# Patient Record
Sex: FEMALE | Race: WHITE | NOT HISPANIC OR LATINO | Employment: OTHER | URBAN - METROPOLITAN AREA
[De-identification: names, ages, dates, MRNs, and addresses within clinical notes are randomized per-mention and may not be internally consistent; named-entity substitution may affect disease eponyms.]

---

## 2017-01-11 ENCOUNTER — TRANSCRIBE ORDERS (OUTPATIENT)
Dept: ADMINISTRATIVE | Facility: HOSPITAL | Age: 77
End: 2017-01-11

## 2017-01-11 DIAGNOSIS — Z12.31 VISIT FOR SCREENING MAMMOGRAM: Primary | ICD-10-CM

## 2017-02-14 ENCOUNTER — HOSPITAL ENCOUNTER (OUTPATIENT)
Dept: MAMMOGRAPHY | Facility: HOSPITAL | Age: 77
Discharge: HOME | End: 2017-02-14
Attending: INTERNAL MEDICINE | Admitting: INTERNAL MEDICINE

## 2017-02-14 DIAGNOSIS — Z12.31 VISIT FOR SCREENING MAMMOGRAM: ICD-10-CM

## 2017-02-14 PROCEDURE — G0202 SCR MAMMO BI INCL CAD: HCPCS

## 2017-02-14 PROCEDURE — G0202 SCR MAMMO BI INCL CAD: HCPCS | Performed by: RADIOLOGY

## 2017-02-14 PROCEDURE — 77063 BREAST TOMOSYNTHESIS BI: CPT

## 2017-02-14 PROCEDURE — 77063 BREAST TOMOSYNTHESIS BI: CPT | Performed by: RADIOLOGY

## 2019-12-10 ENCOUNTER — TRANSCRIBE ORDERS (OUTPATIENT)
Dept: ADMINISTRATIVE | Facility: HOSPITAL | Age: 79
End: 2019-12-10

## 2019-12-10 DIAGNOSIS — R06.09 OTHER FORMS OF DYSPNEA: Primary | ICD-10-CM

## 2020-01-08 ENCOUNTER — HOSPITAL ENCOUNTER (OUTPATIENT)
Dept: CARDIOLOGY | Facility: HOSPITAL | Age: 80
Discharge: HOME | End: 2020-01-08

## 2020-01-08 VITALS — HEIGHT: 66 IN | BODY MASS INDEX: 25.55 KG/M2 | WEIGHT: 159 LBS

## 2020-01-08 DIAGNOSIS — R06.09 OTHER FORMS OF DYSPNEA: ICD-10-CM

## 2020-01-08 LAB
BH CV STRESS BP STAGE 1: NORMAL
BH CV STRESS BP STAGE 3: NORMAL
BH CV STRESS COMMENTS STAGE 1: NORMAL
BH CV STRESS DOSE REGADENOSON STAGE 1: 0.4
BH CV STRESS DURATION MIN STAGE 1: 1
BH CV STRESS DURATION MIN STAGE 2: 1
BH CV STRESS DURATION MIN STAGE 3: 1
BH CV STRESS DURATION MIN STAGE 4: 1
BH CV STRESS DURATION SEC STAGE 1: 10
BH CV STRESS DURATION SEC STAGE 2: 0
BH CV STRESS HR STAGE 1: 95
BH CV STRESS HR STAGE 2: 95
BH CV STRESS HR STAGE 3: 95
BH CV STRESS HR STAGE 4: 93
BH CV STRESS PROTOCOL 1: NORMAL
BH CV STRESS RECOVERY BP: NORMAL MMHG
BH CV STRESS RECOVERY HR: 96 BPM
BH CV STRESS STAGE 1: 1
BH CV STRESS STAGE 2: 2
BH CV STRESS STAGE 3: 3
BH CV STRESS STAGE 4: 4
LV EF NUC BP: 68 %
MAXIMAL PREDICTED HEART RATE: 140 BPM
PERCENT MAX PREDICTED HR: 70.71 %
STRESS BASELINE BP: NORMAL MMHG
STRESS BASELINE HR: 71 BPM
STRESS PERCENT HR: 83 %
STRESS POST PEAK BP: NORMAL MMHG
STRESS POST PEAK HR: 99 BPM
STRESS TARGET HR: 119 BPM

## 2020-01-08 PROCEDURE — 78452 HT MUSCLE IMAGE SPECT MULT: CPT

## 2020-01-08 PROCEDURE — 25010000002 REGADENOSON 0.4 MG/5ML SOLUTION: Performed by: INTERNAL MEDICINE

## 2020-01-08 PROCEDURE — A9500 TC99M SESTAMIBI: HCPCS | Performed by: INTERNAL MEDICINE

## 2020-01-08 PROCEDURE — 0 TECHNETIUM SESTAMIBI: Performed by: INTERNAL MEDICINE

## 2020-01-08 PROCEDURE — 93017 CV STRESS TEST TRACING ONLY: CPT

## 2020-01-08 RX ORDER — RAMIPRIL 1.25 MG/1
1.25 CAPSULE ORAL DAILY
COMMUNITY
Start: 2019-12-11 | End: 2021-08-02 | Stop reason: HOSPADM

## 2020-01-08 RX ORDER — SERTRALINE HYDROCHLORIDE 100 MG/1
150 TABLET, FILM COATED ORAL DAILY
COMMUNITY
Start: 2019-11-13

## 2020-01-08 RX ORDER — ALPRAZOLAM 0.5 MG/1
0.5 TABLET ORAL NIGHTLY PRN
Status: ON HOLD | COMMUNITY
Start: 2019-11-22 | End: 2021-08-26 | Stop reason: SDUPTHER

## 2020-01-08 RX ORDER — AMLODIPINE BESYLATE 5 MG/1
5 TABLET ORAL DAILY
COMMUNITY
Start: 2019-11-13 | End: 2021-05-10 | Stop reason: ALTCHOICE

## 2020-01-08 RX ORDER — HYDROCHLOROTHIAZIDE 25 MG/1
25 TABLET ORAL DAILY
COMMUNITY
Start: 2019-10-15 | End: 2021-08-02 | Stop reason: HOSPADM

## 2020-01-08 RX ORDER — CAFFEINE CITRATE 20 MG/ML
60 SOLUTION INTRAVENOUS ONCE
Status: COMPLETED | OUTPATIENT
Start: 2020-01-08 | End: 2020-01-08

## 2020-01-08 RX ORDER — SIMVASTATIN 20 MG
TABLET ORAL
COMMUNITY
Start: 2019-11-04 | End: 2021-05-10

## 2020-01-08 RX ORDER — TIOTROPIUM BROMIDE AND OLODATEROL 3.124; 2.736 UG/1; UG/1
2 SPRAY, METERED RESPIRATORY (INHALATION) DAILY PRN
Status: ON HOLD | COMMUNITY
Start: 2019-10-09 | End: 2021-10-04

## 2020-01-08 RX ORDER — HYDROCODONE BITARTRATE AND ACETAMINOPHEN 7.5; 325 MG/1; MG/1
1 TABLET ORAL EVERY 6 HOURS PRN
Status: ON HOLD | COMMUNITY
End: 2022-02-12 | Stop reason: SDUPTHER

## 2020-01-08 RX ADMIN — REGADENOSON 0.4 MG: 0.08 INJECTION, SOLUTION INTRAVENOUS at 12:01

## 2020-01-08 RX ADMIN — CAFFEINE CITRATE 60 MG: 20 INJECTION, SOLUTION INTRAVENOUS at 12:11

## 2020-01-08 RX ADMIN — TECHNETIUM TC 99M SESTAMIBI 1 DOSE: 1 INJECTION INTRAVENOUS at 12:00

## 2020-01-08 RX ADMIN — TECHNETIUM TC 99M SESTAMIBI 1 DOSE: 1 INJECTION INTRAVENOUS at 10:15

## 2021-03-22 ENCOUNTER — TRANSCRIBE ORDERS (OUTPATIENT)
Dept: ADMINISTRATIVE | Facility: HOSPITAL | Age: 81
End: 2021-03-22

## 2021-03-22 DIAGNOSIS — I48.91 ATRIAL FIBRILLATION, UNSPECIFIED TYPE (HCC): Primary | ICD-10-CM

## 2021-03-22 DIAGNOSIS — I48.0 PAROXYSMAL ATRIAL FIBRILLATION (HCC): ICD-10-CM

## 2021-04-23 ENCOUNTER — HOSPITAL ENCOUNTER (OUTPATIENT)
Dept: CARDIOLOGY | Facility: HOSPITAL | Age: 81
Discharge: HOME | End: 2021-04-23
Admitting: INTERNAL MEDICINE

## 2021-04-23 VITALS
HEIGHT: 66 IN | BODY MASS INDEX: 25.55 KG/M2 | WEIGHT: 159 LBS | DIASTOLIC BLOOD PRESSURE: 93 MMHG | SYSTOLIC BLOOD PRESSURE: 166 MMHG

## 2021-04-23 DIAGNOSIS — I48.0 PAROXYSMAL ATRIAL FIBRILLATION (HCC): ICD-10-CM

## 2021-04-23 LAB
BH CV ECHO MEAS - AO MAX PG (FULL): 11.9 MMHG
BH CV ECHO MEAS - AO MAX PG: 17.3 MMHG
BH CV ECHO MEAS - AO MEAN PG (FULL): 7.2 MMHG
BH CV ECHO MEAS - AO MEAN PG: 10.2 MMHG
BH CV ECHO MEAS - AO ROOT AREA (BSA CORRECTED): 1.4
BH CV ECHO MEAS - AO ROOT AREA: 5 CM^2
BH CV ECHO MEAS - AO ROOT DIAM: 2.5 CM
BH CV ECHO MEAS - AO V2 MAX: 207.7 CM/SEC
BH CV ECHO MEAS - AO V2 MEAN: 151.8 CM/SEC
BH CV ECHO MEAS - AO V2 VTI: 52.9 CM
BH CV ECHO MEAS - AVA(I,A): 1.5 CM^2
BH CV ECHO MEAS - AVA(I,D): 1.5 CM^2
BH CV ECHO MEAS - AVA(V,A): 1.6 CM^2
BH CV ECHO MEAS - AVA(V,D): 1.6 CM^2
BH CV ECHO MEAS - BSA(HAYCOCK): 1.8 M^2
BH CV ECHO MEAS - BSA: 1.8 M^2
BH CV ECHO MEAS - BZI_BMI: 26.5 KILOGRAMS/M^2
BH CV ECHO MEAS - BZI_METRIC_HEIGHT: 165.1 CM
BH CV ECHO MEAS - BZI_METRIC_WEIGHT: 72.1 KG
BH CV ECHO MEAS - EDV(CUBED): 81.9 ML
BH CV ECHO MEAS - EDV(MOD-SP2): 122 ML
BH CV ECHO MEAS - EDV(MOD-SP4): 137 ML
BH CV ECHO MEAS - EDV(TEICH): 85 ML
BH CV ECHO MEAS - EF(CUBED): 73.4 %
BH CV ECHO MEAS - EF(MOD-BP): 68 %
BH CV ECHO MEAS - EF(MOD-SP2): 72.1 %
BH CV ECHO MEAS - EF(MOD-SP4): 62.8 %
BH CV ECHO MEAS - EF(TEICH): 65.5 %
BH CV ECHO MEAS - ESV(CUBED): 21.8 ML
BH CV ECHO MEAS - ESV(MOD-SP2): 34 ML
BH CV ECHO MEAS - ESV(MOD-SP4): 51 ML
BH CV ECHO MEAS - ESV(TEICH): 29.3 ML
BH CV ECHO MEAS - FS: 35.7 %
BH CV ECHO MEAS - IVS/LVPW: 0.91
BH CV ECHO MEAS - IVSD: 0.93 CM
BH CV ECHO MEAS - LA DIMENSION: 4.8 CM
BH CV ECHO MEAS - LA/AO: 1.9
BH CV ECHO MEAS - LAD MAJOR: 5.3 CM
BH CV ECHO MEAS - LAT PEAK E' VEL: 8.7 CM/SEC
BH CV ECHO MEAS - LATERAL E/E' RATIO: 19.4
BH CV ECHO MEAS - LV DIASTOLIC VOL/BSA (35-75): 76.3 ML/M^2
BH CV ECHO MEAS - LV MASS(C)D: 140.7 GRAMS
BH CV ECHO MEAS - LV MASS(C)DI: 78.4 GRAMS/M^2
BH CV ECHO MEAS - LV MAX PG: 5.3 MMHG
BH CV ECHO MEAS - LV MEAN PG: 3 MMHG
BH CV ECHO MEAS - LV SYSTOLIC VOL/BSA (12-30): 28.4 ML/M^2
BH CV ECHO MEAS - LV V1 MAX: 115.4 CM/SEC
BH CV ECHO MEAS - LV V1 MEAN: 80.3 CM/SEC
BH CV ECHO MEAS - LV V1 VTI: 28.6 CM
BH CV ECHO MEAS - LVIDD: 4.3 CM
BH CV ECHO MEAS - LVIDS: 2.8 CM
BH CV ECHO MEAS - LVLD AP2: 7.8 CM
BH CV ECHO MEAS - LVLD AP4: 8.2 CM
BH CV ECHO MEAS - LVLS AP2: 6.2 CM
BH CV ECHO MEAS - LVLS AP4: 6.6 CM
BH CV ECHO MEAS - LVOT AREA (M): 2.8 CM^2
BH CV ECHO MEAS - LVOT AREA: 2.8 CM^2
BH CV ECHO MEAS - LVOT DIAM: 1.9 CM
BH CV ECHO MEAS - LVPWD: 1 CM
BH CV ECHO MEAS - MED PEAK E' VEL: 6.6 CM/SEC
BH CV ECHO MEAS - MEDIAL E/E' RATIO: 25.4
BH CV ECHO MEAS - MR MAX PG: 152 MMHG
BH CV ECHO MEAS - MR MAX VEL: 615.3 CM/SEC
BH CV ECHO MEAS - MR MEAN PG: 94.9 MMHG
BH CV ECHO MEAS - MR MEAN VEL: 454 CM/SEC
BH CV ECHO MEAS - MR VTI: 195 CM
BH CV ECHO MEAS - MV A MAX VEL: 118.5 CM/SEC
BH CV ECHO MEAS - MV DEC TIME: 0.37 SEC
BH CV ECHO MEAS - MV E MAX VEL: 170.9 CM/SEC
BH CV ECHO MEAS - MV E/A: 1.4
BH CV ECHO MEAS - MV MAX PG: 22 MMHG
BH CV ECHO MEAS - MV MEAN PG: 6.9 MMHG
BH CV ECHO MEAS - MV V2 MAX: 234.6 CM/SEC
BH CV ECHO MEAS - MV V2 MEAN: 114.6 CM/SEC
BH CV ECHO MEAS - MV V2 VTI: 65.8 CM
BH CV ECHO MEAS - MVA(VTI): 1.2 CM^2
BH CV ECHO MEAS - PA ACC SLOPE: 458.5 CM/SEC^2
BH CV ECHO MEAS - PA ACC TIME: 0.17 SEC
BH CV ECHO MEAS - PA MAX PG: 4.6 MMHG
BH CV ECHO MEAS - PA PR(ACCEL): 2.9 MMHG
BH CV ECHO MEAS - PA V2 MAX: 106.9 CM/SEC
BH CV ECHO MEAS - PI END-D VEL: 50.4 CM/SEC
BH CV ECHO MEAS - RAP SYSTOLE: 3 MMHG
BH CV ECHO MEAS - RVSP: 62 MMHG
BH CV ECHO MEAS - SI(AO): 147.4 ML/M^2
BH CV ECHO MEAS - SI(CUBED): 33.5 ML/M^2
BH CV ECHO MEAS - SI(LVOT): 44.6 ML/M^2
BH CV ECHO MEAS - SI(MOD-SP2): 49 ML/M^2
BH CV ECHO MEAS - SI(MOD-SP4): 47.9 ML/M^2
BH CV ECHO MEAS - SI(TEICH): 31 ML/M^2
BH CV ECHO MEAS - SV(AO): 264.4 ML
BH CV ECHO MEAS - SV(CUBED): 60.1 ML
BH CV ECHO MEAS - SV(LVOT): 80 ML
BH CV ECHO MEAS - SV(MOD-SP2): 88 ML
BH CV ECHO MEAS - SV(MOD-SP4): 86 ML
BH CV ECHO MEAS - SV(TEICH): 55.7 ML
BH CV ECHO MEAS - TAPSE (>1.6): 2.7 CM
BH CV ECHO MEAS - TR MAX PG: 59 MMHG
BH CV ECHO MEAS - TR MAX VEL: 354.6 CM/SEC
BH CV ECHO MEASUREMENTS AVERAGE E/E' RATIO: 22.34
BH CV XLRA - RV BASE: 4.1 CM
BH CV XLRA - RV LENGTH: 5.7 CM
BH CV XLRA - RV MID: 2.5 CM
BH CV XLRA - TDI S': 13.5 CM/SEC
LEFT ATRIUM VOLUME INDEX: 42.4 ML/M^2
LEFT ATRIUM VOLUME: 76 ML
MAXIMAL PREDICTED HEART RATE: 139 BPM
STRESS TARGET HR: 118 BPM

## 2021-04-23 PROCEDURE — 93306 TTE W/DOPPLER COMPLETE: CPT

## 2021-04-23 PROCEDURE — 93306 TTE W/DOPPLER COMPLETE: CPT | Performed by: INTERNAL MEDICINE

## 2021-05-05 ENCOUNTER — HOSPITAL ENCOUNTER (OUTPATIENT)
Dept: GENERAL RADIOLOGY | Facility: HOSPITAL | Age: 81
Discharge: HOME | End: 2021-05-05
Admitting: INTERNAL MEDICINE

## 2021-05-05 ENCOUNTER — TRANSCRIBE ORDERS (OUTPATIENT)
Dept: ADMINISTRATIVE | Facility: HOSPITAL | Age: 81
End: 2021-05-05

## 2021-05-05 DIAGNOSIS — I48.91 ATRIAL FIBRILLATION, UNSPECIFIED TYPE (HCC): Primary | ICD-10-CM

## 2021-05-05 PROCEDURE — 71046 X-RAY EXAM CHEST 2 VIEWS: CPT

## 2021-05-10 ENCOUNTER — OFFICE VISIT (OUTPATIENT)
Dept: CARDIOLOGY | Facility: CLINIC | Age: 81
End: 2021-05-10

## 2021-05-10 VITALS
DIASTOLIC BLOOD PRESSURE: 60 MMHG | SYSTOLIC BLOOD PRESSURE: 110 MMHG | BODY MASS INDEX: 22.98 KG/M2 | HEIGHT: 66 IN | WEIGHT: 143 LBS | HEART RATE: 119 BPM

## 2021-05-10 DIAGNOSIS — I48.91 ATRIAL FIBRILLATION WITH RVR (HCC): Primary | ICD-10-CM

## 2021-05-10 DIAGNOSIS — J43.9 PULMONARY EMPHYSEMA, UNSPECIFIED EMPHYSEMA TYPE (HCC): ICD-10-CM

## 2021-05-10 DIAGNOSIS — I10 ESSENTIAL HYPERTENSION: ICD-10-CM

## 2021-05-10 DIAGNOSIS — I48.0 PAROXYSMAL ATRIAL FIBRILLATION (HCC): ICD-10-CM

## 2021-05-10 PROCEDURE — 99204 OFFICE O/P NEW MOD 45 MIN: CPT | Performed by: INTERNAL MEDICINE

## 2021-05-10 PROCEDURE — 93000 ELECTROCARDIOGRAM COMPLETE: CPT | Performed by: INTERNAL MEDICINE

## 2021-05-10 RX ORDER — MULTIPLE VITAMINS W/ MINERALS TAB 9MG-400MCG
1 TAB ORAL DAILY
COMMUNITY

## 2021-05-10 RX ORDER — ERGOCALCIFEROL (VITAMIN D2) 50 MCG
1 CAPSULE ORAL DAILY
COMMUNITY

## 2021-05-10 RX ORDER — DILTIAZEM HYDROCHLORIDE 180 MG/1
1 CAPSULE, EXTENDED RELEASE ORAL DAILY
COMMUNITY
End: 2021-08-02 | Stop reason: HOSPADM

## 2021-05-10 RX ORDER — MULTIVIT WITH MINERALS/LUTEIN
1000 TABLET ORAL DAILY
Status: ON HOLD | COMMUNITY
End: 2021-08-23

## 2021-05-10 RX ORDER — CHLORAL HYDRATE 500 MG
1000 CAPSULE ORAL
COMMUNITY

## 2021-05-10 NOTE — PROGRESS NOTES
Subjective:     Encounter Date:05/10/2021    Primary Care Physician: Santiago Chaudhry MD      Patient ID: Yin Law is a 80 y.o. female.    Chief Complaint:Atrial Fibrillation    PROBLEM LIST:  1. Atrial fibrillation  a. Diagnosed 3/2021  b. CHADSVASC- 4  c. 4/23/2021 EF 66 to 70%.  Severe mitral calcification.  Moderate mitral regurgitation with multiple jets.  Moderate mitral valve stenosis.  RVSP greater than 55 mmHg.  Severe pulmonary hypertension.  Moderate calcification of aortic valve mean gradient 10 mmHg.  2. Hypertension  3. Dyslipidemia  a. 1/8/2020 MPS EF is 68%.  Normal perfusion, no ischemia.  4. Anxiety/depression  5. Asthma  6. COPD  7. Tobacco abuse, ongoing  8. Rheumatoid arthritis  9. Osteoporosis  10. Surgeries:  a. Right colectomy  b. Hysterectomy Total  c. Cholecystectomy  d. Appendectomy  e. Bilateral cataract surgery  f. Left carpal tunnel surgery  g. Tonsillectomy      No Known Allergies      Current Outpatient Medications:   •  ALPRAZolam (XANAX) 0.5 MG tablet, Take 0.5 mg by mouth At Night As Needed., Disp: , Rfl:   •  apixaban (ELIQUIS) 5 MG tablet tablet, Take 5 mg by mouth 2 (Two) Times a Day., Disp: , Rfl:   •  ascorbic acid (VITAMIN C) 1000 MG tablet, Take 1,000 mg by mouth Daily., Disp: , Rfl:   •  Calcium Carbonate (CALCIUM 600 PO), Take 1 tablet by mouth Daily., Disp: , Rfl:   •  dilTIAZem (TIAZAC) 180 MG 24 hr capsule, Take 1 capsule by mouth Daily., Disp: , Rfl:   •  hydroCHLOROthiazide (HYDRODIURIL) 25 MG tablet, Take 25 mg by mouth Daily., Disp: , Rfl:   •  HYDROcodone-acetaminophen (NORCO) 7.5-325 MG per tablet, Take 1 tablet by mouth Daily As Needed., Disp: , Rfl:   •  multivitamin with minerals (CENTRUM SILVER ADULT 50+ PO), Take 1 tablet by mouth Daily., Disp: , Rfl:   •  Omega-3 Fatty Acids (fish oil) 1000 MG capsule capsule, Take 1,000 mg by mouth Daily With Breakfast., Disp: , Rfl:   •  ramipril (ALTACE) 1.25 MG capsule, Take 1.25 mg by mouth Daily., Disp: ,  Rfl:   •  sertraline (ZOLOFT) 100 MG tablet, Take  by mouth Daily. 1.5 tab daily, Disp: , Rfl:   •  STIOLTO RESPIMAT 2.5-2.5 MCG/ACT aerosol solution inhaler, Inhale 2 puffs Daily As Needed., Disp: , Rfl:   •  Vitamin D, Ergocalciferol, 50 MCG (2000 UT) capsule, Take 1 tablet by mouth Daily., Disp: , Rfl:   •  VITAMIN E PO, Take 1 tablet by mouth Daily., Disp: , Rfl:         History of Present Illness    Patient is an 80-year-old  female who is being seen today for further evaluation of atrial fibrillation with rapid ventricular response.  She has no previous history of this.  She notes that she was going for a routine follow-up and was noting a nervous sensation in her chest.  She notes that her vitals were checked with pulse oximetry and her heart rate was noted to be elevated.  She was then given an EKG which showed atrial fibrillation with rapid ventricular response.  She was started on medical therapy for this.  Since this time she has noted significant increase of shortness of breath.  She has been maintained on anticoagulation.  Denies any chest pain, pressure, tightness.  Denies any syncope, near syncope.  Has had some edema primarily in her left lower extremity.    The following portions of the patient's history were reviewed and updated as appropriate: allergies, current medications, past family history, past medical history, past social history, past surgical history and problem list.    Family History   Problem Relation Age of Onset   • Alzheimer's disease Mother    • Parkinsonism Brother        Social History     Tobacco Use   • Smoking status: Current Every Day Smoker     Packs/day: 0.25     Types: Cigarettes   • Smokeless tobacco: Never Used   • Tobacco comment: 1-2 cigs occas   Vaping Use   • Vaping Use: Never used   Substance Use Topics   • Alcohol use: Never   • Drug use: Never         Review of Systems   Constitutional: Positive for malaise/fatigue. Negative for fever.   HENT: Positive  "for hearing loss and tinnitus. Negative for nosebleeds.    Eyes: Positive for blurred vision. Negative for redness and visual disturbance.   Cardiovascular: Positive for leg swelling, orthopnea and palpitations. Negative for paroxysmal nocturnal dyspnea.   Respiratory: Positive for shortness of breath. Negative for cough, snoring, sputum production and wheezing.    Endocrine: Positive for heat intolerance.   Hematologic/Lymphatic: Negative for bleeding problem.   Skin: Negative for flushing, itching and rash.   Musculoskeletal: Positive for arthritis, muscle weakness and myalgias. Negative for falls, joint pain and muscle cramps.   Gastrointestinal: Negative for abdominal pain, diarrhea, heartburn, nausea and vomiting.   Genitourinary: Positive for urgency. Negative for hematuria.   Neurological: Positive for excessive daytime sleepiness and dizziness. Negative for headaches, tremors and weakness.   Psychiatric/Behavioral: Negative for substance abuse. The patient is nervous/anxious.           Objective:   /60 (BP Location: Right arm, Patient Position: Sitting)   Pulse 119   Ht 167.6 cm (66\")   Wt 64.9 kg (143 lb)   BMI 23.08 kg/m²         Vitals reviewed.   Constitutional:       Appearance: Healthy appearance. Well-developed and not in distress.   Eyes:      Conjunctiva/sclera: Conjunctivae normal.      Pupils: Pupils are equal, round, and reactive to light.   HENT:      Head: Normocephalic and atraumatic.    Mouth/Throat:      Pharynx: Oropharynx is clear.   Neck:      Thyroid: Thyroid normal. No thyromegaly.      Vascular: Normal carotid pulses. No carotid bruit or JVD. JVD normal.      Lymphadenopathy: No cervical adenopathy.   Pulmonary:      Effort: No respiratory distress.      Breath sounds: Examination of the right-middle field reveals rhonchi. Examination of the right-lower field reveals rhonchi. Examination of the left-lower field reveals rhonchi. No wheezing. Rhonchi present. No rales.   Chest: "      Chest wall: Not tender to palpatation.   Cardiovascular:      Normal rate. Regular rhythm.      No gallop.   Pulses:     Carotid: 2+ bilaterally.     Dorsalis pedis: 2+ bilaterally.     Posterior tibial: 2+ bilaterally.  Abdominal:      General: There is no distension or abdominal bruit.      Palpations: There is no abdominal mass.      Tenderness: There is no abdominal tenderness. There is no rebound.   Musculoskeletal:         General: No tenderness or deformity.      Extremities: No clubbing present.Skin:     General: Skin is warm and dry. There is no cyanosis.      Findings: No rash.   Neurological:      Mental Status: Alert, oriented to person, place, and time and oriented to person, place and time.           ECG 12 Lead    Date/Time: 5/10/2021 9:34 AM  Performed by: He Montoya MD  Authorized by: He Montoya MD   Comparison: compared with previous ECG from 11/17/2014  Comparison to previous ECG: Sinus rhythm has been replaced with atrial fibrillation with rapid ventricular response.  Rhythm: atrial fibrillation  Other findings: non-specific ST-T wave changes    Clinical impression: abnormal EKG          Labs 11/2020 LDL 57        Assessment:   Assessment/Plan      Diagnoses and all orders for this visit:    1. Atrial fibrillation with RVR (CMS/HCC) (Primary)  -     ECG 12 Lead    2. Pulmonary emphysema, unspecified emphysema type (CMS/HCC)    3. Essential hypertension      1.  Atrial fibrillation with RVR.  Symptomatic, new functional class III dyspnea  2.  History of COPD with pulmonary scarring.  Remote tobacco use.  Abnormal chest x-ray but not hypoxic.  3.  Hypertension, well controlled today on diltiazem.    Recommendations:  1.  Discussed option with the patient his daughter.  Given her symptomatic status, would recommend she undergo cardioversion within the next 1 to 2 weeks.  2.  Continue current anticoagulation therapy.  3.  Withhold antiarrhythmic at this time, given this is her first  event, and reluctant to take medications.  4.  If symptoms do not resolve post cardioversion, will consider ischemic evaluation plus minus repeat pulmonary evaluation.  5.  Further recommendations after the cardioversion       Roslyn SERRATO scribed portions of this dictation for Dr. He Montoya.     I have seen and examined the patient, I have reviewed the note, discussed the case with the advance practice clinician, made necessary changes and I agree with the final note.    He Montoya MD  05/10/21  10:16 EDT        Dictated utilizing Dragon dictation

## 2021-05-17 ENCOUNTER — PREP FOR SURGERY (OUTPATIENT)
Dept: OTHER | Facility: HOSPITAL | Age: 81
End: 2021-05-17

## 2021-05-17 DIAGNOSIS — I48.19 ATRIAL FIBRILLATION, PERSISTENT (HCC): Primary | ICD-10-CM

## 2021-05-17 RX ORDER — ONDANSETRON 2 MG/ML
4 INJECTION INTRAMUSCULAR; INTRAVENOUS EVERY 6 HOURS PRN
Status: CANCELLED | OUTPATIENT
Start: 2021-05-17

## 2021-05-17 RX ORDER — SODIUM CHLORIDE 0.9 % (FLUSH) 0.9 %
3 SYRINGE (ML) INJECTION EVERY 12 HOURS SCHEDULED
Status: CANCELLED | OUTPATIENT
Start: 2021-05-17

## 2021-05-17 RX ORDER — SODIUM CHLORIDE 0.9 % (FLUSH) 0.9 %
1-10 SYRINGE (ML) INJECTION AS NEEDED
Status: CANCELLED | OUTPATIENT
Start: 2021-05-17

## 2021-05-21 ENCOUNTER — APPOINTMENT (OUTPATIENT)
Dept: CARDIOLOGY | Facility: HOSPITAL | Age: 81
End: 2021-05-21

## 2021-05-21 ENCOUNTER — HOSPITAL ENCOUNTER (OUTPATIENT)
Dept: CARDIOLOGY | Facility: HOSPITAL | Age: 81
Discharge: HOME | End: 2021-05-21
Attending: INTERNAL MEDICINE | Admitting: INTERNAL MEDICINE

## 2021-05-21 VITALS
SYSTOLIC BLOOD PRESSURE: 101 MMHG | WEIGHT: 143.3 LBS | BODY MASS INDEX: 23.03 KG/M2 | HEART RATE: 98 BPM | RESPIRATION RATE: 16 BRPM | HEIGHT: 66 IN | DIASTOLIC BLOOD PRESSURE: 60 MMHG | TEMPERATURE: 97.4 F | OXYGEN SATURATION: 100 %

## 2021-05-21 DIAGNOSIS — I48.91 ATRIAL FIBRILLATION, UNSPECIFIED TYPE (HCC): ICD-10-CM

## 2021-05-21 DIAGNOSIS — I48.19 ATRIAL FIBRILLATION, PERSISTENT (HCC): ICD-10-CM

## 2021-05-21 LAB
ALBUMIN SERPL-MCNC: 3.9 G/DL (ref 3.5–5.2)
ALBUMIN/GLOB SERPL: 1.6 G/DL
ALP SERPL-CCNC: 102 U/L (ref 39–117)
ALT SERPL W P-5'-P-CCNC: 14 U/L (ref 1–33)
ANION GAP SERPL CALCULATED.3IONS-SCNC: 9 MMOL/L (ref 5–15)
AST SERPL-CCNC: 26 U/L (ref 1–32)
BILIRUB SERPL-MCNC: 0.6 MG/DL (ref 0–1.2)
BUN SERPL-MCNC: 11 MG/DL (ref 8–23)
BUN/CREAT SERPL: 18 (ref 7–25)
CALCIUM SPEC-SCNC: 9.2 MG/DL (ref 8.6–10.5)
CHLORIDE SERPL-SCNC: 99 MMOL/L (ref 98–107)
CHOLEST SERPL-MCNC: 138 MG/DL (ref 0–200)
CO2 SERPL-SCNC: 27 MMOL/L (ref 22–29)
CREAT SERPL-MCNC: 0.61 MG/DL (ref 0.57–1)
DEPRECATED RDW RBC AUTO: 42.4 FL (ref 37–54)
ERYTHROCYTE [DISTWIDTH] IN BLOOD BY AUTOMATED COUNT: 12.6 % (ref 12.3–15.4)
GFR SERPL CREATININE-BSD FRML MDRD: 94 ML/MIN/1.73
GLOBULIN UR ELPH-MCNC: 2.4 GM/DL
GLUCOSE SERPL-MCNC: 98 MG/DL (ref 65–99)
HBA1C MFR BLD: 4.6 % (ref 4.8–5.6)
HCT VFR BLD AUTO: 31.8 % (ref 34–46.6)
HDLC SERPL-MCNC: 56 MG/DL (ref 40–60)
HGB BLD-MCNC: 9.7 G/DL (ref 12–15.9)
LDLC SERPL CALC-MCNC: 70 MG/DL (ref 0–100)
LDLC/HDLC SERPL: 1.25 {RATIO}
MAXIMAL PREDICTED HEART RATE: 139 BPM
MCH RBC QN AUTO: 28 PG (ref 26.6–33)
MCHC RBC AUTO-ENTMCNC: 30.5 G/DL (ref 31.5–35.7)
MCV RBC AUTO: 91.6 FL (ref 79–97)
PLATELET # BLD AUTO: 233 10*3/MM3 (ref 140–450)
PMV BLD AUTO: 10.6 FL (ref 6–12)
POTASSIUM SERPL-SCNC: 4 MMOL/L (ref 3.5–5.2)
PROT SERPL-MCNC: 6.3 G/DL (ref 6–8.5)
RBC # BLD AUTO: 3.47 10*6/MM3 (ref 3.77–5.28)
SODIUM SERPL-SCNC: 135 MMOL/L (ref 136–145)
STRESS TARGET HR: 118 BPM
TRIGL SERPL-MCNC: 59 MG/DL (ref 0–150)
VLDLC SERPL-MCNC: 12 MG/DL (ref 5–40)
WBC # BLD AUTO: 4.83 10*3/MM3 (ref 3.4–10.8)

## 2021-05-21 PROCEDURE — 80053 COMPREHEN METABOLIC PANEL: CPT | Performed by: NURSE PRACTITIONER

## 2021-05-21 PROCEDURE — 92960 CARDIOVERSION ELECTRIC EXT: CPT | Performed by: INTERNAL MEDICINE

## 2021-05-21 PROCEDURE — 82947 ASSAY GLUCOSE BLOOD QUANT: CPT

## 2021-05-21 PROCEDURE — 83036 HEMOGLOBIN GLYCOSYLATED A1C: CPT | Performed by: NURSE PRACTITIONER

## 2021-05-21 PROCEDURE — 85014 HEMATOCRIT: CPT

## 2021-05-21 PROCEDURE — 80061 LIPID PANEL: CPT | Performed by: NURSE PRACTITIONER

## 2021-05-21 PROCEDURE — 82565 ASSAY OF CREATININE: CPT

## 2021-05-21 PROCEDURE — 84295 ASSAY OF SERUM SODIUM: CPT

## 2021-05-21 PROCEDURE — 92960 CARDIOVERSION ELECTRIC EXT: CPT

## 2021-05-21 PROCEDURE — 85027 COMPLETE CBC AUTOMATED: CPT | Performed by: NURSE PRACTITIONER

## 2021-05-21 PROCEDURE — 84132 ASSAY OF SERUM POTASSIUM: CPT

## 2021-05-21 PROCEDURE — 25010000002 MIDAZOLAM PER 1 MG: Performed by: INTERNAL MEDICINE

## 2021-05-21 PROCEDURE — 82330 ASSAY OF CALCIUM: CPT

## 2021-05-21 PROCEDURE — 36415 COLL VENOUS BLD VENIPUNCTURE: CPT

## 2021-05-21 PROCEDURE — 82803 BLOOD GASES ANY COMBINATION: CPT

## 2021-05-21 RX ORDER — MIDAZOLAM HYDROCHLORIDE 1 MG/ML
INJECTION INTRAMUSCULAR; INTRAVENOUS
Status: DISCONTINUED
Start: 2021-05-21 | End: 2021-05-21 | Stop reason: HOSPADM

## 2021-05-21 RX ORDER — MIDAZOLAM HYDROCHLORIDE 1 MG/ML
INJECTION INTRAMUSCULAR; INTRAVENOUS
Status: COMPLETED | OUTPATIENT
Start: 2021-05-21 | End: 2021-05-21

## 2021-05-21 RX ORDER — AMIODARONE HYDROCHLORIDE 200 MG/1
200 TABLET ORAL 2 TIMES DAILY
Qty: 60 TABLET | Refills: 5 | Status: SHIPPED | OUTPATIENT
Start: 2021-05-21 | End: 2021-08-02 | Stop reason: HOSPADM

## 2021-05-21 RX ORDER — SODIUM CHLORIDE 0.9 % (FLUSH) 0.9 %
1-10 SYRINGE (ML) INJECTION AS NEEDED
Status: DISCONTINUED | OUTPATIENT
Start: 2021-05-21 | End: 2021-05-21 | Stop reason: HOSPADM

## 2021-05-21 RX ORDER — ONDANSETRON 2 MG/ML
4 INJECTION INTRAMUSCULAR; INTRAVENOUS EVERY 6 HOURS PRN
Status: DISCONTINUED | OUTPATIENT
Start: 2021-05-21 | End: 2021-05-21 | Stop reason: HOSPADM

## 2021-05-21 RX ORDER — SODIUM CHLORIDE 0.9 % (FLUSH) 0.9 %
3 SYRINGE (ML) INJECTION EVERY 12 HOURS SCHEDULED
Status: DISCONTINUED | OUTPATIENT
Start: 2021-05-21 | End: 2021-05-21 | Stop reason: HOSPADM

## 2021-05-21 RX ADMIN — MIDAZOLAM 2 MG: 1 INJECTION INTRAMUSCULAR; INTRAVENOUS at 13:04

## 2021-05-21 RX ADMIN — METHOHEXITAL SODIUM 20 MG: 500 INJECTION, POWDER, LYOPHILIZED, FOR SOLUTION INTRAMUSCULAR; INTRAVENOUS; RECTAL at 13:04

## 2021-05-25 LAB
BASE EXCESS BLDA CALC-SCNC: 6 MMOL/L (ref -5–5)
CA-I BLDA-SCNC: 1.25 MMOL/L (ref 1.2–1.32)
CO2 BLDA-SCNC: 31 MMOL/L (ref 24–29)
CREAT BLDA-MCNC: 0.6 MG/DL (ref 0.6–1.3)
GLUCOSE BLDC GLUCOMTR-MCNC: 97 MG/DL (ref 70–130)
HCO3 BLDA-SCNC: 30 MMOL/L (ref 22–26)
HCT VFR BLDA CALC: 32 % (ref 38–51)
HGB BLDA-MCNC: 10.9 G/DL (ref 12–17)
PCO2 BLDA: 44.1 MM HG (ref 35–45)
PH BLDA: 7.44 PH UNITS (ref 7.35–7.6)
PO2 BLDA: 26 MMHG (ref 80–105)
POTASSIUM BLDA-SCNC: 3.8 MMOL/L (ref 3.5–4.9)
SAO2 % BLDA: 49 % (ref 95–98)
SODIUM BLD-SCNC: 136 MMOL/L (ref 138–146)

## 2021-06-17 ENCOUNTER — PREP FOR SURGERY (OUTPATIENT)
Dept: OTHER | Facility: HOSPITAL | Age: 81
End: 2021-06-17

## 2021-06-17 DIAGNOSIS — I48.19 ATRIAL FIBRILLATION, PERSISTENT (HCC): Primary | ICD-10-CM

## 2021-06-17 RX ORDER — SODIUM CHLORIDE 0.9 % (FLUSH) 0.9 %
3 SYRINGE (ML) INJECTION EVERY 12 HOURS SCHEDULED
Status: CANCELLED | OUTPATIENT
Start: 2021-06-17

## 2021-06-17 RX ORDER — ONDANSETRON 2 MG/ML
4 INJECTION INTRAMUSCULAR; INTRAVENOUS EVERY 6 HOURS PRN
Status: CANCELLED | OUTPATIENT
Start: 2021-06-17

## 2021-06-17 RX ORDER — SODIUM CHLORIDE 0.9 % (FLUSH) 0.9 %
1-10 SYRINGE (ML) INJECTION AS NEEDED
Status: CANCELLED | OUTPATIENT
Start: 2021-06-17

## 2021-06-18 ENCOUNTER — HOSPITAL ENCOUNTER (OUTPATIENT)
Dept: CARDIOLOGY | Facility: HOSPITAL | Age: 81
Discharge: HOME | End: 2021-06-18
Attending: INTERNAL MEDICINE | Admitting: INTERNAL MEDICINE

## 2021-06-18 VITALS
SYSTOLIC BLOOD PRESSURE: 168 MMHG | TEMPERATURE: 97.8 F | BODY MASS INDEX: 23.13 KG/M2 | DIASTOLIC BLOOD PRESSURE: 62 MMHG | HEIGHT: 66 IN | HEART RATE: 64 BPM | OXYGEN SATURATION: 98 %

## 2021-06-18 DIAGNOSIS — I48.91 ATRIAL FIBRILLATION, UNSPECIFIED TYPE (HCC): ICD-10-CM

## 2021-06-18 PROCEDURE — 93010 ELECTROCARDIOGRAM REPORT: CPT | Performed by: INTERNAL MEDICINE

## 2021-06-18 PROCEDURE — 93005 ELECTROCARDIOGRAM TRACING: CPT

## 2021-06-18 NOTE — H&P
[facility] Consult/H&P Note      Referring Provider: He Montoya MD  Primary Provider:  Santiago Chaudhry MD  Reason for Consultation: Afib    Problem list:    1. Atrial fibrillation  a. Diagnosed 3/2021  b. CHADSVASC- 4, on Eliquis  c. 4/23/2021 EF 66 to 70%.  Severe mitral calcification.  Moderate mitral regurgitation with multiple jets.  Moderate mitral valve stenosis.  RVSP greater than 55 mmHg.  Severe pulmonary hypertension.  Moderate calcification of aortic valve mean gradient 10 mmHg.  d. 5/21/21, s/p ECV but only maintained NSR for 10-15 seconds. Subsequently started on amiodarone.   e. Returned on 6/18/21 for repeat ECV.   2. Hypertension  3. Dyslipidemia  a. 1/8/2020 MPS EF is 68%.  Normal perfusion, no ischemia.  4. Anxiety/depression  5. Asthma  6. COPD  7. Tobacco abuse, ongoing  8. Rheumatoid arthritis  9. Osteoporosis  10. Surgeries:  a. Right colectomy  b. Hysterectomy Total  c. Cholecystectomy  d. Appendectomy  e. Bilateral cataract surgery  f. Left carpal tunnel surgery  g. Tonsillectomy        HISTORY OF PRESENT ILLNESS:  Yin Law is a 80 y.o. female with the above noted pmhx who presents today for ECV. Patient with known history of atrial fibrillation. She was diagnosed in March 2021 and seen by Dr. Montoya in office on 5/10/21 at which time she was set up for ECV. She underwent ECV on 5/21/21 but only maintained NSR for about 10-15 seconds. She was then started on amiodarone and now returns for repeat ECV in NSR.       REVIEW OF SYSTEMS  Pertinent positives and negatives are listed in the HPI.      SOCIAL HISTORY:   reports that she has been smoking cigarettes. She has been smoking about 0.25 packs per day. She has never used smokeless tobacco. She reports that she does not drink alcohol and does not use drugs.     FAMILY HISTORY:  family history includes Alzheimer's disease in her mother; Parkinsonism in her brother.     CURRENT MEDICATIONS:    Prior to Admission  medications    Medication Sig Start Date End Date Taking? Authorizing Provider   ALPRAZolam (XANAX) 0.5 MG tablet Take 0.5 mg by mouth At Night As Needed. 11/22/19   Laurie Oropeza MD   amiodarone (PACERONE) 200 MG tablet Take 1 tablet by mouth 2 (Two) Times a Day. 5/21/21   He Montoya MD   apixaban (ELIQUIS) 5 MG tablet tablet Take 5 mg by mouth 2 (Two) Times a Day.    Laurie Oropeza MD   ascorbic acid (VITAMIN C) 1000 MG tablet Take 1,000 mg by mouth Daily.    Laurie Oropeza MD   Calcium Carbonate (CALCIUM 600 PO) Take 1 tablet by mouth Daily.    Laurie Oropeza MD   dilTIAZem (TIAZAC) 180 MG 24 hr capsule Take 1 capsule by mouth Daily.    Laurie Oropeza MD   hydroCHLOROthiazide (HYDRODIURIL) 25 MG tablet Take 25 mg by mouth Daily. 10/15/19   Laurie Oropeza MD   HYDROcodone-acetaminophen (NORCO) 7.5-325 MG per tablet Take 1 tablet by mouth Every 6 (Six) Hours As Needed.    Laurie Oropeza MD   multivitamin with minerals (CENTRUM SILVER ADULT 50+ PO) Take 1 tablet by mouth Daily.    Laurie Oropeza MD   Omega-3 Fatty Acids (fish oil) 1000 MG capsule capsule Take 1,000 mg by mouth Daily With Breakfast.    Laurie Oropeza MD   ramipril (ALTACE) 1.25 MG capsule Take 1.25 mg by mouth Daily. 12/11/19   Laurie Oropeza MD   sertraline (ZOLOFT) 100 MG tablet Take 150 mg by mouth Daily. 1.5 tab daily 11/13/19   Laurie Oropeza MD   STIOLTO RESPIMAT 2.5-2.5 MCG/ACT aerosol solution inhaler Inhale 2 puffs Daily As Needed. 10/9/19   Laurie Oropeza MD   Vitamin D, Ergocalciferol, 50 MCG (2000 UT) capsule Take 1 tablet by mouth Daily.    Laurie Oropeza MD   VITAMIN E PO Take 1 tablet by mouth Daily.    Laurie Oropeza MD      Allergies:  Patient has no known allergies.    Objective     Vital Sign Min/Max for last 24 hours  No data recorded   No data recorded   No data recorded   No data recorded   No data recorded   No data recorded    No data recorded     PHYSICAL EXAM:  GENERAL: This is a well-developed, well-nourished, female who is in no acute distress.   SKIN: Pink and warm without rash or abnormality noted.   HEENT: Head is normocephalic and atraumatic. Pupils are equal and reactive to light bilaterally. Mucous membranes are pink and moist.   NECK: Supple without lymphadenopathy or thyromegaly. There is no jugular venous distention at 30°.  LUNGS: Clear to auscultation bilaterally without wheezing, rhonchi, or rales noted.   CARDIOVASCULAR: The heart has a regular rate with a normal S1 and S2. There is no murmur, gallop, rub, or click appreciated. The PMI is nondisplaced. Carotid upstrokes are 2+ and symmetrical without bruits.   ABDOMEN: Soft and nondistended with positive bowel sounds x4. The patient denies tenderness of palpitation.   MUSCULOSKELETAL: There are no obvious bony abnormalities. Normal range of tenderness to palpation.   NEUROLOGICAL: Nonfocal. Alert and oriented x3.   PERIPHERAL VASCULAR: Femoral pulses are 2+ and symmetrical without bruits. Posterior tibial and dorsalis pedis pulses are 2+ and symmetrical. There is no peripheral edema.   PSYCH: Normal mood and affect.      EKG:  NSR  Tele: NSR    LABS:      Lab Results   Component Value Date    WBC 4.83 05/21/2021    HGB 10.9 (L) 05/21/2021    HCT 32 (L) 05/21/2021    MCV 91.6 05/21/2021     05/21/2021     Lab Results   Component Value Date    GLUCOSE 98 05/21/2021    BUN 11 05/21/2021    CREATININE 0.60 05/21/2021    EGFRIFNONA 94 05/21/2021    BCR 18.0 05/21/2021    K 4.0 05/21/2021    CO2 27.0 05/21/2021    CALCIUM 9.2 05/21/2021    ALBUMIN 3.90 05/21/2021    AST 26 05/21/2021    ALT 14 05/21/2021     No results found for: CKTOTAL, CKMB, CKMBINDEX, TROPONINI, TROPONINT  No results found for: INR, PROTIME  Lab Results   Component Value Date    CHOL 138 05/21/2021    TRIG 59 05/21/2021    HDL 56 05/21/2021    LDL 70 05/21/2021        Results Review: I reviewed the  patient's new clinical results.      ASSESSMENT:    1.  Persistent atrial fibrillation  a. CHADSVASC = 4, Eliquis  b. On amiodarone   c. Symptomatic with SOA.   Patient presents today for ECV but is in NSR.     PLAN:    1. Cancel ECV.   2. D/C home.   3. Continue amiodarone and Eliquis for Afib.   4. Follow up with Dr. Montoya in 6 weeks.         Electronically signed by AMA Sanchez, 06/18/21, 10:23 AM EDT.

## 2021-06-23 ENCOUNTER — TELEPHONE (OUTPATIENT)
Dept: CARDIOLOGY | Facility: CLINIC | Age: 81
End: 2021-06-23

## 2021-06-23 NOTE — TELEPHONE ENCOUNTER
Left message for Medina, who left  requesting to talk to TM about patient, advised in [facility], please call back and leave detailed message so my let Dr. Montoya know.

## 2021-06-25 LAB
QT INTERVAL: 474 MS
QTC INTERVAL: 485 MS

## 2021-06-28 ENCOUNTER — TELEPHONE (OUTPATIENT)
Dept: CARDIOLOGY | Facility: CLINIC | Age: 81
End: 2021-06-28

## 2021-06-28 NOTE — TELEPHONE ENCOUNTER
Spoke with Medina, advised Dr. Montoya has reviewed records/labs, agrees with plan to hold Eliquis until post GI work up.

## 2021-07-19 ENCOUNTER — APPOINTMENT (OUTPATIENT)
Dept: GENERAL RADIOLOGY | Facility: HOSPITAL | Age: 81
End: 2021-07-19

## 2021-07-19 ENCOUNTER — APPOINTMENT (OUTPATIENT)
Dept: CT IMAGING | Facility: HOSPITAL | Age: 81
End: 2021-07-19

## 2021-07-19 ENCOUNTER — HOSPITAL ENCOUNTER (INPATIENT)
Facility: HOSPITAL | Age: 81
LOS: 14 days | Discharge: HOME HEALTH | End: 2021-08-02
Attending: EMERGENCY MEDICINE | Admitting: INTERNAL MEDICINE

## 2021-07-19 DIAGNOSIS — I50.9 ACUTE ON CHRONIC CONGESTIVE HEART FAILURE, UNSPECIFIED HEART FAILURE TYPE (HCC): ICD-10-CM

## 2021-07-19 DIAGNOSIS — J18.9 MULTIFOCAL PNEUMONIA: Primary | ICD-10-CM

## 2021-07-19 DIAGNOSIS — J44.9 CHRONIC OBSTRUCTIVE PULMONARY DISEASE, UNSPECIFIED COPD TYPE (HCC): ICD-10-CM

## 2021-07-19 DIAGNOSIS — Z74.09 IMPAIRED FUNCTIONAL MOBILITY, BALANCE, GAIT, AND ENDURANCE: ICD-10-CM

## 2021-07-19 DIAGNOSIS — R09.02 HYPOXIA: ICD-10-CM

## 2021-07-19 DIAGNOSIS — E87.6 HYPOKALEMIA: ICD-10-CM

## 2021-07-19 PROBLEM — I48.91 ATRIAL FIBRILLATION: Status: ACTIVE | Noted: 2021-07-19

## 2021-07-19 LAB
ALBUMIN SERPL-MCNC: 3.8 G/DL (ref 3.5–5.2)
ALBUMIN/GLOB SERPL: 1.2 G/DL
ALP SERPL-CCNC: 173 U/L (ref 39–117)
ALT SERPL W P-5'-P-CCNC: 41 U/L (ref 1–33)
ANION GAP SERPL CALCULATED.3IONS-SCNC: 14 MMOL/L (ref 5–15)
AST SERPL-CCNC: 55 U/L (ref 1–32)
B PARAPERT DNA SPEC QL NAA+PROBE: NOT DETECTED
B PERT DNA SPEC QL NAA+PROBE: NOT DETECTED
BASOPHILS # BLD AUTO: 0.03 10*3/MM3 (ref 0–0.2)
BASOPHILS NFR BLD AUTO: 0.2 % (ref 0–1.5)
BILIRUB SERPL-MCNC: 0.5 MG/DL (ref 0–1.2)
BUN SERPL-MCNC: 14 MG/DL (ref 8–23)
BUN/CREAT SERPL: 16.7 (ref 7–25)
C PNEUM DNA NPH QL NAA+NON-PROBE: NOT DETECTED
CALCIUM SPEC-SCNC: 9.3 MG/DL (ref 8.6–10.5)
CHLORIDE SERPL-SCNC: 92 MMOL/L (ref 98–107)
CO2 SERPL-SCNC: 24 MMOL/L (ref 22–29)
CREAT SERPL-MCNC: 0.84 MG/DL (ref 0.57–1)
D-LACTATE SERPL-SCNC: 2.5 MMOL/L (ref 0.5–2)
D-LACTATE SERPL-SCNC: 3 MMOL/L (ref 0.5–2)
DEPRECATED RDW RBC AUTO: 42.5 FL (ref 37–54)
EOSINOPHIL # BLD AUTO: 0.03 10*3/MM3 (ref 0–0.4)
EOSINOPHIL NFR BLD AUTO: 0.2 % (ref 0.3–6.2)
ERYTHROCYTE [DISTWIDTH] IN BLOOD BY AUTOMATED COUNT: 14.4 % (ref 12.3–15.4)
FLUAV SUBTYP SPEC NAA+PROBE: NOT DETECTED
FLUBV RNA ISLT QL NAA+PROBE: NOT DETECTED
GFR SERPL CREATININE-BSD FRML MDRD: 65 ML/MIN/1.73
GLOBULIN UR ELPH-MCNC: 3.3 GM/DL
GLUCOSE SERPL-MCNC: 154 MG/DL (ref 65–99)
HADV DNA SPEC NAA+PROBE: NOT DETECTED
HCOV 229E RNA SPEC QL NAA+PROBE: NOT DETECTED
HCOV HKU1 RNA SPEC QL NAA+PROBE: NOT DETECTED
HCOV NL63 RNA SPEC QL NAA+PROBE: NOT DETECTED
HCOV OC43 RNA SPEC QL NAA+PROBE: NOT DETECTED
HCT VFR BLD AUTO: 32.1 % (ref 34–46.6)
HGB BLD-MCNC: 9.5 G/DL (ref 12–15.9)
HMPV RNA NPH QL NAA+NON-PROBE: NOT DETECTED
HOLD SPECIMEN: NORMAL
HPIV1 RNA SPEC QL NAA+PROBE: NOT DETECTED
HPIV2 RNA SPEC QL NAA+PROBE: NOT DETECTED
HPIV3 RNA NPH QL NAA+PROBE: NOT DETECTED
HPIV4 P GENE NPH QL NAA+PROBE: NOT DETECTED
IMM GRANULOCYTES # BLD AUTO: 0.13 10*3/MM3 (ref 0–0.05)
IMM GRANULOCYTES NFR BLD AUTO: 0.8 % (ref 0–0.5)
LYMPHOCYTES # BLD AUTO: 0.5 10*3/MM3 (ref 0.7–3.1)
LYMPHOCYTES NFR BLD AUTO: 3.2 % (ref 19.6–45.3)
M PNEUMO IGG SER IA-ACNC: NOT DETECTED
MAGNESIUM SERPL-MCNC: 2.2 MG/DL (ref 1.6–2.4)
MCH RBC QN AUTO: 23.8 PG (ref 26.6–33)
MCHC RBC AUTO-ENTMCNC: 29.6 G/DL (ref 31.5–35.7)
MCV RBC AUTO: 80.5 FL (ref 79–97)
MONOCYTES # BLD AUTO: 1.2 10*3/MM3 (ref 0.1–0.9)
MONOCYTES NFR BLD AUTO: 7.6 % (ref 5–12)
NEUTROPHILS NFR BLD AUTO: 13.86 10*3/MM3 (ref 1.7–7)
NEUTROPHILS NFR BLD AUTO: 88 % (ref 42.7–76)
NRBC BLD AUTO-RTO: 0 /100 WBC (ref 0–0.2)
NT-PROBNP SERPL-MCNC: 3674 PG/ML (ref 0–1800)
PLATELET # BLD AUTO: 288 10*3/MM3 (ref 140–450)
PMV BLD AUTO: 10.4 FL (ref 6–12)
POTASSIUM SERPL-SCNC: 3.3 MMOL/L (ref 3.5–5.2)
PROCALCITONIN SERPL-MCNC: 0.15 NG/ML (ref 0–0.25)
PROT SERPL-MCNC: 7.1 G/DL (ref 6–8.5)
RBC # BLD AUTO: 3.99 10*6/MM3 (ref 3.77–5.28)
RHINOVIRUS RNA SPEC NAA+PROBE: NOT DETECTED
RSV RNA NPH QL NAA+NON-PROBE: NOT DETECTED
SARS-COV-2 RNA NPH QL NAA+NON-PROBE: NOT DETECTED
SODIUM SERPL-SCNC: 130 MMOL/L (ref 136–145)
TROPONIN T SERPL-MCNC: 0.01 NG/ML (ref 0–0.03)
WBC # BLD AUTO: 15.75 10*3/MM3 (ref 3.4–10.8)
WHOLE BLOOD HOLD SPECIMEN: NORMAL

## 2021-07-19 PROCEDURE — 99285 EMERGENCY DEPT VISIT HI MDM: CPT

## 2021-07-19 PROCEDURE — 71275 CT ANGIOGRAPHY CHEST: CPT

## 2021-07-19 PROCEDURE — 25010000002 FUROSEMIDE PER 20 MG: Performed by: INTERNAL MEDICINE

## 2021-07-19 PROCEDURE — 87040 BLOOD CULTURE FOR BACTERIA: CPT | Performed by: EMERGENCY MEDICINE

## 2021-07-19 PROCEDURE — 85025 COMPLETE CBC W/AUTO DIFF WBC: CPT | Performed by: EMERGENCY MEDICINE

## 2021-07-19 PROCEDURE — 0202U NFCT DS 22 TRGT SARS-COV-2: CPT | Performed by: EMERGENCY MEDICINE

## 2021-07-19 PROCEDURE — 83880 ASSAY OF NATRIURETIC PEPTIDE: CPT | Performed by: EMERGENCY MEDICINE

## 2021-07-19 PROCEDURE — 80053 COMPREHEN METABOLIC PANEL: CPT | Performed by: EMERGENCY MEDICINE

## 2021-07-19 PROCEDURE — 93005 ELECTROCARDIOGRAM TRACING: CPT

## 2021-07-19 PROCEDURE — 83735 ASSAY OF MAGNESIUM: CPT | Performed by: PHYSICIAN ASSISTANT

## 2021-07-19 PROCEDURE — 83605 ASSAY OF LACTIC ACID: CPT | Performed by: EMERGENCY MEDICINE

## 2021-07-19 PROCEDURE — 84145 PROCALCITONIN (PCT): CPT | Performed by: INTERNAL MEDICINE

## 2021-07-19 PROCEDURE — 71045 X-RAY EXAM CHEST 1 VIEW: CPT

## 2021-07-19 PROCEDURE — 84484 ASSAY OF TROPONIN QUANT: CPT | Performed by: EMERGENCY MEDICINE

## 2021-07-19 PROCEDURE — 99223 1ST HOSP IP/OBS HIGH 75: CPT | Performed by: INTERNAL MEDICINE

## 2021-07-19 PROCEDURE — 93005 ELECTROCARDIOGRAM TRACING: CPT | Performed by: EMERGENCY MEDICINE

## 2021-07-19 PROCEDURE — 25010000003 CEFTRIAXONE PER 250 MG: Performed by: EMERGENCY MEDICINE

## 2021-07-19 PROCEDURE — 0 IOPAMIDOL PER 1 ML: Performed by: EMERGENCY MEDICINE

## 2021-07-19 RX ORDER — POTASSIUM CHLORIDE 1.5 G/1.77G
40 POWDER, FOR SOLUTION ORAL AS NEEDED
Status: DISCONTINUED | OUTPATIENT
Start: 2021-07-19 | End: 2021-07-20 | Stop reason: SDUPTHER

## 2021-07-19 RX ORDER — ASCORBIC ACID 500 MG
1000 TABLET ORAL DAILY
Status: DISCONTINUED | OUTPATIENT
Start: 2021-07-20 | End: 2021-08-02 | Stop reason: HOSPADM

## 2021-07-19 RX ORDER — SODIUM CHLORIDE 0.9 % (FLUSH) 0.9 %
10 SYRINGE (ML) INJECTION AS NEEDED
Status: DISCONTINUED | OUTPATIENT
Start: 2021-07-19 | End: 2021-07-20 | Stop reason: SDUPTHER

## 2021-07-19 RX ORDER — CEFTRIAXONE SODIUM 2 G/50ML
2 INJECTION, SOLUTION INTRAVENOUS ONCE
Status: COMPLETED | OUTPATIENT
Start: 2021-07-19 | End: 2021-07-19

## 2021-07-19 RX ORDER — SODIUM CHLORIDE 0.9 % (FLUSH) 0.9 %
10 SYRINGE (ML) INJECTION EVERY 12 HOURS SCHEDULED
Status: DISCONTINUED | OUTPATIENT
Start: 2021-07-19 | End: 2021-07-24

## 2021-07-19 RX ORDER — ACETAMINOPHEN 160 MG/5ML
650 SOLUTION ORAL EVERY 4 HOURS PRN
Status: DISCONTINUED | OUTPATIENT
Start: 2021-07-19 | End: 2021-08-02 | Stop reason: HOSPADM

## 2021-07-19 RX ORDER — POTASSIUM CHLORIDE 750 MG/1
40 CAPSULE, EXTENDED RELEASE ORAL AS NEEDED
Status: DISCONTINUED | OUTPATIENT
Start: 2021-07-19 | End: 2021-07-20 | Stop reason: SDUPTHER

## 2021-07-19 RX ORDER — HYDROXYZINE HYDROCHLORIDE 25 MG/1
25 TABLET, FILM COATED ORAL 3 TIMES DAILY PRN
Status: DISCONTINUED | OUTPATIENT
Start: 2021-07-19 | End: 2021-08-02 | Stop reason: HOSPADM

## 2021-07-19 RX ORDER — FUROSEMIDE 10 MG/ML
20 INJECTION INTRAMUSCULAR; INTRAVENOUS ONCE
Status: COMPLETED | OUTPATIENT
Start: 2021-07-19 | End: 2021-07-19

## 2021-07-19 RX ORDER — SODIUM CHLORIDE 0.9 % (FLUSH) 0.9 %
10 SYRINGE (ML) INJECTION AS NEEDED
Status: DISCONTINUED | OUTPATIENT
Start: 2021-07-19 | End: 2021-07-29

## 2021-07-19 RX ORDER — BACITRACIN, NEOMYCIN, POLYMYXIN B 400; 3.5; 5 [USP'U]/G; MG/G; [USP'U]/G
OINTMENT TOPICAL EVERY 8 HOURS SCHEDULED
Status: DISCONTINUED | OUTPATIENT
Start: 2021-07-19 | End: 2021-08-02 | Stop reason: HOSPADM

## 2021-07-19 RX ORDER — POTASSIUM CHLORIDE 7.45 MG/ML
10 INJECTION INTRAVENOUS
Status: DISCONTINUED | OUTPATIENT
Start: 2021-07-19 | End: 2021-07-20 | Stop reason: SDUPTHER

## 2021-07-19 RX ORDER — ACETAMINOPHEN 325 MG/1
650 TABLET ORAL EVERY 4 HOURS PRN
Status: DISCONTINUED | OUTPATIENT
Start: 2021-07-19 | End: 2021-08-02 | Stop reason: HOSPADM

## 2021-07-19 RX ORDER — RAMIPRIL 1.25 MG/1
1.25 CAPSULE ORAL DAILY
Status: DISCONTINUED | OUTPATIENT
Start: 2021-07-20 | End: 2021-07-20

## 2021-07-19 RX ORDER — MULTIPLE VITAMINS W/ MINERALS TAB 9MG-400MCG
1 TAB ORAL DAILY
Status: DISCONTINUED | OUTPATIENT
Start: 2021-07-20 | End: 2021-08-02 | Stop reason: HOSPADM

## 2021-07-19 RX ORDER — HYDROCHLOROTHIAZIDE 25 MG/1
25 TABLET ORAL DAILY
Status: DISCONTINUED | OUTPATIENT
Start: 2021-07-20 | End: 2021-07-20

## 2021-07-19 RX ORDER — IPRATROPIUM BROMIDE AND ALBUTEROL SULFATE 2.5; .5 MG/3ML; MG/3ML
3 SOLUTION RESPIRATORY (INHALATION) EVERY 4 HOURS PRN
Status: DISCONTINUED | OUTPATIENT
Start: 2021-07-19 | End: 2021-07-20

## 2021-07-19 RX ORDER — AMIODARONE HYDROCHLORIDE 200 MG/1
200 TABLET ORAL 2 TIMES DAILY
Status: DISCONTINUED | OUTPATIENT
Start: 2021-07-19 | End: 2021-07-20

## 2021-07-19 RX ORDER — DILTIAZEM HYDROCHLORIDE 180 MG/1
180 CAPSULE, COATED, EXTENDED RELEASE ORAL
Status: DISCONTINUED | OUTPATIENT
Start: 2021-07-20 | End: 2021-07-21

## 2021-07-19 RX ORDER — ACETAMINOPHEN 650 MG/1
650 SUPPOSITORY RECTAL EVERY 4 HOURS PRN
Status: DISCONTINUED | OUTPATIENT
Start: 2021-07-19 | End: 2021-08-02 | Stop reason: HOSPADM

## 2021-07-19 RX ORDER — CHOLECALCIFEROL (VITAMIN D3) 125 MCG
5 CAPSULE ORAL NIGHTLY PRN
Status: DISCONTINUED | OUTPATIENT
Start: 2021-07-19 | End: 2021-07-21

## 2021-07-19 RX ORDER — CEFTRIAXONE SODIUM 1 G/50ML
1 INJECTION, SOLUTION INTRAVENOUS EVERY 24 HOURS
Status: COMPLETED | OUTPATIENT
Start: 2021-07-20 | End: 2021-07-28

## 2021-07-19 RX ADMIN — IOPAMIDOL 65 ML: 755 INJECTION, SOLUTION INTRAVENOUS at 19:25

## 2021-07-19 RX ADMIN — Medication 5 MG: at 23:10

## 2021-07-19 RX ADMIN — BACITRACIN ZINC NEOMYCIN SULFATE POLYMYXIN B SULFATE: 400; 3.5; 5 OINTMENT TOPICAL at 23:20

## 2021-07-19 RX ADMIN — CEFTRIAXONE SODIUM 2 G: 2 INJECTION, SOLUTION INTRAVENOUS at 18:25

## 2021-07-19 RX ADMIN — HYDROXYZINE HYDROCHLORIDE 25 MG: 25 TABLET, FILM COATED ORAL at 23:20

## 2021-07-19 RX ADMIN — FUROSEMIDE 20 MG: 10 INJECTION, SOLUTION INTRAVENOUS at 23:09

## 2021-07-19 RX ADMIN — ACETAMINOPHEN 650 MG: 325 TABLET ORAL at 23:09

## 2021-07-19 RX ADMIN — DOXYCYCLINE 100 MG: 100 INJECTION, POWDER, LYOPHILIZED, FOR SOLUTION INTRAVENOUS at 17:38

## 2021-07-19 RX ADMIN — AMIODARONE HYDROCHLORIDE 200 MG: 200 TABLET ORAL at 23:09

## 2021-07-19 RX ADMIN — APIXABAN 5 MG: 5 TABLET, FILM COATED ORAL at 23:09

## 2021-07-20 ENCOUNTER — APPOINTMENT (OUTPATIENT)
Dept: CARDIOLOGY | Facility: HOSPITAL | Age: 81
End: 2021-07-20

## 2021-07-20 ENCOUNTER — APPOINTMENT (OUTPATIENT)
Dept: GENERAL RADIOLOGY | Facility: HOSPITAL | Age: 81
End: 2021-07-20

## 2021-07-20 PROBLEM — J96.01 ACUTE RESPIRATORY FAILURE WITH HYPOXIA (HCC): Status: ACTIVE | Noted: 2021-07-20

## 2021-07-20 LAB
ANION GAP SERPL CALCULATED.3IONS-SCNC: 16 MMOL/L (ref 5–15)
ARTERIAL PATENCY WRIST A: ABNORMAL
ATMOSPHERIC PRESS: ABNORMAL MM[HG]
BASE EXCESS BLDA CALC-SCNC: 1.7 MMOL/L (ref 0–2)
BDY SITE: ABNORMAL
BH CV ECHO MEAS - AO MAX PG (FULL): 21.9 MMHG
BH CV ECHO MEAS - AO MAX PG: 32.9 MMHG
BH CV ECHO MEAS - AO MEAN PG (FULL): 11.4 MMHG
BH CV ECHO MEAS - AO MEAN PG: 17.2 MMHG
BH CV ECHO MEAS - AO ROOT AREA (BSA CORRECTED): 1.5
BH CV ECHO MEAS - AO ROOT AREA: 5.6 CM^2
BH CV ECHO MEAS - AO ROOT DIAM: 2.7 CM
BH CV ECHO MEAS - AO V2 MAX: 286.7 CM/SEC
BH CV ECHO MEAS - AO V2 MEAN: 189.3 CM/SEC
BH CV ECHO MEAS - AO V2 VTI: 60 CM
BH CV ECHO MEAS - ASC AORTA: 2.8 CM
BH CV ECHO MEAS - AVA(I,A): 1.4 CM^2
BH CV ECHO MEAS - AVA(I,D): 1.4 CM^2
BH CV ECHO MEAS - AVA(V,A): 1.5 CM^2
BH CV ECHO MEAS - AVA(V,D): 1.5 CM^2
BH CV ECHO MEAS - BSA(HAYCOCK): 1.8 M^2
BH CV ECHO MEAS - BSA: 1.8 M^2
BH CV ECHO MEAS - BZI_BMI: 24 KILOGRAMS/M^2
BH CV ECHO MEAS - BZI_METRIC_HEIGHT: 167.6 CM
BH CV ECHO MEAS - BZI_METRIC_WEIGHT: 67.6 KG
BH CV ECHO MEAS - EDV(CUBED): 65.5 ML
BH CV ECHO MEAS - EDV(MOD-SP2): 136 ML
BH CV ECHO MEAS - EDV(MOD-SP4): 113 ML
BH CV ECHO MEAS - EDV(TEICH): 71.3 ML
BH CV ECHO MEAS - EF(CUBED): 72.3 %
BH CV ECHO MEAS - EF(MOD-BP): 65 %
BH CV ECHO MEAS - EF(MOD-SP2): 66.9 %
BH CV ECHO MEAS - EF(MOD-SP4): 64.6 %
BH CV ECHO MEAS - EF(TEICH): 64.6 %
BH CV ECHO MEAS - ESV(CUBED): 18.1 ML
BH CV ECHO MEAS - ESV(MOD-SP2): 45 ML
BH CV ECHO MEAS - ESV(MOD-SP4): 40 ML
BH CV ECHO MEAS - ESV(TEICH): 25.2 ML
BH CV ECHO MEAS - FS: 34.8 %
BH CV ECHO MEAS - IVS/LVPW: 1.1
BH CV ECHO MEAS - IVSD: 0.97 CM
BH CV ECHO MEAS - LA DIMENSION: 4.4 CM
BH CV ECHO MEAS - LA/AO: 1.6
BH CV ECHO MEAS - LAD MAJOR: 5.6 CM
BH CV ECHO MEAS - LV DIASTOLIC VOL/BSA (35-75): 64 ML/M^2
BH CV ECHO MEAS - LV IVRT: 0.09 SEC
BH CV ECHO MEAS - LV MASS(C)D: 113.9 GRAMS
BH CV ECHO MEAS - LV MASS(C)DI: 64.6 GRAMS/M^2
BH CV ECHO MEAS - LV MAX PG: 11 MMHG
BH CV ECHO MEAS - LV MEAN PG: 5.7 MMHG
BH CV ECHO MEAS - LV SYSTOLIC VOL/BSA (12-30): 22.7 ML/M^2
BH CV ECHO MEAS - LV V1 MAX: 165.6 CM/SEC
BH CV ECHO MEAS - LV V1 MEAN: 109.7 CM/SEC
BH CV ECHO MEAS - LV V1 VTI: 33.2 CM
BH CV ECHO MEAS - LVIDD: 4 CM
BH CV ECHO MEAS - LVIDS: 2.6 CM
BH CV ECHO MEAS - LVLD AP2: 8.1 CM
BH CV ECHO MEAS - LVLD AP4: 7.8 CM
BH CV ECHO MEAS - LVLS AP2: 7 CM
BH CV ECHO MEAS - LVLS AP4: 6.3 CM
BH CV ECHO MEAS - LVOT AREA (M): 2.5 CM^2
BH CV ECHO MEAS - LVOT AREA: 2.6 CM^2
BH CV ECHO MEAS - LVOT DIAM: 1.8 CM
BH CV ECHO MEAS - LVPWD: 0.87 CM
BH CV ECHO MEAS - MR MAX PG: 121 MMHG
BH CV ECHO MEAS - MR MAX VEL: 545.8 CM/SEC
BH CV ECHO MEAS - MR MEAN PG: 74.4 MMHG
BH CV ECHO MEAS - MR MEAN VEL: 379.4 CM/SEC
BH CV ECHO MEAS - MR VTI: 114.5 CM
BH CV ECHO MEAS - MV A MAX VEL: 157.3 CM/SEC
BH CV ECHO MEAS - MV DEC SLOPE: 340.3 CM/SEC^2
BH CV ECHO MEAS - MV DEC TIME: 0.44 SEC
BH CV ECHO MEAS - MV E MAX VEL: 132.7 CM/SEC
BH CV ECHO MEAS - MV E/A: 0.84
BH CV ECHO MEAS - MV MAX PG: 12.7 MMHG
BH CV ECHO MEAS - MV MEAN PG: 6.3 MMHG
BH CV ECHO MEAS - MV P1/2T MAX VEL: 171.6 CM/SEC
BH CV ECHO MEAS - MV P1/2T: 147.7 MSEC
BH CV ECHO MEAS - MV V2 MAX: 178.4 CM/SEC
BH CV ECHO MEAS - MV V2 MEAN: 117.9 CM/SEC
BH CV ECHO MEAS - MV V2 VTI: 74.7 CM
BH CV ECHO MEAS - MVA P1/2T LCG: 1.3 CM^2
BH CV ECHO MEAS - MVA(P1/2T): 1.5 CM^2
BH CV ECHO MEAS - MVA(VTI): 1.2 CM^2
BH CV ECHO MEAS - PA ACC SLOPE: 1197 CM/SEC^2
BH CV ECHO MEAS - PA ACC TIME: 0.08 SEC
BH CV ECHO MEAS - PA PR(ACCEL): 41 MMHG
BH CV ECHO MEAS - PI END-D VEL: 122.2 CM/SEC
BH CV ECHO MEAS - RAP SYSTOLE: 3 MMHG
BH CV ECHO MEAS - RVSP: 37 MMHG
BH CV ECHO MEAS - SI(AO): 190.7 ML/M^2
BH CV ECHO MEAS - SI(CUBED): 26.8 ML/M^2
BH CV ECHO MEAS - SI(LVOT): 49.2 ML/M^2
BH CV ECHO MEAS - SI(MOD-SP2): 51.6 ML/M^2
BH CV ECHO MEAS - SI(MOD-SP4): 41.4 ML/M^2
BH CV ECHO MEAS - SI(TEICH): 26.1 ML/M^2
BH CV ECHO MEAS - SV(AO): 336.5 ML
BH CV ECHO MEAS - SV(CUBED): 47.4 ML
BH CV ECHO MEAS - SV(LVOT): 86.8 ML
BH CV ECHO MEAS - SV(MOD-SP2): 91 ML
BH CV ECHO MEAS - SV(MOD-SP4): 73 ML
BH CV ECHO MEAS - SV(TEICH): 46 ML
BH CV ECHO MEAS - TAPSE (>1.6): 2.6 CM
BH CV ECHO MEAS - TR MAX PG: 34 MMHG
BH CV ECHO MEAS - TR MAX VEL: 289.4 CM/SEC
BH CV XLRA - RV BASE: 4.9 CM
BH CV XLRA - RV LENGTH: 6.8 CM
BH CV XLRA - RV MID: 3.7 CM
BODY TEMPERATURE: 37 C
BUN SERPL-MCNC: 19 MG/DL (ref 8–23)
BUN/CREAT SERPL: 18.8 (ref 7–25)
C3 SERPL-MCNC: 109 MG/DL (ref 82–167)
C4 SERPL-MCNC: 19 MG/DL (ref 14–44)
CALCIUM SPEC-SCNC: 8.7 MG/DL (ref 8.6–10.5)
CHLORIDE SERPL-SCNC: 91 MMOL/L (ref 98–107)
CO2 BLDA-SCNC: 27.6 MMOL/L (ref 22–33)
CO2 SERPL-SCNC: 22 MMOL/L (ref 22–29)
COHGB MFR BLD: 1.5 % (ref 0–2)
CREAT SERPL-MCNC: 1.01 MG/DL (ref 0.57–1)
CRP SERPL-MCNC: 15.3 MG/DL (ref 0–0.5)
DEPRECATED RDW RBC AUTO: 41.6 FL (ref 37–54)
EPAP: 0
ERYTHROCYTE [DISTWIDTH] IN BLOOD BY AUTOMATED COUNT: 14.6 % (ref 12.3–15.4)
ERYTHROCYTE [SEDIMENTATION RATE] IN BLOOD: 34 MM/HR (ref 0–30)
GFR SERPL CREATININE-BSD FRML MDRD: 53 ML/MIN/1.73
GLUCOSE BLDC GLUCOMTR-MCNC: 139 MG/DL (ref 70–130)
GLUCOSE BLDC GLUCOMTR-MCNC: 167 MG/DL (ref 70–130)
GLUCOSE SERPL-MCNC: 146 MG/DL (ref 65–99)
HBA1C MFR BLD: 4.9 % (ref 4.8–5.6)
HCO3 BLDA-SCNC: 26.3 MMOL/L (ref 20–26)
HCT VFR BLD AUTO: 30.1 % (ref 34–46.6)
HCT VFR BLD CALC: 31.5 %
HGB BLD-MCNC: 9.2 G/DL (ref 12–15.9)
HGB BLDA-MCNC: 10.3 G/DL (ref 14–18)
INHALED O2 CONCENTRATION: 100 %
IPAP: 0
L PNEUMO1 AG UR QL IA: NEGATIVE
LEFT ATRIUM VOLUME INDEX: 41.9 ML/M^2
LEFT ATRIUM VOLUME: 74 ML
LV EF 2D ECHO EST: 65 %
MAGNESIUM SERPL-MCNC: 1.9 MG/DL (ref 1.6–2.4)
MAXIMAL PREDICTED HEART RATE: 139 BPM
MCH RBC QN AUTO: 24 PG (ref 26.6–33)
MCHC RBC AUTO-ENTMCNC: 30.6 G/DL (ref 31.5–35.7)
MCV RBC AUTO: 78.6 FL (ref 79–97)
METHGB BLD QL: 0 % (ref 0–1.5)
MODALITY: ABNORMAL
NOTE: ABNORMAL
OXYHGB MFR BLDV: 95.2 % (ref 94–99)
PAW @ PEAK INSP FLOW SETTING VENT: 0 CMH2O
PCO2 BLDA: 40.6 MM HG (ref 35–45)
PCO2 TEMP ADJ BLD: 40.6 MM HG (ref 35–45)
PH BLDA: 7.42 PH UNITS (ref 7.35–7.45)
PH, TEMP CORRECTED: 7.42 PH UNITS
PHOSPHATE SERPL-MCNC: 4.2 MG/DL (ref 2.5–4.5)
PLATELET # BLD AUTO: 276 10*3/MM3 (ref 140–450)
PMV BLD AUTO: 11.3 FL (ref 6–12)
PO2 BLDA: 86.8 MM HG (ref 83–108)
PO2 TEMP ADJ BLD: 86.8 MM HG (ref 83–108)
POTASSIUM SERPL-SCNC: 3.7 MMOL/L (ref 3.5–5.2)
POTASSIUM SERPL-SCNC: 3.7 MMOL/L (ref 3.5–5.2)
RBC # BLD AUTO: 3.83 10*6/MM3 (ref 3.77–5.28)
S PNEUM AG SPEC QL LA: NEGATIVE
SODIUM SERPL-SCNC: 129 MMOL/L (ref 136–145)
STRESS TARGET HR: 118 BPM
TOTAL RATE: 0 BREATHS/MINUTE
TROPONIN T SERPL-MCNC: 0.03 NG/ML (ref 0–0.03)
WBC # BLD AUTO: 12.45 10*3/MM3 (ref 3.4–10.8)

## 2021-07-20 PROCEDURE — 84484 ASSAY OF TROPONIN QUANT: CPT | Performed by: INTERNAL MEDICINE

## 2021-07-20 PROCEDURE — 84100 ASSAY OF PHOSPHORUS: CPT | Performed by: INTERNAL MEDICINE

## 2021-07-20 PROCEDURE — 25010000002 MORPHINE PER 10 MG: Performed by: INTERNAL MEDICINE

## 2021-07-20 PROCEDURE — 99291 CRITICAL CARE FIRST HOUR: CPT | Performed by: INTERNAL MEDICINE

## 2021-07-20 PROCEDURE — 86256 FLUORESCENT ANTIBODY TITER: CPT | Performed by: INTERNAL MEDICINE

## 2021-07-20 PROCEDURE — 94799 UNLISTED PULMONARY SVC/PX: CPT

## 2021-07-20 PROCEDURE — 25010000003 MAGNESIUM SULFATE 4 GM/100ML SOLUTION: Performed by: INTERNAL MEDICINE

## 2021-07-20 PROCEDURE — 86235 NUCLEAR ANTIGEN ANTIBODY: CPT | Performed by: INTERNAL MEDICINE

## 2021-07-20 PROCEDURE — 84132 ASSAY OF SERUM POTASSIUM: CPT | Performed by: INTERNAL MEDICINE

## 2021-07-20 PROCEDURE — 86140 C-REACTIVE PROTEIN: CPT | Performed by: INTERNAL MEDICINE

## 2021-07-20 PROCEDURE — 85652 RBC SED RATE AUTOMATED: CPT | Performed by: INTERNAL MEDICINE

## 2021-07-20 PROCEDURE — 86160 COMPLEMENT ANTIGEN: CPT | Performed by: INTERNAL MEDICINE

## 2021-07-20 PROCEDURE — 86431 RHEUMATOID FACTOR QUANT: CPT | Performed by: INTERNAL MEDICINE

## 2021-07-20 PROCEDURE — 86225 DNA ANTIBODY NATIVE: CPT | Performed by: INTERNAL MEDICINE

## 2021-07-20 PROCEDURE — 83520 IMMUNOASSAY QUANT NOS NONAB: CPT | Performed by: INTERNAL MEDICINE

## 2021-07-20 PROCEDURE — 80048 BASIC METABOLIC PNL TOTAL CA: CPT | Performed by: INTERNAL MEDICINE

## 2021-07-20 PROCEDURE — 83735 ASSAY OF MAGNESIUM: CPT | Performed by: INTERNAL MEDICINE

## 2021-07-20 PROCEDURE — 93306 TTE W/DOPPLER COMPLETE: CPT | Performed by: INTERNAL MEDICINE

## 2021-07-20 PROCEDURE — 93306 TTE W/DOPPLER COMPLETE: CPT

## 2021-07-20 PROCEDURE — 25010000002 METHYLPREDNISOLONE PER 40 MG: Performed by: INTERNAL MEDICINE

## 2021-07-20 PROCEDURE — 25010000002 CEFTRIAXONE PER 250 MG: Performed by: PHYSICIAN ASSISTANT

## 2021-07-20 PROCEDURE — 82805 BLOOD GASES W/O2 SATURATION: CPT

## 2021-07-20 PROCEDURE — 87899 AGENT NOS ASSAY W/OPTIC: CPT | Performed by: PHYSICIAN ASSISTANT

## 2021-07-20 PROCEDURE — 82962 GLUCOSE BLOOD TEST: CPT

## 2021-07-20 PROCEDURE — 83050 HGB METHEMOGLOBIN QUAN: CPT

## 2021-07-20 PROCEDURE — 94640 AIRWAY INHALATION TREATMENT: CPT

## 2021-07-20 PROCEDURE — 25010000002 FUROSEMIDE PER 20 MG: Performed by: INTERNAL MEDICINE

## 2021-07-20 PROCEDURE — 93010 ELECTROCARDIOGRAM REPORT: CPT | Performed by: INTERNAL MEDICINE

## 2021-07-20 PROCEDURE — 99223 1ST HOSP IP/OBS HIGH 75: CPT | Performed by: INTERNAL MEDICINE

## 2021-07-20 PROCEDURE — 85027 COMPLETE CBC AUTOMATED: CPT | Performed by: PHYSICIAN ASSISTANT

## 2021-07-20 PROCEDURE — 36600 WITHDRAWAL OF ARTERIAL BLOOD: CPT

## 2021-07-20 PROCEDURE — 82375 ASSAY CARBOXYHB QUANT: CPT

## 2021-07-20 PROCEDURE — 86200 CCP ANTIBODY: CPT | Performed by: INTERNAL MEDICINE

## 2021-07-20 PROCEDURE — 86162 COMPLEMENT TOTAL (CH50): CPT | Performed by: INTERNAL MEDICINE

## 2021-07-20 PROCEDURE — 83036 HEMOGLOBIN GLYCOSYLATED A1C: CPT | Performed by: INTERNAL MEDICINE

## 2021-07-20 PROCEDURE — 93005 ELECTROCARDIOGRAM TRACING: CPT | Performed by: NURSE PRACTITIONER

## 2021-07-20 PROCEDURE — 94660 CPAP INITIATION&MGMT: CPT

## 2021-07-20 PROCEDURE — 71045 X-RAY EXAM CHEST 1 VIEW: CPT

## 2021-07-20 RX ORDER — IPRATROPIUM BROMIDE AND ALBUTEROL SULFATE 2.5; .5 MG/3ML; MG/3ML
3 SOLUTION RESPIRATORY (INHALATION)
Status: DISCONTINUED | OUTPATIENT
Start: 2021-07-20 | End: 2021-07-23

## 2021-07-20 RX ORDER — MAGNESIUM SULFATE HEPTAHYDRATE 40 MG/ML
2 INJECTION, SOLUTION INTRAVENOUS AS NEEDED
Status: DISCONTINUED | OUTPATIENT
Start: 2021-07-20 | End: 2021-08-02 | Stop reason: HOSPADM

## 2021-07-20 RX ORDER — POTASSIUM CHLORIDE 750 MG/1
40 CAPSULE, EXTENDED RELEASE ORAL AS NEEDED
Status: DISCONTINUED | OUTPATIENT
Start: 2021-07-20 | End: 2021-08-02 | Stop reason: HOSPADM

## 2021-07-20 RX ORDER — NITROGLYCERIN 20 MG/100ML
5-200 INJECTION INTRAVENOUS
Status: DISCONTINUED | OUTPATIENT
Start: 2021-07-20 | End: 2021-07-20

## 2021-07-20 RX ORDER — MAGNESIUM SULFATE HEPTAHYDRATE 40 MG/ML
4 INJECTION, SOLUTION INTRAVENOUS AS NEEDED
Status: DISCONTINUED | OUTPATIENT
Start: 2021-07-20 | End: 2021-08-02 | Stop reason: HOSPADM

## 2021-07-20 RX ORDER — FUROSEMIDE 10 MG/ML
40 INJECTION INTRAMUSCULAR; INTRAVENOUS DAILY
Status: DISCONTINUED | OUTPATIENT
Start: 2021-07-21 | End: 2021-07-21

## 2021-07-20 RX ORDER — MORPHINE SULFATE 2 MG/ML
2 INJECTION, SOLUTION INTRAMUSCULAR; INTRAVENOUS ONCE
Status: COMPLETED | OUTPATIENT
Start: 2021-07-20 | End: 2021-07-20

## 2021-07-20 RX ORDER — AMLODIPINE BESYLATE 10 MG/1
10 TABLET ORAL
Status: DISCONTINUED | OUTPATIENT
Start: 2021-07-20 | End: 2021-07-20

## 2021-07-20 RX ORDER — POTASSIUM CHLORIDE 7.45 MG/ML
10 INJECTION INTRAVENOUS
Status: DISCONTINUED | OUTPATIENT
Start: 2021-07-20 | End: 2021-08-02 | Stop reason: HOSPADM

## 2021-07-20 RX ORDER — METHYLPREDNISOLONE SODIUM SUCCINATE 40 MG/ML
40 INJECTION, POWDER, LYOPHILIZED, FOR SOLUTION INTRAMUSCULAR; INTRAVENOUS EVERY 8 HOURS
Status: DISCONTINUED | OUTPATIENT
Start: 2021-07-20 | End: 2021-07-23

## 2021-07-20 RX ORDER — METHYLPREDNISOLONE SODIUM SUCCINATE 40 MG/ML
40 INJECTION, POWDER, LYOPHILIZED, FOR SOLUTION INTRAMUSCULAR; INTRAVENOUS ONCE
Status: COMPLETED | OUTPATIENT
Start: 2021-07-20 | End: 2021-07-20

## 2021-07-20 RX ORDER — POTASSIUM CHLORIDE 750 MG/1
40 CAPSULE, EXTENDED RELEASE ORAL ONCE
Status: COMPLETED | OUTPATIENT
Start: 2021-07-20 | End: 2021-07-20

## 2021-07-20 RX ORDER — FUROSEMIDE 10 MG/ML
40 INJECTION INTRAMUSCULAR; INTRAVENOUS ONCE
Status: COMPLETED | OUTPATIENT
Start: 2021-07-20 | End: 2021-07-20

## 2021-07-20 RX ORDER — DEXTROSE MONOHYDRATE 25 G/50ML
25 INJECTION, SOLUTION INTRAVENOUS
Status: DISCONTINUED | OUTPATIENT
Start: 2021-07-20 | End: 2021-08-02 | Stop reason: HOSPADM

## 2021-07-20 RX ORDER — NITROGLYCERIN 20 MG/100ML
5-200 INJECTION INTRAVENOUS
Status: DISCONTINUED | OUTPATIENT
Start: 2021-07-20 | End: 2021-07-25

## 2021-07-20 RX ORDER — POTASSIUM CHLORIDE 1.5 G/1.77G
40 POWDER, FOR SOLUTION ORAL AS NEEDED
Status: DISCONTINUED | OUTPATIENT
Start: 2021-07-20 | End: 2021-08-02 | Stop reason: HOSPADM

## 2021-07-20 RX ORDER — RAMIPRIL 2.5 MG/1
5 CAPSULE ORAL DAILY
Status: DISCONTINUED | OUTPATIENT
Start: 2021-07-21 | End: 2021-07-20

## 2021-07-20 RX ORDER — FUROSEMIDE 10 MG/ML
40 INJECTION INTRAMUSCULAR; INTRAVENOUS
Status: DISCONTINUED | OUTPATIENT
Start: 2021-07-20 | End: 2021-07-20

## 2021-07-20 RX ORDER — NICOTINE POLACRILEX 4 MG
15 LOZENGE BUCCAL
Status: DISCONTINUED | OUTPATIENT
Start: 2021-07-20 | End: 2021-07-20 | Stop reason: SDUPTHER

## 2021-07-20 RX ADMIN — METHYLPREDNISOLONE SODIUM SUCCINATE 40 MG: 40 INJECTION, POWDER, LYOPHILIZED, FOR SOLUTION INTRAMUSCULAR; INTRAVENOUS at 16:26

## 2021-07-20 RX ADMIN — HYDROXYZINE HYDROCHLORIDE 25 MG: 25 TABLET, FILM COATED ORAL at 20:20

## 2021-07-20 RX ADMIN — RAMIPRIL 1.25 MG: 1.25 CAPSULE ORAL at 07:20

## 2021-07-20 RX ADMIN — IPRATROPIUM BROMIDE AND ALBUTEROL SULFATE 3 ML: 2.5; .5 SOLUTION RESPIRATORY (INHALATION) at 05:39

## 2021-07-20 RX ADMIN — HYDROXYZINE HYDROCHLORIDE 25 MG: 25 TABLET, FILM COATED ORAL at 08:57

## 2021-07-20 RX ADMIN — SERTRALINE HYDROCHLORIDE 150 MG: 50 TABLET ORAL at 07:19

## 2021-07-20 RX ADMIN — IPRATROPIUM BROMIDE AND ALBUTEROL SULFATE 3 ML: 2.5; .5 SOLUTION RESPIRATORY (INHALATION) at 08:42

## 2021-07-20 RX ADMIN — POTASSIUM CHLORIDE 40 MEQ: 750 CAPSULE, EXTENDED RELEASE ORAL at 07:19

## 2021-07-20 RX ADMIN — BACITRACIN ZINC NEOMYCIN SULFATE POLYMYXIN B SULFATE: 400; 3.5; 5 OINTMENT TOPICAL at 20:15

## 2021-07-20 RX ADMIN — POTASSIUM CHLORIDE 40 MEQ: 750 CAPSULE, EXTENDED RELEASE ORAL at 10:58

## 2021-07-20 RX ADMIN — MAGNESIUM SULFATE HEPTAHYDRATE 4 G: 40 INJECTION, SOLUTION INTRAVENOUS at 20:15

## 2021-07-20 RX ADMIN — MULTIPLE VITAMINS W/ MINERALS TAB 1 TABLET: TAB at 07:19

## 2021-07-20 RX ADMIN — HYDROCHLOROTHIAZIDE 25 MG: 25 TABLET ORAL at 07:19

## 2021-07-20 RX ADMIN — MORPHINE SULFATE 2 MG: 2 INJECTION, SOLUTION INTRAMUSCULAR; INTRAVENOUS at 09:21

## 2021-07-20 RX ADMIN — DILTIAZEM HYDROCHLORIDE 180 MG: 180 CAPSULE, COATED, EXTENDED RELEASE ORAL at 07:19

## 2021-07-20 RX ADMIN — APIXABAN 5 MG: 5 TABLET, FILM COATED ORAL at 07:20

## 2021-07-20 RX ADMIN — BACITRACIN ZINC NEOMYCIN SULFATE POLYMYXIN B SULFATE 1 APPLICATION: 400; 3.5; 5 OINTMENT TOPICAL at 05:12

## 2021-07-20 RX ADMIN — DOXYCYCLINE 100 MG: 100 INJECTION, POWDER, LYOPHILIZED, FOR SOLUTION INTRAVENOUS at 17:44

## 2021-07-20 RX ADMIN — Medication 5 MG: at 22:05

## 2021-07-20 RX ADMIN — FUROSEMIDE 40 MG: 10 INJECTION, SOLUTION INTRAVENOUS at 10:58

## 2021-07-20 RX ADMIN — METHYLPREDNISOLONE SODIUM SUCCINATE 40 MG: 40 INJECTION, POWDER, FOR SOLUTION INTRAMUSCULAR; INTRAVENOUS at 09:25

## 2021-07-20 RX ADMIN — FUROSEMIDE 40 MG: 10 INJECTION, SOLUTION INTRAVENOUS at 08:57

## 2021-07-20 RX ADMIN — NITROGLYCERIN 5 MCG/MIN: 20 INJECTION INTRAVENOUS at 10:12

## 2021-07-20 RX ADMIN — IPRATROPIUM BROMIDE AND ALBUTEROL SULFATE 3 ML: 2.5; .5 SOLUTION RESPIRATORY (INHALATION) at 23:58

## 2021-07-20 RX ADMIN — SODIUM CHLORIDE, PRESERVATIVE FREE 10 ML: 5 INJECTION INTRAVENOUS at 20:16

## 2021-07-20 RX ADMIN — IPRATROPIUM BROMIDE AND ALBUTEROL SULFATE 3 ML: 2.5; .5 SOLUTION RESPIRATORY (INHALATION) at 16:31

## 2021-07-20 RX ADMIN — AMIODARONE HYDROCHLORIDE 200 MG: 200 TABLET ORAL at 07:20

## 2021-07-20 RX ADMIN — APIXABAN 5 MG: 5 TABLET, FILM COATED ORAL at 20:16

## 2021-07-20 RX ADMIN — OXYCODONE HYDROCHLORIDE AND ACETAMINOPHEN 1000 MG: 500 TABLET ORAL at 07:19

## 2021-07-20 RX ADMIN — CEFTRIAXONE SODIUM 1 G: 1 INJECTION, SOLUTION INTRAVENOUS at 18:47

## 2021-07-20 RX ADMIN — IPRATROPIUM BROMIDE AND ALBUTEROL SULFATE 3 ML: 2.5; .5 SOLUTION RESPIRATORY (INHALATION) at 19:10

## 2021-07-20 RX ADMIN — DOXYCYCLINE 100 MG: 100 INJECTION, POWDER, LYOPHILIZED, FOR SOLUTION INTRAVENOUS at 05:12

## 2021-07-21 ENCOUNTER — APPOINTMENT (OUTPATIENT)
Dept: GENERAL RADIOLOGY | Facility: HOSPITAL | Age: 81
End: 2021-07-21

## 2021-07-21 LAB
ALBUMIN SERPL-MCNC: 3.1 G/DL (ref 3.5–5.2)
ALBUMIN/GLOB SERPL: 1 G/DL
ALP SERPL-CCNC: 125 U/L (ref 39–117)
ALT SERPL W P-5'-P-CCNC: 49 U/L (ref 1–33)
ANION GAP SERPL CALCULATED.3IONS-SCNC: 14 MMOL/L (ref 5–15)
ANION GAP SERPL CALCULATED.3IONS-SCNC: 15 MMOL/L (ref 5–15)
ANION GAP SERPL CALCULATED.3IONS-SCNC: 15 MMOL/L (ref 5–15)
ANISOCYTOSIS BLD QL: NORMAL
ARTERIAL PATENCY WRIST A: ABNORMAL
AST SERPL-CCNC: 62 U/L (ref 1–32)
ATMOSPHERIC PRESS: ABNORMAL MM[HG]
BASE EXCESS BLDA CALC-SCNC: 2 MMOL/L (ref 0–2)
BASOPHILS # BLD AUTO: 0.03 10*3/MM3 (ref 0–0.2)
BASOPHILS NFR BLD AUTO: 0.1 % (ref 0–1.5)
BDY SITE: ABNORMAL
BILIRUB SERPL-MCNC: 0.5 MG/DL (ref 0–1.2)
BODY TEMPERATURE: 37 C
BUN SERPL-MCNC: 22 MG/DL (ref 8–23)
BUN SERPL-MCNC: 26 MG/DL (ref 8–23)
BUN SERPL-MCNC: 27 MG/DL (ref 8–23)
BUN/CREAT SERPL: 22.7 (ref 7–25)
BUN/CREAT SERPL: 28.1 (ref 7–25)
BUN/CREAT SERPL: 29.5 (ref 7–25)
BURR CELLS BLD QL SMEAR: NORMAL
CALCIUM SPEC-SCNC: 8.3 MG/DL (ref 8.6–10.5)
CALCIUM SPEC-SCNC: 8.5 MG/DL (ref 8.6–10.5)
CALCIUM SPEC-SCNC: 8.6 MG/DL (ref 8.6–10.5)
CHLORIDE SERPL-SCNC: 90 MMOL/L (ref 98–107)
CHLORIDE SERPL-SCNC: 92 MMOL/L (ref 98–107)
CHLORIDE SERPL-SCNC: 92 MMOL/L (ref 98–107)
CHROMATIN AB SERPL-ACNC: 22.8 IU/ML (ref 0–14)
CO2 BLDA-SCNC: 26.4 MMOL/L (ref 22–33)
CO2 SERPL-SCNC: 20 MMOL/L (ref 22–29)
CO2 SERPL-SCNC: 21 MMOL/L (ref 22–29)
CO2 SERPL-SCNC: 23 MMOL/L (ref 22–29)
COHGB MFR BLD: 1.2 % (ref 0–2)
CREAT SERPL-MCNC: 0.88 MG/DL (ref 0.57–1)
CREAT SERPL-MCNC: 0.96 MG/DL (ref 0.57–1)
CREAT SERPL-MCNC: 0.97 MG/DL (ref 0.57–1)
CRP SERPL-MCNC: 19.06 MG/DL (ref 0–0.5)
D-LACTATE SERPL-SCNC: 2.7 MMOL/L (ref 0.5–2)
DEPRECATED RDW RBC AUTO: 40.3 FL (ref 37–54)
EOSINOPHIL # BLD AUTO: 0.06 10*3/MM3 (ref 0–0.4)
EOSINOPHIL NFR BLD AUTO: 0.2 % (ref 0.3–6.2)
EPAP: 0
ERYTHROCYTE [DISTWIDTH] IN BLOOD BY AUTOMATED COUNT: 14.5 % (ref 12.3–15.4)
ERYTHROCYTE [SEDIMENTATION RATE] IN BLOOD: 43 MM/HR (ref 0–30)
GFR SERPL CREATININE-BSD FRML MDRD: 55 ML/MIN/1.73
GFR SERPL CREATININE-BSD FRML MDRD: 56 ML/MIN/1.73
GFR SERPL CREATININE-BSD FRML MDRD: 62 ML/MIN/1.73
GLOBULIN UR ELPH-MCNC: 3 GM/DL
GLUCOSE BLDC GLUCOMTR-MCNC: 127 MG/DL (ref 70–130)
GLUCOSE BLDC GLUCOMTR-MCNC: 141 MG/DL (ref 70–130)
GLUCOSE BLDC GLUCOMTR-MCNC: 150 MG/DL (ref 70–130)
GLUCOSE BLDC GLUCOMTR-MCNC: 179 MG/DL (ref 70–130)
GLUCOSE SERPL-MCNC: 127 MG/DL (ref 65–99)
GLUCOSE SERPL-MCNC: 130 MG/DL (ref 65–99)
GLUCOSE SERPL-MCNC: 137 MG/DL (ref 65–99)
HCO3 BLDA-SCNC: 25.4 MMOL/L (ref 20–26)
HCT VFR BLD AUTO: 27.6 % (ref 34–46.6)
HCT VFR BLD CALC: 25.9 %
HGB BLD-MCNC: 8.6 G/DL (ref 12–15.9)
HGB BLDA-MCNC: 8.4 G/DL (ref 14–18)
IMM GRANULOCYTES # BLD AUTO: 0.21 10*3/MM3 (ref 0–0.05)
IMM GRANULOCYTES NFR BLD AUTO: 0.8 % (ref 0–0.5)
INHALED O2 CONCENTRATION: 100 %
IPAP: 0
LYMPHOCYTES # BLD AUTO: 0.78 10*3/MM3 (ref 0.7–3.1)
LYMPHOCYTES NFR BLD AUTO: 2.9 % (ref 19.6–45.3)
MAGNESIUM SERPL-MCNC: 2.7 MG/DL (ref 1.6–2.4)
MCH RBC QN AUTO: 24.1 PG (ref 26.6–33)
MCHC RBC AUTO-ENTMCNC: 31.2 G/DL (ref 31.5–35.7)
MCV RBC AUTO: 77.3 FL (ref 79–97)
METHGB BLD QL: 0 % (ref 0–1.5)
MODALITY: ABNORMAL
MONOCYTES # BLD AUTO: 2.14 10*3/MM3 (ref 0.1–0.9)
MONOCYTES NFR BLD AUTO: 8.1 % (ref 5–12)
NEUTROPHILS NFR BLD AUTO: 23.31 10*3/MM3 (ref 1.7–7)
NEUTROPHILS NFR BLD AUTO: 87.9 % (ref 42.7–76)
NOTE: ABNORMAL
NRBC BLD AUTO-RTO: 0 /100 WBC (ref 0–0.2)
NT-PROBNP SERPL-MCNC: 3281 PG/ML (ref 0–1800)
OXYHGB MFR BLDV: 92.6 % (ref 94–99)
PAW @ PEAK INSP FLOW SETTING VENT: 0 CMH2O
PCO2 BLDA: 33.6 MM HG (ref 35–45)
PCO2 TEMP ADJ BLD: 33.6 MM HG (ref 35–45)
PH BLDA: 7.49 PH UNITS (ref 7.35–7.45)
PH, TEMP CORRECTED: 7.49 PH UNITS
PHOSPHATE SERPL-MCNC: 4.2 MG/DL (ref 2.5–4.5)
PLAT MORPH BLD: NORMAL
PLATELET # BLD AUTO: 367 10*3/MM3 (ref 140–450)
PMV BLD AUTO: 10.6 FL (ref 6–12)
PO2 BLDA: 66.1 MM HG (ref 83–108)
PO2 TEMP ADJ BLD: 66.1 MM HG (ref 83–108)
POLYCHROMASIA BLD QL SMEAR: NORMAL
POTASSIUM SERPL-SCNC: 3.7 MMOL/L (ref 3.5–5.2)
POTASSIUM SERPL-SCNC: 3.8 MMOL/L (ref 3.5–5.2)
POTASSIUM SERPL-SCNC: 3.8 MMOL/L (ref 3.5–5.2)
PROCALCITONIN SERPL-MCNC: 1.52 NG/ML (ref 0–0.25)
PROT SERPL-MCNC: 6.1 G/DL (ref 6–8.5)
QT INTERVAL: 408 MS
QTC INTERVAL: 523 MS
RBC # BLD AUTO: 3.57 10*6/MM3 (ref 3.77–5.28)
SODIUM SERPL-SCNC: 126 MMOL/L (ref 136–145)
SODIUM SERPL-SCNC: 126 MMOL/L (ref 136–145)
SODIUM SERPL-SCNC: 130 MMOL/L (ref 136–145)
TOTAL RATE: 0 BREATHS/MINUTE
TROPONIN T SERPL-MCNC: 0.01 NG/ML (ref 0–0.03)
WBC # BLD AUTO: 26.53 10*3/MM3 (ref 3.4–10.8)
WBC MORPH BLD: NORMAL

## 2021-07-21 PROCEDURE — 83735 ASSAY OF MAGNESIUM: CPT | Performed by: INTERNAL MEDICINE

## 2021-07-21 PROCEDURE — 25010000002 METHYLPREDNISOLONE PER 125 MG: Performed by: INTERNAL MEDICINE

## 2021-07-21 PROCEDURE — 71045 X-RAY EXAM CHEST 1 VIEW: CPT

## 2021-07-21 PROCEDURE — C1894 INTRO/SHEATH, NON-LASER: HCPCS

## 2021-07-21 PROCEDURE — 85007 BL SMEAR W/DIFF WBC COUNT: CPT | Performed by: INTERNAL MEDICINE

## 2021-07-21 PROCEDURE — 25010000002 FUROSEMIDE PER 20 MG: Performed by: INTERNAL MEDICINE

## 2021-07-21 PROCEDURE — 83050 HGB METHEMOGLOBIN QUAN: CPT

## 2021-07-21 PROCEDURE — 84484 ASSAY OF TROPONIN QUANT: CPT | Performed by: INTERNAL MEDICINE

## 2021-07-21 PROCEDURE — C1751 CATH, INF, PER/CENT/MIDLINE: HCPCS

## 2021-07-21 PROCEDURE — 05HY33Z INSERTION OF INFUSION DEVICE INTO UPPER VEIN, PERCUTANEOUS APPROACH: ICD-10-PCS | Performed by: INTERNAL MEDICINE

## 2021-07-21 PROCEDURE — 99232 SBSQ HOSP IP/OBS MODERATE 35: CPT | Performed by: INTERNAL MEDICINE

## 2021-07-21 PROCEDURE — 82805 BLOOD GASES W/O2 SATURATION: CPT

## 2021-07-21 PROCEDURE — 80053 COMPREHEN METABOLIC PANEL: CPT | Performed by: INTERNAL MEDICINE

## 2021-07-21 PROCEDURE — 83880 ASSAY OF NATRIURETIC PEPTIDE: CPT | Performed by: INTERNAL MEDICINE

## 2021-07-21 PROCEDURE — 25010000002 PHENYLEPHRINE 10 MG/ML SOLUTION: Performed by: INTERNAL MEDICINE

## 2021-07-21 PROCEDURE — 93010 ELECTROCARDIOGRAM REPORT: CPT | Performed by: INTERNAL MEDICINE

## 2021-07-21 PROCEDURE — 36600 WITHDRAWAL OF ARTERIAL BLOOD: CPT

## 2021-07-21 PROCEDURE — 85652 RBC SED RATE AUTOMATED: CPT | Performed by: INTERNAL MEDICINE

## 2021-07-21 PROCEDURE — 82962 GLUCOSE BLOOD TEST: CPT

## 2021-07-21 PROCEDURE — 94799 UNLISTED PULMONARY SVC/PX: CPT

## 2021-07-21 PROCEDURE — 93005 ELECTROCARDIOGRAM TRACING: CPT | Performed by: INTERNAL MEDICINE

## 2021-07-21 PROCEDURE — 25010000003 POTASSIUM CHLORIDE PER 2 MEQ: Performed by: INTERNAL MEDICINE

## 2021-07-21 PROCEDURE — 84145 PROCALCITONIN (PCT): CPT | Performed by: INTERNAL MEDICINE

## 2021-07-21 PROCEDURE — 25010000002 METHYLPREDNISOLONE PER 40 MG: Performed by: INTERNAL MEDICINE

## 2021-07-21 PROCEDURE — 86140 C-REACTIVE PROTEIN: CPT | Performed by: INTERNAL MEDICINE

## 2021-07-21 PROCEDURE — 82375 ASSAY CARBOXYHB QUANT: CPT

## 2021-07-21 PROCEDURE — 84100 ASSAY OF PHOSPHORUS: CPT | Performed by: INTERNAL MEDICINE

## 2021-07-21 PROCEDURE — 83605 ASSAY OF LACTIC ACID: CPT | Performed by: INTERNAL MEDICINE

## 2021-07-21 PROCEDURE — 99291 CRITICAL CARE FIRST HOUR: CPT | Performed by: INTERNAL MEDICINE

## 2021-07-21 PROCEDURE — 25010000002 CEFTRIAXONE PER 250 MG: Performed by: PHYSICIAN ASSISTANT

## 2021-07-21 PROCEDURE — 85025 COMPLETE CBC W/AUTO DIFF WBC: CPT | Performed by: INTERNAL MEDICINE

## 2021-07-21 RX ORDER — DEXMEDETOMIDINE HYDROCHLORIDE 4 UG/ML
.2-1.5 INJECTION, SOLUTION INTRAVENOUS
Status: DISCONTINUED | OUTPATIENT
Start: 2021-07-21 | End: 2021-07-28

## 2021-07-21 RX ORDER — METOPROLOL TARTRATE 5 MG/5ML
5 INJECTION INTRAVENOUS
Status: DISCONTINUED | OUTPATIENT
Start: 2021-07-21 | End: 2021-08-02 | Stop reason: HOSPADM

## 2021-07-21 RX ORDER — CHOLECALCIFEROL (VITAMIN D3) 125 MCG
5 CAPSULE ORAL NIGHTLY
Status: DISCONTINUED | OUTPATIENT
Start: 2021-07-21 | End: 2021-08-02 | Stop reason: HOSPADM

## 2021-07-21 RX ORDER — TOLVAPTAN 15 MG/1
15 TABLET ORAL ONCE
Status: COMPLETED | OUTPATIENT
Start: 2021-07-21 | End: 2021-07-21

## 2021-07-21 RX ORDER — FUROSEMIDE 10 MG/ML
80 INJECTION INTRAMUSCULAR; INTRAVENOUS ONCE
Status: COMPLETED | OUTPATIENT
Start: 2021-07-21 | End: 2021-07-21

## 2021-07-21 RX ORDER — SODIUM CHLORIDE 0.9 % (FLUSH) 0.9 %
10 SYRINGE (ML) INJECTION AS NEEDED
Status: DISCONTINUED | OUTPATIENT
Start: 2021-07-21 | End: 2021-08-02 | Stop reason: HOSPADM

## 2021-07-21 RX ORDER — QUETIAPINE FUMARATE 25 MG/1
50 TABLET, FILM COATED ORAL NIGHTLY
Status: DISCONTINUED | OUTPATIENT
Start: 2021-07-21 | End: 2021-08-02 | Stop reason: HOSPADM

## 2021-07-21 RX ORDER — SODIUM CHLORIDE 0.9 % (FLUSH) 0.9 %
10 SYRINGE (ML) INJECTION EVERY 12 HOURS SCHEDULED
Status: DISCONTINUED | OUTPATIENT
Start: 2021-07-21 | End: 2021-08-02 | Stop reason: HOSPADM

## 2021-07-21 RX ORDER — METHYLPREDNISOLONE SODIUM SUCCINATE 125 MG/2ML
125 INJECTION, POWDER, LYOPHILIZED, FOR SOLUTION INTRAMUSCULAR; INTRAVENOUS ONCE
Status: COMPLETED | OUTPATIENT
Start: 2021-07-21 | End: 2021-07-21

## 2021-07-21 RX ORDER — POTASSIUM CHLORIDE 29.8 MG/ML
20 INJECTION INTRAVENOUS ONCE
Status: COMPLETED | OUTPATIENT
Start: 2021-07-21 | End: 2021-07-21

## 2021-07-21 RX ORDER — POTASSIUM CHLORIDE 750 MG/1
40 CAPSULE, EXTENDED RELEASE ORAL ONCE
Status: DISCONTINUED | OUTPATIENT
Start: 2021-07-21 | End: 2021-07-21

## 2021-07-21 RX ORDER — PHENYLEPHRINE HCL IN 0.9% NACL 0.5 MG/5ML
.5-3 SYRINGE (ML) INTRAVENOUS
Status: DISCONTINUED | OUTPATIENT
Start: 2021-07-21 | End: 2021-07-24

## 2021-07-21 RX ADMIN — FUROSEMIDE 40 MG: 10 INJECTION, SOLUTION INTRAVENOUS at 08:08

## 2021-07-21 RX ADMIN — SODIUM CHLORIDE, PRESERVATIVE FREE 10 ML: 5 INJECTION INTRAVENOUS at 21:24

## 2021-07-21 RX ADMIN — SERTRALINE HYDROCHLORIDE 150 MG: 50 TABLET ORAL at 08:07

## 2021-07-21 RX ADMIN — DILTIAZEM HYDROCHLORIDE 180 MG: 180 CAPSULE, COATED, EXTENDED RELEASE ORAL at 08:07

## 2021-07-21 RX ADMIN — QUETIAPINE FUMARATE 50 MG: 25 TABLET ORAL at 19:44

## 2021-07-21 RX ADMIN — BACITRACIN ZINC NEOMYCIN SULFATE POLYMYXIN B SULFATE: 400; 3.5; 5 OINTMENT TOPICAL at 21:24

## 2021-07-21 RX ADMIN — DOXYCYCLINE 100 MG: 100 INJECTION, POWDER, LYOPHILIZED, FOR SOLUTION INTRAVENOUS at 18:52

## 2021-07-21 RX ADMIN — IPRATROPIUM BROMIDE AND ALBUTEROL SULFATE 3 ML: 2.5; .5 SOLUTION RESPIRATORY (INHALATION) at 07:01

## 2021-07-21 RX ADMIN — IPRATROPIUM BROMIDE AND ALBUTEROL SULFATE 3 ML: 2.5; .5 SOLUTION RESPIRATORY (INHALATION) at 23:45

## 2021-07-21 RX ADMIN — METHYLPREDNISOLONE SODIUM SUCCINATE 125 MG: 125 INJECTION, POWDER, FOR SOLUTION INTRAMUSCULAR; INTRAVENOUS at 09:52

## 2021-07-21 RX ADMIN — OXYCODONE HYDROCHLORIDE AND ACETAMINOPHEN 1000 MG: 500 TABLET ORAL at 08:07

## 2021-07-21 RX ADMIN — NITROGLYCERIN 135 MCG/MIN: 20 INJECTION INTRAVENOUS at 04:13

## 2021-07-21 RX ADMIN — PHENOL 1 SPRAY: 1.5 LIQUID ORAL at 01:17

## 2021-07-21 RX ADMIN — IPRATROPIUM BROMIDE AND ALBUTEROL SULFATE 3 ML: 2.5; .5 SOLUTION RESPIRATORY (INHALATION) at 16:06

## 2021-07-21 RX ADMIN — NITROGLYCERIN 170 MCG/MIN: 20 INJECTION INTRAVENOUS at 10:35

## 2021-07-21 RX ADMIN — IPRATROPIUM BROMIDE AND ALBUTEROL SULFATE 3 ML: 2.5; .5 SOLUTION RESPIRATORY (INHALATION) at 20:04

## 2021-07-21 RX ADMIN — FUROSEMIDE 80 MG: 10 INJECTION, SOLUTION INTRAVENOUS at 16:29

## 2021-07-21 RX ADMIN — CEFTRIAXONE SODIUM 1 G: 1 INJECTION, SOLUTION INTRAVENOUS at 18:16

## 2021-07-21 RX ADMIN — SODIUM CHLORIDE, PRESERVATIVE FREE 10 ML: 5 INJECTION INTRAVENOUS at 21:23

## 2021-07-21 RX ADMIN — Medication 5 MG: at 19:45

## 2021-07-21 RX ADMIN — FUROSEMIDE 80 MG: 10 INJECTION, SOLUTION INTRAVENOUS at 09:50

## 2021-07-21 RX ADMIN — BACITRACIN ZINC NEOMYCIN SULFATE POLYMYXIN B SULFATE: 400; 3.5; 5 OINTMENT TOPICAL at 16:38

## 2021-07-21 RX ADMIN — ACETAMINOPHEN 650 MG: 325 TABLET ORAL at 13:15

## 2021-07-21 RX ADMIN — MULTIPLE VITAMINS W/ MINERALS TAB 1 TABLET: TAB at 08:22

## 2021-07-21 RX ADMIN — PHENYLEPHRINE HYDROCHLORIDE 0.5 MCG/KG/MIN: 10 INJECTION, SOLUTION INTRAVENOUS at 14:59

## 2021-07-21 RX ADMIN — IPRATROPIUM BROMIDE AND ALBUTEROL SULFATE 3 ML: 2.5; .5 SOLUTION RESPIRATORY (INHALATION) at 10:41

## 2021-07-21 RX ADMIN — APIXABAN 5 MG: 5 TABLET, FILM COATED ORAL at 08:07

## 2021-07-21 RX ADMIN — METHYLPREDNISOLONE SODIUM SUCCINATE 40 MG: 40 INJECTION, POWDER, LYOPHILIZED, FOR SOLUTION INTRAMUSCULAR; INTRAVENOUS at 16:29

## 2021-07-21 RX ADMIN — DEXMEDETOMIDINE HYDROCHLORIDE 0.2 MCG/KG/HR: 4 INJECTION, SOLUTION INTRAVENOUS at 09:47

## 2021-07-21 RX ADMIN — SODIUM CHLORIDE, PRESERVATIVE FREE 10 ML: 5 INJECTION INTRAVENOUS at 08:08

## 2021-07-21 RX ADMIN — DOXYCYCLINE 100 MG: 100 INJECTION, POWDER, LYOPHILIZED, FOR SOLUTION INTRAVENOUS at 05:17

## 2021-07-21 RX ADMIN — BACITRACIN ZINC NEOMYCIN SULFATE POLYMYXIN B SULFATE 1 APPLICATION: 400; 3.5; 5 OINTMENT TOPICAL at 05:17

## 2021-07-21 RX ADMIN — IPRATROPIUM BROMIDE AND ALBUTEROL SULFATE 3 ML: 2.5; .5 SOLUTION RESPIRATORY (INHALATION) at 03:43

## 2021-07-21 RX ADMIN — POTASSIUM CHLORIDE 20 MEQ: 400 INJECTION, SOLUTION INTRAVENOUS at 16:37

## 2021-07-21 RX ADMIN — APIXABAN 5 MG: 5 TABLET, FILM COATED ORAL at 19:45

## 2021-07-21 RX ADMIN — TOLVAPTAN 15 MG: 15 TABLET ORAL at 13:16

## 2021-07-22 ENCOUNTER — APPOINTMENT (OUTPATIENT)
Dept: GENERAL RADIOLOGY | Facility: HOSPITAL | Age: 81
End: 2021-07-22

## 2021-07-22 LAB
ALBUMIN SERPL-MCNC: 2.9 G/DL (ref 3.5–5.2)
ALBUMIN/GLOB SERPL: 0.9 G/DL
ALP SERPL-CCNC: 111 U/L (ref 39–117)
ALT SERPL W P-5'-P-CCNC: 40 U/L (ref 1–33)
ANION GAP SERPL CALCULATED.3IONS-SCNC: 14 MMOL/L (ref 5–15)
ANION GAP SERPL CALCULATED.3IONS-SCNC: 17 MMOL/L (ref 5–15)
AST SERPL-CCNC: 40 U/L (ref 1–32)
BASOPHILS # BLD AUTO: 0.02 10*3/MM3 (ref 0–0.2)
BASOPHILS NFR BLD AUTO: 0.1 % (ref 0–1.5)
BILIRUB SERPL-MCNC: 0.4 MG/DL (ref 0–1.2)
BUN SERPL-MCNC: 29 MG/DL (ref 8–23)
BUN SERPL-MCNC: 31 MG/DL (ref 8–23)
BUN/CREAT SERPL: 31.2 (ref 7–25)
BUN/CREAT SERPL: 32.3 (ref 7–25)
CALCIUM SPEC-SCNC: 8.4 MG/DL (ref 8.6–10.5)
CALCIUM SPEC-SCNC: 8.6 MG/DL (ref 8.6–10.5)
CENTROMERE B AB SER-ACNC: >8 AI (ref 0–0.9)
CH50 SERPL-ACNC: 59 U/ML
CHLORIDE SERPL-SCNC: 91 MMOL/L (ref 98–107)
CHLORIDE SERPL-SCNC: 94 MMOL/L (ref 98–107)
CHROMATIN AB SERPL-ACNC: 0.2 AI (ref 0–0.9)
CO2 SERPL-SCNC: 23 MMOL/L (ref 22–29)
CO2 SERPL-SCNC: 23 MMOL/L (ref 22–29)
CREAT SERPL-MCNC: 0.93 MG/DL (ref 0.57–1)
CREAT SERPL-MCNC: 0.96 MG/DL (ref 0.57–1)
CRP SERPL-MCNC: 23.96 MG/DL (ref 0–0.5)
D-LACTATE SERPL-SCNC: 1.2 MMOL/L (ref 0.5–2)
DEPRECATED RDW RBC AUTO: 41.9 FL (ref 37–54)
DSDNA AB SER-ACNC: <1 IU/ML (ref 0–9)
ENA JO1 AB SER-ACNC: <0.2 AI (ref 0–0.9)
ENA RNP AB SER-ACNC: <0.2 AI (ref 0–0.9)
ENA SCL70 AB SER-ACNC: <0.2 AI (ref 0–0.9)
ENA SM AB SER-ACNC: <0.2 AI (ref 0–0.9)
ENA SS-A AB SER-ACNC: <0.2 AI (ref 0–0.9)
ENA SS-B AB SER-ACNC: <0.2 AI (ref 0–0.9)
EOSINOPHIL # BLD AUTO: 0.06 10*3/MM3 (ref 0–0.4)
EOSINOPHIL NFR BLD AUTO: 0.3 % (ref 0.3–6.2)
ERYTHROCYTE [DISTWIDTH] IN BLOOD BY AUTOMATED COUNT: 14.6 % (ref 12.3–15.4)
ERYTHROCYTE [SEDIMENTATION RATE] IN BLOOD: 54 MM/HR (ref 0–30)
GFR SERPL CREATININE-BSD FRML MDRD: 56 ML/MIN/1.73
GFR SERPL CREATININE-BSD FRML MDRD: 58 ML/MIN/1.73
GLOBULIN UR ELPH-MCNC: 3.2 GM/DL
GLUCOSE BLDC GLUCOMTR-MCNC: 129 MG/DL (ref 70–130)
GLUCOSE BLDC GLUCOMTR-MCNC: 148 MG/DL (ref 70–130)
GLUCOSE BLDC GLUCOMTR-MCNC: 150 MG/DL (ref 70–130)
GLUCOSE BLDC GLUCOMTR-MCNC: 183 MG/DL (ref 70–130)
GLUCOSE SERPL-MCNC: 129 MG/DL (ref 65–99)
GLUCOSE SERPL-MCNC: 155 MG/DL (ref 65–99)
HCT VFR BLD AUTO: 30.7 % (ref 34–46.6)
HGB BLD-MCNC: 9.3 G/DL (ref 12–15.9)
IMM GRANULOCYTES # BLD AUTO: 0.24 10*3/MM3 (ref 0–0.05)
IMM GRANULOCYTES NFR BLD AUTO: 1 % (ref 0–0.5)
LYMPHOCYTES # BLD AUTO: 0.41 10*3/MM3 (ref 0.7–3.1)
LYMPHOCYTES NFR BLD AUTO: 1.7 % (ref 19.6–45.3)
Lab: ABNORMAL
MAGNESIUM SERPL-MCNC: 2.3 MG/DL (ref 1.6–2.4)
MCH RBC QN AUTO: 24 PG (ref 26.6–33)
MCHC RBC AUTO-ENTMCNC: 30.3 G/DL (ref 31.5–35.7)
MCV RBC AUTO: 79.3 FL (ref 79–97)
MONOCYTES # BLD AUTO: 1.35 10*3/MM3 (ref 0.1–0.9)
MONOCYTES NFR BLD AUTO: 5.6 % (ref 5–12)
NEUTROPHILS NFR BLD AUTO: 21.83 10*3/MM3 (ref 1.7–7)
NEUTROPHILS NFR BLD AUTO: 91.3 % (ref 42.7–76)
NRBC BLD AUTO-RTO: 0 /100 WBC (ref 0–0.2)
PHOSPHATE SERPL-MCNC: 5.4 MG/DL (ref 2.5–4.5)
PLATELET # BLD AUTO: 328 10*3/MM3 (ref 140–450)
PMV BLD AUTO: 10.5 FL (ref 6–12)
POTASSIUM SERPL-SCNC: 3.4 MMOL/L (ref 3.5–5.2)
POTASSIUM SERPL-SCNC: 3.9 MMOL/L (ref 3.5–5.2)
PROCALCITONIN SERPL-MCNC: 1.42 NG/ML (ref 0–0.25)
PROT SERPL-MCNC: 6.1 G/DL (ref 6–8.5)
RBC # BLD AUTO: 3.87 10*6/MM3 (ref 3.77–5.28)
SODIUM SERPL-SCNC: 131 MMOL/L (ref 136–145)
SODIUM SERPL-SCNC: 131 MMOL/L (ref 136–145)
T4 FREE SERPL-MCNC: 0.95 NG/DL (ref 0.93–1.7)
TSH SERPL DL<=0.05 MIU/L-ACNC: 6.52 UIU/ML (ref 0.27–4.2)
WBC # BLD AUTO: 23.91 10*3/MM3 (ref 3.4–10.8)

## 2021-07-22 PROCEDURE — 94799 UNLISTED PULMONARY SVC/PX: CPT

## 2021-07-22 PROCEDURE — 97530 THERAPEUTIC ACTIVITIES: CPT

## 2021-07-22 PROCEDURE — 71045 X-RAY EXAM CHEST 1 VIEW: CPT

## 2021-07-22 PROCEDURE — 84439 ASSAY OF FREE THYROXINE: CPT | Performed by: INTERNAL MEDICINE

## 2021-07-22 PROCEDURE — 97162 PT EVAL MOD COMPLEX 30 MIN: CPT

## 2021-07-22 PROCEDURE — 25010000002 CEFTRIAXONE PER 250 MG: Performed by: PHYSICIAN ASSISTANT

## 2021-07-22 PROCEDURE — 85025 COMPLETE CBC W/AUTO DIFF WBC: CPT | Performed by: INTERNAL MEDICINE

## 2021-07-22 PROCEDURE — 80053 COMPREHEN METABOLIC PANEL: CPT | Performed by: INTERNAL MEDICINE

## 2021-07-22 PROCEDURE — 82962 GLUCOSE BLOOD TEST: CPT

## 2021-07-22 PROCEDURE — 83735 ASSAY OF MAGNESIUM: CPT | Performed by: INTERNAL MEDICINE

## 2021-07-22 PROCEDURE — 87449 NOS EACH ORGANISM AG IA: CPT | Performed by: INTERNAL MEDICINE

## 2021-07-22 PROCEDURE — 25010000002 METHYLPREDNISOLONE PER 40 MG: Performed by: INTERNAL MEDICINE

## 2021-07-22 PROCEDURE — 84443 ASSAY THYROID STIM HORMONE: CPT | Performed by: INTERNAL MEDICINE

## 2021-07-22 PROCEDURE — 84145 PROCALCITONIN (PCT): CPT | Performed by: INTERNAL MEDICINE

## 2021-07-22 PROCEDURE — 99291 CRITICAL CARE FIRST HOUR: CPT | Performed by: INTERNAL MEDICINE

## 2021-07-22 PROCEDURE — 86140 C-REACTIVE PROTEIN: CPT | Performed by: INTERNAL MEDICINE

## 2021-07-22 PROCEDURE — 99232 SBSQ HOSP IP/OBS MODERATE 35: CPT | Performed by: INTERNAL MEDICINE

## 2021-07-22 PROCEDURE — 83605 ASSAY OF LACTIC ACID: CPT | Performed by: INTERNAL MEDICINE

## 2021-07-22 PROCEDURE — 94660 CPAP INITIATION&MGMT: CPT

## 2021-07-22 PROCEDURE — 85652 RBC SED RATE AUTOMATED: CPT | Performed by: INTERNAL MEDICINE

## 2021-07-22 PROCEDURE — 25010000003 POTASSIUM CHLORIDE 10 MEQ/100ML SOLUTION: Performed by: INTERNAL MEDICINE

## 2021-07-22 PROCEDURE — 84100 ASSAY OF PHOSPHORUS: CPT | Performed by: INTERNAL MEDICINE

## 2021-07-22 PROCEDURE — 25010000002 FUROSEMIDE PER 20 MG: Performed by: INTERNAL MEDICINE

## 2021-07-22 PROCEDURE — 63710000001 INSULIN LISPRO (HUMAN) PER 5 UNITS: Performed by: INTERNAL MEDICINE

## 2021-07-22 PROCEDURE — 87385 HISTOPLASMA CAPSUL AG IA: CPT | Performed by: INTERNAL MEDICINE

## 2021-07-22 RX ORDER — FUROSEMIDE 10 MG/ML
40 INJECTION INTRAMUSCULAR; INTRAVENOUS ONCE
Status: COMPLETED | OUTPATIENT
Start: 2021-07-22 | End: 2021-07-22

## 2021-07-22 RX ORDER — POTASSIUM CHLORIDE 750 MG/1
20 CAPSULE, EXTENDED RELEASE ORAL ONCE
Status: COMPLETED | OUTPATIENT
Start: 2021-07-22 | End: 2021-07-22

## 2021-07-22 RX ADMIN — SODIUM CHLORIDE, PRESERVATIVE FREE 10 ML: 5 INJECTION INTRAVENOUS at 21:16

## 2021-07-22 RX ADMIN — DOXYCYCLINE 100 MG: 100 INJECTION, POWDER, LYOPHILIZED, FOR SOLUTION INTRAVENOUS at 05:11

## 2021-07-22 RX ADMIN — METHYLPREDNISOLONE SODIUM SUCCINATE 40 MG: 40 INJECTION, POWDER, LYOPHILIZED, FOR SOLUTION INTRAMUSCULAR; INTRAVENOUS at 09:11

## 2021-07-22 RX ADMIN — APIXABAN 5 MG: 5 TABLET, FILM COATED ORAL at 09:11

## 2021-07-22 RX ADMIN — MULTIPLE VITAMINS W/ MINERALS TAB 1 TABLET: TAB at 09:10

## 2021-07-22 RX ADMIN — DOXYCYCLINE 100 MG: 100 INJECTION, POWDER, LYOPHILIZED, FOR SOLUTION INTRAVENOUS at 17:32

## 2021-07-22 RX ADMIN — Medication 5 MG: at 21:14

## 2021-07-22 RX ADMIN — CEFTRIAXONE SODIUM 1 G: 1 INJECTION, SOLUTION INTRAVENOUS at 17:17

## 2021-07-22 RX ADMIN — APIXABAN 5 MG: 5 TABLET, FILM COATED ORAL at 21:14

## 2021-07-22 RX ADMIN — BACITRACIN ZINC NEOMYCIN SULFATE POLYMYXIN B SULFATE: 400; 3.5; 5 OINTMENT TOPICAL at 17:18

## 2021-07-22 RX ADMIN — OXYCODONE HYDROCHLORIDE AND ACETAMINOPHEN 1000 MG: 500 TABLET ORAL at 09:10

## 2021-07-22 RX ADMIN — METHYLPREDNISOLONE SODIUM SUCCINATE 40 MG: 40 INJECTION, POWDER, LYOPHILIZED, FOR SOLUTION INTRAMUSCULAR; INTRAVENOUS at 00:43

## 2021-07-22 RX ADMIN — SERTRALINE HYDROCHLORIDE 150 MG: 50 TABLET ORAL at 09:10

## 2021-07-22 RX ADMIN — IPRATROPIUM BROMIDE AND ALBUTEROL SULFATE 3 ML: 2.5; .5 SOLUTION RESPIRATORY (INHALATION) at 14:56

## 2021-07-22 RX ADMIN — SODIUM CHLORIDE, PRESERVATIVE FREE 10 ML: 5 INJECTION INTRAVENOUS at 09:12

## 2021-07-22 RX ADMIN — IPRATROPIUM BROMIDE AND ALBUTEROL SULFATE 3 ML: 2.5; .5 SOLUTION RESPIRATORY (INHALATION) at 02:48

## 2021-07-22 RX ADMIN — INSULIN LISPRO 2 UNITS: 100 INJECTION, SOLUTION INTRAVENOUS; SUBCUTANEOUS at 17:17

## 2021-07-22 RX ADMIN — FUROSEMIDE 40 MG: 10 INJECTION, SOLUTION INTRAVENOUS at 11:45

## 2021-07-22 RX ADMIN — POTASSIUM CHLORIDE 10 MEQ: 7.46 INJECTION, SOLUTION INTRAVENOUS at 06:12

## 2021-07-22 RX ADMIN — IPRATROPIUM BROMIDE AND ALBUTEROL SULFATE 3 ML: 2.5; .5 SOLUTION RESPIRATORY (INHALATION) at 19:50

## 2021-07-22 RX ADMIN — BACITRACIN ZINC NEOMYCIN SULFATE POLYMYXIN B SULFATE 1 APPLICATION: 400; 3.5; 5 OINTMENT TOPICAL at 21:17

## 2021-07-22 RX ADMIN — POTASSIUM CHLORIDE 10 MEQ: 7.46 INJECTION, SOLUTION INTRAVENOUS at 04:00

## 2021-07-22 RX ADMIN — POTASSIUM CHLORIDE 10 MEQ: 7.46 INJECTION, SOLUTION INTRAVENOUS at 05:11

## 2021-07-22 RX ADMIN — IPRATROPIUM BROMIDE AND ALBUTEROL SULFATE 3 ML: 2.5; .5 SOLUTION RESPIRATORY (INHALATION) at 06:49

## 2021-07-22 RX ADMIN — IPRATROPIUM BROMIDE AND ALBUTEROL SULFATE 3 ML: 2.5; .5 SOLUTION RESPIRATORY (INHALATION) at 10:39

## 2021-07-22 RX ADMIN — METHYLPREDNISOLONE SODIUM SUCCINATE 40 MG: 40 INJECTION, POWDER, LYOPHILIZED, FOR SOLUTION INTRAMUSCULAR; INTRAVENOUS at 17:16

## 2021-07-22 RX ADMIN — BACITRACIN ZINC NEOMYCIN SULFATE POLYMYXIN B SULFATE: 400; 3.5; 5 OINTMENT TOPICAL at 05:11

## 2021-07-22 RX ADMIN — FUROSEMIDE 40 MG: 10 INJECTION, SOLUTION INTRAVENOUS at 17:16

## 2021-07-22 RX ADMIN — QUETIAPINE FUMARATE 50 MG: 25 TABLET ORAL at 21:13

## 2021-07-22 RX ADMIN — POTASSIUM CHLORIDE 20 MEQ: 750 CAPSULE, EXTENDED RELEASE ORAL at 17:17

## 2021-07-22 RX ADMIN — POTASSIUM CHLORIDE 10 MEQ: 7.46 INJECTION, SOLUTION INTRAVENOUS at 09:11

## 2021-07-23 ENCOUNTER — APPOINTMENT (OUTPATIENT)
Dept: GENERAL RADIOLOGY | Facility: HOSPITAL | Age: 81
End: 2021-07-23

## 2021-07-23 LAB
ALBUMIN SERPL-MCNC: 3.1 G/DL (ref 3.5–5.2)
ALBUMIN/GLOB SERPL: 1.1 G/DL
ALP SERPL-CCNC: 106 U/L (ref 39–117)
ALT SERPL W P-5'-P-CCNC: 37 U/L (ref 1–33)
ANION GAP SERPL CALCULATED.3IONS-SCNC: 14 MMOL/L (ref 5–15)
AST SERPL-CCNC: 33 U/L (ref 1–32)
BASOPHILS # BLD MANUAL: 0 10*3/MM3 (ref 0–0.2)
BASOPHILS NFR BLD AUTO: 0 % (ref 0–1.5)
BILIRUB SERPL-MCNC: 0.4 MG/DL (ref 0–1.2)
BUN SERPL-MCNC: 44 MG/DL (ref 8–23)
BUN/CREAT SERPL: 49.4 (ref 7–25)
CALCIUM SPEC-SCNC: 8.7 MG/DL (ref 8.6–10.5)
CCP IGA+IGG SERPL IA-ACNC: 5 UNITS (ref 0–19)
CHLORIDE SERPL-SCNC: 94 MMOL/L (ref 98–107)
CO2 SERPL-SCNC: 24 MMOL/L (ref 22–29)
CREAT SERPL-MCNC: 0.89 MG/DL (ref 0.57–1)
CRP SERPL-MCNC: 14.48 MG/DL (ref 0–0.5)
D-LACTATE SERPL-SCNC: 1.4 MMOL/L (ref 0.5–2)
DEPRECATED RDW RBC AUTO: 40 FL (ref 37–54)
EOSINOPHIL # BLD MANUAL: 0 10*3/MM3 (ref 0–0.4)
EOSINOPHIL NFR BLD MANUAL: 0 % (ref 0.3–6.2)
ERYTHROCYTE [DISTWIDTH] IN BLOOD BY AUTOMATED COUNT: 14.6 % (ref 12.3–15.4)
ERYTHROCYTE [SEDIMENTATION RATE] IN BLOOD: 51 MM/HR (ref 0–30)
GFR SERPL CREATININE-BSD FRML MDRD: 61 ML/MIN/1.73
GLOBULIN UR ELPH-MCNC: 2.9 GM/DL
GLUCOSE BLDC GLUCOMTR-MCNC: 109 MG/DL (ref 70–130)
GLUCOSE BLDC GLUCOMTR-MCNC: 120 MG/DL (ref 70–130)
GLUCOSE BLDC GLUCOMTR-MCNC: 120 MG/DL (ref 70–130)
GLUCOSE BLDC GLUCOMTR-MCNC: 139 MG/DL (ref 70–130)
GLUCOSE SERPL-MCNC: 127 MG/DL (ref 65–99)
HCT VFR BLD AUTO: 26 % (ref 34–46.6)
HGB BLD-MCNC: 8.2 G/DL (ref 12–15.9)
LYMPHOCYTES # BLD MANUAL: 0.27 10*3/MM3 (ref 0.7–3.1)
LYMPHOCYTES NFR BLD MANUAL: 1 % (ref 5–12)
LYMPHOCYTES NFR BLD MANUAL: 2 % (ref 19.6–45.3)
MAGNESIUM SERPL-MCNC: 2.2 MG/DL (ref 1.6–2.4)
MCH RBC QN AUTO: 24.2 PG (ref 26.6–33)
MCHC RBC AUTO-ENTMCNC: 31.5 G/DL (ref 31.5–35.7)
MCV RBC AUTO: 76.7 FL (ref 79–97)
MONOCYTES # BLD AUTO: 0.13 10*3/MM3 (ref 0.1–0.9)
NEUTROPHILS # BLD AUTO: 13.01 10*3/MM3 (ref 1.7–7)
NEUTROPHILS NFR BLD MANUAL: 97 % (ref 42.7–76)
NT-PROBNP SERPL-MCNC: 3280 PG/ML (ref 0–1800)
PHOSPHATE SERPL-MCNC: 3.7 MG/DL (ref 2.5–4.5)
PLAT MORPH BLD: NORMAL
PLATELET # BLD AUTO: 290 10*3/MM3 (ref 140–450)
PMV BLD AUTO: 11.3 FL (ref 6–12)
POTASSIUM SERPL-SCNC: 3.5 MMOL/L (ref 3.5–5.2)
POTASSIUM SERPL-SCNC: 4.2 MMOL/L (ref 3.5–5.2)
PROCALCITONIN SERPL-MCNC: 0.9 NG/ML (ref 0–0.25)
PROT SERPL-MCNC: 6 G/DL (ref 6–8.5)
RBC # BLD AUTO: 3.39 10*6/MM3 (ref 3.77–5.28)
RBC MORPH BLD: NORMAL
SCAN SLIDE: NORMAL
SODIUM SERPL-SCNC: 132 MMOL/L (ref 136–145)
WBC # BLD AUTO: 13.41 10*3/MM3 (ref 3.4–10.8)
WBC MORPH BLD: NORMAL

## 2021-07-23 PROCEDURE — 87305 ASPERGILLUS AG IA: CPT | Performed by: INTERNAL MEDICINE

## 2021-07-23 PROCEDURE — 94799 UNLISTED PULMONARY SVC/PX: CPT

## 2021-07-23 PROCEDURE — 87205 SMEAR GRAM STAIN: CPT | Performed by: PHYSICIAN ASSISTANT

## 2021-07-23 PROCEDURE — 94669 MECHANICAL CHEST WALL OSCILL: CPT

## 2021-07-23 PROCEDURE — 97110 THERAPEUTIC EXERCISES: CPT

## 2021-07-23 PROCEDURE — 86757 RICKETTSIA ANTIBODY: CPT | Performed by: INTERNAL MEDICINE

## 2021-07-23 PROCEDURE — 83880 ASSAY OF NATRIURETIC PEPTIDE: CPT | Performed by: INTERNAL MEDICINE

## 2021-07-23 PROCEDURE — 82962 GLUCOSE BLOOD TEST: CPT

## 2021-07-23 PROCEDURE — 71045 X-RAY EXAM CHEST 1 VIEW: CPT

## 2021-07-23 PROCEDURE — 87449 NOS EACH ORGANISM AG IA: CPT | Performed by: INTERNAL MEDICINE

## 2021-07-23 PROCEDURE — 85652 RBC SED RATE AUTOMATED: CPT | Performed by: INTERNAL MEDICINE

## 2021-07-23 PROCEDURE — 84100 ASSAY OF PHOSPHORUS: CPT | Performed by: INTERNAL MEDICINE

## 2021-07-23 PROCEDURE — 84132 ASSAY OF SERUM POTASSIUM: CPT | Performed by: INTERNAL MEDICINE

## 2021-07-23 PROCEDURE — 85025 COMPLETE CBC W/AUTO DIFF WBC: CPT | Performed by: INTERNAL MEDICINE

## 2021-07-23 PROCEDURE — 83735 ASSAY OF MAGNESIUM: CPT | Performed by: INTERNAL MEDICINE

## 2021-07-23 PROCEDURE — 84145 PROCALCITONIN (PCT): CPT | Performed by: INTERNAL MEDICINE

## 2021-07-23 PROCEDURE — 86618 LYME DISEASE ANTIBODY: CPT | Performed by: INTERNAL MEDICINE

## 2021-07-23 PROCEDURE — 99291 CRITICAL CARE FIRST HOUR: CPT | Performed by: INTERNAL MEDICINE

## 2021-07-23 PROCEDURE — 83605 ASSAY OF LACTIC ACID: CPT | Performed by: INTERNAL MEDICINE

## 2021-07-23 PROCEDURE — 86666 EHRLICHIA ANTIBODY: CPT | Performed by: INTERNAL MEDICINE

## 2021-07-23 PROCEDURE — 25010000002 METHYLPREDNISOLONE PER 40 MG: Performed by: INTERNAL MEDICINE

## 2021-07-23 PROCEDURE — 87385 HISTOPLASMA CAPSUL AG IA: CPT | Performed by: INTERNAL MEDICINE

## 2021-07-23 PROCEDURE — 25010000002 HYDRALAZINE PER 20 MG: Performed by: NURSE PRACTITIONER

## 2021-07-23 PROCEDURE — 99232 SBSQ HOSP IP/OBS MODERATE 35: CPT | Performed by: INTERNAL MEDICINE

## 2021-07-23 PROCEDURE — 94660 CPAP INITIATION&MGMT: CPT

## 2021-07-23 PROCEDURE — 86753 PROTOZOA ANTIBODY NOS: CPT | Performed by: INTERNAL MEDICINE

## 2021-07-23 PROCEDURE — 85007 BL SMEAR W/DIFF WBC COUNT: CPT | Performed by: INTERNAL MEDICINE

## 2021-07-23 PROCEDURE — 25010000002 CEFTRIAXONE PER 250 MG: Performed by: INTERNAL MEDICINE

## 2021-07-23 PROCEDURE — 86140 C-REACTIVE PROTEIN: CPT | Performed by: INTERNAL MEDICINE

## 2021-07-23 PROCEDURE — 80053 COMPREHEN METABOLIC PANEL: CPT | Performed by: INTERNAL MEDICINE

## 2021-07-23 PROCEDURE — 25010000002 FUROSEMIDE PER 20 MG: Performed by: INTERNAL MEDICINE

## 2021-07-23 RX ORDER — POTASSIUM CHLORIDE 750 MG/1
40 CAPSULE, EXTENDED RELEASE ORAL 2 TIMES DAILY WITH MEALS
Status: ACTIVE | OUTPATIENT
Start: 2021-07-23 | End: 2021-07-24

## 2021-07-23 RX ORDER — IPRATROPIUM BROMIDE AND ALBUTEROL SULFATE 2.5; .5 MG/3ML; MG/3ML
3 SOLUTION RESPIRATORY (INHALATION)
Status: DISCONTINUED | OUTPATIENT
Start: 2021-07-23 | End: 2021-08-02 | Stop reason: HOSPADM

## 2021-07-23 RX ORDER — DEXTROMETHORPHAN POLISTIREX 30 MG/5ML
60 SUSPENSION ORAL EVERY 12 HOURS SCHEDULED
Status: DISCONTINUED | OUTPATIENT
Start: 2021-07-23 | End: 2021-08-02 | Stop reason: HOSPADM

## 2021-07-23 RX ORDER — HYDRALAZINE HYDROCHLORIDE 20 MG/ML
10 INJECTION INTRAMUSCULAR; INTRAVENOUS EVERY 6 HOURS PRN
Status: DISCONTINUED | OUTPATIENT
Start: 2021-07-23 | End: 2021-08-02 | Stop reason: HOSPADM

## 2021-07-23 RX ORDER — FUROSEMIDE 10 MG/ML
40 INJECTION INTRAMUSCULAR; INTRAVENOUS 2 TIMES DAILY
Status: COMPLETED | OUTPATIENT
Start: 2021-07-23 | End: 2021-07-23

## 2021-07-23 RX ORDER — METHYLPREDNISOLONE SODIUM SUCCINATE 40 MG/ML
20 INJECTION, POWDER, LYOPHILIZED, FOR SOLUTION INTRAMUSCULAR; INTRAVENOUS EVERY 8 HOURS
Status: DISCONTINUED | OUTPATIENT
Start: 2021-07-23 | End: 2021-07-28

## 2021-07-23 RX ORDER — GUAIFENESIN 600 MG/1
1200 TABLET, EXTENDED RELEASE ORAL EVERY 12 HOURS SCHEDULED
Status: DISCONTINUED | OUTPATIENT
Start: 2021-07-23 | End: 2021-07-28

## 2021-07-23 RX ORDER — BUDESONIDE 0.5 MG/2ML
0.5 INHALANT ORAL
Status: DISCONTINUED | OUTPATIENT
Start: 2021-07-23 | End: 2021-08-02 | Stop reason: HOSPADM

## 2021-07-23 RX ORDER — DOXYCYCLINE 100 MG/1
100 CAPSULE ORAL EVERY 12 HOURS SCHEDULED
Status: COMPLETED | OUTPATIENT
Start: 2021-07-23 | End: 2021-07-28

## 2021-07-23 RX ADMIN — CEFTRIAXONE SODIUM 1 G: 1 INJECTION, SOLUTION INTRAVENOUS at 17:30

## 2021-07-23 RX ADMIN — HYDRALAZINE HYDROCHLORIDE 10 MG: 20 INJECTION INTRAMUSCULAR; INTRAVENOUS at 15:46

## 2021-07-23 RX ADMIN — MULTIPLE VITAMINS W/ MINERALS TAB 1 TABLET: TAB at 08:37

## 2021-07-23 RX ADMIN — DEXMEDETOMIDINE HYDROCHLORIDE 0.2 MCG/KG/HR: 4 INJECTION, SOLUTION INTRAVENOUS at 16:36

## 2021-07-23 RX ADMIN — METOPROLOL TARTRATE 5 MG: 1 INJECTION, SOLUTION INTRAVENOUS at 08:02

## 2021-07-23 RX ADMIN — HYDROXYZINE HYDROCHLORIDE 25 MG: 25 TABLET, FILM COATED ORAL at 13:47

## 2021-07-23 RX ADMIN — METHYLPREDNISOLONE SODIUM SUCCINATE 20 MG: 40 INJECTION, POWDER, LYOPHILIZED, FOR SOLUTION INTRAMUSCULAR; INTRAVENOUS at 16:40

## 2021-07-23 RX ADMIN — QUETIAPINE FUMARATE 50 MG: 25 TABLET ORAL at 20:03

## 2021-07-23 RX ADMIN — OXYCODONE HYDROCHLORIDE AND ACETAMINOPHEN 1000 MG: 500 TABLET ORAL at 08:37

## 2021-07-23 RX ADMIN — METOPROLOL TARTRATE 5 MG: 1 INJECTION, SOLUTION INTRAVENOUS at 17:47

## 2021-07-23 RX ADMIN — METHYLPREDNISOLONE SODIUM SUCCINATE 40 MG: 40 INJECTION, POWDER, LYOPHILIZED, FOR SOLUTION INTRAMUSCULAR; INTRAVENOUS at 00:52

## 2021-07-23 RX ADMIN — IPRATROPIUM BROMIDE AND ALBUTEROL SULFATE 3 ML: 2.5; .5 SOLUTION RESPIRATORY (INHALATION) at 19:13

## 2021-07-23 RX ADMIN — Medication 5 MG: at 20:03

## 2021-07-23 RX ADMIN — BACITRACIN ZINC NEOMYCIN SULFATE POLYMYXIN B SULFATE: 400; 3.5; 5 OINTMENT TOPICAL at 05:32

## 2021-07-23 RX ADMIN — DOXYCYCLINE 100 MG: 100 INJECTION, POWDER, LYOPHILIZED, FOR SOLUTION INTRAVENOUS at 05:32

## 2021-07-23 RX ADMIN — APIXABAN 5 MG: 5 TABLET, FILM COATED ORAL at 08:37

## 2021-07-23 RX ADMIN — POTASSIUM CHLORIDE 40 MEQ: 1.5 POWDER, FOR SOLUTION ORAL at 12:13

## 2021-07-23 RX ADMIN — FUROSEMIDE 40 MG: 10 INJECTION, SOLUTION INTRAVENOUS at 09:20

## 2021-07-23 RX ADMIN — FUROSEMIDE 40 MG: 10 INJECTION, SOLUTION INTRAVENOUS at 17:22

## 2021-07-23 RX ADMIN — IPRATROPIUM BROMIDE AND ALBUTEROL SULFATE 3 ML: 2.5; .5 SOLUTION RESPIRATORY (INHALATION) at 07:20

## 2021-07-23 RX ADMIN — DEXTROMETHORPHAN 60 MG: 30 SUSPENSION, EXTENDED RELEASE ORAL at 20:03

## 2021-07-23 RX ADMIN — BACITRACIN ZINC NEOMYCIN SULFATE POLYMYXIN B SULFATE: 400; 3.5; 5 OINTMENT TOPICAL at 21:06

## 2021-07-23 RX ADMIN — SODIUM CHLORIDE, PRESERVATIVE FREE 10 ML: 5 INJECTION INTRAVENOUS at 20:03

## 2021-07-23 RX ADMIN — SERTRALINE HYDROCHLORIDE 150 MG: 50 TABLET ORAL at 08:36

## 2021-07-23 RX ADMIN — BUDESONIDE 0.5 MG: 0.5 INHALANT RESPIRATORY (INHALATION) at 19:13

## 2021-07-23 RX ADMIN — NITROGLYCERIN 5 MCG/MIN: 20 INJECTION INTRAVENOUS at 17:18

## 2021-07-23 RX ADMIN — DOXYCYCLINE 100 MG: 100 CAPSULE ORAL at 20:04

## 2021-07-23 RX ADMIN — SODIUM CHLORIDE, PRESERVATIVE FREE 10 ML: 5 INJECTION INTRAVENOUS at 20:04

## 2021-07-23 RX ADMIN — IPRATROPIUM BROMIDE AND ALBUTEROL SULFATE 3 ML: 2.5; .5 SOLUTION RESPIRATORY (INHALATION) at 03:35

## 2021-07-23 RX ADMIN — IPRATROPIUM BROMIDE AND ALBUTEROL SULFATE 3 ML: 2.5; .5 SOLUTION RESPIRATORY (INHALATION) at 00:07

## 2021-07-23 RX ADMIN — GUAIFENESIN 1200 MG: 600 TABLET, EXTENDED RELEASE ORAL at 20:03

## 2021-07-23 RX ADMIN — SODIUM CHLORIDE, PRESERVATIVE FREE 10 ML: 5 INJECTION INTRAVENOUS at 08:38

## 2021-07-23 RX ADMIN — BACITRACIN ZINC NEOMYCIN SULFATE POLYMYXIN B SULFATE: 400; 3.5; 5 OINTMENT TOPICAL at 16:40

## 2021-07-23 RX ADMIN — SODIUM CHLORIDE, PRESERVATIVE FREE 10 ML: 5 INJECTION INTRAVENOUS at 08:37

## 2021-07-23 RX ADMIN — APIXABAN 5 MG: 5 TABLET, FILM COATED ORAL at 20:03

## 2021-07-23 RX ADMIN — POTASSIUM CHLORIDE 40 MEQ: 1.5 POWDER, FOR SOLUTION ORAL at 08:36

## 2021-07-23 RX ADMIN — METHYLPREDNISOLONE SODIUM SUCCINATE 40 MG: 40 INJECTION, POWDER, LYOPHILIZED, FOR SOLUTION INTRAMUSCULAR; INTRAVENOUS at 08:03

## 2021-07-23 RX ADMIN — IPRATROPIUM BROMIDE AND ALBUTEROL SULFATE 3 ML: 2.5; .5 SOLUTION RESPIRATORY (INHALATION) at 15:40

## 2021-07-24 ENCOUNTER — APPOINTMENT (OUTPATIENT)
Dept: GENERAL RADIOLOGY | Facility: HOSPITAL | Age: 81
End: 2021-07-24

## 2021-07-24 LAB
ALBUMIN SERPL-MCNC: 2.9 G/DL (ref 3.5–5.2)
ALBUMIN/GLOB SERPL: 1 G/DL
ALP SERPL-CCNC: 102 U/L (ref 39–117)
ALT SERPL W P-5'-P-CCNC: 48 U/L (ref 1–33)
ANION GAP SERPL CALCULATED.3IONS-SCNC: 11 MMOL/L (ref 5–15)
AST SERPL-CCNC: 42 U/L (ref 1–32)
B BURGDOR IGG+IGM SER-ACNC: <0.91 ISR (ref 0–0.9)
B BURGDOR IGM SER IA-ACNC: <0.8 INDEX (ref 0–0.79)
BACTERIA SPEC AEROBE CULT: NORMAL
BACTERIA SPEC AEROBE CULT: NORMAL
BACTERIA SPEC RESP CULT: NORMAL
BASOPHILS # BLD AUTO: 0.01 10*3/MM3 (ref 0–0.2)
BASOPHILS NFR BLD AUTO: 0.1 % (ref 0–1.5)
BILIRUB SERPL-MCNC: 0.5 MG/DL (ref 0–1.2)
BUN SERPL-MCNC: 50 MG/DL (ref 8–23)
BUN/CREAT SERPL: 56.8 (ref 7–25)
C-ANCA TITR SER IF: ABNORMAL TITER
CALCIUM SPEC-SCNC: 9 MG/DL (ref 8.6–10.5)
CHLORIDE SERPL-SCNC: 98 MMOL/L (ref 98–107)
CO2 SERPL-SCNC: 25 MMOL/L (ref 22–29)
CREAT SERPL-MCNC: 0.88 MG/DL (ref 0.57–1)
CRP SERPL-MCNC: 7.26 MG/DL (ref 0–0.5)
D-LACTATE SERPL-SCNC: 1.5 MMOL/L (ref 0.5–2)
DEPRECATED RDW RBC AUTO: 41.5 FL (ref 37–54)
EOSINOPHIL # BLD AUTO: 0 10*3/MM3 (ref 0–0.4)
EOSINOPHIL NFR BLD AUTO: 0 % (ref 0.3–6.2)
ERYTHROCYTE [DISTWIDTH] IN BLOOD BY AUTOMATED COUNT: 14.5 % (ref 12.3–15.4)
ERYTHROCYTE [SEDIMENTATION RATE] IN BLOOD: 27 MM/HR (ref 0–30)
GFR SERPL CREATININE-BSD FRML MDRD: 62 ML/MIN/1.73
GLOBULIN UR ELPH-MCNC: 2.8 GM/DL
GLUCOSE BLDC GLUCOMTR-MCNC: 106 MG/DL (ref 70–130)
GLUCOSE BLDC GLUCOMTR-MCNC: 117 MG/DL (ref 70–130)
GLUCOSE BLDC GLUCOMTR-MCNC: 125 MG/DL (ref 70–130)
GLUCOSE BLDC GLUCOMTR-MCNC: 142 MG/DL (ref 70–130)
GLUCOSE BLDC GLUCOMTR-MCNC: 66 MG/DL (ref 70–130)
GLUCOSE SERPL-MCNC: 113 MG/DL (ref 65–99)
GRAM STN SPEC: NORMAL
HCT VFR BLD AUTO: 26.5 % (ref 34–46.6)
HGB BLD-MCNC: 7.9 G/DL (ref 12–15.9)
HYPOCHROMIA BLD QL: NORMAL
IMM GRANULOCYTES # BLD AUTO: 0.09 10*3/MM3 (ref 0–0.05)
IMM GRANULOCYTES NFR BLD AUTO: 0.6 % (ref 0–0.5)
LYMPHOCYTES # BLD AUTO: 0.54 10*3/MM3 (ref 0.7–3.1)
LYMPHOCYTES NFR BLD AUTO: 3.8 % (ref 19.6–45.3)
MAGNESIUM SERPL-MCNC: 2.2 MG/DL (ref 1.6–2.4)
MCH RBC QN AUTO: 23.7 PG (ref 26.6–33)
MCHC RBC AUTO-ENTMCNC: 29.8 G/DL (ref 31.5–35.7)
MCV RBC AUTO: 79.3 FL (ref 79–97)
MICROCYTES BLD QL: NORMAL
MONOCYTES # BLD AUTO: 0.88 10*3/MM3 (ref 0.1–0.9)
MONOCYTES NFR BLD AUTO: 6.2 % (ref 5–12)
MYELOPEROXIDASE AB SER IA-ACNC: <9 U/ML (ref 0–9)
NEUTROPHILS NFR BLD AUTO: 12.65 10*3/MM3 (ref 1.7–7)
NEUTROPHILS NFR BLD AUTO: 89.3 % (ref 42.7–76)
NRBC BLD AUTO-RTO: 0.2 /100 WBC (ref 0–0.2)
P-ANCA ATYPICAL TITR SER IF: ABNORMAL TITER
P-ANCA TITR SER IF: ABNORMAL TITER
PHOSPHATE SERPL-MCNC: 3.9 MG/DL (ref 2.5–4.5)
PLAT MORPH BLD: NORMAL
PLATELET # BLD AUTO: 276 10*3/MM3 (ref 140–450)
PMV BLD AUTO: 10.6 FL (ref 6–12)
POTASSIUM SERPL-SCNC: 4.6 MMOL/L (ref 3.5–5.2)
PROCALCITONIN SERPL-MCNC: 0.51 NG/ML (ref 0–0.25)
PROT SERPL-MCNC: 5.7 G/DL (ref 6–8.5)
PROTEINASE3 AB SER IA-ACNC: <3.5 U/ML (ref 0–3.5)
QT INTERVAL: 350 MS
QTC INTERVAL: 343 MS
RBC # BLD AUTO: 3.34 10*6/MM3 (ref 3.77–5.28)
SODIUM SERPL-SCNC: 134 MMOL/L (ref 136–145)
WBC # BLD AUTO: 14.17 10*3/MM3 (ref 3.4–10.8)
WBC MORPH BLD: NORMAL

## 2021-07-24 PROCEDURE — 83735 ASSAY OF MAGNESIUM: CPT | Performed by: INTERNAL MEDICINE

## 2021-07-24 PROCEDURE — 80053 COMPREHEN METABOLIC PANEL: CPT | Performed by: INTERNAL MEDICINE

## 2021-07-24 PROCEDURE — 71045 X-RAY EXAM CHEST 1 VIEW: CPT

## 2021-07-24 PROCEDURE — 94799 UNLISTED PULMONARY SVC/PX: CPT

## 2021-07-24 PROCEDURE — 25010000002 HYDRALAZINE PER 20 MG: Performed by: NURSE PRACTITIONER

## 2021-07-24 PROCEDURE — 86140 C-REACTIVE PROTEIN: CPT | Performed by: INTERNAL MEDICINE

## 2021-07-24 PROCEDURE — 99232 SBSQ HOSP IP/OBS MODERATE 35: CPT | Performed by: INTERNAL MEDICINE

## 2021-07-24 PROCEDURE — 85007 BL SMEAR W/DIFF WBC COUNT: CPT | Performed by: INTERNAL MEDICINE

## 2021-07-24 PROCEDURE — 85025 COMPLETE CBC W/AUTO DIFF WBC: CPT | Performed by: INTERNAL MEDICINE

## 2021-07-24 PROCEDURE — 85652 RBC SED RATE AUTOMATED: CPT | Performed by: INTERNAL MEDICINE

## 2021-07-24 PROCEDURE — 82962 GLUCOSE BLOOD TEST: CPT

## 2021-07-24 PROCEDURE — 99291 CRITICAL CARE FIRST HOUR: CPT | Performed by: INTERNAL MEDICINE

## 2021-07-24 PROCEDURE — 25010000002 CEFTRIAXONE PER 250 MG: Performed by: INTERNAL MEDICINE

## 2021-07-24 PROCEDURE — 25010000002 LORAZEPAM PER 2 MG: Performed by: NURSE PRACTITIONER

## 2021-07-24 PROCEDURE — 25010000002 METHYLPREDNISOLONE PER 40 MG: Performed by: INTERNAL MEDICINE

## 2021-07-24 PROCEDURE — 87070 CULTURE OTHR SPECIMN AEROBIC: CPT | Performed by: INTERNAL MEDICINE

## 2021-07-24 PROCEDURE — 84145 PROCALCITONIN (PCT): CPT | Performed by: INTERNAL MEDICINE

## 2021-07-24 PROCEDURE — 25010000002 FUROSEMIDE PER 20 MG: Performed by: INTERNAL MEDICINE

## 2021-07-24 PROCEDURE — 87205 SMEAR GRAM STAIN: CPT | Performed by: INTERNAL MEDICINE

## 2021-07-24 PROCEDURE — 94669 MECHANICAL CHEST WALL OSCILL: CPT

## 2021-07-24 PROCEDURE — 83605 ASSAY OF LACTIC ACID: CPT | Performed by: INTERNAL MEDICINE

## 2021-07-24 PROCEDURE — 84100 ASSAY OF PHOSPHORUS: CPT | Performed by: INTERNAL MEDICINE

## 2021-07-24 RX ORDER — LORAZEPAM 2 MG/ML
0.5 INJECTION INTRAMUSCULAR ONCE
Status: COMPLETED | OUTPATIENT
Start: 2021-07-24 | End: 2021-07-24

## 2021-07-24 RX ORDER — FAMOTIDINE 20 MG/1
20 TABLET, FILM COATED ORAL
Status: DISCONTINUED | OUTPATIENT
Start: 2021-07-25 | End: 2021-08-02 | Stop reason: HOSPADM

## 2021-07-24 RX ORDER — FUROSEMIDE 10 MG/ML
40 INJECTION INTRAMUSCULAR; INTRAVENOUS ONCE
Status: COMPLETED | OUTPATIENT
Start: 2021-07-24 | End: 2021-07-24

## 2021-07-24 RX ORDER — LORAZEPAM 2 MG/ML
0.5 INJECTION INTRAMUSCULAR ONCE
Status: DISCONTINUED | OUTPATIENT
Start: 2021-07-24 | End: 2021-07-24

## 2021-07-24 RX ADMIN — GUAIFENESIN 1200 MG: 600 TABLET, EXTENDED RELEASE ORAL at 08:04

## 2021-07-24 RX ADMIN — CEFTRIAXONE SODIUM 1 G: 1 INJECTION, SOLUTION INTRAVENOUS at 18:02

## 2021-07-24 RX ADMIN — FUROSEMIDE 40 MG: 10 INJECTION, SOLUTION INTRAVENOUS at 10:27

## 2021-07-24 RX ADMIN — BACITRACIN ZINC NEOMYCIN SULFATE POLYMYXIN B SULFATE: 400; 3.5; 5 OINTMENT TOPICAL at 13:55

## 2021-07-24 RX ADMIN — QUETIAPINE FUMARATE 50 MG: 25 TABLET ORAL at 22:20

## 2021-07-24 RX ADMIN — GUAIFENESIN 1200 MG: 600 TABLET, EXTENDED RELEASE ORAL at 22:20

## 2021-07-24 RX ADMIN — IPRATROPIUM BROMIDE AND ALBUTEROL SULFATE 3 ML: 2.5; .5 SOLUTION RESPIRATORY (INHALATION) at 16:21

## 2021-07-24 RX ADMIN — MULTIPLE VITAMINS W/ MINERALS TAB 1 TABLET: TAB at 08:04

## 2021-07-24 RX ADMIN — DILTIAZEM HYDROCHLORIDE 30 MG: 30 TABLET, FILM COATED ORAL at 10:33

## 2021-07-24 RX ADMIN — METHYLPREDNISOLONE SODIUM SUCCINATE 20 MG: 40 INJECTION, POWDER, LYOPHILIZED, FOR SOLUTION INTRAMUSCULAR; INTRAVENOUS at 17:39

## 2021-07-24 RX ADMIN — APIXABAN 5 MG: 5 TABLET, FILM COATED ORAL at 08:04

## 2021-07-24 RX ADMIN — DEXTROMETHORPHAN 60 MG: 30 SUSPENSION, EXTENDED RELEASE ORAL at 22:20

## 2021-07-24 RX ADMIN — DEXTROMETHORPHAN 60 MG: 30 SUSPENSION, EXTENDED RELEASE ORAL at 08:04

## 2021-07-24 RX ADMIN — DEXMEDETOMIDINE HYDROCHLORIDE 0.2 MCG/KG/HR: 4 INJECTION, SOLUTION INTRAVENOUS at 23:42

## 2021-07-24 RX ADMIN — Medication 5 MG: at 22:20

## 2021-07-24 RX ADMIN — METHYLPREDNISOLONE SODIUM SUCCINATE 20 MG: 40 INJECTION, POWDER, LYOPHILIZED, FOR SOLUTION INTRAMUSCULAR; INTRAVENOUS at 08:04

## 2021-07-24 RX ADMIN — METHYLPREDNISOLONE SODIUM SUCCINATE 20 MG: 40 INJECTION, POWDER, LYOPHILIZED, FOR SOLUTION INTRAMUSCULAR; INTRAVENOUS at 00:25

## 2021-07-24 RX ADMIN — BUDESONIDE 0.5 MG: 0.5 INHALANT RESPIRATORY (INHALATION) at 18:42

## 2021-07-24 RX ADMIN — BUDESONIDE 0.5 MG: 0.5 INHALANT RESPIRATORY (INHALATION) at 06:47

## 2021-07-24 RX ADMIN — DOXYCYCLINE 100 MG: 100 CAPSULE ORAL at 22:20

## 2021-07-24 RX ADMIN — BACITRACIN ZINC NEOMYCIN SULFATE POLYMYXIN B SULFATE: 400; 3.5; 5 OINTMENT TOPICAL at 06:21

## 2021-07-24 RX ADMIN — IPRATROPIUM BROMIDE AND ALBUTEROL SULFATE 3 ML: 2.5; .5 SOLUTION RESPIRATORY (INHALATION) at 23:22

## 2021-07-24 RX ADMIN — IPRATROPIUM BROMIDE AND ALBUTEROL SULFATE 3 ML: 2.5; .5 SOLUTION RESPIRATORY (INHALATION) at 00:29

## 2021-07-24 RX ADMIN — DEXMEDETOMIDINE HYDROCHLORIDE 0.2 MCG/KG/HR: 4 INJECTION, SOLUTION INTRAVENOUS at 02:10

## 2021-07-24 RX ADMIN — APIXABAN 5 MG: 5 TABLET, FILM COATED ORAL at 22:20

## 2021-07-24 RX ADMIN — DILTIAZEM HYDROCHLORIDE 30 MG: 30 TABLET, FILM COATED ORAL at 17:39

## 2021-07-24 RX ADMIN — SODIUM CHLORIDE, PRESERVATIVE FREE 10 ML: 5 INJECTION INTRAVENOUS at 08:04

## 2021-07-24 RX ADMIN — BACITRACIN ZINC NEOMYCIN SULFATE POLYMYXIN B SULFATE: 400; 3.5; 5 OINTMENT TOPICAL at 22:21

## 2021-07-24 RX ADMIN — IPRATROPIUM BROMIDE AND ALBUTEROL SULFATE 3 ML: 2.5; .5 SOLUTION RESPIRATORY (INHALATION) at 10:34

## 2021-07-24 RX ADMIN — IPRATROPIUM BROMIDE AND ALBUTEROL SULFATE 3 ML: 2.5; .5 SOLUTION RESPIRATORY (INHALATION) at 18:42

## 2021-07-24 RX ADMIN — LORAZEPAM 0.5 MG: 2 INJECTION INTRAMUSCULAR; INTRAVENOUS at 21:08

## 2021-07-24 RX ADMIN — HYDROXYZINE HYDROCHLORIDE 25 MG: 25 TABLET, FILM COATED ORAL at 10:28

## 2021-07-24 RX ADMIN — OXYCODONE HYDROCHLORIDE AND ACETAMINOPHEN 1000 MG: 500 TABLET ORAL at 08:04

## 2021-07-24 RX ADMIN — SODIUM CHLORIDE, PRESERVATIVE FREE 10 ML: 5 INJECTION INTRAVENOUS at 08:05

## 2021-07-24 RX ADMIN — DILTIAZEM HYDROCHLORIDE 30 MG: 30 TABLET, FILM COATED ORAL at 23:42

## 2021-07-24 RX ADMIN — HYDRALAZINE HYDROCHLORIDE 10 MG: 20 INJECTION INTRAMUSCULAR; INTRAVENOUS at 13:55

## 2021-07-24 RX ADMIN — DOXYCYCLINE 100 MG: 100 CAPSULE ORAL at 08:04

## 2021-07-24 RX ADMIN — SERTRALINE HYDROCHLORIDE 150 MG: 50 TABLET ORAL at 08:04

## 2021-07-24 RX ADMIN — IPRATROPIUM BROMIDE AND ALBUTEROL SULFATE 3 ML: 2.5; .5 SOLUTION RESPIRATORY (INHALATION) at 06:47

## 2021-07-25 ENCOUNTER — APPOINTMENT (OUTPATIENT)
Dept: GENERAL RADIOLOGY | Facility: HOSPITAL | Age: 81
End: 2021-07-25

## 2021-07-25 LAB
ANION GAP SERPL CALCULATED.3IONS-SCNC: 12 MMOL/L (ref 5–15)
BASOPHILS # BLD AUTO: 0.01 10*3/MM3 (ref 0–0.2)
BASOPHILS NFR BLD AUTO: 0.1 % (ref 0–1.5)
BUN SERPL-MCNC: 46 MG/DL (ref 8–23)
BUN/CREAT SERPL: 58.2 (ref 7–25)
CALCIUM SPEC-SCNC: 8.9 MG/DL (ref 8.6–10.5)
CHLORIDE SERPL-SCNC: 95 MMOL/L (ref 98–107)
CO2 SERPL-SCNC: 25 MMOL/L (ref 22–29)
CREAT SERPL-MCNC: 0.79 MG/DL (ref 0.57–1)
DEPRECATED RDW RBC AUTO: 43.8 FL (ref 37–54)
EOSINOPHIL # BLD AUTO: 0 10*3/MM3 (ref 0–0.4)
EOSINOPHIL NFR BLD AUTO: 0 % (ref 0.3–6.2)
ERYTHROCYTE [DISTWIDTH] IN BLOOD BY AUTOMATED COUNT: 14.8 % (ref 12.3–15.4)
GFR SERPL CREATININE-BSD FRML MDRD: 70 ML/MIN/1.73
GLUCOSE BLDC GLUCOMTR-MCNC: 111 MG/DL (ref 70–130)
GLUCOSE BLDC GLUCOMTR-MCNC: 115 MG/DL (ref 70–130)
GLUCOSE BLDC GLUCOMTR-MCNC: 119 MG/DL (ref 70–130)
GLUCOSE BLDC GLUCOMTR-MCNC: 33 MG/DL (ref 70–130)
GLUCOSE BLDC GLUCOMTR-MCNC: 83 MG/DL (ref 70–130)
GLUCOSE SERPL-MCNC: 108 MG/DL (ref 65–99)
HCT VFR BLD AUTO: 28.1 % (ref 34–46.6)
HGB BLD-MCNC: 8.1 G/DL (ref 12–15.9)
HYPOCHROMIA BLD QL: NORMAL
IMM GRANULOCYTES # BLD AUTO: 0.11 10*3/MM3 (ref 0–0.05)
IMM GRANULOCYTES NFR BLD AUTO: 0.9 % (ref 0–0.5)
LYMPHOCYTES # BLD AUTO: 0.44 10*3/MM3 (ref 0.7–3.1)
LYMPHOCYTES NFR BLD AUTO: 3.7 % (ref 19.6–45.3)
MAGNESIUM SERPL-MCNC: 2 MG/DL (ref 1.6–2.4)
MCH RBC QN AUTO: 23.5 PG (ref 26.6–33)
MCHC RBC AUTO-ENTMCNC: 28.8 G/DL (ref 31.5–35.7)
MCV RBC AUTO: 81.4 FL (ref 79–97)
MONOCYTES # BLD AUTO: 0.58 10*3/MM3 (ref 0.1–0.9)
MONOCYTES NFR BLD AUTO: 4.9 % (ref 5–12)
NEUTROPHILS NFR BLD AUTO: 10.81 10*3/MM3 (ref 1.7–7)
NEUTROPHILS NFR BLD AUTO: 90.4 % (ref 42.7–76)
NRBC BLD AUTO-RTO: 0 /100 WBC (ref 0–0.2)
OVALOCYTES BLD QL SMEAR: NORMAL
PHOSPHATE SERPL-MCNC: 3.9 MG/DL (ref 2.5–4.5)
PLAT MORPH BLD: NORMAL
PLATELET # BLD AUTO: 243 10*3/MM3 (ref 140–450)
PMV BLD AUTO: 10.5 FL (ref 6–12)
POTASSIUM SERPL-SCNC: 4.1 MMOL/L (ref 3.5–5.2)
RBC # BLD AUTO: 3.45 10*6/MM3 (ref 3.77–5.28)
SODIUM SERPL-SCNC: 132 MMOL/L (ref 136–145)
WBC # BLD AUTO: 11.95 10*3/MM3 (ref 3.4–10.8)
WBC MORPH BLD: NORMAL

## 2021-07-25 PROCEDURE — 25010000002 HYDRALAZINE PER 20 MG: Performed by: NURSE PRACTITIONER

## 2021-07-25 PROCEDURE — 80048 BASIC METABOLIC PNL TOTAL CA: CPT | Performed by: INTERNAL MEDICINE

## 2021-07-25 PROCEDURE — 82962 GLUCOSE BLOOD TEST: CPT

## 2021-07-25 PROCEDURE — 94799 UNLISTED PULMONARY SVC/PX: CPT

## 2021-07-25 PROCEDURE — 84100 ASSAY OF PHOSPHORUS: CPT | Performed by: INTERNAL MEDICINE

## 2021-07-25 PROCEDURE — 85025 COMPLETE CBC W/AUTO DIFF WBC: CPT | Performed by: INTERNAL MEDICINE

## 2021-07-25 PROCEDURE — 99232 SBSQ HOSP IP/OBS MODERATE 35: CPT | Performed by: INTERNAL MEDICINE

## 2021-07-25 PROCEDURE — 25010000002 METHYLPREDNISOLONE PER 40 MG: Performed by: INTERNAL MEDICINE

## 2021-07-25 PROCEDURE — 94669 MECHANICAL CHEST WALL OSCILL: CPT

## 2021-07-25 PROCEDURE — 25010000002 CEFTRIAXONE PER 250 MG: Performed by: INTERNAL MEDICINE

## 2021-07-25 PROCEDURE — 85007 BL SMEAR W/DIFF WBC COUNT: CPT | Performed by: INTERNAL MEDICINE

## 2021-07-25 PROCEDURE — 71045 X-RAY EXAM CHEST 1 VIEW: CPT

## 2021-07-25 PROCEDURE — 25010000002 FUROSEMIDE PER 20 MG: Performed by: INTERNAL MEDICINE

## 2021-07-25 PROCEDURE — 83735 ASSAY OF MAGNESIUM: CPT | Performed by: INTERNAL MEDICINE

## 2021-07-25 PROCEDURE — 99291 CRITICAL CARE FIRST HOUR: CPT | Performed by: INTERNAL MEDICINE

## 2021-07-25 RX ORDER — FUROSEMIDE 10 MG/ML
40 INJECTION INTRAMUSCULAR; INTRAVENOUS ONCE
Status: COMPLETED | OUTPATIENT
Start: 2021-07-25 | End: 2021-07-25

## 2021-07-25 RX ORDER — ALPRAZOLAM 0.5 MG/1
0.5 TABLET ORAL NIGHTLY PRN
Status: DISCONTINUED | OUTPATIENT
Start: 2021-07-25 | End: 2021-08-02 | Stop reason: HOSPADM

## 2021-07-25 RX ADMIN — DOXYCYCLINE 100 MG: 100 CAPSULE ORAL at 20:34

## 2021-07-25 RX ADMIN — BUDESONIDE 0.5 MG: 0.5 INHALANT RESPIRATORY (INHALATION) at 19:36

## 2021-07-25 RX ADMIN — FAMOTIDINE 20 MG: 20 TABLET ORAL at 18:29

## 2021-07-25 RX ADMIN — Medication 5 MG: at 20:34

## 2021-07-25 RX ADMIN — APIXABAN 5 MG: 5 TABLET, FILM COATED ORAL at 12:00

## 2021-07-25 RX ADMIN — FAMOTIDINE 20 MG: 20 TABLET ORAL at 12:00

## 2021-07-25 RX ADMIN — DILTIAZEM HYDROCHLORIDE 30 MG: 30 TABLET, FILM COATED ORAL at 11:59

## 2021-07-25 RX ADMIN — FUROSEMIDE 40 MG: 10 INJECTION, SOLUTION INTRAVENOUS at 12:11

## 2021-07-25 RX ADMIN — SODIUM CHLORIDE, PRESERVATIVE FREE 10 ML: 5 INJECTION INTRAVENOUS at 20:34

## 2021-07-25 RX ADMIN — METHYLPREDNISOLONE SODIUM SUCCINATE 20 MG: 40 INJECTION, POWDER, LYOPHILIZED, FOR SOLUTION INTRAMUSCULAR; INTRAVENOUS at 11:58

## 2021-07-25 RX ADMIN — BACITRACIN ZINC NEOMYCIN SULFATE POLYMYXIN B SULFATE: 400; 3.5; 5 OINTMENT TOPICAL at 05:40

## 2021-07-25 RX ADMIN — SODIUM CHLORIDE, PRESERVATIVE FREE 10 ML: 5 INJECTION INTRAVENOUS at 12:07

## 2021-07-25 RX ADMIN — GUAIFENESIN 1200 MG: 600 TABLET, EXTENDED RELEASE ORAL at 11:59

## 2021-07-25 RX ADMIN — APIXABAN 5 MG: 5 TABLET, FILM COATED ORAL at 20:34

## 2021-07-25 RX ADMIN — METHYLPREDNISOLONE SODIUM SUCCINATE 20 MG: 40 INJECTION, POWDER, LYOPHILIZED, FOR SOLUTION INTRAMUSCULAR; INTRAVENOUS at 00:26

## 2021-07-25 RX ADMIN — QUETIAPINE FUMARATE 50 MG: 25 TABLET ORAL at 20:34

## 2021-07-25 RX ADMIN — HYDRALAZINE HYDROCHLORIDE 10 MG: 20 INJECTION INTRAMUSCULAR; INTRAVENOUS at 05:40

## 2021-07-25 RX ADMIN — BACITRACIN ZINC NEOMYCIN SULFATE POLYMYXIN B SULFATE: 400; 3.5; 5 OINTMENT TOPICAL at 23:13

## 2021-07-25 RX ADMIN — DEXTROMETHORPHAN 60 MG: 30 SUSPENSION, EXTENDED RELEASE ORAL at 20:34

## 2021-07-25 RX ADMIN — DOXYCYCLINE 100 MG: 100 CAPSULE ORAL at 12:00

## 2021-07-25 RX ADMIN — DEXTROMETHORPHAN 60 MG: 30 SUSPENSION, EXTENDED RELEASE ORAL at 11:58

## 2021-07-25 RX ADMIN — IPRATROPIUM BROMIDE AND ALBUTEROL SULFATE 3 ML: 2.5; .5 SOLUTION RESPIRATORY (INHALATION) at 16:17

## 2021-07-25 RX ADMIN — IPRATROPIUM BROMIDE AND ALBUTEROL SULFATE 3 ML: 2.5; .5 SOLUTION RESPIRATORY (INHALATION) at 19:36

## 2021-07-25 RX ADMIN — METHYLPREDNISOLONE SODIUM SUCCINATE 20 MG: 40 INJECTION, POWDER, LYOPHILIZED, FOR SOLUTION INTRAMUSCULAR; INTRAVENOUS at 16:27

## 2021-07-25 RX ADMIN — DILTIAZEM HYDROCHLORIDE 30 MG: 30 TABLET, FILM COATED ORAL at 18:29

## 2021-07-25 RX ADMIN — BUDESONIDE 0.5 MG: 0.5 INHALANT RESPIRATORY (INHALATION) at 07:27

## 2021-07-25 RX ADMIN — MULTIPLE VITAMINS W/ MINERALS TAB 1 TABLET: TAB at 12:01

## 2021-07-25 RX ADMIN — GUAIFENESIN 1200 MG: 600 TABLET, EXTENDED RELEASE ORAL at 20:34

## 2021-07-25 RX ADMIN — ALPRAZOLAM 0.5 MG: 0.5 TABLET ORAL at 20:00

## 2021-07-25 RX ADMIN — CEFTRIAXONE SODIUM 1 G: 1 INJECTION, SOLUTION INTRAVENOUS at 18:38

## 2021-07-25 RX ADMIN — IPRATROPIUM BROMIDE AND ALBUTEROL SULFATE 3 ML: 2.5; .5 SOLUTION RESPIRATORY (INHALATION) at 07:27

## 2021-07-25 RX ADMIN — OXYCODONE HYDROCHLORIDE AND ACETAMINOPHEN 1000 MG: 500 TABLET ORAL at 11:59

## 2021-07-25 RX ADMIN — DILTIAZEM HYDROCHLORIDE 30 MG: 30 TABLET, FILM COATED ORAL at 05:40

## 2021-07-25 RX ADMIN — BACITRACIN ZINC NEOMYCIN SULFATE POLYMYXIN B SULFATE: 400; 3.5; 5 OINTMENT TOPICAL at 16:27

## 2021-07-25 RX ADMIN — DILTIAZEM HYDROCHLORIDE 30 MG: 30 TABLET, FILM COATED ORAL at 23:11

## 2021-07-25 RX ADMIN — METHYLPREDNISOLONE SODIUM SUCCINATE 20 MG: 40 INJECTION, POWDER, LYOPHILIZED, FOR SOLUTION INTRAMUSCULAR; INTRAVENOUS at 23:11

## 2021-07-25 RX ADMIN — SERTRALINE HYDROCHLORIDE 150 MG: 50 TABLET ORAL at 12:00

## 2021-07-26 ENCOUNTER — APPOINTMENT (OUTPATIENT)
Dept: GENERAL RADIOLOGY | Facility: HOSPITAL | Age: 81
End: 2021-07-26

## 2021-07-26 LAB
ANION GAP SERPL CALCULATED.3IONS-SCNC: 12 MMOL/L (ref 5–15)
BASOPHILS # BLD AUTO: 0.01 10*3/MM3 (ref 0–0.2)
BASOPHILS NFR BLD AUTO: 0.1 % (ref 0–1.5)
BUN SERPL-MCNC: 37 MG/DL (ref 8–23)
BUN/CREAT SERPL: 48.7 (ref 7–25)
BURR CELLS BLD QL SMEAR: NORMAL
CALCIUM SPEC-SCNC: 8.9 MG/DL (ref 8.6–10.5)
CHLORIDE SERPL-SCNC: 92 MMOL/L (ref 98–107)
CO2 SERPL-SCNC: 25 MMOL/L (ref 22–29)
CREAT SERPL-MCNC: 0.76 MG/DL (ref 0.57–1)
DEPRECATED RDW RBC AUTO: 40.4 FL (ref 37–54)
EOSINOPHIL # BLD AUTO: 0 10*3/MM3 (ref 0–0.4)
EOSINOPHIL NFR BLD AUTO: 0 % (ref 0.3–6.2)
ERYTHROCYTE [DISTWIDTH] IN BLOOD BY AUTOMATED COUNT: 14.5 % (ref 12.3–15.4)
GFR SERPL CREATININE-BSD FRML MDRD: 73 ML/MIN/1.73
GLUCOSE BLDC GLUCOMTR-MCNC: 113 MG/DL (ref 70–130)
GLUCOSE BLDC GLUCOMTR-MCNC: 117 MG/DL (ref 70–130)
GLUCOSE BLDC GLUCOMTR-MCNC: 52 MG/DL (ref 70–130)
GLUCOSE BLDC GLUCOMTR-MCNC: 80 MG/DL (ref 70–130)
GLUCOSE SERPL-MCNC: 120 MG/DL (ref 65–99)
H CAPSUL AG SER QL IA: <0.5
H CAPSUL AG UR QL IA: <0.5
HCT VFR BLD AUTO: 28 % (ref 34–46.6)
HGB BLD-MCNC: 8.4 G/DL (ref 12–15.9)
HYPOCHROMIA BLD QL: NORMAL
IMM GRANULOCYTES # BLD AUTO: 0.12 10*3/MM3 (ref 0–0.05)
IMM GRANULOCYTES NFR BLD AUTO: 0.9 % (ref 0–0.5)
LYMPHOCYTES # BLD AUTO: 0.65 10*3/MM3 (ref 0.7–3.1)
LYMPHOCYTES NFR BLD AUTO: 4.9 % (ref 19.6–45.3)
Lab: NORMAL
Lab: NORMAL
MCH RBC QN AUTO: 23.3 PG (ref 26.6–33)
MCHC RBC AUTO-ENTMCNC: 30 G/DL (ref 31.5–35.7)
MCV RBC AUTO: 77.8 FL (ref 79–97)
MICROCYTES BLD QL: NORMAL
MONOCYTES # BLD AUTO: 0.7 10*3/MM3 (ref 0.1–0.9)
MONOCYTES NFR BLD AUTO: 5.3 % (ref 5–12)
NEUTROPHILS NFR BLD AUTO: 11.82 10*3/MM3 (ref 1.7–7)
NEUTROPHILS NFR BLD AUTO: 88.8 % (ref 42.7–76)
NRBC BLD AUTO-RTO: 0 /100 WBC (ref 0–0.2)
OVALOCYTES BLD QL SMEAR: NORMAL
PLAT MORPH BLD: NORMAL
PLATELET # BLD AUTO: 310 10*3/MM3 (ref 140–450)
PMV BLD AUTO: 10.7 FL (ref 6–12)
POTASSIUM SERPL-SCNC: 3.9 MMOL/L (ref 3.5–5.2)
RBC # BLD AUTO: 3.6 10*6/MM3 (ref 3.77–5.28)
SODIUM SERPL-SCNC: 129 MMOL/L (ref 136–145)
TARGETS BLD QL SMEAR: NORMAL
WBC # BLD AUTO: 13.3 10*3/MM3 (ref 3.4–10.8)
WBC MORPH BLD: NORMAL

## 2021-07-26 PROCEDURE — 82962 GLUCOSE BLOOD TEST: CPT

## 2021-07-26 PROCEDURE — 80048 BASIC METABOLIC PNL TOTAL CA: CPT | Performed by: INTERNAL MEDICINE

## 2021-07-26 PROCEDURE — 94799 UNLISTED PULMONARY SVC/PX: CPT

## 2021-07-26 PROCEDURE — 97110 THERAPEUTIC EXERCISES: CPT

## 2021-07-26 PROCEDURE — 97116 GAIT TRAINING THERAPY: CPT

## 2021-07-26 PROCEDURE — 99233 SBSQ HOSP IP/OBS HIGH 50: CPT | Performed by: INTERNAL MEDICINE

## 2021-07-26 PROCEDURE — 25010000002 LORAZEPAM PER 2 MG: Performed by: NURSE PRACTITIONER

## 2021-07-26 PROCEDURE — 94669 MECHANICAL CHEST WALL OSCILL: CPT

## 2021-07-26 PROCEDURE — 25010000002 CEFTRIAXONE PER 250 MG: Performed by: INTERNAL MEDICINE

## 2021-07-26 PROCEDURE — 85025 COMPLETE CBC W/AUTO DIFF WBC: CPT | Performed by: INTERNAL MEDICINE

## 2021-07-26 PROCEDURE — 71045 X-RAY EXAM CHEST 1 VIEW: CPT

## 2021-07-26 PROCEDURE — 25010000002 METHYLPREDNISOLONE PER 40 MG: Performed by: INTERNAL MEDICINE

## 2021-07-26 PROCEDURE — 85007 BL SMEAR W/DIFF WBC COUNT: CPT | Performed by: INTERNAL MEDICINE

## 2021-07-26 PROCEDURE — 99232 SBSQ HOSP IP/OBS MODERATE 35: CPT | Performed by: INTERNAL MEDICINE

## 2021-07-26 PROCEDURE — 25010000002 FUROSEMIDE PER 20 MG: Performed by: INTERNAL MEDICINE

## 2021-07-26 RX ORDER — BISOPROLOL FUMARATE 5 MG/1
5 TABLET, FILM COATED ORAL
Status: DISCONTINUED | OUTPATIENT
Start: 2021-07-26 | End: 2021-08-02 | Stop reason: HOSPADM

## 2021-07-26 RX ORDER — FUROSEMIDE 10 MG/ML
40 INJECTION INTRAMUSCULAR; INTRAVENOUS ONCE
Status: COMPLETED | OUTPATIENT
Start: 2021-07-26 | End: 2021-07-26

## 2021-07-26 RX ORDER — LORAZEPAM 2 MG/ML
0.5 INJECTION INTRAMUSCULAR ONCE
Status: COMPLETED | OUTPATIENT
Start: 2021-07-26 | End: 2021-07-26

## 2021-07-26 RX ADMIN — IPRATROPIUM BROMIDE AND ALBUTEROL SULFATE 3 ML: 2.5; .5 SOLUTION RESPIRATORY (INHALATION) at 08:52

## 2021-07-26 RX ADMIN — METOPROLOL TARTRATE 5 MG: 1 INJECTION, SOLUTION INTRAVENOUS at 02:26

## 2021-07-26 RX ADMIN — OXYCODONE HYDROCHLORIDE AND ACETAMINOPHEN 1000 MG: 500 TABLET ORAL at 08:16

## 2021-07-26 RX ADMIN — BISOPROLOL FUMARATE 5 MG: 5 TABLET, FILM COATED ORAL at 10:09

## 2021-07-26 RX ADMIN — BACITRACIN ZINC NEOMYCIN SULFATE POLYMYXIN B SULFATE: 400; 3.5; 5 OINTMENT TOPICAL at 05:13

## 2021-07-26 RX ADMIN — IPRATROPIUM BROMIDE AND ALBUTEROL SULFATE 3 ML: 2.5; .5 SOLUTION RESPIRATORY (INHALATION) at 15:45

## 2021-07-26 RX ADMIN — APIXABAN 5 MG: 5 TABLET, FILM COATED ORAL at 08:18

## 2021-07-26 RX ADMIN — BUDESONIDE 0.5 MG: 0.5 INHALANT RESPIRATORY (INHALATION) at 19:31

## 2021-07-26 RX ADMIN — SERTRALINE HYDROCHLORIDE 150 MG: 50 TABLET ORAL at 08:17

## 2021-07-26 RX ADMIN — DILTIAZEM HYDROCHLORIDE 30 MG: 30 TABLET, FILM COATED ORAL at 05:11

## 2021-07-26 RX ADMIN — SODIUM CHLORIDE, PRESERVATIVE FREE 10 ML: 5 INJECTION INTRAVENOUS at 08:18

## 2021-07-26 RX ADMIN — LORAZEPAM 0.5 MG: 2 INJECTION INTRAMUSCULAR; INTRAVENOUS at 06:14

## 2021-07-26 RX ADMIN — METHYLPREDNISOLONE SODIUM SUCCINATE 20 MG: 40 INJECTION, POWDER, LYOPHILIZED, FOR SOLUTION INTRAMUSCULAR; INTRAVENOUS at 17:17

## 2021-07-26 RX ADMIN — FUROSEMIDE 40 MG: 10 INJECTION, SOLUTION INTRAVENOUS at 10:09

## 2021-07-26 RX ADMIN — GUAIFENESIN 1200 MG: 600 TABLET, EXTENDED RELEASE ORAL at 21:12

## 2021-07-26 RX ADMIN — QUETIAPINE FUMARATE 50 MG: 25 TABLET ORAL at 21:12

## 2021-07-26 RX ADMIN — FAMOTIDINE 20 MG: 20 TABLET ORAL at 17:17

## 2021-07-26 RX ADMIN — MULTIPLE VITAMINS W/ MINERALS TAB 1 TABLET: TAB at 08:17

## 2021-07-26 RX ADMIN — Medication 5 MG: at 21:12

## 2021-07-26 RX ADMIN — DOXYCYCLINE 100 MG: 100 CAPSULE ORAL at 08:17

## 2021-07-26 RX ADMIN — APIXABAN 5 MG: 5 TABLET, FILM COATED ORAL at 21:12

## 2021-07-26 RX ADMIN — BUDESONIDE 0.5 MG: 0.5 INHALANT RESPIRATORY (INHALATION) at 08:54

## 2021-07-26 RX ADMIN — BACITRACIN ZINC NEOMYCIN SULFATE POLYMYXIN B SULFATE: 400; 3.5; 5 OINTMENT TOPICAL at 21:12

## 2021-07-26 RX ADMIN — CEFTRIAXONE SODIUM 1 G: 1 INJECTION, SOLUTION INTRAVENOUS at 17:17

## 2021-07-26 RX ADMIN — DOXYCYCLINE 100 MG: 100 CAPSULE ORAL at 21:12

## 2021-07-26 RX ADMIN — IPRATROPIUM BROMIDE AND ALBUTEROL SULFATE 3 ML: 2.5; .5 SOLUTION RESPIRATORY (INHALATION) at 19:31

## 2021-07-26 RX ADMIN — DEXTROMETHORPHAN 60 MG: 30 SUSPENSION, EXTENDED RELEASE ORAL at 08:17

## 2021-07-26 RX ADMIN — FAMOTIDINE 20 MG: 20 TABLET ORAL at 08:17

## 2021-07-26 RX ADMIN — GUAIFENESIN 1200 MG: 600 TABLET, EXTENDED RELEASE ORAL at 08:17

## 2021-07-26 RX ADMIN — METHYLPREDNISOLONE SODIUM SUCCINATE 20 MG: 40 INJECTION, POWDER, LYOPHILIZED, FOR SOLUTION INTRAMUSCULAR; INTRAVENOUS at 08:17

## 2021-07-26 RX ADMIN — BACITRACIN ZINC NEOMYCIN SULFATE POLYMYXIN B SULFATE: 400; 3.5; 5 OINTMENT TOPICAL at 14:07

## 2021-07-27 LAB
1,3 BETA GLUCAN SER-MCNC: <31 PG/ML
ANION GAP SERPL CALCULATED.3IONS-SCNC: 13 MMOL/L (ref 5–15)
BACTERIA SPEC RESP CULT: ABNORMAL
BACTERIA SPEC RESP CULT: ABNORMAL
BASOPHILS # BLD AUTO: 0.01 10*3/MM3 (ref 0–0.2)
BASOPHILS NFR BLD AUTO: 0.1 % (ref 0–1.5)
BUN SERPL-MCNC: 31 MG/DL (ref 8–23)
BUN/CREAT SERPL: 43.1 (ref 7–25)
CALCIUM SPEC-SCNC: 8.6 MG/DL (ref 8.6–10.5)
CHLORIDE SERPL-SCNC: 93 MMOL/L (ref 98–107)
CO2 SERPL-SCNC: 22 MMOL/L (ref 22–29)
CREAT SERPL-MCNC: 0.72 MG/DL (ref 0.57–1)
DEPRECATED RDW RBC AUTO: 43.2 FL (ref 37–54)
EOSINOPHIL # BLD AUTO: 0 10*3/MM3 (ref 0–0.4)
EOSINOPHIL NFR BLD AUTO: 0 % (ref 0.3–6.2)
ERYTHROCYTE [DISTWIDTH] IN BLOOD BY AUTOMATED COUNT: 14.8 % (ref 12.3–15.4)
GFR SERPL CREATININE-BSD FRML MDRD: 78 ML/MIN/1.73
GLUCOSE BLDC GLUCOMTR-MCNC: 117 MG/DL (ref 70–130)
GLUCOSE BLDC GLUCOMTR-MCNC: 124 MG/DL (ref 70–130)
GLUCOSE BLDC GLUCOMTR-MCNC: 129 MG/DL (ref 70–130)
GLUCOSE BLDC GLUCOMTR-MCNC: 172 MG/DL (ref 70–130)
GLUCOSE BLDC GLUCOMTR-MCNC: 94 MG/DL (ref 70–130)
GLUCOSE SERPL-MCNC: 148 MG/DL (ref 65–99)
GRAM STN SPEC: ABNORMAL
HCT VFR BLD AUTO: 29.4 % (ref 34–46.6)
HGB BLD-MCNC: 8.6 G/DL (ref 12–15.9)
IMM GRANULOCYTES # BLD AUTO: 0.09 10*3/MM3 (ref 0–0.05)
IMM GRANULOCYTES NFR BLD AUTO: 0.8 % (ref 0–0.5)
LYMPHOCYTES # BLD AUTO: 0.64 10*3/MM3 (ref 0.7–3.1)
LYMPHOCYTES NFR BLD AUTO: 5.4 % (ref 19.6–45.3)
MCH RBC QN AUTO: 23.5 PG (ref 26.6–33)
MCHC RBC AUTO-ENTMCNC: 29.3 G/DL (ref 31.5–35.7)
MCV RBC AUTO: 80.3 FL (ref 79–97)
MONOCYTES # BLD AUTO: 0.57 10*3/MM3 (ref 0.1–0.9)
MONOCYTES NFR BLD AUTO: 4.8 % (ref 5–12)
NEUTROPHILS NFR BLD AUTO: 10.51 10*3/MM3 (ref 1.7–7)
NEUTROPHILS NFR BLD AUTO: 88.9 % (ref 42.7–76)
NRBC BLD AUTO-RTO: 0 /100 WBC (ref 0–0.2)
PLATELET # BLD AUTO: 278 10*3/MM3 (ref 140–450)
PMV BLD AUTO: 10.6 FL (ref 6–12)
POTASSIUM SERPL-SCNC: 3.2 MMOL/L (ref 3.5–5.2)
POTASSIUM SERPL-SCNC: 4.9 MMOL/L (ref 3.5–5.2)
QT INTERVAL: 420 MS
QTC INTERVAL: 475 MS
R RICKETTSI IGM SER-ACNC: 0.19 INDEX (ref 0–0.89)
RBC # BLD AUTO: 3.66 10*6/MM3 (ref 3.77–5.28)
SODIUM SERPL-SCNC: 128 MMOL/L (ref 136–145)
WBC # BLD AUTO: 11.82 10*3/MM3 (ref 3.4–10.8)

## 2021-07-27 PROCEDURE — 25010000002 FUROSEMIDE PER 20 MG: Performed by: INTERNAL MEDICINE

## 2021-07-27 PROCEDURE — 85025 COMPLETE CBC W/AUTO DIFF WBC: CPT | Performed by: INTERNAL MEDICINE

## 2021-07-27 PROCEDURE — 99232 SBSQ HOSP IP/OBS MODERATE 35: CPT | Performed by: NURSE PRACTITIONER

## 2021-07-27 PROCEDURE — 97110 THERAPEUTIC EXERCISES: CPT

## 2021-07-27 PROCEDURE — 63710000001 INSULIN LISPRO (HUMAN) PER 5 UNITS: Performed by: INTERNAL MEDICINE

## 2021-07-27 PROCEDURE — 97116 GAIT TRAINING THERAPY: CPT

## 2021-07-27 PROCEDURE — 82962 GLUCOSE BLOOD TEST: CPT

## 2021-07-27 PROCEDURE — 94799 UNLISTED PULMONARY SVC/PX: CPT

## 2021-07-27 PROCEDURE — 99232 SBSQ HOSP IP/OBS MODERATE 35: CPT | Performed by: INTERNAL MEDICINE

## 2021-07-27 PROCEDURE — 25010000002 METHYLPREDNISOLONE PER 40 MG: Performed by: INTERNAL MEDICINE

## 2021-07-27 PROCEDURE — 84132 ASSAY OF SERUM POTASSIUM: CPT | Performed by: INTERNAL MEDICINE

## 2021-07-27 PROCEDURE — 80048 BASIC METABOLIC PNL TOTAL CA: CPT | Performed by: INTERNAL MEDICINE

## 2021-07-27 PROCEDURE — 25010000002 CEFTRIAXONE PER 250 MG: Performed by: INTERNAL MEDICINE

## 2021-07-27 RX ORDER — FUROSEMIDE 10 MG/ML
40 INJECTION INTRAMUSCULAR; INTRAVENOUS ONCE
Status: COMPLETED | OUTPATIENT
Start: 2021-07-27 | End: 2021-07-27

## 2021-07-27 RX ORDER — POTASSIUM CHLORIDE 750 MG/1
40 CAPSULE, EXTENDED RELEASE ORAL ONCE
Status: COMPLETED | OUTPATIENT
Start: 2021-07-27 | End: 2021-07-27

## 2021-07-27 RX ADMIN — METHYLPREDNISOLONE SODIUM SUCCINATE 20 MG: 40 INJECTION, POWDER, LYOPHILIZED, FOR SOLUTION INTRAMUSCULAR; INTRAVENOUS at 23:36

## 2021-07-27 RX ADMIN — DOXYCYCLINE 100 MG: 100 CAPSULE ORAL at 08:23

## 2021-07-27 RX ADMIN — METHYLPREDNISOLONE SODIUM SUCCINATE 20 MG: 40 INJECTION, POWDER, LYOPHILIZED, FOR SOLUTION INTRAMUSCULAR; INTRAVENOUS at 08:24

## 2021-07-27 RX ADMIN — ACETAMINOPHEN 650 MG: 325 TABLET ORAL at 04:30

## 2021-07-27 RX ADMIN — IPRATROPIUM BROMIDE AND ALBUTEROL SULFATE 3 ML: 2.5; .5 SOLUTION RESPIRATORY (INHALATION) at 07:01

## 2021-07-27 RX ADMIN — SERTRALINE HYDROCHLORIDE 150 MG: 50 TABLET ORAL at 08:23

## 2021-07-27 RX ADMIN — MULTIPLE VITAMINS W/ MINERALS TAB 1 TABLET: TAB at 08:24

## 2021-07-27 RX ADMIN — CEFTRIAXONE SODIUM 1 G: 1 INJECTION, SOLUTION INTRAVENOUS at 17:04

## 2021-07-27 RX ADMIN — FAMOTIDINE 20 MG: 20 TABLET ORAL at 06:32

## 2021-07-27 RX ADMIN — POTASSIUM CHLORIDE 40 MEQ: 10 CAPSULE, COATED, EXTENDED RELEASE ORAL at 12:38

## 2021-07-27 RX ADMIN — DEXTROMETHORPHAN 60 MG: 30 SUSPENSION, EXTENDED RELEASE ORAL at 08:24

## 2021-07-27 RX ADMIN — ALPRAZOLAM 0.5 MG: 0.5 TABLET ORAL at 21:01

## 2021-07-27 RX ADMIN — INSULIN LISPRO 2 UNITS: 100 INJECTION, SOLUTION INTRAVENOUS; SUBCUTANEOUS at 12:00

## 2021-07-27 RX ADMIN — APIXABAN 5 MG: 5 TABLET, FILM COATED ORAL at 08:23

## 2021-07-27 RX ADMIN — GUAIFENESIN 1200 MG: 600 TABLET, EXTENDED RELEASE ORAL at 20:14

## 2021-07-27 RX ADMIN — BUDESONIDE 0.5 MG: 0.5 INHALANT RESPIRATORY (INHALATION) at 07:01

## 2021-07-27 RX ADMIN — HYDROXYZINE HYDROCHLORIDE 25 MG: 25 TABLET, FILM COATED ORAL at 16:55

## 2021-07-27 RX ADMIN — BISOPROLOL FUMARATE 5 MG: 5 TABLET, FILM COATED ORAL at 08:45

## 2021-07-27 RX ADMIN — SODIUM CHLORIDE, PRESERVATIVE FREE 10 ML: 5 INJECTION INTRAVENOUS at 08:26

## 2021-07-27 RX ADMIN — SODIUM CHLORIDE, PRESERVATIVE FREE 10 ML: 5 INJECTION INTRAVENOUS at 20:15

## 2021-07-27 RX ADMIN — FAMOTIDINE 20 MG: 20 TABLET ORAL at 16:46

## 2021-07-27 RX ADMIN — IPRATROPIUM BROMIDE AND ALBUTEROL SULFATE 3 ML: 2.5; .5 SOLUTION RESPIRATORY (INHALATION) at 12:19

## 2021-07-27 RX ADMIN — POTASSIUM CHLORIDE 40 MEQ: 1.5 POWDER, FOR SOLUTION ORAL at 16:52

## 2021-07-27 RX ADMIN — QUETIAPINE FUMARATE 50 MG: 25 TABLET ORAL at 20:14

## 2021-07-27 RX ADMIN — IPRATROPIUM BROMIDE AND ALBUTEROL SULFATE 3 ML: 2.5; .5 SOLUTION RESPIRATORY (INHALATION) at 20:00

## 2021-07-27 RX ADMIN — BACITRACIN ZINC NEOMYCIN SULFATE POLYMYXIN B SULFATE: 400; 3.5; 5 OINTMENT TOPICAL at 06:32

## 2021-07-27 RX ADMIN — DOXYCYCLINE 100 MG: 100 CAPSULE ORAL at 20:14

## 2021-07-27 RX ADMIN — IPRATROPIUM BROMIDE AND ALBUTEROL SULFATE 3 ML: 2.5; .5 SOLUTION RESPIRATORY (INHALATION) at 15:52

## 2021-07-27 RX ADMIN — BUDESONIDE 0.5 MG: 0.5 INHALANT RESPIRATORY (INHALATION) at 20:00

## 2021-07-27 RX ADMIN — GUAIFENESIN 1200 MG: 600 TABLET, EXTENDED RELEASE ORAL at 08:24

## 2021-07-27 RX ADMIN — BACITRACIN ZINC NEOMYCIN SULFATE POLYMYXIN B SULFATE: 400; 3.5; 5 OINTMENT TOPICAL at 15:07

## 2021-07-27 RX ADMIN — BACITRACIN ZINC NEOMYCIN SULFATE POLYMYXIN B SULFATE: 400; 3.5; 5 OINTMENT TOPICAL at 21:01

## 2021-07-27 RX ADMIN — FUROSEMIDE 40 MG: 10 INJECTION, SOLUTION INTRAVENOUS at 14:10

## 2021-07-27 RX ADMIN — METHYLPREDNISOLONE SODIUM SUCCINATE 20 MG: 40 INJECTION, POWDER, LYOPHILIZED, FOR SOLUTION INTRAMUSCULAR; INTRAVENOUS at 16:46

## 2021-07-27 RX ADMIN — POTASSIUM CHLORIDE 40 MEQ: 10 CAPSULE, COATED, EXTENDED RELEASE ORAL at 14:13

## 2021-07-27 RX ADMIN — METHYLPREDNISOLONE SODIUM SUCCINATE 20 MG: 40 INJECTION, POWDER, LYOPHILIZED, FOR SOLUTION INTRAMUSCULAR; INTRAVENOUS at 01:00

## 2021-07-27 RX ADMIN — APIXABAN 5 MG: 5 TABLET, FILM COATED ORAL at 20:14

## 2021-07-27 RX ADMIN — OXYCODONE HYDROCHLORIDE AND ACETAMINOPHEN 1000 MG: 500 TABLET ORAL at 08:23

## 2021-07-27 RX ADMIN — Medication 5 MG: at 20:14

## 2021-07-28 ENCOUNTER — APPOINTMENT (OUTPATIENT)
Dept: GENERAL RADIOLOGY | Facility: HOSPITAL | Age: 81
End: 2021-07-28

## 2021-07-28 LAB
ANION GAP SERPL CALCULATED.3IONS-SCNC: 8 MMOL/L (ref 5–15)
BASOPHILS # BLD AUTO: 0.01 10*3/MM3 (ref 0–0.2)
BASOPHILS NFR BLD AUTO: 0 % (ref 0–1.5)
BUN SERPL-MCNC: 28 MG/DL (ref 8–23)
BUN/CREAT SERPL: 37.3 (ref 7–25)
CALCIUM SPEC-SCNC: 8.6 MG/DL (ref 8.6–10.5)
CHLORIDE SERPL-SCNC: 97 MMOL/L (ref 98–107)
CO2 SERPL-SCNC: 25 MMOL/L (ref 22–29)
CREAT SERPL-MCNC: 0.75 MG/DL (ref 0.57–1)
DEPRECATED RDW RBC AUTO: 43.4 FL (ref 37–54)
EOSINOPHIL # BLD AUTO: 0.03 10*3/MM3 (ref 0–0.4)
EOSINOPHIL NFR BLD AUTO: 0.1 % (ref 0.3–6.2)
ERYTHROCYTE [DISTWIDTH] IN BLOOD BY AUTOMATED COUNT: 14.9 % (ref 12.3–15.4)
GALACTOMANNAN AG SPEC IA-ACNC: 0.06 INDEX (ref 0–0.49)
GFR SERPL CREATININE-BSD FRML MDRD: 74 ML/MIN/1.73
GLUCOSE BLDC GLUCOMTR-MCNC: 109 MG/DL (ref 70–130)
GLUCOSE BLDC GLUCOMTR-MCNC: 116 MG/DL (ref 70–130)
GLUCOSE BLDC GLUCOMTR-MCNC: 119 MG/DL (ref 70–130)
GLUCOSE BLDC GLUCOMTR-MCNC: 98 MG/DL (ref 70–130)
GLUCOSE SERPL-MCNC: 105 MG/DL (ref 65–99)
HCT VFR BLD AUTO: 28.7 % (ref 34–46.6)
HGB BLD-MCNC: 8.4 G/DL (ref 12–15.9)
IMM GRANULOCYTES # BLD AUTO: 0.22 10*3/MM3 (ref 0–0.05)
IMM GRANULOCYTES NFR BLD AUTO: 1.1 % (ref 0–0.5)
LYMPHOCYTES # BLD AUTO: 1.22 10*3/MM3 (ref 0.7–3.1)
LYMPHOCYTES NFR BLD AUTO: 5.9 % (ref 19.6–45.3)
MAGNESIUM SERPL-MCNC: 1.9 MG/DL (ref 1.6–2.4)
MCH RBC QN AUTO: 23.3 PG (ref 26.6–33)
MCHC RBC AUTO-ENTMCNC: 29.3 G/DL (ref 31.5–35.7)
MCV RBC AUTO: 79.7 FL (ref 79–97)
MONOCYTES # BLD AUTO: 1.29 10*3/MM3 (ref 0.1–0.9)
MONOCYTES NFR BLD AUTO: 6.2 % (ref 5–12)
MVISTA(R) BLASTOMYCES AG: NORMAL NG/ML
NEUTROPHILS NFR BLD AUTO: 18.07 10*3/MM3 (ref 1.7–7)
NEUTROPHILS NFR BLD AUTO: 86.7 % (ref 42.7–76)
NRBC BLD AUTO-RTO: 0 /100 WBC (ref 0–0.2)
PLATELET # BLD AUTO: 282 10*3/MM3 (ref 140–450)
PMV BLD AUTO: 10.6 FL (ref 6–12)
POTASSIUM SERPL-SCNC: 4.9 MMOL/L (ref 3.5–5.2)
R RICKETTSI IGG SER QL IA: NEGATIVE
RBC # BLD AUTO: 3.6 10*6/MM3 (ref 3.77–5.28)
SODIUM SERPL-SCNC: 130 MMOL/L (ref 136–145)
SPECIMEN SOURCE: NORMAL
WBC # BLD AUTO: 20.84 10*3/MM3 (ref 3.4–10.8)

## 2021-07-28 PROCEDURE — 97110 THERAPEUTIC EXERCISES: CPT

## 2021-07-28 PROCEDURE — 80048 BASIC METABOLIC PNL TOTAL CA: CPT | Performed by: INTERNAL MEDICINE

## 2021-07-28 PROCEDURE — 94669 MECHANICAL CHEST WALL OSCILL: CPT

## 2021-07-28 PROCEDURE — 97116 GAIT TRAINING THERAPY: CPT

## 2021-07-28 PROCEDURE — 71045 X-RAY EXAM CHEST 1 VIEW: CPT

## 2021-07-28 PROCEDURE — 25010000003 MAGNESIUM SULFATE 4 GM/100ML SOLUTION: Performed by: INTERNAL MEDICINE

## 2021-07-28 PROCEDURE — 25010000002 CEFTRIAXONE PER 250 MG: Performed by: INTERNAL MEDICINE

## 2021-07-28 PROCEDURE — 85025 COMPLETE CBC W/AUTO DIFF WBC: CPT | Performed by: INTERNAL MEDICINE

## 2021-07-28 PROCEDURE — 82962 GLUCOSE BLOOD TEST: CPT

## 2021-07-28 PROCEDURE — 99232 SBSQ HOSP IP/OBS MODERATE 35: CPT | Performed by: INTERNAL MEDICINE

## 2021-07-28 PROCEDURE — 94799 UNLISTED PULMONARY SVC/PX: CPT

## 2021-07-28 PROCEDURE — 25010000002 METHYLPREDNISOLONE PER 40 MG: Performed by: INTERNAL MEDICINE

## 2021-07-28 PROCEDURE — 83735 ASSAY OF MAGNESIUM: CPT | Performed by: INTERNAL MEDICINE

## 2021-07-28 RX ORDER — METHYLPREDNISOLONE SODIUM SUCCINATE 40 MG/ML
20 INJECTION, POWDER, LYOPHILIZED, FOR SOLUTION INTRAMUSCULAR; INTRAVENOUS EVERY 24 HOURS
Status: DISCONTINUED | OUTPATIENT
Start: 2021-07-29 | End: 2021-07-30

## 2021-07-28 RX ADMIN — DEXTROMETHORPHAN 60 MG: 30 SUSPENSION, EXTENDED RELEASE ORAL at 20:32

## 2021-07-28 RX ADMIN — APIXABAN 5 MG: 5 TABLET, FILM COATED ORAL at 20:32

## 2021-07-28 RX ADMIN — Medication 5 MG: at 20:32

## 2021-07-28 RX ADMIN — IPRATROPIUM BROMIDE AND ALBUTEROL SULFATE 3 ML: 2.5; .5 SOLUTION RESPIRATORY (INHALATION) at 12:14

## 2021-07-28 RX ADMIN — SERTRALINE HYDROCHLORIDE 150 MG: 50 TABLET ORAL at 08:39

## 2021-07-28 RX ADMIN — BUDESONIDE 0.5 MG: 0.5 INHALANT RESPIRATORY (INHALATION) at 19:51

## 2021-07-28 RX ADMIN — IPRATROPIUM BROMIDE AND ALBUTEROL SULFATE 3 ML: 2.5; .5 SOLUTION RESPIRATORY (INHALATION) at 06:53

## 2021-07-28 RX ADMIN — MAGNESIUM SULFATE HEPTAHYDRATE 4 G: 40 INJECTION, SOLUTION INTRAVENOUS at 09:13

## 2021-07-28 RX ADMIN — CEFTRIAXONE SODIUM 1 G: 1 INJECTION, SOLUTION INTRAVENOUS at 22:08

## 2021-07-28 RX ADMIN — OXYCODONE HYDROCHLORIDE AND ACETAMINOPHEN 1000 MG: 500 TABLET ORAL at 08:39

## 2021-07-28 RX ADMIN — FAMOTIDINE 20 MG: 20 TABLET ORAL at 18:28

## 2021-07-28 RX ADMIN — QUETIAPINE FUMARATE 50 MG: 25 TABLET ORAL at 20:32

## 2021-07-28 RX ADMIN — SODIUM CHLORIDE, PRESERVATIVE FREE 10 ML: 5 INJECTION INTRAVENOUS at 20:32

## 2021-07-28 RX ADMIN — IPRATROPIUM BROMIDE AND ALBUTEROL SULFATE 3 ML: 2.5; .5 SOLUTION RESPIRATORY (INHALATION) at 19:51

## 2021-07-28 RX ADMIN — ALPRAZOLAM 0.5 MG: 0.5 TABLET ORAL at 20:37

## 2021-07-28 RX ADMIN — SODIUM CHLORIDE, PRESERVATIVE FREE 10 ML: 5 INJECTION INTRAVENOUS at 08:39

## 2021-07-28 RX ADMIN — IPRATROPIUM BROMIDE AND ALBUTEROL SULFATE 3 ML: 2.5; .5 SOLUTION RESPIRATORY (INHALATION) at 15:31

## 2021-07-28 RX ADMIN — BUDESONIDE 0.5 MG: 0.5 INHALANT RESPIRATORY (INHALATION) at 06:55

## 2021-07-28 RX ADMIN — METHYLPREDNISOLONE SODIUM SUCCINATE 20 MG: 40 INJECTION, POWDER, LYOPHILIZED, FOR SOLUTION INTRAMUSCULAR; INTRAVENOUS at 08:38

## 2021-07-28 RX ADMIN — DOXYCYCLINE 100 MG: 100 CAPSULE ORAL at 20:32

## 2021-07-28 RX ADMIN — MULTIPLE VITAMINS W/ MINERALS TAB 1 TABLET: TAB at 08:40

## 2021-07-28 RX ADMIN — DOXYCYCLINE 100 MG: 100 CAPSULE ORAL at 08:38

## 2021-07-28 RX ADMIN — APIXABAN 5 MG: 5 TABLET, FILM COATED ORAL at 08:38

## 2021-07-28 RX ADMIN — BISOPROLOL FUMARATE 5 MG: 5 TABLET, FILM COATED ORAL at 08:38

## 2021-07-28 RX ADMIN — BACITRACIN ZINC NEOMYCIN SULFATE POLYMYXIN B SULFATE: 400; 3.5; 5 OINTMENT TOPICAL at 06:31

## 2021-07-28 RX ADMIN — GUAIFENESIN 1200 MG: 600 TABLET, EXTENDED RELEASE ORAL at 08:38

## 2021-07-28 RX ADMIN — BACITRACIN ZINC NEOMYCIN SULFATE POLYMYXIN B SULFATE: 400; 3.5; 5 OINTMENT TOPICAL at 22:08

## 2021-07-29 LAB
A PHAGOCYTOPH IGG TITR SER IF: NEGATIVE {TITER}
A PHAGOCYTOPH IGM TITR SER IF: NEGATIVE {TITER}
ANION GAP SERPL CALCULATED.3IONS-SCNC: 8 MMOL/L (ref 5–15)
BASOPHILS # BLD AUTO: 0.02 10*3/MM3 (ref 0–0.2)
BASOPHILS NFR BLD AUTO: 0.1 % (ref 0–1.5)
BUN SERPL-MCNC: 30 MG/DL (ref 8–23)
BUN/CREAT SERPL: 42.9 (ref 7–25)
CALCIUM SPEC-SCNC: 8.3 MG/DL (ref 8.6–10.5)
CHLORIDE SERPL-SCNC: 100 MMOL/L (ref 98–107)
CO2 SERPL-SCNC: 26 MMOL/L (ref 22–29)
CREAT SERPL-MCNC: 0.7 MG/DL (ref 0.57–1)
DEPRECATED RDW RBC AUTO: 41.3 FL (ref 37–54)
E CHAFFEENSIS IGG TITR SER IF: NEGATIVE {TITER}
E CHAFFEENSIS IGM TITR SER IF: NEGATIVE {TITER}
EOSINOPHIL # BLD AUTO: 0.19 10*3/MM3 (ref 0–0.4)
EOSINOPHIL NFR BLD AUTO: 0.9 % (ref 0.3–6.2)
ERYTHROCYTE [DISTWIDTH] IN BLOOD BY AUTOMATED COUNT: 15.2 % (ref 12.3–15.4)
GFR SERPL CREATININE-BSD FRML MDRD: 80 ML/MIN/1.73
GLUCOSE BLDC GLUCOMTR-MCNC: 111 MG/DL (ref 70–130)
GLUCOSE BLDC GLUCOMTR-MCNC: 71 MG/DL (ref 70–130)
GLUCOSE SERPL-MCNC: 107 MG/DL (ref 65–99)
HCT VFR BLD AUTO: 29.7 % (ref 34–46.6)
HGB BLD-MCNC: 9 G/DL (ref 12–15.9)
IMM GRANULOCYTES # BLD AUTO: 0.24 10*3/MM3 (ref 0–0.05)
IMM GRANULOCYTES NFR BLD AUTO: 1.2 % (ref 0–0.5)
LYMPHOCYTES # BLD AUTO: 1.33 10*3/MM3 (ref 0.7–3.1)
LYMPHOCYTES NFR BLD AUTO: 6.6 % (ref 19.6–45.3)
MCH RBC QN AUTO: 23.4 PG (ref 26.6–33)
MCHC RBC AUTO-ENTMCNC: 30.3 G/DL (ref 31.5–35.7)
MCV RBC AUTO: 77.3 FL (ref 79–97)
MONOCYTES # BLD AUTO: 1.17 10*3/MM3 (ref 0.1–0.9)
MONOCYTES NFR BLD AUTO: 5.8 % (ref 5–12)
NEUTROPHILS NFR BLD AUTO: 17.34 10*3/MM3 (ref 1.7–7)
NEUTROPHILS NFR BLD AUTO: 85.4 % (ref 42.7–76)
NRBC BLD AUTO-RTO: 0 /100 WBC (ref 0–0.2)
PLATELET # BLD AUTO: 321 10*3/MM3 (ref 140–450)
PMV BLD AUTO: 10.7 FL (ref 6–12)
POTASSIUM SERPL-SCNC: 4.7 MMOL/L (ref 3.5–5.2)
RBC # BLD AUTO: 3.84 10*6/MM3 (ref 3.77–5.28)
SODIUM SERPL-SCNC: 134 MMOL/L (ref 136–145)
WBC # BLD AUTO: 20.29 10*3/MM3 (ref 3.4–10.8)

## 2021-07-29 PROCEDURE — 85025 COMPLETE CBC W/AUTO DIFF WBC: CPT | Performed by: INTERNAL MEDICINE

## 2021-07-29 PROCEDURE — 82962 GLUCOSE BLOOD TEST: CPT

## 2021-07-29 PROCEDURE — 94799 UNLISTED PULMONARY SVC/PX: CPT

## 2021-07-29 PROCEDURE — 25010000002 METHYLPREDNISOLONE PER 40 MG: Performed by: INTERNAL MEDICINE

## 2021-07-29 PROCEDURE — 99232 SBSQ HOSP IP/OBS MODERATE 35: CPT | Performed by: INTERNAL MEDICINE

## 2021-07-29 PROCEDURE — 80048 BASIC METABOLIC PNL TOTAL CA: CPT | Performed by: INTERNAL MEDICINE

## 2021-07-29 PROCEDURE — 99232 SBSQ HOSP IP/OBS MODERATE 35: CPT | Performed by: NURSE PRACTITIONER

## 2021-07-29 RX ADMIN — METHYLPREDNISOLONE SODIUM SUCCINATE 20 MG: 40 INJECTION, POWDER, FOR SOLUTION INTRAMUSCULAR; INTRAVENOUS at 09:07

## 2021-07-29 RX ADMIN — SERTRALINE HYDROCHLORIDE 150 MG: 50 TABLET ORAL at 09:07

## 2021-07-29 RX ADMIN — APIXABAN 5 MG: 5 TABLET, FILM COATED ORAL at 09:07

## 2021-07-29 RX ADMIN — OXYCODONE HYDROCHLORIDE AND ACETAMINOPHEN 1000 MG: 500 TABLET ORAL at 09:07

## 2021-07-29 RX ADMIN — FAMOTIDINE 20 MG: 20 TABLET ORAL at 17:49

## 2021-07-29 RX ADMIN — Medication 5 MG: at 21:11

## 2021-07-29 RX ADMIN — SODIUM CHLORIDE, PRESERVATIVE FREE 10 ML: 5 INJECTION INTRAVENOUS at 21:11

## 2021-07-29 RX ADMIN — BUDESONIDE 0.5 MG: 0.5 INHALANT RESPIRATORY (INHALATION) at 19:40

## 2021-07-29 RX ADMIN — FAMOTIDINE 20 MG: 20 TABLET ORAL at 09:07

## 2021-07-29 RX ADMIN — SODIUM CHLORIDE, PRESERVATIVE FREE 10 ML: 5 INJECTION INTRAVENOUS at 13:27

## 2021-07-29 RX ADMIN — IPRATROPIUM BROMIDE AND ALBUTEROL SULFATE 3 ML: 2.5; .5 SOLUTION RESPIRATORY (INHALATION) at 15:39

## 2021-07-29 RX ADMIN — IPRATROPIUM BROMIDE AND ALBUTEROL SULFATE 3 ML: 2.5; .5 SOLUTION RESPIRATORY (INHALATION) at 11:47

## 2021-07-29 RX ADMIN — APIXABAN 5 MG: 5 TABLET, FILM COATED ORAL at 21:11

## 2021-07-29 RX ADMIN — BISOPROLOL FUMARATE 5 MG: 5 TABLET, FILM COATED ORAL at 09:08

## 2021-07-29 RX ADMIN — BACITRACIN ZINC NEOMYCIN SULFATE POLYMYXIN B SULFATE: 400; 3.5; 5 OINTMENT TOPICAL at 09:20

## 2021-07-29 RX ADMIN — BUDESONIDE 0.5 MG: 0.5 INHALANT RESPIRATORY (INHALATION) at 06:33

## 2021-07-29 RX ADMIN — MULTIPLE VITAMINS W/ MINERALS TAB 1 TABLET: TAB at 09:06

## 2021-07-29 RX ADMIN — BACITRACIN ZINC NEOMYCIN SULFATE POLYMYXIN B SULFATE: 400; 3.5; 5 OINTMENT TOPICAL at 13:27

## 2021-07-29 RX ADMIN — IPRATROPIUM BROMIDE AND ALBUTEROL SULFATE 3 ML: 2.5; .5 SOLUTION RESPIRATORY (INHALATION) at 06:33

## 2021-07-29 RX ADMIN — QUETIAPINE FUMARATE 50 MG: 25 TABLET ORAL at 21:11

## 2021-07-29 RX ADMIN — IPRATROPIUM BROMIDE AND ALBUTEROL SULFATE 3 ML: 2.5; .5 SOLUTION RESPIRATORY (INHALATION) at 19:39

## 2021-07-29 RX ADMIN — ALPRAZOLAM 0.5 MG: 0.5 TABLET ORAL at 21:17

## 2021-07-30 LAB
ANION GAP SERPL CALCULATED.3IONS-SCNC: 9 MMOL/L (ref 5–15)
BASOPHILS # BLD AUTO: 0.03 10*3/MM3 (ref 0–0.2)
BASOPHILS NFR BLD AUTO: 0.1 % (ref 0–1.5)
BUN SERPL-MCNC: 27 MG/DL (ref 8–23)
BUN/CREAT SERPL: 36 (ref 7–25)
CALCIUM SPEC-SCNC: 8.4 MG/DL (ref 8.6–10.5)
CHLORIDE SERPL-SCNC: 105 MMOL/L (ref 98–107)
CO2 SERPL-SCNC: 24 MMOL/L (ref 22–29)
CREAT SERPL-MCNC: 0.75 MG/DL (ref 0.57–1)
DEPRECATED RDW RBC AUTO: 44.3 FL (ref 37–54)
EOSINOPHIL # BLD AUTO: 0.17 10*3/MM3 (ref 0–0.4)
EOSINOPHIL NFR BLD AUTO: 0.8 % (ref 0.3–6.2)
ERYTHROCYTE [DISTWIDTH] IN BLOOD BY AUTOMATED COUNT: 15.7 % (ref 12.3–15.4)
GFR SERPL CREATININE-BSD FRML MDRD: 74 ML/MIN/1.73
GLUCOSE SERPL-MCNC: 104 MG/DL (ref 65–99)
HCT VFR BLD AUTO: 31.1 % (ref 34–46.6)
HGB BLD-MCNC: 9.1 G/DL (ref 12–15.9)
IMM GRANULOCYTES # BLD AUTO: 0.48 10*3/MM3 (ref 0–0.05)
IMM GRANULOCYTES NFR BLD AUTO: 2.2 % (ref 0–0.5)
LYMPHOCYTES # BLD AUTO: 2.55 10*3/MM3 (ref 0.7–3.1)
LYMPHOCYTES NFR BLD AUTO: 11.8 % (ref 19.6–45.3)
MCH RBC QN AUTO: 23.8 PG (ref 26.6–33)
MCHC RBC AUTO-ENTMCNC: 29.3 G/DL (ref 31.5–35.7)
MCV RBC AUTO: 81.2 FL (ref 79–97)
MONOCYTES # BLD AUTO: 1.59 10*3/MM3 (ref 0.1–0.9)
MONOCYTES NFR BLD AUTO: 7.3 % (ref 5–12)
NEUTROPHILS NFR BLD AUTO: 16.85 10*3/MM3 (ref 1.7–7)
NEUTROPHILS NFR BLD AUTO: 77.8 % (ref 42.7–76)
NRBC BLD AUTO-RTO: 0 /100 WBC (ref 0–0.2)
PLATELET # BLD AUTO: 348 10*3/MM3 (ref 140–450)
PMV BLD AUTO: 10.7 FL (ref 6–12)
POTASSIUM SERPL-SCNC: 4.7 MMOL/L (ref 3.5–5.2)
RBC # BLD AUTO: 3.83 10*6/MM3 (ref 3.77–5.28)
SODIUM SERPL-SCNC: 138 MMOL/L (ref 136–145)
WBC # BLD AUTO: 21.67 10*3/MM3 (ref 3.4–10.8)

## 2021-07-30 PROCEDURE — 25010000002 METHYLPREDNISOLONE PER 40 MG: Performed by: INTERNAL MEDICINE

## 2021-07-30 PROCEDURE — 94799 UNLISTED PULMONARY SVC/PX: CPT

## 2021-07-30 PROCEDURE — 85025 COMPLETE CBC W/AUTO DIFF WBC: CPT | Performed by: INTERNAL MEDICINE

## 2021-07-30 PROCEDURE — 80048 BASIC METABOLIC PNL TOTAL CA: CPT | Performed by: INTERNAL MEDICINE

## 2021-07-30 PROCEDURE — 99232 SBSQ HOSP IP/OBS MODERATE 35: CPT | Performed by: INTERNAL MEDICINE

## 2021-07-30 RX ORDER — PREDNISONE 20 MG/1
20 TABLET ORAL
Status: DISCONTINUED | OUTPATIENT
Start: 2021-07-31 | End: 2021-08-02 | Stop reason: HOSPADM

## 2021-07-30 RX ADMIN — APIXABAN 5 MG: 5 TABLET, FILM COATED ORAL at 08:19

## 2021-07-30 RX ADMIN — BUDESONIDE 0.5 MG: 0.5 INHALANT RESPIRATORY (INHALATION) at 07:55

## 2021-07-30 RX ADMIN — SODIUM CHLORIDE, PRESERVATIVE FREE 10 ML: 5 INJECTION INTRAVENOUS at 08:21

## 2021-07-30 RX ADMIN — IPRATROPIUM BROMIDE AND ALBUTEROL SULFATE 3 ML: 2.5; .5 SOLUTION RESPIRATORY (INHALATION) at 19:12

## 2021-07-30 RX ADMIN — Medication 5 MG: at 20:22

## 2021-07-30 RX ADMIN — MULTIPLE VITAMINS W/ MINERALS TAB 1 TABLET: TAB at 08:19

## 2021-07-30 RX ADMIN — ALPRAZOLAM 0.5 MG: 0.5 TABLET ORAL at 20:22

## 2021-07-30 RX ADMIN — FAMOTIDINE 20 MG: 20 TABLET ORAL at 08:20

## 2021-07-30 RX ADMIN — METHYLPREDNISOLONE SODIUM SUCCINATE 20 MG: 40 INJECTION, POWDER, FOR SOLUTION INTRAMUSCULAR; INTRAVENOUS at 08:19

## 2021-07-30 RX ADMIN — DEXTROMETHORPHAN 60 MG: 30 SUSPENSION, EXTENDED RELEASE ORAL at 08:20

## 2021-07-30 RX ADMIN — QUETIAPINE FUMARATE 50 MG: 25 TABLET ORAL at 20:22

## 2021-07-30 RX ADMIN — BUDESONIDE 0.5 MG: 0.5 INHALANT RESPIRATORY (INHALATION) at 19:12

## 2021-07-30 RX ADMIN — FAMOTIDINE 20 MG: 20 TABLET ORAL at 17:04

## 2021-07-30 RX ADMIN — SERTRALINE HYDROCHLORIDE 150 MG: 50 TABLET ORAL at 08:19

## 2021-07-30 RX ADMIN — BISOPROLOL FUMARATE 5 MG: 5 TABLET, FILM COATED ORAL at 08:19

## 2021-07-30 RX ADMIN — IPRATROPIUM BROMIDE AND ALBUTEROL SULFATE 3 ML: 2.5; .5 SOLUTION RESPIRATORY (INHALATION) at 15:45

## 2021-07-30 RX ADMIN — IPRATROPIUM BROMIDE AND ALBUTEROL SULFATE 3 ML: 2.5; .5 SOLUTION RESPIRATORY (INHALATION) at 07:55

## 2021-07-30 RX ADMIN — OXYCODONE HYDROCHLORIDE AND ACETAMINOPHEN 1000 MG: 500 TABLET ORAL at 08:20

## 2021-07-30 RX ADMIN — IPRATROPIUM BROMIDE AND ALBUTEROL SULFATE 3 ML: 2.5; .5 SOLUTION RESPIRATORY (INHALATION) at 12:26

## 2021-07-30 RX ADMIN — APIXABAN 5 MG: 5 TABLET, FILM COATED ORAL at 20:22

## 2021-07-30 RX ADMIN — SODIUM CHLORIDE, PRESERVATIVE FREE 10 ML: 5 INJECTION INTRAVENOUS at 20:25

## 2021-07-30 RX ADMIN — BACITRACIN ZINC NEOMYCIN SULFATE POLYMYXIN B SULFATE 1 APPLICATION: 400; 3.5; 5 OINTMENT TOPICAL at 06:30

## 2021-07-31 ENCOUNTER — APPOINTMENT (OUTPATIENT)
Dept: GENERAL RADIOLOGY | Facility: HOSPITAL | Age: 81
End: 2021-07-31

## 2021-07-31 LAB
ANION GAP SERPL CALCULATED.3IONS-SCNC: 8 MMOL/L (ref 5–15)
BASOPHILS # BLD AUTO: 0.02 10*3/MM3 (ref 0–0.2)
BASOPHILS NFR BLD AUTO: 0.1 % (ref 0–1.5)
BUN SERPL-MCNC: 25 MG/DL (ref 8–23)
BUN/CREAT SERPL: 32.9 (ref 7–25)
CALCIUM SPEC-SCNC: 8.4 MG/DL (ref 8.6–10.5)
CHLORIDE SERPL-SCNC: 100 MMOL/L (ref 98–107)
CO2 SERPL-SCNC: 26 MMOL/L (ref 22–29)
CREAT SERPL-MCNC: 0.76 MG/DL (ref 0.57–1)
DEPRECATED RDW RBC AUTO: 43 FL (ref 37–54)
EOSINOPHIL # BLD AUTO: 0.04 10*3/MM3 (ref 0–0.4)
EOSINOPHIL NFR BLD AUTO: 0.3 % (ref 0.3–6.2)
ERYTHROCYTE [DISTWIDTH] IN BLOOD BY AUTOMATED COUNT: 15.9 % (ref 12.3–15.4)
GFR SERPL CREATININE-BSD FRML MDRD: 73 ML/MIN/1.73
GLUCOSE SERPL-MCNC: 105 MG/DL (ref 65–99)
HCT VFR BLD AUTO: 28.3 % (ref 34–46.6)
HGB BLD-MCNC: 8.5 G/DL (ref 12–15.9)
IMM GRANULOCYTES # BLD AUTO: 0.16 10*3/MM3 (ref 0–0.05)
IMM GRANULOCYTES NFR BLD AUTO: 1.1 % (ref 0–0.5)
LYMPHOCYTES # BLD AUTO: 1.21 10*3/MM3 (ref 0.7–3.1)
LYMPHOCYTES NFR BLD AUTO: 8.5 % (ref 19.6–45.3)
MCH RBC QN AUTO: 23.4 PG (ref 26.6–33)
MCHC RBC AUTO-ENTMCNC: 30 G/DL (ref 31.5–35.7)
MCV RBC AUTO: 77.7 FL (ref 79–97)
MONOCYTES # BLD AUTO: 1.06 10*3/MM3 (ref 0.1–0.9)
MONOCYTES NFR BLD AUTO: 7.4 % (ref 5–12)
NEUTROPHILS NFR BLD AUTO: 11.8 10*3/MM3 (ref 1.7–7)
NEUTROPHILS NFR BLD AUTO: 82.6 % (ref 42.7–76)
NRBC BLD AUTO-RTO: 0 /100 WBC (ref 0–0.2)
PLATELET # BLD AUTO: 287 10*3/MM3 (ref 140–450)
PMV BLD AUTO: 10.5 FL (ref 6–12)
POTASSIUM SERPL-SCNC: 4.1 MMOL/L (ref 3.5–5.2)
RBC # BLD AUTO: 3.64 10*6/MM3 (ref 3.77–5.28)
SODIUM SERPL-SCNC: 134 MMOL/L (ref 136–145)
WBC # BLD AUTO: 14.29 10*3/MM3 (ref 3.4–10.8)

## 2021-07-31 PROCEDURE — 85025 COMPLETE CBC W/AUTO DIFF WBC: CPT | Performed by: INTERNAL MEDICINE

## 2021-07-31 PROCEDURE — 80048 BASIC METABOLIC PNL TOTAL CA: CPT | Performed by: INTERNAL MEDICINE

## 2021-07-31 PROCEDURE — 71045 X-RAY EXAM CHEST 1 VIEW: CPT

## 2021-07-31 PROCEDURE — 63710000001 PREDNISONE PER 1 MG: Performed by: INTERNAL MEDICINE

## 2021-07-31 PROCEDURE — 99232 SBSQ HOSP IP/OBS MODERATE 35: CPT | Performed by: INTERNAL MEDICINE

## 2021-07-31 PROCEDURE — 94799 UNLISTED PULMONARY SVC/PX: CPT

## 2021-07-31 RX ADMIN — PREDNISONE 20 MG: 20 TABLET ORAL at 10:05

## 2021-07-31 RX ADMIN — BACITRACIN ZINC NEOMYCIN SULFATE POLYMYXIN B SULFATE 1 APPLICATION: 400; 3.5; 5 OINTMENT TOPICAL at 14:35

## 2021-07-31 RX ADMIN — BISOPROLOL FUMARATE 5 MG: 5 TABLET, FILM COATED ORAL at 10:01

## 2021-07-31 RX ADMIN — APIXABAN 5 MG: 5 TABLET, FILM COATED ORAL at 10:05

## 2021-07-31 RX ADMIN — FAMOTIDINE 20 MG: 20 TABLET ORAL at 16:27

## 2021-07-31 RX ADMIN — IPRATROPIUM BROMIDE AND ALBUTEROL SULFATE 3 ML: 2.5; .5 SOLUTION RESPIRATORY (INHALATION) at 07:16

## 2021-07-31 RX ADMIN — BUDESONIDE 0.5 MG: 0.5 INHALANT RESPIRATORY (INHALATION) at 07:16

## 2021-07-31 RX ADMIN — SERTRALINE HYDROCHLORIDE 150 MG: 50 TABLET ORAL at 10:00

## 2021-07-31 RX ADMIN — FAMOTIDINE 20 MG: 20 TABLET ORAL at 10:06

## 2021-07-31 RX ADMIN — Medication 5 MG: at 20:19

## 2021-07-31 RX ADMIN — MULTIPLE VITAMINS W/ MINERALS TAB 1 TABLET: TAB at 10:01

## 2021-07-31 RX ADMIN — QUETIAPINE FUMARATE 50 MG: 25 TABLET ORAL at 20:19

## 2021-07-31 RX ADMIN — IPRATROPIUM BROMIDE AND ALBUTEROL SULFATE 3 ML: 2.5; .5 SOLUTION RESPIRATORY (INHALATION) at 16:11

## 2021-07-31 RX ADMIN — BACITRACIN ZINC NEOMYCIN SULFATE POLYMYXIN B SULFATE 1 APPLICATION: 400; 3.5; 5 OINTMENT TOPICAL at 06:40

## 2021-07-31 RX ADMIN — IPRATROPIUM BROMIDE AND ALBUTEROL SULFATE 3 ML: 2.5; .5 SOLUTION RESPIRATORY (INHALATION) at 11:52

## 2021-07-31 RX ADMIN — BACITRACIN ZINC NEOMYCIN SULFATE POLYMYXIN B SULFATE: 400; 3.5; 5 OINTMENT TOPICAL at 20:20

## 2021-07-31 RX ADMIN — SODIUM CHLORIDE, PRESERVATIVE FREE 10 ML: 5 INJECTION INTRAVENOUS at 20:19

## 2021-07-31 RX ADMIN — APIXABAN 5 MG: 5 TABLET, FILM COATED ORAL at 20:19

## 2021-07-31 RX ADMIN — BUDESONIDE 0.5 MG: 0.5 INHALANT RESPIRATORY (INHALATION) at 19:54

## 2021-07-31 RX ADMIN — ALPRAZOLAM 0.5 MG: 0.5 TABLET ORAL at 20:19

## 2021-07-31 RX ADMIN — OXYCODONE HYDROCHLORIDE AND ACETAMINOPHEN 1000 MG: 500 TABLET ORAL at 10:06

## 2021-07-31 RX ADMIN — SODIUM CHLORIDE, PRESERVATIVE FREE 10 ML: 5 INJECTION INTRAVENOUS at 10:06

## 2021-07-31 RX ADMIN — IPRATROPIUM BROMIDE AND ALBUTEROL SULFATE 3 ML: 2.5; .5 SOLUTION RESPIRATORY (INHALATION) at 19:54

## 2021-08-01 ENCOUNTER — APPOINTMENT (OUTPATIENT)
Dept: GENERAL RADIOLOGY | Facility: HOSPITAL | Age: 81
End: 2021-08-01

## 2021-08-01 LAB
FERRITIN SERPL-MCNC: 213.8 NG/ML (ref 13–150)
FOLATE SERPL-MCNC: >20 NG/ML (ref 4.78–24.2)
HCT VFR BLD AUTO: 28.5 % (ref 34–46.6)
HGB BLD-MCNC: 8.6 G/DL (ref 12–15.9)
IRON 24H UR-MRATE: 16 MCG/DL (ref 37–145)
IRON SATN MFR SERPL: 4 % (ref 20–50)
TIBC SERPL-MCNC: 364 MCG/DL (ref 298–536)
TRANSFERRIN SERPL-MCNC: 244 MG/DL (ref 200–360)
VIT B12 BLD-MCNC: 1792 PG/ML (ref 211–946)

## 2021-08-01 PROCEDURE — 25010000002 NA FERRIC GLUC CPLX PER 12.5 MG: Performed by: INTERNAL MEDICINE

## 2021-08-01 PROCEDURE — 83540 ASSAY OF IRON: CPT | Performed by: INTERNAL MEDICINE

## 2021-08-01 PROCEDURE — 94799 UNLISTED PULMONARY SVC/PX: CPT

## 2021-08-01 PROCEDURE — 82746 ASSAY OF FOLIC ACID SERUM: CPT | Performed by: INTERNAL MEDICINE

## 2021-08-01 PROCEDURE — 85018 HEMOGLOBIN: CPT | Performed by: INTERNAL MEDICINE

## 2021-08-01 PROCEDURE — 85014 HEMATOCRIT: CPT | Performed by: INTERNAL MEDICINE

## 2021-08-01 PROCEDURE — 82607 VITAMIN B-12: CPT | Performed by: INTERNAL MEDICINE

## 2021-08-01 PROCEDURE — 82728 ASSAY OF FERRITIN: CPT | Performed by: INTERNAL MEDICINE

## 2021-08-01 PROCEDURE — 97116 GAIT TRAINING THERAPY: CPT

## 2021-08-01 PROCEDURE — 97110 THERAPEUTIC EXERCISES: CPT

## 2021-08-01 PROCEDURE — 99232 SBSQ HOSP IP/OBS MODERATE 35: CPT | Performed by: INTERNAL MEDICINE

## 2021-08-01 PROCEDURE — 63710000001 PREDNISONE PER 1 MG: Performed by: INTERNAL MEDICINE

## 2021-08-01 PROCEDURE — 84466 ASSAY OF TRANSFERRIN: CPT | Performed by: INTERNAL MEDICINE

## 2021-08-01 PROCEDURE — 71045 X-RAY EXAM CHEST 1 VIEW: CPT

## 2021-08-01 RX ADMIN — BACITRACIN ZINC NEOMYCIN SULFATE POLYMYXIN B SULFATE: 400; 3.5; 5 OINTMENT TOPICAL at 21:38

## 2021-08-01 RX ADMIN — FAMOTIDINE 20 MG: 20 TABLET ORAL at 17:25

## 2021-08-01 RX ADMIN — MULTIPLE VITAMINS W/ MINERALS TAB 1 TABLET: TAB at 09:05

## 2021-08-01 RX ADMIN — BACITRACIN ZINC NEOMYCIN SULFATE POLYMYXIN B SULFATE: 400; 3.5; 5 OINTMENT TOPICAL at 15:35

## 2021-08-01 RX ADMIN — SERTRALINE HYDROCHLORIDE 150 MG: 50 TABLET ORAL at 09:04

## 2021-08-01 RX ADMIN — APIXABAN 5 MG: 5 TABLET, FILM COATED ORAL at 21:39

## 2021-08-01 RX ADMIN — IPRATROPIUM BROMIDE AND ALBUTEROL SULFATE 3 ML: 2.5; .5 SOLUTION RESPIRATORY (INHALATION) at 11:39

## 2021-08-01 RX ADMIN — BUDESONIDE 0.5 MG: 0.5 INHALANT RESPIRATORY (INHALATION) at 19:17

## 2021-08-01 RX ADMIN — IPRATROPIUM BROMIDE AND ALBUTEROL SULFATE 3 ML: 2.5; .5 SOLUTION RESPIRATORY (INHALATION) at 15:19

## 2021-08-01 RX ADMIN — PREDNISONE 20 MG: 20 TABLET ORAL at 09:05

## 2021-08-01 RX ADMIN — FAMOTIDINE 20 MG: 20 TABLET ORAL at 09:05

## 2021-08-01 RX ADMIN — SODIUM CHLORIDE, PRESERVATIVE FREE 10 ML: 5 INJECTION INTRAVENOUS at 15:37

## 2021-08-01 RX ADMIN — QUETIAPINE FUMARATE 50 MG: 25 TABLET ORAL at 21:39

## 2021-08-01 RX ADMIN — IPRATROPIUM BROMIDE AND ALBUTEROL SULFATE 3 ML: 2.5; .5 SOLUTION RESPIRATORY (INHALATION) at 19:17

## 2021-08-01 RX ADMIN — BUDESONIDE 0.5 MG: 0.5 INHALANT RESPIRATORY (INHALATION) at 07:42

## 2021-08-01 RX ADMIN — BISOPROLOL FUMARATE 5 MG: 5 TABLET, FILM COATED ORAL at 09:05

## 2021-08-01 RX ADMIN — ALPRAZOLAM 0.5 MG: 0.5 TABLET ORAL at 22:16

## 2021-08-01 RX ADMIN — APIXABAN 5 MG: 5 TABLET, FILM COATED ORAL at 09:04

## 2021-08-01 RX ADMIN — BACITRACIN ZINC NEOMYCIN SULFATE POLYMYXIN B SULFATE: 400; 3.5; 5 OINTMENT TOPICAL at 06:00

## 2021-08-01 RX ADMIN — IPRATROPIUM BROMIDE AND ALBUTEROL SULFATE 3 ML: 2.5; .5 SOLUTION RESPIRATORY (INHALATION) at 07:42

## 2021-08-01 RX ADMIN — OXYCODONE HYDROCHLORIDE AND ACETAMINOPHEN 1000 MG: 500 TABLET ORAL at 09:05

## 2021-08-01 RX ADMIN — Medication 5 MG: at 21:39

## 2021-08-01 NOTE — THERAPY TREATMENT NOTE
Patient Name: Yin Law  : 1940    MRN: [id number]                              Today's Date: 2021       Admit Date: 2021    Visit Dx:     ICD-10-CM ICD-9-CM   1. Multifocal pneumonia  J18.9 486   2. Hypoxia  R09.02 799.02   3. Acute on chronic congestive heart failure, unspecified heart failure type (CMS/HCC)  I50.9 428.0   4. Hypokalemia  E87.6 276.8   5. Chronic obstructive pulmonary disease, unspecified COPD type (CMS/HCC)  J44.9 496   6. Impaired functional mobility, balance, gait, and endurance  Z74.09 V49.89     Patient Active Problem List   Diagnosis   • Multifocal pneumonia   • Atrial fibrillation (CMS/HCC)   • COPD (chronic obstructive pulmonary disease) (CMS/HCC)   • Hypertension   • Rheumatoid arthritis (CMS/HCC)   • Anxiety and depression   • Lactic acidosis   • Wound of right leg   • Anemia   • Hypokalemia   • Acute respiratory failure with hypoxia (CMS/HCC)     Past Medical History:   Diagnosis Date   • Anxiety and depression    • Arrhythmia     A-FIB   • Asthma    • Chicken pox    • COPD (chronic obstructive pulmonary disease) (CMS/HCC)    • Hyperlipidemia    • Hypertension    • Measles    • Menopause    • Osteoporosis    • Rheumatoid arthritis (CMS/HCC)    • Rheumatoid arthritis (CMS/HCC)    • Tobacco abuse      Past Surgical History:   Procedure Laterality Date   • APPENDECTOMY     • CARPAL TUNNEL RELEASE Left    • CATARACT EXTRACTION, BILATERAL     • COLON SURGERY     • COLONOSCOPY     • GALLBLADDER SURGERY     • HYSTERECTOMY     • TONSILLECTOMY       General Information     Row Name 21 1454          Physical Therapy Time and Intention    Document Type  therapy note (daily note);progress note/recertification  -     Mode of Treatment  physical therapy  -     Row Name 21 1454          General Information    Patient Profile Reviewed  yes plan is to return home tomorrow [facility]  -     Existing Precautions/Restrictions   fall;oxygen therapy device and L/min  -KM     Row Name 08/01/21 1454          Cognition    Orientation Status (Cognition)  oriented x 4  -KM     Row Name 08/01/21 1454          Safety Issues, Functional Mobility    Impairments Affecting Function (Mobility)  balance;endurance/activity tolerance;pain;shortness of breath;strength  -KM       User Key  (r) = Recorded By, (t) = Taken By, (c) = Cosigned By    Initials Name Provider Type    Anny Mclaughlin, PT Physical Therapist        Mobility     Row Name 08/01/21 1455          Bed Mobility    Comment (Bed Mobility)  UIC  -KM     Row Name 08/01/21 1455          Sit-Stand Transfer    Sit-Stand [place] (Transfers)  contact guard  -KM     Assistive Device (Sit-Stand Transfers)  walker, front-wheeled  -KM     Row Name 08/01/21 1455          Gait/Stairs (Locomotion)    [place] Level (Gait)  contact guard;1 person to manage equipment  -KM     Assistive Device (Gait)  walker, front-wheeled  -KM     Distance in Feet (Gait)  55  -KM     Deviations/Abnormal Patterns (Gait)  base of support, narrow;cindi decreased;stride length decreased  -KM     Bilateral Gait Deviations  forward flexed posture;heel strike decreased  -KM     Comment (Gait/Stairs)  pt demonstrates fatigue and dyspnea with gait, pt requires 3 standing rest breaks, assist managing walker with turns, limited by decreased endurance  -KM       User Key  (r) = Recorded By, (t) = Taken By, (c) = Cosigned By    Initials Name Provider Type    Anny Mclaughlin PT Physical Therapist        Obj/Interventions     Row Name 08/01/21 1457          Hip (Therapeutic Exercise)    Hip AROM (Therapeutic Exercise)  bilateral;flexion;aBduction;aDduction;10 repetitions  -KM     Row Name 08/01/21 1457          Knee (Therapeutic Exercise)    Knee (Therapeutic Exercise)  AROM (active range of motion)  -KM     Knee AROM (Therapeutic Exercise)  bilateral;LAQ (long arc quad);10 repetitions  -KM     Row Name  08/01/21 1457          Balance    Dynamic Standing Balance  mild impairment;supported  -KM     Balance Interventions  sit to stand;weight shifting activity;occupation based/functional task  -KM       User Key  (r) = Recorded By, (t) = Taken By, (c) = Cosigned By    Initials Name Provider Type    Anny Mclaughlin, PT Physical Therapist        Goals/Plan     Row Name 08/01/21 1501          Bed Mobility Goal 1 (PT)    Activity/Assistive Device (Bed Mobility Goal 1, PT)  supine to sit  -KM     [place] Level/Cues Needed (Bed Mobility Goal 1, PT)  independent  -KM     Time Frame (Bed Mobility Goal 1, PT)  long term goal (LTG);2 weeks  -KM     Progress/Outcomes (Bed Mobility Goal 1, PT)  good progress toward goal  -KM     Row Name 08/01/21 1501          Transfer Goal 1 (PT)    Activity/Assistive Device (Transfer Goal 1, PT)  sit-to-stand/stand-to-sit  -KM     [place] Level/Cues Needed (Transfer Goal 1, PT)  independent  -KM     Time Frame (Transfer Goal 1, PT)  long term goal (LTG);2 weeks  -KM     Progress/Outcome (Transfer Goal 1, PT)  continuing progress toward goal;goal partially met  -KM     Row Name 08/01/21 1501          Gait Training Goal 1 (PT)    Activity/Assistive Device (Gait Training Goal 1, PT)  gait (walking locomotion);assistive device use  -KM     [place] Level (Gait Training Goal 1, PT)  modified independence  -KM     Distance (Gait Training Goal 1, PT)  150  -KM     Time Frame (Gait Training Goal 1, PT)  long term goal (LTG);2 weeks  -KM     Progress/Outcome (Gait Training Goal 1, PT)  goal ongoing;progress slower than expected  -KM     Row Name 08/01/21 1501          Stairs Goal 1 (PT)    Activity/Assistive Device (Stairs Goal 1, PT)  ascending stairs;descending stairs  -KM     [place] Level/Cues Needed (Stairs Goal 1, PT)  contact guard assist  -KM     Number of Stairs (Stairs Goal 1, PT)  2  -KM     Time Frame (Stairs Goal 1, PT)  long term goal (LTG);2 weeks  -KM      Progress/Outcome (Stairs Goal 1, PT)  goal ongoing  -KM       User Key  (r) = Recorded By, (t) = Taken By, (c) = Cosigned By    Initials Name Provider Type    Anny Mclaughlin, PT Physical Therapist        Clinical Impression     Row Name 08/01/21 1458          Pain    Additional Documentation  Pain Scale: Numbers Pre/Post-Treatment (Group)  -KM     Row Name 08/01/21 1458          Pain Scale: Numbers Pre/Post-Treatment    Pretreatment Pain Rating  0/10 - no pain  -KM     Posttreatment Pain Rating  0/10 - no pain  -KM     Row Name 08/01/21 1458          Plan of Care Review    Plan of Care Reviewed With  patient  -KM     Progress  improving  -KM     Outcome Summary  Patient transfers with c.g.assist and ambul 55 ft with r wx, dyspnea and fatigue with activity. Pt on 2l/miin O2, O2 sats 92-97%. Pt progressing slowly toward goals, anticipate continued progress with HHPT  -KM     Row Name 08/01/21 1458          Vital Signs    Pre Systolic BP Rehab  158  -KM     Pre Treatment Diastolic BP  51  -KM     Pre SpO2 (%)  97  -KM     O2 Delivery Pre Treatment  supplemental O2  -KM     Intra SpO2 (%)  92  -KM     O2 Delivery Intra Treatment  supplemental O2  -KM     Post SpO2 (%)  99  -KM     O2 Delivery Post Treatment  supplemental O2  -KM     Row Name 08/01/21 1458          Positioning and Restraints    Pre-Treatment Position  sitting in chair/recliner  -KM     Post Treatment Position  chair  -KM     In Chair  reclined;call light within reach;encouraged to call for assist  -KM       User Key  (r) = Recorded By, (t) = Taken By, (c) = Cosigned By    Initials Name Provider Type    Anny Mclaughlin, PT Physical Therapist        Outcome Measures     Row Name 08/01/21 1504 08/01/21 0800       How much help from another person do you currently need...    Turning from your back to your side while in flat bed without using bedrails?  4  -KM  4  -MH    Moving from lying on back to sitting on the side of a flat bed  without bedrails?  4  -KM  4  -MH    Moving to and from a bed to a chair (including a wheelchair)?  3  -KM  3  -MH    Standing up from a chair using your arms (e.g., wheelchair, bedside chair)?  3  -KM  3  -MH    Climbing 3-5 steps with a railing?  2  -KM  2  -MH    To walk in hospital room?  3  -KM  3  -MH    AM-PAC 6 Clicks Score (PT)  19  -KM  19  -MH    Row Name 08/01/21 1504          Functional Assessment    Outcome Measure Options  AM-PAC 6 Clicks Basic Mobility (PT)  -KM       User Key  (r) = Recorded By, (t) = Taken By, (c) = Cosigned By    Initials Name Provider Type    Anny Mclaughlin, PT Physical Therapist     Andreia Fabian, RN Registered Nurse                       Physical Therapy Education                 Title: PT OT SLP Therapies (Done)     Topic: Physical Therapy (Done)     Point: Mobility training (Done)     Learning Progress Summary           Patient Eager, E, VU by KM at 8/1/2021 1504    Acceptance, E, VU,NR by DO at 7/28/2021 1037    Acceptance, E, VU,NR by DO at 7/27/2021 1416    Acceptance, E, VU,NR by DO at 7/26/2021 1445    Acceptance, E, VU,NR by DO at 7/23/2021 1406    Acceptance, E, VU,NR by DO at 7/22/2021 0938   Family Acceptance, E, VU,NR by DO at 7/27/2021 1416    Acceptance, E, VU,NR by DO at 7/26/2021 1445    Acceptance, E, VU,NR by DO at 7/23/2021 1406                   Point: Home exercise program (Done)     Learning Progress Summary           Patient Eager, E, VU by KM at 8/1/2021 1504    Acceptance, E, VU,NR by DO at 7/28/2021 1037    Acceptance, E, VU,NR by DO at 7/27/2021 1416    Acceptance, E, VU,NR by DO at 7/26/2021 1445    Acceptance, E, VU,NR by DO at 7/23/2021 1406    Acceptance, E, VU,NR by DO at 7/22/2021 0938   Family Acceptance, E, VU,NR by DO at 7/27/2021 1416    Acceptance, E, VU,NR by DO at 7/26/2021 1445    Acceptance, E, VU,NR by DO at 7/23/2021 1406                   Point: Body mechanics (Done)     Learning Progress Summary           Patient  Eager, E, VU by KM at 8/1/2021 1504    Acceptance, E, VU,NR by DO at 7/28/2021 1037    Acceptance, E, VU,NR by DO at 7/27/2021 1416    Acceptance, E, VU,NR by DO at 7/26/2021 1445    Acceptance, E, VU,NR by DO at 7/23/2021 1406    Acceptance, E, VU,NR by DO at 7/22/2021 0938   Family Acceptance, E, VU,NR by DO at 7/27/2021 1416    Acceptance, E, VU,NR by DO at 7/26/2021 1445    Acceptance, E, VU,NR by DO at 7/23/2021 1406                   Point: Precautions (Done)     Learning Progress Summary           Patient Eager, E, VU by KM at 8/1/2021 1504    Acceptance, E, VU,NR by DO at 7/28/2021 1037    Acceptance, E, VU,NR by DO at 7/27/2021 1416    Acceptance, E, VU,NR by DO at 7/26/2021 1445    Acceptance, E, VU,NR by DO at 7/23/2021 1406    Acceptance, E, VU,NR by DO at 7/22/2021 0938   Family Acceptance, E, VU,NR by DO at 7/27/2021 1416    Acceptance, E, VU,NR by DO at 7/26/2021 1445    Acceptance, E, VU,NR by DO at 7/23/2021 1406                               User Key     Initials Effective Dates Name Provider Type Discipline    KM 06/16/21 -  Anny Bone, PT Physical Therapist PT    DO 07/09/20 -  Mona Conner, PT Student PT Student PT              PT Recommendation and Plan     Plan of Care Reviewed With: patient  Progress: improving  Outcome Summary: Patient transfers with c.g.assist and ambul 55 ft with r wx, dyspnea and fatigue with activity. Pt on 2l/miin O2, O2 sats 92-97%. Pt progressing slowly toward goals, anticipate continued progress with HHPT     Time Calculation:   PT Charges     Row Name 08/01/21 1505             Time Calculation    Start Time  1422  -KM      PT Received On  08/01/21  -KM      PT Goal Re-Cert Due Date  08/11/21  -KM         Time Calculation- PT    Total Timed Code Minutes- PT  23 minute(s)  -KM         Timed Charges    78993 - PT Therapeutic Exercise Minutes  8  -KM      47103 - Gait Training Minutes   15  -KM         Total Minutes    Timed Charges Total Minutes  23  -KM        Total Minutes  23  -KM        User Key  (r) = Recorded By, (t) = Taken By, (c) = Cosigned By    Initials Name Provider Type    Anny Mclaughlin, PT Physical Therapist        Therapy Charges for Today     Code Description Service Date Service Provider Modifiers Qty    [id number]  PT THER PROC EA 15 MIN 8/1/2021 Anny Bone, PT GP 1    [id number] HC GAIT TRAINING EA 15 MIN 8/1/2021 Anny Bone, PT GP 1          PT G-Codes  Outcome Measure Options: AM-PAC 6 Clicks Basic Mobility (PT)  AM-PAC 6 Clicks Score (PT): 19    Anny Bone, PT  8/1/2021

## 2021-08-01 NOTE — PROGRESS NOTES
[facility] Medicine Services  PROGRESS NOTE    Patient Name: Yin Law  : 1940  MRN: [id number]    Date of Admission: 2021  Primary Care Physician: Santiago Chaudhry MD    Subjective   Subjective     CC:  hypoxia    HPI:  Denies cough or shortness of breath.    ROS:  Gen- No fevers, chills  CV- No chest pain, palpitations  Resp- No cough, dyspnea  GI- No N/V/D, abd pain    Objective   Objective     Vital Signs:   Temp:  [98.1 °F (36.7 °C)-98.8 °F (37.1 °C)] 98.3 °F (36.8 °C)  Heart Rate:  [54-72] 57  Resp:  [15-20] 15  BP: (151-164)/(58-65) 151/58  Flow (L/min):  [2] 2     Physical Exam:    Constitutional - no acute distress, frail, sitting up in the recliner  HEENT-NCAT, mucous membranes moist  CV-RRR, S1 S2 normal, no m/r/g  Resp-CTAB  Abd-soft, nontender, nondistended, normoactive bowel sounds  Ext-No lower extremity cyanosis, clubbing or edema bilaterally  Neuro-alert and oriented, speech clear, moves all extremities   Psych-normal affect   Skin- No rash on exposed UE or LE bilaterally      Results Reviewed:  LAB RESULTS:      Lab 21  0845 21  1012 21  0551 21  0743 21  0546 21  0610 21  0610   WBC  --  14.29* 21.67* 20.29* 20.84*  --  11.82*   HEMOGLOBIN 8.6* 8.5* 9.1* 9.0* 8.4*   < > 8.6*   HEMATOCRIT 28.5* 28.3* 31.1* 29.7* 28.7*   < > 29.4*   PLATELETS  --  287 348 321 282  --  278   NEUTROS ABS  --  11.80* 16.85* 17.34* 18.07*  --  10.51*   IMMATURE GRANS (ABS)  --  0.16* 0.48* 0.24* 0.22*  --  0.09*   LYMPHS ABS  --  1.21 2.55 1.33 1.22  --  0.64*   MONOS ABS  --  1.06* 1.59* 1.17* 1.29*  --  0.57   EOS ABS  --  0.04 0.17 0.19 0.03  --  0.00   MCV  --  77.7* 81.2 77.3* 79.7  --  80.3    < > = values in this interval not displayed.         Lab 21  1012 21  0551 21  0743 21  0546 21  2114 21  0903 21  0903   SODIUM 134* 138 134* 130*  --   --  128*   POTASSIUM 4.1 4.7 4.7 4.9  4.9   < > 3.2*   CHLORIDE 100 105 100 97*  --   --  93*   CO2 26.0 24.0 26.0 25.0  --   --  22.0   ANION GAP 8.0 9.0 8.0 8.0  --   --  13.0   BUN 25* 27* 30* 28*  --   --  31*   CREATININE 0.76 0.75 0.70 0.75  --   --  0.72   GLUCOSE 105* 104* 107* 105*  --   --  148*   CALCIUM 8.4* 8.4* 8.3* 8.6  --   --  8.6   MAGNESIUM  --   --   --  1.9  --   --   --     < > = values in this interval not displayed.                     Lab 08/01/21  0845   IRON 16*   IRON SATURATION 4*   TIBC 364   TRANSFERRIN 244   FERRITIN 213.80*         Brief Urine Lab Results     None          Microbiology Results Abnormal     Procedure Component Value - Date/Time    Blastomyces Antigen - Urine, Urine, Catheter [[id number]] Collected: 07/22/21 1734    Lab Status: Final result Specimen: Urine, Catheter Updated: 07/28/21 0817     MVista(R) Blastomyces Ag None Detected ng/mL      Comment: Results reported as ng/mL in 0.2 - 14.7 ng/mL range  Results above the limit of detection but below 0.2 ng/mL  are reported as 'Positive, Below the Limit of  Quantification'  Results above 14.7 ng/mL are reported as 'Positive,  Above the Limit of Quantification'        Specimen Type URINE    Narrative:      Performed at:  [address] [place] [address] [facility] [address] [place]  [id number]  : Husam White MD, Phone:  [phone]    Blood Culture - Blood, Arm, Left [[id number]] Collected: 07/19/21 1725    Lab Status: Final result Specimen: Blood from Arm, Left Updated: 07/24/21 1830     Blood Culture No growth at 5 days    Blood Culture - Blood, Arm, Right [[id number]] Collected: 07/19/21 1730    Lab Status: Final result Specimen: Blood from Arm, Right Updated: 07/24/21 1830     Blood Culture No growth at 5 days    Respiratory Culture - Sputum, Cough [[id number]] Collected: 07/23/21 0928    Lab Status: Final result Specimen: Sputum from Cough Updated: 07/24/21 0944     Respiratory Culture Rejected     Gram Stain Moderate (3+) Epithelial cells  per low power field      Rare (1+) WBCs per low power field      Rare (1+) Mixed bacterial morphotypes seen on Gram Stain    Narrative:      Specimen rejected due to oropharyngeal contamination.    S. Pneumo Ag Urine or CSF - Urine, Urine, Clean Catch [[id number]]  (Normal) Collected: 07/20/21 0623    Lab Status: Final result Specimen: Urine, Clean Catch Updated: 07/20/21 0936     Strep Pneumo Ag Negative    Legionella Antigen, Urine - Urine, Urine, Clean Catch [[id number]]  (Normal) Collected: 07/20/21 0623    Lab Status: Final result Specimen: Urine, Clean Catch Updated: 07/20/21 0936     LEGIONELLA ANTIGEN, URINE Negative    COVID PRE-OP / PRE-PROCEDURE SCREENING ORDER (NO ISOLATION) - Swab, Nasopharynx [[id number]]  (Normal) Collected: 07/19/21 1736    Lab Status: Final result Specimen: Swab from Nasopharynx Updated: 07/19/21 1848    Narrative:      The following orders were created for panel order COVID PRE-OP / PRE-PROCEDURE SCREENING ORDER (NO ISOLATION) - Swab, Nasopharynx.  Procedure                               Abnormality         Status                     ---------                               -----------         ------                     Respiratory Panel PCR w/...[[id number]]  Normal              Final result                 Please view results for these tests on the individual orders.    Respiratory Panel PCR w/COVID-19(SARS-CoV-2) CAS/SHALINI/KRISTY/PAD/COR/MAD/HAJA In-House, NP Swab in UTM/VTM, 3-4 HR TAT - Swab, Nasopharynx [[id number]]  (Normal) Collected: 07/19/21 1736    Lab Status: Final result Specimen: Swab from Nasopharynx Updated: 07/19/21 1848     ADENOVIRUS, PCR Not Detected     Coronavirus 229E Not Detected     Coronavirus HKU1 Not Detected     Coronavirus NL63 Not Detected     Coronavirus OC43 Not Detected     COVID19 Not Detected     Human Metapneumovirus Not Detected     Human Rhinovirus/Enterovirus Not Detected     Influenza A PCR Not Detected     Influenza B PCR Not Detected     Parainfluenza  Virus 1 Not Detected     Parainfluenza Virus 2 Not Detected     Parainfluenza Virus 3 Not Detected     Parainfluenza Virus 4 Not Detected     RSV, PCR Not Detected     Bordetella pertussis pcr Not Detected     Bordetella parapertussis PCR Not Detected     Chlamydophila pneumoniae PCR Not Detected     Mycoplasma pneumo by PCR Not Detected    Narrative:      In the setting of a positive respiratory panel with a viral infection PLUS a negative procalcitonin without other underlying concern for bacterial infection, consider observing off antibiotics or discontinuation of antibiotics and continue supportive care. If the respiratory panel is positive for atypical bacterial infection (Bordetella pertussis, Chlamydophila pneumoniae, or Mycoplasma pneumoniae), consider antibiotic de-escalation to target atypical bacterial infection.          XR Chest 1 View    Result Date: 8/1/2021   EXAMINATION: XR CHEST 1 VW - 07/31/2021  INDICATION: J18.9-Pneumonia, unspecified organism; R09.02-Hypoxemia; I50.9-Heart failure, unspecified; E87.6-Hypokalemia; J44.9-Chronic obstructive pulmonary disease, unspecified; Z74.09-Other reduced mobility. Bilateral infiltrates.  COMPARISON: 07/28/2021  FINDINGS: Portable chest reveals patchy airspace disease seen within the upper lung fields bilaterally which is stable and unchanged. No change seen in the right pleural effusion with improvement seen in the aeration of the left lung base in the interval. Degenerative changes seen within the spine with PICC line catheter on the right tip in the SVC.        Impression: Improvement seen in aeration the left lung base. The remainder the chest is stable.  DICTATED:   07/31/2021 EDITED/ls :   08/01/2021       XR Chest 1 View    Result Date: 8/1/2021   EXAMINATION: XR CHEST 1 VW-  INDICATION: bilateral infiltrates; J18.9-Pneumonia, unspecified organism; R09.02-Hypoxemia; I50.9-Heart failure, unspecified; E87.6-Hypokalemia; J44.9-Chronic obstructive  pulmonary disease, unspecified; Z74.09-Other reduced mobility  COMPARISON: 07/31/2021  FINDINGS: Portable chest reveals patchy airspace disease seen in the upper lung fields. Degenerative changes seen within the spine. Small bladder pleural effusions. Vascular calcification seen within the thoracic aorta. PICC line catheter the right tip in the SVC.         Impression: Stable chest as above.         Results for orders placed during the hospital encounter of 07/19/21    Adult Transthoracic Echo Complete W/ Cont if Necessary Per Protocol    Interpretation Summary  · Estimated left ventricular EF = 65%  · Mild aortic valve stenosis is present.  · Aortic valve maximum pressure gradient is 32.9 mmHg. Aortic valve mean pressure gradient is 17.2 mmHg.  · Mild mitral valve regurgitation is present.  · Mild tricuspid valve regurgitation is present.  · Estimated right ventricular systolic pressure from tricuspid regurgitation is 37.0 mmHg.  · There is a large left pleural effusion. There is a moderate sized right pleural effusion.      I have reviewed the medications:  Scheduled Meds:apixaban, 5 mg, Oral, Q12H  ascorbic acid, 1,000 mg, Oral, Daily  bisoprolol, 5 mg, Oral, Q24H  budesonide, 0.5 mg, Nebulization, BID - RT  dextromethorphan polistirex ER, 60 mg, Oral, Q12H  famotidine, 20 mg, Oral, BID AC  ferric gluconate, 125 mg, Intravenous, Once  ipratropium-albuterol, 3 mL, Nebulization, Q4H While Awake - RT  melatonin, 5 mg, Oral, Nightly  multivitamin with minerals, 1 tablet, Oral, Daily  neomycin-bacitracin-polymyxin, , Topical, Q8H  predniSONE, 20 mg, Oral, Daily With Breakfast  QUEtiapine, 50 mg, Oral, Nightly  sertraline, 150 mg, Oral, Daily  sodium chloride, 10 mL, Intravenous, Q12H      Continuous Infusions:   PRN Meds:.•  acetaminophen **OR** acetaminophen **OR** acetaminophen  •  ALPRAZolam  •  dextrose  •  hydrALAZINE  •  hydrOXYzine  •  magnesium sulfate **OR** magnesium sulfate **OR** magnesium sulfate  •   metoprolol tartrate  •  phenol  •  potassium chloride **OR** potassium chloride **OR** potassium chloride  •  potassium phosphate infusion greater than 15 mMoles **OR** potassium phosphate infusion greater than 15 mMoles **OR** potassium phosphate **OR** sodium phosphate IVPB **OR** sodium phosphate IVPB **OR** sodium phosphate IVPB  •  sodium chloride    Assessment/Plan   Assessment & Plan     Active Hospital Problems    Diagnosis  POA   • **Acute respiratory failure with hypoxia (CMS/[facility]) [J96.01]  Yes   • Multifocal pneumonia [J18.9]  Yes   • Atrial fibrillation (CMS/[facility]) [I48.91]  Yes   • COPD (chronic obstructive pulmonary disease) (CMS/[facility]) [J44.9]  Yes   • Hypertension [I10]  Yes   • Rheumatoid arthritis (CMS/[facility]) [M06.9]  Yes   • Anxiety and depression [F41.9, F32.9]  Yes   • Lactic acidosis [E87.2]  Yes   • Wound of right leg [S81.801A]  Yes   • Anemia [D64.9]  Yes   • Hypokalemia [E87.6]  Yes      Resolved Hospital Problems   No resolved problems to display.        Brief Hospital Course to date:  80 y.o. female with history of atrial fibrillation on Eliquis and amiodarone, hypertension, hyperlipidemia, COPD, rheumatoid arthritis, depression/anxiety, who was admitted on 7/19/2021 with pneumonia and CHF exacerbation.  She decompensated in the medical unit and was transferred to the intensive care unit on 7/20/2021 with a systolic blood pressure of 220 and worsening hypoxemia.  CT angiogram of the chest on 7/19/2021 showed significant bilateral infiltrates, interstitial in appearance but not necessarily volume related.    Hypoxia with pulmonary infiltrates  - unclear if lung findings secondary to infection, volume or inflammation secondary to inflammation (ie, amiodarone toxicity)  - patient remains on 2 liters supplemental oxygen  - continue steroids for several more days, Pulmonary does not recommend an extended taper  - CXR today stable with diffuse interstitial infiltrates, however some clearing at bases  similar to yesterday.   - Ms Law will need to be followed by CXR by PCP to ensure resolution, may benefit from referral to Pulmonary clinic as well.    Pneumonia  - s/p antibiotics  - leukocytosis at this juncture likely secondary to steroids, patient remains afebrile.    PAF  - amiodarone discontinued due to concern for possible element of lung toxicity  - adjust rate control medications as needed, currently on bisoprolol 5 mg daily  - eliquis  - follow up with Dr Montoya in 4 weeks    CHF  - appears euvolemic    Hyponatremia  - s/p samsca  - stable at 134    HTN  - held HCTZ due to hyponatremia  - zebeta started, 5 mg daily -- consider titrating up or adding low dose ACEi    Anemia  - patient on eliquis but denies melena or blood in stool  - hemoglobin 8.6, MCV 77 -- low iron saturation (4%)  - will give IV iron x 1, start feosol with bifera at discharge  - patient has follow up later this month with GI Dr Tripp for upper and lower endoscopy.    DVT prophylaxis: eliquis  Medical DVT prophylaxis orders are present.     Disposition: I expect the patient to be discharged home in the morning, patient declines rehab. Ms Law says that her niece will be staying with her after discharge.  Discussed discharge plan with niece at bedside.    CODE STATUS:   Code Status and Medical Interventions:   Ordered at: 07/19/21 4430     Level Of Support Discussed With:    Patient     Code Status:    CPR     Medical Interventions (Level of Support Prior to Arrest):    Full       Chico Nicole MD  08/01/21

## 2021-08-01 NOTE — PLAN OF CARE
Goal Outcome Evaluation:  Plan of Care Reviewed With: patient        Progress: improving  Outcome Summary: Patient transfers with c.g.assist and ambul 55 ft with r wx, dyspnea and fatigue with activity. Pt on 2l/miin O2, O2 sats 92-97%. Pt progressing slowly toward goals, anticipate continued progress with HHPT

## 2021-08-01 NOTE — PLAN OF CARE
Goal Outcome Evaluation:  Plan of Care Reviewed With: patient        Progress: improving  Outcome Summary: no changes to report overnight; a/o x 4; VSS, 2L n.c; denies soa, no pain, pleasant; rested comfortably, will continue to monitor

## 2021-08-02 VITALS
HEIGHT: 66 IN | HEART RATE: 60 BPM | TEMPERATURE: 98.1 F | SYSTOLIC BLOOD PRESSURE: 135 MMHG | DIASTOLIC BLOOD PRESSURE: 44 MMHG | WEIGHT: 138.4 LBS | BODY MASS INDEX: 22.24 KG/M2 | OXYGEN SATURATION: 91 % | RESPIRATION RATE: 18 BRPM

## 2021-08-02 LAB
B MICROTI IGG TITR SER: NORMAL {TITER}
B MICROTI IGM TITR SER: NORMAL {TITER}

## 2021-08-02 PROCEDURE — 99239 HOSP IP/OBS DSCHRG MGMT >30: CPT | Performed by: NURSE PRACTITIONER

## 2021-08-02 PROCEDURE — 94799 UNLISTED PULMONARY SVC/PX: CPT

## 2021-08-02 PROCEDURE — 63710000001 PREDNISONE PER 1 MG: Performed by: INTERNAL MEDICINE

## 2021-08-02 RX ORDER — PREDNISONE 20 MG/1
20 TABLET ORAL
Qty: 2 TABLET | Refills: 0 | Status: SHIPPED | OUTPATIENT
Start: 2021-08-03 | End: 2021-08-12 | Stop reason: HOSPADM

## 2021-08-02 RX ORDER — BISOPROLOL FUMARATE 5 MG/1
5 TABLET, FILM COATED ORAL
Qty: 30 TABLET | Refills: 0 | Status: SHIPPED | OUTPATIENT
Start: 2021-08-03 | End: 2022-02-12 | Stop reason: HOSPADM

## 2021-08-02 RX ADMIN — MULTIPLE VITAMINS W/ MINERALS TAB 1 TABLET: TAB at 09:34

## 2021-08-02 RX ADMIN — OXYCODONE HYDROCHLORIDE AND ACETAMINOPHEN 1000 MG: 500 TABLET ORAL at 09:34

## 2021-08-02 RX ADMIN — PREDNISONE 20 MG: 20 TABLET ORAL at 09:34

## 2021-08-02 RX ADMIN — SERTRALINE HYDROCHLORIDE 150 MG: 50 TABLET ORAL at 09:34

## 2021-08-02 RX ADMIN — SODIUM CHLORIDE, PRESERVATIVE FREE 10 ML: 5 INJECTION INTRAVENOUS at 09:35

## 2021-08-02 RX ADMIN — BISOPROLOL FUMARATE 5 MG: 5 TABLET, FILM COATED ORAL at 09:34

## 2021-08-02 RX ADMIN — FAMOTIDINE 20 MG: 20 TABLET ORAL at 09:35

## 2021-08-02 RX ADMIN — BACITRACIN ZINC NEOMYCIN SULFATE POLYMYXIN B SULFATE: 400; 3.5; 5 OINTMENT TOPICAL at 09:35

## 2021-08-02 RX ADMIN — IPRATROPIUM BROMIDE AND ALBUTEROL SULFATE 3 ML: 2.5; .5 SOLUTION RESPIRATORY (INHALATION) at 11:38

## 2021-08-02 RX ADMIN — APIXABAN 5 MG: 5 TABLET, FILM COATED ORAL at 09:34

## 2021-08-02 RX ADMIN — BUDESONIDE 0.5 MG: 0.5 INHALANT RESPIRATORY (INHALATION) at 08:30

## 2021-08-02 RX ADMIN — IPRATROPIUM BROMIDE AND ALBUTEROL SULFATE 3 ML: 2.5; .5 SOLUTION RESPIRATORY (INHALATION) at 08:30

## 2021-08-02 NOTE — CASE MANAGEMENT/SOCIAL WORK
Case Management Discharge Note      Final Note: Met with pt and isaías at bedside to finalize DCP.  Plan remains to return home with isaías and [facility] services.  CM notified Sho at  of pt's DC today.  Confirmed RW and home O2 have been delivered to pt's room for DC today.  Isaías requested info for [employer].  Contact info has been listed in AVS.         Selected Continued Care - Admitted Since 7/19/2021     Destination    No services have been selected for the patient.              Durable Medical Equipment Coordination complete.    Service Provider Selected Services Address Phone Fax Patient Preferred    [facility] [place]  Durable Medical Equipment [address], [facility] [address] [phone] --          Dialysis/Infusion    No services have been selected for the patient.              Home Medical Care Coordination complete.    Service Provider Selected Services Address Phone Fax Patient Preferred    [facility]  Home Health Services [address], [facility] [address] [phone] --          Therapy    No services have been selected for the patient.              Community Resources    No services have been selected for the patient.              Community & DME    No services have been selected for the patient.                       Final Discharge Disposition Code: 06 - home with home health care

## 2021-08-02 NOTE — DISCHARGE SUMMARY
[facility] Medicine Services  DISCHARGE SUMMARY    Patient Name: Yin Law  : 1940  MRN: [id number]    Date of Admission: 2021  4:06 PM  Date of Discharge:  21  Primary Care Physician: Santiago Chaudhry MD    Consults     Date and Time Order Name Status Description    2021 12:35 AM Inpatient Cardiology Consult Completed     2021  9:48 PM Inpatient Cardiology Consult Completed           Hospital Course     Presenting Problem:   Multifocal pneumonia [J18.9]    Active Hospital Problems    Diagnosis  POA   • **Acute respiratory failure with hypoxia (CMS/HCC) [J96.01]  Yes   • Multifocal pneumonia [J18.9]  Yes   • Atrial fibrillation (CMS/HCC) [I48.91]  Yes   • COPD (chronic obstructive pulmonary disease) (CMS/HCC) [J44.9]  Yes   • Hypertension [I10]  Yes   • Rheumatoid arthritis (CMS/HCC) [M06.9]  Yes   • Anxiety and depression [F41.9, F32.9]  Yes   • Lactic acidosis [E87.2]  Yes   • Wound of right leg [S81.801A]  Yes   • Anemia [D64.9]  Yes   • Hypokalemia [E87.6]  Yes      Resolved Hospital Problems   No resolved problems to display.          Hospital Course:  Yin Law is a 80 y.o. female with history of atrial fibrillation on Eliquis and amiodarone, hypertension, hyperlipidemia, COPD, rheumatoid arthritis, depression/anxiety, who was admitted on 2021 with pneumonia, CHF exacerbation and right leg wound. Was then admitted to ICU for worsening respiratory failure and hypertensive urgency. CT angiogram of the chest on 2021 showed small pleural effusions, interstitial lung disease more prominent at the apices.  It was unclear if line findings are secondary to infection, volume or possibly inflammation secondary to amiodarone toxicity.  Patient will be tapered on steroids patient will need to follow-up with her PCP for chest x-ray to ensure resolution of her disease process and may benefit from referral to pulmonary clinic as well.  Patient  status post antibiotics.  Amiodarone was discontinued due to concern for possible lung toxicity, adjusted her medications for rate control of her PAF.  Will need to follow-up with Dr. Montoya in 4 weeks.  Patient will be discharged home as she is declined rehab at this time.    Discharge Follow Up Recommendations for outpatient labs/diagnostics:   PCP 1 week with CXR   Dr. Montoya in 4 weeks    Day of Discharge     HPI:   Breathing has improved, but not back to baseline yet. Remains on 2lnc, will require oxygen at home after discharge. No new concerns. Denies f/c, n/v/d, soa or cp.     Review of Systems  All other systems negative    Vital Signs:   Temp:  [96.8 °F (36 °C)-98.7 °F (37.1 °C)] 98.1 °F (36.7 °C)  Heart Rate:  [51-69] 56  Resp:  [15-18] 18  BP: (135-169)/(44-68) 135/44     Physical Exam:  Constitutional: No acute distress, awake, alert up in chair   HENT: NCAT, mucous membranes moist  Respiratory: Clear to auscultation bilaterally, respiratory effort normal on 2lnc   Cardiovascular: Bradycardic, no murmurs, rubs, or gallops  Gastrointestinal: Positive bowel sounds, soft, nontender, nondistended  Musculoskeletal: 1+ bilateral ankle edema  Psychiatric: Appropriate affect, cooperative  Neurologic: Oriented x 3, strength symmetric in all extremities, Cranial Nerves grossly intact to confrontation, speech clear  Skin: No rashes     Pertinent  and/or Most Recent Results     LAB RESULTS:      Lab 08/01/21  0845 07/31/21  1012 07/30/21  0551 07/29/21  0743 07/28/21  0546 07/27/21  0610 07/27/21  0610   WBC  --  14.29* 21.67* 20.29* 20.84*  --  11.82*   HEMOGLOBIN 8.6* 8.5* 9.1* 9.0* 8.4*   < > 8.6*   HEMATOCRIT 28.5* 28.3* 31.1* 29.7* 28.7*   < > 29.4*   PLATELETS  --  287 348 321 282  --  278   NEUTROS ABS  --  11.80* 16.85* 17.34* 18.07*  --  10.51*   IMMATURE GRANS (ABS)  --  0.16* 0.48* 0.24* 0.22*  --  0.09*   LYMPHS ABS  --  1.21 2.55 1.33 1.22  --  0.64*   MONOS ABS  --  1.06* 1.59* 1.17* 1.29*  --  0.57    EOS ABS  --  0.04 0.17 0.19 0.03  --  0.00   MCV  --  77.7* 81.2 77.3* 79.7  --  80.3    < > = values in this interval not displayed.         Lab 07/31/21  1012 07/30/21  0551 07/29/21  0743 07/28/21  0546 07/27/21  2114 07/27/21  0903 07/27/21  0903   SODIUM 134* 138 134* 130*  --   --  128*   POTASSIUM 4.1 4.7 4.7 4.9 4.9   < > 3.2*   CHLORIDE 100 105 100 97*  --   --  93*   CO2 26.0 24.0 26.0 25.0  --   --  22.0   ANION GAP 8.0 9.0 8.0 8.0  --   --  13.0   BUN 25* 27* 30* 28*  --   --  31*   CREATININE 0.76 0.75 0.70 0.75  --   --  0.72   GLUCOSE 105* 104* 107* 105*  --   --  148*   CALCIUM 8.4* 8.4* 8.3* 8.6  --   --  8.6   MAGNESIUM  --   --   --  1.9  --   --   --     < > = values in this interval not displayed.                     Lab 08/01/21  0845   IRON 16*   IRON SATURATION 4*   TIBC 364   TRANSFERRIN 244   FERRITIN 213.80*   FOLATE >20.00   VITAMIN B 12 1,792*         Brief Urine Lab Results     None        Microbiology Results (last 10 days)     Procedure Component Value - Date/Time    Respiratory Culture - Sputum, Cough [[id number]]  (Abnormal) Collected: 07/24/21 1040    Lab Status: Final result Specimen: Sputum from Cough Updated: 07/27/21 1247     Respiratory Culture Scant growth (1+) Candida albicans      No Normal Respiratory Emerita     Gram Stain Rare (1+) WBCs per low power field      Rare (1+) Epithelial cells per low power field      No organisms seen          XR Chest 1 View    Result Date: 8/2/2021   EXAMINATION: XR CHEST 1 VW - 07/31/2021  INDICATION: J18.9-Pneumonia, unspecified organism; R09.02-Hypoxemia; I50.9-Heart failure, unspecified; E87.6-Hypokalemia; J44.9-Chronic obstructive pulmonary disease, unspecified; Z74.09-Other reduced mobility. Bilateral infiltrates.  COMPARISON: 07/28/2021  FINDINGS: Portable chest reveals patchy airspace disease seen within the upper lung fields bilaterally which is stable and unchanged. No change seen in the right pleural effusion with improvement seen  in the aeration of the left lung base in the interval. Degenerative changes seen within the spine with PICC line catheter on the right tip in the SVC.        Improvement seen in aeration the left lung base. The remainder the chest is stable.  DICTATED:   07/31/2021 EDITED/ls :   08/01/2021  This report was finalized on 8/2/2021 8:29 AM by Dr. Nichole Greene MD.      XR Chest 1 View    Result Date: 8/2/2021  EXAMINATION: XR CHEST 1 VW-  INDICATION: Bilateral infiltrates; J18.9-Pneumonia, unspecified organism; R09.02-Hypoxemia; I50.9-Heart failure, unspecified; E87.6-Hypokalemia; J44.9-Chronic obstructive pulmonary disease, unspecified; Z74.09-Other reduced mobility.  COMPARISON: 07/31/2021.  FINDINGS: Portable chest reveals patchy airspace disease seen in the upper lung fields. Degenerative change is seen within the spine. Small bilateral pleural effusions. Vascular calcification is seen within the thoracic aorta. PICC line catheter on the right with tip in the SVC.         Stable chest as above.  D:  08/01/2021 E:  08/02/2021      XR Chest 1 View    Result Date: 7/30/2021  EXAMINATION: XR CHEST 1 VW-07/28/2021:  INDICATION: Respiratory failure; J18.9-Pneumonia, unspecified organism; R09.02-Hypoxemia; I50.9-Heart failure, unspecified; E87.6-Hypokalemia.  COMPARISON: Chest x-ray 07/26/2021.  FINDINGS: Right arm PICC remains in place. Bilateral pulmonary opacifications of an upper lung predominance, right greater than left, mildly decreased from prior with small-to-moderate bilateral pleural effusions.      Slight decrease in airspace disease in the upper lungs from prior.  D:  07/28/2021 E:  07/28/2021  This report was finalized on 7/30/2021 5:16 PM by Dr. Tony Eaton.      XR Chest 1 View    Result Date: 7/26/2021   EXAMINATION: XR CHEST 1 VW - 07/25/2021  INDICATION: J18.9-Pneumonia, unspecified organism; R09.02-Hypoxemia; I50.9-Heart failure, unspecified; E87.6-Hypokalemia. Follow-up respiratory illness.   COMPARISON: Chest x-ray 07/24/2021.  FINDINGS: Right PICC remains in place. Cardiac size enlarged. Bilateral P1 opacifications again noted with bilateral effusions stable to slightly decreased on the right with persistent small volume left effusion.      Bilateral pulmonary opacifications again noted with stable to slightly decreased right effusion.  DICTATED:   07/25/2021 EDITED/ls :   07/25/2021  This report was finalized on 7/26/2021 4:46 PM by Dr. Tony Eaton.      XR Chest 1 View    Result Date: 7/26/2021  EXAMINATION: XR CHEST 1 VW-  INDICATION: Followup respiratory illness.; J18.9-Pneumonia, unspecified organism; R09.02-Hypoxemia; I50.9-Heart failure, unspecified; E87.6-Hypokalemia.  COMPARISON: 07/25/2021.  FINDINGS: Portable chest reveals increased markings seen diffusely throughout the lung fields. Small bilateral pleural effusions with degenerative changes seen within the spine. PICC line catheter identified on the right with tip in the SVC with vascular calcification seen within the thoracic aorta.         Stable chest with patchy airspace disease seen diffusely throughout the lung fields. No change in the small bilateral pleural effusions.  D:  07/26/2021 E:  07/26/2021  This report was finalized on 7/26/2021 4:03 PM by Dr. Nichole Greene MD.      XR Chest 1 View    Result Date: 7/24/2021   EXAMINATION: XR CHEST 1 VW - 07/24/2021  INDICATION: J18.9-Pneumonia, unspecified organism; R09.02-Hypoxemia; I50.9-Heart failure, unspecified; E87.6-Hypokalemia. Follow-up exam.  COMPARISON: Chest x-ray 07/23/2021  FINDINGS: Right arm PICC remains in place. Cardiac size remains enlarged. Bilateral pulmonary opacifications in the bibasilar regions with small right and trace to small left pleural fusions with increase on the right from prior comparison along with diffuse bilateral pulmonary opacifications noted.      Bilateral P1 opacifications with potential increased in density from prior along with at least  small volume right pleural effusion increased from prior comparison along with trace to small persistent left effusion as well.  DICTATED:   07/24/2021 EDITED/ls :   07/24/2021  This report was finalized on 7/24/2021 2:28 PM by Dr. Tony Eaton.      XR Chest 1 View    Result Date: 7/23/2021  EXAMINATION: XR CHEST 1 VW-  INDICATION: Followup;  J18.9-Pneumonia, unspecified organism; R09.02-Hypoxemia; I50.9-Heart failure, unspecified; E87.6-Hypokalemia.  COMPARISON: 07/22/2021.  FINDINGS: Right upper extremity PICC line remains in the SVC. There is dense interstitial disease of the upper and mid lungs and milder basilar disease. There appears to be minimal bibasilar effusion. No pneumothorax is seen. Heart is enlarged. Vasculature is cephalized.      Extensive but stable pulmonary interstitial disease mostly in the upper lungs, resembling interstitial edema or ARDS. Very small pleural effusions and mild bibasilar atelectasis.   D:  07/23/2021 E:  07/23/2021  This report was finalized on 7/23/2021 9:34 PM by Dr. Augie Leiva MD.                Results for orders placed during the hospital encounter of 07/19/21    Adult Transthoracic Echo Complete W/ Cont if Necessary Per Protocol    Interpretation Summary  · Estimated left ventricular EF = 65%  · Mild aortic valve stenosis is present.  · Aortic valve maximum pressure gradient is 32.9 mmHg. Aortic valve mean pressure gradient is 17.2 mmHg.  · Mild mitral valve regurgitation is present.  · Mild tricuspid valve regurgitation is present.  · Estimated right ventricular systolic pressure from tricuspid regurgitation is 37.0 mmHg.  · There is a large left pleural effusion. There is a moderate sized right pleural effusion.      Plan for Follow-up of Pending Labs/Results: PCP  Pending Labs     Order Current Status    [facility] Microti Antibody Panel In process        Discharge Details        Discharge Medications      Stop These Medications    amiodarone 200 MG tablet  Commonly  known as: PACERONE        ASK your doctor about these medications      Instructions Start Date   ALPRAZolam 0.5 MG tablet  Commonly known as: XANAX   0.5 mg, Oral, Nightly PRN      apixaban 5 MG tablet tablet  Commonly known as: ELIQUIS   5 mg, Oral, 2 Times Daily      ascorbic acid 1000 MG tablet  Commonly known as: VITAMIN C   1,000 mg, Oral, Daily      CALCIUM 600 PO   1 tablet, Oral, Daily      dilTIAZem 180 MG 24 hr capsule  Commonly known as: TIAZAC   1 capsule, Oral, Daily      fish oil 1000 MG capsule capsule   1,000 mg, Oral, Daily With Breakfast      hydroCHLOROthiazide 25 MG tablet  Commonly known as: HYDRODIURIL   25 mg, Oral, Daily      HYDROcodone-acetaminophen 7.5-325 MG per tablet  Commonly known as: NORCO   1 tablet, Oral, Every 6 Hours PRN      multivitamin with minerals tablet tablet   1 tablet, Oral, Daily      ramipril 1.25 MG capsule  Commonly known as: ALTACE   1.25 mg, Oral, Daily      sertraline 100 MG tablet  Commonly known as: ZOLOFT   150 mg, Oral, Daily, 1.5 tab daily      Stiolto Respimat 2.5-2.5 MCG/ACT aerosol solution inhaler  Generic drug: tiotropium bromide-olodaterol   2 puffs, Inhalation, Daily PRN      Vitamin D (Ergocalciferol) 50 MCG (2000 UT) capsule   1 tablet, Oral, Daily      VITAMIN E PO   1 tablet, Oral, Daily             No Known Allergies      Discharge Disposition:  Home    Diet:  Hospital:  Diet Order   Procedures   • Diet Soft Texture; Whole Foods; Cardiac            CODE STATUS:    Code Status and Medical Interventions:   Ordered at: 07/19/21 4041     Level Of Support Discussed With:    Patient     Code Status:    CPR     Medical Interventions (Level of Support Prior to Arrest):    Full       Future Appointments   Date Time Provider Department Center   8/30/2021  2:30 PM Roslyn Melendez APRN [facility]       Additional Instructions for the Follow-ups that You Need to Schedule     Ambulatory Referral to Home Health   As directed      Face to Face Visit Date:  7/30/2021    Follow-up provider for Plan of Care?: I treated the patient in an acute care facility and will not continue treatment after discharge.    Follow-up provider: LARISSA ROSE [[id number]]    Reason/Clinical Findings: Respiratory failure;  PNA;  COPD    Describe mobility limitations that make leaving home difficult: Impaired functional mobility, gait, balance, endurance    Nursing/Therapeutic Services Requested: Skilled Nursing Physical Therapy Occupational Therapy    Skilled nursing orders: Medication education Cardiopulmonary assessments COPD management O2 instruction    PT orders: Therapeutic exercise Gait Training Strengthening    Weight Bearing Status: As Tolerated    Occupational orders: Activities of daily living Strengthening Home safety assessment    Frequency: 1 Week 1         Discharge Follow-up with Specialty: Jan/Al; 1 Month   As directed      Specialty: Jan/[place]    Follow Up: 1 Month                     AMA Randall  08/02/21      Time Spent on Discharge:  I spent  45 minutes on this discharge activity which included: face-to-face encounter with the patient, reviewing the data in the system, coordination of the care with the nursing staff as well as consultants, documentation, and entering orders.

## 2021-08-02 NOTE — PROGRESS NOTES
Clinical Nutrition       Patient Name: Yin Law  YOB: 1940  MRN: [id number]  Date of Encounter: 21 11:27 EDT  Admission date: 2021      Reason for Visit   Follow-up protocol       EMR  Reviewed        Reported/Observed/Food/Nutrition Related - Comments     ) Euvolemia noted. PO intake improved from previous follow up.     ) Plans to decrease steroids today. Patient reports she is eating okay and drinking ONS. She reports ability to reach food has been an obstacle. Spoke to her about ensuring to ask for assistance from staff if this occurs again.      Current Nutrition Prescription     Diet Soft Texture; Whole Foods; Cardiac    Dietary Nutrition Supplements Ensure HP BID      Average Intake from [place]% x 7 meals   ) 58% x 6 meals   ) 50% x 2 meals     Nutrition Diagnosis     Date:  Updated:,   Problem Inadequate Oral intake    Etiology Clinical condition; reps status    Signs/Symptoms PO intake    Status: improved    Actions     Follow treatment progress, Care plan reviewed     Will continue to send ONS as ordered       Monitor Per Protocol      Tamika Vogt RDN, LD, CNSC  Time Spent: 15min

## 2021-08-02 NOTE — PLAN OF CARE
Goal Outcome Evaluation:  Plan of Care Reviewed With: patient, family        Progress: improving  Outcome Summary: Pt stable VSS and been up in recliner no c/o of pain or discomfort at this time.  Pt stable for discharge at this time.  Will d/c on home oxygen and has portable tank in room and oxygen co number to have home o2 delivered there.  Family is providing transportation home in private vehicle. Patient had resting room O2 sat of 86%.

## 2021-08-03 ENCOUNTER — READMISSION MANAGEMENT (OUTPATIENT)
Dept: CALL CENTER | Facility: HOSPITAL | Age: 81
End: 2021-08-03

## 2021-08-03 NOTE — OUTREACH NOTE
Prep Survey      Responses   Nondenominational facility patient discharged from?  [place]   Is LACE score < 7 ?  No   Emergency Room discharge w/ pulse ox?  No   Eligibility  Readm Mgmt   Discharge diagnosis  Multifocal pneumonia   Does the patient have one of the following disease processes/diagnoses(primary or secondary)?  COPD/Pneumonia   Does the patient have Home health ordered?  Yes   What is the Home health agency?   [facility]   Is there a DME ordered?  No   Prep survey completed?  Yes          Lani Baum RN

## 2021-08-05 ENCOUNTER — APPOINTMENT (OUTPATIENT)
Dept: CT IMAGING | Facility: HOSPITAL | Age: 81
End: 2021-08-05

## 2021-08-05 ENCOUNTER — HOSPITAL ENCOUNTER (INPATIENT)
Facility: HOSPITAL | Age: 81
LOS: 7 days | Discharge: HOME HEALTH | End: 2021-08-12
Attending: EMERGENCY MEDICINE | Admitting: INTERNAL MEDICINE

## 2021-08-05 DIAGNOSIS — J18.9 MULTIFOCAL PNEUMONIA: ICD-10-CM

## 2021-08-05 DIAGNOSIS — J18.9 PNEUMONIA OF BOTH LOWER LOBES DUE TO INFECTIOUS ORGANISM: ICD-10-CM

## 2021-08-05 DIAGNOSIS — J96.01 ACUTE RESPIRATORY FAILURE WITH HYPOXIA (HCC): Primary | ICD-10-CM

## 2021-08-05 DIAGNOSIS — I50.9 ACUTE ON CHRONIC CONGESTIVE HEART FAILURE, UNSPECIFIED HEART FAILURE TYPE (HCC): ICD-10-CM

## 2021-08-05 DIAGNOSIS — J90 PLEURAL EFFUSION: ICD-10-CM

## 2021-08-05 PROBLEM — J96.21 ACUTE AND CHRONIC RESPIRATORY FAILURE WITH HYPOXIA (HCC): Status: ACTIVE | Noted: 2021-08-05

## 2021-08-05 PROBLEM — R77.8 ELEVATED TROPONIN: Status: ACTIVE | Noted: 2021-08-05

## 2021-08-05 PROBLEM — A41.9 SEPSIS (HCC): Status: ACTIVE | Noted: 2021-08-05

## 2021-08-05 LAB
ALBUMIN SERPL-MCNC: 3.2 G/DL (ref 3.5–5.2)
ALBUMIN/GLOB SERPL: 1.1 G/DL
ALP SERPL-CCNC: 95 U/L (ref 39–117)
ALT SERPL W P-5'-P-CCNC: 21 U/L (ref 1–33)
ANION GAP SERPL CALCULATED.3IONS-SCNC: 10 MMOL/L (ref 5–15)
AST SERPL-CCNC: 58 U/L (ref 1–32)
B PARAPERT DNA SPEC QL NAA+PROBE: NOT DETECTED
B PERT DNA SPEC QL NAA+PROBE: NOT DETECTED
BASOPHILS # BLD AUTO: 0.05 10*3/MM3 (ref 0–0.2)
BASOPHILS NFR BLD AUTO: 0.4 % (ref 0–1.5)
BILIRUB SERPL-MCNC: 0.7 MG/DL (ref 0–1.2)
BUN SERPL-MCNC: 20 MG/DL (ref 8–23)
BUN/CREAT SERPL: 29.4 (ref 7–25)
C PNEUM DNA NPH QL NAA+NON-PROBE: NOT DETECTED
CALCIUM SPEC-SCNC: 8.8 MG/DL (ref 8.6–10.5)
CHLORIDE SERPL-SCNC: 102 MMOL/L (ref 98–107)
CO2 SERPL-SCNC: 24 MMOL/L (ref 22–29)
CREAT SERPL-MCNC: 0.68 MG/DL (ref 0.57–1)
D-LACTATE SERPL-SCNC: 2.3 MMOL/L (ref 0.5–2)
DEPRECATED RDW RBC AUTO: 47.2 FL (ref 37–54)
EOSINOPHIL # BLD AUTO: 0.04 10*3/MM3 (ref 0–0.4)
EOSINOPHIL NFR BLD AUTO: 0.3 % (ref 0.3–6.2)
ERYTHROCYTE [DISTWIDTH] IN BLOOD BY AUTOMATED COUNT: 19.9 % (ref 12.3–15.4)
FLUAV SUBTYP SPEC NAA+PROBE: NOT DETECTED
FLUBV RNA ISLT QL NAA+PROBE: NOT DETECTED
GFR SERPL CREATININE-BSD FRML MDRD: 83 ML/MIN/1.73
GLOBULIN UR ELPH-MCNC: 2.9 GM/DL
GLUCOSE SERPL-MCNC: 104 MG/DL (ref 65–99)
HADV DNA SPEC NAA+PROBE: NOT DETECTED
HCOV 229E RNA SPEC QL NAA+PROBE: NOT DETECTED
HCOV HKU1 RNA SPEC QL NAA+PROBE: NOT DETECTED
HCOV NL63 RNA SPEC QL NAA+PROBE: NOT DETECTED
HCOV OC43 RNA SPEC QL NAA+PROBE: NOT DETECTED
HCT VFR BLD AUTO: 28.2 % (ref 34–46.6)
HGB BLD-MCNC: 8.5 G/DL (ref 12–15.9)
HMPV RNA NPH QL NAA+NON-PROBE: NOT DETECTED
HOLD SPECIMEN: NORMAL
HPIV1 RNA SPEC QL NAA+PROBE: NOT DETECTED
HPIV2 RNA SPEC QL NAA+PROBE: NOT DETECTED
HPIV3 RNA NPH QL NAA+PROBE: NOT DETECTED
HPIV4 P GENE NPH QL NAA+PROBE: NOT DETECTED
IMM GRANULOCYTES # BLD AUTO: 0.07 10*3/MM3 (ref 0–0.05)
IMM GRANULOCYTES NFR BLD AUTO: 0.5 % (ref 0–0.5)
LYMPHOCYTES # BLD AUTO: 1.62 10*3/MM3 (ref 0.7–3.1)
LYMPHOCYTES NFR BLD AUTO: 12.7 % (ref 19.6–45.3)
M PNEUMO IGG SER IA-ACNC: NOT DETECTED
MCH RBC QN AUTO: 24.3 PG (ref 26.6–33)
MCHC RBC AUTO-ENTMCNC: 30.1 G/DL (ref 31.5–35.7)
MCV RBC AUTO: 80.6 FL (ref 79–97)
MONOCYTES # BLD AUTO: 1.02 10*3/MM3 (ref 0.1–0.9)
MONOCYTES NFR BLD AUTO: 8 % (ref 5–12)
NEUTROPHILS NFR BLD AUTO: 78.1 % (ref 42.7–76)
NEUTROPHILS NFR BLD AUTO: 9.99 10*3/MM3 (ref 1.7–7)
NRBC BLD AUTO-RTO: 0 /100 WBC (ref 0–0.2)
NT-PROBNP SERPL-MCNC: ABNORMAL PG/ML (ref 0–1800)
PLATELET # BLD AUTO: 276 10*3/MM3 (ref 140–450)
PMV BLD AUTO: 10.4 FL (ref 6–12)
POTASSIUM SERPL-SCNC: 3.7 MMOL/L (ref 3.5–5.2)
PROCALCITONIN SERPL-MCNC: 0.17 NG/ML (ref 0–0.25)
PROT SERPL-MCNC: 6.1 G/DL (ref 6–8.5)
RBC # BLD AUTO: 3.5 10*6/MM3 (ref 3.77–5.28)
RHINOVIRUS RNA SPEC NAA+PROBE: NOT DETECTED
RSV RNA NPH QL NAA+NON-PROBE: NOT DETECTED
SARS-COV-2 RNA NPH QL NAA+NON-PROBE: NOT DETECTED
SODIUM SERPL-SCNC: 136 MMOL/L (ref 136–145)
TROPONIN T SERPL-MCNC: 0.38 NG/ML (ref 0–0.03)
WBC # BLD AUTO: 12.79 10*3/MM3 (ref 3.4–10.8)
WHOLE BLOOD HOLD SPECIMEN: NORMAL

## 2021-08-05 PROCEDURE — 83605 ASSAY OF LACTIC ACID: CPT | Performed by: INTERNAL MEDICINE

## 2021-08-05 PROCEDURE — 84145 PROCALCITONIN (PCT): CPT | Performed by: INTERNAL MEDICINE

## 2021-08-05 PROCEDURE — 87040 BLOOD CULTURE FOR BACTERIA: CPT | Performed by: INTERNAL MEDICINE

## 2021-08-05 PROCEDURE — 93005 ELECTROCARDIOGRAM TRACING: CPT | Performed by: EMERGENCY MEDICINE

## 2021-08-05 PROCEDURE — 94660 CPAP INITIATION&MGMT: CPT

## 2021-08-05 PROCEDURE — 25010000002 METHYLPREDNISOLONE PER 40 MG: Performed by: NURSE PRACTITIONER

## 2021-08-05 PROCEDURE — 80053 COMPREHEN METABOLIC PANEL: CPT | Performed by: EMERGENCY MEDICINE

## 2021-08-05 PROCEDURE — 25010000002 VANCOMYCIN 10 G RECONSTITUTED SOLUTION: Performed by: EMERGENCY MEDICINE

## 2021-08-05 PROCEDURE — 99284 EMERGENCY DEPT VISIT MOD MDM: CPT

## 2021-08-05 PROCEDURE — 94799 UNLISTED PULMONARY SVC/PX: CPT

## 2021-08-05 PROCEDURE — 99223 1ST HOSP IP/OBS HIGH 75: CPT | Performed by: INTERNAL MEDICINE

## 2021-08-05 PROCEDURE — 87899 AGENT NOS ASSAY W/OPTIC: CPT | Performed by: NURSE PRACTITIONER

## 2021-08-05 PROCEDURE — 84484 ASSAY OF TROPONIN QUANT: CPT | Performed by: EMERGENCY MEDICINE

## 2021-08-05 PROCEDURE — 0202U NFCT DS 22 TRGT SARS-COV-2: CPT | Performed by: INTERNAL MEDICINE

## 2021-08-05 PROCEDURE — 83880 ASSAY OF NATRIURETIC PEPTIDE: CPT | Performed by: EMERGENCY MEDICINE

## 2021-08-05 PROCEDURE — 0 IOPAMIDOL PER 1 ML: Performed by: EMERGENCY MEDICINE

## 2021-08-05 PROCEDURE — 81003 URINALYSIS AUTO W/O SCOPE: CPT | Performed by: NURSE PRACTITIONER

## 2021-08-05 PROCEDURE — 25010000002 PIPERACILLIN SOD-TAZOBACTAM PER 1 G: Performed by: EMERGENCY MEDICINE

## 2021-08-05 PROCEDURE — 25010000002 FUROSEMIDE PER 20 MG: Performed by: EMERGENCY MEDICINE

## 2021-08-05 PROCEDURE — 85025 COMPLETE CBC W/AUTO DIFF WBC: CPT | Performed by: EMERGENCY MEDICINE

## 2021-08-05 PROCEDURE — 71275 CT ANGIOGRAPHY CHEST: CPT

## 2021-08-05 RX ORDER — FUROSEMIDE 10 MG/ML
40 INJECTION INTRAMUSCULAR; INTRAVENOUS ONCE
Status: COMPLETED | OUTPATIENT
Start: 2021-08-05 | End: 2021-08-05

## 2021-08-05 RX ORDER — SODIUM CHLORIDE 0.9 % (FLUSH) 0.9 %
10 SYRINGE (ML) INJECTION EVERY 12 HOURS SCHEDULED
Status: DISCONTINUED | OUTPATIENT
Start: 2021-08-05 | End: 2021-08-12 | Stop reason: HOSPADM

## 2021-08-05 RX ORDER — ALPRAZOLAM 0.5 MG/1
0.5 TABLET ORAL NIGHTLY PRN
Status: DISCONTINUED | OUTPATIENT
Start: 2021-08-05 | End: 2021-08-07

## 2021-08-05 RX ORDER — HYDROCODONE BITARTRATE AND ACETAMINOPHEN 7.5; 325 MG/1; MG/1
1 TABLET ORAL EVERY 6 HOURS PRN
Status: DISPENSED | OUTPATIENT
Start: 2021-08-05 | End: 2021-08-07

## 2021-08-05 RX ORDER — SODIUM CHLORIDE 0.9 % (FLUSH) 0.9 %
10 SYRINGE (ML) INJECTION AS NEEDED
Status: DISCONTINUED | OUTPATIENT
Start: 2021-08-05 | End: 2021-08-12 | Stop reason: HOSPADM

## 2021-08-05 RX ORDER — IPRATROPIUM BROMIDE AND ALBUTEROL SULFATE 2.5; .5 MG/3ML; MG/3ML
3 SOLUTION RESPIRATORY (INHALATION)
Status: DISCONTINUED | OUTPATIENT
Start: 2021-08-05 | End: 2021-08-12 | Stop reason: HOSPADM

## 2021-08-05 RX ORDER — METHYLPREDNISOLONE SODIUM SUCCINATE 40 MG/ML
40 INJECTION, POWDER, LYOPHILIZED, FOR SOLUTION INTRAMUSCULAR; INTRAVENOUS EVERY 8 HOURS
Status: DISCONTINUED | OUTPATIENT
Start: 2021-08-05 | End: 2021-08-08

## 2021-08-05 RX ORDER — IPRATROPIUM BROMIDE AND ALBUTEROL SULFATE 2.5; .5 MG/3ML; MG/3ML
3 SOLUTION RESPIRATORY (INHALATION) EVERY 4 HOURS PRN
Status: DISCONTINUED | OUTPATIENT
Start: 2021-08-05 | End: 2021-08-12 | Stop reason: HOSPADM

## 2021-08-05 RX ORDER — FUROSEMIDE 10 MG/ML
40 INJECTION INTRAMUSCULAR; INTRAVENOUS ONCE
Status: COMPLETED | OUTPATIENT
Start: 2021-08-06 | End: 2021-08-06

## 2021-08-05 RX ORDER — ASCORBIC ACID 500 MG
1000 TABLET ORAL DAILY
Status: DISCONTINUED | OUTPATIENT
Start: 2021-08-06 | End: 2021-08-12 | Stop reason: HOSPADM

## 2021-08-05 RX ORDER — BISOPROLOL FUMARATE 5 MG/1
5 TABLET, FILM COATED ORAL
Status: DISCONTINUED | OUTPATIENT
Start: 2021-08-06 | End: 2021-08-12 | Stop reason: HOSPADM

## 2021-08-05 RX ADMIN — FUROSEMIDE 40 MG: 10 INJECTION INTRAMUSCULAR; INTRAVENOUS at 20:59

## 2021-08-05 RX ADMIN — APIXABAN 5 MG: 5 TABLET, FILM COATED ORAL at 23:41

## 2021-08-05 RX ADMIN — IOPAMIDOL 70 ML: 755 INJECTION, SOLUTION INTRAVENOUS at 19:44

## 2021-08-05 RX ADMIN — VANCOMYCIN HYDROCHLORIDE 1250 MG: 100 INJECTION, POWDER, LYOPHILIZED, FOR SOLUTION INTRAVENOUS at 21:55

## 2021-08-05 RX ADMIN — TAZOBACTAM SODIUM AND PIPERACILLIN SODIUM 3.38 G: 375; 3 INJECTION, SOLUTION INTRAVENOUS at 21:06

## 2021-08-05 RX ADMIN — SODIUM CHLORIDE, PRESERVATIVE FREE 10 ML: 5 INJECTION INTRAVENOUS at 23:41

## 2021-08-05 RX ADMIN — METHYLPREDNISOLONE SODIUM SUCCINATE 40 MG: 40 INJECTION, POWDER, FOR SOLUTION INTRAMUSCULAR; INTRAVENOUS at 23:41

## 2021-08-05 NOTE — ED PROVIDER NOTES
[place]    EMERGENCY DEPARTMENT ENCOUNTER      Pt Name: Yin Law  MRN: [id number]  YOB: 1940  Date of evaluation: 8/5/2021  Provider: Clemente Hernández DO    CHIEF COMPLAINT       Chief Complaint   Patient presents with   • Shortness of Breath         HISTORY OF PRESENT ILLNESS  (Location/Symptom, Timing/Onset, Context/Setting, Quality, Duration, Modifying Factors, Severity.)   Yin Law is a 80 y.o. female who presents to the emergency department for evaluation of increasing shortness of breath, congestion, overall not feeling well.  She has a history of atrial fibrillation on Eliquis and amiodarone, hypertension, hyperlipidemia, COPD, rheumatoid arthritis, depression/anxiety, who was admitted on 7/19/2021 with pneumonia, CHF exacerbation and right leg wound. Was then admitted to ICU for worsening respiratory failure and hypertensive urgency. CT angiogram of the chest on 7/19/2021 showed small pleural effusions, interstitial lung disease more prominent at the apices.  Patient states she was treated for pneumonia she was sent back home and since she has been home she continued to worsen with increase shortness of breath, especially with any type of exertion, mild cough, she denies any fevers or chills, does have history of heart failure.  She denies any chest pain.  She been compliant with her medications at home.  She denies any lateral weakness, numbness or tingling, no falls injury or head trauma.  She denies any other acute systemic complaints at this time.  She does wear chronic nasal cannula oxygenation, but states she has increased her levels from 2 L to 4 L.      Nursing notes were reviewed.    REVIEW OF SYSTEMS    (2-9 systems for level 4, 10 or more for level 5)   ROS:  General:  No fevers, no chills, + generalized weakness  Cardiovascular:  No chest pain, no palpitations  Respiratory:  + shortness of breath, no cough, no wheezing  Gastrointestinal:  No pain, no nausea, no  vomiting, no diarrhea  Musculoskeletal:  No muscle pain, no joint pain  Skin:  No rash  Neurologic:  No speech problems, no headache, no extremity numbness, no extremity tingling, no extremity weakness  Psychiatric:  No anxiety  Genitourinary:  No dysuria, no hematuria    Except as noted above the remainder of the review of systems was reviewed and negative.       PAST MEDICAL HISTORY     Past Medical History:   Diagnosis Date   • Anxiety and depression    • Arrhythmia     A-FIB   • Asthma    • Chicken pox    • COPD (chronic obstructive pulmonary disease) (CMS/Formerly [facility])    • Hyperlipidemia    • Hypertension    • Measles    • Menopause    • Osteoporosis    • Rheumatoid arthritis (CMS/Formerly [facility])    • Rheumatoid arthritis (CMS/Formerly [facility])    • Tobacco abuse          SURGICAL HISTORY       Past Surgical History:   Procedure Laterality Date   • APPENDECTOMY     • CARPAL TUNNEL RELEASE Left    • CATARACT EXTRACTION, BILATERAL     • COLON SURGERY     • COLONOSCOPY     • GALLBLADDER SURGERY     • HYSTERECTOMY  1971   • TONSILLECTOMY           CURRENT MEDICATIONS       Current Facility-Administered Medications:   •  [START ON 8/7/2021] ! Vanc trough due 09:30 am 8/7/21 - hold 10 am vanc dose until trough reviewed by Pharmacist, , Does not apply, Once, Missy Lee DO  •  ALPRAZolam (XANAX) tablet 0.5 mg, 0.5 mg, Oral, Nightly PRN, Missy Lee DO  •  apixaban (ELIQUIS) tablet 5 mg, 5 mg, Oral, Q12H, Jacqueline Guzman APRN, 5 mg at 08/05/21 2341  •  ascorbic acid (VITAMIN C) tablet 1,000 mg, 1,000 mg, Oral, Daily, Jacqueline Guzman APRN  •  bisoprolol (ZEBeta) tablet 5 mg, 5 mg, Oral, Q24H, Jacqueline Guzman APRN  •  furosemide (LASIX) injection 40 mg, 40 mg, Intravenous, Once, Jacqueline Guzman APRN  •  HYDROcodone-acetaminophen (NORCO) 7.5-325 MG per tablet 1 tablet, 1 tablet, Oral, Q6H PRN, Missy Lee DO  •  ipratropium-albuterol (DUO-NEB) nebulizer solution 3 mL, 3 mL, Nebulization, Q4H PRN, Jacqueline Guzman APRN  •   ipratropium-albuterol (DUO-NEB) nebulizer solution 3 mL, 3 mL, Nebulization, 4x Daily - RT, Jacqueline Guzman APRN  •  methylPREDNISolone sodium succinate (SOLU-Medrol) injection 40 mg, 40 mg, Intravenous, Q8H, Jacqueline Guzman APRN, 40 mg at 08/05/21 2341  •  Pharmacy to dose vancomycin, , Does not apply, Continuous PRN, Jacqueline Gzuman APRN  •  piperacillin-tazobactam (ZOSYN) 3.375 g in iso-osmotic dextrose 50 ml (premix), 3.375 g, Intravenous, Q8H, Jacqueline Guzman APRN  •  sertraline (ZOLOFT) tablet 150 mg, 150 mg, Oral, Daily, Jacqueline Guzman APRN  •  sodium chloride 0.9 % flush 10 mL, 10 mL, Intravenous, PRN, Clemente Hernández DO  •  sodium chloride 0.9 % flush 10 mL, 10 mL, Intravenous, Q12H, Jacqueline Guzman APRN, 10 mL at 08/05/21 2341  •  sodium chloride 0.9 % flush 10 mL, 10 mL, Intravenous, PRN, Jacqueline Guzman APRN  •  vancomycin in dextrose 5% 150 mL (VANCOCIN) IVPB 750 mg, 750 mg, Intravenous, Q12H, Missy Lee DO    ALLERGIES     Patient has no known allergies.    FAMILY HISTORY       Family History   Problem Relation Age of Onset   • Alzheimer's disease Mother    • Parkinsonism Brother           SOCIAL HISTORY       Social History     Socioeconomic History   • Marital status:      Spouse name: Not on file   • Number of children: Not on file   • Years of education: Not on file   • Highest education level: Not on file   Tobacco Use   • Smoking status: Current Every Day Smoker     Packs/day: 0.25     Types: Cigarettes   • Smokeless tobacco: Never Used   • Tobacco comment: 1-2 cigs occas   Vaping Use   • Vaping Use: Never used   Substance and Sexual Activity   • Alcohol use: Never   • Drug use: Never   • Sexual activity: Defer         PHYSICAL EXAM    (up to 7 for level 4, 8 or more for level 5)     Vitals:    08/05/21 2120 08/05/21 2144 08/05/21 2337 08/05/21 2351   BP: 172/69 (!) 183/64 165/59    BP Location: Right arm Right arm Right arm    Patient Position: Lying Lying Lying    Pulse: 70  75 67 65   Resp:  (!) 29 28 28   Temp:  98.2 °F (36.8 °C) 97.7 °F (36.5 °C)    TempSrc:  Oral Oral    SpO2: 94%  95% 97%   Weight:       Height:           Physical Exam  General : Patient is awake, alert, oriented, in no acute distress, nontoxic appearing  HEENT: Pupils are equally round and reactive to light, EOMI, conjunctivae clear, sclerae white, there is no injection no icterus.  Oral mucosa is moist, no exudate. Uvula is midline  Neck: Neck is supple, full range of motion, trachea midline  Cardiac: Heart regular rate, rhythm, no murmurs, rubs, or gallops  Lungs: Lungs are clear to auscultation, there is no wheezing, rhonchi, or rales. There is no use of accessory muscles  Chest wall: There is no tenderness to palpation over the chest wall or over ribs  Abdomen: Abdomen is soft, nontender, nondistended. There are no firm or pulsatile masses, no rebound rigidity or guarding.   Musculoskeletal: 5 out of 5 strength in all 4 extremities.  No focal muscle deficits are appreciated  Neuro: Motor intact, sensory intact, level of consciousness is normal, cerebellar function is normal, reflexes are grossly normal. No evidence of incontinence or loss of bowel or bladder function, no saddle anesthesia noted   Dermatology: Skin is warm and dry  Psych: Mentation is grossly normal, cognition is grossly normal. Affect is appropriate.      DIAGNOSTIC RESULTS     EKG: All EKG's are interpreted by the Emergency Department Physician who either signs or Co-signs this chart in the absence of a cardiologist.    ECG 12 Lead         ECG 12 Lead    (Results Pending)       RADIOLOGY:   Non-plain film images such as CT, Ultrasound and MRI are read by the radiologist. Plain radiographic images are visualized and preliminarily interpreted by the emergency physician with the below findings:      [] Radiologist's Report Reviewed:  CT Chest Pulmonary Embolism   Final Result   1. Negative for pulmonary embolus.   2. There has been interval  increase in the patient's bibasilar pleural effusions from prior as well as patchy bilateral airspace disease that may represent progressive pneumonia/infection. There also may be some potential pulmonary edema.   3. There is cardiomegaly with coronary artery calcification.      Signer Name: Jacqueline Ramírez MD    Signed: 8/5/2021 7:56 PM    Workstation Name: [id number]     [facility]            ED BEDSIDE ULTRASOUND:   Performed by ED Physician - none    LABS:    I have reviewed and interpreted all of the currently available lab results from this visit (if applicable):  Results for orders placed or performed during the hospital encounter of 08/05/21   Respiratory Panel PCR w/COVID-19(SARS-CoV-2) CAS/SHALINI/KRISTY/PAD/COR/MAD/HAJA In-House, NP Swab in UTM/VTM, 3-4 HR TAT - Swab, Nasopharynx    Specimen: Nasopharynx; Swab   Result Value Ref Range    ADENOVIRUS, PCR Not Detected Not Detected    Coronavirus 229E Not Detected Not Detected    Coronavirus HKU1 Not Detected Not Detected    Coronavirus NL63 Not Detected Not Detected    Coronavirus OC43 Not Detected Not Detected    COVID19 Not Detected Not Detected - Ref. Range    Human Metapneumovirus Not Detected Not Detected    Human Rhinovirus/Enterovirus Not Detected Not Detected    Influenza A PCR Not Detected Not Detected    Influenza B PCR Not Detected Not Detected    Parainfluenza Virus 1 Not Detected Not Detected    Parainfluenza Virus 2 Not Detected Not Detected    Parainfluenza Virus 3 Not Detected Not Detected    Parainfluenza Virus 4 Not Detected Not Detected    RSV, PCR Not Detected Not Detected    Bordetella pertussis pcr Not Detected Not Detected    Bordetella parapertussis PCR Not Detected Not Detected    Chlamydophila pneumoniae PCR Not Detected Not Detected    Mycoplasma pneumo by PCR Not Detected Not Detected   Comprehensive Metabolic Panel    Specimen: Blood   Result Value Ref Range    Glucose 104 (H) 65 - 99 mg/dL    BUN 20 8 - 23 mg/dL     Creatinine 0.68 0.57 - 1.00 mg/dL    Sodium 136 136 - 145 mmol/L    Potassium 3.7 3.5 - 5.2 mmol/L    Chloride 102 98 - 107 mmol/L    CO2 24.0 22.0 - 29.0 mmol/L    Calcium 8.8 8.6 - 10.5 mg/dL    Total Protein 6.1 6.0 - 8.5 g/dL    Albumin 3.20 (L) 3.50 - 5.20 g/dL    ALT (SGPT) 21 1 - 33 U/L    AST (SGOT) 58 (H) 1 - 32 U/L    Alkaline Phosphatase 95 39 - 117 U/L    Total Bilirubin 0.7 0.0 - 1.2 mg/dL    eGFR Non African Amer 83 >60 mL/min/1.73    Globulin 2.9 gm/dL    A/G Ratio 1.1 g/dL    BUN/Creatinine Ratio 29.4 (H) 7.0 - 25.0    Anion Gap 10.0 5.0 - 15.0 mmol/L   BNP    Specimen: Blood   Result Value Ref Range    proBNP 10,115.0 (H) 0.0-1,800.0 pg/mL   Troponin    Specimen: Blood   Result Value Ref Range    Troponin T 0.380 (C) 0.000 - 0.030 ng/mL   CBC Auto Differential    Specimen: Blood   Result Value Ref Range    WBC 12.79 (H) 3.40 - 10.80 10*3/mm3    RBC 3.50 (L) 3.77 - 5.28 10*6/mm3    Hemoglobin 8.5 (L) 12.0 - 15.9 g/dL    Hematocrit 28.2 (L) 34.0 - 46.6 %    MCV 80.6 79.0 - 97.0 fL    MCH 24.3 (L) 26.6 - 33.0 pg    MCHC 30.1 (L) 31.5 - 35.7 g/dL    RDW 19.9 (H) 12.3 - 15.4 %    RDW-SD 47.2 37.0 - 54.0 fl    MPV 10.4 6.0 - 12.0 fL    Platelets 276 140 - 450 10*3/mm3    Neutrophil % 78.1 (H) 42.7 - 76.0 %    Lymphocyte % 12.7 (L) 19.6 - 45.3 %    Monocyte % 8.0 5.0 - 12.0 %    Eosinophil % 0.3 0.3 - 6.2 %    Basophil % 0.4 0.0 - 1.5 %    Immature Grans % 0.5 0.0 - 0.5 %    Neutrophils, Absolute 9.99 (H) 1.70 - 7.00 10*3/mm3    Lymphocytes, Absolute 1.62 0.70 - 3.10 10*3/mm3    Monocytes, Absolute 1.02 (H) 0.10 - 0.90 10*3/mm3    Eosinophils, Absolute 0.04 0.00 - 0.40 10*3/mm3    Basophils, Absolute 0.05 0.00 - 0.20 10*3/mm3    Immature Grans, Absolute 0.07 (H) 0.00 - 0.05 10*3/mm3    nRBC 0.0 0.0 - 0.2 /100 WBC   Lactic Acid, Plasma    Specimen: Blood   Result Value Ref Range    Lactate 2.3 (C) 0.5 - 2.0 mmol/L   Procalcitonin    Specimen: Blood   Result Value Ref Range    Procalcitonin 0.17 0.00 - 0.25  ng/mL   Green Top (Gel)   Result Value Ref Range    Extra Tube Hold for add-ons.    Lavender Top   Result Value Ref Range    Extra Tube hold for add-on    Gold Top - SST   Result Value Ref Range    Extra Tube Hold for add-ons.    Gray Top   Result Value Ref Range    Extra Tube Hold for add-ons.         All other labs were within normal range or not returned as of this dictation.      EMERGENCY DEPARTMENT COURSE and DIFFERENTIAL DIAGNOSIS/MDM:   Vitals:    Vitals:    08/05/21 2120 08/05/21 2144 08/05/21 2337 08/05/21 2351   BP: 172/69 (!) 183/64 165/59    BP Location: Right arm Right arm Right arm    Patient Position: Lying Lying Lying    Pulse: 70 75 67 65   Resp:  (!) 29 28 28   Temp:  98.2 °F (36.8 °C) 97.7 °F (36.5 °C)    TempSrc:  Oral Oral    SpO2: 94%  95% 97%   Weight:       Height:                Patient with increasing shortness of breath with a history of pulmonary disease, COPD was recently admitted to the hospital and treated for pneumonia was continuing to worsen her discharge a couple days ago.  She has had increased oxygen demand, has decreased breath sound bilaterally with scattered rhonchi throughout bilateral lung fields.  With her recent no pneumonia we did obtain IV, labs, venous imaging for further evaluation.  She appears to have continued bilateral effusions, bilateral lower lobe pneumonia, no pulmonary emboli.  She was started on broad-spectrum antibiotics, BNP over 10,000, underlying CHF present with her effusions, she was diuresed, we will plan for admission to the hospital for further work-up treatment and evaluation.  Case was discussed with hospitalist team for admission.      MEDICATIONS ADMINISTERED IN ED:      PROCEDURES:  Procedures    CRITICAL CARE TIME    Total Critical Care time was 30 minutes, excluding separately reportable procedures.  Acute respiratory failure hypoxia with acute congestive heart failure, pleural effusions and recurrent pneumonia requiring multiple  interventions, reevaluation's, discussions with consultants. There was a high probability of clinically significant/life threatening deterioration in the patient's condition which required my urgent intervention.      FINAL IMPRESSION      1. Acute respiratory failure with hypoxia (CMS/HCC)    2. Acute on chronic congestive heart failure, unspecified heart failure type (CMS/HCC)    3. Pleural effusion    4. Pneumonia of both lower lobes due to infectious organism          DISPOSITION/PLAN     ED Disposition     ED Disposition Condition Comment    Decision to Admit  Level of Care: Telemetry [5]   Diagnosis: Acute respiratory failure with hypoxia (CMS/HCC) [917541]   Admitting Physician: JOSHUA SINGH [[id number]]   Attending Physician: JOSHUA SINGH [[id number]]   Bed Request Comments: 6A, can go to tele with a neg covid                  Comment: Please note this report has been produced using speech recognition software.      Clemente Hernández DO  Attending Emergency Physician               Clemente Hernández DO  08/06/21 0048

## 2021-08-06 ENCOUNTER — READMISSION MANAGEMENT (OUTPATIENT)
Dept: CALL CENTER | Facility: HOSPITAL | Age: 81
End: 2021-08-06

## 2021-08-06 ENCOUNTER — APPOINTMENT (OUTPATIENT)
Dept: GENERAL RADIOLOGY | Facility: HOSPITAL | Age: 81
End: 2021-08-06

## 2021-08-06 PROBLEM — R91.8 BILATERAL PULMONARY INFILTRATES ON CXR: Status: ACTIVE | Noted: 2021-08-06

## 2021-08-06 PROBLEM — I50.31 DIASTOLIC CHF, ACUTE: Status: ACTIVE | Noted: 2021-08-06

## 2021-08-06 LAB
ANION GAP SERPL CALCULATED.3IONS-SCNC: 12 MMOL/L (ref 5–15)
ANISOCYTOSIS BLD QL: NORMAL
BASOPHILS # BLD AUTO: 0.01 10*3/MM3 (ref 0–0.2)
BASOPHILS NFR BLD AUTO: 0.1 % (ref 0–1.5)
BILIRUB UR QL STRIP: NEGATIVE
BUN SERPL-MCNC: 17 MG/DL (ref 8–23)
BUN/CREAT SERPL: 22.4 (ref 7–25)
CALCIUM SPEC-SCNC: 8.4 MG/DL (ref 8.6–10.5)
CHLORIDE SERPL-SCNC: 101 MMOL/L (ref 98–107)
CLARITY UR: CLEAR
CO2 SERPL-SCNC: 27 MMOL/L (ref 22–29)
COLOR UR: YELLOW
CREAT SERPL-MCNC: 0.76 MG/DL (ref 0.57–1)
CRP SERPL-MCNC: 6.98 MG/DL (ref 0–0.5)
D-LACTATE SERPL-SCNC: 1.5 MMOL/L (ref 0.5–2)
DEPRECATED RDW RBC AUTO: 49.3 FL (ref 37–54)
EOSINOPHIL # BLD AUTO: 0 10*3/MM3 (ref 0–0.4)
EOSINOPHIL NFR BLD AUTO: 0 % (ref 0.3–6.2)
ERYTHROCYTE [DISTWIDTH] IN BLOOD BY AUTOMATED COUNT: 20.2 % (ref 12.3–15.4)
GFR SERPL CREATININE-BSD FRML MDRD: 73 ML/MIN/1.73
GLUCOSE SERPL-MCNC: 138 MG/DL (ref 65–99)
GLUCOSE UR STRIP-MCNC: NEGATIVE MG/DL
HCT VFR BLD AUTO: 28.8 % (ref 34–46.6)
HGB BLD-MCNC: 8.6 G/DL (ref 12–15.9)
HGB UR QL STRIP.AUTO: NEGATIVE
HYPOCHROMIA BLD QL: NORMAL
IMM GRANULOCYTES # BLD AUTO: 0.1 10*3/MM3 (ref 0–0.05)
IMM GRANULOCYTES NFR BLD AUTO: 0.9 % (ref 0–0.5)
KETONES UR QL STRIP: NEGATIVE
L PNEUMO1 AG UR QL IA: NEGATIVE
LDH SERPL-CCNC: 361 U/L (ref 135–214)
LEUKOCYTE ESTERASE UR QL STRIP.AUTO: NEGATIVE
LYMPHOCYTES # BLD AUTO: 0.44 10*3/MM3 (ref 0.7–3.1)
LYMPHOCYTES NFR BLD AUTO: 3.8 % (ref 19.6–45.3)
MAGNESIUM SERPL-MCNC: 2.1 MG/DL (ref 1.6–2.4)
MCH RBC QN AUTO: 24.4 PG (ref 26.6–33)
MCHC RBC AUTO-ENTMCNC: 29.9 G/DL (ref 31.5–35.7)
MCV RBC AUTO: 81.8 FL (ref 79–97)
MONOCYTES # BLD AUTO: 0.14 10*3/MM3 (ref 0.1–0.9)
MONOCYTES NFR BLD AUTO: 1.2 % (ref 5–12)
MRSA DNA SPEC QL NAA+PROBE: NEGATIVE
NEUTROPHILS NFR BLD AUTO: 10.8 10*3/MM3 (ref 1.7–7)
NEUTROPHILS NFR BLD AUTO: 94 % (ref 42.7–76)
NITRITE UR QL STRIP: NEGATIVE
NRBC BLD AUTO-RTO: 0 /100 WBC (ref 0–0.2)
NT-PROBNP SERPL-MCNC: ABNORMAL PG/ML (ref 0–1800)
OVALOCYTES BLD QL SMEAR: NORMAL
PH UR STRIP.AUTO: 6 [PH] (ref 5–8)
PLAT MORPH BLD: NORMAL
PLATELET # BLD AUTO: 266 10*3/MM3 (ref 140–450)
PMV BLD AUTO: 10.8 FL (ref 6–12)
POTASSIUM SERPL-SCNC: 4.3 MMOL/L (ref 3.5–5.2)
PROT UR QL STRIP: NEGATIVE
QT INTERVAL: 398 MS
QT INTERVAL: 420 MS
QTC INTERVAL: 435 MS
QTC INTERVAL: 453 MS
RBC # BLD AUTO: 3.52 10*6/MM3 (ref 3.77–5.28)
S PNEUM AG SPEC QL LA: NEGATIVE
SODIUM SERPL-SCNC: 140 MMOL/L (ref 136–145)
SP GR UR STRIP: 1.01 (ref 1–1.03)
TARGETS BLD QL SMEAR: NORMAL
TROPONIN T SERPL-MCNC: 0.36 NG/ML (ref 0–0.03)
UROBILINOGEN UR QL STRIP: NORMAL
WBC # BLD AUTO: 11.49 10*3/MM3 (ref 3.4–10.8)
WBC MORPH BLD: NORMAL

## 2021-08-06 PROCEDURE — 94799 UNLISTED PULMONARY SVC/PX: CPT

## 2021-08-06 PROCEDURE — 83880 ASSAY OF NATRIURETIC PEPTIDE: CPT | Performed by: NURSE PRACTITIONER

## 2021-08-06 PROCEDURE — 84484 ASSAY OF TROPONIN QUANT: CPT | Performed by: NURSE PRACTITIONER

## 2021-08-06 PROCEDURE — 93005 ELECTROCARDIOGRAM TRACING: CPT | Performed by: NURSE PRACTITIONER

## 2021-08-06 PROCEDURE — 94640 AIRWAY INHALATION TREATMENT: CPT

## 2021-08-06 PROCEDURE — 83615 LACTATE (LD) (LDH) ENZYME: CPT | Performed by: INTERNAL MEDICINE

## 2021-08-06 PROCEDURE — 85007 BL SMEAR W/DIFF WBC COUNT: CPT | Performed by: NURSE PRACTITIONER

## 2021-08-06 PROCEDURE — 85025 COMPLETE CBC W/AUTO DIFF WBC: CPT | Performed by: NURSE PRACTITIONER

## 2021-08-06 PROCEDURE — 83735 ASSAY OF MAGNESIUM: CPT | Performed by: NURSE PRACTITIONER

## 2021-08-06 PROCEDURE — 25010000002 VANCOMYCIN PER 500 MG: Performed by: INTERNAL MEDICINE

## 2021-08-06 PROCEDURE — 25010000002 FUROSEMIDE PER 20 MG: Performed by: NURSE PRACTITIONER

## 2021-08-06 PROCEDURE — 25010000002 PIPERACILLIN SOD-TAZOBACTAM PER 1 G: Performed by: NURSE PRACTITIONER

## 2021-08-06 PROCEDURE — 97162 PT EVAL MOD COMPLEX 30 MIN: CPT

## 2021-08-06 PROCEDURE — 80048 BASIC METABOLIC PNL TOTAL CA: CPT | Performed by: NURSE PRACTITIONER

## 2021-08-06 PROCEDURE — 93010 ELECTROCARDIOGRAM REPORT: CPT | Performed by: INTERNAL MEDICINE

## 2021-08-06 PROCEDURE — 93005 ELECTROCARDIOGRAM TRACING: CPT | Performed by: INTERNAL MEDICINE

## 2021-08-06 PROCEDURE — 83605 ASSAY OF LACTIC ACID: CPT | Performed by: INTERNAL MEDICINE

## 2021-08-06 PROCEDURE — 86140 C-REACTIVE PROTEIN: CPT | Performed by: INTERNAL MEDICINE

## 2021-08-06 PROCEDURE — 25010000002 METHYLPREDNISOLONE PER 40 MG: Performed by: NURSE PRACTITIONER

## 2021-08-06 PROCEDURE — 97110 THERAPEUTIC EXERCISES: CPT

## 2021-08-06 PROCEDURE — 99233 SBSQ HOSP IP/OBS HIGH 50: CPT | Performed by: INTERNAL MEDICINE

## 2021-08-06 PROCEDURE — 87641 MR-STAPH DNA AMP PROBE: CPT

## 2021-08-06 PROCEDURE — 99291 CRITICAL CARE FIRST HOUR: CPT | Performed by: INTERNAL MEDICINE

## 2021-08-06 PROCEDURE — 71045 X-RAY EXAM CHEST 1 VIEW: CPT

## 2021-08-06 RX ORDER — DOXYCYCLINE 100 MG/1
100 CAPSULE ORAL EVERY 12 HOURS SCHEDULED
Status: DISCONTINUED | OUTPATIENT
Start: 2021-08-06 | End: 2021-08-06

## 2021-08-06 RX ORDER — DILTIAZEM HCL IN NACL,ISO-OSM 125 MG/125
5-15 PLASTIC BAG, INJECTION (ML) INTRAVENOUS
Status: DISCONTINUED | OUTPATIENT
Start: 2021-08-06 | End: 2021-08-08

## 2021-08-06 RX ORDER — DOXYCYCLINE 100 MG/1
100 CAPSULE ORAL 2 TIMES DAILY
Status: DISCONTINUED | OUTPATIENT
Start: 2021-08-06 | End: 2021-08-12 | Stop reason: HOSPADM

## 2021-08-06 RX ORDER — L.ACID,PARA/B.BIFIDUM/S.THERM 8B CELL
1 CAPSULE ORAL DAILY
Status: DISCONTINUED | OUTPATIENT
Start: 2021-08-06 | End: 2021-08-12 | Stop reason: HOSPADM

## 2021-08-06 RX ORDER — ALPRAZOLAM 0.5 MG/1
0.5 TABLET ORAL ONCE
Status: COMPLETED | OUTPATIENT
Start: 2021-08-06 | End: 2021-08-06

## 2021-08-06 RX ADMIN — SODIUM CHLORIDE, PRESERVATIVE FREE 10 ML: 5 INJECTION INTRAVENOUS at 09:32

## 2021-08-06 RX ADMIN — TAZOBACTAM SODIUM AND PIPERACILLIN SODIUM 3.38 G: 375; 3 INJECTION, SOLUTION INTRAVENOUS at 06:04

## 2021-08-06 RX ADMIN — ALPRAZOLAM 0.5 MG: 0.5 TABLET ORAL at 17:16

## 2021-08-06 RX ADMIN — Medication 1 CAPSULE: at 14:49

## 2021-08-06 RX ADMIN — VANCOMYCIN HYDROCHLORIDE 750 MG: 750 INJECTION, SOLUTION INTRAVENOUS at 10:56

## 2021-08-06 RX ADMIN — TAZOBACTAM SODIUM AND PIPERACILLIN SODIUM 3.38 G: 375; 3 INJECTION, SOLUTION INTRAVENOUS at 20:54

## 2021-08-06 RX ADMIN — Medication 5 MG/HR: at 17:41

## 2021-08-06 RX ADMIN — METHYLPREDNISOLONE SODIUM SUCCINATE 40 MG: 40 INJECTION, POWDER, FOR SOLUTION INTRAMUSCULAR; INTRAVENOUS at 23:19

## 2021-08-06 RX ADMIN — SODIUM CHLORIDE, PRESERVATIVE FREE 10 ML: 5 INJECTION INTRAVENOUS at 20:55

## 2021-08-06 RX ADMIN — IPRATROPIUM BROMIDE AND ALBUTEROL SULFATE 3 ML: 2.5; .5 SOLUTION RESPIRATORY (INHALATION) at 14:05

## 2021-08-06 RX ADMIN — OXYCODONE HYDROCHLORIDE AND ACETAMINOPHEN 1000 MG: 500 TABLET ORAL at 09:31

## 2021-08-06 RX ADMIN — DOXYCYCLINE 100 MG: 100 CAPSULE ORAL at 20:55

## 2021-08-06 RX ADMIN — TAZOBACTAM SODIUM AND PIPERACILLIN SODIUM 3.38 G: 375; 3 INJECTION, SOLUTION INTRAVENOUS at 13:58

## 2021-08-06 RX ADMIN — APIXABAN 5 MG: 5 TABLET, FILM COATED ORAL at 20:55

## 2021-08-06 RX ADMIN — IPRATROPIUM BROMIDE AND ALBUTEROL SULFATE 3 ML: 2.5; .5 SOLUTION RESPIRATORY (INHALATION) at 16:56

## 2021-08-06 RX ADMIN — IPRATROPIUM BROMIDE AND ALBUTEROL SULFATE 3 ML: 2.5; .5 SOLUTION RESPIRATORY (INHALATION) at 18:48

## 2021-08-06 RX ADMIN — METHYLPREDNISOLONE SODIUM SUCCINATE 40 MG: 40 INJECTION, POWDER, FOR SOLUTION INTRAMUSCULAR; INTRAVENOUS at 17:20

## 2021-08-06 RX ADMIN — METHYLPREDNISOLONE SODIUM SUCCINATE 40 MG: 40 INJECTION, POWDER, FOR SOLUTION INTRAMUSCULAR; INTRAVENOUS at 09:31

## 2021-08-06 RX ADMIN — FUROSEMIDE 40 MG: 10 INJECTION, SOLUTION INTRAVENOUS at 06:04

## 2021-08-06 RX ADMIN — BISOPROLOL FUMARATE 5 MG: 5 TABLET, FILM COATED ORAL at 09:31

## 2021-08-06 RX ADMIN — IPRATROPIUM BROMIDE AND ALBUTEROL SULFATE 3 ML: 2.5; .5 SOLUTION RESPIRATORY (INHALATION) at 09:12

## 2021-08-06 RX ADMIN — SERTRALINE HYDROCHLORIDE 150 MG: 100 TABLET ORAL at 09:31

## 2021-08-06 RX ADMIN — APIXABAN 5 MG: 5 TABLET, FILM COATED ORAL at 12:30

## 2021-08-06 NOTE — PLAN OF CARE
Goal Outcome Evaluation:  Plan of Care Reviewed With: patient, family        Progress: no change  Outcome Summary: PT evaluation complete this session. Pt presented with decreased activity tolerance, generalized weakness, and diminished functional mobility impacting her overall functional independence. Pt was able to come to sit on EOB with min A with increased reports of dizziness and SOA and O2 saturation dropping to 88% from 98%, requiring quick return to supine with Max A x1. O2 saturation returned to 96% within one minute with cues for PLB. Pt would benefit from IP PT at time time to address identified limitations and promote return to PLOF. Pt would benefit from IP OT evaluation at this time as well. Recommend SNF following discharge pending further progression.

## 2021-08-06 NOTE — CONSULTS
"  Referring Provider: MD Ruby  Reason for Consultation: AoCRF    Subjective .   Education:  NN spoke with pt and family at BS.  Pt alert and able to answer questions appropriately.  Pt O2 sat 93 % on 4 L currently, home O2 use 2-3 L.  Pt unable to report the ability to ambulate at baseline before experiencing SOB.  Pt is not currently prescribed a rescue medication. Patient is up to date on COVID and flu vaccines.  Per record, no PNA vaccine since 2015.  Encouraged to discuss with primary MD.  Former smoker, quit date June 2021.  Pack years unclear.  Pt reports no issues at this time with medications or transportation for appointments.  She is not interested at this time in seeing a pulmonologist.  Pt reports no previous hx of formal COPD teaching, no understanding of action plan, or WA.  Stop light report, NN contact information, instructions for accessing [facility] and list of educational videos given to pt.  \"A Patient's Guide to COPD\" booklet left at BS. 1800QUITNOW reference sheet discussed and given to patient at BS.  Benefits and various levels of pulmonary rehab explained.   COPD education completed in the form of explanation, handouts, and videos.  Albuterol and home O2 safety discussed  No new concerns or questions voiced at this time.  NN will continue to follow as needed.     Age: 80 y.o.  Sex: female  Smoker Status: former, pack years unclear  Pulmonologist: KASIA  FEV1 (PFT): NA  Home O2: 2-3L    Objective     SpO2 SpO2: 95 % (08/06/21 1059)  Device Device (Oxygen Therapy): humidified, nasal cannula (08/06/21 1059)  Flow Flow (L/min): 4 (08/06/21 1059)  Incentive Spirometer    IS Predicted Level (mL)     Number of Repetitions     Level Incentive Spirometer (mL)    Patient Tolerance     Inhaler Treatment Status    Treatment Route        Home Medications:  Medications Prior to Admission   Medication Sig Dispense Refill Last Dose   • ALPRAZolam (XANAX) 0.5 MG tablet Take 0.5 mg by mouth At Night As Needed.    "   • apixaban (ELIQUIS) 5 MG tablet tablet Take 5 mg by mouth 2 (Two) Times a Day.      • ascorbic acid (VITAMIN C) 1000 MG tablet Take 1,000 mg by mouth Daily.      • bisoprolol (ZEBeta) 5 MG tablet Take 1 tablet by mouth Daily. 30 tablet 0    • HYDROcodone-acetaminophen (NORCO) 7.5-325 MG per tablet Take 1 tablet by mouth Every 6 (Six) Hours As Needed.      • multivitamin with minerals (CENTRUM SILVER ADULT 50+ PO) Take 1 tablet by mouth Daily.      • Omega-3 Fatty Acids (fish oil) 1000 MG capsule capsule Take 1,000 mg by mouth Daily With Breakfast.      • predniSONE (DELTASONE) 20 MG tablet Take 1 tablet by mouth Daily With Breakfast. 2 tablet 0    • sertraline (ZOLOFT) 100 MG tablet Take 150 mg by mouth Daily. 1.5 tab daily      • STIOLTO RESPIMAT 2.5-2.5 MCG/ACT aerosol solution inhaler Inhale 2 puffs Daily As Needed.      • Vitamin D, Ergocalciferol, 50 MCG (2000 UT) capsule Take 1 tablet by mouth Daily.      • VITAMIN E PO Take 1 tablet by mouth Daily.          Discussion: Per current GOLD Standards, please consider: No rescue in place, No ICS in place, Outpatient PFT, Pulmonary rehab as appropriate          Elin Cline RN

## 2021-08-06 NOTE — PLAN OF CARE
Goal Outcome Evaluation:               Pt initially on 6L NC this am. Able to wean down to 3L NC throughout the day. No complaints of pain. IV abx given. Family at beside. This afternoon pt stated she was beginning to feel very anxious. HR up into the 150's. Pt stated she felt SOB, O2 increased to 5L NC. EKG showed afib with RVR. MD notified, cardizem gtt initiated. Currently running at 10 mL/hr. Purewick in place with adequate output. Will continue with plan of care.

## 2021-08-06 NOTE — PLAN OF CARE
Goal Outcome Evaluation:  Plan of Care Reviewed With: patient        Progress: no change  Patient vital signs stable and wearing a nonrebreather mask. Patient refuses bipap and Dr. Lee is aware. Patient denies pain and nausea. Will maintain fall and safety precautions.

## 2021-08-06 NOTE — CONSULTS
Intensivist Note     8/6/2021  Hospital Day: 1  * No surgery found *  ICU Stays Timeline            Hospital Admission: 08/05/21 1708 - Current  No ICU stays    No ICU stays to display             Ms. Yin Law, 80 y.o. female is followed for:    Bilateral upper lobe pulmonary infiltrates.  Components of CHF and possible inflammation/infection    A/C hypoxemic respiratory failure (CMS/HCC)    Acute diastolic CHF (CMS/HCC)    Pleural effusion, bilateral    Recurrent atrial fibrillation with RVR (CMS/HCC)    Hypertension and diastolic dysfunction    Elevated troponin    COPD on home oxygen (CMS/HCC)    Rheumatoid arthritis (CMS/HCC)    Anxiety and depression    Anemia       SUBJECTIVE     80-year-old white female recent smoker with PMH significant for atrial fibrillation on Eliquis and amiodarone, hypertension, hyperlipidemia, COPD, RA, as well as depression/anxiety.  Was admitted 7/19/2021 with presumed pneumonia and CHF exacerbation but decompensated on telemetry and was transferred to the ICU with a SBP of 220 and worsening hypoxemia.  CTA of chest 7/19/2021 revealed interstitial lung disease more prominent at the apices and small pleural effusions.  It was unclear whether this was drug-induced amiodarone lung versus autoimmune versus an infectious pneumonitis and it was felt that there was a component of acute on chronic diastolic CHF.  Amiodarone was held and she was given steroids as well as being placed on empiric antimicrobial therapy plus diuresis.  Gas exchange gradually improved with this therapy it was eventually weaned to 4 L of nasal O2.  She was profoundly weak, but was transitioned to telemetry on 7/29/2021.  It remained unclear how much of her problem was amiodarone toxicity, simple volume load overload, or pneumonia (no organisms cultured).  By 8/2/2021 she was felt to have received maximal hospital benefit and was discharged home.  At the time of discharge amiodarone was not continued nor her  steroids.  Medications at discharge included: Hydrochlorothiazide, diltiazem, RAMAPRIL, apixaban 5 mg twice daily, Stiolto Respimat, and sertraline.  Unfortunately she declined rehab and was discharged to home.      Shortly after discharge the patient began to note increasing dyspnea especially with exertion.  She has chronic lower extremity edema but states that is actually better than it has been in the past.  She denied fever, chills, or sweats but described a mild nonproductive cough.  She also denied chest pain on exertion or pleuritic.  Apparently the day of admission (8/5/2021) her niece was helping her to the bathroom when she became dizzy and near syncopal.  With help was placed back in her recliner.  I am told she was taken to Dr. Chaudhry office, a CXR was obtained which was abnormal, and she was sent to [facility] ER.  At admission a CTA was obtained which revealed no PE but revealed diffuse consolidative interstitial/alveolar disease in the upper lobe as well as moderate bilateral lower lobe effusions much worse than her previous CT of 7/19/2021. Patient was afebrile and labs revealed hematocrit 28.2, WBC 12.79, procalcitonin of 0.17, proBNP of 10,115, and troponin of 0.38.  Nasal swab for MRSA PCR was negative.  Chemistries were unremarkable.  PCR for COVID-19 was negative.  EKG in the ER revealed a normal sinus rhythm at a rate of 70 bpm.  Because of the fact that her CT scan on 8/5/2021 appeared much worse than the CT scan of 7/19/2021 it was felt that she had pneumonia and she was begun on vancomycin and Zosyn.  Note that strep and Legionella urinary antigens were negative.  Infectious disease has already seen the patient today and has discontinued her vancomycin and replaced it with doxycycline plus the Zosyn she was already on.  Additional therapies have included Lasix IV and Solu-Medrol 40 mg every 8 hours.  She has been continued on her home apixaban.  She does not have any history of exposure to  organic orienting any dust and no one else around her has been ill at home.  At admission the patient was in a sinus rhythm, but at approximately 5 PM today (8/6/2021) atrial fibrillation recurred with RVR ( to 130).  Blood pressure however was maintained.      ROS: Per subjective, all other systems reviewed and were negative.     Past Medical History:   Diagnosis Date   • Anxiety and depression    • Arrhythmia     A-FIB   • Asthma    • Chicken pox    • COPD (chronic obstructive pulmonary disease) (CMS/HCC)    • Hyperlipidemia    • Hypertension    • Measles    • Menopause    • Osteoporosis    • Rheumatoid arthritis (CMS/HCC)    • Rheumatoid arthritis (CMS/HCC)    • Tobacco abuse      Past Surgical History:   Procedure Laterality Date   • APPENDECTOMY     • CARPAL TUNNEL RELEASE Left    • CATARACT EXTRACTION, BILATERAL     • COLON SURGERY     • COLONOSCOPY     • GALLBLADDER SURGERY     • HYSTERECTOMY  1971   • TONSILLECTOMY         Current Outpatient Medications on File Prior to Encounter   Medication Sig Dispense Refill   • ALPRAZolam (XANAX) 0.5 MG tablet Take 0.5 mg by mouth At Night As Needed.     • apixaban (ELIQUIS) 5 MG tablet tablet Take 5 mg by mouth 2 (Two) Times a Day.     • ascorbic acid (VITAMIN C) 1000 MG tablet Take 1,000 mg by mouth Daily.     • bisoprolol (ZEBeta) 5 MG tablet Take 1 tablet by mouth Daily. 30 tablet 0   • HYDROcodone-acetaminophen (NORCO) 7.5-325 MG per tablet Take 1 tablet by mouth Every 6 (Six) Hours As Needed.     • multivitamin with minerals (CENTRUM SILVER ADULT 50+ PO) Take 1 tablet by mouth Daily.     • Omega-3 Fatty Acids (fish oil) 1000 MG capsule capsule Take 1,000 mg by mouth Daily With Breakfast.     • predniSONE (DELTASONE) 20 MG tablet Take 1 tablet by mouth Daily With Breakfast. 2 tablet 0   • sertraline (ZOLOFT) 100 MG tablet Take 150 mg by mouth Daily. 1.5 tab daily     • STIOLTO RESPIMAT 2.5-2.5 MCG/ACT aerosol solution inhaler Inhale 2 puffs Daily As Needed.    "  • Vitamin D, Ergocalciferol, 50 MCG (2000 UT) capsule Take 1 tablet by mouth Daily.     • VITAMIN E PO Take 1 tablet by mouth Daily.       No Known Allergies     Social History     Socioeconomic History   • Marital status:      Spouse name: Not on file   • Number of children: Not on file   • Years of education: Not on file   • Highest education level: Not on file   Tobacco Use   • Smoking status: Current Every Day Smoker     Packs/day: 0.25     Types: Cigarettes   • Smokeless tobacco: Never Used   • Tobacco comment: 1-2 cigs occas   Vaping Use   • Vaping Use: Never used   Substance and Sexual Activity   • Alcohol use: Never   • Drug use: Never   • Sexual activity: Defer       The patient's relevant PMH, PSH, FH, and SH were reviewed and updated in Epic as appropriate. Allergies and Medications reviewed.    OBJECTIVE     /80 (BP Location: Right arm, Patient Position: Lying)   Pulse (!) 123   Temp 98.1 °F (36.7 °C) (Oral)   Resp 22   Ht 167.6 cm (66\")   Wt 65.8 kg (145 lb)   SpO2 94%   BMI 23.40 kg/m²   Oxygen Concentration (%): 80 (2 flaps on)  Flow (L/min): 3    Flowsheet Rows      First Filed Value   Admission Height  167.6 cm (66\") Documented at 08/05/2021 1718   Admission Weight  62.1 kg (137 lb) Documented at 08/05/2021 1718        Intake & Output (last day)       08/05 0701 - 08/06 0700 08/06 0701 - 08/07 0700    P.O.  720    Total Intake(mL/kg)  720 (10.9)    Urine (mL/kg/hr) 1925 900 (1.2)    Stool  0    Total Output 1925 900    Net -1925 -180          Stool Unmeasured Occurrence  1 x          Exam:  General Exam:  Frail, thin, elderly white female propped up in bed in NAD  HEENT: Pupils equal and reactive. Nose and throat clear.  Neck:                          Supple, no JVD, thyromegaly, or adenopathy  Lungs: Bronchial breath sounds and some egophony in the upper lobes posteriorly.  Cardiovascular: Irregularly irregular rhythm with a variable S1 and normal S2.  No obvious murmurs or " gallops.  (Atrial fibrillation at a rate of 125 on monitor)  Abdomen: Soft nontender without organomegaly or masses.   and rectal: Deferred.  Extremities: Chronic lower extremity edema (2+) with thickened upper pigmented skin and obvious venous stasis changes  Neurologic:                 Symmetric strength but diffusely weak. No focal deficits.  Answers questions appropriately but poor historian and mildly confused    CXR 8/5/2021: Heart size normal.  Extensive bilateral upper lobe dense reticulonodular disease with very little change when compared to her discharge chest x-ray of 8/1/2021.  Bilateral pleural effusions are noted which are slightly larger than the previous film.    CTA chest 8/5/2021: No PE.  Sensitive bilateral dense airspace disease in the upper lobes.  Moderate pleural effusions in the bases.    INFUSIONS  dilTIAZem, 5-15 mg/hr, Last Rate: 10 mg/hr (08/06/21 1818)        Results from last 7 days   Lab Units 08/06/21  0612 08/05/21  1820 08/01/21  0845 07/31/21  1012 07/31/21  1012   WBC 10*3/mm3 11.49* 12.79*  --   --  14.29*   HEMOGLOBIN g/dL 8.6* 8.5* 8.6*   < > 8.5*   HEMATOCRIT % 28.8* 28.2* 28.5*   < > 28.3*   PLATELETS 10*3/mm3 266 276  --   --  287    < > = values in this interval not displayed.     Results from last 7 days   Lab Units 08/06/21  0612 08/05/21  1820   SODIUM mmol/L 140 136   POTASSIUM mmol/L 4.3 3.7   CHLORIDE mmol/L 101 102   CO2 mmol/L 27.0 24.0   BUN mg/dL 17 20   CREATININE mg/dL 0.76 0.68   GLUCOSE mg/dL 138* 104*   CALCIUM mg/dL 8.4* 8.8     Results from last 7 days   Lab Units 08/06/21  0612   MAGNESIUM mg/dL 2.1     Results from last 7 days   Lab Units 08/05/21  1820   ALK PHOS U/L 95   BILIRUBIN mg/dL 0.7   ALT (SGPT) U/L 21   AST (SGOT) U/L 58*       Lab Results   Component Value Date    SEDRATE 27 07/24/2021     No results found for: BNP  Lab Results   Component Value Date    TROPONINT 0.359 (C) 08/06/2021     Lab Results   Component Value Date    TSH 6.520 (H)  07/22/2021     Lab Results   Component Value Date    LACTATE 1.5 08/06/2021     No results found for: CORTISOL      I reviewed the patient's results, images and medication.    Assessment/Plan   ASSESSMENT        Bilateral upper lobe pulmonary infiltrates.  Components of CHF and possible inflammation/infection    A/C hypoxemic respiratory failure (CMS/HCC)    Acute diastolic CHF (CMS/HCC)    Pleural effusion, bilateral    Recurrent atrial fibrillation with RVR (CMS/HCC)    Hypertension and diastolic dysfunction    Elevated troponin    COPD on home oxygen (CMS/HCC)    Rheumatoid arthritis (CMS/HCC)    Anxiety and depression    Anemia      DISCUSSION: It appears there are 2 processes going on.  She does have CHF and since all her previous echocardiograms and stress test have revealed normal LV function I have to assume this is diastolic CHF.  The CXR picture however does not fit with that diagnosis and appears more inflammatory.  At present she has no evidence of an ongoing pneumonia (no fever, WBC, low procalcitonin and no sputum production).  Her chest x-ray had already worsened significantly during her previous hospital stay (just look at her admission CXR 7/19/2021 and her discharge CXR 8/1/2021 which already revealed extensive upper lobe infiltrates much worse than on admission.  There is only minimal difference between her discharge x-ray of 8/1/21 and the admission film 8/5/2021.  The only thing that was changed significantly at her discharge was the cessation of her steroids.  Although I do not think the CXR is classic for amiodarone lung, it is in the differential, and off steroids she may have deteriorated quickly.  This unusual upper lobe CXR appearance could also be seen with atypical infections such as TB or fungus but I would have expected more symptomatology consistent with an infection.  I normally would recommend bronchoscopy and biopsy but she is requiring significant FiO2's and would not tolerate it.   In addition at this point I would not recommend an aggressive approach such as a VAT biopsy in view of her age and debility.  For now we should treat what we can i.e. CHF, inflammation, and obtain appropriate serologic studies for atypical infections.  The latter has already been done with a respiratory panel PCR.  Will however check QuantiFERON gold as well as a histo and blasto urinary antigen although suspect extremely low yield.    PLAN     1.  QuantiFERON gold  2.  Histo and blasto urinary antigen  3.  Cryptococcal antigen  4.  Diurese until BUN begins to increase  5.  HFNC as needed (will not agree to wear BiPAP)  6.  Rate control for atrial fibrillation.  If does not respond may need cardiology help  7.  Unable to use amiodarone  8.  Diltiazem drip  9.  Continuing bisoprolol  10.  He remains in rapid A. fib or develops hypotension may have to cardiovert  11.  Continue steroids as you are doing    Prognosis guarded    Plan of care and goals reviewed with multidisciplinary team at daily rounds.    I discussed the patient's findings and my recommendations with patient and nursing staff    Patient is critically ill due to severe infiltrates of unknown etiology with hypoxemic respiratory failure and has a high risk of life-threatening decline in condition.  They require continuous monitoring and frequent reassessment of condition for adjustment of management in order to lessen risk.      Time spent Critical care 60 min (It does not include procedure time).    Electronically signed by Santiago Perez MD, 08/06/21, 6:46 PM EDT.   Pulmonary / Critical care medicine

## 2021-08-06 NOTE — CONSULTS
"INFECTIOUS DISEASE CONSULT/INITIAL HOSPITAL VISIT    Yin Law  1940  [id number]    Date of Consult: 8/6/2021    Admission Date: 8/5/2021      Requesting Provider: Missy Lee DO  Evaluating Physician: Clemente Barriga MD    Reason for Consultation: multifocal pneumonia    History of present illness:    Patient is a 80 y.o. female with h/o COPD/2L O2 at home, afib/Eliquis, HTN, and RA (no IS) who presented to [facility] ED on 8/5 with worsening shortness of breath.  The patient was recently hospitalized at [facility] from 7/19-8/2/21 for pneumonia, RLE wound, and CHF exacerbation.  A CT scan chest on 7/19 showed small pleural effusions and interstitial lung disease that was worse at apices.  It was unclear if this was infection or amiodarone toxicity.  Amiodarone was subsequently stopped by Cardiology.  She was treated with Rocephin, doxycycline, and steroids.  She was discharged home after refusing rehab on no steroids or antibiotics.  She followed up with her PCP on 8/5 with CXR showing worsening pneumonia.  She was sent to [facility] for further evaluation.     She has had worsening shortness of breath since discharge with a non-productive cough.  She had associated chills, weakness, and fatigue.  She continues to have swelling in her feet that has improved somewhat since discharge.  On arrival, she was afebrile and required 5L O2.  Her hypoxia worsened overnight and was on 15L O2, but now down to 4L.  Pertinent labs are WBC 12,800 with 78% neutrophils, PCT 0.17, proBNP 10,100, creatinine 0.68, and lactic acid 2.3.  Her UA was negative.  A respiratory panel PCR with COVID-19 was negative.  Strep pneumo and Legionella UAgs are negative.  Blood cultures are pending.  A Sputum culture is pending.  MRSA PCR negative.  A CTA of chest showed no PE, but did showed \" increase in bibasilar pleural effusions and patchy bilateral airspace disease that may represent progressive pneumonia/infection\".  She was started on Zosyn and " Vancomycin.  ID was asked to evaluate and manage her antibiotic therapy.    Past Medical History:   Diagnosis Date   • Anxiety and depression    • Arrhythmia     A-FIB   • Asthma    • Chicken pox    • COPD (chronic obstructive pulmonary disease) (CMS/[facility])    • Hyperlipidemia    • Hypertension    • Measles    • Menopause    • Osteoporosis    • Rheumatoid arthritis (CMS/HCC)    • Rheumatoid arthritis (CMS/HCC)    • Tobacco abuse        Past Surgical History:   Procedure Laterality Date   • APPENDECTOMY     • CARPAL TUNNEL RELEASE Left    • CATARACT EXTRACTION, BILATERAL     • COLON SURGERY     • COLONOSCOPY     • GALLBLADDER SURGERY     • HYSTERECTOMY  1971   • TONSILLECTOMY         Family History   Problem Relation Age of Onset   • Alzheimer's disease Mother    • Parkinsonism Brother        Social History     Socioeconomic History   • Marital status:      Spouse name: Not on file   • Number of children: Not on file   • Years of education: Not on file   • Highest education level: Not on file   Tobacco Use   • Smoking status: Current Every Day Smoker     Packs/day: 0.25     Types: Cigarettes   • Smokeless tobacco: Never Used   • Tobacco comment: 1-2 cigs occas   Vaping Use   • Vaping Use: Never used   Substance and Sexual Activity   • Alcohol use: Never   • Drug use: Never   • Sexual activity: Defer       No Known Allergies      Medication:    Current Facility-Administered Medications:   •  [START ON 8/7/2021] ! Vanc trough due 09:30 am 8/7/21 - hold 10 am vanc dose until trough reviewed by Pharmacist, , Does not apply, Once, Missy Lee DO  •  ALPRAZolam (XANAX) tablet 0.5 mg, 0.5 mg, Oral, Nightly PRN, Missy Lee DO  •  apixaban (ELIQUIS) tablet 5 mg, 5 mg, Oral, Q12H, Jacqueline Guzman APRN, 5 mg at 08/05/21 2341  •  ascorbic acid (VITAMIN C) tablet 1,000 mg, 1,000 mg, Oral, Daily, Jacqueline Guzman APRN  •  bisoprolol (ZEBeta) tablet 5 mg, 5 mg, Oral, Q24H, Jacqueline Guzman APRN  •   HYDROcodone-acetaminophen (NORCO) 7.5-325 MG per tablet 1 tablet, 1 tablet, Oral, Q6H PRN, Missy Lee DO  •  ipratropium-albuterol (DUO-NEB) nebulizer solution 3 mL, 3 mL, Nebulization, Q4H PRN, Jacqueline Guzman APRN  •  ipratropium-albuterol (DUO-NEB) nebulizer solution 3 mL, 3 mL, Nebulization, 4x Daily - RT, Jacqueline Guzman APRN  •  methylPREDNISolone sodium succinate (SOLU-Medrol) injection 40 mg, 40 mg, Intravenous, Q8H, Jacqueline Guzman APRN, 40 mg at 08/05/21 2341  •  Pharmacy Consult - [place], , Does not apply, Daily, Braxton Adan, [facility]  •  Pharmacy to dose vancomycin, , Does not apply, Continuous PRN, Jacqueline Guzman APRN  •  piperacillin-tazobactam (ZOSYN) 3.375 g in iso-osmotic dextrose 50 ml (premix), 3.375 g, Intravenous, Q8H, Jacqueline Guzman APRN, 3.375 g at 08/06/21 0604  •  sertraline (ZOLOFT) tablet 150 mg, 150 mg, Oral, Daily, Jacqueline Guzman APRN  •  sodium chloride 0.9 % flush 10 mL, 10 mL, Intravenous, PRN, Clemente Hernández DO  •  sodium chloride 0.9 % flush 10 mL, 10 mL, Intravenous, Q12H, Jacqueline Guzman APRN, 10 mL at 08/05/21 2341  •  sodium chloride 0.9 % flush 10 mL, 10 mL, Intravenous, PRN, Jacqueline Guzman APRN  •  vancomycin in dextrose 5% 150 mL (VANCOCIN) IVPB 750 mg, 750 mg, Intravenous, Q12H, iMssy Lee, DO    Antibiotics:  Anti-Infectives (From admission, onward)    Ordered     Dose/Rate Route Frequency Start Stop    08/05/21 2249  vancomycin in dextrose 5% 150 mL (VANCOCIN) IVPB 750 mg     Ordering Provider: Missy Lee DO    750 mg  over 60 Minutes Intravenous Every 12 Hours 08/06/21 1000 08/13/21 0959    08/05/21 2245  piperacillin-tazobactam (ZOSYN) 3.375 g in iso-osmotic dextrose 50 ml (premix)     Note to Pharmacy: First dose given in ED.   Ordering Provider: Jacqueline Guzman APRN    3.375 g  over 4 Hours Intravenous Every 8 Hours 08/06/21 0500 08/09/21 0459    08/05/21 2245  Pharmacy to dose vancomycin     Ordering Provider: Jacqueline Guzman APRN     Does  not apply Continuous PRN 21 2244 08/10/21 2243    21  vancomycin 1250 mg/250 mL 0.9% NS IVPB ([facility])     Ordering Provider: Clemente Hernández DO    20 mg/kg × 62.1 kg  166.7 mL/hr over 90 Minutes Intravenous Once 21 2325    21  piperacillin-tazobactam (ZOSYN) 3.375 g in iso-osmotic dextrose 50 ml (premix)     Ordering Provider: Clemente Hernández DO    3.375 g  over 30 Minutes Intravenous Once 21            Review of Systems:  Constitutional-- No Fever, chills or sweats.  Appetite decreased, and no malaise. No fatigue. + weakness  HEENT-- No new vision, hearing or throat complaints.  No epistaxis or oral sores.  Denies odynophagia or dysphagia. No headache, photophobia or neck stiffness.  CV-- No chest pain, palpitation or syncope  Resp-- + SOB/+ nonproductive cough/no Hemoptysis  GI- No nausea, vomiting, or diarrhea.  No hematochezia, melena, or hematemesis. Denies jaundice or chronic liver disease.  -- No dysuria, hematuria, or flank pain.  Denies hesitancy, urgency or burning with urination  Lymph- no swollen lymph nodes in neck/axilla or groin.   Heme- No active bruising or bleeding; no Hx of DVT or PE.  MS-- no swelling or pain in the bones or joints of arms/legs.  No new back pain.  Neuro-- No acute focal weakness or numbness in the arms or legs.  No seizures.  Skin--No rashes or lesions      Physical Exam:   Vital Signs  Temp (24hrs), Av.2 °F (36.8 °C), Min:97.7 °F (36.5 °C), Max:98.8 °F (37.1 °C)    Temp  Min: 97.7 °F (36.5 °C)  Max: 98.8 °F (37.1 °C)  BP  Min: 152/50  Max: 183/64  Pulse  Min: 57  Max: 76  Resp  Min: 18  Max: 29  SpO2  Min: 81 %  Max: 100 %    GENERAL: Awake and alert, in mild distress.   HEENT: Normocephalic, atraumatic.  PERRL. EOMI. No conjunctival injection. No icterus. Oropharynx clear without evidence of thrush or exudate. No evidence of peridontal disease.    NECK: Supple without nuchal rigidity.    LYMPH: No cervical, lymphadenopathy.  HEART: irregular rhythm; No murmur, rubs, gallops.   LUNGS: Diminished at lung bases bilaterally rales at right base. No wheezing,  Normal respiratory effort. Nonlabored.  ABDOMEN: Soft, nontender, nondistended. Positive bowel sounds. No rebound or guarding. EXT:  No cyanosis, clubbing. 1-2+ edema. No cord.  :  With external urinary catheter.  MSK:  FROM, no joint effusion  SKIN: Warm and dry without cutaneous eruptions on Inspection/palpation.  BLE with discoloration/venous stasis disease  NEURO: Oriented to PPT. Normal speech and cognition  PSYCHIATRIC: Normal insight and judgment. Cooperative with PE  PIV    Laboratory Data    Results from last 7 days   Lab Units 08/06/21  0612 08/05/21  1820 08/01/21  0845 07/31/21  1012 07/31/21  1012   WBC 10*3/mm3 11.49* 12.79*  --   --  14.29*   HEMOGLOBIN g/dL 8.6* 8.5* 8.6*   < > 8.5*   HEMATOCRIT % 28.8* 28.2* 28.5*   < > 28.3*   PLATELETS 10*3/mm3 266 276  --   --  287    < > = values in this interval not displayed.     Results from last 7 days   Lab Units 08/06/21  0612   SODIUM mmol/L 140   POTASSIUM mmol/L 4.3   CHLORIDE mmol/L 101   CO2 mmol/L 27.0   BUN mg/dL 17   CREATININE mg/dL 0.76   GLUCOSE mg/dL 138*   CALCIUM mg/dL 8.4*     Results from last 7 days   Lab Units 08/05/21  1820   ALK PHOS U/L 95   BILIRUBIN mg/dL 0.7   ALT (SGPT) U/L 21   AST (SGOT) U/L 58*         Results from last 7 days   Lab Units 08/06/21  0612   CRP mg/dL 6.98*     Results from last 7 days   Lab Units 08/06/21  0000   LACTATE mmol/L 1.5             Estimated Creatinine Clearance: 57.3 mL/min (by C-G formula based on SCr of 0.76 mg/dL).      Microbiology:  Microbiology Results (last 10 days)     Procedure Component Value - Date/Time    Respiratory Panel PCR w/COVID-19(SARS-CoV-2) CAS/SHALINI/KRISTY/PAD/COR/MAD/HAJA In-House, NP Swab in UTM/VTM, 3-4 HR TAT - Swab, Nasopharynx [[id number]]  (Normal) Collected: 08/05/21 2100    Lab Status: Final result  Specimen: Swab from Nasopharynx Updated: 08/05/21 2315     ADENOVIRUS, PCR Not Detected     Coronavirus 229E Not Detected     Coronavirus HKU1 Not Detected     Coronavirus NL63 Not Detected     Coronavirus OC43 Not Detected     COVID19 Not Detected     Human Metapneumovirus Not Detected     Human Rhinovirus/Enterovirus Not Detected     Influenza A PCR Not Detected     Influenza B PCR Not Detected     Parainfluenza Virus 1 Not Detected     Parainfluenza Virus 2 Not Detected     Parainfluenza Virus 3 Not Detected     Parainfluenza Virus 4 Not Detected     RSV, PCR Not Detected     Bordetella pertussis pcr Not Detected     Bordetella parapertussis PCR Not Detected     Chlamydophila pneumoniae PCR Not Detected     Mycoplasma pneumo by PCR Not Detected    Narrative:      In the setting of a positive respiratory panel with a viral infection PLUS a negative procalcitonin without other underlying concern for bacterial infection, consider observing off antibiotics or discontinuation of antibiotics and continue supportive care. If the respiratory panel is positive for atypical bacterial infection (Bordetella pertussis, Chlamydophila pneumoniae, or Mycoplasma pneumoniae), consider antibiotic de-escalation to target atypical bacterial infection.          Urine strep and legionella negative  MRSA nares screen negative    Radiology:  Imaging Results (Last 72 Hours)     Procedure Component Value Units Date/Time    CT Chest Pulmonary Embolism [[id number]] Collected: 08/05/21 1956     Updated: 08/05/21 1958    Narrative:      CT CHEST PULMONARY EMBOLISM W CONTRAST    INDICATION:   Shortness of air, history of pneumonia    TECHNIQUE:   CT angiogram of the chest with IV contrast. 3-D reconstructions were obtained and reviewed.   Radiation dose reduction techniques included automated exposure control or exposure modulation based on body size. Count of known CT and cardiac nuc med studies  performed in previous 12 months: 0.      COMPARISON:   CTA of the chest from 7/19/2021    FINDINGS:   There is no evidence of pulmonary embolism. The heart is again noted to be enlarged and there is coronary artery calcification. The patient's pleural effusions as well as bibasilar atelectasis has increased from prior. Again noted is patchy,  predominantly perihilar and upper lobe airspace disease that has progressed from prior. There may be some pulmonary vascular congestion and edema. Upper abdominal structures appear unremarkable. There are mild compression deformities of several  thoracolumbar vertebral bodies.      Impression:      1. Negative for pulmonary embolus.  2. There has been interval increase in the patient's bibasilar pleural effusions from prior as well as patchy bilateral airspace disease that may represent progressive pneumonia/infection. There also may be some potential pulmonary edema.  3. There is cardiomegaly with coronary artery calcification.    Signer Name: Jacqueline Ramírez MD   Signed: 8/5/2021 7:56 PM   Workstation Name: [id number]    [facility]        I personally reviewed the above extensive bilateral infiltrates      Impression:   - Bilateral pulmonary infiltrates, pneumonia vs edema or both: worse compared to last admission. Possible underlying amiodarone toxicity. Unclear if patient was discharged on steroids. Negative procal. Negative COVID, RVP, urine antigens and MRSA screening. Sputum culture pending if she can produce an adequate sample. No pathogens on screen from last admission. 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 On 2 L at baseline  - Acute on chronic CHF, likely diastolic  - COPD with acute exacerbation  - Chronic hypoxic respiratory failure on 2L O2 baseline at home  -  Leukocytosis/neutrophilia  - Anemia of chronic disease  - Essential hypertension  - Atrial fibrillation/Eliquis  - Rheumatoid arthritis: not on immunosuppression     PLAN/RECOMMENDATIONS:   - microbiology data reviewed  - CT scan images reviewed  - Follow blood and sputum cultures.   - Follow CBC, CMP    - stop vancomycin  - continue empiric zosyn pending further workup  - start PO doxycycline  - probiotic daily    - diuresis, nebulizers, steroids per primary team    Complex MDM. Numerous comorbidities predispose to poor outcomes. I will follow cultures peripherally over the weekend and re-evaluate in person on Monday. Call if any new concerns in the meantime.    Clemente Barriga MD saw and examined patient, verified hx and PE, read all radiographic studies, reviewed labs and micro data, and formulated dx, plan for treatment and all medical decision making.      PAVEL Del Toro-C for MD [employer] [facility]qu[facility] PA  8/6/2021  08:31 EDT    I have edited this note to reflect my history, exam, assessment and plan.  Clemente Barriga MD

## 2021-08-06 NOTE — H&P
[facility] Medicine Services  HISTORY AND PHYSICAL    Patient Name: Yin Law  : 1940  MRN: [id number]  Primary Care Physician: Santiago Chaudhry MD  Date of admission: 2021    Subjective   Subjective     Chief Complaint:  Shortness of breath     HPI:  Yin Law is a 80 y.o. female w/ a hx of chronic respiratory failure on 2 liters ATT, atrial fibrillation (Eliquis), HTN, HLD, COPD, rheumatoid arthritis, depression/anxiety who presented to the ED w/ c/o shortness of breath.   Pt admitted to [facility]  through 2021 for PNA, CHF exacerbation and a right leg wound. Pt initially admitted to the telemetry floor then moved to ICU on  for worsening respiratory failure and HTN urgency. Pt given IV Rocephin, Doxycycline and steroids for PNA treatment. CT angiogram of the chest on 2021 showed small pleural effusions, interstitial lung disease more prominent at the apices.  It was unclear if line findings are secondary to infection, volume or possibly inflammation secondary to amiodarone toxicity. Ultimately, Cardiology was consulted and amiodarone was d/c 2/2 concern for lung toxicity. Pt d/c home after she decline rehab. Pt as to f/u w/ PCP for repeat CXR and Dr. Montoya in 4 wks.   Pt seen by her PCP today for f/u and repeat CXR. Her PCP sent her to the ED for further evaluation 2/2 worsening CXR images; ongoing PNA.   Pt c/o progressively worsening shortness of breath since d/c w/ a non-productive cough, chills, fatigue and generalized weakness. Pt w/ ongoing pedal edema that she reports has slowly improved since d/c. '  Pt denies known fever, chest pain, N/V/D, abdominal pain, dysuria, syncope, confusion.   Pt evaluated in the ED. CTA Chest c/w increase in bibasilar pleural effusions and progressive PNA/infection. ProBNP 10,115.0, troponin 0.380. Lactic acid 2.3, WBC 12.79. Pt admitted to the hospital medicine service for further evaluation.     COVID  Details:    Symptoms:    [] NONE [] Fever [x]  Cough [x] Shortness of breath [] Change in taste/smell      The patient has a COVID HM Topic on their chart, and they are fully vaccinated.    Review of Systems   Constitutional: Positive for chills and fatigue. Negative for fever.   HENT: Negative.  Negative for congestion, postnasal drip, rhinorrhea, sinus pressure, sinus pain, sneezing, sore throat and trouble swallowing.    Eyes: Negative.  Negative for visual disturbance.   Respiratory: Positive for cough and shortness of breath. Negative for wheezing and stridor.    Cardiovascular: Positive for leg swelling. Negative for chest pain and palpitations.   Gastrointestinal: Negative.  Negative for abdominal distention, abdominal pain, blood in stool, constipation, diarrhea, nausea and vomiting.   Endocrine: Negative.    Genitourinary: Negative.  Negative for decreased urine volume, difficulty urinating, dysuria, flank pain, frequency, hematuria, pelvic pain and urgency.   Musculoskeletal: Negative.  Negative for arthralgias, myalgias, neck pain and neck stiffness.   Skin: Negative.  Negative for wound.   Neurological: Negative.  Negative for dizziness, tremors, seizures, syncope, facial asymmetry, speech difficulty, weakness, light-headedness, numbness and headaches.   Hematological: Negative.  Does not bruise/bleed easily.   Psychiatric/Behavioral: Negative.  Negative for confusion.   All other systems reviewed and are negative.     Personal History     Past Medical History:   Diagnosis Date   • Anxiety and depression    • Arrhythmia     A-FIB   • Asthma    • Chicken pox    • COPD (chronic obstructive pulmonary disease) (CMS/HCC)    • Hyperlipidemia    • Hypertension    • Measles    • Menopause    • Osteoporosis    • Rheumatoid arthritis (CMS/HCC)    • Rheumatoid arthritis (CMS/HCC)    • Tobacco abuse        Past Surgical History:   Procedure Laterality Date   • APPENDECTOMY     • CARPAL TUNNEL RELEASE Left    •  CATARACT EXTRACTION, BILATERAL     • COLON SURGERY     • COLONOSCOPY     • GALLBLADDER SURGERY     • HYSTERECTOMY  1971   • TONSILLECTOMY         Family History: family history includes Alzheimer's disease in her mother; Parkinsonism in her brother. Otherwise pertinent FHx was reviewed and unremarkable.     Social History:  reports that she has been smoking cigarettes. She has been smoking about 0.25 packs per day. She has never used smokeless tobacco. She reports that she does not drink alcohol and does not use drugs.  Social History     Social History Narrative   • Not on file       Medications:  ALPRAZolam, HYDROcodone-acetaminophen, Vitamin D (Ergocalciferol), Vitamin E, apixaban, ascorbic acid, bisoprolol, fish oil, multivitamin with minerals, predniSONE, sertraline, and tiotropium bromide-olodaterol    No Known Allergies    Objective   Objective     Vital Signs:   Temp:  [98.2 °F (36.8 °C)-98.8 °F (37.1 °C)] 98.2 °F (36.8 °C)  Heart Rate:  [67-76] 75  Resp:  [18-29] 29  BP: (152-183)/(50-71) 183/64  Flow (L/min):  [4-6] 6    Physical Exam     Constitutional: Awake, alert; ill appearing    Eyes: PERRLA, sclerae anicteric, no conjunctival injection  HENT: NCAT, mucous membranes moist  Neck: Supple, no thyromegaly, no lymphadenopathy, trachea midline  Respiratory: diminished throughout w/ faint rhonchi, no wheeze; increased respiratory effort at rest    Cardiovascular: RRR, no murmurs, rubs, or gallops, +2-3 pitting edema BLE   Gastrointestinal: Positive bowel sounds, soft, nontender, nondistended  Musculoskeletal: Normal ROM bilaterally   Psychiatric: Appropriate affect, cooperative  Neurologic: Oriented x 3, strength symmetric in all extremities, Cranial Nerves grossly intact to confrontation, speech clear  Skin: No rashes, lesions     Results Reviewed:  I have personally reviewed most recent indicated data and agree with findings including:  [x]  Laboratory  [x]  Radiology  [x]  EKG/Telemetry  []   Pathology  []  Cardiac/Vascular Studies  []  Old records  []  Other:  Most pertinent findings include:    LAB RESULTS:      Lab 08/05/21  2104 08/05/21  1820 08/01/21  0845 07/31/21  1012 07/30/21  0551   WBC  --  12.79*  --  14.29* 21.67*   HEMOGLOBIN  --  8.5* 8.6* 8.5* 9.1*   HEMATOCRIT  --  28.2* 28.5* 28.3* 31.1*   PLATELETS  --  276  --  287 348   NEUTROS ABS  --  9.99*  --  11.80* 16.85*   IMMATURE GRANS (ABS)  --  0.07*  --  0.16* 0.48*   LYMPHS ABS  --  1.62  --  1.21 2.55   MONOS ABS  --  1.02*  --  1.06* 1.59*   EOS ABS  --  0.04  --  0.04 0.17   MCV  --  80.6  --  77.7* 81.2   PROCALCITONIN  --  0.17  --   --   --    LACTATE 2.3*  --   --   --   --          Lab 08/05/21  1820 07/31/21  1012 07/30/21  0551   SODIUM 136 134* 138   POTASSIUM 3.7 4.1 4.7   CHLORIDE 102 100 105   CO2 24.0 26.0 24.0   ANION GAP 10.0 8.0 9.0   BUN 20 25* 27*   CREATININE 0.68 0.76 0.75   GLUCOSE 104* 105* 104*   CALCIUM 8.8 8.4* 8.4*         Lab 08/05/21  1820   TOTAL PROTEIN 6.1   ALBUMIN 3.20*   GLOBULIN 2.9   ALT (SGPT) 21   AST (SGOT) 58*   BILIRUBIN 0.7   ALK PHOS 95         Lab 08/05/21  1820   PROBNP 10,115.0*   TROPONIN T 0.380*             Lab 08/01/21  0845   IRON 16*   IRON SATURATION 4*   TIBC 364   TRANSFERRIN 244   FERRITIN 213.80*   FOLATE >20.00   VITAMIN B 12 1,792*         Brief Urine Lab Results     None        Microbiology Results (last 10 days)     ** No results found for the last 240 hours. **          CT Chest Pulmonary Embolism    Result Date: 8/5/2021  CT CHEST PULMONARY EMBOLISM W CONTRAST INDICATION: Shortness of air, history of pneumonia TECHNIQUE: CT angiogram of the chest with IV contrast. 3-D reconstructions were obtained and reviewed.   Radiation dose reduction techniques included automated exposure control or exposure modulation based on body size. Count of known CT and cardiac nuc med studies performed in previous 12 months: 0. COMPARISON: CTA of the chest from 7/19/2021 FINDINGS: There is no  evidence of pulmonary embolism. The heart is again noted to be enlarged and there is coronary artery calcification. The patient's pleural effusions as well as bibasilar atelectasis has increased from prior. Again noted is patchy, predominantly perihilar and upper lobe airspace disease that has progressed from prior. There may be some pulmonary vascular congestion and edema. Upper abdominal structures appear unremarkable. There are mild compression deformities of several thoracolumbar vertebral bodies.     Impression: 1. Negative for pulmonary embolus. 2. There has been interval increase in the patient's bibasilar pleural effusions from prior as well as patchy bilateral airspace disease that may represent progressive pneumonia/infection. There also may be some potential pulmonary edema. 3. There is cardiomegaly with coronary artery calcification. Signer Name: Jacqueline Ramírez MD  Signed: 8/5/2021 7:56 PM  Workstation Name: [id number]  [facility]      Results for orders placed during the hospital encounter of 07/19/21    Adult Transthoracic Echo Complete W/ Cont if Necessary Per Protocol    Interpretation Summary  · Estimated left ventricular EF = 65%  · Mild aortic valve stenosis is present.  · Aortic valve maximum pressure gradient is 32.9 mmHg. Aortic valve mean pressure gradient is 17.2 mmHg.  · Mild mitral valve regurgitation is present.  · Mild tricuspid valve regurgitation is present.  · Estimated right ventricular systolic pressure from tricuspid regurgitation is 37.0 mmHg.  · There is a large left pleural effusion. There is a moderate sized right pleural effusion.      Assessment/Plan   Assessment & Plan       Sepsis (CMS/HCC)    Multifocal pneumonia    Atrial fibrillation (CMS/HCC)    COPD (chronic obstructive pulmonary disease) (CMS/HCC)    Hypertension    Rheumatoid arthritis (CMS/HCC)    Anxiety and depression    Lactic acidosis    Anemia    Elevated troponin    Acute and chronic  respiratory failure with hypoxia (CMS/HCC)    Pleural effusion, bilateral    Yin CHARISSE Law is a 80 y.o. female w/ a hx of chronic respiratory failure on 2 liters ATT, atrial fibrillation (Eliquis), HTN, HLD, COPD, rheumatoid arthritis, depression/anxiety who presented to the ED w/ c/o shortness of breath.     **Acute/chronic, hypoxic respiratory failure   **Sepsis 2/2 multifocal Pneumonia   -recent admit for same and treated w/ Doxy, Rocephin and steroids (amiodarone d/c 2/2 concerns for lung toxicity)   -seen by PCP today for repeat/f-u CXR and sent to ED  -meets sepsis criteria based on WBC 12.79, lactic acid 2.3, RR 29, source   -pt on 2 liters AAT; currently requiring 6 liters- BiPAP ordered (pt currently refusing. noted to be 81% on 2 liters in ED)   -CTA Chest obtained in ED   -ABG refused by pt  -blood cx pending   -sputum cx pending   -baseline UA pending; legionella and strep. pneuo- pending   -lactic acid 2.3; reflex pending   -procal WNL   -s/p IV Vanc and Zosyn started in ED; continue  -Solumedrol 40mg TID   -prn and scheduled duo nebs   -IV diuresis   -Pulmonary consult in am  -ID consult in am   -case mgt consult in am   -COVID pending; if negative- consider pt,ot consult     **Bilateral pleural effusion   **Acute CHF   -per CTA Chest- interval increase in bibasilar pleural effusions   -ECHO 7/20/2021; EF 65% w/ large left effusion and moderate right effusion; RVSP 37mmHG  -proBNP 10,115.0  -s/p IV Lasix 40mg given in ED; repeat dose in am (@ 6am)   -BiPAP   -daily weights; strict I/Os  -consider thoracentesis if respiratory status park not improve     **Elevated troponin   -previously elevated @ 0.031 on 7/20; currently 0.380  -trend overnight   -EKG in am   -consider Cardiology pending trend     **PAF  -EKG c/w NSR   -continue routine Eliquis, Zebeta  -amiodarone recently d/c 2/2 concern for lung toxicity   -monitor   -has f/u appointment w/ Dr. Montoya in ~3-4 weeks     **Anemia   -previous H/H  8.6/28.5  -monitor     **Anxiety  **Depression   -continue routine Zoloft, PRN Xanax     DVT prophylaxis:  Eliquis     CODE STATUS:  DNR/DNI  Code Status and Medical Interventions:   Ordered at: 08/05/21 3143     Level Of Support Discussed With:    Patient     Code Status:    No CPR     Medical Interventions (Level of Support Prior to Arrest):    Full     Comments:    DNR/ DNI     This note has been completed as part of a split-shared workflow.     Signature: Electronically signed by AMA Rich, 08/05/21, 11:14 PM EDT.      Attending   Admission Attestation       I have seen and examined the patient, performing an independent face-to-face diagnostic evaluation with plan of care reviewed and developed with the advanced practice clinician (APC).      Brief Summary Statement:   Yin Law is a 80 y.o. female With a PMH significant for atrial fibrillation on Eliquis, HTN, HLD, COPD, RA, depression and anxiety, chronic hypoxic respiratory failure on 2 L home O2 who presents to the ED due to shortness of breath.  Patient was recently hospitalized and discharged on 8/2/2021 where she was treated for pneumonia/CHF exacerbation requiring a brief stay in the ICU.  Patient was treated with IV antibiotics (Rocephin/doxycycline) and steroids.  With imaging showing interstitial lung disease, amiodarone was discontinued due to concerns for amiodarone toxicity.  Patient was discharged home.  After discharge, her PCP ordered an outpatient x-ray today which was found to be abnormal.  Patient was encouraged to come to the ED.  She reports that her breathing has been stable since discharge, not currently worse.  She reports occasional cough, lower extremity edema and orthopnea.  She denies fever, chest pain.  Remainder of detailed HPI is as noted by APC and has been reviewed and/or edited by me for completeness.    Attending Physical Exam:  Constitutional: Awake, alert  Eyes: PERRLA, sclerae anicteric, no conjunctival  injection  HENT: NCAT, mucous membranes moist  Neck: Supple, no thyromegaly, no lymphadenopathy, trachea midline  Respiratory: Tachypneic, shallow breath sounds.  Cardiovascular: RRR, no murmurs, rubs, or gallops, palpable pedal pulses bilaterally  Gastrointestinal: Positive bowel sounds, soft, nontender, nondistended  Musculoskeletal: 2+ pitting bilateral ankle edema, no clubbing or cyanosis to extremities  Psychiatric: Appropriate affect, cooperative  Neurologic: Oriented x 3, strength symmetric in all extremities, Cranial Nerves grossly intact to confrontation, speech clear  Skin: No rashes      Brief Assessment/Plan :  See detailed assessment and plan developed with APC which I have reviewed and/or edited for completeness.        Admission Status: I believe that this patient meets INPATIENT status due to acute on chronic hypoxic respiratory failure.  I feel patient’s risk for adverse outcomes and need for care warrant INPATIENT evaluation and I predict the patient’s care encounter to likely last beyond 2 midnights.    Missy Lee,   08/06/21

## 2021-08-06 NOTE — THERAPY EVALUATION
Patient Name: Yin Law  : 1940    MRN: [id number]                              Today's Date: 2021       Admit Date: 2021    Visit Dx:     ICD-10-CM ICD-9-CM   1. Acute respiratory failure with hypoxia (CMS/HCC)  J96.01 518.81   2. Acute on chronic congestive heart failure, unspecified heart failure type (CMS/HCC)  I50.9 428.0   3. Pleural effusion  J90 511.9   4. Pneumonia of both lower lobes due to infectious organism  J18.9 486     Patient Active Problem List   Diagnosis   • Multifocal pneumonia   • Atrial fibrillation (CMS/HCC)   • COPD (chronic obstructive pulmonary disease) (CMS/HCC)   • Hypertension   • Rheumatoid arthritis (CMS/HCC)   • Anxiety and depression   • Lactic acidosis   • Wound of right leg   • Anemia   • Elevated troponin   • Sepsis (CMS/HCC)   • Acute and chronic respiratory failure with hypoxia (CMS/HCC)   • Pleural effusion, bilateral     Past Medical History:   Diagnosis Date   • Anxiety and depression    • Arrhythmia     A-FIB   • Asthma    • Chicken pox    • COPD (chronic obstructive pulmonary disease) (CMS/HCC)    • Hyperlipidemia    • Hypertension    • Measles    • Menopause    • Osteoporosis    • Rheumatoid arthritis (CMS/HCC)    • Rheumatoid arthritis (CMS/HCC)    • Tobacco abuse      Past Surgical History:   Procedure Laterality Date   • APPENDECTOMY     • CARPAL TUNNEL RELEASE Left    • CATARACT EXTRACTION, BILATERAL     • COLON SURGERY     • COLONOSCOPY     • GALLBLADDER SURGERY     • HYSTERECTOMY  1971   • TONSILLECTOMY       General Information     Row Name 21 1056          Physical Therapy Time and Intention    Document Type  evaluation  -HP     Mode of Treatment  physical therapy  -     Row Name 21 1055          General Information    Patient Profile Reviewed  yes  -HP     Prior Level of Function  mod assist:;all household mobility;transfer;bed mobility;cooking family memeber has been living with and providing assistance to pt since previous  d/c ~2 weeks ago; pt reported being Ind prior to last hospitalization  -     Existing Precautions/Restrictions  fall;oxygen therapy device and L/min  -     Barriers to Rehab  medically complex;previous functional deficit  -     Row Name 08/06/21 1050          Living Environment    Lives With  alone;other (see comments) niece has been staying with pt for last two weeks  -     Row Name 08/06/21 1050          Home Main Entrance    Number of Stairs, Main Entrance  two  -     Stair Railings, Main Entrance  railings safe and in good condition  -     Row Name 08/06/21 1050          Stairs Within Home, Primary    Number of Stairs, Within Home, Primary  none  -     Row Name 08/06/21 1050          Cognition    Orientation Status (Cognition)  oriented x 3  -     Row Name 08/06/21 1050          Safety Issues, Functional Mobility    Safety Issues Affecting Function (Mobility)  awareness of need for assistance;insight into deficits/self-awareness;sequencing abilities  -     Impairments Affecting Function (Mobility)  balance;coordination;endurance/activity tolerance;motor planning;shortness of breath;strength  -       User Key  (r) = Recorded By, (t) = Taken By, (c) = Cosigned By    Initials Name Provider Type     Acacia Taylor PT Physical Therapist        Mobility     Row Name 08/06/21 1054          Bed Mobility    Bed Mobility  supine-sit;sit-supine  -     Supine-Sit [place] (Bed Mobility)  minimum assist (75% patient effort);1 person assist  -     Sit-Supine [place] (Bed Mobility)  1 person assist;maximum assist (25% patient effort)  -     Assistive Device (Bed Mobility)  bed rails;draw sheet;head of bed elevated  -     Comment (Bed Mobility)  Pt was able to come to sit on EOB with min A for hip management with increased reports of dizziness and SOA and O2 saturation dropping to 88%, requiring quick return to supine with Max A x1. O2 saturation returned to 96% within one minute with  cues for PLB.  -[facility] Name 08/06/21 1054          Transfers    Comment (Transfers)  OOB activities not appropriate at this time.  -[facility] Name 08/06/21 1054          Bed-Chair Transfer    Bed-Chair [place] (Transfers)  not tested  -[facility] Name 08/06/21 1054          Sit-Stand Transfer    Sit-Stand [place] (Transfers)  not tested  -[facility] Name 08/06/21 1054          Gait/Stairs (Locomotion)    [place] Level (Gait)  not tested  -     Comment (Gait/Stairs)  OOB activities not appropriate at this time.  -       User Key  (r) = Recorded By, (t) = Taken By, (c) = Cosigned By    Initials Name Provider Type     Acacia Taylor PT Physical Therapist        Obj/Interventions     [facility] Name 08/06/21 1058          Range of Motion Comprehensive    General Range of Motion  no range of motion deficits identified  -[facility] Name 08/06/21 1058          Strength Comprehensive (MMT)    General Manual Muscle Testing (MMT) Assessment  lower extremity strength deficits identified  -     Comment, General Manual Muscle Testing (MMT) Assessment  BLE grossly 4-/5  -[facility] Name 08/06/21 1058          Motor Skills    Motor Skills  therapeutic exercise  -     Therapeutic Exercise  hip;knee;ankle  -[facility] Name 08/06/21 1058          Hip (Therapeutic Exercise)    Hip (Therapeutic Exercise)  strengthening exercise  -     Hip Strengthening (Therapeutic Exercise)  bilateral;heel slides;5 repetitions  -[facility] Name 08/06/21 1058          Knee (Therapeutic Exercise)    Knee (Therapeutic Exercise)  strengthening exercise  -     Knee Strengthening (Therapeutic Exercise)  bilateral;SLR (straight leg raise);5 repetitions;2 sets  -[facility] Name 08/06/21 1058          Ankle (Therapeutic Exercise)    Ankle (Therapeutic Exercise)  AROM (active range of motion)  -     Ankle AROM (Therapeutic Exercise)  bilateral;dorsiflexion;plantarflexion;10 repetitions  -[facility] Name 08/06/21 1058           Balance    Balance Assessment  sitting static balance;sitting dynamic balance  -HP     Static Sitting Balance  mild impairment;sitting, edge of bed  -HP     Dynamic Sitting Balance  moderate impairment;sitting, edge of bed  -HP     Row Name 08/06/21 1058          Sensory Assessment (Somatosensory)    Sensory Assessment (Somatosensory)  LE sensation intact  -HP       User Key  (r) = Recorded By, (t) = Taken By, (c) = Cosigned By    Initials Name Provider Type     Acacia Taylor PT Physical Therapist        Goals/Plan     Row Name 08/06/21 1108          Bed Mobility Goal 1 (PT)    Activity/Assistive Device (Bed Mobility Goal 1, PT)  sit to supine/supine to sit  -HP     [place] Level/Cues Needed (Bed Mobility Goal 1, PT)  modified independence  -HP     Time Frame (Bed Mobility Goal 1, PT)  long term goal (LTG);10 days  -HP     Progress/Outcomes (Bed Mobility Goal 1, PT)  goal ongoing  -     Row Name 08/06/21 1108          Transfer Goal 1 (PT)    Activity/Assistive Device (Transfer Goal 1, PT)  sit-to-stand/stand-to-sit  -HP     [place] Level/Cues Needed (Transfer Goal 1, PT)  minimum assist (75% or more patient effort);1 person assist  -HP     Time Frame (Transfer Goal 1, PT)  long term goal (LTG);10 days  -HP     Progress/Outcome (Transfer Goal 1, PT)  goal ongoing  -     Row Name 08/06/21 1108          Gait Training Goal 1 (PT)    Activity/Assistive Device (Gait Training Goal 1, PT)  gait (walking locomotion)  -HP     [place] Level (Gait Training Goal 1, PT)  minimum assist (75% or more patient effort);1 person assist  -HP     Distance (Gait Training Goal 1, PT)  25  -HP     Time Frame (Gait Training Goal 1, PT)  long term goal (LTG);10 days  -HP     Progress/Outcome (Gait Training Goal 1, PT)  goal ongoing  -       User Key  (r) = Recorded By, (t) = Taken By, (c) = Cosigned By    Initials Name Provider Type     Acacia Taylor PT Physical Therapist        Clinical Impression     Row Name  08/06/21 1100          Pain    Additional Documentation  Pain Scale: Numbers Pre/Post-Treatment (Group)  -HP     Row Name 08/06/21 1100          Pain Scale: Numbers Pre/Post-Treatment    Pretreatment Pain Rating  0/10 - no pain  -HP     Posttreatment Pain Rating  0/10 - no pain  -HP     Row Name 08/06/21 1100          Plan of Care Review    Plan of Care Reviewed With  patient;family  -     Progress  no change  -HP     Outcome Summary  PT evaluation complete this session. Pt presented with decreased activity tolerance, generalized weakness, and diminished functional mobility impacting her overall functional independence. Pt was able to come to sit on EOB with min A with increased reports of dizziness and SOA and O2 saturation dropping to 88% from 98%, requiring quick return to supine with Max A x1. O2 saturation returned to 96% within one minute with cues for PLB. Pt would benefit from IP PT at time time to address identified limitations and promote return to PLOF. Pt would benefit from IP OT evaluation at this time as well. Recommend SNF following discharge pending further progression.  -     Row Name 08/06/21 1100          Therapy Assessment/Plan (PT)    Patient/Family Therapy Goals Statement (PT)  to return to PLOF  -     Rehab Potential (PT)  good, to achieve stated therapy goals  -HP     Criteria for Skilled Interventions Met (PT)  yes;meets criteria;skilled treatment is necessary  -HP     Predicted Duration of Therapy Intervention (PT)  two weeks  -     Row Name 08/06/21 1100          Vital Signs    Pre Systolic BP Rehab  162  -HP     Pre Treatment Diastolic BP  65  -HP     Post Systolic BP Rehab  165  -HP     Post Treatment Diastolic BP  63  -HP     Pretreatment Heart Rate (beats/min)  74  -HP     Posttreatment Heart Rate (beats/min)  65  -HP     Pre SpO2 (%)  99  -HP     O2 Delivery Pre Treatment  nasal cannula  -HP     Intra SpO2 (%)  88  -HP     O2 Delivery Intra Treatment  nasal cannula  -HP      Post SpO2 (%)  99  -HP     O2 Delivery Post Treatment  nasal cannula  -HP     Pre Patient Position  Supine  -HP     Intra Patient Position  Sitting  -HP     Post Patient Position  Supine  -HP     Row Name 08/06/21 1100          Positioning and Restraints    Pre-Treatment Position  in bed  -HP     Post Treatment Position  bed  -HP     In Bed  supine;fowlers;call light within reach;encouraged to call for assist;exit alarm on;with family/caregiver;side rails up x2;legs elevated;notified nsg  -HP       User Key  (r) = Recorded By, (t) = Taken By, (c) = Cosigned By    Initials Name Provider Type     Acacia Taylor PT Physical Therapist        Outcome Measures     Row Name 08/06/21 1109          How much help from another person do you currently need...    Turning from your back to your side while in flat bed without using bedrails?  3  -HP     Moving from lying on back to sitting on the side of a flat bed without bedrails?  3  -HP     Moving to and from a bed to a chair (including a wheelchair)?  2  -HP     Standing up from a chair using your arms (e.g., wheelchair, bedside chair)?  2  -HP     Climbing 3-5 steps with a railing?  1  -HP     To walk in hospital room?  2  -HP     AM-PAC 6 Clicks Score (PT)  13  -HP     Row Name 08/06/21 1109          Functional Assessment    Outcome Measure Options  AM-PAC 6 Clicks Basic Mobility (PT)  -HP       User Key  (r) = Recorded By, (t) = Taken By, (c) = Cosigned By    Initials Name Provider Type    Acacia Jin PT Physical Therapist                       Physical Therapy Education                 Title: PT OT SLP Therapies (Done)     Topic: Physical Therapy (Done)     Point: Mobility training (Done)     Learning Progress Summary           Patient Acceptance, E,D, VU,NR by  at 8/6/2021 1110                   Point: Home exercise program (Done)     Learning Progress Summary           Patient Acceptance, E,D, VU,NR by  at 8/6/2021 1110                   Point: Body  mechanics (Done)     Learning Progress Summary           Patient Acceptance, E,D, VU,NR by  at 8/6/2021 1110                   Point: Precautions (Done)     Learning Progress Summary           Patient Acceptance, E,D, VU,NR by  at 8/6/2021 1110                               User Key     Initials Effective Dates Name Provider Type Discipline     06/01/21 -  Acacia Taylor, PT Physical Therapist PT              PT Recommendation and Plan  Planned Therapy Interventions (PT): balance training, bed mobility training, gait training, home exercise program, manual therapy techniques, patient/family education, postural re-education, strengthening, stretching, transfer training, motor coordination training  Plan of Care Reviewed With: patient, family  Progress: no change  Outcome Summary: PT evaluation complete this session. Pt presented with decreased activity tolerance, generalized weakness, and diminished functional mobility impacting her overall functional independence. Pt was able to come to sit on EOB with min A with increased reports of dizziness and SOA and O2 saturation dropping to 88% from 98%, requiring quick return to supine with Max A x1. O2 saturation returned to 96% within one minute with cues for PLB. Pt would benefit from IP PT at time time to address identified limitations and promote return to PLOF. Pt would benefit from IP OT evaluation at this time as well. Recommend SNF following discharge pending further progression.     Time Calculation:   PT Charges     Row Name 08/06/21 0100             Time Calculation    Start Time  1000  -HP      PT Received On  08/06/21  -      PT Goal Re-Cert Due Date  08/16/21  -         Time Calculation- PT    Total Timed Code Minutes- PT  8 minute(s)  -HP         Timed Charges    80348 - PT Therapeutic Exercise Minutes  5  -HP      09041 - PT Therapeutic Activity Minutes  3  -HP         Untimed Charges    PT Eval/Re-eval Minutes  47  -HP         Total Minutes     Timed Charges Total Minutes  8  -HP      Untimed Charges Total Minutes  47  -HP       Total Minutes  55  -HP        User Key  (r) = Recorded By, (t) = Taken By, (c) = Cosigned By    Initials Name Provider Type    HP Acacia Taylor, PT Physical Therapist        Therapy Charges for Today     Code Description Service Date Service Provider Modifiers Qty    92653519778 HC PT THER PROC EA 15 MIN 8/6/2021 Acacia Taylor, PT GP 1    77552387221 HC PT EVAL MOD COMPLEXITY 4 8/6/2021 Acacia Taylor, PT GP 1          PT G-Codes  Outcome Measure Options: AM-PAC 6 Clicks Basic Mobility (PT)  AM-PAC 6 Clicks Score (PT): 13    Acacia Taylor, PT  8/6/2021

## 2021-08-06 NOTE — OUTREACH NOTE
COPD/PN Week 1 Survey      Responses   [facility] patient discharged from?  [place]   Does the patient have one of the following disease processes/diagnoses(primary or secondary)?  COPD/Pneumonia   Week 1 attempt successful?  No   Revoke  Readmitted          Juliana Moreira RN

## 2021-08-06 NOTE — CASE MANAGEMENT/SOCIAL WORK
Discharge Planning Assessment  [facility]     Patient Name: Yin Law  MRN: [id number]  Today's Date: 8/6/2021    Admit Date: 8/5/2021    Discharge Needs Assessment     Row Name 08/06/21 1107       Living Environment    Lives With  other relative(s)    Name(s) of Who Lives With Patient  Esther metz    Current Living Arrangements  home/apartment/condo    Primary Care Provided by  other (see comments)    Provides Primary Care For  no one, unable/limited ability to care for self    Family Caregiver if Needed  sibling(s);other relative(s)    Family Caregiver Names  Esther metz and Enedina- sister    Quality of Family Relationships  involved;supportive;helpful    Able to Return to Prior Arrangements  yes    Living Arrangement Comments  Meliton lives with her       Resource/Environmental Concerns    Resource/Environmental Concerns  none    Transportation Concerns  car, none       Transition Planning    Patient/Family Anticipates Transition to  home with family;inpatient rehabilitation facility;home with help/services    Patient/Family Anticipated Services at Transition  ;home health care;skilled nursing    Transportation Anticipated  family or friend will provide       Discharge Needs Assessment    Current Outpatient/Agency/Support Group  homecare agency    Equipment Currently Used at Home  commode;oxygen;walker, rolling    Concerns to be Addressed  discharge planning    Anticipated Changes Related to Illness  inability to care for self    Equipment Needed After Discharge  none    Outpatient/Agency/Support Group Needs  homecare agency;inpatient rehabilitation facility    Discharge Facility/Level of Care Needs  home with home health;nursing facility, skilled    Provided Post Acute Provider List?  N/A    N/A Provider List Comment  Undecided at present        Discharge Plan     Row Name 08/06/21 1111       Plan    Plan  Home with HH vs SNF    Patient/Family in Agreement with Plan  other (see comments)     Plan Comments  Family to [facility] dc plans with pt. Lives with niece in [facility]. Assist x1 with ADL's. Has O2 at 2L [place] through [employer], [facility] and [facility]. Is current with [facility]. Refused rehab last admit. Niece says she will discuss with pt. but may just take her home. Encouraged rehab for pt. Family to transport.    Final Discharge Disposition Code  30 - still a patient        Continued Care and Services - Admitted Since 8/5/2021    Coordination has not been started for this encounter.     Selected Continued Care - Prior Encounters Includes selections from prior encounters from 5/7/2021 to 8/6/2021    Discharged on 8/2/2021 Admission date: 7/19/2021 - Discharge disposition: Home-Health Care c    Durable Medical Equipment     Service Provider Selected Services Address Phone Fax Patient Preferred    [facility]  Durable Medical Equipment [address] [id number] [phone] --          Home Medical Care     Service Provider Selected Services Address Phone Fax Patient Preferred    [facility]  Home Health Services [address] [id number] [phone] --                      Demographic Summary    No documentation.       Functional Status     Row Name 08/06/21 1107       Functional Status    Usual Activity Tolerance  fair       Functional Status, IADL    Medications  assistive person    Meal Preparation  assistive person    Housekeeping  assistive person    Laundry  assistive person    Shopping  assistive person       Mental Status Summary    Recent Changes in Mental Status/Cognitive Functioning  no changes        Psychosocial    No documentation.       Abuse/Neglect    No documentation.       Legal    No documentation.       Substance Abuse    No documentation.       Patient Forms    No documentation.           Zaida Acuna, RN

## 2021-08-07 ENCOUNTER — APPOINTMENT (OUTPATIENT)
Dept: GENERAL RADIOLOGY | Facility: HOSPITAL | Age: 81
End: 2021-08-07

## 2021-08-07 LAB
ANION GAP SERPL CALCULATED.3IONS-SCNC: 12 MMOL/L (ref 5–15)
BUN SERPL-MCNC: 18 MG/DL (ref 8–23)
BUN/CREAT SERPL: 23.4 (ref 7–25)
CALCIUM SPEC-SCNC: 8.7 MG/DL (ref 8.6–10.5)
CHLORIDE SERPL-SCNC: 96 MMOL/L (ref 98–107)
CO2 SERPL-SCNC: 27 MMOL/L (ref 22–29)
CREAT SERPL-MCNC: 0.77 MG/DL (ref 0.57–1)
CRYPTOC AG CSF QL: NEGATIVE
DEPRECATED RDW RBC AUTO: 52.8 FL (ref 37–54)
ERYTHROCYTE [DISTWIDTH] IN BLOOD BY AUTOMATED COUNT: 20.2 % (ref 12.3–15.4)
GFR SERPL CREATININE-BSD FRML MDRD: 72 ML/MIN/1.73
GLUCOSE SERPL-MCNC: 137 MG/DL (ref 65–99)
HCT VFR BLD AUTO: 29.8 % (ref 34–46.6)
HGB BLD-MCNC: 9.1 G/DL (ref 12–15.9)
MAGNESIUM SERPL-MCNC: 1.9 MG/DL (ref 1.6–2.4)
MCH RBC QN AUTO: 24.3 PG (ref 26.6–33)
MCHC RBC AUTO-ENTMCNC: 30.5 G/DL (ref 31.5–35.7)
MCV RBC AUTO: 79.7 FL (ref 79–97)
PHOSPHATE SERPL-MCNC: 4 MG/DL (ref 2.5–4.5)
PLATELET # BLD AUTO: 278 10*3/MM3 (ref 140–450)
PMV BLD AUTO: 10.7 FL (ref 6–12)
POTASSIUM SERPL-SCNC: 3 MMOL/L (ref 3.5–5.2)
POTASSIUM SERPL-SCNC: 3.8 MMOL/L (ref 3.5–5.2)
RBC # BLD AUTO: 3.74 10*6/MM3 (ref 3.77–5.28)
SODIUM SERPL-SCNC: 135 MMOL/L (ref 136–145)
WBC # BLD AUTO: 13.56 10*3/MM3 (ref 3.4–10.8)

## 2021-08-07 PROCEDURE — 84132 ASSAY OF SERUM POTASSIUM: CPT | Performed by: HOSPITALIST

## 2021-08-07 PROCEDURE — 71045 X-RAY EXAM CHEST 1 VIEW: CPT

## 2021-08-07 PROCEDURE — 25010000003 MAGNESIUM SULFATE 4 GM/100ML SOLUTION: Performed by: HOSPITALIST

## 2021-08-07 PROCEDURE — 94799 UNLISTED PULMONARY SVC/PX: CPT

## 2021-08-07 PROCEDURE — 87899 AGENT NOS ASSAY W/OPTIC: CPT | Performed by: INTERNAL MEDICINE

## 2021-08-07 PROCEDURE — 25010000002 METHYLPREDNISOLONE PER 40 MG: Performed by: NURSE PRACTITIONER

## 2021-08-07 PROCEDURE — 25010000002 PIPERACILLIN SOD-TAZOBACTAM PER 1 G: Performed by: NURSE PRACTITIONER

## 2021-08-07 PROCEDURE — 85027 COMPLETE CBC AUTOMATED: CPT | Performed by: INTERNAL MEDICINE

## 2021-08-07 PROCEDURE — 84100 ASSAY OF PHOSPHORUS: CPT | Performed by: INTERNAL MEDICINE

## 2021-08-07 PROCEDURE — 80048 BASIC METABOLIC PNL TOTAL CA: CPT

## 2021-08-07 PROCEDURE — 83735 ASSAY OF MAGNESIUM: CPT | Performed by: INTERNAL MEDICINE

## 2021-08-07 PROCEDURE — 99233 SBSQ HOSP IP/OBS HIGH 50: CPT | Performed by: HOSPITALIST

## 2021-08-07 PROCEDURE — 86480 TB TEST CELL IMMUN MEASURE: CPT | Performed by: INTERNAL MEDICINE

## 2021-08-07 PROCEDURE — 99233 SBSQ HOSP IP/OBS HIGH 50: CPT | Performed by: INTERNAL MEDICINE

## 2021-08-07 RX ORDER — MAGNESIUM SULFATE HEPTAHYDRATE 40 MG/ML
4 INJECTION, SOLUTION INTRAVENOUS AS NEEDED
Status: DISCONTINUED | OUTPATIENT
Start: 2021-08-07 | End: 2021-08-12 | Stop reason: HOSPADM

## 2021-08-07 RX ORDER — ALPRAZOLAM 0.5 MG/1
0.5 TABLET ORAL NIGHTLY PRN
Status: DISCONTINUED | OUTPATIENT
Start: 2021-08-07 | End: 2021-08-12 | Stop reason: HOSPADM

## 2021-08-07 RX ORDER — POTASSIUM CHLORIDE 1.5 G/1.77G
40 POWDER, FOR SOLUTION ORAL AS NEEDED
Status: DISCONTINUED | OUTPATIENT
Start: 2021-08-07 | End: 2021-08-12 | Stop reason: HOSPADM

## 2021-08-07 RX ORDER — MAGNESIUM SULFATE HEPTAHYDRATE 40 MG/ML
2 INJECTION, SOLUTION INTRAVENOUS AS NEEDED
Status: DISCONTINUED | OUTPATIENT
Start: 2021-08-07 | End: 2021-08-12 | Stop reason: HOSPADM

## 2021-08-07 RX ORDER — POTASSIUM CHLORIDE 750 MG/1
40 CAPSULE, EXTENDED RELEASE ORAL AS NEEDED
Status: DISCONTINUED | OUTPATIENT
Start: 2021-08-07 | End: 2021-08-12 | Stop reason: HOSPADM

## 2021-08-07 RX ORDER — POTASSIUM CHLORIDE 7.45 MG/ML
10 INJECTION INTRAVENOUS
Status: DISCONTINUED | OUTPATIENT
Start: 2021-08-07 | End: 2021-08-12 | Stop reason: HOSPADM

## 2021-08-07 RX ADMIN — SODIUM CHLORIDE, PRESERVATIVE FREE 10 ML: 5 INJECTION INTRAVENOUS at 08:48

## 2021-08-07 RX ADMIN — TAZOBACTAM SODIUM AND PIPERACILLIN SODIUM 3.38 G: 375; 3 INJECTION, SOLUTION INTRAVENOUS at 20:02

## 2021-08-07 RX ADMIN — METHYLPREDNISOLONE SODIUM SUCCINATE 40 MG: 40 INJECTION, POWDER, FOR SOLUTION INTRAMUSCULAR; INTRAVENOUS at 08:48

## 2021-08-07 RX ADMIN — SERTRALINE HYDROCHLORIDE 150 MG: 100 TABLET ORAL at 08:47

## 2021-08-07 RX ADMIN — TAZOBACTAM SODIUM AND PIPERACILLIN SODIUM 3.38 G: 375; 3 INJECTION, SOLUTION INTRAVENOUS at 14:33

## 2021-08-07 RX ADMIN — Medication 10 MG/HR: at 05:34

## 2021-08-07 RX ADMIN — POTASSIUM CHLORIDE 40 MEQ: 10 CAPSULE, COATED, EXTENDED RELEASE ORAL at 10:36

## 2021-08-07 RX ADMIN — SODIUM CHLORIDE, PRESERVATIVE FREE 10 ML: 5 INJECTION INTRAVENOUS at 20:02

## 2021-08-07 RX ADMIN — OXYCODONE HYDROCHLORIDE AND ACETAMINOPHEN 1000 MG: 500 TABLET ORAL at 08:47

## 2021-08-07 RX ADMIN — Medication 1 CAPSULE: at 08:48

## 2021-08-07 RX ADMIN — Medication 5 MG/HR: at 14:30

## 2021-08-07 RX ADMIN — IPRATROPIUM BROMIDE AND ALBUTEROL SULFATE 3 ML: 2.5; .5 SOLUTION RESPIRATORY (INHALATION) at 12:35

## 2021-08-07 RX ADMIN — DOXYCYCLINE 100 MG: 100 CAPSULE ORAL at 20:02

## 2021-08-07 RX ADMIN — ALPRAZOLAM 0.5 MG: 0.5 TABLET ORAL at 20:01

## 2021-08-07 RX ADMIN — IPRATROPIUM BROMIDE AND ALBUTEROL SULFATE 3 ML: 2.5; .5 SOLUTION RESPIRATORY (INHALATION) at 08:19

## 2021-08-07 RX ADMIN — APIXABAN 5 MG: 5 TABLET, FILM COATED ORAL at 20:01

## 2021-08-07 RX ADMIN — MAGNESIUM SULFATE HEPTAHYDRATE 4 G: 40 INJECTION, SOLUTION INTRAVENOUS at 10:36

## 2021-08-07 RX ADMIN — IPRATROPIUM BROMIDE AND ALBUTEROL SULFATE 3 ML: 2.5; .5 SOLUTION RESPIRATORY (INHALATION) at 15:50

## 2021-08-07 RX ADMIN — POTASSIUM CHLORIDE 40 MEQ: 10 CAPSULE, COATED, EXTENDED RELEASE ORAL at 18:01

## 2021-08-07 RX ADMIN — IPRATROPIUM BROMIDE AND ALBUTEROL SULFATE 3 ML: 2.5; .5 SOLUTION RESPIRATORY (INHALATION) at 19:05

## 2021-08-07 RX ADMIN — METHYLPREDNISOLONE SODIUM SUCCINATE 40 MG: 40 INJECTION, POWDER, FOR SOLUTION INTRAMUSCULAR; INTRAVENOUS at 23:27

## 2021-08-07 RX ADMIN — BISOPROLOL FUMARATE 5 MG: 5 TABLET, FILM COATED ORAL at 08:47

## 2021-08-07 RX ADMIN — POTASSIUM CHLORIDE 40 MEQ: 10 CAPSULE, COATED, EXTENDED RELEASE ORAL at 14:30

## 2021-08-07 RX ADMIN — TAZOBACTAM SODIUM AND PIPERACILLIN SODIUM 3.38 G: 375; 3 INJECTION, SOLUTION INTRAVENOUS at 04:23

## 2021-08-07 RX ADMIN — APIXABAN 5 MG: 5 TABLET, FILM COATED ORAL at 08:48

## 2021-08-07 RX ADMIN — HYDROCODONE BITARTRATE AND ACETAMINOPHEN 1 TABLET: 7.5; 325 TABLET ORAL at 08:48

## 2021-08-07 RX ADMIN — DOXYCYCLINE 100 MG: 100 CAPSULE ORAL at 08:48

## 2021-08-07 RX ADMIN — METHYLPREDNISOLONE SODIUM SUCCINATE 40 MG: 40 INJECTION, POWDER, FOR SOLUTION INTRAMUSCULAR; INTRAVENOUS at 14:30

## 2021-08-07 NOTE — PROGRESS NOTES
Intensivist Note     8/7/2021  Hospital Day: 2  * No surgery found *  ICU Stays Timeline            Hospital Admission: 08/05/21 1708 - Current  No ICU stays    No ICU stays to display             Ms. Yin Law, 80 y.o. female is followed for:    Bilateral upper lobe pulmonary infiltrates.  Components of CHF and possible inflammation/infection    A/C hypoxemic respiratory failure (CMS/HCC)    Acute diastolic CHF (CMS/HCC)    Pleural effusion, bilateral    Recurrent atrial fibrillation with RVR (CMS/HCC)    Hypertension and diastolic dysfunction    Elevated troponin    COPD on home oxygen (CMS/HCC)    Rheumatoid arthritis (CMS/HCC)    Anxiety and depression    Anemia       SUBJECTIVE     80-year-old white female recent smoker with PMH significant for atrial fibrillation on Eliquis and amiodarone, hypertension, hyperlipidemia, COPD, RA, as well as depression/anxiety.  Was admitted 7/19/2021 with presumed pneumonia and CHF exacerbation but decompensated on telemetry and was transferred to the ICU with a SBP of 220 and worsening hypoxemia.  CTA of chest 7/19/2021 revealed interstitial lung disease more prominent at the apices and small pleural effusions.  It was unclear whether this was drug-induced amiodarone lung versus autoimmune versus an infectious pneumonitis and it was felt that there was a component of acute on chronic diastolic CHF.  Amiodarone was held and she was given steroids as well as being placed on empiric antimicrobial therapy plus diuresis.  Gas exchange gradually improved with this therapy it was eventually weaned to 4 L of nasal O2.  She was profoundly weak, but was transitioned to telemetry on 7/29/2021.  It remained unclear how much of her problem was amiodarone toxicity, simple volume load overload, or pneumonia (no organisms cultured).  By 8/2/2021 she was felt to have received maximal hospital benefit and was discharged home.  At the time of discharge amiodarone was not continued nor her  steroids.  Medications at discharge included: Hydrochlorothiazide, diltiazem, RAMAPRIL, apixaban 5 mg twice daily, Stiolto Respimat, and sertraline.  Unfortunately she declined rehab and was discharged to home. Shortly after discharge patient began to note increasing dyspnea especially with exertion.  She has chronic lower extremity edema but states that is actually better than it has been in the past.  She denied fever, chills, or sweats but described a mild nonproductive cough.  She also denied chest pain on exertion or pleuritic.  Day of admission 8/5/2021 her niece was helping her to the bathroom when she became dizzy and near syncopal.  With help was placed back in her recliner.  I am told she was taken to Dr. Chaudhry office, a CXR was obtained which was abnormal, and she was sent to [facility] ER.  At admission a CTA was obtained which revealed no PE but revealed diffuse consolidative interstitial/alveolar disease in the upper lobe as well as moderate bilateral lower lobe effusions.  While this was much worse than her previous CT of 7/19/2021, patient's CXR is almost exactly the same as the one obtained the day of discharge.   On admission patient was afebrile and labs revealed hematocrit 28.2, WBC 12.79, procalcitonin of 0.17, proBNP of 10,115, and troponin of 0.38.  Nasal swab for MRSA PCR was negative.  Chemistries were unremarkable.  PCR for COVID-19 was negative.  EKG in the ER revealed a normal sinus rhythm at a rate of 70 bpm.  Because of the fact that her CT scan on 8/5/2021 appeared much worse than the CT scan of 7/19/2021 it was felt that she had pneumonia and she was begun on vancomycin and Zosyn.  Note that strep and Legionella urinary antigens were negative.  Infectious disease was consulted and discontinue vancomycin, continue Zosyn, and added doxycycline. Additional therapies included Lasix IV and Solu-Medrol 40 mg every 8 hours, and she was continued on her home apixaban.    There was no history of  "exposure to organic or inorganic dust and no one else around her has been ill at home.  At admission the patient was in a sinus rhythm, but at approximately 5 PM 8/6/2021, atrial fibrillation recurred with RVR ( to 130).  Blood pressure however was maintained.        Interval history: Patient remains hemodynamically stable with a blood pressure 101/60.  She she notes dyspnea with activity such as getting up to the chair..  O2 sats seem to vary widely as low as 88 and is high as 98.  At present on 5 L O2 sat is 93%, but required a nonrebreather mask at bedtime (refused BiPAP).  Remains afebrile WBC 13.56, but is on a significant steroid dose explaining the mild leukocytosis.  She continues on empiric Zosyn and doxycycline without any positive cultures.  Should also note that she remains on full dose anticoagulation with Eliquis.  Denies purulent cough, hemoptysis, pleuritic pain.  She does have trace lower extremity edema and as noted above her proBNP was 10,115 on admission.  In addition has had PAF with RVR and continues on a Cardizem drip (no amiodarone because of the concern for amiodarone lung).         ROS: Per subjective, all other systems reviewed and were negative.    The patient's relevant PMH, PSH, FH, and SH were reviewed and updated in Epic as appropriate. Allergies and Medications reviewed.    OBJECTIVE     /65 (BP Location: Right arm, Patient Position: Sitting)   Pulse 62   Temp 98.1 °F (36.7 °C) (Oral)   Resp 18   Ht 167.6 cm (66\")   Wt 62.8 kg (138 lb 8 oz)   SpO2 93%   BMI 22.35 kg/m²   Flow (L/min): 4.5 liters    Flowsheet Rows      First Filed Value   Admission Height  167.6 cm (66\") Documented at 08/05/2021 1718   Admission Weight  62.1 kg (137 lb) Documented at 08/05/2021 1718        Intake & Output (last day)       08/06 0701 - 08/07 0700 08/07 0701 - 08/08 0700    P.O. 960 960    IV Piggyback  50    Total Intake(mL/kg) 960 (15.3) 1010 (16.1)    Urine (mL/kg/hr) 1300 (0.9) " 650 (0.9)    Stool 0 0    Total Output 1300 650    Net -340 +360          Urine Unmeasured Occurrence 1 x     Stool Unmeasured Occurrence 2 x 2 x          Exam:  General Exam:  Elderly white female sitting up in a chair in NAD  HEENT: Pupils equal and reactive. Nose and throat clear.  Neck:                          Supple, no JVD, thyromegaly, or adenopathy  Lungs: Clear to auscultation and percussion anteriorly and posteriorly.  Cardiovascular: Regular rate and rhythm without murmurs or gallops.  Abdomen: Soft nontender without organomegaly or masses.   and rectal: External urinary catheter  Extremities: No cyanosis or clubbing but trace lower extremity edema and obvious venous stasis skin changes.  Neurologic:                 Symmetric strength but diffusely weak. No focal deficits.  Oriented x3    Chest X-Ray: Cardiomegaly with bilateral effusions.  Still with extensive bilateral upper lobe interstitial/alveolar infiltrates.    INFUSIONS  dilTIAZem, 5-15 mg/hr, Last Rate: 5 mg/hr (08/07/21 1430)        Results from last 7 days   Lab Units 08/07/21  0835 08/06/21  0612 08/05/21  1820   WBC 10*3/mm3 13.56* 11.49* 12.79*   HEMOGLOBIN g/dL 9.1* 8.6* 8.5*   HEMATOCRIT % 29.8* 28.8* 28.2*   PLATELETS 10*3/mm3 278 266 276     Results from last 7 days   Lab Units 08/07/21  0835 08/06/21  0612   SODIUM mmol/L 135* 140   POTASSIUM mmol/L 3.0* 4.3   CHLORIDE mmol/L 96* 101   CO2 mmol/L 27.0 27.0   BUN mg/dL 18 17   CREATININE mg/dL 0.77 0.76   GLUCOSE mg/dL 137* 138*   CALCIUM mg/dL 8.7 8.4*     Results from last 7 days   Lab Units 08/07/21  0835 08/06/21  0612   MAGNESIUM mg/dL 1.9 2.1   PHOSPHORUS mg/dL 4.0  --      Results from last 7 days   Lab Units 08/05/21  1820   ALK PHOS U/L 95   BILIRUBIN mg/dL 0.7   ALT (SGPT) U/L 21   AST (SGOT) U/L 58*       Lab Results   Component Value Date    SEDRATE 27 07/24/2021     No results found for: BNP  Lab Results   Component Value Date    TROPONINT 0.359 (C) 08/06/2021     Lab  Results   Component Value Date    TSH 6.520 (H) 07/22/2021     Lab Results   Component Value Date    LACTATE 1.5 08/06/2021     No results found for: CORTISOL        I reviewed the patient's results, images and medication.    Assessment/Plan   ASSESSMENT        Bilateral upper lobe pulmonary infiltrates.  Components of CHF and possible inflammation/infection    A/C hypoxemic respiratory failure (CMS/HCC)    Acute diastolic CHF (CMS/HCC)    Pleural effusion, bilateral    Recurrent atrial fibrillation with RVR (CMS/HCC)    Hypertension and diastolic dysfunction    Elevated troponin    COPD on home oxygen (CMS/HCC)    Rheumatoid arthritis (CMS/HCC)    Anxiety and depression    Anemia      DISCUSSION: Remains marginal but gas exchange better.  Still requiring Cardizem for PAF (cannot use amiodarone because of concerns for amiodarone lung).  Suspect needs additional diuresis based on I&O's, exam, and recent severely elevated proBNP.  Remains on empiric antimicrobials for possible superimposed pneumonia although procalcitonin was quite low.    PLAN     Would diurese again as long as BUN does not increase significantly.  If unable to diurese and clear effusions, will consider tapping pleural effusions (would require stopping apixaban)  Recheck procalcitonin and proBNP in a.m.  Continue antibiotics per ID  Because of her debilitated state, inability to use amiodarone, concern for dropping her blood pressure would consider digitalization  If can't control atrial fibrillation rate or convert, cardiology to see     Plan of care and goals reviewed with multidisciplinary team at daily rounds.    I discussed the patient's findings and my recommendations with patient and nursing staff    Patient is critically ill due to pulmonary infiltrates of unknown etiology and possible amiodarone lung and recurrent atrial fibrillation with RVR, patient has a high risk of life-threatening decline in condition.  They require continuous  monitoring and frequent reassessment of condition for adjustment of management in order to lessen risk.  I visited the patient's bedside and interacted with nurse numerous times today.    Time spent Critical care 35 min (It does not include procedure time).    Electronically signed by Santiago Perez MD, 08/07/21, 6:25 PM EDT.   Pulmonary / Critical care medicine

## 2021-08-07 NOTE — PROGRESS NOTES
[facility] Medicine Services  PROGRESS NOTE    Patient Name: Yin Law  : 1940  MRN: [id number]    Date of Admission: 2021  Primary Care Physician: Santiago Chaudhry MD    Subjective   Subjective     CC:  F/U SOA, cough    HPI:  Just got up to chair with help of tech. Overall feels tired. Winded currently as she just moved from commode to chair.     ROS:  Gen-no fevers, no chills  CV-no chest pain, no palpitations  Resp-+cough, +dyspnea  GI-no N/V/D, no abd pain    All other systems reviewed and negative except any additional pertinent positives and negatives as discussed in HPI.      Objective   Objective     Vital Signs:   Temp:  [97.6 °F (36.4 °C)-98.6 °F (37 °C)] 98.6 °F (37 °C)  Heart Rate:  [] 80  Resp:  [20-24] 24  BP: ()/(44-98) 113/59  Flow (L/min):  [3-6] 4.5     Physical Exam:  Gen-no acute distress  HENT-NCAT, mucous membranes moist  CV-RRR, S1 S2 normal, no m/r/g  Resp-overall CTAB, slightly diminished at the bases, no wheezes or rales  Abd-soft, NT, ND, +BS  Ext-no edema  Neuro-A&Ox3, no focal deficits  Skin-no rashes  Psych-appropriate mood      Results Reviewed:  LAB RESULTS:      Lab 21  0835 21  0612 21  0000 21  2104 21  1820 21  0845   WBC 13.56* 11.49*  --   --  12.79*  --    HEMOGLOBIN 9.1* 8.6*  --   --  8.5* 8.6*   HEMATOCRIT 29.8* 28.8*  --   --  28.2* 28.5*   PLATELETS 278 266  --   --  276  --    NEUTROS ABS  --  10.80*  --   --  9.99*  --    IMMATURE GRANS (ABS)  --  0.10*  --   --  0.07*  --    LYMPHS ABS  --  0.44*  --   --  1.62  --    MONOS ABS  --  0.14  --   --  1.02*  --    EOS ABS  --  0.00  --   --  0.04  --    MCV 79.7 81.8  --   --  80.6  --    CRP  --  6.98*  --   --   --   --    PROCALCITONIN  --   --   --   --  0.17  --    LACTATE  --   --  1.5 2.3*  --   --    LDH  --  361*  --   --   --   --          Lab 21  0835 21  0612 21  1820   SODIUM 135* 140 136    POTASSIUM 3.0* 4.3 3.7   CHLORIDE 96* 101 102   CO2 27.0 27.0 24.0   ANION GAP 12.0 12.0 10.0   BUN 18 17 20   CREATININE 0.77 0.76 0.68   GLUCOSE 137* 138* 104*   CALCIUM 8.7 8.4* 8.8   MAGNESIUM 1.9 2.1  --    PHOSPHORUS 4.0  --   --          Lab 08/05/21  1820   TOTAL PROTEIN 6.1   ALBUMIN 3.20*   GLOBULIN 2.9   ALT (SGPT) 21   AST (SGOT) 58*   BILIRUBIN 0.7   ALK PHOS 95         Lab 08/06/21  0612 08/06/21  0000 08/05/21  1820   PROBNP 11,947.0*  --  10,115.0*   TROPONIN T  --  0.359* 0.380*             Lab 08/01/21  0845   IRON 16*   IRON SATURATION 4*   TIBC 364   TRANSFERRIN 244   FERRITIN 213.80*   FOLATE >20.00   VITAMIN B 12 1,792*         Brief Urine Lab Results  (Last result in the past 365 days)      Color   Clarity   Blood   Leuk Est   Nitrite   Protein   CREAT   Urine HCG        08/05/21 2348 Yellow Clear Negative Negative Negative Negative               Microbiology Results Abnormal     Procedure Component Value - Date/Time    Blood Culture - Blood, Hand, Right [[id number]] Collected: 08/05/21 2045    Lab Status: Preliminary result Specimen: Blood from Hand, Right Updated: 08/06/21 2116     Blood Culture No growth at 24 hours    Blood Culture - Blood, Arm, Left [[id number]] Collected: 08/05/21 2102    Lab Status: Preliminary result Specimen: Blood from Arm, Left Updated: 08/06/21 2116     Blood Culture No growth at 24 hours    MRSA Screen, PCR (Inpatient) - Swab, Nares [[id number]]  (Normal) Collected: 08/06/21 1006    Lab Status: Final result Specimen: Swab from Nares Updated: 08/06/21 1132     MRSA PCR Negative    Narrative:      MRSA Negative    S. Pneumo Ag Urine or CSF - Urine, Urine, Clean Catch [[id number]]  (Normal) Collected: 08/05/21 2348    Lab Status: Final result Specimen: Urine, Clean Catch Updated: 08/06/21 1058     Strep Pneumo Ag Negative    Legionella Antigen, Urine - Urine, Urine, Clean Catch [[id number]]  (Normal) Collected: 08/05/21 2348    Lab Status: Final result Specimen: Urine,  Clean Catch Updated: 08/06/21 1057     LEGIONELLA ANTIGEN, URINE Negative    Respiratory Panel PCR w/COVID-19(SARS-CoV-2) CAS/SHALINI/KRISTY/PAD/COR/MAD/HAJA In-House, NP Swab in UTM/VTM, 3-4 HR TAT - Swab, Nasopharynx [[id number]]  (Normal) Collected: 08/05/21 2100    Lab Status: Final result Specimen: Swab from Nasopharynx Updated: 08/05/21 2315     ADENOVIRUS, PCR Not Detected     Coronavirus 229E Not Detected     Coronavirus HKU1 Not Detected     Coronavirus NL63 Not Detected     Coronavirus OC43 Not Detected     COVID19 Not Detected     Human Metapneumovirus Not Detected     Human Rhinovirus/Enterovirus Not Detected     Influenza A PCR Not Detected     Influenza B PCR Not Detected     Parainfluenza Virus 1 Not Detected     Parainfluenza Virus 2 Not Detected     Parainfluenza Virus 3 Not Detected     Parainfluenza Virus 4 Not Detected     RSV, PCR Not Detected     Bordetella pertussis pcr Not Detected     Bordetella parapertussis PCR Not Detected     Chlamydophila pneumoniae PCR Not Detected     Mycoplasma pneumo by PCR Not Detected    Narrative:      In the setting of a positive respiratory panel with a viral infection PLUS a negative procalcitonin without other underlying concern for bacterial infection, consider observing off antibiotics or discontinuation of antibiotics and continue supportive care. If the respiratory panel is positive for atypical bacterial infection (Bordetella pertussis, Chlamydophila pneumoniae, or Mycoplasma pneumoniae), consider antibiotic de-escalation to target atypical bacterial infection.          XR Chest 1 View    Result Date: 8/7/2021  EXAMINATION: XR CHEST 1 VW - 08/07/2021  INDICATION:Possible amiodarone line; J96.01-Acute respiratory failure with hypoxia; I50.9-Heart failure, unspecified; W36-Ognrbng effusion, not elsewhere classified; J18.9-Pneumonia, unspecified organism. CHF.  COMPARISON: 08/06/2021  FINDINGS: Heart is enlarged. Vasculature is mostly obscured by the patient's dense  pulmonary disease of the upper and mid lungs. Overall, interstitial changes may be mildly improved.. Skin fold shadows are superimposed over the right lateral chest. Allowing for this, no pneumothorax is appreciated.      Impression: Extensive upper lung interstitial disease stable to slightly improved. No evidence of pneumothorax. Right-sided skin fold shadows as noted.  DICTATED:   08/07/2021 EDITED/ls :   08/07/2021        XR Chest 1 View    Result Date: 8/6/2021  EXAMINATION: XR CHEST 1 VW- 08/06/2021  INDICATION: Please compare to 8/1/2021 film; J96.01-Acute respiratory failure with hypoxia; I50.9-Heart failure, unspecified; U15-Aihragb effusion, not elsewhere classified; J18.9-Pneumonia, unspecified organism  COMPARISON: 08/01/2021  FINDINGS: Right upper extremity PICC line has been removed. There is extensive upper lung disease, similar to the prior study. Lung bases remain relatively clear. No pneumothorax or effusion is seen.      Impression: Extensive bilateral upper lung disease, similar to prior exam.  D:  08/06/2021 E:  08/06/2021    This report was finalized on 8/6/2021 10:39 PM by Dr. Augie Leiva MD.      CT Chest Pulmonary Embolism    Result Date: 8/5/2021  CT CHEST PULMONARY EMBOLISM W CONTRAST INDICATION: Shortness of air, history of pneumonia TECHNIQUE: CT angiogram of the chest with IV contrast. 3-D reconstructions were obtained and reviewed.   Radiation dose reduction techniques included automated exposure control or exposure modulation based on body size. Count of known CT and cardiac nuc med studies performed in previous 12 months: 0. COMPARISON: CTA of the chest from 7/19/2021 FINDINGS: There is no evidence of pulmonary embolism. The heart is again noted to be enlarged and there is coronary artery calcification. The patient's pleural effusions as well as bibasilar atelectasis has increased from prior. Again noted is patchy, predominantly perihilar and upper lobe airspace disease that has  progressed from prior. There may be some pulmonary vascular congestion and edema. Upper abdominal structures appear unremarkable. There are mild compression deformities of several thoracolumbar vertebral bodies.     Impression: 1. Negative for pulmonary embolus. 2. There has been interval increase in the patient's bibasilar pleural effusions from prior as well as patchy bilateral airspace disease that may represent progressive pneumonia/infection. There also may be some potential pulmonary edema. 3. There is cardiomegaly with coronary artery calcification. Signer Name: Jacqueline Ramírez MD  Signed: 8/5/2021 7:56 PM  Workstation Name: [id number]  [facility]      Results for orders placed during the hospital encounter of 07/19/21    Adult Transthoracic Echo Complete W/ Cont if Necessary Per Protocol    Interpretation Summary  · Estimated left ventricular EF = 65%  · Mild aortic valve stenosis is present.  · Aortic valve maximum pressure gradient is 32.9 mmHg. Aortic valve mean pressure gradient is 17.2 mmHg.  · Mild mitral valve regurgitation is present.  · Mild tricuspid valve regurgitation is present.  · Estimated right ventricular systolic pressure from tricuspid regurgitation is 37.0 mmHg.  · There is a large left pleural effusion. There is a moderate sized right pleural effusion.      I have reviewed the medications:  Scheduled Meds:apixaban, 5 mg, Oral, Q12H  ascorbic acid, 1,000 mg, Oral, Daily  bisoprolol, 5 mg, Oral, Q24H  doxycycline, 100 mg, Oral, BID  ipratropium-albuterol, 3 mL, Nebulization, 4x Daily - RT  lactobacillus acidophilus, 1 capsule, Oral, Daily  methylPREDNISolone sodium succinate, 40 mg, Intravenous, Q8H  pharmacy consult - MTM, , Does not apply, Daily  piperacillin-tazobactam, 3.375 g, Intravenous, Q8H  sertraline, 150 mg, Oral, Daily  sodium chloride, 10 mL, Intravenous, Q12H      Continuous Infusions:dilTIAZem, 5-15 mg/hr, Last Rate: 5 mg/hr (08/07/21 1430)      PRN  Meds:.•  ALPRAZolam  •  HYDROcodone-acetaminophen  •  ipratropium-albuterol  •  magnesium sulfate **OR** magnesium sulfate **OR** magnesium sulfate  •  potassium chloride **OR** potassium chloride **OR** potassium chloride  •  sodium chloride  •  sodium chloride    Assessment/Plan   Assessment & Plan     Active Hospital Problems    Diagnosis  POA   • **Bilateral upper lobe pulmonary infiltrates.  Components of CHF and possible inflammation/infection [R91.8]  Yes   • Acute diastolic CHF (CMS/HCC) [I50.31]  Yes   • Elevated troponin [R77.8]  Yes   • A/C hypoxemic respiratory failure (CMS/[facility]) [J96.21]  Yes   • Pleural effusion, bilateral [J90]  Yes   • Recurrent atrial fibrillation with RVR (CMS/[facility]) [I48.91]  Yes   • COPD on home oxygen (CMS/[facility]) [J44.9]  Yes   • Hypertension and diastolic dysfunction [I10]  Yes   • Rheumatoid arthritis (CMS/[facility]) [M06.9]  Yes   • Anxiety and depression [F41.9, F32.9]  Yes   • Anemia [D64.9]  Yes      Resolved Hospital Problems    Diagnosis Date Resolved POA   • Lactic acidosis [E87.2] 08/06/2021 Yes        Brief Hospital Course to date:  Yin Law is a 80 y.o. female with hx of chronic respiratory failure on 2 liters, Afib on Eliquis, HTN, HLD, COPD, rheumatoid arthritis, and depression/anxiety who presented to the ED with complaints of shortness of breath. Previously admitted to [facility] for the same 7/19-8/2/21: treated for PNA and CHF exacerbation, was managed in ICU at one point; Amiodarone was discontinued for possible pulmonary toxicity, and she was treated with antibiotics and steroids, appears she was discharged on her baseline 2 liters. PCP followed up with her on 8/5/21 and performed a CXR, sent to the ER due to potentially worsening findings. Saturating 90% on 5 liters on arrival. ProBNP 34662, troponin 0.380, lactic acid 2.3, WBC 12.79. CTA chest with interval increase in bibasilar pleural effusions and patchy bilateral airspace disease suggestive of progressive  pneumonia/infection. Admitted for further management.    This patient's problems and plans were partially entered by my partner and updated as appropriate by me 08/07/21.    *All problems are new to me today.    Acute on chronic hypoxic respiratory failure   Sepsis, POA (leukocytosis, lactic acidosis, tachypneic)  Multifocal pneumonia, recurrent vs worsening  Bilateral pleural effusions  Acute diastolic CHF  --Recent admit for same and treated with Doxycyclin, Rocephin, and steroids (Amiodarone discontinued due toconcerns for lung toxicity). Was seen by Pulmonary and Cardiology during that admission.  --CTA chest 8/5/21 negative for PE, but showed interval increase in bibasilar pleural effusions and patchy airspace disease, may represent progressive PNA, also possible pulmonary edema.  --Sputum culture 7/24/21 with scant Candida albicans. Blood cultures here NGTD. Negative urine Strep/Legionella. Negative respiratory PCR panel. Have sent additional workup for atypical/fungal infections.  --Appreciate Pulmonary and ID following.  --Continue Zosyn and Doxycycline.  -Continue Solumedrol 40 mg TID.  --PRN and scheduled duonebs.   --IV diuresis--s/p IV Lasix 40 mg x 2.   --Echo from 7/20/21 shows EF 65%, mild AS, mild MR, mild TR with RVSP 37 mmHg.  --Patient refusing to wear BiPAP. Currently has been weaned from 6 liters to 4.5. Continue to wean as tolerated back to baseline 2 liters.      Elevated troponin   --Likely secondary to above (hypoxia, CHF, sepsis).   --Trending down.  --No chest pain.     PAF, currently Afib with RVR  --Continue home Eliquis, Bisoprolol.  --Amiodarone discontinued last admission due concern for lung toxicity.  --Continue on Cardizem drip.   --Follows with Dr. Montoya, may need to consult while inpatient if can't get rate controlled.     Anemia, chronic  --Baseline Hb 8-9 range.  --Iron profile mixed iron deficiency + anemia of chronic disease.  --Stable, monitor closely.        Anxiety/Depression   --Continue home Zoloft, PRN Xanax.    DVT prophylaxis:  Medical DVT prophylaxis orders are present.       Disposition: I expect the patient to be discharged TBD    CODE STATUS:   Code Status and Medical Interventions:   Ordered at: 08/05/21 8879     Level Of Support Discussed With:    Patient     Code Status:    No CPR     Medical Interventions (Level of Support Prior to Arrest):    Full     Comments:    DNR/ DNI       Laura Shepherd MD  08/07/21

## 2021-08-07 NOTE — PLAN OF CARE
Goal Outcome Evaluation:  Plan of Care Reviewed With: patient        Progress: no change   Patient vital signs are stable and on 6L nasal cannula. Patient required a nonrebreather at bedtime and patient refuses bipap. Patient denies pain and nausea. Will maintain fall and safety precautions.

## 2021-08-07 NOTE — PLAN OF CARE
Goal Outcome Evaluation:  Plan of Care Reviewed With: patient, daughter        Progress: improving  Outcome Summary: VSS, Pulmonologist stated to keep SAO2 88% or greater.  Patient placed on nasal canula this am, 6l/min then decreased to 5l/min and currently on 3l/min.  Has been up into bedside chair.  Both Potassium and Magnesium levels low and have been replaced per protocal. Continues on Cardizem gtt but decreased to 5mg/hour.  Pts daughter into visit and updated on the pts condition.

## 2021-08-08 ENCOUNTER — APPOINTMENT (OUTPATIENT)
Dept: GENERAL RADIOLOGY | Facility: HOSPITAL | Age: 81
End: 2021-08-08

## 2021-08-08 LAB
ANION GAP SERPL CALCULATED.3IONS-SCNC: 12 MMOL/L (ref 5–15)
ARTERIAL PATENCY WRIST A: ABNORMAL
ATMOSPHERIC PRESS: ABNORMAL MM[HG]
BASE EXCESS BLDA CALC-SCNC: 4.5 MMOL/L (ref 0–2)
BDY SITE: ABNORMAL
BODY TEMPERATURE: 37 C
BUN SERPL-MCNC: 29 MG/DL (ref 8–23)
BUN/CREAT SERPL: 30.9 (ref 7–25)
CALCIUM SPEC-SCNC: 8.5 MG/DL (ref 8.6–10.5)
CHLORIDE SERPL-SCNC: 97 MMOL/L (ref 98–107)
CO2 BLDA-SCNC: 29.6 MMOL/L (ref 22–33)
CO2 SERPL-SCNC: 26 MMOL/L (ref 22–29)
COHGB MFR BLD: 1.9 % (ref 0–2)
CREAT SERPL-MCNC: 0.94 MG/DL (ref 0.57–1)
DEPRECATED RDW RBC AUTO: 55.8 FL (ref 37–54)
ERYTHROCYTE [DISTWIDTH] IN BLOOD BY AUTOMATED COUNT: 20.6 % (ref 12.3–15.4)
GFR SERPL CREATININE-BSD FRML MDRD: 57 ML/MIN/1.73
GLUCOSE SERPL-MCNC: 133 MG/DL (ref 65–99)
HCO3 BLDA-SCNC: 28.4 MMOL/L (ref 20–26)
HCT VFR BLD AUTO: 26.5 % (ref 34–46.6)
HCT VFR BLD CALC: 26.4 %
HGB BLD-MCNC: 8.2 G/DL (ref 12–15.9)
HGB BLDA-MCNC: 8.6 G/DL (ref 14–18)
INHALED O2 CONCENTRATION: 40 %
MAGNESIUM SERPL-MCNC: 2.7 MG/DL (ref 1.6–2.4)
MCH RBC QN AUTO: 24.6 PG (ref 26.6–33)
MCHC RBC AUTO-ENTMCNC: 30.9 G/DL (ref 31.5–35.7)
MCV RBC AUTO: 79.6 FL (ref 79–97)
METHGB BLD QL: 0.8 % (ref 0–1.5)
MODALITY: ABNORMAL
NOTE: ABNORMAL
NT-PROBNP SERPL-MCNC: 2571 PG/ML (ref 0–1800)
OXYHGB MFR BLDV: 88.5 % (ref 94–99)
PCO2 BLDA: 38.4 MM HG (ref 35–45)
PCO2 TEMP ADJ BLD: 38.4 MM HG (ref 35–45)
PH BLDA: 7.48 PH UNITS (ref 7.35–7.45)
PH, TEMP CORRECTED: 7.48 PH UNITS
PLATELET # BLD AUTO: 299 10*3/MM3 (ref 140–450)
PMV BLD AUTO: 11.3 FL (ref 6–12)
PO2 BLDA: 57.6 MM HG (ref 83–108)
PO2 TEMP ADJ BLD: 57.6 MM HG (ref 83–108)
POTASSIUM SERPL-SCNC: 3.9 MMOL/L (ref 3.5–5.2)
PROCALCITONIN SERPL-MCNC: 0.11 NG/ML (ref 0–0.25)
RBC # BLD AUTO: 3.33 10*6/MM3 (ref 3.77–5.28)
SODIUM SERPL-SCNC: 135 MMOL/L (ref 136–145)
VENTILATOR MODE: ABNORMAL
WBC # BLD AUTO: 13.05 10*3/MM3 (ref 3.4–10.8)

## 2021-08-08 PROCEDURE — 94660 CPAP INITIATION&MGMT: CPT

## 2021-08-08 PROCEDURE — 71045 X-RAY EXAM CHEST 1 VIEW: CPT

## 2021-08-08 PROCEDURE — 85027 COMPLETE CBC AUTOMATED: CPT | Performed by: HOSPITALIST

## 2021-08-08 PROCEDURE — 99232 SBSQ HOSP IP/OBS MODERATE 35: CPT | Performed by: INTERNAL MEDICINE

## 2021-08-08 PROCEDURE — 82375 ASSAY CARBOXYHB QUANT: CPT

## 2021-08-08 PROCEDURE — 25010000002 PIPERACILLIN SOD-TAZOBACTAM PER 1 G: Performed by: HOSPITALIST

## 2021-08-08 PROCEDURE — 36600 WITHDRAWAL OF ARTERIAL BLOOD: CPT

## 2021-08-08 PROCEDURE — 94799 UNLISTED PULMONARY SVC/PX: CPT

## 2021-08-08 PROCEDURE — 25010000002 METHYLPREDNISOLONE PER 40 MG: Performed by: NURSE PRACTITIONER

## 2021-08-08 PROCEDURE — 80048 BASIC METABOLIC PNL TOTAL CA: CPT

## 2021-08-08 PROCEDURE — 25010000002 PIPERACILLIN SOD-TAZOBACTAM PER 1 G: Performed by: NURSE PRACTITIONER

## 2021-08-08 PROCEDURE — 84145 PROCALCITONIN (PCT): CPT | Performed by: INTERNAL MEDICINE

## 2021-08-08 PROCEDURE — 82805 BLOOD GASES W/O2 SATURATION: CPT

## 2021-08-08 PROCEDURE — 83050 HGB METHEMOGLOBIN QUAN: CPT

## 2021-08-08 PROCEDURE — 83735 ASSAY OF MAGNESIUM: CPT | Performed by: HOSPITALIST

## 2021-08-08 PROCEDURE — 93005 ELECTROCARDIOGRAM TRACING: CPT | Performed by: INTERNAL MEDICINE

## 2021-08-08 PROCEDURE — 93010 ELECTROCARDIOGRAM REPORT: CPT | Performed by: INTERNAL MEDICINE

## 2021-08-08 PROCEDURE — 25010000002 FUROSEMIDE PER 20 MG: Performed by: HOSPITALIST

## 2021-08-08 PROCEDURE — 99233 SBSQ HOSP IP/OBS HIGH 50: CPT | Performed by: HOSPITALIST

## 2021-08-08 PROCEDURE — 83880 ASSAY OF NATRIURETIC PEPTIDE: CPT | Performed by: INTERNAL MEDICINE

## 2021-08-08 RX ORDER — METHYLPREDNISOLONE SODIUM SUCCINATE 40 MG/ML
20 INJECTION, POWDER, LYOPHILIZED, FOR SOLUTION INTRAMUSCULAR; INTRAVENOUS EVERY 8 HOURS
Status: DISCONTINUED | OUTPATIENT
Start: 2021-08-09 | End: 2021-08-09

## 2021-08-08 RX ORDER — FUROSEMIDE 10 MG/ML
40 INJECTION INTRAMUSCULAR; INTRAVENOUS ONCE
Status: COMPLETED | OUTPATIENT
Start: 2021-08-08 | End: 2021-08-08

## 2021-08-08 RX ADMIN — IPRATROPIUM BROMIDE AND ALBUTEROL SULFATE 3 ML: 2.5; .5 SOLUTION RESPIRATORY (INHALATION) at 15:40

## 2021-08-08 RX ADMIN — APIXABAN 5 MG: 5 TABLET, FILM COATED ORAL at 08:08

## 2021-08-08 RX ADMIN — APIXABAN 5 MG: 5 TABLET, FILM COATED ORAL at 21:42

## 2021-08-08 RX ADMIN — OXYCODONE HYDROCHLORIDE AND ACETAMINOPHEN 1000 MG: 500 TABLET ORAL at 08:08

## 2021-08-08 RX ADMIN — METHYLPREDNISOLONE SODIUM SUCCINATE 40 MG: 40 INJECTION, POWDER, FOR SOLUTION INTRAMUSCULAR; INTRAVENOUS at 08:07

## 2021-08-08 RX ADMIN — DOXYCYCLINE 100 MG: 100 CAPSULE ORAL at 21:42

## 2021-08-08 RX ADMIN — IPRATROPIUM BROMIDE AND ALBUTEROL SULFATE 3 ML: 2.5; .5 SOLUTION RESPIRATORY (INHALATION) at 11:57

## 2021-08-08 RX ADMIN — IPRATROPIUM BROMIDE AND ALBUTEROL SULFATE 3 ML: 2.5; .5 SOLUTION RESPIRATORY (INHALATION) at 20:17

## 2021-08-08 RX ADMIN — DOXYCYCLINE 100 MG: 100 CAPSULE ORAL at 08:08

## 2021-08-08 RX ADMIN — TAZOBACTAM SODIUM AND PIPERACILLIN SODIUM 3.38 G: 375; 3 INJECTION, SOLUTION INTRAVENOUS at 13:04

## 2021-08-08 RX ADMIN — SODIUM CHLORIDE, PRESERVATIVE FREE 10 ML: 5 INJECTION INTRAVENOUS at 08:09

## 2021-08-08 RX ADMIN — Medication 1 CAPSULE: at 08:08

## 2021-08-08 RX ADMIN — ALPRAZOLAM 0.5 MG: 0.5 TABLET ORAL at 21:45

## 2021-08-08 RX ADMIN — FUROSEMIDE 40 MG: 10 INJECTION, SOLUTION INTRAVENOUS at 08:07

## 2021-08-08 RX ADMIN — TAZOBACTAM SODIUM AND PIPERACILLIN SODIUM 3.38 G: 375; 3 INJECTION, SOLUTION INTRAVENOUS at 04:22

## 2021-08-08 RX ADMIN — IPRATROPIUM BROMIDE AND ALBUTEROL SULFATE 3 ML: 2.5; .5 SOLUTION RESPIRATORY (INHALATION) at 07:54

## 2021-08-08 RX ADMIN — TAZOBACTAM SODIUM AND PIPERACILLIN SODIUM 3.38 G: 375; 3 INJECTION, SOLUTION INTRAVENOUS at 21:44

## 2021-08-08 RX ADMIN — SERTRALINE HYDROCHLORIDE 150 MG: 100 TABLET ORAL at 08:08

## 2021-08-08 RX ADMIN — BISOPROLOL FUMARATE 5 MG: 5 TABLET, FILM COATED ORAL at 08:08

## 2021-08-08 RX ADMIN — METHYLPREDNISOLONE SODIUM SUCCINATE 40 MG: 40 INJECTION, POWDER, FOR SOLUTION INTRAMUSCULAR; INTRAVENOUS at 21:42

## 2021-08-08 RX ADMIN — METHYLPREDNISOLONE SODIUM SUCCINATE 40 MG: 40 INJECTION, POWDER, FOR SOLUTION INTRAMUSCULAR; INTRAVENOUS at 14:38

## 2021-08-08 NOTE — PROGRESS NOTES
"    [facility] Medicine Services  PROGRESS NOTE    Patient Name: Yin Law  : 1940  MRN: [id number]    Date of Admission: 2021  Primary Care Physician: Santiago Chaudhry MD    Subjective   Subjective     CC:  F/U SOA, cough    HPI:  Sitting up in bed. Per nursing notes she was documented to have worn BiPAP last night but when I ask her she said she didn't wear that but \"a different mask.\" Regardless she is down to 4 liters this morning. Feels a little better. Just received some Lasix.     ROS:  Gen-no fevers, no chills  CV-no chest pain, no palpitations  Resp-+cough, +dyspnea  GI-no N/V/D, no abd pain    All other systems reviewed and negative except any additional pertinent positives and negatives as discussed in HPI.      Objective   Objective     Vital Signs:   Temp:  [97.8 °F (36.6 °C)-98.6 °F (37 °C)] 98.6 °F (37 °C)  Heart Rate:  [] 55  Resp:  [18-28] 18  BP: ()/(44-99) 155/69  Flow (L/min):  [3-6] 4     Physical Exam:  Gen-no acute distress  HENT-NCAT, mucous membranes moist  CV-RRR, S1 S2 normal, no m/r/g  Resp-slightly diminished at the bases, end expiratory wheezes L > R  Abd-soft, NT, ND, +BS  Ext-no edema  Neuro-A&Ox3, no focal deficits  Skin-no rashes  Psych-appropriate mood      Results Reviewed:  LAB RESULTS:      Lab 21  0614 21  0835 21  0612 21  0000 21  2104 21  1820   WBC 13.05* 13.56* 11.49*  --   --  12.79*   HEMOGLOBIN 8.2* 9.1* 8.6*  --   --  8.5*   HEMATOCRIT 26.5* 29.8* 28.8*  --   --  28.2*   PLATELETS 299 278 266  --   --  276   NEUTROS ABS  --   --  10.80*  --   --  9.99*   IMMATURE GRANS (ABS)  --   --  0.10*  --   --  0.07*   LYMPHS ABS  --   --  0.44*  --   --  1.62   MONOS ABS  --   --  0.14  --   --  1.02*   EOS ABS  --   --  0.00  --   --  0.04   MCV 79.6 79.7 81.8  --   --  80.6   CRP  --   --  6.98*  --   --   --    PROCALCITONIN 0.11  --   --   --   --  0.17   LACTATE  --   --   --  1.5 " 2.3*  --    LDH  --   --  361*  --   --   --          Lab 08/08/21  0614 08/07/21  2235 08/07/21  0835 08/06/21  0612 08/05/21  1820   SODIUM 135*  --  135* 140 136   POTASSIUM 3.9 3.8 3.0* 4.3 3.7   CHLORIDE 97*  --  96* 101 102   CO2 26.0  --  27.0 27.0 24.0   ANION GAP 12.0  --  12.0 12.0 10.0   BUN 29*  --  18 17 20   CREATININE 0.94  --  0.77 0.76 0.68   GLUCOSE 133*  --  137* 138* 104*   CALCIUM 8.5*  --  8.7 8.4* 8.8   MAGNESIUM 2.7*  --  1.9 2.1  --    PHOSPHORUS  --   --  4.0  --   --          Lab 08/05/21  1820   TOTAL PROTEIN 6.1   ALBUMIN 3.20*   GLOBULIN 2.9   ALT (SGPT) 21   AST (SGOT) 58*   BILIRUBIN 0.7   ALK PHOS 95         Lab 08/08/21  0614 08/06/21  0612 08/06/21  0000 08/05/21  1820   PROBNP 2,571.0* 11,947.0*  --  10,115.0*   TROPONIN T  --   --  0.359* 0.380*                 Lab 08/08/21  0707   PH, ARTERIAL 7.477*   PCO2, ARTERIAL 38.4   PO2 ART 57.6*   FIO2 40   HCO3 ART 28.4*   BASE EXCESS ART 4.5*   CARBOXYHEMOGLOBIN 1.9     Brief Urine Lab Results  (Last result in the past 365 days)      Color   Clarity   Blood   Leuk Est   Nitrite   Protein   CREAT   Urine HCG        08/05/21 2348 Yellow Clear Negative Negative Negative Negative               Microbiology Results Abnormal     Procedure Component Value - Date/Time    Blood Culture - Blood, Hand, Right [[id number]] Collected: 08/05/21 2045    Lab Status: Preliminary result Specimen: Blood from Hand, Right Updated: 08/07/21 2130     Blood Culture No growth at 2 days    Blood Culture - Blood, Arm, Left [[id number]] Collected: 08/05/21 2102    Lab Status: Preliminary result Specimen: Blood from Arm, Left Updated: 08/07/21 2130     Blood Culture No growth at 2 days    MRSA Screen, PCR (Inpatient) - Swab, Nares [[id number]]  (Normal) Collected: 08/06/21 1006    Lab Status: Final result Specimen: Swab from Nares Updated: 08/06/21 1132     MRSA PCR Negative    Narrative:      MRSA Negative    S. Pneumo Ag Urine or CSF - Urine, Urine, Clean Catch  [[id number]]  (Normal) Collected: 08/05/21 2348    Lab Status: Final result Specimen: Urine, Clean Catch Updated: 08/06/21 1058     Strep Pneumo Ag Negative    Legionella Antigen, Urine - Urine, Urine, Clean Catch [[id number]]  (Normal) Collected: 08/05/21 2348    Lab Status: Final result Specimen: Urine, Clean Catch Updated: 08/06/21 1057     LEGIONELLA ANTIGEN, URINE Negative    Respiratory Panel PCR w/COVID-19(SARS-CoV-2) CAS/SHALINI/KRISTY/PAD/COR/MAD/HAJA In-House, NP Swab in UTM/VTM, 3-4 HR TAT - Swab, Nasopharynx [[id number]]  (Normal) Collected: 08/05/21 2100    Lab Status: Final result Specimen: Swab from Nasopharynx Updated: 08/05/21 2315     ADENOVIRUS, PCR Not Detected     Coronavirus 229E Not Detected     Coronavirus HKU1 Not Detected     Coronavirus NL63 Not Detected     Coronavirus OC43 Not Detected     COVID19 Not Detected     Human Metapneumovirus Not Detected     Human Rhinovirus/Enterovirus Not Detected     Influenza A PCR Not Detected     Influenza B PCR Not Detected     Parainfluenza Virus 1 Not Detected     Parainfluenza Virus 2 Not Detected     Parainfluenza Virus 3 Not Detected     Parainfluenza Virus 4 Not Detected     RSV, PCR Not Detected     Bordetella pertussis pcr Not Detected     Bordetella parapertussis PCR Not Detected     Chlamydophila pneumoniae PCR Not Detected     Mycoplasma pneumo by PCR Not Detected    Narrative:      In the setting of a positive respiratory panel with a viral infection PLUS a negative procalcitonin without other underlying concern for bacterial infection, consider observing off antibiotics or discontinuation of antibiotics and continue supportive care. If the respiratory panel is positive for atypical bacterial infection (Bordetella pertussis, Chlamydophila pneumoniae, or Mycoplasma pneumoniae), consider antibiotic de-escalation to target atypical bacterial infection.          XR Chest 1 View    Result Date: 8/8/2021  EXAMINATION: XR CHEST 1 VW-  INDICATION: Hypoxemic  respiratory failure and diffuse upper lobe infiltrates; J96.01-Acute respiratory failure with hypoxia; I50.9-Heart failure, unspecified; K10-Rxtvwey effusion, not elsewhere classified; J18.9-Pneumonia, unspecified organism  COMPARISON: 8/7/2021  FINDINGS: Coarsened extensive biapical pulmonary interstitial disease is stable. Included lower lungs appear clear except for trace bibasilar effusion. No pneumothorax is seen.       Impression: Stable chest exam including extensive but stable upper lung interstitial disease.        XR Chest 1 View    Result Date: 8/7/2021  EXAMINATION: XR CHEST 1 VW - 08/07/2021  INDICATION:Possible amiodarone line; J96.01-Acute respiratory failure with hypoxia; I50.9-Heart failure, unspecified; G49-Svqivts effusion, not elsewhere classified; J18.9-Pneumonia, unspecified organism. CHF.  COMPARISON: 08/06/2021  FINDINGS: Heart is enlarged. Vasculature is mostly obscured by the patient's dense pulmonary disease of the upper and mid lungs. Overall, interstitial changes may be mildly improved. Skin fold shadows are superimposed over the right lateral chest. Allowing for this, no pneumothorax is appreciated.      Impression: Extensive upper lung interstitial disease stable to slightly improved. No evidence of pneumothorax. Right-sided skin fold shadows as noted.  DICTATED:   08/07/2021 EDITED/ls :   08/07/2021    This report was finalized on 8/7/2021 9:53 PM by Dr. Augie Leiva MD.        Results for orders placed during the hospital encounter of 07/19/21    Adult Transthoracic Echo Complete W/ Cont if Necessary Per Protocol    Interpretation Summary  · Estimated left ventricular EF = 65%  · Mild aortic valve stenosis is present.  · Aortic valve maximum pressure gradient is 32.9 mmHg. Aortic valve mean pressure gradient is 17.2 mmHg.  · Mild mitral valve regurgitation is present.  · Mild tricuspid valve regurgitation is present.  · Estimated right ventricular systolic pressure from tricuspid  regurgitation is 37.0 mmHg.  · There is a large left pleural effusion. There is a moderate sized right pleural effusion.      I have reviewed the medications:  Scheduled Meds:apixaban, 5 mg, Oral, Q12H  ascorbic acid, 1,000 mg, Oral, Daily  bisoprolol, 5 mg, Oral, Q24H  doxycycline, 100 mg, Oral, BID  ipratropium-albuterol, 3 mL, Nebulization, 4x Daily - RT  lactobacillus acidophilus, 1 capsule, Oral, Daily  methylPREDNISolone sodium succinate, 40 mg, Intravenous, Q8H  pharmacy consult - MTM, , Does not apply, Daily  piperacillin-tazobactam, 3.375 g, Intravenous, Q8H  sertraline, 150 mg, Oral, Daily  sodium chloride, 10 mL, Intravenous, Q12H      Continuous Infusions:dilTIAZem, 5-15 mg/hr, Last Rate: Stopped (08/08/21 0422)      PRN Meds:.•  ALPRAZolam  •  ipratropium-albuterol  •  magnesium sulfate **OR** magnesium sulfate **OR** magnesium sulfate  •  potassium chloride **OR** potassium chloride **OR** potassium chloride  •  sodium chloride  •  sodium chloride    Assessment/Plan   Assessment & Plan     Active Hospital Problems    Diagnosis  POA   • **Bilateral upper lobe pulmonary infiltrates.  Components of CHF and possible inflammation/infection [R91.8]  Yes   • Acute diastolic CHF (CMS/HCC) [I50.31]  Yes   • Elevated troponin [R77.8]  Yes   • A/C hypoxemic respiratory failure (CMS/HCC) [J96.21]  Yes   • Pleural effusion, bilateral [J90]  Yes   • Recurrent atrial fibrillation with RVR (CMS/HCC) [I48.91]  Yes   • COPD on home oxygen (CMS/HCC) [J44.9]  Yes   • Hypertension and diastolic dysfunction [I10]  Yes   • Rheumatoid arthritis (CMS/HCC) [M06.9]  Yes   • Anxiety and depression [F41.9, F32.9]  Yes   • Anemia [D64.9]  Yes      Resolved Hospital Problems    Diagnosis Date Resolved POA   • Lactic acidosis [E87.2] 08/06/2021 Yes        Brief Hospital Course to date:  Yin Law is a 80 y.o. female with hx of chronic respiratory failure on 2 liters, Afib on Eliquis, HTN, HLD, COPD, rheumatoid arthritis, and  depression/anxiety who presented to the ED with complaints of shortness of breath. Previously admitted to [facility] for the same 7/19-8/2/21: treated for PNA and CHF exacerbation, was managed in ICU at one point; Amiodarone was discontinued for possible pulmonary toxicity, and she was treated with antibiotics and steroids, appears she was discharged on her baseline 2 liters. PCP followed up with her on 8/5/21 and performed a CXR, sent to the ER due to potentially worsening findings. Saturating 90% on 5 liters on arrival. ProBNP 62531, troponin 0.380, lactic acid 2.3, WBC 12.79. CTA chest with interval increase in bibasilar pleural effusions and patchy bilateral airspace disease suggestive of progressive pneumonia/infection. Admitted for further management.    This patient's problems and plans were partially entered by my partner and updated as appropriate by me 08/08/21.    Acute on chronic hypoxic respiratory failure   Sepsis, POA (leukocytosis, lactic acidosis, tachypneic)  Multifocal pneumonia, recurrent vs worsening  Bilateral pleural effusions  Acute diastolic CHF  --Recent admit for same and treated with Doxycyclin, Rocephin, and steroids (Amiodarone discontinued due toconcerns for lung toxicity). Was seen by Pulmonary and Cardiology during that admission.  --CTA chest 8/5/21 negative for PE, but showed interval increase in bibasilar pleural effusions and patchy airspace disease, may represent progressive PNA, also possible pulmonary edema.  --Sputum culture 7/24/21 with scant Candida albicans. Blood cultures here NGTD. Negative urine Strep/Legionella. Negative respiratory PCR panel. Have sent additional workup for atypical/fungal infections.  --Appreciate Pulmonary and ID following.  --Continue Zosyn and Doxycycline.  --Continue Solumedrol 40 mg TID.  --PRN and scheduled duonebs.   --Continue IV diuresis as tolerated--will give another dose of IV Lasix today.  --Echo from 7/20/21 shows EF 65%, mild AS, mild MR,  mild TR with RVSP 37 mmHg.  --Patient has been refusing to wear BiPAP but appears she did wear it last night? Currently has been weaned from 6 liters to 4. Continue to wean as tolerated back to baseline 2 liters.      Elevated troponin   --Likely secondary to above (hypoxia, CHF, sepsis).   --Trending down.  --No chest pain.     PAF, currently Afib with RVR  --Continue home Eliquis, Bisoprolol.  --Amiodarone discontinued last admission due concern for lung toxicity.  --Converted to NSR 8/7/21 evening. Discontinue Cardizem drip today.  --Follows with Dr. Montoya, may need to consult while inpatient if continues to have issues.     Anemia, chronic  --Baseline Hb 8-9 range.  --Iron profile mixed iron deficiency + anemia of chronic disease.  --Stable, monitor closely.       Anxiety/Depression   --Continue home Zoloft, PRN Xanax.    DVT prophylaxis:  Medical DVT prophylaxis orders are present.       Disposition: I expect the patient to be discharged TBD    Complex patient.    CODE STATUS:   Code Status and Medical Interventions:   Ordered at: 08/05/21 8253     Level Of Support Discussed With:    Patient     Code Status:    No CPR     Medical Interventions (Level of Support Prior to Arrest):    Full     Comments:    DNR/ DNI       Laura Shepherd MD  08/08/21

## 2021-08-08 NOTE — PLAN OF CARE
Goal Outcome Evaluation:         No significant events overnight. VSS. Tolerating 4.5LNC and bipap at night. Will continue to monitor.

## 2021-08-08 NOTE — PROGRESS NOTES
Intensivist Note     8/8/2021  Hospital Day: 3  * No surgery found *  ICU Stays Timeline            Hospital Admission: 08/05/21 1708 - Current  No ICU stays    No ICU stays to display             Ms. Yin Law, 80 y.o. female is followed for:    Bilateral upper lobe pulmonary infiltrates.  Components of CHF and possible inflammation/infection    A/C hypoxemic respiratory failure (CMS/HCC)    Acute diastolic CHF (CMS/HCC)    Pleural effusion, bilateral    Recurrent atrial fibrillation with RVR (CMS/HCC)    Hypertension and diastolic dysfunction    Elevated troponin    COPD on home oxygen (CMS/HCC)    Rheumatoid arthritis (CMS/HCC)    Anxiety and depression    Anemia       SUBJECTIVE     80-year-old white female recent smoker with PMH significant for atrial fibrillation on Eliquis and amiodarone, hypertension, hyperlipidemia, COPD, RA, as well as depression/anxiety.  Was admitted 7/19/2021 with presumed pneumonia and CHF exacerbation but decompensated on telemetry and was transferred to the ICU with a SBP of 220 and worsening hypoxemia.  CTA of chest 7/19/2021 revealed interstitial lung disease more prominent at the apices and small pleural effusions.  It was unclear whether this was drug-induced amiodarone lung versus autoimmune versus an infectious pneumonitis and it was felt that there was a component of acute on chronic diastolic CHF.  Amiodarone was held and she was given steroids as well as being placed on empiric antimicrobial therapy plus diuresis.  Gas exchange gradually improved with this therapy it was eventually weaned to 4 L of nasal O2.  She was profoundly weak, but was transitioned to telemetry on 7/29/2021.  It remained unclear how much of her problem was amiodarone toxicity, simple volume load overload, or pneumonia (no organisms cultured).  By 8/2/2021 she was felt to have received maximal hospital benefit and was discharged home.  At the time of discharge amiodarone was not continued nor her  steroids.  Medications at discharge included: Hydrochlorothiazide, diltiazem, RAMAPRIL, apixaban 5 mg twice daily, Stiolto Respimat, and sertraline.  Unfortunately she declined rehab and was discharged to home. Shortly after discharge patient began to note increasing dyspnea especially with exertion.  She has chronic lower extremity edema but states that is actually better than it has been in the past.  She denied fever, chills, or sweats but described a mild nonproductive cough.  She also denied chest pain on exertion or pleuritic.  Day of admission 8/5/2021 her niece was helping her to the bathroom when she became dizzy and near syncopal.  With help was placed back in her recliner.  I am told she was taken to Dr. Chaudhry office, a CXR was obtained which was abnormal, and she was sent to [facility] ER.  At admission a CTA was obtained which revealed no PE but revealed diffuse consolidative interstitial/alveolar disease in the upper lobe as well as moderate bilateral lower lobe effusions.  While this was much worse than her previous CT of 7/19/2021, patient's CXR is almost exactly the same as the one obtained the day of discharge.   On admission patient was afebrile and labs revealed hematocrit 28.2, WBC 12.79, procalcitonin of 0.17, proBNP of 10,115, and troponin of 0.38.  Nasal swab for MRSA PCR was negative.  Chemistries were unremarkable.  PCR for COVID-19 was negative.  EKG in the ER revealed a normal sinus rhythm at a rate of 70 bpm.  Because of the fact that her CT scan on 8/5/2021 appeared much worse than the CT scan of 7/19/2021 it was felt that she had pneumonia and she was begun on vancomycin and Zosyn.  Note that strep and Legionella urinary antigens were negative.  Infectious disease was consulted and discontinue vancomycin, continue Zosyn, and added doxycycline. Additional therapies included Lasix IV and Solu-Medrol 40 mg every 8 hours, and she was continued on her home apixaban.    There was no history of  "exposure to organic or inorganic dust and no one else around her has been ill at home.  At admission the patient was in a sinus rhythm, but at approximately 5 PM 8/6/2021, atrial fibrillation recurred with RVR ( to 130).  Blood pressure however was maintained.    Interval history: Hemodynamics look good (and converted from atrial fibrillation to sinus  rhythm the evening of 8/7/2021) and Cardizem drip has now been discontinued.  Is just on bisoprolol 5 mg daily now  which was her home dose.  Patient afebrile WBC 13.05 on IV Solu-Medrol (40 mg every 8 hours). Appears comfortable without significant dyspnea at rest and O2 sats 91% on 5 L during the day. Goes on BiPAP at night. UOP adequate and BUN/creatinine remain normal although BUN has increased from 18, to 29 dose of Lasix today..         ROS: Per subjective, all other systems reviewed and were negative.    The patient's relevant PMH, PSH, FH, and SH were reviewed and updated in Epic as appropriate. Allergies and Medications reviewed.    OBJECTIVE     /67 (BP Location: Right arm, Patient Position: Sitting)   Pulse 77   Temp 98.3 °F (36.8 °C) (Oral)   Resp 18   Ht 167.6 cm (66\")   Wt 64 kg (141 lb 1.6 oz)   SpO2 98%   BMI 22.77 kg/m²   Flow (L/min): 4    Flowsheet Rows      First Filed Value   Admission Height  167.6 cm (66\") Documented at 08/05/2021 1718   Admission Weight  62.1 kg (137 lb) Documented at 08/05/2021 1718        Intake & Output (last day)       08/07 0701 - 08/08 0700 08/08 0701 - 08/09 0700    P.O. 960 360    IV Piggyback 50     Total Intake(mL/kg) 1010 (15.8) 360 (5.6)    Urine (mL/kg/hr) 1400 (0.9)     Stool 0     Total Output 1400     Net -390 +360          Stool Unmeasured Occurrence 3 x           Exam:  General Exam:  Elderly white female propped up in bed in NAD.  HEENT: Pupils equal and reactive. Nose and throat clear.  Neck:                          Supple, no JVD, thyromegaly, or adenopathy  Lungs: Bronchial breath " sounds and some egophony in the upper lobes posteriorly  Cardiovascular: Regular rate and rhythm without murmurs or gallops. HR 67 bpm and sinus on monitor  Abdomen: Soft nontender without organomegaly or masses.   and rectal: Deferred.  Extremities: No cyanosis or clubbing but chronic lower extremity edema with thickened hyperpigmented skin (venous stasis)  Neurologic:                 Symmetric strength but diffusely weak. No focal deficits.    Chest X-Ray: Extensive dense bilateral upper lobe interstitial disease but unchanged      Results from last 7 days   Lab Units 08/08/21  0614 08/07/21  0835 08/06/21  0612   WBC 10*3/mm3 13.05* 13.56* 11.49*   HEMOGLOBIN g/dL 8.2* 9.1* 8.6*   HEMATOCRIT % 26.5* 29.8* 28.8*   PLATELETS 10*3/mm3 299 278 266     Results from last 7 days   Lab Units 08/08/21  0614 08/07/21 2235 08/07/21  0835 08/07/21  0835   SODIUM mmol/L 135*  --   --  135*   POTASSIUM mmol/L 3.9 3.8   < > 3.0*   CHLORIDE mmol/L 97*  --   --  96*   CO2 mmol/L 26.0  --   --  27.0   BUN mg/dL 29*  --   --  18   CREATININE mg/dL 0.94  --   --  0.77   GLUCOSE mg/dL 133*  --   --  137*   CALCIUM mg/dL 8.5*  --   --  8.7    < > = values in this interval not displayed.     Results from last 7 days   Lab Units 08/08/21  0614 08/07/21  0835 08/06/21  0612   MAGNESIUM mg/dL 2.7* 1.9 2.1   PHOSPHORUS mg/dL  --  4.0  --      Results from last 7 days   Lab Units 08/05/21  1820   ALK PHOS U/L 95   BILIRUBIN mg/dL 0.7   ALT (SGPT) U/L 21   AST (SGOT) U/L 58*       Lab Results   Component Value Date    SEDRATE 27 07/24/2021     No results found for: BNP  Lab Results   Component Value Date    TROPONINT 0.359 (C) 08/06/2021     Lab Results   Component Value Date    TSH 6.520 (H) 07/22/2021     Lab Results   Component Value Date    LACTATE 1.5 08/06/2021     No results found for: CORTISOL    Results from last 7 days   Lab Units 08/08/21  0707   PH, ARTERIAL pH units 7.477*   PCO2, ARTERIAL mm Hg 38.4   PO2 ART mm Hg 57.6*    HCO3 ART mmol/L 28.4*   FIO2 % 40           I reviewed the patient's results, images and medication.    Assessment/Plan   ASSESSMENT        Bilateral upper lobe pulmonary infiltrates.  Components of CHF and possible inflammation/infection    A/C hypoxemic respiratory failure (CMS/HCC)    Acute diastolic CHF (CMS/HCC)    Pleural effusion, bilateral    Recurrent atrial fibrillation with RVR (CMS/HCC)    Hypertension and diastolic dysfunction    Elevated troponin    COPD on home oxygen (CMS/HCC)    Rheumatoid arthritis (CMS/HCC)    Anxiety and depression    Anemia      DISCUSSION: No dramatic change and still requiring 5 L nasal oxygen. I have observed her on BiPAP 12/5 in the evening and noted that on 40% her sats are 98%. Is back in a sinus rhythm but is not on anything to keep her on it as has to remain off amiodarone. I continue to be concerned about her chest x-ray appearance as it is definitely atypical for amiodarone lung. There are still some studies pending including QuantiFERON gold, cryptococcal antigen, as well as histo and blasto urinary antigens. I note that there is an atypical p-ANCA from July that was + at 1: 640 and will repeat. Tomorrow would ask cardiology if they would consider putting her on digoxin or another agent. With her underlying lung disease she is very likely to go back into atrial fibrillation    PLAN     1. Continue to wean FiO2 as low as possible whether on nasal O2 or BiPAP  2. Decrease Solu-Medrol to 20 mg every 8 hours  3. BUN increasing so would back off on further diuresis  4. Is presently only on a beta-blocker for her PAF  5. Would ask cardiology opinion regarding best option for keeping her out of atrial fibrillation  6. Follow-up results of above studies  7. Repeat ANCA panel      Plan of care and goals reviewed with multidisciplinary team at daily rounds.    I discussed the patient's findings and my recommendations with patient and nursing staff    Patient is critically ill  due to underlying COPD on home O2 with severe apical lung disease, and has a high risk of life-threatening decline in condition.  They require continuous monitoring and frequent reassessment of condition for adjustment of management in order to lessen risk.      Time spent Critical care 20 min (It does not include procedure time).    Electronically signed by Santiago Perez MD, 08/08/21, 9:58 AM EDT.   Pulmonary / Critical care medicine

## 2021-08-08 NOTE — PLAN OF CARE
Goal Outcome Evaluation:  Plan of Care Reviewed With: patient, daughter        Progress: improving  Outcome Summary: VSS, has denied both pain or SOA.  Currently on 3.5l/min of Oxygen.  Has been up into bedside chair most of the shift.  Patients daughter at the bedside and is aware of the pts condition.

## 2021-08-09 LAB
ALBUMIN SERPL-MCNC: 2.7 G/DL (ref 3.5–5.2)
ALBUMIN/GLOB SERPL: 1.1 G/DL
ALP SERPL-CCNC: 69 U/L (ref 39–117)
ALT SERPL W P-5'-P-CCNC: 23 U/L (ref 1–33)
ANION GAP SERPL CALCULATED.3IONS-SCNC: 10 MMOL/L (ref 5–15)
AST SERPL-CCNC: 22 U/L (ref 1–32)
BILIRUB SERPL-MCNC: 0.4 MG/DL (ref 0–1.2)
BUN SERPL-MCNC: 39 MG/DL (ref 8–23)
BUN/CREAT SERPL: 36.4 (ref 7–25)
CALCIUM SPEC-SCNC: 8.3 MG/DL (ref 8.6–10.5)
CHLORIDE SERPL-SCNC: 98 MMOL/L (ref 98–107)
CO2 SERPL-SCNC: 24 MMOL/L (ref 22–29)
CREAT SERPL-MCNC: 1.07 MG/DL (ref 0.57–1)
DEPRECATED RDW RBC AUTO: 53.1 FL (ref 37–54)
ERYTHROCYTE [DISTWIDTH] IN BLOOD BY AUTOMATED COUNT: 20 % (ref 12.3–15.4)
GFR SERPL CREATININE-BSD FRML MDRD: 49 ML/MIN/1.73
GLOBULIN UR ELPH-MCNC: 2.4 GM/DL
GLUCOSE SERPL-MCNC: 120 MG/DL (ref 65–99)
HCT VFR BLD AUTO: 25.2 % (ref 34–46.6)
HGB BLD-MCNC: 7.8 G/DL (ref 12–15.9)
MCH RBC QN AUTO: 24.3 PG (ref 26.6–33)
MCHC RBC AUTO-ENTMCNC: 31 G/DL (ref 31.5–35.7)
MCV RBC AUTO: 78.5 FL (ref 79–97)
PLATELET # BLD AUTO: 223 10*3/MM3 (ref 140–450)
PMV BLD AUTO: 10.9 FL (ref 6–12)
POTASSIUM SERPL-SCNC: 3.6 MMOL/L (ref 3.5–5.2)
PREALB SERPL-MCNC: 17.4 MG/DL (ref 20–40)
PROT SERPL-MCNC: 5.1 G/DL (ref 6–8.5)
QT INTERVAL: 350 MS
QT INTERVAL: 454 MS
QTC INTERVAL: 475 MS
QTC INTERVAL: 534 MS
RBC # BLD AUTO: 3.21 10*6/MM3 (ref 3.77–5.28)
SODIUM SERPL-SCNC: 132 MMOL/L (ref 136–145)
WBC # BLD AUTO: 11.76 10*3/MM3 (ref 3.4–10.8)

## 2021-08-09 PROCEDURE — 97530 THERAPEUTIC ACTIVITIES: CPT

## 2021-08-09 PROCEDURE — 83520 IMMUNOASSAY QUANT NOS NONAB: CPT | Performed by: INTERNAL MEDICINE

## 2021-08-09 PROCEDURE — 94799 UNLISTED PULMONARY SVC/PX: CPT

## 2021-08-09 PROCEDURE — 99233 SBSQ HOSP IP/OBS HIGH 50: CPT | Performed by: HOSPITALIST

## 2021-08-09 PROCEDURE — 25010000002 PIPERACILLIN SOD-TAZOBACTAM PER 1 G: Performed by: HOSPITALIST

## 2021-08-09 PROCEDURE — 84134 ASSAY OF PREALBUMIN: CPT | Performed by: INTERNAL MEDICINE

## 2021-08-09 PROCEDURE — 85027 COMPLETE CBC AUTOMATED: CPT | Performed by: HOSPITALIST

## 2021-08-09 PROCEDURE — 86256 FLUORESCENT ANTIBODY TITER: CPT | Performed by: INTERNAL MEDICINE

## 2021-08-09 PROCEDURE — 99232 SBSQ HOSP IP/OBS MODERATE 35: CPT | Performed by: INTERNAL MEDICINE

## 2021-08-09 PROCEDURE — 25010000002 METHYLPREDNISOLONE PER 40 MG: Performed by: INTERNAL MEDICINE

## 2021-08-09 PROCEDURE — 80053 COMPREHEN METABOLIC PANEL: CPT | Performed by: INTERNAL MEDICINE

## 2021-08-09 RX ORDER — PREDNISONE 20 MG/1
40 TABLET ORAL
Status: DISCONTINUED | OUTPATIENT
Start: 2021-08-10 | End: 2021-08-12 | Stop reason: HOSPADM

## 2021-08-09 RX ORDER — AMOXICILLIN AND CLAVULANATE POTASSIUM 500; 125 MG/1; MG/1
1 TABLET, FILM COATED ORAL 2 TIMES DAILY
Status: COMPLETED | OUTPATIENT
Start: 2021-08-09 | End: 2021-08-10

## 2021-08-09 RX ADMIN — APIXABAN 5 MG: 5 TABLET, FILM COATED ORAL at 09:37

## 2021-08-09 RX ADMIN — METHYLPREDNISOLONE SODIUM SUCCINATE 20 MG: 40 INJECTION, POWDER, FOR SOLUTION INTRAMUSCULAR; INTRAVENOUS at 05:51

## 2021-08-09 RX ADMIN — POTASSIUM CHLORIDE 40 MEQ: 10 CAPSULE, COATED, EXTENDED RELEASE ORAL at 12:58

## 2021-08-09 RX ADMIN — POTASSIUM CHLORIDE 40 MEQ: 10 CAPSULE, COATED, EXTENDED RELEASE ORAL at 20:14

## 2021-08-09 RX ADMIN — ALPRAZOLAM 0.5 MG: 0.5 TABLET ORAL at 22:15

## 2021-08-09 RX ADMIN — IPRATROPIUM BROMIDE AND ALBUTEROL SULFATE 3 ML: 2.5; .5 SOLUTION RESPIRATORY (INHALATION) at 07:55

## 2021-08-09 RX ADMIN — METHYLPREDNISOLONE SODIUM SUCCINATE 20 MG: 40 INJECTION, POWDER, FOR SOLUTION INTRAMUSCULAR; INTRAVENOUS at 12:58

## 2021-08-09 RX ADMIN — APIXABAN 5 MG: 5 TABLET, FILM COATED ORAL at 20:14

## 2021-08-09 RX ADMIN — IPRATROPIUM BROMIDE AND ALBUTEROL SULFATE 3 ML: 2.5; .5 SOLUTION RESPIRATORY (INHALATION) at 12:18

## 2021-08-09 RX ADMIN — DOXYCYCLINE 100 MG: 100 CAPSULE ORAL at 20:14

## 2021-08-09 RX ADMIN — SODIUM CHLORIDE, PRESERVATIVE FREE 10 ML: 5 INJECTION INTRAVENOUS at 09:37

## 2021-08-09 RX ADMIN — SODIUM CHLORIDE, PRESERVATIVE FREE 10 ML: 5 INJECTION INTRAVENOUS at 20:14

## 2021-08-09 RX ADMIN — AMOXICILLIN AND CLAVULANATE POTASSIUM 500 MG: 500; 125 TABLET, FILM COATED ORAL at 20:14

## 2021-08-09 RX ADMIN — TAZOBACTAM SODIUM AND PIPERACILLIN SODIUM 3.38 G: 375; 3 INJECTION, SOLUTION INTRAVENOUS at 05:51

## 2021-08-09 RX ADMIN — OXYCODONE HYDROCHLORIDE AND ACETAMINOPHEN 1000 MG: 500 TABLET ORAL at 09:37

## 2021-08-09 RX ADMIN — BISOPROLOL FUMARATE 5 MG: 5 TABLET, FILM COATED ORAL at 09:37

## 2021-08-09 RX ADMIN — TAZOBACTAM SODIUM AND PIPERACILLIN SODIUM 3.38 G: 375; 3 INJECTION, SOLUTION INTRAVENOUS at 12:58

## 2021-08-09 RX ADMIN — Medication 1 CAPSULE: at 09:37

## 2021-08-09 RX ADMIN — DOXYCYCLINE 100 MG: 100 CAPSULE ORAL at 09:37

## 2021-08-09 RX ADMIN — IPRATROPIUM BROMIDE AND ALBUTEROL SULFATE 3 ML: 2.5; .5 SOLUTION RESPIRATORY (INHALATION) at 19:52

## 2021-08-09 RX ADMIN — SERTRALINE HYDROCHLORIDE 150 MG: 100 TABLET ORAL at 09:37

## 2021-08-09 RX ADMIN — IPRATROPIUM BROMIDE AND ALBUTEROL SULFATE 3 ML: 2.5; .5 SOLUTION RESPIRATORY (INHALATION) at 15:50

## 2021-08-09 NOTE — PROGRESS NOTES
" PULMONARY NOTE     Hospital Day: 4    Ms. Yin Law, 80 y.o. female is followed for:   Acute hypoxemic respiratory failure and pulmonary infiltrates        SUBJECTIVE       Interval history:    Awake and alert.  In bedside chair.  On nasal cannula O2.  Family at bedside.  Fluid balance slightly negative.  Afebrile.    The patient's relevant past medical, surgical and social history were reviewed and updated in Epic as appropriate.        OBJECTIVE     Vital Sign Min/Max for last 24 hours  Temp  Min: 97.6 °F (36.4 °C)  Max: 98.4 °F (36.9 °C)   BP  Min: 151/67  Max: 169/79   Pulse  Min: 56  Max: 77   Resp  Min: 16  Max: 22   SpO2  Min: 85 %  Max: 100 %   No data recorded   Weight  Min: 64.9 kg (143 lb)  Max: 64.9 kg (143 lb)      Intake/Output Summary (Last 24 hours) at 8/9/2021 1719  Last data filed at 8/9/2021 0148  Gross per 24 hour   Intake 360 ml   Output 500 ml   Net -140 ml      Flowsheet Rows      First Filed Value   Admission Height  167.6 cm (66\") Documented at 08/05/2021 1718   Admission Weight  62.1 kg (137 lb) Documented at 08/05/2021 1718        Body mass index is 23.08 kg/m².     08/07/21  0400 08/08/21  0459 08/09/21  0338   Weight: 62.8 kg (138 lb 8 oz) 64 kg (141 lb 1.6 oz) 64.9 kg (143 lb)             Objective:  General Appearance:  In no acute distress.    Vital signs: (most recent): Blood pressure 167/76, pulse 72, temperature 98.4 °F (36.9 °C), temperature source Oral, resp. rate 18, height 167.6 cm (66\"), weight 64.9 kg (143 lb), SpO2 96 %.    HEENT: Normal HEENT exam.  (Nasal cannula O2)    Lungs:  There are rales and decreased breath sounds.  No wheezes or rhonchi.    Heart: Normal rate.  Regular rhythm.  S1 normal and S2 normal.  No murmur, gallop or friction rub.   Chest: Symmetric chest wall expansion.   Abdomen: Abdomen is soft and non-distended.  Bowel sounds are normal.   There is no abdominal tenderness.   There is no mass. There is no splenomegaly. There is no hepatomegaly. "   Extremities: There is no deformity or dependent edema.    Neurological: Patient is alert and oriented to person, place and time.    Pupils:  Pupils are equal, round, and reactive to light.    Skin:  Warm and dry.                      I reviewed the patient's results and images, including reviewing images and reports.    Medications reviewed.      Scheduled Meds:apixaban, 5 mg, Oral, Q12H  ascorbic acid, 1,000 mg, Oral, Daily  bisoprolol, 5 mg, Oral, Q24H  doxycycline, 100 mg, Oral, BID  ipratropium-albuterol, 3 mL, Nebulization, 4x Daily - RT  lactobacillus acidophilus, 1 capsule, Oral, Daily  pharmacy consult - MTM, , Does not apply, Daily  piperacillin-tazobactam, 3.375 g, Intravenous, Q8H  [START ON 8/10/2021] predniSONE, 40 mg, Oral, Daily With Breakfast  sertraline, 150 mg, Oral, Daily  sodium chloride, 10 mL, Intravenous, Q12H          Assessment/Plan   ASSESSMENT      Active Hospital Problems    Diagnosis    • **Bilateral upper lobe pulmonary infiltrates.  Components of CHF and possible inflammation/infection    • Acute diastolic CHF (CMS/HCC)    • Elevated troponin    • A/C hypoxemic respiratory failure (CMS/HCC)    • Pleural effusion, bilateral    • Recurrent atrial fibrillation with RVR (CMS/HCC)    • COPD on home oxygen (CMS/HCC)    • Hypertension and diastolic dysfunction    • Rheumatoid arthritis (CMS/HCC)    • Anxiety and depression    • Anemia          80-year-old female with a past medical history significant for atrial fibrillation on Eliquis and amiodarone, hypertension, dyslipidemia, COPD, rheumatoid arthritis, and depression/anxiety.  She was admitted on 7/19 with presumed pneumonia and CHF exacerbation.  There was some question of amiodarone lung toxicity versus an autoimmune process versus an infectious pneumonia.  Amiodarone was held and she was given steroids and empiric antibiotics.  On discharge amiodarone was discontinued and steroids were not prescribed.  She declined rehab.  She  returned about 3 days after admission with dizziness and near syncope.  Her chest x-ray was not much changed.  Patient was readmitted and placed on Zosyn and doxycycline as well as a steroid pulse.    Oxygen currently weaned to 3 L.  She has some subjective improvement.  Family is at the bedside.  She has had recurrent atrial fibrillation after admission.  She is currently back in sinus rhythm.    1. Bilateral pulmonary infiltrates: There is certainly an upper lobe predominance and while this is atypical for amiodarone toxicity, multiple patterns are possible including lobar infiltrates and pleural fluid.  It seems she deteriorated after removal from steroids so we might consider a more gradual taper on discharge this time around  2. Possible pneumonia: Discussed with Dr. Barriga and he is planning on an appropriate de-escalating regimen  3. Possible component of diastolic CHF: Additional diuretics as needed        RECOMMENDATIONS     1. Wean O2 as tolerated  2. Decrease steroids to prednisone 40 mg daily  3. Empiric antibiotics per Dr. Barriga.  Discussed with him  4. Continue to hold amiodarone  5. Eliquis as before  6. Bronchodilators  7. I talked to her about considering rehab this time around         I discussed the patient's findings and my recommendations with patient, family and consulting provider     High level of risk due to:  illness with threat to life or bodily function.    Linus Mosley MD  Pulmonary and Critical Care Medicine

## 2021-08-09 NOTE — PLAN OF CARE
Goal Outcome Evaluation:            Pt pleasant and cooperative, she rested off and on overnight. Pt had not voided, bladder scan showed 394mls, but pt had complaints of discomfort. I&O cath returned 500mls. IV antibiotics cont. VSS on 2.5L- pt did wear bipap for a short time, will cont to monitor.

## 2021-08-09 NOTE — PLAN OF CARE
Goal Outcome Evaluation:  Plan of Care Reviewed With: patient, daughter        Progress: improving  Outcome Summary: Pt showing improvement as she increased ambulation distance to 5ft + 10ft with RWx and CGA with O2 remaining >90% on 2L NC. Initial dizziness reported which resolved with prolonged sitting EOB. Mod A required to stand from low surface height. Pt limited by decreased functional endurance and generalized weakness. Continue to progress as able. d/c rec for IRF.

## 2021-08-09 NOTE — PROGRESS NOTES
"    [facility]  PROGRESS NOTE    Patient Name: Yin Law  : 1940  MRN: [id number]    Date of Admission: 2021  Primary Care Physician: Santiago Chaudhry MD    Subjective   Subjective     CC:  F/U SOA, cough    HPI:  Seen this morning. Family member at bedside. Patient has been weaned to 2.5 liters. Says she feels better but not quite sure if breathing is that much better. Did wear BiPAP overnight. Says the steroids are making her feel \"jittery.\"     ROS:  Gen-no fevers, no chills  CV-no chest pain, no palpitations  Resp-no cough, +dyspnea  GI-no N/V/D, no abd pain    All other systems reviewed and negative except any additional pertinent positives and negatives as discussed in HPI.      Objective   Objective     Vital Signs:   Temp:  [97.6 °F (36.4 °C)-98.6 °F (37 °C)] 98.4 °F (36.9 °C)  Heart Rate:  [55-77] 68  Resp:  [16-28] 18  BP: (151-169)/(63-79) 166/75  Flow (L/min):  [2.5-4] 2.5     Physical Exam:  Gen-no acute distress  HENT-NCAT, mucous membranes moist  CV-RRR, S1 S2 normal, no m/r/g  Resp-slightly diminished at the bases, no wheezes appreciated today, nonlabored on 2.5 liters  Abd-soft, NT, ND, +BS  Ext-no edema  Neuro-A&Ox3, no focal deficits  Skin-no rashes  Psych-appropriate mood      Results Reviewed:  LAB RESULTS:      Lab 21  0640 21  0614 21  0835 21  0612 21  0000 21  2104 21  1820   WBC 11.76* 13.05* 13.56* 11.49*  --   --  12.79*   HEMOGLOBIN 7.8* 8.2* 9.1* 8.6*  --   --  8.5*   HEMATOCRIT 25.2* 26.5* 29.8* 28.8*  --   --  28.2*   PLATELETS 223 299 278 266  --   --  276   NEUTROS ABS  --   --   --  10.80*  --   --  9.99*   IMMATURE GRANS (ABS)  --   --   --  0.10*  --   --  0.07*   LYMPHS ABS  --   --   --  0.44*  --   --  1.62   MONOS ABS  --   --   --  0.14  --   --  1.02*   EOS ABS  --   --   --  0.00  --   --  0.04   MCV 78.5* 79.6 79.7 81.8  --   --  80.6   CRP  --   --   --  6.98*  --   " --   --    PROCALCITONIN  --  0.11  --   --   --   --  0.17   LACTATE  --   --   --   --  1.5 2.3*  --    LDH  --   --   --  361*  --   --   --          Lab 08/09/21  0641 08/08/21  0614 08/07/21  2235 08/07/21  0835 08/06/21  0612 08/05/21  1820 08/05/21  1820   SODIUM 132* 135*  --  135* 140  --  136   POTASSIUM 3.6 3.9 3.8 3.0* 4.3   < > 3.7   CHLORIDE 98 97*  --  96* 101  --  102   CO2 24.0 26.0  --  27.0 27.0  --  24.0   ANION GAP 10.0 12.0  --  12.0 12.0  --  10.0   BUN 39* 29*  --  18 17  --  20   CREATININE 1.07* 0.94  --  0.77 0.76  --  0.68   GLUCOSE 120* 133*  --  137* 138*  --  104*   CALCIUM 8.3* 8.5*  --  8.7 8.4*  --  8.8   MAGNESIUM  --  2.7*  --  1.9 2.1  --   --    PHOSPHORUS  --   --   --  4.0  --   --   --     < > = values in this interval not displayed.         Lab 08/09/21  0641 08/05/21  1820   TOTAL PROTEIN 5.1* 6.1   ALBUMIN 2.70* 3.20*   GLOBULIN 2.4 2.9   ALT (SGPT) 23 21   AST (SGOT) 22 58*   BILIRUBIN 0.4 0.7   ALK PHOS 69 95         Lab 08/08/21  0614 08/06/21  0612 08/06/21  0000 08/05/21  1820   PROBNP 2,571.0* 11,947.0*  --  10,115.0*   TROPONIN T  --   --  0.359* 0.380*                 Lab 08/08/21  0707   PH, ARTERIAL 7.477*   PCO2, ARTERIAL 38.4   PO2 ART 57.6*   FIO2 40   HCO3 ART 28.4*   BASE EXCESS ART 4.5*   CARBOXYHEMOGLOBIN 1.9     Brief Urine Lab Results  (Last result in the past 365 days)      Color   Clarity   Blood   Leuk Est   Nitrite   Protein   CREAT   Urine HCG        08/05/21 2348 Yellow Clear Negative Negative Negative Negative               Microbiology Results Abnormal     Procedure Component Value - Date/Time    Blood Culture - Blood, Hand, Right [[id number]] Collected: 08/05/21 2045    Lab Status: Preliminary result Specimen: Blood from Hand, Right Updated: 08/08/21 2130     Blood Culture No growth at 3 days    Blood Culture - Blood, Arm, Left [[id number]] Collected: 08/05/21 2102    Lab Status: Preliminary result Specimen: Blood from Arm, Left Updated: 08/08/21  2130     Blood Culture No growth at 3 days    MRSA Screen, PCR (Inpatient) - Swab, Nares [[id number]]  (Normal) Collected: 08/06/21 1006    Lab Status: Final result Specimen: Swab from Nares Updated: 08/06/21 1132     MRSA PCR Negative    Narrative:      MRSA Negative    S. Pneumo Ag Urine or CSF - Urine, Urine, Clean Catch [[id number]]  (Normal) Collected: 08/05/21 2348    Lab Status: Final result Specimen: Urine, Clean Catch Updated: 08/06/21 1058     Strep Pneumo Ag Negative    Legionella Antigen, Urine - Urine, Urine, Clean Catch [[id number]]  (Normal) Collected: 08/05/21 2348    Lab Status: Final result Specimen: Urine, Clean Catch Updated: 08/06/21 1057     LEGIONELLA ANTIGEN, URINE Negative    Respiratory Panel PCR w/COVID-19(SARS-CoV-2) CAS/SHALINI/KRISTY/PAD/COR/MAD/HAJA In-House, NP Swab in UTM/VTM, 3-4 HR TAT - Swab, Nasopharynx [[id number]]  (Normal) Collected: 08/05/21 2100    Lab Status: Final result Specimen: Swab from Nasopharynx Updated: 08/05/21 2315     ADENOVIRUS, PCR Not Detected     Coronavirus 229E Not Detected     Coronavirus HKU1 Not Detected     Coronavirus NL63 Not Detected     Coronavirus OC43 Not Detected     COVID19 Not Detected     Human Metapneumovirus Not Detected     Human Rhinovirus/Enterovirus Not Detected     Influenza A PCR Not Detected     Influenza B PCR Not Detected     Parainfluenza Virus 1 Not Detected     Parainfluenza Virus 2 Not Detected     Parainfluenza Virus 3 Not Detected     Parainfluenza Virus 4 Not Detected     RSV, PCR Not Detected     Bordetella pertussis pcr Not Detected     Bordetella parapertussis PCR Not Detected     Chlamydophila pneumoniae PCR Not Detected     Mycoplasma pneumo by PCR Not Detected    Narrative:      In the setting of a positive respiratory panel with a viral infection PLUS a negative procalcitonin without other underlying concern for bacterial infection, consider observing off antibiotics or discontinuation of antibiotics and continue supportive care.  If the respiratory panel is positive for atypical bacterial infection (Bordetella pertussis, Chlamydophila pneumoniae, or Mycoplasma pneumoniae), consider antibiotic de-escalation to target atypical bacterial infection.          XR Chest 1 View    Result Date: 8/8/2021  EXAMINATION: XR CHEST 1 VW - 08/08/2021  INDICATION: J96.01-Acute respiratory failure with hypoxia; I50.9-Heart failure, unspecified; Z13-Oupedvx effusion, not elsewhere classified; J18.9-Pneumonia, unspecified organism.  COMPARISON: 08/07/2021  FINDINGS: Coarse and extensive biapical pulmonary interstitial disease is stable. Included lower lungs appear clear except for trace bibasilar effusion. No pneumothorax is seen.      Impression: Stable chest exam including extensive but stable upper lung interstitial disease.   DICTATED:   08/08/2021 EDITED/ls :   08/08/2021  This report was finalized on 8/8/2021 10:00 PM by Dr. Augie Leiva MD.        Results for orders placed during the hospital encounter of 07/19/21    Adult Transthoracic Echo Complete W/ Cont if Necessary Per Protocol    Interpretation Summary  · Estimated left ventricular EF = 65%  · Mild aortic valve stenosis is present.  · Aortic valve maximum pressure gradient is 32.9 mmHg. Aortic valve mean pressure gradient is 17.2 mmHg.  · Mild mitral valve regurgitation is present.  · Mild tricuspid valve regurgitation is present.  · Estimated right ventricular systolic pressure from tricuspid regurgitation is 37.0 mmHg.  · There is a large left pleural effusion. There is a moderate sized right pleural effusion.      I have reviewed the medications:  Scheduled Meds:apixaban, 5 mg, Oral, Q12H  ascorbic acid, 1,000 mg, Oral, Daily  bisoprolol, 5 mg, Oral, Q24H  doxycycline, 100 mg, Oral, BID  ipratropium-albuterol, 3 mL, Nebulization, 4x Daily - RT  lactobacillus acidophilus, 1 capsule, Oral, Daily  methylPREDNISolone sodium succinate, 20 mg, Intravenous, Q8H  pharmacy consult - MTM, , Does not apply,  Daily  piperacillin-tazobactam, 3.375 g, Intravenous, Q8H  sertraline, 150 mg, Oral, Daily  sodium chloride, 10 mL, Intravenous, Q12H      Continuous Infusions:   PRN Meds:.•  ALPRAZolam  •  ipratropium-albuterol  •  magnesium sulfate **OR** magnesium sulfate **OR** magnesium sulfate  •  potassium chloride **OR** potassium chloride **OR** potassium chloride  •  sodium chloride  •  sodium chloride    Assessment/Plan   Assessment & Plan     Active Hospital Problems    Diagnosis  POA   • **Bilateral upper lobe pulmonary infiltrates.  Components of CHF and possible inflammation/infection [R91.8]  Yes   • Acute diastolic CHF (CMS/HCC) [I50.31]  Yes   • Elevated troponin [R77.8]  Yes   • A/C hypoxemic respiratory failure (CMS/HCC) [J96.21]  Yes   • Pleural effusion, bilateral [J90]  Yes   • Recurrent atrial fibrillation with RVR (CMS/HCC) [I48.91]  Yes   • COPD on home oxygen (CMS/HCC) [J44.9]  Yes   • Hypertension and diastolic dysfunction [I10]  Yes   • Rheumatoid arthritis (CMS/HCC) [M06.9]  Yes   • Anxiety and depression [F41.9, F32.9]  Yes   • Anemia [D64.9]  Yes      Resolved Hospital Problems    Diagnosis Date Resolved POA   • Lactic acidosis [E87.2] 08/06/2021 Yes        Brief Hospital Course to date:  Yin Law is a 80 y.o. female with hx of chronic respiratory failure on 2 liters, Afib on Eliquis, HTN, HLD, COPD, rheumatoid arthritis, and depression/anxiety who presented to the ED with complaints of shortness of breath. Previously admitted to [facility] for the same 7/19-8/2/21: treated for PNA and CHF exacerbation, was managed in ICU at one point; Amiodarone was discontinued for possible pulmonary toxicity, and she was treated with antibiotics and steroids, appears she was discharged on her baseline 2 liters. PCP followed up with her on 8/5/21 and performed a CXR, sent to the ER due to potentially worsening findings. Saturating 90% on 5 liters on arrival. ProBNP 57270, troponin 0.380, lactic acid 2.3, WBC  12.79. CTA chest with interval increase in bibasilar pleural effusions and patchy bilateral airspace disease suggestive of progressive pneumonia/infection. Admitted for further management.    This patient's problems and plans were partially entered by my partner and updated as appropriate by me 08/09/21.    Acute on chronic hypoxic respiratory failure   Sepsis, POA (leukocytosis, lactic acidosis, tachypneic)  Multifocal pneumonia, recurrent vs worsening  Bilateral pleural effusions  Acute diastolic CHF  --Recent admit for same and treated with Doxycycline, Rocephin, and steroids (Amiodarone discontinued due to concerns for lung toxicity). Was seen by Pulmonary and Cardiology during that admission. Discharged on a few more days of prednisone.  --CTA chest 8/5/21 negative for PE, but showed interval increase in bibasilar pleural effusions and patchy airspace disease, may represent progressive PNA, also possible pulmonary edema.  --Sputum culture 7/24/21 with scant Candida albicans. Blood cultures here NGTD. Negative urine Strep/Legionella. Negative respiratory PCR panel. Negative Crypto Ag. Negative Fungitell, Histo, Blasto, Aspergillus during last admission.  --Appreciate Pulmonary and ID following.  --Continue Zosyn and Doxycycline.  --Decreased Solumedrol to 20 mg Q8H today--continue to wean per Pulmonary. She may benefit from longer steroid taper?  --Continue PRN and scheduled duonebs.   --Continue IV diuresis as tolerated, last dose of Lasix 8/8/21--BUN/Cr bumped today, hold further doses.   --Echo from 7/20/21 shows EF 65%, mild AS, mild MR, mild TR with RVSP 37 mmHg.  --Currently has been weaned from 6 liters to 2.5 liters. Was wearing 2 liters at baseline previously. Continue BiPAP at night as tolerated.     Elevated troponin   --Likely secondary to above (hypoxia, CHF, sepsis).   --Trending down.  --No chest pain.     PAF, currently Afib with RVR  --Continue home Eliquis, Bisoprolol.  --Amiodarone  discontinued last admission due concern for lung toxicity.  --Converted to NSR 8/7/21 evening. Discontinued Cardizem drip 8/8/21.  --Follows with Dr. Montoya, have not consulted yet as she has now converted to and remains in NSR.     Anemia, chronic  --Baseline Hb 8-9 range.  --Iron profile mixed iron deficiency + anemia of chronic disease.  --Slight decline today, monitor closely.       Anxiety/Depression   --Continue home Zoloft, PRN Xanax.    DVT prophylaxis:  Medical DVT prophylaxis orders are present.       Disposition: I expect the patient to be discharged home ~2-3 days if remains stable    Discussed with patient and family, all questions answered to the best of my ability.     CODE STATUS:   Code Status and Medical Interventions:   Ordered at: 08/05/21 9002     Level Of Support Discussed With:    Patient     Code Status:    No CPR     Medical Interventions (Level of Support Prior to Arrest):    Full     Comments:    DNR/ DNI       Laura Shepherd MD  08/09/21

## 2021-08-09 NOTE — PROGRESS NOTES
NN spoke with pt at BS.  Pt alert and able to answer questions appropriately. Pt O2 sat  95  % on  2 L currently, home O2 use 2 L. Deep breathing exercises encouraged. IS to BS.  Patient declines IPR and pulmonary clinic follow up.  No new concerns or questions voiced at this time.  NN will continue to follow as needed.       Per current GOLD Standards, please consider: No rescue in place, No ICS in place, Outpatient PFT, Pulmonary rehab as appropriate

## 2021-08-09 NOTE — PROGRESS NOTES
"INFECTIOUS DISEASE Progress Note    Yin Law  1940  [id number]    Date of Consult: 8/6/2021    Admission Date: 8/5/2021      Requesting Provider: Missy Lee DO  Evaluating Physician: Clemente Barriga MD    Reason for Consultation: multifocal pneumonia    History of present illness:    Patient is a 80 y.o. female with h/o COPD/2L O2 at home, afib/Eliquis, HTN, and RA (no IS) who presented to [facility] ED on 8/5 with worsening shortness of breath.  The patient was recently hospitalized at [facility] from 7/19-8/2/21 for pneumonia, RLE wound, and CHF exacerbation.  A CT scan chest on 7/19 showed small pleural effusions and interstitial lung disease that was worse at apices.  It was unclear if this was infection or amiodarone toxicity.  Amiodarone was subsequently stopped by Cardiology.  She was treated with Rocephin, doxycycline, and steroids.  She was discharged home after refusing rehab on no steroids or antibiotics.  She followed up with her PCP on 8/5 with CXR showing worsening pneumonia.  She was sent to [facility] for further evaluation.     She has had worsening shortness of breath since discharge with a non-productive cough.  She had associated chills, weakness, and fatigue.  She continues to have swelling in her feet that has improved somewhat since discharge.  On arrival, she was afebrile and required 5L O2.  Her hypoxia worsened overnight and was on 15L O2, but now down to 4L.  Pertinent labs are WBC 12,800 with 78% neutrophils, PCT 0.17, proBNP 10,100, creatinine 0.68, and lactic acid 2.3.  Her UA was negative.  A respiratory panel PCR with COVID-19 was negative.  Strep pneumo and Legionella UAgs are negative.  Blood cultures are pending.  A Sputum culture is pending.  MRSA PCR negative.  A CTA of chest showed no PE, but did showed \" increase in bibasilar pleural effusions and patchy bilateral airspace disease that may represent progressive pneumonia/infection\".  She was started on Zosyn and Vancomycin.  ID was " asked to evaluate and manage her antibiotic therapy.    8/9/21: Afebrile over the weekend.  Blood pressure stable.  O2 requirement overall stable, currently 3 L.  She complains of rare dry cough but no sputum production.  Infectious work-up negative to date.  No worsening chest pain.  No headache, neck pain, abdominal pain, diarrhea, rash.     ROS: See above.  Otherwise 12 point review of systems negative      No Known Allergies      Medication:    Current Facility-Administered Medications:   •  ALPRAZolam (XANAX) tablet 0.5 mg, 0.5 mg, Oral, Nightly PRN, Laura Shepherd MD, 0.5 mg at 08/08/21 2145  •  apixaban (ELIQUIS) tablet 5 mg, 5 mg, Oral, Q12H, Jacqueline Guzman APRN, 5 mg at 08/09/21 0937  •  ascorbic acid (VITAMIN C) tablet 1,000 mg, 1,000 mg, Oral, Daily, Jacqueline Guzman APRN, 1,000 mg at 08/09/21 0937  •  bisoprolol (ZEBeta) tablet 5 mg, 5 mg, Oral, Q24H, Jacqueline Guzman APRN, 5 mg at 08/09/21 0937  •  doxycycline (MONODOX) capsule 100 mg, 100 mg, Oral, BID, Clemente Barriga MD, 100 mg at 08/09/21 0937  •  ipratropium-albuterol (DUO-NEB) nebulizer solution 3 mL, 3 mL, Nebulization, Q4H PRN, Jacqueline Guzman APRN  •  ipratropium-albuterol (DUO-NEB) nebulizer solution 3 mL, 3 mL, Nebulization, 4x Daily - RT, Jacqueline Guzman APRN, 3 mL at 08/09/21 1550  •  lactobacillus acidophilus (RISAQUAD) capsule 1 capsule, 1 capsule, Oral, Daily, Clemente Barriga MD, 1 capsule at 08/09/21 0937  •  Magnesium Sulfate 2 gram Bolus, followed by 8 gram infusion (total Mg dose 10 grams)- Mg less than or equal to 1mg/dL, 2 g, Intravenous, PRN **OR** Magnesium Sulfate 2 gram / 50mL Infusion (GIVE X 3 BAGS TO EQUAL 6GM TOTAL DOSE) - Mg 1.1 - 1.5 mg/dl, 2 g, Intravenous, PRN **OR** Magnesium Sulfate 4 gram infusion- Mg 1.6-1.9 mg/dL, 4 g, Intravenous, PRN, Laura Shepherd MD, Last Rate: 25 mL/hr at 08/07/21 1036, 4 g at 08/07/21 1036  •  Pharmacy Consult - [place], , Does not apply, Daily, Braxton Adan, [facility], Given at  08/08/21 0809  •  piperacillin-tazobactam (ZOSYN) 3.375 g in iso-osmotic dextrose 50 ml (premix), 3.375 g, Intravenous, Q8H, Laura Shepherd MD, 3.375 g at 08/09/21 1258  •  potassium chloride (MICRO-K) CR capsule 40 mEq, 40 mEq, Oral, PRN, 40 mEq at 08/09/21 1258 **OR** potassium chloride (KLOR-CON) packet 40 mEq, 40 mEq, Oral, PRN **OR** potassium chloride 10 mEq in 100 mL IVPB, 10 mEq, Intravenous, Q1H PRN, Laura Shepherd MD  •  [START ON 8/10/2021] predniSONE (DELTASONE) tablet 40 mg, 40 mg, Oral, Daily With Breakfast, Linus Mosley MD  •  sertraline (ZOLOFT) tablet 150 mg, 150 mg, Oral, Daily, Jacqueline Guzman APRN, 150 mg at 08/09/21 0937  •  sodium chloride 0.9 % flush 10 mL, 10 mL, Intravenous, PRN, Clemente Hernández,   •  sodium chloride 0.9 % flush 10 mL, 10 mL, Intravenous, Q12H, Jacqueline Guzman APRN, 10 mL at 08/09/21 0937  •  sodium chloride 0.9 % flush 10 mL, 10 mL, Intravenous, PRN, Jacqueline Guzman APRN    Antibiotics:  Anti-Infectives (From admission, onward)    Ordered     Dose/Rate Route Frequency Start Stop    08/06/21 1314  doxycycline (MONODOX) capsule 100 mg     Braxton Adan [facility] reviewed the order on 08/09/21 0742.   Ordering Provider: Clemente Barriga MD    100 mg Oral 2 Times Daily 08/06/21 2100 08/13/21 2059 08/05/21 2245  piperacillin-tazobactam (ZOSYN) 3.375 g in iso-osmotic dextrose 50 ml (premix)     Note to Pharmacy: First dose given in ED.   Laura Shepherd MD reviewed the order on 08/08/21 0748.   Ordering Provider: Laura Shepherd MD    3.375 g  over 4 Hours Intravenous Every 8 Hours 08/06/21 0500 08/11/21 0747    08/05/21 2015  vancomycin 1250 mg/250 mL 0.9% NS IVPB ([facility])     Ordering Provider: Clemente Hernández DO    20 mg/kg × 62.1 kg  166.7 mL/hr over 90 Minutes Intravenous Once 08/05/21 2017 08/05/21 2325    08/05/21 2015  piperacillin-tazobactam (ZOSYN) 3.375 g in iso-osmotic dextrose 50 ml (premix)     Ordering Provider: Clemente Hernández  Layo, DO    3.375 g  over 30 Minutes Intravenous Once 21 9715           Physical Exam:   Vital Signs  Temp (24hrs), Av.1 °F (36.7 °C), Min:97.6 °F (36.4 °C), Max:98.4 °F (36.9 °C)    Temp  Min: 97.6 °F (36.4 °C)  Max: 98.4 °F (36.9 °C)  BP  Min: 151/67  Max: 169/79  Pulse  Min: 56  Max: 77  Resp  Min: 16  Max: 22  SpO2  Min: 85 %  Max: 100 %    GENERAL: Awake and alert, in mild distress.   HEENT: Normocephalic, atraumatic.   EOMI. No conjunctival injection. No icterus.     NECK: Supple without nuchal rigidity.    HEART: irregular rhythm; No murmur   LUNGS: Diminished at bases but clear bilaterally.  No cough appreciated  ABDOMEN: Soft, nontender, nondistended. Positive bowel sounds. No rebound or guarding.   : + external catheter  EXT:  No edema  MSK:  FROM, no joint effusion  SKIN: no rash noted  NEURO: Oriented to PPT. Normal speech and cognition  PSYCHIATRIC: Normal insight and judgment. Cooperative with PE  PIV    Laboratory Data    Results from last 7 days   Lab Units 21  0640 21  0614 21  0835   WBC 10*3/mm3 11.76* 13.05* 13.56*   HEMOGLOBIN g/dL 7.8* 8.2* 9.1*   HEMATOCRIT % 25.2* 26.5* 29.8*   PLATELETS 10*3/mm3 223 299 278     Results from last 7 days   Lab Units 21  0641   SODIUM mmol/L 132*   POTASSIUM mmol/L 3.6   CHLORIDE mmol/L 98   CO2 mmol/L 24.0   BUN mg/dL 39*   CREATININE mg/dL 1.07*   GLUCOSE mg/dL 120*   CALCIUM mg/dL 8.3*     Results from last 7 days   Lab Units 21  0641   ALK PHOS U/L 69   BILIRUBIN mg/dL 0.4   ALT (SGPT) U/L 23   AST (SGOT) U/L 22         Results from last 7 days   Lab Units 21  0612   CRP mg/dL 6.98*     Results from last 7 days   Lab Units 08/06/21  0000   LACTATE mmol/L 1.5             Estimated Creatinine Clearance: 42.2 mL/min (A) (by C-G formula based on SCr of 1.07 mg/dL (H)).      Microbiology:  Microbiology Results (last 10 days)     Procedure Component Value - Date/Time    MRSA Screen, PCR (Inpatient) -  Swab, Nares [[id number]]  (Normal) Collected: 08/06/21 1006    Lab Status: Final result Specimen: Swab from Nares Updated: 08/06/21 1132     MRSA PCR Negative    Narrative:      MRSA Negative    Legionella Antigen, Urine - Urine, Urine, Clean Catch [[id number]]  (Normal) Collected: 08/05/21 2348    Lab Status: Final result Specimen: Urine, Clean Catch Updated: 08/06/21 1057     LEGIONELLA ANTIGEN, URINE Negative    S. Pneumo Ag Urine or CSF - Urine, Urine, Clean Catch [[id number]]  (Normal) Collected: 08/05/21 2348    Lab Status: Final result Specimen: Urine, Clean Catch Updated: 08/06/21 1058     Strep Pneumo Ag Negative    Blood Culture - Blood, Arm, Left [[id number]] Collected: 08/05/21 2102    Lab Status: Preliminary result Specimen: Blood from Arm, Left Updated: 08/08/21 2130     Blood Culture No growth at 3 days    Respiratory Panel PCR w/COVID-19(SARS-CoV-2) CAS/SHALINI/KRISTY/PAD/COR/MAD/HAJA In-House, NP Swab in UTM/VTM, 3-4 HR TAT - Swab, Nasopharynx [[id number]]  (Normal) Collected: 08/05/21 2100    Lab Status: Final result Specimen: Swab from Nasopharynx Updated: 08/05/21 2315     ADENOVIRUS, PCR Not Detected     Coronavirus 229E Not Detected     Coronavirus HKU1 Not Detected     Coronavirus NL63 Not Detected     Coronavirus OC43 Not Detected     COVID19 Not Detected     Human Metapneumovirus Not Detected     Human Rhinovirus/Enterovirus Not Detected     Influenza A PCR Not Detected     Influenza B PCR Not Detected     Parainfluenza Virus 1 Not Detected     Parainfluenza Virus 2 Not Detected     Parainfluenza Virus 3 Not Detected     Parainfluenza Virus 4 Not Detected     RSV, PCR Not Detected     Bordetella pertussis pcr Not Detected     Bordetella parapertussis PCR Not Detected     Chlamydophila pneumoniae PCR Not Detected     Mycoplasma pneumo by PCR Not Detected    Narrative:      In the setting of a positive respiratory panel with a viral infection PLUS a negative procalcitonin without other underlying concern  for bacterial infection, consider observing off antibiotics or discontinuation of antibiotics and continue supportive care. If the respiratory panel is positive for atypical bacterial infection (Bordetella pertussis, Chlamydophila pneumoniae, or Mycoplasma pneumoniae), consider antibiotic de-escalation to target atypical bacterial infection.    Blood Culture - Blood, Hand, Right [[id number]] Collected: 08/05/21 2045    Lab Status: Preliminary result Specimen: Blood from Hand, Right Updated: 08/08/21 2130     Blood Culture No growth at 3 days          Urine strep and legionella negative  MRSA nares screen negative    Radiology:  Imaging Results (Last 72 Hours)     Procedure Component Value Units Date/Time    XR Chest 1 View [[id number]] Collected: 08/08/21 1024     Updated: 08/08/21 2203    Narrative:      EXAMINATION: XR CHEST 1 VW - 08/08/2021     INDICATION: J96.01-Acute respiratory failure with hypoxia; I50.9-Heart  failure, unspecified; D40-Swvfrou effusion, not elsewhere classified;  J18.9-Pneumonia, unspecified organism.     COMPARISON: 08/07/2021     FINDINGS: Coarse and extensive biapical pulmonary interstitial disease  is stable. Included lower lungs appear clear except for trace bibasilar  effusion. No pneumothorax is seen.       Impression:      Stable chest exam including extensive but stable upper lung  interstitial disease.      DICTATED:   08/08/2021  EDITED/ls :   08/08/2021     This report was finalized on 8/8/2021 10:00 PM by Dr. Augie Leiva MD.       XR Chest 1 View [[id number]] Collected: 08/07/21 0836     Updated: 08/07/21 2156    Narrative:      EXAMINATION: XR CHEST 1 VW - 08/07/2021     INDICATION:Possible amiodarone line; J96.01-Acute respiratory failure  with hypoxia; I50.9-Heart failure, unspecified; E93-Xyrquvn effusion,  not elsewhere classified; J18.9-Pneumonia, unspecified organism. CHF.     COMPARISON: 08/06/2021     FINDINGS: Heart is enlarged. Vasculature is mostly obscured by  the  patient's dense pulmonary disease of the upper and mid lungs. Overall,  interstitial changes may be mildly improved. Skin fold shadows are  superimposed over the right lateral chest. Allowing for this, no  pneumothorax is appreciated.       Impression:      Extensive upper lung interstitial disease stable to slightly  improved. No evidence of pneumothorax. Right-sided skin fold shadows as  noted.     DICTATED:   08/07/2021  EDITED/ls :   08/07/2021         This report was finalized on 8/7/2021 9:53 PM by Dr. Augie Leiva MD.       XR Chest 1 View [[id number]] Collected: 08/06/21 1008     Updated: 08/06/21 2242    Narrative:      EXAMINATION: XR CHEST 1 VW- 08/06/2021     INDICATION: Please compare to 8/1/2021 film; J96.01-Acute respiratory  failure with hypoxia; I50.9-Heart failure, unspecified; V49-Xybzdxs  effusion, not elsewhere classified; J18.9-Pneumonia, unspecified  organism     COMPARISON: 08/01/2021     FINDINGS: Right upper extremity PICC line has been removed. There is  extensive upper lung disease, similar to the prior study. Lung bases  remain relatively clear. No pneumothorax or effusion is seen.       Impression:      Extensive bilateral upper lung disease, similar to prior  exam.     D:  08/06/2021  E:  08/06/2021         This report was finalized on 8/6/2021 10:39 PM by Dr. Augie Leiva MD.           I personally reviewed the above extensive bilateral infiltrates      Impression:   - Bilateral pulmonary infiltrates: Discussed with pulmonology team.  Edema certainly contributing, especially with elevated BNP.  Also had effusions.  A large part of this could be amiodarone toxicity.  CRP improving.  Could have possible reinfection, although WBC improved compared to last admission and now overall stable.  Procalcitonin negative x2.  Negative respiratory viral panel, urine antigens and MRSA screening.  No pathogens on sputum culture from last admission and now cough is no longer productive.     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 Steroids contributing.   - TRACY: creat trending up over past few days  - Anemia of chronic disease  - Essential hypertension  - Atrial fibrillation/Eliquis  - Rheumatoid arthritis: not on immunosuppression   - Prior QTC prolongation: Contraindication to several antibiotics    PLAN/RECOMMENDATIONS:   - microbiology data reviewed  - CT scan images reviewed again  - I discussed her complex case in the room with Dr. Mosley  - Follow CBC, CMP  - check G6PD in case dapsone needed     Currently day 4 of antibiotics.  Patient approved.  We will begin de-escalating  - Stop Zosyn  - PO Augmentin, renally dosed for 1 more day  - Continue doxycycline for now  - probiotic daily  - diuresis, nebulizers, steroids per primary team, pulmonary    - Likely PCP prophylaxis with low-dose Bactrim vs dapsone after other antibiotics completed. Continue until on less than 15 mg of prednisone daily.    Complex MDM. Numerous comorbidities predispose to poor outcomes. Discussed with Dr Mosley and family in the room. I will follow       Clemente Barriga MD  8/9/2021  17:26 EDT

## 2021-08-09 NOTE — THERAPY TREATMENT NOTE
Patient Name: Yin Law  : 1940    MRN: [id number]                              Today's Date: 2021       Admit Date: 2021    Visit Dx:     ICD-10-CM ICD-9-CM   1. Acute respiratory failure with hypoxia (CMS/[facility])  J96.01 518.81   2. Acute on chronic congestive heart failure, unspecified heart failure type (CMS/[facility])  I50.9 428.0   3. Pleural effusion  J90 511.9   4. Pneumonia of both lower lobes due to infectious organism  J18.9 486     Patient Active Problem List   Diagnosis   • Multifocal pneumonia   • Recurrent atrial fibrillation with RVR (CMS/[facility])   • COPD on home oxygen (CMS/[facility])   • Hypertension and diastolic dysfunction   • Rheumatoid arthritis (CMS/[facility])   • Anxiety and depression   • Wound of right leg   • Anemia   • Elevated troponin   • Sepsis (CMS/[facility])   • A/C hypoxemic respiratory failure (CMS/[facility])   • Pleural effusion, bilateral   • Bilateral upper lobe pulmonary infiltrates.  Components of CHF and possible inflammation/infection   • Acute diastolic CHF (CMS/[facility])     Past Medical History:   Diagnosis Date   • Anxiety and depression    • Arrhythmia     A-FIB   • Asthma    • Chicken pox    • COPD (chronic obstructive pulmonary disease) (CMS/[facility])    • Hyperlipidemia    • Hypertension    • Measles    • Menopause    • Osteoporosis    • Rheumatoid arthritis (CMS/HCC)    • Rheumatoid arthritis (CMS/HCC)    • Tobacco abuse      Past Surgical History:   Procedure Laterality Date   • APPENDECTOMY     • CARPAL TUNNEL RELEASE Left    • CATARACT EXTRACTION, BILATERAL     • COLON SURGERY     • COLONOSCOPY     • GALLBLADDER SURGERY     • HYSTERECTOMY  1971   • TONSILLECTOMY       General Information     Row Name 21 1318          Physical Therapy Time and Intention    Document Type  therapy note (daily note)  -AY     Mode of Treatment  physical therapy  -AY     Row Name 21 5813          General Information    Patient Profile Reviewed  yes  -AY     Existing Precautions/Restrictions   fall;oxygen therapy device and L/min dizziness  -AY     Barriers to Rehab  medically complex;previous functional deficit  -AY     Row Name 08/09/21 1318          Cognition    Orientation Status (Cognition)  oriented x 3  -AY     Row Name 08/09/21 1318          Safety Issues, Functional Mobility    Safety Issues Affecting Function (Mobility)  awareness of need for assistance;insight into deficits/self-awareness  -AY     Impairments Affecting Function (Mobility)  balance;coordination;endurance/activity tolerance;motor planning;shortness of breath;strength  -AY       User Key  (r) = Recorded By, (t) = Taken By, (c) = Cosigned By    Initials Name Provider Type    AY Anne Ballard, PT Physical Therapist        Mobility     Row Name 08/09/21 1318          Bed Mobility    Bed Mobility  supine-sit  -AY     Supine-Sit Crosby (Bed Mobility)  contact guard;verbal cues  -AY     Comment (Bed Mobility)  VC for sequencing and hand placement. Pt initially reported dizziness. BP WNL and dizziness resolved after 2 minutes of sitting EOB  -AY     Row Name 08/09/21 1318          Transfers    Comment (Transfers)  STS performed x3. Mod A required to stand from toilet, CGA to stand from EOB.  -AY     Row Name 08/09/21 1318          Sit-Stand Transfer    Sit-Stand Crosby (Transfers)  1 person assist;moderate assist (50% patient effort);verbal cues  -AY     Assistive Device (Sit-Stand Transfers)  walker, front-wheeled  -AY     Row Name 08/09/21 1318          Gait/Stairs (Locomotion)    Crosby Level (Gait)  contact guard;verbal cues  -AY     Assistive Device (Gait)  walker, front-wheeled  -AY     Distance in Feet (Gait)  5 + 10  -AY     Deviations/Abnormal Patterns (Gait)  cindi decreased;festinating/shuffling;gait speed decreased;bilateral deviations;stride length decreased;base of support, narrow  -AY     Bilateral Gait Deviations  heel strike decreased;forward flexed posture  -AY     Comment (Gait/Stairs)  pt  demonstrated shuffling step through gait pattern with decreased cindi and very flexed posture. VC for posture, increased stride length, and PLB. O2 remained >90% on 2L NC throughout. Gait distance limited by fatigue.  -AY       User Key  (r) = Recorded By, (t) = Taken By, (c) = Cosigned By    Initials Name Provider Type    Anne Florence PT Physical Therapist        Obj/Interventions     Row Name 08/09/21 1320          Balance    Balance Assessment  sitting static balance;sitting dynamic balance;standing static balance;standing dynamic balance  -AY     Static Sitting Balance  WFL;sitting, edge of bed  -AY     Dynamic Sitting Balance  mild impairment;sitting, edge of bed  -AY     Static Standing Balance  mild impairment;standing;supported  -AY     Dynamic Standing Balance  mild impairment;supported;standing  -AY       User Key  (r) = Recorded By, (t) = Taken By, (c) = Cosigned By    Initials Name Provider Type    Anne Florence PT Physical Therapist        Goals/Plan    No documentation.       Clinical Impression     Row Name 08/09/21 1320          Pain    Additional Documentation  Pain Scale: Numbers Pre/Post-Treatment (Group)  -AY     Row Name 08/09/21 1320          Pain Scale: Numbers Pre/Post-Treatment    Pretreatment Pain Rating  0/10 - no pain  -AY     Posttreatment Pain Rating  0/10 - no pain  -AY     Pain Intervention(s)  Ambulation/increased activity;Repositioned  -AY     Row Name 08/09/21 1320          Plan of Care Review    Plan of Care Reviewed With  patient;daughter  -AY     Progress  improving  -AY     Outcome Summary  Pt showing improvement as she increased ambulation distance to 5ft + 10ft with RWx and CGA with O2 remaining >90% on 2L NC. Initial dizziness reported which resolved with prolonged sitting EOB. Mod A required to stand from low surface height. Pt limited by decreased functional endurance and generalized weakness. Continue to progress as able. d/c rec for IRF.  -AY     Row Name  08/09/21 1320          Vital Signs    Pre Systolic BP Rehab  -- VSS  -AY     Pre SpO2 (%)  94  -AY     O2 Delivery Pre Treatment  nasal cannula  -AY     Intra SpO2 (%)  90  -AY     O2 Delivery Intra Treatment  nasal cannula  -AY     Post SpO2 (%)  96  -AY     O2 Delivery Post Treatment  nasal cannula  -AY     Pre Patient Position  Supine  -AY     Intra Patient Position  Standing  -AY     Post Patient Position  Sitting  -AY     Row Name 08/09/21 1320          Positioning and Restraints    Pre-Treatment Position  in bed  -AY     Post Treatment Position  chair  -AY     In Chair  notified nsg;reclined;sitting;call light within reach;encouraged to call for assist;exit alarm on;with family/caregiver;RUE elevated;LUE elevated;waffle cushion;legs elevated  -AY       User Key  (r) = Recorded By, (t) = Taken By, (c) = Cosigned By    Initials Name Provider Type    Anne Florence PT Physical Therapist        Outcome Measures     Row Name 08/09/21 1323          How much help from another person do you currently need...    Turning from your back to your side while in flat bed without using bedrails?  3  -AY     Moving from lying on back to sitting on the side of a flat bed without bedrails?  3  -AY     Moving to and from a bed to a chair (including a wheelchair)?  3  -AY     Standing up from a chair using your arms (e.g., wheelchair, bedside chair)?  3  -AY     Climbing 3-5 steps with a railing?  2  -AY     To walk in hospital room?  3  -AY     AM-PAC 6 Clicks Score (PT)  17  -AY     Row Name 08/09/21 1323          Functional Assessment    Outcome Measure Options  AM-PAC 6 Clicks Basic Mobility (PT)  -AY       User Key  (r) = Recorded By, (t) = Taken By, (c) = Cosigned By    Initials Name Provider Type    Anne Florence PT Physical Therapist                       Physical Therapy Education                 Title: PT OT SLP Therapies (Done)     Topic: Physical Therapy (Done)     Point: Mobility training (Done)     Learning  Progress Summary           Patient Acceptance, E,TB, VU,NR by AY at 8/9/2021 1323    Acceptance, E,D, VU,NR by HP at 8/6/2021 1110                   Point: Home exercise program (Done)     Learning Progress Summary           Patient Acceptance, E,D, VU,NR by HP at 8/6/2021 1110                   Point: Body mechanics (Done)     Learning Progress Summary           Patient Acceptance, E,TB, VU,NR by AY at 8/9/2021 1323    Acceptance, E,D, VU,NR by HP at 8/6/2021 1110                   Point: Precautions (Done)     Learning Progress Summary           Patient Acceptance, E,TB, VU,NR by AY at 8/9/2021 1323    Acceptance, E,D, VU,NR by HP at 8/6/2021 1110                               User Key     Initials Effective Dates Name Provider Type Discipline     11/10/20 -  Anne Ballard, PT Physical Therapist PT     06/01/21 -  Acacia Taylor, DELIA Physical Therapist PT              PT Recommendation and Plan     Plan of Care Reviewed With: patient, daughter  Progress: improving  Outcome Summary: Pt showing improvement as she increased ambulation distance to 5ft + 10ft with RWx and CGA with O2 remaining >90% on 2L NC. Initial dizziness reported which resolved with prolonged sitting EOB. Mod A required to stand from low surface height. Pt limited by decreased functional endurance and generalized weakness. Continue to progress as able. d/c rec for IRF.     Time Calculation:   PT Charges     Row Name 08/09/21 1323             Time Calculation    Start Time  0955  -AY      PT Received On  08/09/21  -AY         Time Calculation- PT    Total Timed Code Minutes- PT  23 minute(s)  -AY         Timed Charges    58314 - PT Therapeutic Activity Minutes  23  -AY         Total Minutes    Timed Charges Total Minutes  23  -AY       Total Minutes  23  -AY        User Key  (r) = Recorded By, (t) = Taken By, (c) = Cosigned By    Initials Name Provider Type    AY Anne Ballard, PT Physical Therapist        Therapy Charges for Today     Code  Description Service Date Service Provider Modifiers Qty    71188587390 HC PT THERAPEUTIC ACT EA 15 MIN 8/9/2021 Anne Ballard, PT GP 2          PT G-Codes  Outcome Measure Options: AM-PAC 6 Clicks Basic Mobility (PT)  AM-PAC 6 Clicks Score (PT): 17    Anne Ballard PT  8/9/2021

## 2021-08-09 NOTE — CASE MANAGEMENT/SOCIAL WORK
Continued Stay Note  [facility]     Patient Name: Yin Law  MRN: [id number]  Today's Date: 8/9/2021    Admit Date: 8/5/2021    Discharge Plan     Row Name 08/09/21 1445       Plan    Plan  Home with family    Patient/Family in Agreement with Plan  yes    Plan Comments  Spoke with pt. and blanca at bedside. Pt. states her plan is to dc to home with HH and her family. States it's her decision to make. She does not want rehab.    Final Discharge Disposition Code  06 - home with home health care        Discharge Codes    No documentation.             Zaida Acuna RN

## 2021-08-10 ENCOUNTER — APPOINTMENT (OUTPATIENT)
Dept: GENERAL RADIOLOGY | Facility: HOSPITAL | Age: 81
End: 2021-08-10

## 2021-08-10 LAB
ANION GAP SERPL CALCULATED.3IONS-SCNC: 6 MMOL/L (ref 5–15)
BACTERIA SPEC AEROBE CULT: NORMAL
BACTERIA SPEC AEROBE CULT: NORMAL
BUN SERPL-MCNC: 40 MG/DL (ref 8–23)
BUN/CREAT SERPL: 42.1 (ref 7–25)
CALCIUM SPEC-SCNC: 8.4 MG/DL (ref 8.6–10.5)
CHLORIDE SERPL-SCNC: 103 MMOL/L (ref 98–107)
CO2 SERPL-SCNC: 27 MMOL/L (ref 22–29)
CREAT SERPL-MCNC: 0.95 MG/DL (ref 0.57–1)
DEPRECATED RDW RBC AUTO: 54.5 FL (ref 37–54)
ERYTHROCYTE [DISTWIDTH] IN BLOOD BY AUTOMATED COUNT: 19.8 % (ref 12.3–15.4)
GFR SERPL CREATININE-BSD FRML MDRD: 56 ML/MIN/1.73
GLUCOSE SERPL-MCNC: 82 MG/DL (ref 65–99)
HCT VFR BLD AUTO: 26.6 % (ref 34–46.6)
HGB BLD-MCNC: 7.9 G/DL (ref 12–15.9)
MCH RBC QN AUTO: 23.7 PG (ref 26.6–33)
MCHC RBC AUTO-ENTMCNC: 29.7 G/DL (ref 31.5–35.7)
MCV RBC AUTO: 79.9 FL (ref 79–97)
PLATELET # BLD AUTO: 207 10*3/MM3 (ref 140–450)
PMV BLD AUTO: 11 FL (ref 6–12)
POTASSIUM SERPL-SCNC: 4.4 MMOL/L (ref 3.5–5.2)
RBC # BLD AUTO: 3.33 10*6/MM3 (ref 3.77–5.28)
SODIUM SERPL-SCNC: 136 MMOL/L (ref 136–145)
WBC # BLD AUTO: 12.81 10*3/MM3 (ref 3.4–10.8)

## 2021-08-10 PROCEDURE — 85027 COMPLETE CBC AUTOMATED: CPT | Performed by: HOSPITALIST

## 2021-08-10 PROCEDURE — 71046 X-RAY EXAM CHEST 2 VIEWS: CPT

## 2021-08-10 PROCEDURE — 99233 SBSQ HOSP IP/OBS HIGH 50: CPT | Performed by: HOSPITALIST

## 2021-08-10 PROCEDURE — 82955 ASSAY OF G6PD ENZYME: CPT | Performed by: INTERNAL MEDICINE

## 2021-08-10 PROCEDURE — 80048 BASIC METABOLIC PNL TOTAL CA: CPT | Performed by: HOSPITALIST

## 2021-08-10 PROCEDURE — 63710000001 PREDNISONE PER 1 MG: Performed by: INTERNAL MEDICINE

## 2021-08-10 PROCEDURE — 94799 UNLISTED PULMONARY SVC/PX: CPT

## 2021-08-10 PROCEDURE — 97530 THERAPEUTIC ACTIVITIES: CPT

## 2021-08-10 PROCEDURE — 85041 AUTOMATED RBC COUNT: CPT | Performed by: INTERNAL MEDICINE

## 2021-08-10 PROCEDURE — 99232 SBSQ HOSP IP/OBS MODERATE 35: CPT | Performed by: INTERNAL MEDICINE

## 2021-08-10 PROCEDURE — 97110 THERAPEUTIC EXERCISES: CPT

## 2021-08-10 RX ADMIN — SODIUM CHLORIDE, PRESERVATIVE FREE 10 ML: 5 INJECTION INTRAVENOUS at 09:34

## 2021-08-10 RX ADMIN — DOXYCYCLINE 100 MG: 100 CAPSULE ORAL at 09:33

## 2021-08-10 RX ADMIN — DOXYCYCLINE 100 MG: 100 CAPSULE ORAL at 20:23

## 2021-08-10 RX ADMIN — PREDNISONE 40 MG: 20 TABLET ORAL at 09:33

## 2021-08-10 RX ADMIN — IPRATROPIUM BROMIDE AND ALBUTEROL SULFATE 3 ML: 2.5; .5 SOLUTION RESPIRATORY (INHALATION) at 16:01

## 2021-08-10 RX ADMIN — APIXABAN 5 MG: 5 TABLET, FILM COATED ORAL at 20:23

## 2021-08-10 RX ADMIN — IPRATROPIUM BROMIDE AND ALBUTEROL SULFATE 3 ML: 2.5; .5 SOLUTION RESPIRATORY (INHALATION) at 13:48

## 2021-08-10 RX ADMIN — AMOXICILLIN AND CLAVULANATE POTASSIUM 500 MG: 500; 125 TABLET, FILM COATED ORAL at 20:23

## 2021-08-10 RX ADMIN — SERTRALINE HYDROCHLORIDE 150 MG: 100 TABLET ORAL at 09:33

## 2021-08-10 RX ADMIN — SODIUM CHLORIDE, PRESERVATIVE FREE 10 ML: 5 INJECTION INTRAVENOUS at 20:24

## 2021-08-10 RX ADMIN — AMOXICILLIN AND CLAVULANATE POTASSIUM 500 MG: 500; 125 TABLET, FILM COATED ORAL at 09:33

## 2021-08-10 RX ADMIN — APIXABAN 5 MG: 5 TABLET, FILM COATED ORAL at 09:34

## 2021-08-10 RX ADMIN — IPRATROPIUM BROMIDE AND ALBUTEROL SULFATE 3 ML: 2.5; .5 SOLUTION RESPIRATORY (INHALATION) at 07:57

## 2021-08-10 RX ADMIN — ALPRAZOLAM 0.5 MG: 0.5 TABLET ORAL at 21:27

## 2021-08-10 RX ADMIN — OXYCODONE HYDROCHLORIDE AND ACETAMINOPHEN 1000 MG: 500 TABLET ORAL at 09:33

## 2021-08-10 RX ADMIN — Medication 1 CAPSULE: at 09:33

## 2021-08-10 RX ADMIN — BISOPROLOL FUMARATE 5 MG: 5 TABLET, FILM COATED ORAL at 09:33

## 2021-08-10 RX ADMIN — IPRATROPIUM BROMIDE AND ALBUTEROL SULFATE 3 ML: 2.5; .5 SOLUTION RESPIRATORY (INHALATION) at 19:44

## 2021-08-10 NOTE — PROGRESS NOTES
[facility]  PROGRESS NOTE    Patient Name: Yin Law  : 1940  MRN: [id number]    Date of Admission: 2021  Primary Care Physician: Santiago Chaudhry MD    Subjective   Subjective     CC:  F/U SOA, cough    HPI:  Seen this morning. No family present. Overall feels better. On 3 liters currently.     ROS:  Gen-no fevers, no chills  CV-no chest pain, no palpitations  Resp-no cough, improved dyspnea  GI-no N/V/D, no abd pain    All other systems reviewed and negative except any additional pertinent positives and negatives as discussed in HPI.      Objective   Objective     Vital Signs:   Temp:  [97.5 °F (36.4 °C)-98.8 °F (37.1 °C)] 97.5 °F (36.4 °C)  Heart Rate:  [63-85] 69  Resp:  [16-20] 16  BP: (155-173)/(56-77) 165/60  Flow (L/min):  [2.5-3] 3     Physical Exam:  Gen-no acute distress  HENT-NCAT, mucous membranes moist  CV-RRR, S1 S2 normal, no m/r/g  Resp-slightly diminished at the bases, nonlabored on 3 liters  Abd-soft, NT, ND, +BS  Ext-no edema  Neuro-A&Ox3, no focal deficits  Skin-no rashes  Psych-appropriate mood      Results Reviewed:  LAB RESULTS:      Lab 08/10/21  0646 21  0640 21  0614 21  0835 21  0612 21  0000 21  2104 21  1820 21  1820   WBC 12.81* 11.76* 13.05* 13.56* 11.49*  --   --    < > 12.79*   HEMOGLOBIN 7.9* 7.8* 8.2* 9.1* 8.6*  --   --    < > 8.5*   HEMATOCRIT 26.6* 25.2* 26.5* 29.8* 28.8*  --   --    < > 28.2*   PLATELETS 207 223 299 278 266  --   --    < > 276   NEUTROS ABS  --   --   --   --  10.80*  --   --   --  9.99*   IMMATURE GRANS (ABS)  --   --   --   --  0.10*  --   --   --  0.07*   LYMPHS ABS  --   --   --   --  0.44*  --   --   --  1.62   MONOS ABS  --   --   --   --  0.14  --   --   --  1.02*   EOS ABS  --   --   --   --  0.00  --   --   --  0.04   MCV 79.9 78.5* 79.6 79.7 81.8  --   --    < > 80.6   CRP  --   --   --   --  6.98*  --   --   --   --    PROCALCITONIN   --   --  0.11  --   --   --   --   --  0.17   LACTATE  --   --   --   --   --  1.5 2.3*  --   --    LDH  --   --   --   --  361*  --   --   --   --     < > = values in this interval not displayed.         Lab 08/10/21  0647 08/09/21  0641 08/08/21  0614 08/07/21  2235 08/07/21  0835 08/06/21  0612 08/06/21  0612   SODIUM 136 132* 135*  --  135*  --  140   POTASSIUM 4.4 3.6 3.9 3.8 3.0*   < > 4.3   CHLORIDE 103 98 97*  --  96*  --  101   CO2 27.0 24.0 26.0  --  27.0  --  27.0   ANION GAP 6.0 10.0 12.0  --  12.0  --  12.0   BUN 40* 39* 29*  --  18  --  17   CREATININE 0.95 1.07* 0.94  --  0.77  --  0.76   GLUCOSE 82 120* 133*  --  137*  --  138*   CALCIUM 8.4* 8.3* 8.5*  --  8.7  --  8.4*   MAGNESIUM  --   --  2.7*  --  1.9  --  2.1   PHOSPHORUS  --   --   --   --  4.0  --   --     < > = values in this interval not displayed.         Lab 08/09/21  0641 08/05/21  1820   TOTAL PROTEIN 5.1* 6.1   ALBUMIN 2.70* 3.20*   GLOBULIN 2.4 2.9   ALT (SGPT) 23 21   AST (SGOT) 22 58*   BILIRUBIN 0.4 0.7   ALK PHOS 69 95         Lab 08/08/21  0614 08/06/21  0612 08/06/21  0000 08/05/21  1820   PROBNP 2,571.0* 11,947.0*  --  10,115.0*   TROPONIN T  --   --  0.359* 0.380*                 Lab 08/08/21  0707   PH, ARTERIAL 7.477*   PCO2, ARTERIAL 38.4   PO2 ART 57.6*   FIO2 40   HCO3 ART 28.4*   BASE EXCESS ART 4.5*   CARBOXYHEMOGLOBIN 1.9     Brief Urine Lab Results  (Last result in the past 365 days)      Color   Clarity   Blood   Leuk Est   Nitrite   Protein   CREAT   Urine HCG        08/05/21 2348 Yellow Clear Negative Negative Negative Negative               Microbiology Results Abnormal     Procedure Component Value - Date/Time    Blood Culture - Blood, Hand, Right [[id number]] Collected: 08/05/21 2045    Lab Status: Preliminary result Specimen: Blood from Hand, Right Updated: 08/09/21 2116     Blood Culture No growth at 4 days    Blood Culture - Blood, Arm, Left [[id number]] Collected: 08/05/21 2102    Lab Status: Preliminary  result Specimen: Blood from Arm, Left Updated: 08/09/21 2116     Blood Culture No growth at 4 days    MRSA Screen, PCR (Inpatient) - Swab, Nares [[id number]]  (Normal) Collected: 08/06/21 1006    Lab Status: Final result Specimen: Swab from Nares Updated: 08/06/21 1132     MRSA PCR Negative    Narrative:      MRSA Negative    S. Pneumo Ag Urine or CSF - Urine, Urine, Clean Catch [[id number]]  (Normal) Collected: 08/05/21 2348    Lab Status: Final result Specimen: Urine, Clean Catch Updated: 08/06/21 1058     Strep Pneumo Ag Negative    Legionella Antigen, Urine - Urine, Urine, Clean Catch [[id number]]  (Normal) Collected: 08/05/21 2348    Lab Status: Final result Specimen: Urine, Clean Catch Updated: 08/06/21 1057     LEGIONELLA ANTIGEN, URINE Negative    Respiratory Panel PCR w/COVID-19(SARS-CoV-2) CAS/SHALINI/KRISTY/PAD/COR/MAD/HAJA In-House, NP Swab in UTM/VTM, 3-4 HR TAT - Swab, Nasopharynx [[id number]]  (Normal) Collected: 08/05/21 2100    Lab Status: Final result Specimen: Swab from Nasopharynx Updated: 08/05/21 2315     ADENOVIRUS, PCR Not Detected     Coronavirus 229E Not Detected     Coronavirus HKU1 Not Detected     Coronavirus NL63 Not Detected     Coronavirus OC43 Not Detected     COVID19 Not Detected     Human Metapneumovirus Not Detected     Human Rhinovirus/Enterovirus Not Detected     Influenza A PCR Not Detected     Influenza B PCR Not Detected     Parainfluenza Virus 1 Not Detected     Parainfluenza Virus 2 Not Detected     Parainfluenza Virus 3 Not Detected     Parainfluenza Virus 4 Not Detected     RSV, PCR Not Detected     Bordetella pertussis pcr Not Detected     Bordetella parapertussis PCR Not Detected     Chlamydophila pneumoniae PCR Not Detected     Mycoplasma pneumo by PCR Not Detected    Narrative:      In the setting of a positive respiratory panel with a viral infection PLUS a negative procalcitonin without other underlying concern for bacterial infection, consider observing off antibiotics or  discontinuation of antibiotics and continue supportive care. If the respiratory panel is positive for atypical bacterial infection (Bordetella pertussis, Chlamydophila pneumoniae, or Mycoplasma pneumoniae), consider antibiotic de-escalation to target atypical bacterial infection.          No radiology results from the last 24 hrs    Results for orders placed during the hospital encounter of 07/19/21    Adult Transthoracic Echo Complete W/ Cont if Necessary Per Protocol    Interpretation Summary  · Estimated left ventricular EF = 65%  · Mild aortic valve stenosis is present.  · Aortic valve maximum pressure gradient is 32.9 mmHg. Aortic valve mean pressure gradient is 17.2 mmHg.  · Mild mitral valve regurgitation is present.  · Mild tricuspid valve regurgitation is present.  · Estimated right ventricular systolic pressure from tricuspid regurgitation is 37.0 mmHg.  · There is a large left pleural effusion. There is a moderate sized right pleural effusion.      I have reviewed the medications:  Scheduled Meds:amoxicillin-clavulanate, 1 tablet, Oral, BID  apixaban, 5 mg, Oral, Q12H  ascorbic acid, 1,000 mg, Oral, Daily  bisoprolol, 5 mg, Oral, Q24H  doxycycline, 100 mg, Oral, BID  ipratropium-albuterol, 3 mL, Nebulization, 4x Daily - RT  lactobacillus acidophilus, 1 capsule, Oral, Daily  pharmacy consult - MTM, , Does not apply, Daily  predniSONE, 40 mg, Oral, Daily With Breakfast  sertraline, 150 mg, Oral, Daily  sodium chloride, 10 mL, Intravenous, Q12H      Continuous Infusions:   PRN Meds:.•  ALPRAZolam  •  ipratropium-albuterol  •  magnesium sulfate **OR** magnesium sulfate **OR** magnesium sulfate  •  potassium chloride **OR** potassium chloride **OR** potassium chloride  •  sodium chloride  •  sodium chloride    Assessment/Plan   Assessment & Plan     Active Hospital Problems    Diagnosis  POA   • **Bilateral upper lobe pulmonary infiltrates.  Components of CHF and possible inflammation/infection [R91.8]  Yes    • Acute diastolic CHF (CMS/[facility]) [I50.31]  Yes   • Elevated troponin [R77.8]  Yes   • A/C hypoxemic respiratory failure (CMS/[facility]) [J96.21]  Yes   • Pleural effusion, bilateral [J90]  Yes   • Recurrent atrial fibrillation with RVR (CMS/[facility]) [I48.91]  Yes   • COPD on home oxygen (CMS/[facility]) [J44.9]  Yes   • Hypertension and diastolic dysfunction [I10]  Yes   • Rheumatoid arthritis (CMS/[facility]) [M06.9]  Yes   • Anxiety and depression [F41.9, F32.9]  Yes   • Anemia [D64.9]  Yes      Resolved Hospital Problems    Diagnosis Date Resolved POA   • Lactic acidosis [E87.2] 08/06/2021 Yes        Brief Hospital Course to date:  Yin Law is a 80 y.o. female with hx of chronic respiratory failure on 2 liters, Afib on Eliquis, HTN, HLD, COPD, rheumatoid arthritis, and depression/anxiety who presented to the ED with complaints of shortness of breath. Previously admitted to [facility] for the same 7/19-8/2/21: treated for PNA and CHF exacerbation, was managed in ICU at one point; Amiodarone was discontinued for possible pulmonary toxicity, and she was treated with antibiotics and steroids, appears she was discharged on her baseline 2 liters. PCP followed up with her on 8/5/21 and performed a CXR, sent to the ER due to potentially worsening findings. Saturating 90% on 5 liters on arrival. ProBNP 92562, troponin 0.380, lactic acid 2.3, WBC 12.79. CTA chest with interval increase in bibasilar pleural effusions and patchy bilateral airspace disease suggestive of progressive pneumonia/infection. Admitted for further management.    This patient's problems and plans were partially entered by my partner and updated as appropriate by me 08/10/21.    Acute on chronic hypoxic respiratory failure   Sepsis, POA (leukocytosis, lactic acidosis, tachypneic)  Multifocal pneumonia, recurrent vs worsening  Bilateral pleural effusions  Acute diastolic CHF  --Recent admit for same and treated with Doxycycline, Rocephin, and steroids (Amiodarone  discontinued due to concerns for lung toxicity). Was seen by Pulmonary and Cardiology during that admission. Discharged on a few more days of prednisone.  --CTA chest 8/5/21 negative for PE, but showed interval increase in bibasilar pleural effusions and patchy airspace disease, may represent progressive PNA, also possible pulmonary edema.  --Sputum culture 7/24/21 with scant Candida albicans. Blood cultures here NGTD. Negative urine Strep/Legionella. Negative respiratory PCR panel. Negative Crypto Ag. Negative Fungitell, Histo, Blasto, Aspergillus during last admission.  --Appreciate Pulmonary and ID following.  --Has been on Zosyn and Doxycycline--switched to PO Augmentin with Doxycycline to continue.   --Changed Solumedrol to 40 mg prednisone--continue to wean per Pulmonary. She will need a longer steroid taper at discharge.   --Continue PRN and scheduled duonebs.   --Continue IV diuresis as tolerated, last dose of Lasix 8/8/21.  --Echo from 7/20/21 shows EF 65%, mild AS, mild MR, mild TR with RVSP 37 mmHg.  --Currently has been weaned from 6 liters to 3 liters. Was wearing 2 liters at baseline previously. Continue BiPAP at night as tolerated.     Elevated troponin   --Likely secondary to above (hypoxia, CHF, sepsis).   --Trending down.  --No chest pain.     PAF, currently Afib with RVR  --Continue home Eliquis, Bisoprolol.  --Amiodarone discontinued last admission due concern for lung toxicity.  --Converted to NSR 8/7/21 evening. Discontinued Cardizem drip 8/8/21.  --Follows with Dr. Montoya, did not consult as she has now converted to and remains in NSR.     Anemia, chronic  --Baseline Hb 8-9 range.  --Iron profile mixed iron deficiency + anemia of chronic disease.  --Slight decline but overall stable today. Monitor.      Anxiety/Depression   --Continue home Zoloft, PRN Xanax.    DVT prophylaxis:  Medical DVT prophylaxis orders are present.       Disposition: I expect the patient to be discharged home ~2 days if  remains stable. Continue to work with PT/OT. Patient declines inpatient rehab so plan is for HHPT.       CODE STATUS:   Code Status and Medical Interventions:   Ordered at: 08/05/21 9698     Level Of Support Discussed With:    Patient     Code Status:    No CPR     Medical Interventions (Level of Support Prior to Arrest):    Full     Comments:    DNR/ DNI       Laura Shepherd MD  08/10/21

## 2021-08-10 NOTE — PROGRESS NOTES
"INFECTIOUS DISEASE Progress Note    Yin Law  1940  [id number]    Date of Consult: 8/6/2021    Admission Date: 8/5/2021      Requesting Provider: Missy Lee DO  Evaluating Physician: Clemente Barriga MD    Reason for Consultation: multifocal pneumonia    History of present illness:    Patient is a 80 y.o. female with h/o COPD/2L O2 at home, afib/Eliquis, HTN, and RA (no IS) who presented to [facility] ED on 8/5 with worsening shortness of breath.  The patient was recently hospitalized at [facility] from 7/19-8/2/21 for pneumonia, RLE wound, and CHF exacerbation.  A CT scan chest on 7/19 showed small pleural effusions and interstitial lung disease that was worse at apices.  It was unclear if this was infection or amiodarone toxicity.  Amiodarone was subsequently stopped by Cardiology.  She was treated with Rocephin, doxycycline, and steroids.  She was discharged home after refusing rehab on no steroids or antibiotics.  She followed up with her PCP on 8/5 with CXR showing worsening pneumonia.  She was sent to [facility] for further evaluation.     She has had worsening shortness of breath since discharge with a non-productive cough.  She had associated chills, weakness, and fatigue.  She continues to have swelling in her feet that has improved somewhat since discharge.  On arrival, she was afebrile and required 5L O2.  Her hypoxia worsened overnight and was on 15L O2, but now down to 4L.  Pertinent labs are WBC 12,800 with 78% neutrophils, PCT 0.17, proBNP 10,100, creatinine 0.68, and lactic acid 2.3.  Her UA was negative.  A respiratory panel PCR with COVID-19 was negative.  Strep pneumo and Legionella UAgs are negative.  Blood cultures are pending.  A Sputum culture is pending.  MRSA PCR negative.  A CTA of chest showed no PE, but did showed \" increase in bibasilar pleural effusions and patchy bilateral airspace disease that may represent progressive pneumonia/infection\".  She was started on Zosyn and Vancomycin.  ID was " asked to evaluate and manage her antibiotic therapy.    8/9/21: Afebrile over the weekend.  Blood pressure stable.  O2 requirement overall stable, currently 3 L.  She complains of rare dry cough but no sputum production.  Infectious work-up negative to date.  No worsening chest pain.  No headache, neck pain, abdominal pain, diarrhea, rash.     8/10/21: afebrile. Stable today. Still on 3L O2. Does not feel like she is any worse. Appetite good.      ROS: See above.  Otherwise 12 point review of systems negative      No Known Allergies      Medication:    Current Facility-Administered Medications:   •  ALPRAZolam (XANAX) tablet 0.5 mg, 0.5 mg, Oral, Nightly PRN, Laura Shepherd MD, 0.5 mg at 08/09/21 2215  •  amoxicillin-clavulanate (AUGMENTIN) 500-125 MG per tablet 500 mg, 1 tablet, Oral, BID, Clemente Barriga MD, 500 mg at 08/10/21 0933  •  apixaban (ELIQUIS) tablet 5 mg, 5 mg, Oral, Q12H, Jacqueline Guzman APRN, 5 mg at 08/10/21 0934  •  ascorbic acid (VITAMIN C) tablet 1,000 mg, 1,000 mg, Oral, Daily, Jacqueline Guzman APRN, 1,000 mg at 08/10/21 0933  •  bisoprolol (ZEBeta) tablet 5 mg, 5 mg, Oral, Q24H, Jacqueline Guzman APRN, 5 mg at 08/10/21 0933  •  doxycycline (MONODOX) capsule 100 mg, 100 mg, Oral, BID, Clemente Barriga MD, 100 mg at 08/10/21 0933  •  ipratropium-albuterol (DUO-NEB) nebulizer solution 3 mL, 3 mL, Nebulization, Q4H PRN, Jacqueline Guzman APRN  •  ipratropium-albuterol (DUO-NEB) nebulizer solution 3 mL, 3 mL, Nebulization, 4x Daily - RT, Jacqueline Guzman APRN, 3 mL at 08/10/21 1601  •  lactobacillus acidophilus (RISAQUAD) capsule 1 capsule, 1 capsule, Oral, Daily, Clemente Barriga MD, 1 capsule at 08/10/21 0933  •  Magnesium Sulfate 2 gram Bolus, followed by 8 gram infusion (total Mg dose 10 grams)- Mg less than or equal to 1mg/dL, 2 g, Intravenous, PRN **OR** Magnesium Sulfate 2 gram / 50mL Infusion (GIVE X 3 BAGS TO EQUAL 6GM TOTAL DOSE) - Mg 1.1 - 1.5 mg/dl, 2 g, Intravenous, PRN **OR**  Magnesium Sulfate 4 gram infusion- Mg 1.6-1.9 mg/dL, 4 g, Intravenous, PRN, Laura Shepherd MD, Last Rate: 25 mL/hr at 08/07/21 1036, 4 g at 08/07/21 1036  •  Pharmacy Consult - [facility], , Does not apply, Daily, Braxton Adan [facility], Given at 08/08/21 0809  •  potassium chloride (MICRO-K) CR capsule 40 mEq, 40 mEq, Oral, PRN, 40 mEq at 08/09/21 2014 **OR** potassium chloride (KLOR-CON) packet 40 mEq, 40 mEq, Oral, PRN **OR** potassium chloride 10 mEq in 100 mL IVPB, 10 mEq, Intravenous, Q1H PRN, Laura Shepherd MD  •  predniSONE (DELTASONE) tablet 40 mg, 40 mg, Oral, Daily With Breakfast, Linus Mosley MD, 40 mg at 08/10/21 0933  •  sertraline (ZOLOFT) tablet 150 mg, 150 mg, Oral, Daily, Jacqueline Guzman APRN, 150 mg at 08/10/21 0933  •  sodium chloride 0.9 % flush 10 mL, 10 mL, Intravenous, PRN, Clemente Hernández,   •  sodium chloride 0.9 % flush 10 mL, 10 mL, Intravenous, Q12H, Jacqueline Guzman APRN, 10 mL at 08/10/21 0934  •  sodium chloride 0.9 % flush 10 mL, 10 mL, Intravenous, PRN, Jacqueline Guzman APRN    Antibiotics:  Anti-Infectives (From admission, onward)    Ordered     Dose/Rate Route Frequency Start Stop    08/09/21 1735  amoxicillin-clavulanate (AUGMENTIN) 500-125 MG per tablet 500 mg     Ordering Provider: Clemente Barriga MD    1 tablet Oral 2 Times Daily 08/09/21 2100 08/11/21 0859    08/06/21 1314  doxycycline (MONODOX) capsule 100 mg     Braxton Adan [facility] reviewed the order on 08/09/21 0742.   Ordering Provider: Clemente Barriga MD    100 mg Oral 2 Times Daily 08/06/21 2100 08/13/21 2059 08/05/21 2015  vancomycin 1250 mg/250 mL 0.9% NS IVPB ([facility])     Ordering Provider: Clemente Hernández DO    20 mg/kg × 62.1 kg  166.7 mL/hr over 90 Minutes Intravenous Once 08/05/21 2017 08/05/21 2325    08/05/21 2015  piperacillin-tazobactam (ZOSYN) 3.375 g in iso-osmotic dextrose 50 ml (premix)     Ordering Provider: Clemente Hernández DO    3.375 g  over 30 Minutes  Intravenous Once 21           Physical Exam:   Vital Signs  Temp (24hrs), Av °F (36.7 °C), Min:97.5 °F (36.4 °C), Max:98.8 °F (37.1 °C)    Temp  Min: 97.5 °F (36.4 °C)  Max: 98.8 °F (37.1 °C)  BP  Min: 155/77  Max: 173/71  Pulse  Min: 63  Max: 85  Resp  Min: 16  Max: 22  SpO2  Min: 88 %  Max: 100 %    GENERAL: Awake and alert, in mild distress.   HEENT: Normocephalic, atraumatic.   EOMI. No conjunctival injection. No icterus.     NECK: Supple without nuchal rigidity.    HEART: irregular rhythm; No murmur   LUNGS: Diminished at bases but clear bilaterally.  No cough appreciated  ABDOMEN: Soft, nontender, nondistended. Positive bowel sounds. No rebound or guarding.   : + external catheter  EXT:  No edema  MSK:  FROM, no joint effusion  SKIN: no rash noted  NEURO: Oriented to PPT. Normal speech and cognition  PSYCHIATRIC: Normal insight and judgment. Cooperative with PE  PIV    Laboratory Data    Results from last 7 days   Lab Units 08/10/21  0646 21  0640 21  0614   WBC 10*3/mm3 12.81* 11.76* 13.05*   HEMOGLOBIN g/dL 7.9* 7.8* 8.2*   HEMATOCRIT % 26.6* 25.2* 26.5*   PLATELETS 10*3/mm3 207 223 299     Results from last 7 days   Lab Units 08/10/21  0647   SODIUM mmol/L 136   POTASSIUM mmol/L 4.4   CHLORIDE mmol/L 103   CO2 mmol/L 27.0   BUN mg/dL 40*   CREATININE mg/dL 0.95   GLUCOSE mg/dL 82   CALCIUM mg/dL 8.4*     Results from last 7 days   Lab Units 21  0641   ALK PHOS U/L 69   BILIRUBIN mg/dL 0.4   ALT (SGPT) U/L 23   AST (SGOT) U/L 22         Results from last 7 days   Lab Units 21  0612   CRP mg/dL 6.98*     Results from last 7 days   Lab Units 21  0000   LACTATE mmol/L 1.5             Estimated Creatinine Clearance: 47.6 mL/min (by C-G formula based on SCr of 0.95 mg/dL).      Microbiology:  Microbiology Results (last 10 days)     Procedure Component Value - Date/Time    MRSA Screen, PCR (Inpatient) - Swab, Nares [[id number]]  (Normal) Collected:  08/06/21 1006    Lab Status: Final result Specimen: Swab from Nares Updated: 08/06/21 1132     MRSA PCR Negative    Narrative:      MRSA Negative    Legionella Antigen, Urine - Urine, Urine, Clean Catch [[id number]]  (Normal) Collected: 08/05/21 2348    Lab Status: Final result Specimen: Urine, Clean Catch Updated: 08/06/21 1057     LEGIONELLA ANTIGEN, URINE Negative    S. Pneumo Ag Urine or CSF - Urine, Urine, Clean Catch [[id number]]  (Normal) Collected: 08/05/21 2348    Lab Status: Final result Specimen: Urine, Clean Catch Updated: 08/06/21 1058     Strep Pneumo Ag Negative    Blood Culture - Blood, Arm, Left [[id number]] Collected: 08/05/21 2102    Lab Status: Preliminary result Specimen: Blood from Arm, Left Updated: 08/09/21 2116     Blood Culture No growth at 4 days    Respiratory Panel PCR w/COVID-19(SARS-CoV-2) CAS/SHALINI/KRISTY/PAD/COR/MAD/HAJA In-House, NP Swab in UTM/VTM, 3-4 HR TAT - Swab, Nasopharynx [[id number]]  (Normal) Collected: 08/05/21 2100    Lab Status: Final result Specimen: Swab from Nasopharynx Updated: 08/05/21 2315     ADENOVIRUS, PCR Not Detected     Coronavirus 229E Not Detected     Coronavirus HKU1 Not Detected     Coronavirus NL63 Not Detected     Coronavirus OC43 Not Detected     COVID19 Not Detected     Human Metapneumovirus Not Detected     Human Rhinovirus/Enterovirus Not Detected     Influenza A PCR Not Detected     Influenza B PCR Not Detected     Parainfluenza Virus 1 Not Detected     Parainfluenza Virus 2 Not Detected     Parainfluenza Virus 3 Not Detected     Parainfluenza Virus 4 Not Detected     RSV, PCR Not Detected     Bordetella pertussis pcr Not Detected     Bordetella parapertussis PCR Not Detected     Chlamydophila pneumoniae PCR Not Detected     Mycoplasma pneumo by PCR Not Detected    Narrative:      In the setting of a positive respiratory panel with a viral infection PLUS a negative procalcitonin without other underlying concern for bacterial infection, consider observing  off antibiotics or discontinuation of antibiotics and continue supportive care. If the respiratory panel is positive for atypical bacterial infection (Bordetella pertussis, Chlamydophila pneumoniae, or Mycoplasma pneumoniae), consider antibiotic de-escalation to target atypical bacterial infection.    Blood Culture - Blood, Hand, Right [[id number]] Collected: 08/05/21 2045    Lab Status: Preliminary result Specimen: Blood from Hand, Right Updated: 08/09/21 2116     Blood Culture No growth at 4 days          Urine strep and legionella negative  MRSA nares screen negative    Radiology:  Imaging Results (Last 72 Hours)     Procedure Component Value Units Date/Time    XR Chest PA & Lateral [[id number]] Collected: 08/10/21 1541     Updated: 08/10/21 1549    Narrative:      EXAMINATION: XR CHEST, PA AND LATERAL-08/10/2021:      INDICATION: F/U infiltrates; J96.01-Acute respiratory failure with  hypoxia; I50.9-Heart failure, unspecified; A31-Fztnmdq effusion, not  elsewhere classified; J18.9-Pneumonia, unspecified organism.      COMPARISON: Chest x-ray 08/08/2021.     FINDINGS: PA and lateral views of the chest reveal bilateral upper lung  and suprahilar predominant pulmonary opacifications, right greater than  left, with mild decrease in density of decreased airspace disease or  improvement from prior comparison with small-to-moderate right and small  left pleural effusions.       Impression:      Mild decrease in density of decreased airspace disease or  improvement from prior comparison with small-to-moderate right and small  left pleural effusions.     D:  08/10/2021  E:  08/10/2021          XR Chest 1 View [[id number]] Collected: 08/08/21 1024     Updated: 08/08/21 2203    Narrative:      EXAMINATION: XR CHEST 1 VW - 08/08/2021     INDICATION: J96.01-Acute respiratory failure with hypoxia; I50.9-Heart  failure, unspecified; Q57-Dgdsehs effusion, not elsewhere classified;  J18.9-Pneumonia, unspecified organism.      COMPARISON: 08/07/2021     FINDINGS: Coarse and extensive biapical pulmonary interstitial disease  is stable. Included lower lungs appear clear except for trace bibasilar  effusion. No pneumothorax is seen.       Impression:      Stable chest exam including extensive but stable upper lung  interstitial disease.      DICTATED:   08/08/2021  EDITED/ls :   08/08/2021     This report was finalized on 8/8/2021 10:00 PM by Dr. Augie Leiva MD.       XR Chest 1 View [[id number]] Collected: 08/07/21 0836     Updated: 08/07/21 2156    Narrative:      EXAMINATION: XR CHEST 1 VW - 08/07/2021     INDICATION:Possible amiodarone line; J96.01-Acute respiratory failure  with hypoxia; I50.9-Heart failure, unspecified; Q63-Btlzxdo effusion,  not elsewhere classified; J18.9-Pneumonia, unspecified organism. CHF.     COMPARISON: 08/06/2021     FINDINGS: Heart is enlarged. Vasculature is mostly obscured by the  patient's dense pulmonary disease of the upper and mid lungs. Overall,  interstitial changes may be mildly improved. Skin fold shadows are  superimposed over the right lateral chest. Allowing for this, no  pneumothorax is appreciated.       Impression:      Extensive upper lung interstitial disease stable to slightly  improved. No evidence of pneumothorax. Right-sided skin fold shadows as  noted.     DICTATED:   08/07/2021  EDITED/ls :   08/07/2021         This report was finalized on 8/7/2021 9:53 PM by Dr. Augie Leiva MD.           I personally reviewed the above extensive bilateral infiltrates      Impression:   - Bilateral pulmonary infiltrates: Discussed with pulmonology team.  Edema certainly contributing, especially with elevated BNP.  Also had effusions.  A large part of this could be amiodarone toxicity.  CRP improving.  Could have possible reinfection, although WBC improved compared to last admission and now overall stable.  Procalcitonin negative x2.  Negative respiratory viral panel, urine antigens and MRSA screening.   No pathogens on sputum culture from last admission and now cough is no longer productive.     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 Steroids contributing.   - TRACY: improved  - Anemia of chronic disease  - Essential hypertension  - Atrial fibrillation/Eliquis  - Rheumatoid arthritis: not on immunosuppression   - Prior QTC prolongation: Contraindication to several antibiotics    PLAN/RECOMMENDATIONS:   - microbiology data reviewed  - Follow CBC, CMP  - G6PD pending     Currently day 5 of antibiotics.  Patient improved.    - Stop Augmentin.  - Continue doxycycline for 2 more days  - probiotic daily  - diuresis, nebulizers, per pulmonary. Weaning prednisone dose    - Likely PCP prophylaxis with low-dose Bactrim vs dapsone after other antibiotics completed. Continue until on less than 15 mg of prednisone daily.    Complex MDM. Numerous comorbidities predispose to poor outcomes. Previously discussed with Dr Mosley and family. I will follow       Clemente Barriga MD  8/10/2021  17:35 EDT

## 2021-08-10 NOTE — PLAN OF CARE
Goal Outcome Evaluation:  Plan of Care Reviewed With: patient        Progress: improving  Outcome Summary: Patient had a good day. VSS, 3L. x1 assist to bathroom, up in chair most of shift. Chest xray obtained this shift. No complaints of pain. SOA on exertion. Will continue to monitor.

## 2021-08-10 NOTE — PLAN OF CARE
Goal Outcome Evaluation:  Plan of Care Reviewed With: patient, family        Progress: improving  Outcome Summary: Pt improved distance amb to 15' with FWW and CGA. O2 saturation dropped at lowest to 80% throughout session with bed mobility being the most taxing. O2 levels remained above 90% following ambulation. Frequent rest breaks incorporated due to reports of SOA. Continue to progress with PT POC as pt tolerates.

## 2021-08-10 NOTE — CASE MANAGEMENT/SOCIAL WORK
Continued Stay Note  [facility]     Patient Name: Yin Law  MRN: [id number]  Today's Date: 8/10/2021    Admit Date: 8/5/2021    Discharge Plan     Row Name 08/10/21 1504       Plan    Plan  Home with     Patient/Family in Agreement with Plan  yes    Plan Comments  Pt's plan remains to dc to home with [facility]. She is on 3L O2 NC. Her baseline is 2-2.5L O2.    Final Discharge Disposition Code  06 - home with home health care        Discharge Codes    No documentation.             Zaida Acuna RN     Incidental Actinic Keratosis Histology Text: Actinic keratosis was noted on frozen sections.  The specimen contains foci of disordered epidermal cells confined to the epidermis.  The cells have anaplastic nuclei with parakeratosis and a thickened granular cell layer.  There is papillomatosis and some hyperkeratosis.

## 2021-08-10 NOTE — THERAPY TREATMENT NOTE
Patient Name: Yin Law  : 1940    MRN: [id number]                              Today's Date: 8/10/2021       Admit Date: 2021    Visit Dx:     ICD-10-CM ICD-9-CM   1. Acute respiratory failure with hypoxia (CMS/[facility])  J96.01 518.81   2. Acute on chronic congestive heart failure, unspecified heart failure type (CMS/[facility])  I50.9 428.0   3. Pleural effusion  J90 511.9   4. Pneumonia of both lower lobes due to infectious organism  J18.9 486     Patient Active Problem List   Diagnosis   • Multifocal pneumonia   • Recurrent atrial fibrillation with RVR (CMS/[facility])   • COPD on home oxygen (CMS/[facility])   • Hypertension and diastolic dysfunction   • Rheumatoid arthritis (CMS/[facility])   • Anxiety and depression   • Wound of right leg   • Anemia   • Elevated troponin   • Sepsis (CMS/[facility])   • A/C hypoxemic respiratory failure (CMS/[facility])   • Pleural effusion, bilateral   • Bilateral upper lobe pulmonary infiltrates.  Components of CHF and possible inflammation/infection   • Acute diastolic CHF (CMS/[facility])     Past Medical History:   Diagnosis Date   • Anxiety and depression    • Arrhythmia     A-FIB   • Asthma    • Chicken pox    • COPD (chronic obstructive pulmonary disease) (CMS/[facility])    • Hyperlipidemia    • Hypertension    • Measles    • Menopause    • Osteoporosis    • Rheumatoid arthritis (CMS/[facility])    • Rheumatoid arthritis (CMS/[facility])    • Tobacco abuse      Past Surgical History:   Procedure Laterality Date   • APPENDECTOMY     • CARPAL TUNNEL RELEASE Left    • CATARACT EXTRACTION, BILATERAL     • COLON SURGERY     • COLONOSCOPY     • GALLBLADDER SURGERY     • HYSTERECTOMY  1971   • TONSILLECTOMY       General Information     Row Name 08/10/21 1152          Physical Therapy Time and Intention    Document Type  therapy note (daily note)  -HP     Mode of Treatment  physical therapy  -HP     Row Name 08/10/21 1152          General Information    Patient Profile Reviewed  yes  -HP     Existing Precautions/Restrictions   fall;oxygen therapy device and L/min  -     Row Name 08/10/21 1152          Cognition    Orientation Status (Cognition)  oriented x 3  -     Row Name 08/10/21 1152          Safety Issues, Functional Mobility    Impairments Affecting Function (Mobility)  balance;coordination;endurance/activity tolerance;motor planning;shortness of breath;strength  -       User Key  (r) = Recorded By, (t) = Taken By, (c) = Cosigned By    Initials Name Provider Type     Acacia Taylor, DELIA Physical Therapist        Mobility     Row Name 08/10/21 1152          Bed Mobility    Bed Mobility  supine-sit  -     Supine-Sit Cortez (Bed Mobility)  contact guard;verbal cues  -     Comment (Bed Mobility)  Pt performed Supine-sit transition with extra time and effort. Pt required sitting rest break once coming to sit with O2 saturation dropping to 80% and requiring ~2 minutes to recover to 90% on 3L NC. Pt reported extra fatigue this session due to recent amb to bathroom with other staff prior to start of session.  -     Row Name 08/10/21 1152          Transfers    Comment (Transfers)  Pt performed STS with CGA and FWW from EOB.  -     Row Name 08/10/21 1152          Sit-Stand Transfer    Sit-Stand Cortez (Transfers)  contact guard;1 person assist  -     Assistive Device (Sit-Stand Transfers)  walker, front-wheeled  -[facility] Name 08/10/21 1152          Gait/Stairs (Locomotion)    Cortez Level (Gait)  contact guard;verbal cues  -     Assistive Device (Gait)  walker, front-wheeled  -     Distance in Feet (Gait)  15'  -     Deviations/Abnormal Patterns (Gait)  cindi decreased;festinating/shuffling;gait speed decreased;bilateral deviations;stride length decreased;base of support, narrow  -     Bilateral Gait Deviations  heel strike decreased;forward flexed posture  -     Comment (Gait/Stairs)  Pt amb 15' with FWW and CGA with significant decrease in gait speed and forward flexed posture. O2 saturation  was 94% following return to sit in chair.  -       User Key  (r) = Recorded By, (t) = Taken By, (c) = Cosigned By    Initials Name Provider Type    Acacia Jin PT Physical Therapist        Obj/Interventions     Row Name 08/10/21 1156          Motor Skills    Motor Skills  functional endurance  -     Functional Endurance  Pulm exercises: Scap squeeze, row, lateral lean (minimal), shoulder ABD x10B with appropriate breathing. O2 saturation increased to and maintain levels of 97% throughout activity.  -     Row Name 08/10/21 1156          Balance    Balance Assessment  sitting static balance;sitting dynamic balance;standing static balance;standing dynamic balance  -     Static Sitting Balance  WFL;sitting, edge of bed  -     Dynamic Sitting Balance  WFL;mild impairment;sitting, edge of bed  -     Static Standing Balance  mild impairment;standing;supported  -     Dynamic Standing Balance  mild impairment;supported;standing  -     Balance Interventions  occupation based/functional task  -       User Key  (r) = Recorded By, (t) = Taken By, (c) = Cosigned By    Initials Name Provider Type    Acacia Jin PT Physical Therapist        Goals/Plan    No documentation.       Clinical Impression     [facility] Name 08/10/21 1157          Pain    Additional Documentation  Pain Scale: Numbers Pre/Post-Treatment (Group)  -HP     Row Name 08/10/21 1157          Pain Scale: Numbers Pre/Post-Treatment    Pretreatment Pain Rating  0/10 - no pain  -     Posttreatment Pain Rating  0/10 - no pain  -HP     Row Name 08/10/21 1157          Plan of Care Review    Progress  improving  -     Outcome Summary  Pt improved distance amb to 15' with FWW and CGA. O2 saturation dropped at lowest to 80% throughout session with bed mobility being the most taxing. O2 levels remained above 90% following ambulation. Frequent rest breaks incorporated due to reports of SOA. Continue to progress with PT POC as pt tolerates.  -      Row Name 08/10/21 1157          Vital Signs    Post Systolic BP Rehab  164  -HP     Post Treatment Diastolic BP  59  -HP     Pretreatment Heart Rate (beats/min)  76  -HP     Posttreatment Heart Rate (beats/min)  66  -HP     Pre SpO2 (%)  94  -HP     O2 Delivery Pre Treatment  nasal cannula  -HP     Post SpO2 (%)  94  -HP     O2 Delivery Post Treatment  nasal cannula  -HP     Pre Patient Position  Supine  -HP     Intra Patient Position  Standing  -HP     Post Patient Position  Sitting  -HP     Row Name 08/10/21 1157          Positioning and Restraints    Pre-Treatment Position  in bed  -HP     Post Treatment Position  chair  -HP     In Chair  notified nsg;reclined;sitting;call light within reach;encouraged to call for assist;exit alarm on;legs elevated;waffle cushion  -HP       User Key  (r) = Recorded By, (t) = Taken By, (c) = Cosigned By    Initials Name Provider Type    Acacia Jin PT Physical Therapist        Outcome Measures     Row Name 08/10/21 1200          How much help from another person do you currently need...    Turning from your back to your side while in flat bed without using bedrails?  4  -HP     Moving from lying on back to sitting on the side of a flat bed without bedrails?  4  -HP     Moving to and from a bed to a chair (including a wheelchair)?  3  -HP     Standing up from a chair using your arms (e.g., wheelchair, bedside chair)?  3  -HP     Climbing 3-5 steps with a railing?  2  -HP     To walk in hospital room?  3  -HP     AM-PAC 6 Clicks Score (PT)  19  -HP     Row Name 08/10/21 1200          Functional Assessment    Outcome Measure Options  AM-PAC 6 Clicks Basic Mobility (PT)  -HP       User Key  (r) = Recorded By, (t) = Taken By, (c) = Cosigned By    Initials Name Provider Type    Acacia Jin PT Physical Therapist                       Physical Therapy Education                 Title: PT OT SLP Therapies (Done)     Topic: Physical Therapy (Done)     Point: Mobility  training (Done)     Learning Progress Summary           Patient Acceptance, E,D, VU,NR by HP at 8/10/2021 1201    Acceptance, E,TB, VU,NR by AY at 8/9/2021 1323    Acceptance, E,D, VU,NR by HP at 8/6/2021 1110                   Point: Home exercise program (Done)     Learning Progress Summary           Patient Acceptance, E,D, VU,NR by HP at 8/10/2021 1201    Acceptance, E,D, VU,NR by HP at 8/6/2021 1110                   Point: Body mechanics (Done)     Learning Progress Summary           Patient Acceptance, E,D, VU,NR by HP at 8/10/2021 1201    Acceptance, E,TB, VU,NR by AY at 8/9/2021 1323    Acceptance, E,D, VU,NR by HP at 8/6/2021 1110                   Point: Precautions (Done)     Learning Progress Summary           Patient Acceptance, E,D, VU,NR by HP at 8/10/2021 1201    Acceptance, E,TB, VU,NR by AY at 8/9/2021 1323    Acceptance, E,D, VU,NR by HP at 8/6/2021 1110                               User Key     Initials Effective Dates Name Provider Type Discipline     11/10/20 -  Anne Ballard, PT Physical Therapist PT     06/01/21 -  Acacia Taylor PT Physical Therapist PT              PT Recommendation and Plan  Planned Therapy Interventions (PT): balance training, bed mobility training, gait training, home exercise program, manual therapy techniques, patient/family education, postural re-education, strengthening, stretching, transfer training, motor coordination training  Plan of Care Reviewed With: patient, family  Progress: improving  Outcome Summary: Pt improved distance amb to 15' with FWW and CGA. O2 saturation dropped at lowest to 80% throughout session with bed mobility being the most taxing. O2 levels remained above 90% following ambulation. Frequent rest breaks incorporated due to reports of SOA. Continue to progress with PT POC as pt tolerates.     Time Calculation:   PT Charges     Row Name 08/10/21 1115             Time Calculation    Start Time  1115  -HP      PT Received On  08/10/21  -          Time Calculation- PT    Total Timed Code Minutes- PT  23 minute(s)  -HP         Timed Charges    60529 - PT Therapeutic Exercise Minutes  8  -HP      60573 - Gait Training Minutes   7  -HP      96706 - PT Therapeutic Activity Minutes  8  -HP         Total Minutes    Timed Charges Total Minutes  23  -HP       Total Minutes  23  -HP        User Key  (r) = Recorded By, (t) = Taken By, (c) = Cosigned By    Initials Name Provider Type     Acacia Taylor, PT Physical Therapist        Therapy Charges for Today     Code Description Service Date Service Provider Modifiers Qty    68475915330 HC PT THER PROC EA 15 MIN 8/10/2021 Acacia Taylor, PT GP 1    49302877692 HC PT THERAPEUTIC ACT EA 15 MIN 8/10/2021 Acacia Taylor, PT GP 1          PT G-Codes  Outcome Measure Options: AM-PAC 6 Clicks Basic Mobility (PT)  AM-PAC 6 Clicks Score (PT): 19    Acacia Taylor PT  8/10/2021

## 2021-08-10 NOTE — PROGRESS NOTES
NN spoke with pt at BS.  Pt alert and able to answer questions appropriately. Pt O2 sat  90  % on  3 L currently, home O2 use 2 L. COPD action plan reviewed. Deep breathing exercises encouraged. Patient continues to decline rehab and pulmonary follow up.  No new concerns or questions voiced at this time.  NN will continue to follow as needed.       Per current GOLD Standards, please consider: No rescue in place, No ICS in place, Outpatient PFT, Pulmonary rehab as appropriate

## 2021-08-10 NOTE — PROGRESS NOTES
" PULMONARY NOTE     Hospital Day: 5    Ms. Yin Law, 80 y.o. female is followed for:   Acute hypoxemic respiratory failure and pulmonary infiltrates        SUBJECTIVE       Interval history:    Awake and alert.  In bedside chair.  On nasal cannula O2.  Family at bedside.  Fluid balance slightly negative.  Afebrile.    The patient's relevant past medical, surgical and social history were reviewed and updated in Epic as appropriate.        OBJECTIVE     Vital Sign Min/Max for last 24 hours  Temp  Min: 97.5 °F (36.4 °C)  Max: 98.8 °F (37.1 °C)   BP  Min: 155/77  Max: 173/71   Pulse  Min: 63  Max: 85   Resp  Min: 16  Max: 20   SpO2  Min: 88 %  Max: 100 %   No data recorded   Weight  Min: 64.9 kg (143 lb 1.6 oz)  Max: 64.9 kg (143 lb 1.6 oz)    No intake or output data in the 24 hours ending 08/10/21 1317   Flowsheet Rows      First Filed Value   Admission Height  167.6 cm (66\") Documented at 08/05/2021 1718   Admission Weight  62.1 kg (137 lb) Documented at 08/05/2021 1718        Body mass index is 23.1 kg/m².     08/08/21  0459 08/09/21  0338 08/10/21  0451   Weight: 64 kg (141 lb 1.6 oz) 64.9 kg (143 lb) 64.9 kg (143 lb 1.6 oz)             Objective:  General Appearance:  In no acute distress.    Vital signs: (most recent): Blood pressure 165/60, pulse 69, temperature 97.5 °F (36.4 °C), temperature source Oral, resp. rate 16, height 167.6 cm (66\"), weight 64.9 kg (143 lb 1.6 oz), SpO2 (!) 88 %.    HEENT: Normal HEENT exam.  (Nasal cannula O2)    Lungs:  There are rales and decreased breath sounds.  No wheezes or rhonchi.    Heart: Normal rate.  Regular rhythm.  S1 normal and S2 normal.  No murmur, gallop or friction rub.   Chest: Symmetric chest wall expansion.   Abdomen: Abdomen is soft and non-distended.  Bowel sounds are normal.   There is no abdominal tenderness.   There is no mass. There is no splenomegaly. There is no hepatomegaly.   Extremities: There is no deformity or dependent edema.    Neurological: " Patient is alert and oriented to person, place and time.    Pupils:  Pupils are equal, round, and reactive to light.    Skin:  Warm and dry.                      I reviewed the patient's results and images, including reviewing images and reports.    Medications reviewed.      Scheduled Meds:amoxicillin-clavulanate, 1 tablet, Oral, BID  apixaban, 5 mg, Oral, Q12H  ascorbic acid, 1,000 mg, Oral, Daily  bisoprolol, 5 mg, Oral, Q24H  doxycycline, 100 mg, Oral, BID  ipratropium-albuterol, 3 mL, Nebulization, 4x Daily - RT  lactobacillus acidophilus, 1 capsule, Oral, Daily  pharmacy consult - MTM, , Does not apply, Daily  predniSONE, 40 mg, Oral, Daily With Breakfast  sertraline, 150 mg, Oral, Daily  sodium chloride, 10 mL, Intravenous, Q12H          Assessment/Plan   ASSESSMENT      Active Hospital Problems    Diagnosis    • **Bilateral upper lobe pulmonary infiltrates.  Components of CHF and possible inflammation/infection    • Acute diastolic CHF (CMS/H[facility])    • Elevated troponin    • A/C hypoxemic respiratory failure (CMS/[facility])    • Pleural effusion, bilateral    • Recurrent atrial fibrillation with RVR (CMS/HCC)    • COPD on home oxygen (CMS/H[facility])    • Hypertension and diastolic dysfunction    • Rheumatoid arthritis (CMS/H[facility])    • Anxiety and depression    • Anemia          80-year-old female with a past medical history significant for atrial fibrillation on Eliquis and amiodarone, hypertension, dyslipidemia, COPD, rheumatoid arthritis, and depression/anxiety.  She was admitted on 7/19 with presumed pneumonia and CHF exacerbation.  There was some question of amiodarone lung toxicity versus an autoimmune process versus an infectious pneumonia.  Amiodarone was held and she was given steroids and empiric antibiotics.  On discharge amiodarone was discontinued and steroids were not prescribed.  She declined rehab.  She returned about 3 days after admission with dizziness and near syncope.  Her chest x-ray was not much  changed.  Patient was readmitted and placed on Zosyn and doxycycline as well as a steroid pulse.    Oxygen remains at 3 L nasal cannula.  Currently back in sinus rhythm.  Up in chair eating lunch.    1. Bilateral pulmonary infiltrates: There is certainly an upper lobe predominance and while this is atypical for amiodarone toxicity, multiple patterns are possible including lobar infiltrates and pleural fluid.  It seems she deteriorated after removal from steroids so we might consider a more gradual taper on discharge this time.  Will repeat chest x-ray today  2. Possible pneumonia: Discussed with Dr. Barriga yesterday and he is planning on an appropriate de-escalating regimen depending on any additional cultures.  3. Possible component of diastolic CHF: Additional diuretics as needed.  See no compelling indication today.        RECOMMENDATIONS     1. Wean O2 as tolerated  2. Steroids decreased to prednisone 40 mg daily starting today  3. Empiric antibiotics per Dr. Barriga.  Discussed with him yesterday  4. Follow-up chest x-ray PA and lateral today.  5. Continue to hold amiodarone  6. Eliquis as before  7. Bronchodilators         I discussed the patient's findings and my recommendations with patient, family and consulting provider     High level of risk due to:  illness with threat to life or bodily function.    Linus Mosley MD  Pulmonary and Critical Care Medicine

## 2021-08-11 LAB
ANION GAP SERPL CALCULATED.3IONS-SCNC: 10 MMOL/L (ref 5–15)
BUN SERPL-MCNC: 31 MG/DL (ref 8–23)
BUN/CREAT SERPL: 35.6 (ref 7–25)
CALCIUM SPEC-SCNC: 8.3 MG/DL (ref 8.6–10.5)
CHLORIDE SERPL-SCNC: 105 MMOL/L (ref 98–107)
CO2 SERPL-SCNC: 23 MMOL/L (ref 22–29)
CREAT SERPL-MCNC: 0.87 MG/DL (ref 0.57–1)
DEPRECATED RDW RBC AUTO: 54.4 FL (ref 37–54)
ERYTHROCYTE [DISTWIDTH] IN BLOOD BY AUTOMATED COUNT: 19.3 % (ref 12.3–15.4)
GFR SERPL CREATININE-BSD FRML MDRD: 62 ML/MIN/1.73
GLUCOSE SERPL-MCNC: 74 MG/DL (ref 65–99)
HCT VFR BLD AUTO: 27.9 % (ref 34–46.6)
HGB BLD-MCNC: 8.1 G/DL (ref 12–15.9)
MCH RBC QN AUTO: 23.8 PG (ref 26.6–33)
MCHC RBC AUTO-ENTMCNC: 29 G/DL (ref 31.5–35.7)
MCV RBC AUTO: 82.1 FL (ref 79–97)
PLATELET # BLD AUTO: 177 10*3/MM3 (ref 140–450)
PMV BLD AUTO: 11.3 FL (ref 6–12)
POTASSIUM SERPL-SCNC: 4.1 MMOL/L (ref 3.5–5.2)
RBC # BLD AUTO: 3.4 10*6/MM3 (ref 3.77–5.28)
SODIUM SERPL-SCNC: 138 MMOL/L (ref 136–145)
WBC # BLD AUTO: 9.27 10*3/MM3 (ref 3.4–10.8)

## 2021-08-11 PROCEDURE — 85027 COMPLETE CBC AUTOMATED: CPT | Performed by: HOSPITALIST

## 2021-08-11 PROCEDURE — 63710000001 PREDNISONE PER 1 MG: Performed by: INTERNAL MEDICINE

## 2021-08-11 PROCEDURE — 99233 SBSQ HOSP IP/OBS HIGH 50: CPT | Performed by: HOSPITALIST

## 2021-08-11 PROCEDURE — 94799 UNLISTED PULMONARY SVC/PX: CPT

## 2021-08-11 PROCEDURE — 97116 GAIT TRAINING THERAPY: CPT

## 2021-08-11 PROCEDURE — 99232 SBSQ HOSP IP/OBS MODERATE 35: CPT | Performed by: INTERNAL MEDICINE

## 2021-08-11 PROCEDURE — 80048 BASIC METABOLIC PNL TOTAL CA: CPT | Performed by: HOSPITALIST

## 2021-08-11 RX ORDER — SULFAMETHOXAZOLE AND TRIMETHOPRIM 800; 160 MG/1; MG/1
0.5 TABLET ORAL DAILY
Status: DISCONTINUED | OUTPATIENT
Start: 2021-08-11 | End: 2021-08-12 | Stop reason: HOSPADM

## 2021-08-11 RX ORDER — FUROSEMIDE 20 MG/1
20 TABLET ORAL EVERY OTHER DAY
Status: DISCONTINUED | OUTPATIENT
Start: 2021-08-11 | End: 2021-08-12 | Stop reason: HOSPADM

## 2021-08-11 RX ORDER — ACETAMINOPHEN 325 MG/1
650 TABLET ORAL EVERY 6 HOURS PRN
Status: DISCONTINUED | OUTPATIENT
Start: 2021-08-11 | End: 2021-08-12 | Stop reason: HOSPADM

## 2021-08-11 RX ADMIN — OXYCODONE HYDROCHLORIDE AND ACETAMINOPHEN 1000 MG: 500 TABLET ORAL at 09:30

## 2021-08-11 RX ADMIN — ACETAMINOPHEN 650 MG: 325 TABLET, FILM COATED ORAL at 12:24

## 2021-08-11 RX ADMIN — FUROSEMIDE 20 MG: 20 TABLET ORAL at 09:34

## 2021-08-11 RX ADMIN — IPRATROPIUM BROMIDE AND ALBUTEROL SULFATE 3 ML: 2.5; .5 SOLUTION RESPIRATORY (INHALATION) at 16:08

## 2021-08-11 RX ADMIN — SULFAMETHOXAZOLE AND TRIMETHOPRIM 80 MG: 800; 160 TABLET ORAL at 12:24

## 2021-08-11 RX ADMIN — APIXABAN 5 MG: 5 TABLET, FILM COATED ORAL at 21:37

## 2021-08-11 RX ADMIN — PREDNISONE 40 MG: 20 TABLET ORAL at 09:30

## 2021-08-11 RX ADMIN — ACETAMINOPHEN 650 MG: 325 TABLET, FILM COATED ORAL at 21:39

## 2021-08-11 RX ADMIN — Medication 1 CAPSULE: at 09:30

## 2021-08-11 RX ADMIN — IPRATROPIUM BROMIDE AND ALBUTEROL SULFATE 3 ML: 2.5; .5 SOLUTION RESPIRATORY (INHALATION) at 20:09

## 2021-08-11 RX ADMIN — IPRATROPIUM BROMIDE AND ALBUTEROL SULFATE 3 ML: 2.5; .5 SOLUTION RESPIRATORY (INHALATION) at 08:16

## 2021-08-11 RX ADMIN — SERTRALINE HYDROCHLORIDE 150 MG: 100 TABLET ORAL at 09:29

## 2021-08-11 RX ADMIN — APIXABAN 5 MG: 5 TABLET, FILM COATED ORAL at 09:30

## 2021-08-11 RX ADMIN — BISOPROLOL FUMARATE 5 MG: 5 TABLET, FILM COATED ORAL at 09:30

## 2021-08-11 RX ADMIN — ALPRAZOLAM 0.5 MG: 0.5 TABLET ORAL at 17:14

## 2021-08-11 RX ADMIN — IPRATROPIUM BROMIDE AND ALBUTEROL SULFATE 3 ML: 2.5; .5 SOLUTION RESPIRATORY (INHALATION) at 11:48

## 2021-08-11 RX ADMIN — DOXYCYCLINE 100 MG: 100 CAPSULE ORAL at 21:37

## 2021-08-11 RX ADMIN — SODIUM CHLORIDE, PRESERVATIVE FREE 10 ML: 5 INJECTION INTRAVENOUS at 09:30

## 2021-08-11 RX ADMIN — DOXYCYCLINE 100 MG: 100 CAPSULE ORAL at 09:30

## 2021-08-11 NOTE — PLAN OF CARE
Goal Outcome Evaluation:  Plan of Care Reviewed With: patient, family        Progress: improving  Outcome Summary: Pt amb 30'x2 with FWW and CGA. Pt demonstrated decreased gait speed and SOA. VC for forward flexed posture with moderate correction. Pt demonstrated good effort with ther-ex activities. O2 saturation remained >94% throughout session on 2L NC. Continue with PT POC as pt tolerates.

## 2021-08-11 NOTE — PROGRESS NOTES
NN spoke with pt at BS.  Pt alert and able to answer questions appropriately. Pt O2 sat >90% on 2  L currently, home O2 use 2 L. COPD action plan reviewed. Deep breathing exercises encouraged. Patient states she has not been coughing as much this morning.  She states she does not think she will be discharged today.  No new concerns or questions voiced at this time.  NN will continue to follow as needed.       Per current GOLD Standards, please consider: No rescue in place, No ICS in place, Outpatient PFT, Pulmonary rehab as appropriate

## 2021-08-11 NOTE — CASE MANAGEMENT/SOCIAL WORK
Continued Stay Note  [facility]     Patient Name: Yin Law  MRN: [id number]  Today's Date: 8/11/2021    Admit Date: 8/5/2021    Discharge Plan     Row Name 08/11/21 1419       Plan    Plan  Home with family    Patient/Family in Agreement with Plan  yes    Plan Comments  Spoke with pt. at bedside. States she feels better and her plan is to d to home with family. She has declined rehab. She is current with [facility].    Final Discharge Disposition Code  06 - home with home health care        Discharge Codes    No documentation.             Zaida Acuna RN

## 2021-08-11 NOTE — PLAN OF CARE
Goal Outcome Evaluation:  Plan of Care Reviewed With: patient           Outcome Summary: Pt a/o, VSS, 2 LNC, Sat. 92-94%. C/o pain addressed with PRN meds. Continue monitoring.

## 2021-08-11 NOTE — PROGRESS NOTES
"INFECTIOUS DISEASE Progress Note    Yin Law  1940  [id number]    Date of Consult: 8/6/2021    Admission Date: 8/5/2021      Requesting Provider: Missy Lee DO  Evaluating Physician: Clemente Barriga MD    Reason for Consultation: multifocal pneumonia    History of present illness:    Patient is a 80 y.o. female with h/o COPD/2L O2 at home, afib/Eliquis, HTN, and RA (no IS) who presented to [facility] ED on 8/5 with worsening shortness of breath.  The patient was recently hospitalized at [facility] from 7/19-8/2/21 for pneumonia, RLE wound, and CHF exacerbation.  A CT scan chest on 7/19 showed small pleural effusions and interstitial lung disease that was worse at apices.  It was unclear if this was infection or amiodarone toxicity.  Amiodarone was subsequently stopped by Cardiology.  She was treated with Rocephin, doxycycline, and steroids.  She was discharged home after refusing rehab on no steroids or antibiotics.  She followed up with her PCP on 8/5 with CXR showing worsening pneumonia.  She was sent to [facility] for further evaluation.     She has had worsening shortness of breath since discharge with a non-productive cough.  She had associated chills, weakness, and fatigue.  She continues to have swelling in her feet that has improved somewhat since discharge.  On arrival, she was afebrile and required 5L O2.  Her hypoxia worsened overnight and was on 15L O2, but now down to 4L.  Pertinent labs are WBC 12,800 with 78% neutrophils, PCT 0.17, proBNP 10,100, creatinine 0.68, and lactic acid 2.3.  Her UA was negative.  A respiratory panel PCR with COVID-19 was negative.  Strep pneumo and Legionella UAgs are negative.  Blood cultures are pending.  A Sputum culture is pending.  MRSA PCR negative.  A CTA of chest showed no PE, but did showed \" increase in bibasilar pleural effusions and patchy bilateral airspace disease that may represent progressive pneumonia/infection\".  She was started on Zosyn and Vancomycin.  ID was " asked to evaluate and manage her antibiotic therapy.    8/9/21: Afebrile over the weekend.  Blood pressure stable.  O2 requirement overall stable, currently 3 L.  She complains of rare dry cough but no sputum production.  Infectious work-up negative to date.  No worsening chest pain.  No headache, neck pain, abdominal pain, diarrhea, rash.     8/10/21: afebrile. Stable today. Still on 3L O2. Does not feel like she is any worse. Appetite good.      8/11/21: afebrile. Stable on 2L. Less cough. She feels close to back to baseline. Frustrated with slow progress. Tolerating antibiotics    ROS: See above.  Otherwise 12 point review of systems negative      No Known Allergies      Medication:    Current Facility-Administered Medications:   •  ALPRAZolam (XANAX) tablet 0.5 mg, 0.5 mg, Oral, Nightly PRN, Laura Shepherd MD, 0.5 mg at 08/10/21 2127  •  apixaban (ELIQUIS) tablet 5 mg, 5 mg, Oral, Q12H, Jacqueline Guzman APRN, 5 mg at 08/11/21 0930  •  ascorbic acid (VITAMIN C) tablet 1,000 mg, 1,000 mg, Oral, Daily, Jacqueline Guzman APRN, 1,000 mg at 08/11/21 0930  •  bisoprolol (ZEBeta) tablet 5 mg, 5 mg, Oral, Q24H, Jacqueline Guzman APRN, 5 mg at 08/11/21 0930  •  doxycycline (MONODOX) capsule 100 mg, 100 mg, Oral, BID, Clemente Barriga MD, 100 mg at 08/11/21 0930  •  furosemide (LASIX) tablet 20 mg, 20 mg, Oral, Every Other Day, Laura Shepherd MD, 20 mg at 08/11/21 0934  •  ipratropium-albuterol (DUO-NEB) nebulizer solution 3 mL, 3 mL, Nebulization, Q4H PRN, Jacqueline Guzman APRN  •  ipratropium-albuterol (DUO-NEB) nebulizer solution 3 mL, 3 mL, Nebulization, 4x Daily - RT, Jacqueline Guzman APRN, 3 mL at 08/11/21 0816  •  lactobacillus acidophilus (RISAQUAD) capsule 1 capsule, 1 capsule, Oral, Daily, Clemente Barriga MD, 1 capsule at 08/11/21 0930  •  Magnesium Sulfate 2 gram Bolus, followed by 8 gram infusion (total Mg dose 10 grams)- Mg less than or equal to 1mg/dL, 2 g, Intravenous, PRN **OR** Magnesium Sulfate 2  gram / 50mL Infusion (GIVE X 3 BAGS TO EQUAL 6GM TOTAL DOSE) - Mg 1.1 - 1.5 mg/dl, 2 g, Intravenous, PRN **OR** Magnesium Sulfate 4 gram infusion- Mg 1.6-1.9 mg/dL, 4 g, Intravenous, PRN, Laura Shepherd MD, Last Rate: 25 mL/hr at 08/07/21 1036, 4 g at 08/07/21 1036  •  Pharmacy Consult - [facility], , Does not apply, Daily, Braxton Adan, [facility], Given at 08/08/21 0809  •  potassium chloride (MICRO-K) CR capsule 40 mEq, 40 mEq, Oral, PRN, 40 mEq at 08/09/21 2014 **OR** potassium chloride (KLOR-CON) packet 40 mEq, 40 mEq, Oral, PRN **OR** potassium chloride 10 mEq in 100 mL IVPB, 10 mEq, Intravenous, Q1H PRN, Laura Shepherd MD  •  predniSONE (DELTASONE) tablet 40 mg, 40 mg, Oral, Daily With Breakfast, Linus Mosley MD, 40 mg at 08/11/21 0930  •  sertraline (ZOLOFT) tablet 150 mg, 150 mg, Oral, Daily, Jacqueline Guzman APRN, 150 mg at 08/11/21 0929  •  sodium chloride 0.9 % flush 10 mL, 10 mL, Intravenous, PRN, Clemente Hernández, DO  •  sodium chloride 0.9 % flush 10 mL, 10 mL, Intravenous, Q12H, Jacqueline Guzman APRN, 10 mL at 08/11/21 0930  •  sodium chloride 0.9 % flush 10 mL, 10 mL, Intravenous, PRN, Jacqueline Guzman APRN    Antibiotics:  Anti-Infectives (From admission, onward)    Ordered     Dose/Rate Route Frequency Start Stop    08/09/21 1735  amoxicillin-clavulanate (AUGMENTIN) 500-125 MG per tablet 500 mg     Ordering Provider: Clemente Barriga MD    1 tablet Oral 2 Times Daily 08/09/21 2100 08/10/21 2023    08/06/21 1314  doxycycline (MONODOX) capsule 100 mg     Clemente Barriga MD reviewed the order on 08/10/21 1738.   Ordering Provider: Clemente Barriga MD    100 mg Oral 2 Times Daily 08/06/21 2100 08/12/21 2359    08/05/21 2015  vancomycin 1250 mg/250 mL 0.9% NS IVPB ([facility])     Ordering Provider: Clemente Hernández DO    20 mg/kg × 62.1 kg  166.7 mL/hr over 90 Minutes Intravenous Once 08/05/21 2017 08/05/21 2325    08/05/21 2015  piperacillin-tazobactam (ZOSYN) 3.375 g in  iso-osmotic dextrose 50 ml (premix)     Ordering Provider: Clemente Hernández,     3.375 g  over 30 Minutes Intravenous Once 21 9855           Physical Exam:   Vital Signs  Temp (24hrs), Av.2 °F (36.8 °C), Min:97.5 °F (36.4 °C), Max:98.7 °F (37.1 °C)    Temp  Min: 97.5 °F (36.4 °C)  Max: 98.7 °F (37.1 °C)  BP  Min: 162/67  Max: 180/75  Pulse  Min: 62  Max: 80  Resp  Min: 16  Max: 22  SpO2  Min: 88 %  Max: 100 %    GENERAL: Awake and alert, in mild distress.   HEENT: Normocephalic, atraumatic.   EOMI. No conjunctival injection. No icterus.     NECK: Supple without nuchal rigidity.    HEART: RRR; No murmur   LUNGS: Diminished at bases but clear bilaterally.  No cough appreciated  ABDOMEN: Soft, nontender, nondistended. Positive bowel sounds. No rebound or guarding.   : + external catheter  EXT:  No edema  MSK:  FROM, no joint effusion  SKIN: no rash noted  NEURO: Oriented to PPT. Normal speech and cognition  PSYCHIATRIC: Normal insight and judgment. Cooperative with ANGELA GARCIA    Laboratory Data    Results from last 7 days   Lab Units 21  0546 08/10/21  0646 21  0640   WBC 10*3/mm3 9.27 12.81* 11.76*   HEMOGLOBIN g/dL 8.1* 7.9* 7.8*   HEMATOCRIT % 27.9* 26.6* 25.2*   PLATELETS 10*3/mm3 177 207 223     Results from last 7 days   Lab Units 21  0546   SODIUM mmol/L 138   POTASSIUM mmol/L 4.1   CHLORIDE mmol/L 105   CO2 mmol/L 23.0   BUN mg/dL 31*   CREATININE mg/dL 0.87   GLUCOSE mg/dL 74   CALCIUM mg/dL 8.3*     Results from last 7 days   Lab Units 21  0641   ALK PHOS U/L 69   BILIRUBIN mg/dL 0.4   ALT (SGPT) U/L 23   AST (SGOT) U/L 22         Results from last 7 days   Lab Units 21  0612   CRP mg/dL 6.98*     Results from last 7 days   Lab Units 21  0000   LACTATE mmol/L 1.5             Estimated Creatinine Clearance: 49.4 mL/min (by C-G formula based on SCr of 0.87 mg/dL).      Microbiology:  Microbiology Results (last 10 days)     Procedure Component  Value - Date/Time    MRSA Screen, PCR (Inpatient) - Swab, Nares [[id number]]  (Normal) Collected: 08/06/21 1006    Lab Status: Final result Specimen: Swab from Nares Updated: 08/06/21 1132     MRSA PCR Negative    Narrative:      MRSA Negative    Legionella Antigen, Urine - Urine, Urine, Clean Catch [[id number]]  (Normal) Collected: 08/05/21 2348    Lab Status: Final result Specimen: Urine, Clean Catch Updated: 08/06/21 1057     LEGIONELLA ANTIGEN, URINE Negative    S. Pneumo Ag Urine or CSF - Urine, Urine, Clean Catch [[id number]]  (Normal) Collected: 08/05/21 2348    Lab Status: Final result Specimen: Urine, Clean Catch Updated: 08/06/21 1058     Strep Pneumo Ag Negative    Blood Culture - Blood, Arm, Left [[id number]] Collected: 08/05/21 2102    Lab Status: Final result Specimen: Blood from Arm, Left Updated: 08/10/21 2116     Blood Culture No growth at 5 days    Respiratory Panel PCR w/COVID-19(SARS-CoV-2) CAS/SHALINI/KRISTY/PAD/COR/MAD/HAJA In-House, NP Swab in UTM/VTM, 3-4 HR TAT - Swab, Nasopharynx [[id number]]  (Normal) Collected: 08/05/21 2100    Lab Status: Final result Specimen: Swab from Nasopharynx Updated: 08/05/21 2315     ADENOVIRUS, PCR Not Detected     Coronavirus 229E Not Detected     Coronavirus HKU1 Not Detected     Coronavirus NL63 Not Detected     Coronavirus OC43 Not Detected     COVID19 Not Detected     Human Metapneumovirus Not Detected     Human Rhinovirus/Enterovirus Not Detected     Influenza A PCR Not Detected     Influenza B PCR Not Detected     Parainfluenza Virus 1 Not Detected     Parainfluenza Virus 2 Not Detected     Parainfluenza Virus 3 Not Detected     Parainfluenza Virus 4 Not Detected     RSV, PCR Not Detected     Bordetella pertussis pcr Not Detected     Bordetella parapertussis PCR Not Detected     Chlamydophila pneumoniae PCR Not Detected     Mycoplasma pneumo by PCR Not Detected    Narrative:      In the setting of a positive respiratory panel with a viral infection PLUS a negative  procalcitonin without other underlying concern for bacterial infection, consider observing off antibiotics or discontinuation of antibiotics and continue supportive care. If the respiratory panel is positive for atypical bacterial infection (Bordetella pertussis, Chlamydophila pneumoniae, or Mycoplasma pneumoniae), consider antibiotic de-escalation to target atypical bacterial infection.    Blood Culture - Blood, Hand, Right [[id number]] Collected: 08/05/21 2045    Lab Status: Final result Specimen: Blood from Hand, Right Updated: 08/10/21 2116     Blood Culture No growth at 5 days          Urine strep and legionella negative  MRSA nares screen negative    Radiology:  Imaging Results (Last 72 Hours)     Procedure Component Value Units Date/Time    XR Chest PA & Lateral [[id number]] Collected: 08/10/21 1541     Updated: 08/10/21 1746    Narrative:      EXAMINATION: XR CHEST, PA AND LATERAL-08/10/2021:      INDICATION: F/U infiltrates; J96.01-Acute respiratory failure with  hypoxia; I50.9-Heart failure, unspecified; B83-Fvvlrtq effusion, not  elsewhere classified; J18.9-Pneumonia, unspecified organism.      COMPARISON: Chest x-ray 08/08/2021.     FINDINGS: PA and lateral views of the chest reveal bilateral upper lung  and suprahilar predominant pulmonary opacifications, right greater than  left, with mild decrease in density of decreased airspace disease or  improvement from prior comparison with small-to-moderate right and small  left pleural effusions.       Impression:      Mild decrease in density of decreased airspace disease or  improvement from prior comparison with small-to-moderate right and small  left pleural effusions.     D:  08/10/2021  E:  08/10/2021     This report was finalized on 8/10/2021 5:43 PM by Dr. Tony Eaton.       XR Chest 1 View [[id number]] Collected: 08/08/21 1024     Updated: 08/08/21 2203    Narrative:      EXAMINATION: XR CHEST 1 VW - 08/08/2021     INDICATION: J96.01-Acute respiratory  failure with hypoxia; I50.9-Heart  failure, unspecified; F36-Timyanc effusion, not elsewhere classified;  J18.9-Pneumonia, unspecified organism.     COMPARISON: 08/07/2021     FINDINGS: Coarse and extensive biapical pulmonary interstitial disease  is stable. Included lower lungs appear clear except for trace bibasilar  effusion. No pneumothorax is seen.       Impression:      Stable chest exam including extensive but stable upper lung  interstitial disease.      DICTATED:   08/08/2021  EDITED/ls :   08/08/2021     This report was finalized on 8/8/2021 10:00 PM by Dr. Augie Leiva MD.           I personally reviewed the above CXR: bilateral upper lobe infiltrates improved      Impression:   - Bilateral pulmonary infiltrates: improved. Seems most likely related to amiodarone toxicity even despite some atypical imaging.  Improved with steroids.  Edema also contributed, especially with elevated BNP.  Also had effusions.  WBC improved.  Procalcitonin negative x2.  Negative respiratory viral panel, urine antigens and MRSA screening.  No pathogens on sputum culture from last admission and now cough is no longer productive.     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 Steroids contributing.   - TRACY: improved  - Anemia of chronic disease  - Essential hypertension  - Atrial fibrillation/Eliquis  - Rheumatoid arthritis: not on immunosuppression   - Prior QTC prolongation: Contraindication to several antibiotics    PLAN/RECOMMENDATIONS:   - microbiology data reviewed  - CXR from yesterday reviewed  - Follow CBC, CMP  - G6PD pending      - Continue doxycycline for 1 more day  - probiotic daily  - diuresis, nebulizers, per pulmonary.  Weaning prednisone dose    - start PCP prophylaxis until on less than 15 mg of prednisone daily. low-dose Bactrim to start. Dapsone a potential, alternative if G6PD negative and issues with hyperkalemia or worsening creatinine    Complex MDM. Numerous comorbidities predispose to poor outcomes. Pulmonary rehab recommended but patient still refusing.  Discussed increased risk for relapse of infection, readmission and she is discharged home and patient understands. I will follow       Clemente Barriga MD  8/11/2021  10:20 EDT

## 2021-08-11 NOTE — PROGRESS NOTES
[facility] Medicine Services  PROGRESS NOTE    Patient Name: Yin Law  : 1940  MRN: [id number]    Date of Admission: 2021  Primary Care Physician: Santiago Chaudhry MD    Subjective   Subjective     CC:  F/U SOA, cough    HPI:  Seen this morning. Overall feels better. Eating some applesauce. Weaned to 2 liters.     ROS:  Gen-no fevers, no chills  CV-no chest pain, no palpitations  Resp-no cough, improved dyspnea  GI-no N/V/D, no abd pain    All other systems reviewed and negative except any additional pertinent positives and negatives as discussed in HPI.      Objective   Objective     Vital Signs:   Temp:  [98.1 °F (36.7 °C)-98.7 °F (37.1 °C)] 98.2 °F (36.8 °C)  Heart Rate:  [61-80] 61  Resp:  [16-22] 18  BP: (145-180)/(50-75) 145/50  Flow (L/min):  [2-3] 2     Physical Exam:  Gen-no acute distress, frail appearance  HENT-NCAT, mucous membranes moist  CV-RRR, S1 S2 normal, no m/r/g  Resp-slightly diminished at the bases, nonlabored on 2 liters  Abd-soft, NT, ND, +BS  Ext-no edema  Neuro-A&Ox3, no focal deficits  Skin-no rashes  Psych-appropriate mood      Results Reviewed:  LAB RESULTS:      Lab 21  0546 08/10/21  0646 21  0640 21  0614 21  0835 21  0612 21  0612 21  0000 21  2104 21  1820 21  1820   WBC 9.27 12.81* 11.76* 13.05* 13.56*   < > 11.49*  --   --    < > 12.79*   HEMOGLOBIN 8.1* 7.9* 7.8* 8.2* 9.1*   < > 8.6*  --   --    < > 8.5*   HEMATOCRIT 27.9* 26.6* 25.2* 26.5* 29.8*   < > 28.8*  --   --    < > 28.2*   PLATELETS 177 207 223 299 278   < > 266  --   --    < > 276   NEUTROS ABS  --   --   --   --   --   --  10.80*  --   --   --  9.99*   IMMATURE GRANS (ABS)  --   --   --   --   --   --  0.10*  --   --   --  0.07*   LYMPHS ABS  --   --   --   --   --   --  0.44*  --   --   --  1.62   MONOS ABS  --   --   --   --   --   --  0.14  --   --   --  1.02*   EOS ABS  --   --   --   --   --   --  0.00   --   --   --  0.04   MCV 82.1 79.9 78.5* 79.6 79.7   < > 81.8  --   --    < > 80.6   CRP  --   --   --   --   --   --  6.98*  --   --   --   --    PROCALCITONIN  --   --   --  0.11  --   --   --   --   --   --  0.17   LACTATE  --   --   --   --   --   --   --  1.5 2.3*  --   --    LDH  --   --   --   --   --   --  361*  --   --   --   --     < > = values in this interval not displayed.         Lab 08/11/21  0546 08/10/21  0647 08/09/21  0641 08/08/21  0614 08/07/21  2235 08/07/21  0835 08/07/21  0835 08/06/21  0612 08/06/21  0612   SODIUM 138 136 132* 135*  --   --  135*   < > 140   POTASSIUM 4.1 4.4 3.6 3.9 3.8   < > 3.0*   < > 4.3   CHLORIDE 105 103 98 97*  --   --  96*   < > 101   CO2 23.0 27.0 24.0 26.0  --   --  27.0   < > 27.0   ANION GAP 10.0 6.0 10.0 12.0  --   --  12.0   < > 12.0   BUN 31* 40* 39* 29*  --   --  18   < > 17   CREATININE 0.87 0.95 1.07* 0.94  --   --  0.77   < > 0.76   GLUCOSE 74 82 120* 133*  --   --  137*   < > 138*   CALCIUM 8.3* 8.4* 8.3* 8.5*  --   --  8.7   < > 8.4*   MAGNESIUM  --   --   --  2.7*  --   --  1.9  --  2.1   PHOSPHORUS  --   --   --   --   --   --  4.0  --   --     < > = values in this interval not displayed.         Lab 08/09/21  0641 08/05/21  1820   TOTAL PROTEIN 5.1* 6.1   ALBUMIN 2.70* 3.20*   GLOBULIN 2.4 2.9   ALT (SGPT) 23 21   AST (SGOT) 22 58*   BILIRUBIN 0.4 0.7   ALK PHOS 69 95         Lab 08/08/21  0614 08/06/21  0612 08/06/21  0000 08/05/21  1820   PROBNP 2,571.0* 11,947.0*  --  10,115.0*   TROPONIN T  --   --  0.359* 0.380*                 Lab 08/08/21  0707   PH, ARTERIAL 7.477*   PCO2, ARTERIAL 38.4   PO2 ART 57.6*   FIO2 40   HCO3 ART 28.4*   BASE EXCESS ART 4.5*   CARBOXYHEMOGLOBIN 1.9     Brief Urine Lab Results  (Last result in the past 365 days)      Color   Clarity   Blood   Leuk Est   Nitrite   Protein   CREAT   Urine HCG        08/05/21 2348 Yellow Clear Negative Negative Negative Negative               Microbiology Results Abnormal     Procedure  Component Value - Date/Time    Blood Culture - Blood, Hand, Right [[id number]] Collected: 08/05/21 2045    Lab Status: Final result Specimen: Blood from Hand, Right Updated: 08/10/21 2116     Blood Culture No growth at 5 days    Blood Culture - Blood, Arm, Left [[id number]] Collected: 08/05/21 2102    Lab Status: Final result Specimen: Blood from Arm, Left Updated: 08/10/21 2116     Blood Culture No growth at 5 days    MRSA Screen, PCR (Inpatient) - Swab, Nares [[id number]]  (Normal) Collected: 08/06/21 1006    Lab Status: Final result Specimen: Swab from Nares Updated: 08/06/21 1132     MRSA PCR Negative    Narrative:      MRSA Negative    S. Pneumo Ag Urine or CSF - Urine, Urine, Clean Catch [[id number]]  (Normal) Collected: 08/05/21 2348    Lab Status: Final result Specimen: Urine, Clean Catch Updated: 08/06/21 1058     Strep Pneumo Ag Negative    Legionella Antigen, Urine - Urine, Urine, Clean Catch [[id number]]  (Normal) Collected: 08/05/21 2348    Lab Status: Final result Specimen: Urine, Clean Catch Updated: 08/06/21 1057     LEGIONELLA ANTIGEN, URINE Negative    Respiratory Panel PCR w/COVID-19(SARS-CoV-2) CAS/SHALINI/KRISTY/PAD/COR/MAD/HAJA In-House, NP Swab in UTM/VTM, 3-4 HR TAT - Swab, Nasopharynx [[id number]]  (Normal) Collected: 08/05/21 2100    Lab Status: Final result Specimen: Swab from Nasopharynx Updated: 08/05/21 2315     ADENOVIRUS, PCR Not Detected     Coronavirus 229E Not Detected     Coronavirus HKU1 Not Detected     Coronavirus NL63 Not Detected     Coronavirus OC43 Not Detected     COVID19 Not Detected     Human Metapneumovirus Not Detected     Human Rhinovirus/Enterovirus Not Detected     Influenza A PCR Not Detected     Influenza B PCR Not Detected     Parainfluenza Virus 1 Not Detected     Parainfluenza Virus 2 Not Detected     Parainfluenza Virus 3 Not Detected     Parainfluenza Virus 4 Not Detected     RSV, PCR Not Detected     Bordetella pertussis pcr Not Detected     Bordetella parapertussis PCR  Not Detected     Chlamydophila pneumoniae PCR Not Detected     Mycoplasma pneumo by PCR Not Detected    Narrative:      In the setting of a positive respiratory panel with a viral infection PLUS a negative procalcitonin without other underlying concern for bacterial infection, consider observing off antibiotics or discontinuation of antibiotics and continue supportive care. If the respiratory panel is positive for atypical bacterial infection (Bordetella pertussis, Chlamydophila pneumoniae, or Mycoplasma pneumoniae), consider antibiotic de-escalation to target atypical bacterial infection.          XR Chest PA & Lateral    Result Date: 8/10/2021  EXAMINATION: XR CHEST, PA AND LATERAL-08/10/2021:  INDICATION: F/U infiltrates; J96.01-Acute respiratory failure with hypoxia; I50.9-Heart failure, unspecified; C07-Wmcsrne effusion, not elsewhere classified; J18.9-Pneumonia, unspecified organism.  COMPARISON: Chest x-ray 08/08/2021.  FINDINGS: PA and lateral views of the chest reveal bilateral upper lung and suprahilar predominant pulmonary opacifications, right greater than left, with mild decrease in density of decreased airspace disease or improvement from prior comparison with small-to-moderate right and small left pleural effusions.      Impression: Mild decrease in density of decreased airspace disease or improvement from prior comparison with small-to-moderate right and small left pleural effusions.  D:  08/10/2021 E:  08/10/2021  This report was finalized on 8/10/2021 5:43 PM by Dr. Tony Eaton.        Results for orders placed during the hospital encounter of 07/19/21    Adult Transthoracic Echo Complete W/ Cont if Necessary Per Protocol    Interpretation Summary  · Estimated left ventricular EF = 65%  · Mild aortic valve stenosis is present.  · Aortic valve maximum pressure gradient is 32.9 mmHg. Aortic valve mean pressure gradient is 17.2 mmHg.  · Mild mitral valve regurgitation is present.  · Mild tricuspid  valve regurgitation is present.  · Estimated right ventricular systolic pressure from tricuspid regurgitation is 37.0 mmHg.  · There is a large left pleural effusion. There is a moderate sized right pleural effusion.      I have reviewed the medications:  Scheduled Meds:apixaban, 5 mg, Oral, Q12H  ascorbic acid, 1,000 mg, Oral, Daily  bisoprolol, 5 mg, Oral, Q24H  doxycycline, 100 mg, Oral, BID  furosemide, 20 mg, Oral, Every Other Day  ipratropium-albuterol, 3 mL, Nebulization, 4x Daily - RT  lactobacillus acidophilus, 1 capsule, Oral, Daily  pharmacy consult - MTM, , Does not apply, Daily  predniSONE, 40 mg, Oral, Daily With Breakfast  sertraline, 150 mg, Oral, Daily  sodium chloride, 10 mL, Intravenous, Q12H  sulfamethoxazole-trimethoprim, 0.5 tablet, Oral, Daily      Continuous Infusions:   PRN Meds:.•  acetaminophen  •  ALPRAZolam  •  ipratropium-albuterol  •  magnesium sulfate **OR** magnesium sulfate **OR** magnesium sulfate  •  potassium chloride **OR** potassium chloride **OR** potassium chloride  •  sodium chloride  •  sodium chloride    Assessment/Plan   Assessment & Plan     Active Hospital Problems    Diagnosis  POA   • **Bilateral upper lobe pulmonary infiltrates.  Components of CHF and possible inflammation/infection [R91.8]  Yes   • Acute diastolic CHF (CMS/[facility]) [I50.31]  Yes   • Elevated troponin [R77.8]  Yes   • A/C hypoxemic respiratory failure (CMS/[facility]) [J96.21]  Yes   • Pleural effusion, bilateral [J90]  Yes   • Recurrent atrial fibrillation with RVR (CMS/[facility]) [I48.91]  Yes   • COPD on home oxygen (CMS/[facility]) [J44.9]  Yes   • Hypertension and diastolic dysfunction [I10]  Yes   • Rheumatoid arthritis (CMS/[facility]) [M06.9]  Yes   • Anxiety and depression [F41.9, F32.9]  Yes   • Anemia [D64.9]  Yes      Resolved Hospital Problems    Diagnosis Date Resolved POA   • Lactic acidosis [E87.2] 08/06/2021 Yes        Brief Hospital Course to date:  Yin Law is a 80 y.o. female with hx of chronic  respiratory failure on 2 liters, Afib on Eliquis, HTN, HLD, COPD, rheumatoid arthritis, and depression/anxiety who presented to the ED with complaints of shortness of breath. Previously admitted to [facility] for the same 7/19-8/2/21: treated for PNA and CHF exacerbation, was managed in ICU at one point; Amiodarone was discontinued for possible pulmonary toxicity, and she was treated with antibiotics and steroids, appears she was discharged on her baseline 2 liters. PCP followed up with her on 8/5/21 and performed a CXR, sent to the ER due to potentially worsening findings. Saturating 90% on 5 liters on arrival. ProBNP 93004, troponin 0.380, lactic acid 2.3, WBC 12.79. CTA chest with interval increase in bibasilar pleural effusions and patchy bilateral airspace disease suggestive of progressive pneumonia/infection. Admitted for further management.    This patient's problems and plans were partially entered by my partner and updated as appropriate by me 08/11/21.    Acute on chronic hypoxic respiratory failure   Sepsis, POA (leukocytosis, lactic acidosis, tachypneic)  Multifocal pneumonia, recurrent vs worsening  Bilateral pleural effusions  Acute diastolic CHF  --Recent admit for same and treated with Doxycycline, Rocephin, and steroids (Amiodarone discontinued due to concerns for lung toxicity). Was seen by Pulmonary and Cardiology during that admission. Discharged on a few more days of prednisone.  --CTA chest 8/5/21 negative for PE, but showed interval increase in bibasilar pleural effusions and patchy airspace disease, may represent progressive PNA, also possible pulmonary edema.  --Sputum culture 7/24/21 with scant Candida albicans. Blood cultures here NGTD. Negative urine Strep/Legionella. Negative respiratory PCR panel. Negative Crypto Ag. Negative Fungitell, Histo, Blasto, Aspergillus during last admission.  --Appreciate Pulmonary and ID following.  --Has been on Zosyn and Doxycycline--switched to PO Augmentin with  Doxycycline. Will complete ATBx tomorrow.  --Changed Solumedrol to 40 mg prednisone--continue to wean per Pulmonary. She will need a longer steroid taper at discharge.   --Continue PRN and scheduled duonebs.   --Continue diuresis as tolerated, last dose of Lasix 8/8/21. Will start 20 mg PO Lasix every other day. CXR 8/10/21 still with some small effusions.  --Echo from 7/20/21 shows EF 65%, mild AS, mild MR, mild TR with RVSP 37 mmHg.  --Currently has been weaned from 6 liters to baseline 2 liters.     Elevated troponin   --Likely secondary to above (hypoxia, CHF, sepsis).   --Trending down.  --No chest pain.     PAF, currently Afib with RVR  --Continue home Eliquis, Bisoprolol.  --Amiodarone discontinued last admission due concern for lung toxicity.  --Converted to NSR 8/7/21 evening. Discontinued Cardizem drip 8/8/21.  --Follows with Dr. Montoya, did not consult as she has now converted to and remains in NSR.     Anemia, chronic  --Baseline Hb 8-9 range.  --Iron profile mixed iron deficiency + anemia of chronic disease.  --Overall stable today. Monitor.      Anxiety/Depression   --Continue home Zoloft, PRN Xanax.    DVT prophylaxis:  Medical DVT prophylaxis orders are present.       Disposition: I expect the patient to be discharged home tomorrow if remains stable. Continue to work with PT/OT. Patient declines inpatient rehab so plan is for HHPT.       CODE STATUS:   Code Status and Medical Interventions:   Ordered at: 08/05/21 5780     Level Of Support Discussed With:    Patient     Code Status:    No CPR     Medical Interventions (Level of Support Prior to Arrest):    Full     Comments:    DNR/ DNI       Laura Shepherd MD  08/11/21

## 2021-08-11 NOTE — THERAPY TREATMENT NOTE
Patient Name: Yin Law  : 1940    MRN: [id number]                              Today's Date: 2021       Admit Date: 2021    Visit Dx:     ICD-10-CM ICD-9-CM   1. Acute respiratory failure with hypoxia (CMS/[facility])  J96.01 518.81   2. Acute on chronic congestive heart failure, unspecified heart failure type (CMS/[facility])  I50.9 428.0   3. Pleural effusion  J90 511.9   4. Pneumonia of both lower lobes due to infectious organism  J18.9 486     Patient Active Problem List   Diagnosis   • Multifocal pneumonia   • Recurrent atrial fibrillation with RVR (CMS/[facility])   • COPD on home oxygen (CMS/[facility])   • Hypertension and diastolic dysfunction   • Rheumatoid arthritis (CMS/[facility])   • Anxiety and depression   • Wound of right leg   • Anemia   • Elevated troponin   • Sepsis (CMS/[facility])   • A/C hypoxemic respiratory failure (CMS/[facility])   • Pleural effusion, bilateral   • Bilateral upper lobe pulmonary infiltrates.  Components of CHF and possible inflammation/infection   • Acute diastolic CHF (CMS/[facility])     Past Medical History:   Diagnosis Date   • Anxiety and depression    • Arrhythmia     A-FIB   • Asthma    • Chicken pox    • COPD (chronic obstructive pulmonary disease) (CMS/[facility])    • Hyperlipidemia    • Hypertension    • Measles    • Menopause    • Osteoporosis    • Rheumatoid arthritis (CMS/[facility])    • Rheumatoid arthritis (CMS/[facility])    • Tobacco abuse      Past Surgical History:   Procedure Laterality Date   • APPENDECTOMY     • CARPAL TUNNEL RELEASE Left    • CATARACT EXTRACTION, BILATERAL     • COLON SURGERY     • COLONOSCOPY     • GALLBLADDER SURGERY     • HYSTERECTOMY  1971   • TONSILLECTOMY       General Information     Row Name 21 2389          Physical Therapy Time and Intention    Document Type  therapy note (daily note)  -HP     Mode of Treatment  physical therapy  -HP     Row Name 21 9857          General Information    Patient Profile Reviewed  yes  -HP     Existing Precautions/Restrictions   fall;oxygen therapy device and L/min  -     Row Name 08/11/21 1550          Cognition    Orientation Status (Cognition)  oriented x 3  -HP     Row Name 08/11/21 1550          Safety Issues, Functional Mobility    Impairments Affecting Function (Mobility)  balance;coordination;endurance/activity tolerance;motor planning;shortness of breath;strength  -       User Key  (r) = Recorded By, (t) = Taken By, (c) = Cosigned By    Initials Name Provider Type     Acacia Taylor PT Physical Therapist        Mobility     Row Name 08/11/21 1551          Bed Mobility    Comment (Bed Mobility)  UIC  -     Row Name 08/11/21 1551          Transfers    Comment (Transfers)  Pt performed STS x2 with FWW and SBA with increased time and effort  -     Row Name 08/11/21 1551          Sit-Stand Transfer    Sit-Stand [place] (Transfers)  standby assist  -     Assistive Device (Sit-Stand Transfers)  walker, front-wheeled  -     Row Name 08/11/21 1551          Gait/Stairs (Locomotion)    [place] Level (Gait)  contact guard;verbal cues  -     Assistive Device (Gait)  walker, front-wheeled  -     Distance in Feet (Gait)  30'x2  -     Deviations/Abnormal Patterns (Gait)  cindi decreased;festinating/shuffling;gait speed decreased;bilateral deviations;stride length decreased;base of support, narrow  -     Bilateral Gait Deviations  heel strike decreased;forward flexed posture  -     Comment (Gait/Stairs)  Pt amb 30'x2 with FWW and CGA. Pt demonstrated decreased gait speed and SOA. VC for forward flexed posture with moderate correction. Pt and niece educated on use of extra chairs in long hallways as rest points prn upon returning home.  -       User Key  (r) = Recorded By, (t) = Taken By, (c) = Cosigned By    Initials Name Provider Type     Acacia Taylor PT Physical Therapist        Obj/Interventions     Row Name 08/11/21 1559          Motor Skills    Motor Skills  therapeutic exercise  -     Functional  Endurance  scap squeezes x10  -     Therapeutic Exercise  hip;knee;ankle  -[facility] Name 08/11/21 1555          Hip (Therapeutic Exercise)    Hip Strengthening (Therapeutic Exercise)  bilateral;marching while seated;10 repetitions  -[facility] Name 08/11/21 1555          Knee (Therapeutic Exercise)    Knee (Therapeutic Exercise)  strengthening exercise  -     Knee Strengthening (Therapeutic Exercise)  bilateral;LAQ (long arc quad);10 repetitions  -[facility] Name 08/11/21 1555          Ankle (Therapeutic Exercise)    Ankle (Therapeutic Exercise)  strengthening exercise  -     Ankle Strengthening (Therapeutic Exercise)  bilateral;dorsiflexion;plantarflexion;sitting;10 repetitions  -[facility] Name 08/11/21 1551          Balance    Balance Assessment  sitting static balance;sitting dynamic balance;standing static balance;standing dynamic balance  -     Static Sitting Balance  WFL;sitting in chair  -     Dynamic Sitting Balance  WFL;sitting in chair  -     Static Standing Balance  WFL;supported;standing  -     Dynamic Standing Balance  mild impairment;supported;standing  -     Balance Interventions  occupation based/functional task  -       User Key  (r) = Recorded By, (t) = Taken By, (c) = Cosigned By    Initials Name Provider Type     Acacia Taylor, DELIA Physical Therapist        Goals/Plan    No documentation.       Clinical Impression     [facility] Name 08/11/21 1555          Pain    Additional Documentation  Pain Scale: Numbers Pre/Post-Treatment (Group)  -HP     Row Name 08/11/21 155          Pain Scale: Numbers Pre/Post-Treatment    Pretreatment Pain Rating  0/10 - no pain  -     Posttreatment Pain Rating  0/10 - no pain  -[facility] Name 08/11/21 1559          Plan of Care Review    Plan of Care Reviewed With  patient;family  -     Progress  improving  -     Outcome Summary  Pt amb 30'x2 with FWW and CGA. Pt demonstrated decreased gait speed and SOA. VC for forward flexed posture with  moderate correction. Pt demonstrated good effort with ther-ex activities. O2 saturation remained >94% throughout session on 2L NC. Continue with PT POC as pt tolerates.  -HP     Row Name 08/11/21 1556          Vital Signs    Pre Systolic BP Rehab  155  -HP     Pre Treatment Diastolic BP  53  -HP     Pretreatment Heart Rate (beats/min)  64  -HP     Posttreatment Heart Rate (beats/min)  64  -HP     Pre SpO2 (%)  100  -HP     O2 Delivery Pre Treatment  nasal cannula  -HP     Post SpO2 (%)  98  -HP     O2 Delivery Post Treatment  nasal cannula  -HP     Pre Patient Position  Sitting  -HP     Intra Patient Position  Standing  -HP     Post Patient Position  Sitting  -HP     Row Name 08/11/21 1556          Positioning and Restraints    Pre-Treatment Position  sitting in chair/recliner  -HP     Post Treatment Position  chair  -HP     In Chair  reclined;sitting;call light within reach;encouraged to call for assist;with family/caregiver;waffle cushion;legs elevated  -HP       User Key  (r) = Recorded By, (t) = Taken By, (c) = Cosigned By    Initials Name Provider Type    Acacia Jin PT Physical Therapist        Outcome Measures     Row Name 08/11/21 1558          How much help from another person do you currently need...    Turning from your back to your side while in flat bed without using bedrails?  4  -HP     Moving from lying on back to sitting on the side of a flat bed without bedrails?  4  -HP     Moving to and from a bed to a chair (including a wheelchair)?  3  -HP     Standing up from a chair using your arms (e.g., wheelchair, bedside chair)?  3  -HP     Climbing 3-5 steps with a railing?  2  -HP     To walk in hospital room?  3  -HP     AM-PAC 6 Clicks Score (PT)  19  -HP     Row Name 08/11/21 1558          Functional Assessment    Outcome Measure Options  AM-PAC 6 Clicks Basic Mobility (PT)  -HP       User Key  (r) = Recorded By, (t) = Taken By, (c) = Cosigned By    Initials Name Provider Type    ALFRED Taylor  Acacia, PT Physical Therapist                       Physical Therapy Education                 Title: PT OT SLP Therapies (Done)     Topic: Physical Therapy (Done)     Point: Mobility training (Done)     Learning Progress Summary           Patient Acceptance, E,D, VU,NR by  at 8/11/2021 1558    Acceptance, E,D, VU,NR by HP at 8/10/2021 1201    Acceptance, E,TB, VU,NR by AY at 8/9/2021 1323    Acceptance, E,D, VU,NR by HP at 8/6/2021 1110                   Point: Home exercise program (Done)     Learning Progress Summary           Patient Acceptance, E,D, VU,NR by HP at 8/11/2021 1558    Acceptance, E,D, VU,NR by HP at 8/10/2021 1201    Acceptance, E,D, VU,NR by HP at 8/6/2021 1110                   Point: Body mechanics (Done)     Learning Progress Summary           Patient Acceptance, E,D, VU,NR by  at 8/11/2021 1558    Acceptance, E,D, VU,NR by HP at 8/10/2021 1201    Acceptance, E,TB, VU,NR by AY at 8/9/2021 1323    Acceptance, E,D, VU,NR by  at 8/6/2021 1110                   Point: Precautions (Done)     Learning Progress Summary           Patient Acceptance, E,D, VU,NR by  at 8/11/2021 1558    Acceptance, E,D, VU,NR by  at 8/10/2021 1201    Acceptance, E,TB, VU,NR by AY at 8/9/2021 1323    Acceptance, E,D, VU,NR by  at 8/6/2021 1110                               User Key     Initials Effective Dates Name Provider Type Discipline     11/10/20 -  Anne Ballard, PT Physical Therapist PT     06/01/21 -  Acacia Taylor PT Physical Therapist PT              PT Recommendation and Plan  Planned Therapy Interventions (PT): balance training, bed mobility training, gait training, home exercise program, manual therapy techniques, patient/family education, postural re-education, strengthening, stretching, transfer training, motor coordination training  Plan of Care Reviewed With: patient, family  Progress: improving  Outcome Summary: Pt amb 30'x2 with FWW and CGA. Pt demonstrated decreased gait speed and  SOA. VC for forward flexed posture with moderate correction. Pt demonstrated good effort with ther-ex activities. O2 saturation remained >94% throughout session on 2L NC. Continue with PT POC as pt tolerates.     Time Calculation:   PT Charges     Row Name 08/11/21 1527             Time Calculation    Start Time  1527  -HP      PT Received On  08/11/21  -HP         Time Calculation- PT    Total Timed Code Minutes- PT  19 minute(s)  -HP         Timed Charges    78511 - PT Therapeutic Exercise Minutes  5  -HP      82522 - Gait Training Minutes   10  -HP      46198 - PT Therapeutic Activity Minutes  4  -HP         Total Minutes    Timed Charges Total Minutes  19  -HP       Total Minutes  19  -HP        User Key  (r) = Recorded By, (t) = Taken By, (c) = Cosigned By    Initials Name Provider Type     Acacia Taylor, DELIA Physical Therapist        Therapy Charges for Today     Code Description Service Date Service Provider Modifiers Qty    76257739475 HC PT THER PROC EA 15 MIN 8/10/2021 Acacia Taylor, PT GP 1    43062273876 HC PT THERAPEUTIC ACT EA 15 MIN 8/10/2021 Acacia Taylor, PT GP 1    45086838600 HC GAIT TRAINING EA 15 MIN 8/11/2021 Acacia Taylor, PT GP 1          PT G-Codes  Outcome Measure Options: AM-PAC 6 Clicks Basic Mobility (PT)  AM-PAC 6 Clicks Score (PT): 19    Acacia Taylor PT  8/11/2021

## 2021-08-12 VITALS
SYSTOLIC BLOOD PRESSURE: 167 MMHG | RESPIRATION RATE: 17 BRPM | HEART RATE: 57 BPM | HEIGHT: 66 IN | TEMPERATURE: 98.6 F | BODY MASS INDEX: 22.28 KG/M2 | OXYGEN SATURATION: 100 % | WEIGHT: 138.6 LBS | DIASTOLIC BLOOD PRESSURE: 67 MMHG

## 2021-08-12 LAB
ANION GAP SERPL CALCULATED.3IONS-SCNC: 6 MMOL/L (ref 5–15)
BUN SERPL-MCNC: 27 MG/DL (ref 8–23)
BUN/CREAT SERPL: 38 (ref 7–25)
C-ANCA TITR SER IF: ABNORMAL TITER
CALCIUM SPEC-SCNC: 8.4 MG/DL (ref 8.6–10.5)
CHLORIDE SERPL-SCNC: 103 MMOL/L (ref 98–107)
CO2 SERPL-SCNC: 27 MMOL/L (ref 22–29)
CREAT SERPL-MCNC: 0.71 MG/DL (ref 0.57–1)
DEPRECATED RDW RBC AUTO: 55.7 FL (ref 37–54)
ERYTHROCYTE [DISTWIDTH] IN BLOOD BY AUTOMATED COUNT: 19.1 % (ref 12.3–15.4)
G6PD BLD QN: 454 U/10E12 RBC (ref 127–427)
GAMMA INTERFERON BACKGROUND BLD IA-ACNC: 0.01 IU/ML
GFR SERPL CREATININE-BSD FRML MDRD: 79 ML/MIN/1.73
GLUCOSE SERPL-MCNC: 79 MG/DL (ref 65–99)
HCT VFR BLD AUTO: 28.2 % (ref 34–46.6)
HGB BLD-MCNC: 8.2 G/DL (ref 12–15.9)
M TB IFN-G BLD-IMP: NEGATIVE
M TB IFN-G CD4+ BCKGRND COR BLD-ACNC: 0.03 IU/ML
M TB IFN-G CD4+CD8+ BCKGRND COR BLD-ACNC: 0.04 IU/ML
MCH RBC QN AUTO: 24.3 PG (ref 26.6–33)
MCHC RBC AUTO-ENTMCNC: 29.1 G/DL (ref 31.5–35.7)
MCV RBC AUTO: 83.4 FL (ref 79–97)
MITOGEN IGNF BLD-ACNC: 4.28 IU/ML
MYELOPEROXIDASE AB SER IA-ACNC: <9 U/ML (ref 0–9)
P-ANCA ATYPICAL TITR SER IF: ABNORMAL TITER
P-ANCA TITR SER IF: ABNORMAL TITER
PLATELET # BLD AUTO: 195 10*3/MM3 (ref 140–450)
PMV BLD AUTO: 11.2 FL (ref 6–12)
POTASSIUM SERPL-SCNC: 4.9 MMOL/L (ref 3.5–5.2)
PROTEINASE3 AB SER IA-ACNC: <3.5 U/ML (ref 0–3.5)
RBC # BLD AUTO: 3.35 X10E6/UL (ref 3.77–5.28)
RBC # BLD AUTO: 3.38 10*6/MM3 (ref 3.77–5.28)
SERVICE CMNT-IMP: NORMAL
SODIUM SERPL-SCNC: 136 MMOL/L (ref 136–145)
WBC # BLD AUTO: 9.28 10*3/MM3 (ref 3.4–10.8)

## 2021-08-12 PROCEDURE — 94799 UNLISTED PULMONARY SVC/PX: CPT

## 2021-08-12 PROCEDURE — 80048 BASIC METABOLIC PNL TOTAL CA: CPT | Performed by: HOSPITALIST

## 2021-08-12 PROCEDURE — 99239 HOSP IP/OBS DSCHRG MGMT >30: CPT | Performed by: INTERNAL MEDICINE

## 2021-08-12 PROCEDURE — 63710000001 PREDNISONE PER 1 MG: Performed by: INTERNAL MEDICINE

## 2021-08-12 PROCEDURE — 85027 COMPLETE CBC AUTOMATED: CPT | Performed by: HOSPITALIST

## 2021-08-12 RX ORDER — L.ACID,PARA/B.BIFIDUM/S.THERM 8B CELL
1 CAPSULE ORAL DAILY
Qty: 7 CAPSULE | Refills: 0 | Status: SHIPPED | OUTPATIENT
Start: 2021-08-13 | End: 2022-02-07

## 2021-08-12 RX ORDER — DOXYCYCLINE 100 MG/1
100 CAPSULE ORAL 2 TIMES DAILY
Qty: 1 CAPSULE | Refills: 0 | Status: SHIPPED | OUTPATIENT
Start: 2021-08-12 | End: 2021-08-13

## 2021-08-12 RX ORDER — FUROSEMIDE 20 MG/1
20 TABLET ORAL EVERY OTHER DAY
Qty: 15 TABLET | Refills: 0 | Status: SHIPPED | OUTPATIENT
Start: 2021-08-13 | End: 2021-08-26 | Stop reason: HOSPADM

## 2021-08-12 RX ORDER — SULFAMETHOXAZOLE AND TRIMETHOPRIM 800; 160 MG/1; MG/1
0.5 TABLET ORAL DAILY
Qty: 14 TABLET | Refills: 0 | Status: SHIPPED | OUTPATIENT
Start: 2021-08-13 | End: 2021-08-26 | Stop reason: HOSPADM

## 2021-08-12 RX ORDER — PREDNISONE 10 MG/1
10 TABLET ORAL DAILY
Qty: 7 TABLET | Refills: 0 | Status: ON HOLD | OUTPATIENT
Start: 2021-09-03 | End: 2021-08-23

## 2021-08-12 RX ORDER — PREDNISONE 10 MG/1
TABLET ORAL
Qty: 63 TABLET | Refills: 0 | Status: SHIPPED | OUTPATIENT
Start: 2021-08-13 | End: 2021-08-26 | Stop reason: HOSPADM

## 2021-08-12 RX ADMIN — PREDNISONE 40 MG: 20 TABLET ORAL at 09:06

## 2021-08-12 RX ADMIN — IPRATROPIUM BROMIDE AND ALBUTEROL SULFATE 3 ML: 2.5; .5 SOLUTION RESPIRATORY (INHALATION) at 11:13

## 2021-08-12 RX ADMIN — OXYCODONE HYDROCHLORIDE AND ACETAMINOPHEN 1000 MG: 500 TABLET ORAL at 09:05

## 2021-08-12 RX ADMIN — APIXABAN 5 MG: 5 TABLET, FILM COATED ORAL at 09:06

## 2021-08-12 RX ADMIN — BISOPROLOL FUMARATE 5 MG: 5 TABLET, FILM COATED ORAL at 09:05

## 2021-08-12 RX ADMIN — IPRATROPIUM BROMIDE AND ALBUTEROL SULFATE 3 ML: 2.5; .5 SOLUTION RESPIRATORY (INHALATION) at 07:47

## 2021-08-12 RX ADMIN — DOXYCYCLINE 100 MG: 100 CAPSULE ORAL at 09:06

## 2021-08-12 RX ADMIN — ACETAMINOPHEN 650 MG: 325 TABLET, FILM COATED ORAL at 09:05

## 2021-08-12 RX ADMIN — Medication 1 CAPSULE: at 09:06

## 2021-08-12 RX ADMIN — SULFAMETHOXAZOLE AND TRIMETHOPRIM 80 MG: 800; 160 TABLET ORAL at 09:06

## 2021-08-12 RX ADMIN — SERTRALINE HYDROCHLORIDE 150 MG: 100 TABLET ORAL at 09:04

## 2021-08-12 NOTE — DISCHARGE SUMMARY
[facility] Medicine Services  DISCHARGE SUMMARY    Patient Name: Yin Law  : 1940  MRN: [id number]    Date of Admission: 2021  5:08 PM  Date of Discharge:  2021  Primary Care Physician: Santiago Chaudhry MD    Consults     Date and Time Order Name Status Description    2021 12:36 AM Inpatient Pulmonology Consult Completed     2021 12:36 AM Inpatient Infectious Diseases Consult Completed     2021 12:35 AM Inpatient Cardiology Consult Completed     2021  9:48 PM Inpatient Cardiology Consult Completed           Hospital Course     Presenting Problem:   Acute respiratory failure with hypoxia (CMS/HCC) [J96.01]    Active Hospital Problems    Diagnosis  POA   • **Bilateral upper lobe pulmonary infiltrates.  Components of CHF and possible inflammation/infection [R91.8]  Yes   • Acute diastolic CHF (CMS/HCC) [I50.31]  Yes   • Elevated troponin [R77.8]  Yes   • A/C hypoxemic respiratory failure (CMS/HCC) [J96.21]  Yes   • Pleural effusion, bilateral [J90]  Yes   • Recurrent atrial fibrillation with RVR (CMS/HCC) [I48.91]  Yes   • COPD on home oxygen (CMS/HCC) [J44.9]  Yes   • Hypertension and diastolic dysfunction [I10]  Yes   • Rheumatoid arthritis (CMS/HCC) [M06.9]  Yes   • Anxiety and depression [F41.9, F32.9]  Yes   • Anemia [D64.9]  Yes      Resolved Hospital Problems    Diagnosis Date Resolved POA   • Lactic acidosis [E87.2] 2021 Yes          Hospital Course:  Yin Law is a 80 y.o. female  with hx of chronic respiratory failure on 2 liters O2 at baseline, Afib on Eliquis, HTN, HLD, COPD, rheumatoid arthritis, and depression/anxiety who presented to the ED with complaints of shortness of breath. Previously admitted to [facility] for the same -21: treated for PNA and CHF exacerbation, was managed in ICU at one point; Amiodarone was discontinued for possible pulmonary toxicity, and she was treated with antibiotics and steroids,  appears she was discharged on her baseline 2 liters. PCP followed up with her on 8/5/21 and performed a CXR, sent to the ER due to potentially worsening findings. Saturating 90% on 5 liters on arrival. ProBNP 68525, troponin 0.380, lactic acid 2.3, WBC 12.79. CTA chest with interval increase in bibasilar pleural effusions and patchy bilateral airspace disease suggestive of progressive pneumonia/infection. Admitted for further management.    Multifactorial acute on chronic hypoxic respiratory failure   Sepsis, POA (leukocytosis, lactic acidosis, tachypneic)  Multifocal pneumonia, recurrent vs worsening  Bilateral pleural effusions  Acute diastolic CHF  --Recent admit for same and treated with Doxycycline, Rocephin, and steroids (Amiodarone discontinued due to concerns for lung toxicity). Was seen by Pulmonary and Cardiology during that admission. Discharged on a few more days of prednisone.  --CTA chest 8/5/21 negative for PE, but showed interval increase in bibasilar pleural effusions and patchy airspace disease, may represent progressive PNA, also possible pulmonary edema.  --Sputum culture 7/24/21 with scant Candida albicans. Blood cultures here NGTD. Negative urine Strep/Legionella. Negative respiratory PCR panel. Negative Crypto Ag. Negative Fungitell, Histo, Blasto, Aspergillus during last admission.  --Appreciate Pulmonary and ID following.  --Has been on Zosyn and Doxycycline--switched to PO Augmentin with Doxycycline. Will complete ATBx today, day of discharge.   --Changed Solumedrol to 40 mg prednisone--continue to wean per Pulmonary. She will need a longer steroid taper at discharge as follows: 40 mg PDS daily x 1 week tapering by 10mg weekly until off.   --Continue home inhalers  --Continue with 20 mg PO Lasix every other day. CXR 8/10/21 still with some small effusions.  --Echo from 7/20/21 shows EF 65%, mild AS, mild MR, mild TR with RVSP 37 mmHg.  --Currently has been weaned from 6 liters to baseline 2  liters.     Elevated troponin   --Likely secondary to above (hypoxia, CHF, sepsis).   --Trended down.  --No chest pain.     PAF, currently Afib with RVR  --Continue home Eliquis, Bisoprolol.  --Amiodarone discontinued last admission due concern for lung toxicity.  --Converted to NSR 8/7/21 evening. Discontinued Cardizem drip 8/8/21.  --Follows with Dr. Montoya, did not consult as she has now converted to and remains in NSR.     Anemia, chronic  --Baseline Hb 8-9 range.  --Iron profile mixed iron deficiency + anemia of chronic disease.  --Overall stable today. Monitor.      Anxiety/Depression   --Continue home Zoloft, PRN Xanax.     Deconditioning  --pt was seen by PT/OT who recommended inpatient rehab, however, pt declined this option and will follow up with HHPT      Discharge Follow Up Recommendations for outpatient labs/diagnostics:   1. Follow up with PCP within one week  2. Follow up with Pulm in 3 weeks with repeat CXR at that time    Day of Discharge     HPI:   Doing well today, says that she has been waiting to be discharged. No issues overnight.     Review of Systems  Gen- No fevers, chills  CV- No chest pain, palpitations  Resp- No cough, dyspnea  GI- No N/V/D, abd pain      Vital Signs:   Temp:  [98.2 °F (36.8 °C)-98.6 °F (37 °C)] 98.6 °F (37 °C)  Heart Rate:  [57-65] 57  Resp:  [16-20] 18  BP: (145-167)/(50-67) 167/67     Physical Exam:  Gen-no acute distress, frail appearance  HENT-NCAT, mucous membranes moist  CV-RRR, S1 S2 normal, no m/r/g  Resp-slightly diminished at the bases, nonlabored on 2 liters  Abd-soft, NT, ND, +BS  Ext-no edema  Neuro-A&Ox3, no focal deficits  Skin-no rashes  Psych-appropriate mood    Pertinent  and/or Most Recent Results     LAB RESULTS:      Lab 08/11/21  0546 08/10/21  0646 08/09/21  0640 08/08/21  0614 08/07/21  0835 08/06/21  0612 08/06/21  0612 08/06/21  0000 08/05/21  2104 08/05/21  1820 08/05/21  1820   WBC 9.27 12.81* 11.76* 13.05* 13.56*   < > 11.49*  --   --    < >  12.79*   HEMOGLOBIN 8.1* 7.9* 7.8* 8.2* 9.1*   < > 8.6*  --   --    < > 8.5*   HEMATOCRIT 27.9* 26.6* 25.2* 26.5* 29.8*   < > 28.8*  --   --    < > 28.2*   PLATELETS 177 207 223 299 278   < > 266  --   --    < > 276   NEUTROS ABS  --   --   --   --   --   --  10.80*  --   --   --  9.99*   IMMATURE GRANS (ABS)  --   --   --   --   --   --  0.10*  --   --   --  0.07*   LYMPHS ABS  --   --   --   --   --   --  0.44*  --   --   --  1.62   MONOS ABS  --   --   --   --   --   --  0.14  --   --   --  1.02*   EOS ABS  --   --   --   --   --   --  0.00  --   --   --  0.04   MCV 82.1 79.9 78.5* 79.6 79.7   < > 81.8  --   --    < > 80.6   CRP  --   --   --   --   --   --  6.98*  --   --   --   --    PROCALCITONIN  --   --   --  0.11  --   --   --   --   --   --  0.17   LACTATE  --   --   --   --   --   --   --  1.5 2.3*  --   --    LDH  --   --   --   --   --   --  361*  --   --   --   --     < > = values in this interval not displayed.         Lab 08/11/21  0546 08/10/21  0647 08/09/21  0641 08/08/21  0614 08/07/21  2235 08/07/21  0835 08/07/21  0835 08/06/21  0612 08/06/21  0612   SODIUM 138 136 132* 135*  --   --  135*   < > 140   POTASSIUM 4.1 4.4 3.6 3.9 3.8   < > 3.0*   < > 4.3   CHLORIDE 105 103 98 97*  --   --  96*   < > 101   CO2 23.0 27.0 24.0 26.0  --   --  27.0   < > 27.0   ANION GAP 10.0 6.0 10.0 12.0  --   --  12.0   < > 12.0   BUN 31* 40* 39* 29*  --   --  18   < > 17   CREATININE 0.87 0.95 1.07* 0.94  --   --  0.77   < > 0.76   GLUCOSE 74 82 120* 133*  --   --  137*   < > 138*   CALCIUM 8.3* 8.4* 8.3* 8.5*  --   --  8.7   < > 8.4*   MAGNESIUM  --   --   --  2.7*  --   --  1.9  --  2.1   PHOSPHORUS  --   --   --   --   --   --  4.0  --   --     < > = values in this interval not displayed.         Lab 08/09/21  0641 08/05/21  1820   TOTAL PROTEIN 5.1* 6.1   ALBUMIN 2.70* 3.20*   GLOBULIN 2.4 2.9   ALT (SGPT) 23 21   AST (SGOT) 22 58*   BILIRUBIN 0.4 0.7   ALK PHOS 69 95         Lab 08/08/21  0614 08/06/21  0612  08/06/21  0000 08/05/21  1820   PROBNP 2,571.0* 11,947.0*  --  10,115.0*   TROPONIN T  --   --  0.359* 0.380*                 Lab 08/08/21  0707   PH, ARTERIAL 7.477*   PCO2, ARTERIAL 38.4   PO2 ART 57.6*   FIO2 40   HCO3 ART 28.4*   BASE EXCESS ART 4.5*   CARBOXYHEMOGLOBIN 1.9     Brief Urine Lab Results  (Last result in the past 365 days)      Color   Clarity   Blood   Leuk Est   Nitrite   Protein   CREAT   Urine HCG        08/05/21 2348 Yellow Clear Negative Negative Negative Negative             Microbiology Results (last 10 days)     Procedure Component Value - Date/Time    MRSA Screen, PCR (Inpatient) - Swab, Nares [[id number]]  (Normal) Collected: 08/06/21 1006    Lab Status: Final result Specimen: Swab from Nares Updated: 08/06/21 1132     MRSA PCR Negative    Narrative:      MRSA Negative    Legionella Antigen, Urine - Urine, Urine, Clean Catch [[id number]]  (Normal) Collected: 08/05/21 2348    Lab Status: Final result Specimen: Urine, Clean Catch Updated: 08/06/21 1057     LEGIONELLA ANTIGEN, URINE Negative    S. Pneumo Ag Urine or CSF - Urine, Urine, Clean Catch [[id number]]  (Normal) Collected: 08/05/21 2348    Lab Status: Final result Specimen: Urine, Clean Catch Updated: 08/06/21 1058     Strep Pneumo Ag Negative    Blood Culture - Blood, Arm, Left [[id number]] Collected: 08/05/21 2102    Lab Status: Final result Specimen: Blood from Arm, Left Updated: 08/10/21 2116     Blood Culture No growth at 5 days    Respiratory Panel PCR w/COVID-19(SARS-CoV-2) CAS/SHALINI/KRISTY/PAD/COR/MAD/HAJA In-House, NP Swab in UTM/VTM, 3-4 HR TAT - Swab, Nasopharynx [[id number]]  (Normal) Collected: 08/05/21 2100    Lab Status: Final result Specimen: Swab from Nasopharynx Updated: 08/05/21 2315     ADENOVIRUS, PCR Not Detected     Coronavirus 229E Not Detected     Coronavirus HKU1 Not Detected     Coronavirus NL63 Not Detected     Coronavirus OC43 Not Detected     COVID19 Not Detected     Human Metapneumovirus Not Detected     Human  Rhinovirus/Enterovirus Not Detected     Influenza A PCR Not Detected     Influenza B PCR Not Detected     Parainfluenza Virus 1 Not Detected     Parainfluenza Virus 2 Not Detected     Parainfluenza Virus 3 Not Detected     Parainfluenza Virus 4 Not Detected     RSV, PCR Not Detected     Bordetella pertussis pcr Not Detected     Bordetella parapertussis PCR Not Detected     Chlamydophila pneumoniae PCR Not Detected     Mycoplasma pneumo by PCR Not Detected    Narrative:      In the setting of a positive respiratory panel with a viral infection PLUS a negative procalcitonin without other underlying concern for bacterial infection, consider observing off antibiotics or discontinuation of antibiotics and continue supportive care. If the respiratory panel is positive for atypical bacterial infection (Bordetella pertussis, Chlamydophila pneumoniae, or Mycoplasma pneumoniae), consider antibiotic de-escalation to target atypical bacterial infection.    Blood Culture - Blood, Hand, Right [[id number]] Collected: 08/05/21 2045    Lab Status: Final result Specimen: Blood from Hand, Right Updated: 08/10/21 2116     Blood Culture No growth at 5 days          XR Chest 1 View    Result Date: 8/8/2021  EXAMINATION: XR CHEST 1 VW - 08/08/2021  INDICATION: J96.01-Acute respiratory failure with hypoxia; I50.9-Heart failure, unspecified; X91-Evslklc effusion, not elsewhere classified; J18.9-Pneumonia, unspecified organism.  COMPARISON: 08/07/2021  FINDINGS: Coarse and extensive biapical pulmonary interstitial disease is stable. Included lower lungs appear clear except for trace bibasilar effusion. No pneumothorax is seen.      Stable chest exam including extensive but stable upper lung interstitial disease.   DICTATED:   08/08/2021 EDITED/ls :   08/08/2021  This report was finalized on 8/8/2021 10:00 PM by Dr. Augie Leiva MD.      XR Chest 1 View    Result Date: 8/7/2021  EXAMINATION: XR CHEST 1 VW - 08/07/2021  INDICATION:Possible  amiodarone line; J96.01-Acute respiratory failure with hypoxia; I50.9-Heart failure, unspecified; M73-[place]joto effusion, not elsewhere classified; J18.9-Pneumonia, unspecified organism. CHF.  COMPARISON: 08/06/2021  FINDINGS: Heart is enlarged. Vasculature is mostly obscured by the patient's dense pulmonary disease of the upper and mid lungs. Overall, interstitial changes may be mildly improved. Skin fold shadows are superimposed over the right lateral chest. Allowing for this, no pneumothorax is appreciated.      Extensive upper lung interstitial disease stable to slightly improved. No evidence of pneumothorax. Right-sided skin fold shadows as noted.  DICTATED:   08/07/2021 EDITED/ls :   08/07/2021    This report was finalized on 8/7/2021 9:53 PM by Dr. Augie Leiva MD.      XR Chest 1 View    Result Date: 8/6/2021  EXAMINATION: XR CHEST 1 VW- 08/06/2021  INDICATION: Please compare to 8/1/2021 film; J96.01-Acute respiratory failure with hypoxia; I50.9-Heart failure, unspecified; H41-Dntapyw effusion, not elsewhere classified; J18.9-Pneumonia, unspecified organism  COMPARISON: 08/01/2021  FINDINGS: Right upper extremity PICC line has been removed. There is extensive upper lung disease, similar to the prior study. Lung bases remain relatively clear. No pneumothorax or effusion is seen.      Extensive bilateral upper lung disease, similar to prior exam.  D:  08/06/2021 E:  08/06/2021    This report was finalized on 8/6/2021 10:39 PM by Dr. Augie Leiva MD.      CT Chest Pulmonary Embolism    Result Date: 8/5/2021  CT CHEST PULMONARY EMBOLISM W CONTRAST INDICATION: Shortness of air, history of pneumonia TECHNIQUE: CT angiogram of the chest with IV contrast. 3-D reconstructions were obtained and reviewed.   Radiation dose reduction techniques included automated exposure control or exposure modulation based on body size. Count of known CT and cardiac nuc med studies performed in previous 12 months: 0. COMPARISON: CTA of the  chest from 7/19/2021 FINDINGS: There is no evidence of pulmonary embolism. The heart is again noted to be enlarged and there is coronary artery calcification. The patient's pleural effusions as well as bibasilar atelectasis has increased from prior. Again noted is patchy, predominantly perihilar and upper lobe airspace disease that has progressed from prior. There may be some pulmonary vascular congestion and edema. Upper abdominal structures appear unremarkable. There are mild compression deformities of several thoracolumbar vertebral bodies.     1. Negative for pulmonary embolus. 2. There has been interval increase in the patient's bibasilar pleural effusions from prior as well as patchy bilateral airspace disease that may represent progressive pneumonia/infection. There also may be some potential pulmonary edema. 3. There is cardiomegaly with coronary artery calcification. Signer Name: Jacqueline Ramírez MD  Signed: 8/5/2021 7:56 PM  Workstation Name: [id number]  [facility]    XR Chest PA & Lateral    Result Date: 8/10/2021  EXAMINATION: XR CHEST, PA AND LATERAL-08/10/2021:  INDICATION: F/U infiltrates; J96.01-Acute respiratory failure with hypoxia; I50.9-Heart failure, unspecified; Y47-[place] effusion, not elsewhere classified; J18.9-Pneumonia, unspecified organism.  COMPARISON: Chest x-ray 08/08/2021.  FINDINGS: PA and lateral views of the chest reveal bilateral upper lung and suprahilar predominant pulmonary opacifications, right greater than left, with mild decrease in density of decreased airspace disease or improvement from prior comparison with small-to-moderate right and small left pleural effusions.      Mild decrease in density of decreased airspace disease or improvement from prior comparison with small-to-moderate right and small left pleural effusions.  D:  08/10/2021 E:  08/10/2021  This report was finalized on 8/10/2021 5:43 PM by Dr. Tony Eaton.                Results for  orders placed during the hospital encounter of 07/19/21    Adult Transthoracic Echo Complete W/ Cont if Necessary Per Protocol    Interpretation Summary  · Estimated left ventricular EF = 65%  · Mild aortic valve stenosis is present.  · Aortic valve maximum pressure gradient is 32.9 mmHg. Aortic valve mean pressure gradient is 17.2 mmHg.  · Mild mitral valve regurgitation is present.  · Mild tricuspid valve regurgitation is present.  · Estimated right ventricular systolic pressure from tricuspid regurgitation is 37.0 mmHg.  · There is a large left pleural effusion. There is a moderate sized right pleural effusion.      Plan for Follow-up of Pending Labs/Results:   Pending Labs     Order Current Status    Basic Metabolic Panel Collected (08/12/21 0758)    CBC (No Diff) Collected (08/12/21 0758)    ANCA Panel In process    Glucose 6 Phosphate Dehydrogenase In process    QuantiFERON TB Plus Client Incubated In process        Discharge Details        Discharge Medications      ASK your doctor about these medications      Instructions Start Date   ALPRAZolam 0.5 MG tablet  Commonly known as: XANAX   0.5 mg, Oral, Nightly PRN      apixaban 5 MG tablet tablet  Commonly known as: ELIQUIS   5 mg, Oral, 2 Times Daily      ascorbic acid 1000 MG tablet  Commonly known as: VITAMIN C   1,000 mg, Oral, Daily      bisoprolol 5 MG tablet  Commonly known as: ZEBeta   5 mg, Oral, Every 24 Hours Scheduled      fish oil 1000 MG capsule capsule   1,000 mg, Oral, Daily With Breakfast      HYDROcodone-acetaminophen 7.5-325 MG per tablet  Commonly known as: NORCO   1 tablet, Oral, Every 6 Hours PRN      multivitamin with minerals tablet tablet   1 tablet, Oral, Daily      predniSONE 20 MG tablet  Commonly known as: DELTASONE   20 mg, Oral, Daily With Breakfast      sertraline 100 MG tablet  Commonly known as: ZOLOFT   150 mg, Oral, Daily, 1.5 tab daily      Stiolto Respimat 2.5-2.5 MCG/ACT aerosol solution inhaler  Generic drug: tiotropium  bromide-olodaterol   2 puffs, Inhalation, Daily PRN      Vitamin D (Ergocalciferol) 50 MCG (2000 UT) capsule   1 tablet, Oral, Daily      VITAMIN E PO   1 tablet, Oral, Daily             No Known Allergies      Discharge Disposition:      Diet:  Hospital:  Diet Order   Procedures   • Diet Regular; Cardiac       Activity:      Restrictions or Other Recommendations:         CODE STATUS:    Code Status and Medical Interventions:   Ordered at: 08/05/21 6364     Level Of Support Discussed With:    Patient     Code Status:    No CPR     Medical Interventions (Level of Support Prior to Arrest):    Full     Comments:    DNR/ DNI       Future Appointments   Date Time Provider Department Center   8/30/2021  2:30 PM Roslyn Melendez APRN [facility]                 Breann King MD  08/12/21      Time Spent on Discharge:  I spent 35  minutes on this discharge activity which included: face-to-face encounter with the patient, reviewing the data in the system, coordination of the care with the nursing staff as well as consultants, documentation, and entering orders.

## 2021-08-12 NOTE — PROGRESS NOTES
NN spoke with pt at BS.  Pt alert and able to answer questions appropriately. Pt O2 sat   97 % on 2  L currently, home O2 use 2 L. COPD action plan reviewed. Deep breathing exercises encouraged. Patient has been scheduled for a hospital follow up with [facility] for 9/9/2021 @ 3:15 pm with AMA Hayes.  Patient expects to be discharged home today with home health services.  No new concerns or questions voiced at this time.  NN will continue to follow as needed.             Per current G[employer] Standards, please consider: No rescue in place, No ICS in place, Outpatient PFT, Pulmonary rehab as appropriate

## 2021-08-12 NOTE — PROGRESS NOTES
" PULMONARY NOTE     Hospital Day: 6    Ms. Yin Law, 80 y.o. female is followed for:   Acute hypoxemic respiratory failure and pulmonary infiltrates        SUBJECTIVE       Interval history:    Remains on low-flow nasal cannula O2.  Subjectively improved..    The patient's relevant past medical, surgical and social history were reviewed and updated in Epic as appropriate.        OBJECTIVE     Vital Sign Min/Max for last 24 hours  Temp  Min: 98.1 °F (36.7 °C)  Max: 98.7 °F (37.1 °C)   BP  Min: 145/50  Max: 180/75   Pulse  Min: 61  Max: 73   Resp  Min: 16  Max: 18   SpO2  Min: 91 %  Max: 100 %   No data recorded   Weight  Min: 61.7 kg (136 lb)  Max: 61.7 kg (136 lb)    No intake or output data in the 24 hours ending 08/11/21 2005   Flowsheet Rows      First Filed Value   Admission Height  167.6 cm (66\") Documented at 08/05/2021 1718   Admission Weight  62.1 kg (137 lb) Documented at 08/05/2021 1718        Body mass index is 21.95 kg/m².     08/09/21  0338 08/10/21  0451 08/11/21  0543   Weight: 64.9 kg (143 lb) 64.9 kg (143 lb 1.6 oz) 61.7 kg (136 lb)             Objective:  General Appearance:  In no acute distress.    Vital signs: (most recent): Blood pressure 155/53, pulse 62, temperature 98.2 °F (36.8 °C), temperature source Oral, resp. rate 18, height 167.6 cm (66\"), weight 61.7 kg (136 lb), SpO2 98 %.    HEENT: Normal HEENT exam.  (Nasal cannula O2)    Lungs:  There are rales and decreased breath sounds.  No wheezes or rhonchi.    Heart: Normal rate.  Regular rhythm.  S1 normal and S2 normal.  No murmur, gallop or friction rub.   Chest: Symmetric chest wall expansion.   Abdomen: Abdomen is soft and non-distended.  Bowel sounds are normal.   There is no abdominal tenderness.   There is no mass. There is no splenomegaly. There is no hepatomegaly.   Extremities: There is no deformity or dependent edema.    Neurological: Patient is alert and oriented to person, place and time.    Pupils:  Pupils are equal, " Hide Additional Notes?: No Detail Level: Detailed round, and reactive to light.    Skin:  Warm and dry.                      I reviewed the patient's results and images, including reviewing images and reports.    Medications reviewed.      Scheduled Meds:apixaban, 5 mg, Oral, Q12H  ascorbic acid, 1,000 mg, Oral, Daily  bisoprolol, 5 mg, Oral, Q24H  doxycycline, 100 mg, Oral, BID  furosemide, 20 mg, Oral, Every Other Day  ipratropium-albuterol, 3 mL, Nebulization, 4x Daily - RT  lactobacillus acidophilus, 1 capsule, Oral, Daily  pharmacy consult - MTM, , Does not apply, Daily  predniSONE, 40 mg, Oral, Daily With Breakfast  sertraline, 150 mg, Oral, Daily  sodium chloride, 10 mL, Intravenous, Q12H  sulfamethoxazole-trimethoprim, 0.5 tablet, Oral, Daily          Assessment/Plan   ASSESSMENT      Active Hospital Problems    Diagnosis    • **Bilateral upper lobe pulmonary infiltrates.  Components of CHF and possible inflammation/infection    • Acute diastolic CHF (CMS/HCC)    • Elevated troponin    • A/C hypoxemic respiratory failure (CMS/HCC)    • Pleural effusion, bilateral    • Recurrent atrial fibrillation with RVR (CMS/HCC)    • COPD on home oxygen (CMS/HCC)    • Hypertension and diastolic dysfunction    • Rheumatoid arthritis (CMS/HCC)    • Anxiety and depression    • Anemia          80-year-old female with a past medical history significant for atrial fibrillation on Eliquis and amiodarone, hypertension, dyslipidemia, COPD, rheumatoid arthritis, and depression/anxiety.  She was admitted on 7/19 with presumed pneumonia and CHF exacerbation.  There was some question of amiodarone lung toxicity versus an autoimmune process versus an infectious pneumonia.  Amiodarone was held and she was given steroids and empiric antibiotics.  On discharge amiodarone was discontinued and steroids were not prescribed.  She declined rehab.  She returned about 3 days after admission with dizziness and near syncope.  Her chest x-ray was not much changed.  Patient was readmitted and  Include Location In Plan?: Yes placed on Zosyn and doxycycline as well as a steroid pulse.    Currently O2 weaned to 2.5 L.  Serologies positive for atypical p-ANCA and an anticentromere antibody.  There is likely some relation to her underlying autoimmune disease.  There is no evidence of a specific pulmonary vasculitis pattern serologically.  Chest x-ray slightly improved today.    1. Bilateral pulmonary infiltrates: There is certainly an upper lobe predominance and while this is atypical for amiodarone toxicity, multiple patterns are possible including lobar infiltrates and pleural fluid.  It seems she deteriorated after removal from steroids so we might consider a more gradual taper on discharge this time.  Chest x-ray is slightly improved today.  2. Possible pneumonia: Discussed with Dr. Barriga on admission and he is planning on an appropriate de-escalating regimen depending on any additional cultures.  3. Possible component of diastolic CHF: Additional diuretics as needed.          RECOMMENDATIONS     1. Discharge on O2 when otherwise ready and will reassess need as an outpatient  2. Prednisone 40 mg daily with a taper over 4 weeks on discharge i.e. 10 mg weekly until off  3. Empiric antibiotics per Dr. Barriga.  4. Continue to hold amiodarone  5. Eliquis as before  6. Inhalers as before  7. Will schedule follow-up in the pulmonary clinic in about 3 weeks with a chest x-ray         I discussed the patient's findings and my recommendations with patient     .    Linus Mosley MD  Pulmonary and Critical Care Medicine              Detail Level: Generalized Detail Level: Zone Detail Level: Simple

## 2021-08-12 NOTE — CASE MANAGEMENT/SOCIAL WORK
Case Management Discharge Note      Final Note: Pt's plan is to dc to home. Have arranged for HH through [facility]. Called to let them know of pt's dc. No needs voiced. Family to provide transport.    Provided Post Acute Provider List?: N/A  N/A Provider List Comment: Undecided at present    Selected Continued Care - Admitted Since 8/5/2021     Destination    No services have been selected for the patient.              Durable Medical Equipment    No services have been selected for the patient.              Dialysis/Infusion    No services have been selected for the patient.              Home Medical Care Coordination complete.    Service Provider Selected Services Address Phone Fax Patient Preferred    [facility] [address] [place] [address], [facility] [id number] [phone] --          Therapy    No services have been selected for the patient.              Community Resources    No services have been selected for the patient.              Community & DME    No services have been selected for the patient.                Selected Continued Care - Prior Encounters Includes selections from prior encounters from 5/7/2021 to 8/12/2021    Discharged on 8/2/2021 Admission date: 7/19/2021 - Discharge disposition: Home-Health Care Svc    Durable Medical Equipment     Service Provider Selected Services Address Phone Fax Patient Preferred    [facility]  Durable Medical Equipment [address], [place] [id number] [phone] --          Home Medical Care     Service Provider Selected Services Address Phone Fax Patient Preferred    [facility] [address] [place] [address], [facility] [id number] [phone] --                         Final Discharge Disposition Code: 06 - home with home health care

## 2021-08-12 NOTE — DISCHARGE PLACEMENT REQUEST
"Yin Bhatti (80 y.o. Female)     Date of Birth Social Security Number Address Home Phone MRN    1940  [address] [facility] [address] [phone] [id number]    [employer] Mar[employer] Status          Presybeterian        Admission Date Admission Type Admitting Provider Attending Provider Department, Room/Bed    8/5/21 Emergency Breann King MD Howard, Gabriela Kirk, MD [facility], N616/1    Discharge Date Discharge Disposition Discharge Destination         Home or Self Care              Attending Provider: Breann King MD    Allergies: No Known Allergies    Isolation: None   Infection: None   Code Status: No CPR    Ht: 167.6 cm (66\")   Wt: 62.9 kg (138 lb 9.6 oz)    Admission Cmt: None   Principal Problem: Bilateral upper lobe pulmonary infiltrates.  Components of CHF and possible inflammation/infection [R91.8]                 Active Insurance as of 8/5/2021     Primary Coverage     Payor Plan Insurance Group Employer/Plan Group    MEDICARE MEDICARE A & B      Payor Plan Address Payor Plan Phone Number Payor Plan Fax Number Effective Dates    [address] [phone]  9/1/2004 - None Entered    [facility] [id number]       Subscriber Name Subscriber Birth Date Member ID       YIN BHATTI 1940 [id number]           Secondary Coverage     Payor Plan Insurance Group Employer/Plan Group    ANTH BLUE CROSS [facility] SUPP [id number]     Payor Plan Address Payor Plan Phone Number Payor Plan Fax Number Effective Dates    [address]   12/1/2016 - None Entered    [facility] [address]       Subscriber Name Subscriber Birth Date Member ID       YIN BHATTI 1940 [id number]                 Emergency Contacts      (Rel.) Home Phone Work Phone Mobile Phone    Enedina Mauricio (Sister) [phone] -- [phone]    Titi Elkins (Relative) [phone] -- [phone]          [facility]  [address]  [facility] [address]  Phone:  [phone]  Fax:  [phone] Date: Aug 12, 2021      Ambulatory Referral to Home Health     Patient:  Yin Law MRN:  [id number]   [address]  [facility] [address] :  1940  SSN:    Phone: [phone] Sex:  F      INSURANCE PAYOR PLAN GROUP # SUBSCRIBER ID   Primary:  Secondary:    MEDICARE ANTHEM BLUE CROSS [id number]  [id number]    [id number]  [id number]      Referring Provider Information:  PAM BATRES Phone: [phone] Fax: [phone]      Referral Information:   # Visits:  999 Referral Type: Home Health [42]   Urgency:  Routine Referral Reason: Continuity of Care   Start Date: Aug 12, 2021 End Date:  To be determined by Insurer   Diagnosis: Acute respiratory failure with hypoxia (CMS/HCC) (J96.01 [ICD-10-CM] 518.81 [ICD-9-CM])  Acute on chronic congestive heart failure, unspecified heart failure type (CMS/HCC) (I50.9 [ICD-10-CM] 428.0 [ICD-9-CM])  Pneumonia of both lower lobes due to infectious organism (J18.9 [ICD-10-CM] 486 [ICD-9-CM])  Multifocal pneumonia (J18.9 [ICD-10-CM] 486 [ICD-9-CM])      Refer to Dept:   Refer to Provider:   Refer to Facility:       Face to Face Visit Date: 2021  Follow-up provider for Plan of Care? I treated the patient in an acute care facility and will not continue treatment after discharge.  Follow-up provider: LARISSA ROSE [[id number]]  Reason/Clinical Findings: Bilateral upper lobe pulmonary infiltrates. Components of CHF and possible inflammation/infection  Describe mobility limitations that make leaving home difficult: Impaired functional mobility, gait, balance and endurance  Nursing/Therapeutic Services Requested: Skilled Nursing  Nursing/Therapeutic Services Requested: Physical Therapy  Nursing/Therapeutic Services Requested: Occupational Therapy  Skilled nursing orders: CHF management  Skilled nursing orders: COPD management  Skilled nursing orders: Cardiopulmonary  assessments  PT orders: Therapeutic exercise  PT orders: Strengthening  PT orders: Home safety assessment  Occupational orders: Activities of daily living  Occupational orders: Energy conservation  Frequency: 1 Week 1     This document serves as a request of services and does not constitute Insurance authorization or approval of services.  To determine eligibility, please contact the members Insurance carrier to verify and review coverage.     If you have medical questions regarding this request for services. Please contact [address] at [phone] during normal business hours.       Authorizing Provider:Breann King MD  Authorizing Provider's NPI: [id number]  Order Entered By: Zaida Acuna RN 2021  1:49 PM     Electronically signed by: Breann King MD 2021  1:49 PM             Discharge Summary      Breann King MD at 21 0821              [facility] Medicine Services  DISCHARGE SUMMARY    Patient Name: Yin Law  : 1940  MRN: [id number]    Date of Admission: 2021  5:08 PM  Date of Discharge:  2021  Primary Care Physician: Santiago Chaudhry MD    Consults     Date and Time Order Name Status Description    2021 12:36 AM Inpatient Pulmonology Consult Completed     2021 12:36 AM Inpatient Infectious Diseases Consult Completed     2021 12:35 AM Inpatient Cardiology Consult Completed     2021  9:48 PM Inpatient Cardiology Consult Completed           Hospital Course     Presenting Problem:   Acute respiratory failure with hypoxia (CMS/HCC) [J96.01]    Active Hospital Problems    Diagnosis  POA   • **Bilateral upper lobe pulmonary infiltrates.  Components of CHF and possible inflammation/infection [R91.8]  Yes   • Acute diastolic CHF (CMS/HCC) [I50.31]  Yes   • Elevated troponin [R77.8]  Yes   • A/C hypoxemic respiratory failure (CMS/HCC) [J96.21]  Yes   • Pleural effusion, bilateral  [J90]  Yes   • Recurrent atrial fibrillation with RVR (CMS/[facility]) [I48.91]  Yes   • COPD on home oxygen (CMS/[facility]) [J44.9]  Yes   • Hypertension and diastolic dysfunction [I10]  Yes   • Rheumatoid arthritis (CMS/HCC) [M06.9]  Yes   • Anxiety and depression [F41.9, F32.9]  Yes   • Anemia [D64.9]  Yes      Resolved Hospital Problems    Diagnosis Date Resolved POA   • Lactic acidosis [E87.2] 08/06/2021 Yes          Hospital Course:  Yin Law is a 80 y.o. female  with hx of chronic respiratory failure on 2 liters O2 at baseline, Afib on Eliquis, HTN, HLD, COPD, rheumatoid arthritis, and depression/anxiety who presented to the ED with complaints of shortness of breath. Previously admitted to [facility] for the same 7/19-8/2/21: treated for PNA and CHF exacerbation, was managed in ICU at one point; Amiodarone was discontinued for possible pulmonary toxicity, and she was treated with antibiotics and steroids, appears she was discharged on her baseline 2 liters. PCP followed up with her on 8/5/21 and performed a CXR, sent to the ER due to potentially worsening findings. Saturating 90% on 5 liters on arrival. ProBNP 41792, troponin 0.380, lactic acid 2.3, WBC 12.79. CTA chest with interval increase in bibasilar pleural effusions and patchy bilateral airspace disease suggestive of progressive pneumonia/infection. Admitted for further management.    Multifactorial acute on chronic hypoxic respiratory failure   Sepsis, POA (leukocytosis, lactic acidosis, tachypneic)  Multifocal pneumonia, recurrent vs worsening  Bilateral pleural effusions  Acute diastolic CHF  --Recent admit for same and treated with Doxycycline, Rocephin, and steroids (Amiodarone discontinued due to concerns for lung toxicity). Was seen by Pulmonary and Cardiology during that admission. Discharged on a few more days of prednisone.  --CTA chest 8/5/21 negative for PE, but showed interval increase in bibasilar pleural effusions and patchy airspace disease, may  represent progressive PNA, also possible pulmonary edema.  --Sputum culture 7/24/21 with scant Candida albicans. Blood cultures here NGTD. Negative urine Strep/Legionella. Negative respiratory PCR panel. Negative Crypto Ag. Negative Fungitell, Histo, Blasto, Aspergillus during last admission.  --Appreciate Pulmonary and ID following.  --Has been on Zosyn and Doxycycline--switched to PO Augmentin with Doxycycline. Will complete ATBx today, day of discharge.   --Changed Solumedrol to 40 mg prednisone--continue to wean per Pulmonary. She will need a longer steroid taper at discharge as follows: 40 mg PDS daily x 1 week tapering by 10mg weekly until off.   --Continue home inhalers  --Continue with 20 mg PO Lasix every other day. CXR 8/10/21 still with some small effusions.  --Echo from 7/20/21 shows EF 65%, mild AS, mild MR, mild TR with RVSP 37 mmHg.  --Currently has been weaned from 6 liters to baseline 2 liters.     Elevated troponin   --Likely secondary to above (hypoxia, CHF, sepsis).   --Trended down.  --No chest pain.     PAF, currently Afib with RVR  --Continue home Eliquis, Bisoprolol.  --Amiodarone discontinued last admission due concern for lung toxicity.  --Converted to NSR 8/7/21 evening. Discontinued Cardizem drip 8/8/21.  --Follows with Dr. Montoya, did not consult as she has now converted to and remains in NSR.     Anemia, chronic  --Baseline Hb 8-9 range.  --Iron profile mixed iron deficiency + anemia of chronic disease.  --Overall stable today. Monitor.      Anxiety/Depression   --Continue home Zoloft, PRN Xanax.     Deconditioning  --pt was seen by PT/OT who recommended inpatient rehab, however, pt declined this option and will follow up with PT      Discharge Follow Up Recommendations for outpatient labs/diagnostics:   1. Follow up with PCP within one week  2. Follow up with Pulm in 3 weeks with repeat CXR at that time    Day of Discharge     HPI:   Doing well today, says that she has been waiting  to be discharged. No issues overnight.     Review of Systems  Gen- No fevers, chills  CV- No chest pain, palpitations  Resp- No cough, dyspnea  GI- No N/V/D, abd pain      Vital Signs:   Temp:  [98.2 °F (36.8 °C)-98.6 °F (37 °C)] 98.6 °F (37 °C)  Heart Rate:  [57-65] 57  Resp:  [16-20] 18  BP: (145-167)/(50-67) 167/67     Physical Exam:  Gen-no acute distress, frail appearance  HENT-NCAT, mucous membranes moist  CV-RRR, S1 S2 normal, no m/r/g  Resp-slightly diminished at the bases, nonlabored on 2 liters  Abd-soft, NT, ND, +BS  Ext-no edema  Neuro-A&Ox3, no focal deficits  Skin-no rashes  Psych-appropriate mood    Pertinent  and/or Most Recent Results     LAB RESULTS:      Lab 08/11/21  0546 08/10/21  0646 08/09/21  0640 08/08/21  0614 08/07/21  0835 08/06/21  0612 08/06/21  0612 08/06/21  0000 08/05/21  2104 08/05/21  1820 08/05/21  1820   WBC 9.27 12.81* 11.76* 13.05* 13.56*   < > 11.49*  --   --    < > 12.79*   HEMOGLOBIN 8.1* 7.9* 7.8* 8.2* 9.1*   < > 8.6*  --   --    < > 8.5*   HEMATOCRIT 27.9* 26.6* 25.2* 26.5* 29.8*   < > 28.8*  --   --    < > 28.2*   PLATELETS 177 207 223 299 278   < > 266  --   --    < > 276   NEUTROS ABS  --   --   --   --   --   --  10.80*  --   --   --  9.99*   IMMATURE GRANS (ABS)  --   --   --   --   --   --  0.10*  --   --   --  0.07*   LYMPHS ABS  --   --   --   --   --   --  0.44*  --   --   --  1.62   MONOS ABS  --   --   --   --   --   --  0.14  --   --   --  1.02*   EOS ABS  --   --   --   --   --   --  0.00  --   --   --  0.04   MCV 82.1 79.9 78.5* 79.6 79.7   < > 81.8  --   --    < > 80.6   CRP  --   --   --   --   --   --  6.98*  --   --   --   --    PROCALCITONIN  --   --   --  0.11  --   --   --   --   --   --  0.17   LACTATE  --   --   --   --   --   --   --  1.5 2.3*  --   --    LDH  --   --   --   --   --   --  361*  --   --   --   --     < > = values in this interval not displayed.         Lab 08/11/21  0546 08/10/21  0647 08/09/21  0641 08/08/21  0614 08/07/21  3463  08/07/21  0835 08/07/21  0835 08/06/21  0612 08/06/21  0612   SODIUM 138 136 132* 135*  --   --  135*   < > 140   POTASSIUM 4.1 4.4 3.6 3.9 3.8   < > 3.0*   < > 4.3   CHLORIDE 105 103 98 97*  --   --  96*   < > 101   CO2 23.0 27.0 24.0 26.0  --   --  27.0   < > 27.0   ANION GAP 10.0 6.0 10.0 12.0  --   --  12.0   < > 12.0   BUN 31* 40* 39* 29*  --   --  18   < > 17   CREATININE 0.87 0.95 1.07* 0.94  --   --  0.77   < > 0.76   GLUCOSE 74 82 120* 133*  --   --  137*   < > 138*   CALCIUM 8.3* 8.4* 8.3* 8.5*  --   --  8.7   < > 8.4*   MAGNESIUM  --   --   --  2.7*  --   --  1.9  --  2.1   PHOSPHORUS  --   --   --   --   --   --  4.0  --   --     < > = values in this interval not displayed.         Lab 08/09/21  0641 08/05/21  1820   TOTAL PROTEIN 5.1* 6.1   ALBUMIN 2.70* 3.20*   GLOBULIN 2.4 2.9   ALT (SGPT) 23 21   AST (SGOT) 22 58*   BILIRUBIN 0.4 0.7   ALK PHOS 69 95         Lab 08/08/21  0614 08/06/21  0612 08/06/21  0000 08/05/21  1820   PROBNP 2,571.0* 11,947.0*  --  10,115.0*   TROPONIN T  --   --  0.359* 0.380*                 Lab 08/08/21  0707   PH, ARTERIAL 7.477*   PCO2, ARTERIAL 38.4   PO2 ART 57.6*   FIO2 40   HCO3 ART 28.4*   BASE EXCESS ART 4.5*   CARBOXYHEMOGLOBIN 1.9     Brief Urine Lab Results  (Last result in the past 365 days)      Color   Clarity   Blood   Leuk Est   Nitrite   Protein   CREAT   Urine HCG        08/05/21 2348 Yellow Clear Negative Negative Negative Negative             Microbiology Results (last 10 days)     Procedure Component Value - Date/Time    MRSA Screen, PCR (Inpatient) - Swab, Nares [[id number]]  (Normal) Collected: 08/06/21 1006    Lab Status: Final result Specimen: Swab from Nares Updated: 08/06/21 1132     MRSA PCR Negative    Narrative:      MRSA Negative    Legionella Antigen, Urine - Urine, Urine, Clean Catch [[id number]]  (Normal) Collected: 08/05/21 2348    Lab Status: Final result Specimen: Urine, Clean Catch Updated: 08/06/21 1057     LEGIONELLA ANTIGEN, URINE  Negative    S. Pneumo Ag Urine or CSF - Urine, Urine, Clean Catch [[id number]]  (Normal) Collected: 08/05/21 2348    Lab Status: Final result Specimen: Urine, Clean Catch Updated: 08/06/21 1058     Strep Pneumo Ag Negative    Blood Culture - Blood, Arm, Left [[id number]] Collected: 08/05/21 2102    Lab Status: Final result Specimen: Blood from Arm, Left Updated: 08/10/21 2116     Blood Culture No growth at 5 days    Respiratory Panel PCR w/COVID-19(SARS-CoV-2) CAS/SHALINI/KRISTY/PAD/COR/MAD/HAJA In-House, NP Swab in UTM/VTM, 3-4 HR TAT - Swab, Nasopharynx [[id number]]  (Normal) Collected: 08/05/21 2100    Lab Status: Final result Specimen: Swab from Nasopharynx Updated: 08/05/21 2315     ADENOVIRUS, PCR Not Detected     Coronavirus 229E Not Detected     Coronavirus HKU1 Not Detected     Coronavirus NL63 Not Detected     Coronavirus OC43 Not Detected     COVID19 Not Detected     Human Metapneumovirus Not Detected     Human Rhinovirus/Enterovirus Not Detected     Influenza A PCR Not Detected     Influenza B PCR Not Detected     Parainfluenza Virus 1 Not Detected     Parainfluenza Virus 2 Not Detected     Parainfluenza Virus 3 Not Detected     Parainfluenza Virus 4 Not Detected     RSV, PCR Not Detected     Bordetella pertussis pcr Not Detected     Bordetella parapertussis PCR Not Detected     Chlamydophila pneumoniae PCR Not Detected     Mycoplasma pneumo by PCR Not Detected    Narrative:      In the setting of a positive respiratory panel with a viral infection PLUS a negative procalcitonin without other underlying concern for bacterial infection, consider observing off antibiotics or discontinuation of antibiotics and continue supportive care. If the respiratory panel is positive for atypical bacterial infection (Bordetella pertussis, Chlamydophila pneumoniae, or Mycoplasma pneumoniae), consider antibiotic de-escalation to target atypical bacterial infection.    Blood Culture - Blood, Hand, Right [[id number]] Collected:  08/05/21 2045    Lab Status: Final result Specimen: Blood from Hand, Right Updated: 08/10/21 2116     Blood Culture No growth at 5 days          XR Chest 1 View    Result Date: 8/8/2021  EXAMINATION: XR CHEST 1 VW - 08/08/2021  INDICATION: J96.01-Acute respiratory failure with hypoxia; I50.9-Heart failure, unspecified; E48-Srrbflj effusion, not elsewhere classified; J18.9-Pneumonia, unspecified organism.  COMPARISON: 08/07/2021  FINDINGS: Coarse and extensive biapical pulmonary interstitial disease is stable. Included lower lungs appear clear except for trace bibasilar effusion. No pneumothorax is seen.      Stable chest exam including extensive but stable upper lung interstitial disease.   DICTATED:   08/08/2021 EDITED/ls :   08/08/2021  This report was finalized on 8/8/2021 10:00 PM by Dr. Augie Leiva MD.      XR Chest 1 View    Result Date: 8/7/2021  EXAMINATION: XR CHEST 1 VW - 08/07/2021  INDICATION:Possible amiodarone line; J96.01-Acute respiratory failure with hypoxia; I50.9-Heart failure, unspecified; D68-Cjpkyvl effusion, not elsewhere classified; J18.9-Pneumonia, unspecified organism. CHF.  COMPARISON: 08/06/2021  FINDINGS: Heart is enlarged. Vasculature is mostly obscured by the patient's dense pulmonary disease of the upper and mid lungs. Overall, interstitial changes may be mildly improved. Skin fold shadows are superimposed over the right lateral chest. Allowing for this, no pneumothorax is appreciated.      Extensive upper lung interstitial disease stable to slightly improved. No evidence of pneumothorax. Right-sided skin fold shadows as noted.  DICTATED:   08/07/2021 EDITED/ls :   08/07/2021    This report was finalized on 8/7/2021 9:53 PM by Dr. Augie Leiva MD.      XR Chest 1 View    Result Date: 8/6/2021  EXAMINATION: XR CHEST 1 VW- 08/06/2021  INDICATION: Please compare to 8/1/2021 film; J96.01-Acute respiratory failure with hypoxia; I50.9-Heart failure, unspecified; U51-Seoximy effusion, not  elsewhere classified; J18.9-Pneumonia, unspecified organism  COMPARISON: 08/01/2021  FINDINGS: Right upper extremity PICC line has been removed. There is extensive upper lung disease, similar to the prior study. Lung bases remain relatively clear. No pneumothorax or effusion is seen.      Extensive bilateral upper lung disease, similar to prior exam.  D:  08/06/2021 E:  08/06/2021    This report was finalized on 8/6/2021 10:39 PM by Dr. Augie Leiva MD.      CT Chest Pulmonary Embolism    Result Date: 8/5/2021  CT CHEST PULMONARY EMBOLISM W CONTRAST INDICATION: Shortness of air, history of pneumonia TECHNIQUE: CT angiogram of the chest with IV contrast. 3-D reconstructions were obtained and reviewed.   Radiation dose reduction techniques included automated exposure control or exposure modulation based on body size. Count of known CT and cardiac nuc med studies performed in previous 12 months: 0. COMPARISON: CTA of the chest from 7/19/2021 FINDINGS: There is no evidence of pulmonary embolism. The heart is again noted to be enlarged and there is coronary artery calcification. The patient's pleural effusions as well as bibasilar atelectasis has increased from prior. Again noted is patchy, predominantly perihilar and upper lobe airspace disease that has progressed from prior. There may be some pulmonary vascular congestion and edema. Upper abdominal structures appear unremarkable. There are mild compression deformities of several thoracolumbar vertebral bodies.     1. Negative for pulmonary embolus. 2. There has been interval increase in the patient's bibasilar pleural effusions from prior as well as patchy bilateral airspace disease that may represent progressive pneumonia/infection. There also may be some potential pulmonary edema. 3. There is cardiomegaly with coronary artery calcification. Signer Name: Jacqueline Ramírez MD  Signed: 8/5/2021 7:56 PM  Workstation Name: [id number]  [facility]  Chest PA & Lateral    Result Date: 8/10/2021  EXAMINATION: XR CHEST, PA AND LATERAL-08/10/2021:  INDICATION: F/U infiltrates; J96.01-Acute respiratory failure with hypoxia; I50.9-Heart failure, unspecified; K76-Zxjhnct effusion, not elsewhere classified; J18.9-Pneumonia, unspecified organism.  COMPARISON: Chest x-ray 08/08/2021.  FINDINGS: PA and lateral views of the chest reveal bilateral upper lung and suprahilar predominant pulmonary opacifications, right greater than left, with mild decrease in density of decreased airspace disease or improvement from prior comparison with small-to-moderate right and small left pleural effusions.      Mild decrease in density of decreased airspace disease or improvement from prior comparison with small-to-moderate right and small left pleural effusions.  D:  08/10/2021 E:  08/10/2021  This report was finalized on 8/10/2021 5:43 PM by Dr. Tony Eaton.                Results for orders placed during the hospital encounter of 07/19/21    Adult Transthoracic Echo Complete W/ Cont if Necessary Per Protocol    Interpretation Summary  · Estimated left ventricular EF = 65%  · Mild aortic valve stenosis is present.  · Aortic valve maximum pressure gradient is 32.9 mmHg. Aortic valve mean pressure gradient is 17.2 mmHg.  · Mild mitral valve regurgitation is present.  · Mild tricuspid valve regurgitation is present.  · Estimated right ventricular systolic pressure from tricuspid regurgitation is 37.0 mmHg.  · There is a large left pleural effusion. There is a moderate sized right pleural effusion.      Plan for Follow-up of Pending Labs/Results:   Pending Labs     Order Current Status    Basic Metabolic Panel Collected (08/12/21 3110)    CBC (No Diff) Collected (08/12/21 0758)    ANCA Panel In process    Glucose 6 Phosphate Dehydrogenase In process    QuantiFERON TB Plus Client Incubated In process        Discharge Details        Discharge Medications      ASK your doctor about these  medications      Instructions Start Date   ALPRAZolam 0.5 MG tablet  Commonly known as: XANAX   0.5 mg, Oral, Nightly PRN      apixaban 5 MG tablet tablet  Commonly known as: ELIQUIS   5 mg, Oral, 2 Times Daily      ascorbic acid 1000 MG tablet  Commonly known as: VITAMIN C   1,000 mg, Oral, Daily      bisoprolol 5 MG tablet  Commonly known as: ZEBeta   5 mg, Oral, Every 24 Hours Scheduled      fish oil 1000 MG capsule capsule   1,000 mg, Oral, Daily With Breakfast      HYDROcodone-acetaminophen 7.5-325 MG per tablet  Commonly known as: NORCO   1 tablet, Oral, Every 6 Hours PRN      multivitamin with minerals tablet tablet   1 tablet, Oral, Daily      predniSONE 20 MG tablet  Commonly known as: DELTASONE   20 mg, Oral, Daily With Breakfast      sertraline 100 MG tablet  Commonly known as: ZOLOFT   150 mg, Oral, Daily, 1.5 tab daily      Stiolto Respimat 2.5-2.5 MCG/ACT aerosol solution inhaler  Generic drug: tiotropium bromide-olodaterol   2 puffs, Inhalation, Daily PRN      Vitamin D (Ergocalciferol) 50 MCG (2000 UT) capsule   1 tablet, Oral, Daily      VITAMIN E PO   1 tablet, Oral, Daily             No Known Allergies      Discharge Disposition:      Diet:  Hospital:  Diet Order   Procedures   • Diet Regular; Cardiac       Activity:      Restrictions or Other Recommendations:         CODE STATUS:    Code Status and Medical Interventions:   Ordered at: 08/05/21 0184     Level Of Support Discussed With:    Patient     Code Status:    No CPR     Medical Interventions (Level of Support Prior to Arrest):    Full     Comments:    DNR/ DNI       Future Appointments   Date Time Provider Department Center   8/30/2021  2:30 PM Roslyn Melendez APRN [facility]                 Breann King MD  08/12/21      Time Spent on Discharge:  I spent 35  minutes on this discharge activity which included: face-to-face encounter with the patient, reviewing the data in the system, coordination of the care with the nursing  staff as well as consultants, documentation, and entering orders.          Electronically signed by Breann King MD at 08/12/21 4191

## 2021-08-13 ENCOUNTER — READMISSION MANAGEMENT (OUTPATIENT)
Dept: CALL CENTER | Facility: HOSPITAL | Age: 81
End: 2021-08-13

## 2021-08-13 NOTE — OUTREACH NOTE
Prep Survey      Responses   [facility] patient discharged from?  [place]   Is LACE score < 7 ?  No   Emergency Room discharge w/ pulse ox?  No   Eligibility  Readm Mgmt   Discharge diagnosis  Bilateral upper lobe pulmonary infiltrates.  Components of CHF and possible inflammation/infection,  Acute CHF   Does the patient have one of the following disease processes/diagnoses(primary or secondary)?  CHF   Does the patient have Home health ordered?  Yes   What is the Home health agency?   [facility]   Is there a DME ordered?  No   Prep survey completed?  Yes          Lucy Deras RN

## 2021-08-17 ENCOUNTER — READMISSION MANAGEMENT (OUTPATIENT)
Dept: CALL CENTER | Facility: HOSPITAL | Age: 81
End: 2021-08-17

## 2021-08-17 ENCOUNTER — OFFICE VISIT (OUTPATIENT)
Dept: CARDIOLOGY | Facility: HOSPITAL | Age: 81
End: 2021-08-17

## 2021-08-17 VITALS
HEART RATE: 57 BPM | OXYGEN SATURATION: 92 % | SYSTOLIC BLOOD PRESSURE: 166 MMHG | RESPIRATION RATE: 22 BRPM | TEMPERATURE: 97.6 F | BODY MASS INDEX: 22.21 KG/M2 | WEIGHT: 138.19 LBS | DIASTOLIC BLOOD PRESSURE: 65 MMHG | HEIGHT: 66 IN

## 2021-08-17 DIAGNOSIS — I50.31 DIASTOLIC CHF, ACUTE (HCC): Primary | ICD-10-CM

## 2021-08-17 DIAGNOSIS — I48.0 PAROXYSMAL ATRIAL FIBRILLATION (HCC): ICD-10-CM

## 2021-08-17 DIAGNOSIS — I10 ESSENTIAL HYPERTENSION: ICD-10-CM

## 2021-08-17 PROCEDURE — 99214 OFFICE O/P EST MOD 30 MIN: CPT | Performed by: NURSE PRACTITIONER

## 2021-08-17 NOTE — PROGRESS NOTES
"Chief Complaint  Congestive Heart Failure and Establish Care    Subjective    History of Present Illness {CC  Problem List  Visit  Diagnosis   Encounters  Notes  Medications  Labs  Result Review Imaging  Media :23}     Yin Law, 80 y.o. female with AF, HTN, acute respiratory failure, COPD presents to [facility] for Congestive Heart Failure and Establish Care    Patient recently hospitalized for dyspnea.  Hospitalization complicated by multifactorial A/C hypoxic respiratory failure, sepsis, pneumonia, bilateral pleural effusions, A/C diastolic HF, paroxysmal AF with RVR.  Patient was treated with diltiazem drip for AF with RVR; she converted to NSR prior to discharge and instructed at discharge to continue home Eliquis, bisoprolol.  She was instructed to continue home furosemide regimen 20 mg every other day.    She presents today feeling improved since hospital discharge.  Weight is down approximately 6LB since discharge, and dyspnea has improved.  Home blood pressures range 120/ systolic.  She denies chest pain/pressure, stable dyspnea, palpitation, racing heart, lightheadedness/dizziness, and syncope.  She has scheduled follow-up with PCP next week, and follow-up with primary cardiology team in approximately 2 weeks.  Instructed to maintain home BP log prior to upcoming appointments.    Objective     Vital Signs:   Vitals:    08/17/21 1229 08/17/21 1232 08/17/21 1233   BP: 159/68 120/57 166/65   BP Location: Right arm Left arm Left arm   Patient Position: Sitting Standing Sitting   Cuff Size: Adult Adult Adult   Pulse: 59 58 57   Resp:   22   Temp: 97.6 °F (36.4 °C) 97.6 °F (36.4 °C) 97.6 °F (36.4 °C)   TempSrc: Temporal Temporal Temporal   SpO2: 92% 93% 92%   Weight:   62.7 kg (138 lb 3 oz)   Height:   167.6 cm (66\")     Body mass index is 22.3 kg/m².  Physical Exam  Vitals and nursing note reviewed.   Constitutional:       Appearance: Normal appearance.      " Comments: Home O2 in place   HENT:      Head: Normocephalic.   Eyes:      Extraocular Movements: Extraocular movements intact.   Neck:      Vascular: No carotid bruit.   Cardiovascular:      Rate and Rhythm: Normal rate and regular rhythm.      Pulses: Normal pulses.      Heart sounds: Normal heart sounds, S1 normal and S2 normal. No murmur heard.     Pulmonary:      Effort: Pulmonary effort is normal. No respiratory distress.      Breath sounds: Normal breath sounds.   Musculoskeletal:      Cervical back: Neck supple.      Right lower leg: Edema present.      Left lower leg: Edema present.      Comments: Trace LE edema   Skin:     General: Skin is warm and dry.   Neurological:      General: No focal deficit present.      Mental Status: She is alert.   Psychiatric:         Mood and Affect: Mood normal.         Behavior: Behavior normal.         Thought Content: Thought content normal.        Data Reviewed:{ Labs  Result Review  Imaging  Med Tab  Media :23}     CBC (No Diff) (08/12/2021 07:58)  Basic Metabolic Panel (08/12/2021 07:58)  proBNP (08/08/2021 06:14)  proBNP (08/06/2021 06:12)  XR Chest PA & Lateral (08/10/2021 15:22)  Adult Transthoracic Echo Complete W/ Cont if Necessary Per Protocol (07/20/2021 11:17)  Stress test with myocardial perfusion one day (01/08/2020 10:39)      Assessment and Plan {CC Problem List  Visit Diagnosis  ROS  Review (Popup)  Health Maintenance  Quality  BestPractice  Medications  SmartSets  SnapShot Encounters  Media :23}     1. Acute diastolic CHF (CMS/HCC)  -Weight down approximately 6 LB since discharge, dyspnea stable  -Mildly hypervolemic on exam with bilateral dependent LE edema. States improvement since recent hospitalization.   -Recent TTE 7/20/2021 with preserved LVEF  -Continue current diuretic regimen  -Continue follow-up with primary cardiology team as scheduled    2. Paroxysmal atrial fibrillation (CMS/HCC)  -AF with RVR during recent hospitalization.   Regular rate/rhythm at time of exam.  Patient states she is asymptomatic with her AF  -Continue apixaban, bisoprolol as ordered  -Amiodarone previously discontinued due to concerns for lung toxicity  -[id number]: 4    3. Essential hypertension  -Slightly elevated at time of exam  -Home BP with SBP generally greater than 140  -Discussed addition of antihypertensive with patient/daughter.  Patient refused at this time, stated she would discuss with her PCP at visit next week.  -Instructed to maintain home BP log with data available at upcoming PCP/primary cardiology appointment      Follow Up {Instructions Charge Capture  Follow-up Communications :23}     Return if symptoms worsen or fail to improve.    Patient was given instructions and counseling regarding her condition or for health maintenance advice. Please see specific information pulled into the AVS if appropriate.  Patient was instructed to call the [facility] with any questions, concerns, or worsening symptoms.    *Please note that portions of this note were completed with a voice recognition program. Efforts were made to edit the dictations, but occasionally words are mistranscribed.

## 2021-08-17 NOTE — OUTREACH NOTE
CHF Week 1 Survey      Responses   [facility] patient discharged from?  [place]   Does the patient have one of the following disease processes/diagnoses(primary or secondary)?  CHF   CHF Week 1 attempt successful?  No   Unsuccessful attempts  Attempt 1          Jordyn Power LPN

## 2021-08-20 ENCOUNTER — READMISSION MANAGEMENT (OUTPATIENT)
Dept: CALL CENTER | Facility: HOSPITAL | Age: 81
End: 2021-08-20

## 2021-08-20 NOTE — OUTREACH NOTE
CHF Week 2 Survey      Responses   [facility] patient discharged from?  [place]   Does the patient have one of the following disease processes/diagnoses(primary or secondary)?  CHF   Week 2 attempt successful?  Yes   Call start time  1540   Call end time  1545   Discharge diagnosis  Bilateral upper lobe pulmonary infiltrates.  Components of CHF and possible inflammation/infection,  Acute CHF   Is patient permission given to speak with other caregiver?  Yes   List who call center can speak with  Yin   Person spoke with today (if not patient) and relationship  Yin   Meds reviewed with patient/caregiver?  Yes   Is the patient having any side effects they believe may be caused by any medication additions or changes?  No   Does the patient have all medications ordered at discharge?  Yes   Is the patient taking all medications as directed (includes completed medication regime)?  Yes   Does the patient have a primary care provider?   Yes   Does the patient have an appointment with their PCP within 7 days of discharge?  Yes   Comments regarding PCP  spoken via phone   Has the patient kept scheduled appointments due by today?  Yes   What is the Home health agency?   [facility]   Has home health visited the patient within 72 hours of discharge?  Yes   DME comments  Home O2 @ 2L   Pulse Ox monitoring  Intermittent   Pulse Ox device source  Patient   O2 Sat comments  83-91% with 2L O2.    O2 Sat: education provided  Sat levels   Psychosocial issues?  No   Comments  Today pt is more SOA today per caregiver. MD has adjusted diuretic for few doses per family.    Did the patient receive a copy of their discharge instructions?  Yes   Nursing interventions  Reviewed instructions with patient   What is the patient's perception of their health status since discharge?  Same   Nursing interventions  Nurse provided patient education   Is the patient weighing daily?  Yes   Does the patient have scales?  Yes    Daily weight interventions  Education provided on importance of daily weight   Is the patient able to teach back Heart Failure diet management?  Yes   Is the patient able to teach back Heart Failure Zones?  Yes   Is the patient able to teach back signs and symptoms of worsening condition? (i.e. weight gain, shortness of air, etc.)  Yes   If the patient is a current smoker, are they able to teach back resources for cessation?  Not a smoker   Is the patient/caregiver able to teach back the hierarchy of who to call/visit for symptoms/problems? PCP, Specialist, Home health nurse, Urgent Care, ED, 911  Yes   CHF Week 2 call completed?  Yes          Hyun Pascual RN

## 2021-08-21 ENCOUNTER — APPOINTMENT (OUTPATIENT)
Dept: CT IMAGING | Facility: HOSPITAL | Age: 81
End: 2021-08-21

## 2021-08-21 ENCOUNTER — HOSPITAL ENCOUNTER (INPATIENT)
Facility: HOSPITAL | Age: 81
LOS: 5 days | Discharge: NURSING, REHAB OR LONG-TERM CARE | End: 2021-08-26
Attending: EMERGENCY MEDICINE | Admitting: FAMILY MEDICINE

## 2021-08-21 ENCOUNTER — APPOINTMENT (OUTPATIENT)
Dept: GENERAL RADIOLOGY | Facility: HOSPITAL | Age: 81
End: 2021-08-21

## 2021-08-21 DIAGNOSIS — I50.9 ACUTE ON CHRONIC CONGESTIVE HEART FAILURE, UNSPECIFIED HEART FAILURE TYPE (HCC): Primary | ICD-10-CM

## 2021-08-21 DIAGNOSIS — D64.9 ANEMIA, UNSPECIFIED TYPE: ICD-10-CM

## 2021-08-21 DIAGNOSIS — F41.9 ANXIETY AND DEPRESSION: ICD-10-CM

## 2021-08-21 DIAGNOSIS — J96.21 ACUTE AND CHRONIC RESPIRATORY FAILURE WITH HYPOXIA (HCC): ICD-10-CM

## 2021-08-21 DIAGNOSIS — F32.A ANXIETY AND DEPRESSION: ICD-10-CM

## 2021-08-21 LAB
ALBUMIN SERPL-MCNC: 3.6 G/DL (ref 3.5–5.2)
ALBUMIN/GLOB SERPL: 1.4 G/DL
ALP SERPL-CCNC: 87 U/L (ref 39–117)
ALT SERPL W P-5'-P-CCNC: 37 U/L (ref 1–33)
ANION GAP SERPL CALCULATED.3IONS-SCNC: 15 MMOL/L (ref 5–15)
AST SERPL-CCNC: 34 U/L (ref 1–32)
B PARAPERT DNA SPEC QL NAA+PROBE: NOT DETECTED
B PERT DNA SPEC QL NAA+PROBE: NOT DETECTED
BACTERIA UR QL AUTO: ABNORMAL /HPF
BACTERIA UR QL AUTO: ABNORMAL /HPF
BASOPHILS # BLD AUTO: 0.01 10*3/MM3 (ref 0–0.2)
BASOPHILS NFR BLD AUTO: 0.1 % (ref 0–1.5)
BILIRUB SERPL-MCNC: 0.6 MG/DL (ref 0–1.2)
BILIRUB UR QL STRIP: NEGATIVE
BILIRUB UR QL STRIP: NEGATIVE
BUN SERPL-MCNC: 26 MG/DL (ref 8–23)
BUN/CREAT SERPL: 26.3 (ref 7–25)
C PNEUM DNA NPH QL NAA+NON-PROBE: NOT DETECTED
CALCIUM SPEC-SCNC: 8.6 MG/DL (ref 8.6–10.5)
CHLORIDE SERPL-SCNC: 102 MMOL/L (ref 98–107)
CLARITY UR: CLEAR
CLARITY UR: CLEAR
CO2 SERPL-SCNC: 25 MMOL/L (ref 22–29)
COLOR UR: YELLOW
COLOR UR: YELLOW
CREAT SERPL-MCNC: 0.99 MG/DL (ref 0.57–1)
DEPRECATED RDW RBC AUTO: 60.4 FL (ref 37–54)
EOSINOPHIL # BLD AUTO: 0 10*3/MM3 (ref 0–0.4)
EOSINOPHIL NFR BLD AUTO: 0 % (ref 0.3–6.2)
ERYTHROCYTE [DISTWIDTH] IN BLOOD BY AUTOMATED COUNT: 21.2 % (ref 12.3–15.4)
FLUAV SUBTYP SPEC NAA+PROBE: NOT DETECTED
FLUBV RNA ISLT QL NAA+PROBE: NOT DETECTED
GFR SERPL CREATININE-BSD FRML MDRD: 54 ML/MIN/1.73
GLOBULIN UR ELPH-MCNC: 2.6 GM/DL
GLUCOSE SERPL-MCNC: 128 MG/DL (ref 65–99)
GLUCOSE UR STRIP-MCNC: NEGATIVE MG/DL
GLUCOSE UR STRIP-MCNC: NEGATIVE MG/DL
HADV DNA SPEC NAA+PROBE: NOT DETECTED
HCOV 229E RNA SPEC QL NAA+PROBE: NOT DETECTED
HCOV HKU1 RNA SPEC QL NAA+PROBE: NOT DETECTED
HCOV NL63 RNA SPEC QL NAA+PROBE: NOT DETECTED
HCOV OC43 RNA SPEC QL NAA+PROBE: NOT DETECTED
HCT VFR BLD AUTO: 25.8 % (ref 34–46.6)
HGB BLD-MCNC: 7.6 G/DL (ref 12–15.9)
HGB UR QL STRIP.AUTO: ABNORMAL
HGB UR QL STRIP.AUTO: NEGATIVE
HMPV RNA NPH QL NAA+NON-PROBE: NOT DETECTED
HOLD SPECIMEN: NORMAL
HPIV1 RNA SPEC QL NAA+PROBE: NOT DETECTED
HPIV2 RNA SPEC QL NAA+PROBE: NOT DETECTED
HPIV3 RNA NPH QL NAA+PROBE: NOT DETECTED
HPIV4 P GENE NPH QL NAA+PROBE: NOT DETECTED
HYALINE CASTS UR QL AUTO: ABNORMAL /LPF
HYALINE CASTS UR QL AUTO: ABNORMAL /LPF
IMM GRANULOCYTES # BLD AUTO: 0.07 10*3/MM3 (ref 0–0.05)
IMM GRANULOCYTES NFR BLD AUTO: 0.6 % (ref 0–0.5)
KETONES UR QL STRIP: NEGATIVE
KETONES UR QL STRIP: NEGATIVE
LEUKOCYTE ESTERASE UR QL STRIP.AUTO: NEGATIVE
LEUKOCYTE ESTERASE UR QL STRIP.AUTO: NEGATIVE
LYMPHOCYTES # BLD AUTO: 0.65 10*3/MM3 (ref 0.7–3.1)
LYMPHOCYTES NFR BLD AUTO: 5.3 % (ref 19.6–45.3)
M PNEUMO IGG SER IA-ACNC: NOT DETECTED
MCH RBC QN AUTO: 24 PG (ref 26.6–33)
MCHC RBC AUTO-ENTMCNC: 29.5 G/DL (ref 31.5–35.7)
MCV RBC AUTO: 81.4 FL (ref 79–97)
MONOCYTES # BLD AUTO: 0.22 10*3/MM3 (ref 0.1–0.9)
MONOCYTES NFR BLD AUTO: 1.8 % (ref 5–12)
NEUTROPHILS NFR BLD AUTO: 11.26 10*3/MM3 (ref 1.7–7)
NEUTROPHILS NFR BLD AUTO: 92.2 % (ref 42.7–76)
NITRITE UR QL STRIP: POSITIVE
NITRITE UR QL STRIP: POSITIVE
NRBC BLD AUTO-RTO: 0 /100 WBC (ref 0–0.2)
NT-PROBNP SERPL-MCNC: 7704 PG/ML (ref 0–1800)
PH UR STRIP.AUTO: 6.5 [PH] (ref 5–8)
PH UR STRIP.AUTO: 7 [PH] (ref 5–8)
PLATELET # BLD AUTO: 256 10*3/MM3 (ref 140–450)
PMV BLD AUTO: 10.7 FL (ref 6–12)
POTASSIUM SERPL-SCNC: 3.5 MMOL/L (ref 3.5–5.2)
PROCALCITONIN SERPL-MCNC: 0.12 NG/ML (ref 0–0.25)
PROT SERPL-MCNC: 6.2 G/DL (ref 6–8.5)
PROT UR QL STRIP: NEGATIVE
PROT UR QL STRIP: NEGATIVE
QT INTERVAL: 428 MS
QTC INTERVAL: 455 MS
RBC # BLD AUTO: 3.17 10*6/MM3 (ref 3.77–5.28)
RBC # UR: ABNORMAL /HPF
RBC # UR: ABNORMAL /HPF
REF LAB TEST METHOD: ABNORMAL
REF LAB TEST METHOD: ABNORMAL
RHINOVIRUS RNA SPEC NAA+PROBE: NOT DETECTED
RSV RNA NPH QL NAA+NON-PROBE: NOT DETECTED
SARS-COV-2 RNA NPH QL NAA+NON-PROBE: NOT DETECTED
SODIUM SERPL-SCNC: 142 MMOL/L (ref 136–145)
SP GR UR STRIP: 1.01 (ref 1–1.03)
SP GR UR STRIP: 1.01 (ref 1–1.03)
SQUAMOUS #/AREA URNS HPF: ABNORMAL /HPF
SQUAMOUS #/AREA URNS HPF: ABNORMAL /HPF
TROPONIN T SERPL-MCNC: 0.04 NG/ML (ref 0–0.03)
TROPONIN T SERPL-MCNC: 0.04 NG/ML (ref 0–0.03)
UROBILINOGEN UR QL STRIP: ABNORMAL
UROBILINOGEN UR QL STRIP: ABNORMAL
WBC # BLD AUTO: 12.21 10*3/MM3 (ref 3.4–10.8)
WBC UR QL AUTO: ABNORMAL /HPF
WBC UR QL AUTO: ABNORMAL /HPF
WHOLE BLOOD HOLD SPECIMEN: NORMAL

## 2021-08-21 PROCEDURE — 93005 ELECTROCARDIOGRAM TRACING: CPT

## 2021-08-21 PROCEDURE — 94640 AIRWAY INHALATION TREATMENT: CPT

## 2021-08-21 PROCEDURE — 0202U NFCT DS 22 TRGT SARS-COV-2: CPT | Performed by: EMERGENCY MEDICINE

## 2021-08-21 PROCEDURE — 84484 ASSAY OF TROPONIN QUANT: CPT | Performed by: INTERNAL MEDICINE

## 2021-08-21 PROCEDURE — 25010000002 CEFTRIAXONE PER 250 MG: Performed by: NURSE PRACTITIONER

## 2021-08-21 PROCEDURE — 80053 COMPREHEN METABOLIC PANEL: CPT

## 2021-08-21 PROCEDURE — 81001 URINALYSIS AUTO W/SCOPE: CPT | Performed by: NURSE PRACTITIONER

## 2021-08-21 PROCEDURE — 93005 ELECTROCARDIOGRAM TRACING: CPT | Performed by: INTERNAL MEDICINE

## 2021-08-21 PROCEDURE — 83880 ASSAY OF NATRIURETIC PEPTIDE: CPT

## 2021-08-21 PROCEDURE — 87088 URINE BACTERIA CULTURE: CPT | Performed by: NURSE PRACTITIONER

## 2021-08-21 PROCEDURE — 84484 ASSAY OF TROPONIN QUANT: CPT

## 2021-08-21 PROCEDURE — 83735 ASSAY OF MAGNESIUM: CPT | Performed by: NURSE PRACTITIONER

## 2021-08-21 PROCEDURE — 71250 CT THORAX DX C-: CPT

## 2021-08-21 PROCEDURE — 81001 URINALYSIS AUTO W/SCOPE: CPT | Performed by: EMERGENCY MEDICINE

## 2021-08-21 PROCEDURE — 87186 SC STD MICRODIL/AGAR DIL: CPT | Performed by: NURSE PRACTITIONER

## 2021-08-21 PROCEDURE — 85025 COMPLETE CBC W/AUTO DIFF WBC: CPT

## 2021-08-21 PROCEDURE — 99285 EMERGENCY DEPT VISIT HI MDM: CPT

## 2021-08-21 PROCEDURE — 71045 X-RAY EXAM CHEST 1 VIEW: CPT

## 2021-08-21 PROCEDURE — 99223 1ST HOSP IP/OBS HIGH 75: CPT | Performed by: INTERNAL MEDICINE

## 2021-08-21 PROCEDURE — 93010 ELECTROCARDIOGRAM REPORT: CPT | Performed by: INTERNAL MEDICINE

## 2021-08-21 PROCEDURE — 87086 URINE CULTURE/COLONY COUNT: CPT | Performed by: NURSE PRACTITIONER

## 2021-08-21 PROCEDURE — 84145 PROCALCITONIN (PCT): CPT | Performed by: NURSE PRACTITIONER

## 2021-08-21 PROCEDURE — 25010000002 FUROSEMIDE PER 20 MG: Performed by: EMERGENCY MEDICINE

## 2021-08-21 PROCEDURE — 3E0333Z INTRODUCTION OF ANTI-INFLAMMATORY INTO PERIPHERAL VEIN, PERCUTANEOUS APPROACH: ICD-10-PCS | Performed by: INTERNAL MEDICINE

## 2021-08-21 PROCEDURE — 94799 UNLISTED PULMONARY SVC/PX: CPT

## 2021-08-21 PROCEDURE — P9612 CATHETERIZE FOR URINE SPEC: HCPCS

## 2021-08-21 RX ORDER — ACETAMINOPHEN 650 MG/1
650 SUPPOSITORY RECTAL EVERY 4 HOURS PRN
Status: DISCONTINUED | OUTPATIENT
Start: 2021-08-21 | End: 2021-08-26 | Stop reason: HOSPADM

## 2021-08-21 RX ORDER — L.ACID,PARA/B.BIFIDUM/S.THERM 8B CELL
1 CAPSULE ORAL DAILY
Status: DISCONTINUED | OUTPATIENT
Start: 2021-08-22 | End: 2021-08-26 | Stop reason: HOSPADM

## 2021-08-21 RX ORDER — ONDANSETRON 4 MG/1
4 TABLET, FILM COATED ORAL EVERY 6 HOURS PRN
Status: DISCONTINUED | OUTPATIENT
Start: 2021-08-21 | End: 2021-08-26 | Stop reason: HOSPADM

## 2021-08-21 RX ORDER — ARFORMOTEROL TARTRATE 15 UG/2ML
15 SOLUTION RESPIRATORY (INHALATION)
Status: DISCONTINUED | OUTPATIENT
Start: 2021-08-21 | End: 2021-08-26 | Stop reason: HOSPADM

## 2021-08-21 RX ORDER — BISOPROLOL FUMARATE 5 MG/1
5 TABLET, FILM COATED ORAL
Status: DISCONTINUED | OUTPATIENT
Start: 2021-08-22 | End: 2021-08-26 | Stop reason: HOSPADM

## 2021-08-21 RX ORDER — METHYLPREDNISOLONE SODIUM SUCCINATE 40 MG/ML
30 INJECTION, POWDER, LYOPHILIZED, FOR SOLUTION INTRAMUSCULAR; INTRAVENOUS DAILY
Status: DISCONTINUED | OUTPATIENT
Start: 2021-08-22 | End: 2021-08-23

## 2021-08-21 RX ORDER — SODIUM CHLORIDE 0.9 % (FLUSH) 0.9 %
10 SYRINGE (ML) INJECTION AS NEEDED
Status: DISCONTINUED | OUTPATIENT
Start: 2021-08-21 | End: 2021-08-26 | Stop reason: HOSPADM

## 2021-08-21 RX ORDER — BISACODYL 5 MG/1
5 TABLET, DELAYED RELEASE ORAL DAILY PRN
Status: DISCONTINUED | OUTPATIENT
Start: 2021-08-21 | End: 2021-08-26 | Stop reason: HOSPADM

## 2021-08-21 RX ORDER — AMOXICILLIN 250 MG
2 CAPSULE ORAL 2 TIMES DAILY
Status: DISCONTINUED | OUTPATIENT
Start: 2021-08-21 | End: 2021-08-26 | Stop reason: HOSPADM

## 2021-08-21 RX ORDER — ALPRAZOLAM 0.25 MG/1
0.25 TABLET ORAL NIGHTLY PRN
Status: DISCONTINUED | OUTPATIENT
Start: 2021-08-21 | End: 2021-08-23 | Stop reason: SDUPTHER

## 2021-08-21 RX ORDER — ASCORBIC ACID 500 MG
1000 TABLET ORAL DAILY
Status: DISCONTINUED | OUTPATIENT
Start: 2021-08-22 | End: 2021-08-26 | Stop reason: HOSPADM

## 2021-08-21 RX ORDER — MAGNESIUM SULFATE HEPTAHYDRATE 40 MG/ML
2 INJECTION, SOLUTION INTRAVENOUS AS NEEDED
Status: DISCONTINUED | OUTPATIENT
Start: 2021-08-21 | End: 2021-08-26 | Stop reason: HOSPADM

## 2021-08-21 RX ORDER — ACETAMINOPHEN 160 MG/5ML
650 SOLUTION ORAL EVERY 4 HOURS PRN
Status: DISCONTINUED | OUTPATIENT
Start: 2021-08-21 | End: 2021-08-26 | Stop reason: HOSPADM

## 2021-08-21 RX ORDER — POLYETHYLENE GLYCOL 3350 17 G/17G
17 POWDER, FOR SOLUTION ORAL DAILY PRN
Status: DISCONTINUED | OUTPATIENT
Start: 2021-08-21 | End: 2021-08-26 | Stop reason: HOSPADM

## 2021-08-21 RX ORDER — POTASSIUM CHLORIDE 750 MG/1
40 CAPSULE, EXTENDED RELEASE ORAL AS NEEDED
Status: DISCONTINUED | OUTPATIENT
Start: 2021-08-21 | End: 2021-08-26 | Stop reason: HOSPADM

## 2021-08-21 RX ORDER — SODIUM CHLORIDE 0.9 % (FLUSH) 0.9 %
10 SYRINGE (ML) INJECTION EVERY 12 HOURS SCHEDULED
Status: DISCONTINUED | OUTPATIENT
Start: 2021-08-21 | End: 2021-08-26 | Stop reason: HOSPADM

## 2021-08-21 RX ORDER — MULTIPLE VITAMINS W/ MINERALS TAB 9MG-400MCG
1 TAB ORAL DAILY
Status: DISCONTINUED | OUTPATIENT
Start: 2021-08-22 | End: 2021-08-26 | Stop reason: HOSPADM

## 2021-08-21 RX ORDER — ACETAMINOPHEN 325 MG/1
650 TABLET ORAL EVERY 4 HOURS PRN
Status: DISCONTINUED | OUTPATIENT
Start: 2021-08-21 | End: 2021-08-26 | Stop reason: HOSPADM

## 2021-08-21 RX ORDER — POTASSIUM CHLORIDE 1.5 G/1.77G
40 POWDER, FOR SOLUTION ORAL AS NEEDED
Status: DISCONTINUED | OUTPATIENT
Start: 2021-08-21 | End: 2021-08-26 | Stop reason: HOSPADM

## 2021-08-21 RX ORDER — NITROGLYCERIN 20 MG/100ML
10-50 INJECTION INTRAVENOUS
Status: DISCONTINUED | OUTPATIENT
Start: 2021-08-21 | End: 2021-08-25

## 2021-08-21 RX ORDER — ONDANSETRON 2 MG/ML
4 INJECTION INTRAMUSCULAR; INTRAVENOUS EVERY 6 HOURS PRN
Status: DISCONTINUED | OUTPATIENT
Start: 2021-08-21 | End: 2021-08-26 | Stop reason: HOSPADM

## 2021-08-21 RX ORDER — IPRATROPIUM BROMIDE AND ALBUTEROL SULFATE 2.5; .5 MG/3ML; MG/3ML
3 SOLUTION RESPIRATORY (INHALATION)
Status: DISCONTINUED | OUTPATIENT
Start: 2021-08-21 | End: 2021-08-23

## 2021-08-21 RX ORDER — BISACODYL 10 MG
10 SUPPOSITORY, RECTAL RECTAL DAILY PRN
Status: DISCONTINUED | OUTPATIENT
Start: 2021-08-21 | End: 2021-08-26 | Stop reason: HOSPADM

## 2021-08-21 RX ORDER — FUROSEMIDE 10 MG/ML
40 INJECTION INTRAMUSCULAR; INTRAVENOUS ONCE
Status: COMPLETED | OUTPATIENT
Start: 2021-08-21 | End: 2021-08-21

## 2021-08-21 RX ORDER — FUROSEMIDE 10 MG/ML
40 INJECTION INTRAMUSCULAR; INTRAVENOUS DAILY
Status: DISCONTINUED | OUTPATIENT
Start: 2021-08-22 | End: 2021-08-22 | Stop reason: SDUPTHER

## 2021-08-21 RX ORDER — POTASSIUM CHLORIDE 7.45 MG/ML
10 INJECTION INTRAVENOUS
Status: DISCONTINUED | OUTPATIENT
Start: 2021-08-21 | End: 2021-08-26 | Stop reason: HOSPADM

## 2021-08-21 RX ORDER — IPRATROPIUM BROMIDE AND ALBUTEROL SULFATE 2.5; .5 MG/3ML; MG/3ML
3 SOLUTION RESPIRATORY (INHALATION) ONCE
Status: COMPLETED | OUTPATIENT
Start: 2021-08-21 | End: 2021-08-21

## 2021-08-21 RX ORDER — MAGNESIUM SULFATE HEPTAHYDRATE 40 MG/ML
4 INJECTION, SOLUTION INTRAVENOUS AS NEEDED
Status: DISCONTINUED | OUTPATIENT
Start: 2021-08-21 | End: 2021-08-26 | Stop reason: HOSPADM

## 2021-08-21 RX ORDER — HYDROCODONE BITARTRATE AND ACETAMINOPHEN 5; 325 MG/1; MG/1
1 TABLET ORAL ONCE
Status: COMPLETED | OUTPATIENT
Start: 2021-08-21 | End: 2021-08-21

## 2021-08-21 RX ADMIN — DOCUSATE SODIUM 50 MG AND SENNOSIDES 8.6 MG 2 TABLET: 8.6; 5 TABLET, FILM COATED ORAL at 20:57

## 2021-08-21 RX ADMIN — SODIUM CHLORIDE, PRESERVATIVE FREE 10 ML: 5 INJECTION INTRAVENOUS at 20:58

## 2021-08-21 RX ADMIN — HYDROCODONE BITARTRATE AND ACETAMINOPHEN 1 TABLET: 5; 325 TABLET ORAL at 20:57

## 2021-08-21 RX ADMIN — IPRATROPIUM BROMIDE 0.5 MG: 0.5 SOLUTION RESPIRATORY (INHALATION) at 21:38

## 2021-08-21 RX ADMIN — IPRATROPIUM BROMIDE AND ALBUTEROL SULFATE 3 ML: 2.5; .5 SOLUTION RESPIRATORY (INHALATION) at 12:32

## 2021-08-21 RX ADMIN — FUROSEMIDE 40 MG: 10 INJECTION, SOLUTION INTRAMUSCULAR; INTRAVENOUS at 16:47

## 2021-08-21 RX ADMIN — SODIUM CHLORIDE 1 G: 900 INJECTION INTRAVENOUS at 23:08

## 2021-08-21 RX ADMIN — IPRATROPIUM BROMIDE AND ALBUTEROL SULFATE 3 ML: 2.5; .5 SOLUTION RESPIRATORY (INHALATION) at 21:37

## 2021-08-22 ENCOUNTER — READMISSION MANAGEMENT (OUTPATIENT)
Dept: CALL CENTER | Facility: HOSPITAL | Age: 81
End: 2021-08-22

## 2021-08-22 LAB
ALBUMIN SERPL-MCNC: 3.1 G/DL (ref 3.5–5.2)
ALBUMIN/GLOB SERPL: 1.3 G/DL
ALP SERPL-CCNC: 77 U/L (ref 39–117)
ALT SERPL W P-5'-P-CCNC: 26 U/L (ref 1–33)
ANION GAP SERPL CALCULATED.3IONS-SCNC: 10 MMOL/L (ref 5–15)
AST SERPL-CCNC: 21 U/L (ref 1–32)
BASOPHILS # BLD AUTO: 0.04 10*3/MM3 (ref 0–0.2)
BASOPHILS NFR BLD AUTO: 0.3 % (ref 0–1.5)
BILIRUB SERPL-MCNC: 0.5 MG/DL (ref 0–1.2)
BUN SERPL-MCNC: 24 MG/DL (ref 8–23)
BUN/CREAT SERPL: 24.7 (ref 7–25)
CALCIUM SPEC-SCNC: 8.1 MG/DL (ref 8.6–10.5)
CHLORIDE SERPL-SCNC: 100 MMOL/L (ref 98–107)
CO2 SERPL-SCNC: 28 MMOL/L (ref 22–29)
CREAT SERPL-MCNC: 0.97 MG/DL (ref 0.57–1)
DEPRECATED RDW RBC AUTO: 60.3 FL (ref 37–54)
EOSINOPHIL # BLD AUTO: 0 10*3/MM3 (ref 0–0.4)
EOSINOPHIL NFR BLD AUTO: 0 % (ref 0.3–6.2)
ERYTHROCYTE [DISTWIDTH] IN BLOOD BY AUTOMATED COUNT: 21.2 % (ref 12.3–15.4)
GFR SERPL CREATININE-BSD FRML MDRD: 55 ML/MIN/1.73
GLOBULIN UR ELPH-MCNC: 2.3 GM/DL
GLUCOSE SERPL-MCNC: 96 MG/DL (ref 65–99)
HCT VFR BLD AUTO: 24.6 % (ref 34–46.6)
HGB BLD-MCNC: 7.2 G/DL (ref 12–15.9)
IMM GRANULOCYTES # BLD AUTO: 0.08 10*3/MM3 (ref 0–0.05)
IMM GRANULOCYTES NFR BLD AUTO: 0.6 % (ref 0–0.5)
LYMPHOCYTES # BLD AUTO: 1.4 10*3/MM3 (ref 0.7–3.1)
LYMPHOCYTES NFR BLD AUTO: 10.7 % (ref 19.6–45.3)
MAGNESIUM SERPL-MCNC: 2.1 MG/DL (ref 1.6–2.4)
MAGNESIUM SERPL-MCNC: 2.3 MG/DL (ref 1.6–2.4)
MCH RBC QN AUTO: 23.6 PG (ref 26.6–33)
MCHC RBC AUTO-ENTMCNC: 29.3 G/DL (ref 31.5–35.7)
MCV RBC AUTO: 80.7 FL (ref 79–97)
MONOCYTES # BLD AUTO: 1.1 10*3/MM3 (ref 0.1–0.9)
MONOCYTES NFR BLD AUTO: 8.4 % (ref 5–12)
NEUTROPHILS NFR BLD AUTO: 10.5 10*3/MM3 (ref 1.7–7)
NEUTROPHILS NFR BLD AUTO: 80 % (ref 42.7–76)
NRBC BLD AUTO-RTO: 0 /100 WBC (ref 0–0.2)
PHOSPHATE SERPL-MCNC: 4.3 MG/DL (ref 2.5–4.5)
PLATELET # BLD AUTO: 223 10*3/MM3 (ref 140–450)
PMV BLD AUTO: 10.5 FL (ref 6–12)
POTASSIUM SERPL-SCNC: 3.8 MMOL/L (ref 3.5–5.2)
PROT SERPL-MCNC: 5.4 G/DL (ref 6–8.5)
RBC # BLD AUTO: 3.05 10*6/MM3 (ref 3.77–5.28)
SODIUM SERPL-SCNC: 138 MMOL/L (ref 136–145)
T4 FREE SERPL-MCNC: 0.57 NG/DL (ref 0.93–1.7)
TROPONIN T SERPL-MCNC: 0.04 NG/ML (ref 0–0.03)
TSH SERPL DL<=0.05 MIU/L-ACNC: 22.92 UIU/ML (ref 0.27–4.2)
WBC # BLD AUTO: 13.12 10*3/MM3 (ref 3.4–10.8)

## 2021-08-22 PROCEDURE — 99222 1ST HOSP IP/OBS MODERATE 55: CPT | Performed by: INTERNAL MEDICINE

## 2021-08-22 PROCEDURE — 97161 PT EVAL LOW COMPLEX 20 MIN: CPT

## 2021-08-22 PROCEDURE — 94799 UNLISTED PULMONARY SVC/PX: CPT

## 2021-08-22 PROCEDURE — 84484 ASSAY OF TROPONIN QUANT: CPT | Performed by: INTERNAL MEDICINE

## 2021-08-22 PROCEDURE — 25010000002 CEFTRIAXONE PER 250 MG: Performed by: NURSE PRACTITIONER

## 2021-08-22 PROCEDURE — 99233 SBSQ HOSP IP/OBS HIGH 50: CPT | Performed by: INTERNAL MEDICINE

## 2021-08-22 PROCEDURE — 25010000003 POTASSIUM CHLORIDE 10 MEQ/100ML SOLUTION: Performed by: NURSE PRACTITIONER

## 2021-08-22 PROCEDURE — 97166 OT EVAL MOD COMPLEX 45 MIN: CPT

## 2021-08-22 PROCEDURE — 84439 ASSAY OF FREE THYROXINE: CPT | Performed by: INTERNAL MEDICINE

## 2021-08-22 PROCEDURE — 25010000002 FUROSEMIDE PER 20 MG: Performed by: INTERNAL MEDICINE

## 2021-08-22 PROCEDURE — 84443 ASSAY THYROID STIM HORMONE: CPT | Performed by: INTERNAL MEDICINE

## 2021-08-22 PROCEDURE — 85025 COMPLETE CBC W/AUTO DIFF WBC: CPT | Performed by: INTERNAL MEDICINE

## 2021-08-22 PROCEDURE — 25010000002 FUROSEMIDE PER 20 MG: Performed by: NURSE PRACTITIONER

## 2021-08-22 PROCEDURE — 84100 ASSAY OF PHOSPHORUS: CPT | Performed by: INTERNAL MEDICINE

## 2021-08-22 PROCEDURE — 83735 ASSAY OF MAGNESIUM: CPT | Performed by: INTERNAL MEDICINE

## 2021-08-22 PROCEDURE — 80053 COMPREHEN METABOLIC PANEL: CPT | Performed by: INTERNAL MEDICINE

## 2021-08-22 PROCEDURE — 25010000002 METHYLPREDNISOLONE PER 40 MG: Performed by: INTERNAL MEDICINE

## 2021-08-22 RX ORDER — FUROSEMIDE 10 MG/ML
40 INJECTION INTRAMUSCULAR; INTRAVENOUS
Status: DISCONTINUED | OUTPATIENT
Start: 2021-08-23 | End: 2021-08-25

## 2021-08-22 RX ORDER — LEVOTHYROXINE SODIUM 0.05 MG/1
50 TABLET ORAL
Status: DISCONTINUED | OUTPATIENT
Start: 2021-08-22 | End: 2021-08-26 | Stop reason: HOSPADM

## 2021-08-22 RX ORDER — FUROSEMIDE 10 MG/ML
40 INJECTION INTRAMUSCULAR; INTRAVENOUS ONCE
Status: COMPLETED | OUTPATIENT
Start: 2021-08-22 | End: 2021-08-22

## 2021-08-22 RX ADMIN — POTASSIUM CHLORIDE 10 MEQ: 7.46 INJECTION, SOLUTION INTRAVENOUS at 00:31

## 2021-08-22 RX ADMIN — DOCUSATE SODIUM 50 MG AND SENNOSIDES 8.6 MG 2 TABLET: 8.6; 5 TABLET, FILM COATED ORAL at 10:13

## 2021-08-22 RX ADMIN — Medication 1 CAPSULE: at 10:14

## 2021-08-22 RX ADMIN — POTASSIUM CHLORIDE 10 MEQ: 7.46 INJECTION, SOLUTION INTRAVENOUS at 01:58

## 2021-08-22 RX ADMIN — SODIUM CHLORIDE, PRESERVATIVE FREE 10 ML: 5 INJECTION INTRAVENOUS at 10:14

## 2021-08-22 RX ADMIN — SODIUM CHLORIDE, PRESERVATIVE FREE 10 ML: 5 INJECTION INTRAVENOUS at 22:20

## 2021-08-22 RX ADMIN — BISOPROLOL FUMARATE 5 MG: 5 TABLET, FILM COATED ORAL at 10:13

## 2021-08-22 RX ADMIN — MULTIPLE VITAMINS W/ MINERALS TAB 1 TABLET: TAB at 10:14

## 2021-08-22 RX ADMIN — SERTRALINE HYDROCHLORIDE 150 MG: 100 TABLET ORAL at 10:14

## 2021-08-22 RX ADMIN — FUROSEMIDE 40 MG: 10 INJECTION, SOLUTION INTRAMUSCULAR; INTRAVENOUS at 10:14

## 2021-08-22 RX ADMIN — POTASSIUM CHLORIDE 10 MEQ: 7.46 INJECTION, SOLUTION INTRAVENOUS at 05:40

## 2021-08-22 RX ADMIN — SODIUM CHLORIDE 1 G: 900 INJECTION INTRAVENOUS at 22:19

## 2021-08-22 RX ADMIN — FUROSEMIDE 40 MG: 10 INJECTION, SOLUTION INTRAMUSCULAR; INTRAVENOUS at 16:09

## 2021-08-22 RX ADMIN — ARFORMOTEROL TARTRATE 15 MCG: 15 SOLUTION RESPIRATORY (INHALATION) at 07:48

## 2021-08-22 RX ADMIN — OXYCODONE HYDROCHLORIDE AND ACETAMINOPHEN 1000 MG: 500 TABLET ORAL at 10:13

## 2021-08-22 RX ADMIN — IPRATROPIUM BROMIDE AND ALBUTEROL SULFATE 3 ML: 2.5; .5 SOLUTION RESPIRATORY (INHALATION) at 07:49

## 2021-08-22 RX ADMIN — METHYLPREDNISOLONE SODIUM SUCCINATE 30 MG: 40 INJECTION, POWDER, FOR SOLUTION INTRAMUSCULAR; INTRAVENOUS at 10:13

## 2021-08-22 RX ADMIN — POTASSIUM CHLORIDE 10 MEQ: 7.46 INJECTION, SOLUTION INTRAVENOUS at 03:46

## 2021-08-22 RX ADMIN — IPRATROPIUM BROMIDE AND ALBUTEROL SULFATE 3 ML: 2.5; .5 SOLUTION RESPIRATORY (INHALATION) at 15:49

## 2021-08-22 NOTE — OUTREACH NOTE
CHF Week 3 Survey      Responses   [facility] patient discharged from?  [place]   Does the patient have one of the following disease processes/diagnoses(primary or secondary)?  CHF   Week 3 attempt successful?  No   Revoke  Readmitted          Delores Kramer RN

## 2021-08-23 ENCOUNTER — APPOINTMENT (OUTPATIENT)
Dept: GENERAL RADIOLOGY | Facility: HOSPITAL | Age: 81
End: 2021-08-23

## 2021-08-23 ENCOUNTER — APPOINTMENT (OUTPATIENT)
Dept: CT IMAGING | Facility: HOSPITAL | Age: 81
End: 2021-08-23

## 2021-08-23 LAB
ABO GROUP BLD: NORMAL
ANION GAP SERPL CALCULATED.3IONS-SCNC: 14 MMOL/L (ref 5–15)
APPEARANCE FLD: CLEAR
BLD GP AB SCN SERPL QL: NEGATIVE
BUN SERPL-MCNC: 24 MG/DL (ref 8–23)
BUN/CREAT SERPL: 23.5 (ref 7–25)
CALCIUM SPEC-SCNC: 8 MG/DL (ref 8.6–10.5)
CHLORIDE SERPL-SCNC: 99 MMOL/L (ref 98–107)
CO2 SERPL-SCNC: 22 MMOL/L (ref 22–29)
COLOR FLD: YELLOW
CREAT SERPL-MCNC: 1.02 MG/DL (ref 0.57–1)
DEPRECATED RDW RBC AUTO: 60.5 FL (ref 37–54)
ERYTHROCYTE [DISTWIDTH] IN BLOOD BY AUTOMATED COUNT: 20.5 % (ref 12.3–15.4)
FERRITIN SERPL-MCNC: 182 NG/ML (ref 13–150)
FOLATE SERPL-MCNC: >20 NG/ML (ref 4.78–24.2)
GFR SERPL CREATININE-BSD FRML MDRD: 52 ML/MIN/1.73
GLUCOSE FLD-MCNC: 140 MG/DL
GLUCOSE SERPL-MCNC: 88 MG/DL (ref 65–99)
HCT VFR BLD AUTO: 25.9 % (ref 34–46.6)
HEMOCCULT STL QL: NEGATIVE
HGB BLD-MCNC: 7.4 G/DL (ref 12–15.9)
INR PPP: 1.08 (ref 0.85–1.16)
IRON 24H UR-MRATE: 14 MCG/DL (ref 37–145)
IRON SATN MFR SERPL: 4 % (ref 20–50)
LDH FLD-CCNC: 80 U/L
LYMPHOCYTES NFR FLD MANUAL: 21 %
MAGNESIUM SERPL-MCNC: 2.5 MG/DL (ref 1.6–2.4)
MCH RBC QN AUTO: 23.8 PG (ref 26.6–33)
MCHC RBC AUTO-ENTMCNC: 28.6 G/DL (ref 31.5–35.7)
MCV RBC AUTO: 83.3 FL (ref 79–97)
MONOCYTES NFR FLD: 54 %
NEUTROPHILS NFR FLD MANUAL: 25 %
PH FLD: 7.58 [PH]
PLATELET # BLD AUTO: 227 10*3/MM3 (ref 140–450)
PMV BLD AUTO: 11.4 FL (ref 6–12)
POTASSIUM SERPL-SCNC: 2.8 MMOL/L (ref 3.5–5.2)
PROT FLD-MCNC: 1.2 G/DL
PROTHROMBIN TIME: 13.6 SECONDS (ref 11.4–14.4)
RBC # BLD AUTO: 3.11 10*6/MM3 (ref 3.77–5.28)
RBC # FLD AUTO: <2000 /MM3
RH BLD: POSITIVE
SODIUM SERPL-SCNC: 135 MMOL/L (ref 136–145)
T&S EXPIRATION DATE: NORMAL
TIBC SERPL-MCNC: 340 MCG/DL (ref 298–536)
TRANSFERRIN SERPL-MCNC: 228 MG/DL (ref 200–360)
VIT B12 BLD-MCNC: 1122 PG/ML (ref 211–946)
WBC # BLD AUTO: 11.25 10*3/MM3 (ref 3.4–10.8)
WBC # FLD AUTO: 282 /MM3

## 2021-08-23 PROCEDURE — 99233 SBSQ HOSP IP/OBS HIGH 50: CPT | Performed by: INTERNAL MEDICINE

## 2021-08-23 PROCEDURE — 83735 ASSAY OF MAGNESIUM: CPT | Performed by: INTERNAL MEDICINE

## 2021-08-23 PROCEDURE — 84157 ASSAY OF PROTEIN OTHER: CPT | Performed by: INTERNAL MEDICINE

## 2021-08-23 PROCEDURE — 86901 BLOOD TYPING SEROLOGIC RH(D): CPT | Performed by: INTERNAL MEDICINE

## 2021-08-23 PROCEDURE — 86900 BLOOD TYPING SEROLOGIC ABO: CPT | Performed by: INTERNAL MEDICINE

## 2021-08-23 PROCEDURE — 84466 ASSAY OF TRANSFERRIN: CPT | Performed by: INTERNAL MEDICINE

## 2021-08-23 PROCEDURE — 25010000003 LIDOCAINE 1 % SOLUTION: Performed by: RADIOLOGY

## 2021-08-23 PROCEDURE — 88112 CYTOPATH CELL ENHANCE TECH: CPT | Performed by: INTERNAL MEDICINE

## 2021-08-23 PROCEDURE — 25010000002 NA FERRIC GLUC CPLX PER 12.5 MG: Performed by: INTERNAL MEDICINE

## 2021-08-23 PROCEDURE — 85610 PROTHROMBIN TIME: CPT | Performed by: RADIOLOGY

## 2021-08-23 PROCEDURE — 0W9B3ZZ DRAINAGE OF LEFT PLEURAL CAVITY, PERCUTANEOUS APPROACH: ICD-10-PCS | Performed by: RADIOLOGY

## 2021-08-23 PROCEDURE — 82728 ASSAY OF FERRITIN: CPT | Performed by: INTERNAL MEDICINE

## 2021-08-23 PROCEDURE — 94799 UNLISTED PULMONARY SVC/PX: CPT

## 2021-08-23 PROCEDURE — 89051 BODY FLUID CELL COUNT: CPT | Performed by: INTERNAL MEDICINE

## 2021-08-23 PROCEDURE — 83540 ASSAY OF IRON: CPT | Performed by: INTERNAL MEDICINE

## 2021-08-23 PROCEDURE — 87070 CULTURE OTHR SPECIMN AEROBIC: CPT | Performed by: INTERNAL MEDICINE

## 2021-08-23 PROCEDURE — 71045 X-RAY EXAM CHEST 1 VIEW: CPT

## 2021-08-23 PROCEDURE — 86923 COMPATIBILITY TEST ELECTRIC: CPT

## 2021-08-23 PROCEDURE — 25010000002 CEFTRIAXONE PER 250 MG: Performed by: NURSE PRACTITIONER

## 2021-08-23 PROCEDURE — 99232 SBSQ HOSP IP/OBS MODERATE 35: CPT | Performed by: NURSE PRACTITIONER

## 2021-08-23 PROCEDURE — 80048 BASIC METABOLIC PNL TOTAL CA: CPT | Performed by: INTERNAL MEDICINE

## 2021-08-23 PROCEDURE — 83986 ASSAY PH BODY FLUID NOS: CPT | Performed by: INTERNAL MEDICINE

## 2021-08-23 PROCEDURE — 87205 SMEAR GRAM STAIN: CPT | Performed by: INTERNAL MEDICINE

## 2021-08-23 PROCEDURE — 83615 LACTATE (LD) (LDH) ENZYME: CPT | Performed by: INTERNAL MEDICINE

## 2021-08-23 PROCEDURE — 82607 VITAMIN B-12: CPT | Performed by: INTERNAL MEDICINE

## 2021-08-23 PROCEDURE — 82272 OCCULT BLD FECES 1-3 TESTS: CPT | Performed by: INTERNAL MEDICINE

## 2021-08-23 PROCEDURE — 82746 ASSAY OF FOLIC ACID SERUM: CPT | Performed by: INTERNAL MEDICINE

## 2021-08-23 PROCEDURE — 25010000002 FUROSEMIDE PER 20 MG: Performed by: NURSE PRACTITIONER

## 2021-08-23 PROCEDURE — 75989 ABSCESS DRAINAGE UNDER X-RAY: CPT

## 2021-08-23 PROCEDURE — 82945 GLUCOSE OTHER FLUID: CPT | Performed by: INTERNAL MEDICINE

## 2021-08-23 PROCEDURE — 25010000002 METHYLPREDNISOLONE PER 40 MG: Performed by: INTERNAL MEDICINE

## 2021-08-23 PROCEDURE — 85027 COMPLETE CBC AUTOMATED: CPT | Performed by: INTERNAL MEDICINE

## 2021-08-23 PROCEDURE — 87015 SPECIMEN INFECT AGNT CONCNTJ: CPT | Performed by: INTERNAL MEDICINE

## 2021-08-23 PROCEDURE — 86850 RBC ANTIBODY SCREEN: CPT | Performed by: INTERNAL MEDICINE

## 2021-08-23 RX ORDER — POTASSIUM CHLORIDE 7.45 MG/ML
10 INJECTION INTRAVENOUS
Status: DISCONTINUED | OUTPATIENT
Start: 2021-08-23 | End: 2021-08-23 | Stop reason: SDUPTHER

## 2021-08-23 RX ORDER — POTASSIUM CHLORIDE 750 MG/1
40 CAPSULE, EXTENDED RELEASE ORAL AS NEEDED
Status: DISCONTINUED | OUTPATIENT
Start: 2021-08-23 | End: 2021-08-23 | Stop reason: SDUPTHER

## 2021-08-23 RX ORDER — ALPRAZOLAM 0.5 MG/1
0.5 TABLET ORAL NIGHTLY PRN
Status: DISCONTINUED | OUTPATIENT
Start: 2021-08-23 | End: 2021-08-26 | Stop reason: HOSPADM

## 2021-08-23 RX ORDER — LIDOCAINE HYDROCHLORIDE 10 MG/ML
20 INJECTION, SOLUTION INFILTRATION; PERINEURAL ONCE
Status: COMPLETED | OUTPATIENT
Start: 2021-08-23 | End: 2021-08-23

## 2021-08-23 RX ORDER — DAPSONE 100 MG/1
100 TABLET ORAL
Status: DISCONTINUED | OUTPATIENT
Start: 2021-08-23 | End: 2021-08-25

## 2021-08-23 RX ORDER — POTASSIUM CHLORIDE 1.5 G/1.77G
40 POWDER, FOR SOLUTION ORAL AS NEEDED
Status: DISCONTINUED | OUTPATIENT
Start: 2021-08-23 | End: 2021-08-23 | Stop reason: SDUPTHER

## 2021-08-23 RX ORDER — PREDNISONE 20 MG/1
20 TABLET ORAL
Status: DISCONTINUED | OUTPATIENT
Start: 2021-08-24 | End: 2021-08-25

## 2021-08-23 RX ORDER — BUDESONIDE 0.5 MG/2ML
0.5 INHALANT ORAL
Status: DISCONTINUED | OUTPATIENT
Start: 2021-08-23 | End: 2021-08-26 | Stop reason: HOSPADM

## 2021-08-23 RX ADMIN — ARFORMOTEROL TARTRATE 15 MCG: 15 SOLUTION RESPIRATORY (INHALATION) at 07:32

## 2021-08-23 RX ADMIN — LIDOCAINE HYDROCHLORIDE 20 ML: 10 INJECTION, SOLUTION INFILTRATION; PERINEURAL at 15:32

## 2021-08-23 RX ADMIN — POTASSIUM CHLORIDE 40 MEQ: 750 CAPSULE, EXTENDED RELEASE ORAL at 11:15

## 2021-08-23 RX ADMIN — LEVOTHYROXINE SODIUM 50 MCG: 50 TABLET ORAL at 05:51

## 2021-08-23 RX ADMIN — DAPSONE 100 MG: 100 TABLET ORAL at 12:55

## 2021-08-23 RX ADMIN — POTASSIUM CHLORIDE 40 MEQ: 750 CAPSULE, EXTENDED RELEASE ORAL at 22:50

## 2021-08-23 RX ADMIN — POTASSIUM CHLORIDE 40 MEQ: 750 CAPSULE, EXTENDED RELEASE ORAL at 18:15

## 2021-08-23 RX ADMIN — BUDESONIDE 0.5 MG: 0.5 INHALANT RESPIRATORY (INHALATION) at 19:35

## 2021-08-23 RX ADMIN — BISOPROLOL FUMARATE 5 MG: 5 TABLET, FILM COATED ORAL at 09:12

## 2021-08-23 RX ADMIN — OXYCODONE HYDROCHLORIDE AND ACETAMINOPHEN 1000 MG: 500 TABLET ORAL at 09:12

## 2021-08-23 RX ADMIN — FUROSEMIDE 40 MG: 10 INJECTION, SOLUTION INTRAMUSCULAR; INTRAVENOUS at 18:15

## 2021-08-23 RX ADMIN — SODIUM CHLORIDE, PRESERVATIVE FREE 10 ML: 5 INJECTION INTRAVENOUS at 09:13

## 2021-08-23 RX ADMIN — FUROSEMIDE 40 MG: 10 INJECTION, SOLUTION INTRAMUSCULAR; INTRAVENOUS at 09:13

## 2021-08-23 RX ADMIN — SODIUM CHLORIDE 125 MG: 9 INJECTION, SOLUTION INTRAVENOUS at 13:58

## 2021-08-23 RX ADMIN — IPRATROPIUM BROMIDE AND ALBUTEROL SULFATE 3 ML: 2.5; .5 SOLUTION RESPIRATORY (INHALATION) at 07:32

## 2021-08-23 RX ADMIN — IPRATROPIUM BROMIDE 0.5 MG: 0.5 SOLUTION RESPIRATORY (INHALATION) at 19:34

## 2021-08-23 RX ADMIN — MULTIPLE VITAMINS W/ MINERALS TAB 1 TABLET: TAB at 09:12

## 2021-08-23 RX ADMIN — IPRATROPIUM BROMIDE 0.5 MG: 0.5 SOLUTION RESPIRATORY (INHALATION) at 16:01

## 2021-08-23 RX ADMIN — SODIUM CHLORIDE, PRESERVATIVE FREE 10 ML: 5 INJECTION INTRAVENOUS at 20:32

## 2021-08-23 RX ADMIN — NITROGLYCERIN 5 MCG/MIN: 20 INJECTION INTRAVENOUS at 05:57

## 2021-08-23 RX ADMIN — Medication 1 CAPSULE: at 09:12

## 2021-08-23 RX ADMIN — SERTRALINE HYDROCHLORIDE 150 MG: 100 TABLET ORAL at 09:12

## 2021-08-23 RX ADMIN — ALPRAZOLAM 0.5 MG: 0.5 TABLET ORAL at 22:57

## 2021-08-23 RX ADMIN — SODIUM CHLORIDE 1 G: 900 INJECTION INTRAVENOUS at 20:31

## 2021-08-23 RX ADMIN — METHYLPREDNISOLONE SODIUM SUCCINATE 30 MG: 40 INJECTION, POWDER, FOR SOLUTION INTRAMUSCULAR; INTRAVENOUS at 09:13

## 2021-08-23 RX ADMIN — ARFORMOTEROL TARTRATE 15 MCG: 15 SOLUTION RESPIRATORY (INHALATION) at 19:34

## 2021-08-24 ENCOUNTER — APPOINTMENT (OUTPATIENT)
Dept: GENERAL RADIOLOGY | Facility: HOSPITAL | Age: 81
End: 2021-08-24

## 2021-08-24 LAB
ABO GROUP BLD: NORMAL
ANION GAP SERPL CALCULATED.3IONS-SCNC: 10 MMOL/L (ref 5–15)
BACTERIA SPEC AEROBE CULT: ABNORMAL
BUN SERPL-MCNC: 25 MG/DL (ref 8–23)
BUN/CREAT SERPL: 25 (ref 7–25)
CALCIUM SPEC-SCNC: 8 MG/DL (ref 8.6–10.5)
CHLORIDE SERPL-SCNC: 103 MMOL/L (ref 98–107)
CO2 SERPL-SCNC: 27 MMOL/L (ref 22–29)
CREAT SERPL-MCNC: 1 MG/DL (ref 0.57–1)
CRP SERPL-MCNC: 1.57 MG/DL (ref 0–0.5)
DEPRECATED RDW RBC AUTO: 57.1 FL (ref 37–54)
ERYTHROCYTE [DISTWIDTH] IN BLOOD BY AUTOMATED COUNT: 20 % (ref 12.3–15.4)
ERYTHROCYTE [SEDIMENTATION RATE] IN BLOOD: <1 MM/HR (ref 0–30)
GFR SERPL CREATININE-BSD FRML MDRD: 53 ML/MIN/1.73
GLUCOSE SERPL-MCNC: 89 MG/DL (ref 65–99)
HCT VFR BLD AUTO: 21.6 % (ref 34–46.6)
HCT VFR BLD AUTO: 30.2 % (ref 34–46.6)
HGB BLD-MCNC: 6.4 G/DL (ref 12–15.9)
HGB BLD-MCNC: 9.2 G/DL (ref 12–15.9)
MCH RBC QN AUTO: 23.6 PG (ref 26.6–33)
MCHC RBC AUTO-ENTMCNC: 29.6 G/DL (ref 31.5–35.7)
MCV RBC AUTO: 79.7 FL (ref 79–97)
PLATELET # BLD AUTO: 197 10*3/MM3 (ref 140–450)
PMV BLD AUTO: 11.3 FL (ref 6–12)
POTASSIUM SERPL-SCNC: 3.3 MMOL/L (ref 3.5–5.2)
RBC # BLD AUTO: 2.71 10*6/MM3 (ref 3.77–5.28)
RH BLD: POSITIVE
SODIUM SERPL-SCNC: 140 MMOL/L (ref 136–145)
WBC # BLD AUTO: 10.36 10*3/MM3 (ref 3.4–10.8)

## 2021-08-24 PROCEDURE — 85018 HEMOGLOBIN: CPT | Performed by: INTERNAL MEDICINE

## 2021-08-24 PROCEDURE — 86140 C-REACTIVE PROTEIN: CPT | Performed by: INTERNAL MEDICINE

## 2021-08-24 PROCEDURE — 85014 HEMATOCRIT: CPT | Performed by: INTERNAL MEDICINE

## 2021-08-24 PROCEDURE — 94799 UNLISTED PULMONARY SVC/PX: CPT

## 2021-08-24 PROCEDURE — 25010000002 NA FERRIC GLUC CPLX PER 12.5 MG: Performed by: INTERNAL MEDICINE

## 2021-08-24 PROCEDURE — 80048 BASIC METABOLIC PNL TOTAL CA: CPT | Performed by: INTERNAL MEDICINE

## 2021-08-24 PROCEDURE — P9016 RBC LEUKOCYTES REDUCED: HCPCS

## 2021-08-24 PROCEDURE — 99232 SBSQ HOSP IP/OBS MODERATE 35: CPT | Performed by: NURSE PRACTITIONER

## 2021-08-24 PROCEDURE — 86901 BLOOD TYPING SEROLOGIC RH(D): CPT

## 2021-08-24 PROCEDURE — 85027 COMPLETE CBC AUTOMATED: CPT | Performed by: INTERNAL MEDICINE

## 2021-08-24 PROCEDURE — 86900 BLOOD TYPING SEROLOGIC ABO: CPT

## 2021-08-24 PROCEDURE — 99232 SBSQ HOSP IP/OBS MODERATE 35: CPT | Performed by: INTERNAL MEDICINE

## 2021-08-24 PROCEDURE — 85652 RBC SED RATE AUTOMATED: CPT | Performed by: INTERNAL MEDICINE

## 2021-08-24 PROCEDURE — 63710000001 PREDNISONE PER 1 MG: Performed by: INTERNAL MEDICINE

## 2021-08-24 PROCEDURE — 71045 X-RAY EXAM CHEST 1 VIEW: CPT

## 2021-08-24 PROCEDURE — 25010000002 FUROSEMIDE PER 20 MG: Performed by: NURSE PRACTITIONER

## 2021-08-24 PROCEDURE — 36430 TRANSFUSION BLD/BLD COMPNT: CPT

## 2021-08-24 RX ORDER — CEFUROXIME AXETIL 250 MG/1
250 TABLET ORAL EVERY 12 HOURS SCHEDULED
Status: COMPLETED | OUTPATIENT
Start: 2021-08-24 | End: 2021-08-25

## 2021-08-24 RX ORDER — PANTOPRAZOLE SODIUM 40 MG/10ML
40 INJECTION, POWDER, LYOPHILIZED, FOR SOLUTION INTRAVENOUS EVERY 12 HOURS SCHEDULED
Status: DISCONTINUED | OUTPATIENT
Start: 2021-08-24 | End: 2021-08-25

## 2021-08-24 RX ADMIN — SODIUM CHLORIDE, PRESERVATIVE FREE 10 ML: 5 INJECTION INTRAVENOUS at 20:54

## 2021-08-24 RX ADMIN — PREDNISONE 20 MG: 20 TABLET ORAL at 09:06

## 2021-08-24 RX ADMIN — Medication 1 CAPSULE: at 09:06

## 2021-08-24 RX ADMIN — DAPSONE 100 MG: 100 TABLET ORAL at 09:07

## 2021-08-24 RX ADMIN — IPRATROPIUM BROMIDE 0.5 MG: 0.5 SOLUTION RESPIRATORY (INHALATION) at 12:26

## 2021-08-24 RX ADMIN — POTASSIUM CHLORIDE 40 MEQ: 750 CAPSULE, EXTENDED RELEASE ORAL at 09:06

## 2021-08-24 RX ADMIN — SODIUM CHLORIDE, PRESERVATIVE FREE 10 ML: 5 INJECTION INTRAVENOUS at 09:07

## 2021-08-24 RX ADMIN — ARFORMOTEROL TARTRATE 15 MCG: 15 SOLUTION RESPIRATORY (INHALATION) at 07:52

## 2021-08-24 RX ADMIN — MULTIPLE VITAMINS W/ MINERALS TAB 1 TABLET: TAB at 09:06

## 2021-08-24 RX ADMIN — PANTOPRAZOLE SODIUM 40 MG: 40 INJECTION, POWDER, FOR SOLUTION INTRAVENOUS at 20:53

## 2021-08-24 RX ADMIN — IPRATROPIUM BROMIDE 0.5 MG: 0.5 SOLUTION RESPIRATORY (INHALATION) at 07:52

## 2021-08-24 RX ADMIN — LEVOTHYROXINE SODIUM 50 MCG: 50 TABLET ORAL at 06:08

## 2021-08-24 RX ADMIN — CEFUROXIME AXETIL 250 MG: 250 TABLET ORAL at 20:53

## 2021-08-24 RX ADMIN — SERTRALINE HYDROCHLORIDE 150 MG: 100 TABLET ORAL at 09:06

## 2021-08-24 RX ADMIN — BISOPROLOL FUMARATE 5 MG: 5 TABLET, FILM COATED ORAL at 09:07

## 2021-08-24 RX ADMIN — OXYCODONE HYDROCHLORIDE AND ACETAMINOPHEN 1000 MG: 500 TABLET ORAL at 09:06

## 2021-08-24 RX ADMIN — DOCUSATE SODIUM 50 MG AND SENNOSIDES 8.6 MG 2 TABLET: 8.6; 5 TABLET, FILM COATED ORAL at 09:07

## 2021-08-24 RX ADMIN — FUROSEMIDE 40 MG: 10 INJECTION, SOLUTION INTRAMUSCULAR; INTRAVENOUS at 09:06

## 2021-08-24 RX ADMIN — BUDESONIDE 0.5 MG: 0.5 INHALANT RESPIRATORY (INHALATION) at 07:52

## 2021-08-24 RX ADMIN — FUROSEMIDE 40 MG: 10 INJECTION, SOLUTION INTRAMUSCULAR; INTRAVENOUS at 17:49

## 2021-08-24 RX ADMIN — BUDESONIDE 0.5 MG: 0.5 INHALANT RESPIRATORY (INHALATION) at 19:27

## 2021-08-24 RX ADMIN — SODIUM CHLORIDE 125 MG: 9 INJECTION, SOLUTION INTRAVENOUS at 09:23

## 2021-08-24 RX ADMIN — IPRATROPIUM BROMIDE 0.5 MG: 0.5 SOLUTION RESPIRATORY (INHALATION) at 19:27

## 2021-08-24 RX ADMIN — ARFORMOTEROL TARTRATE 15 MCG: 15 SOLUTION RESPIRATORY (INHALATION) at 19:27

## 2021-08-24 RX ADMIN — IPRATROPIUM BROMIDE 0.5 MG: 0.5 SOLUTION RESPIRATORY (INHALATION) at 16:08

## 2021-08-24 RX ADMIN — POTASSIUM CHLORIDE 40 MEQ: 750 CAPSULE, EXTENDED RELEASE ORAL at 17:56

## 2021-08-24 RX ADMIN — PANTOPRAZOLE SODIUM 40 MG: 40 INJECTION, POWDER, FOR SOLUTION INTRAVENOUS at 15:48

## 2021-08-25 ENCOUNTER — APPOINTMENT (OUTPATIENT)
Dept: GENERAL RADIOLOGY | Facility: HOSPITAL | Age: 81
End: 2021-08-25

## 2021-08-25 PROBLEM — I50.33 ACUTE ON CHRONIC DIASTOLIC CONGESTIVE HEART FAILURE (HCC): Status: ACTIVE | Noted: 2021-08-21

## 2021-08-25 LAB
ANION GAP SERPL CALCULATED.3IONS-SCNC: 7 MMOL/L (ref 5–15)
BH BB BLOOD EXPIRATION DATE: NORMAL
BH BB BLOOD TYPE BARCODE: 5100
BH BB DISPENSE STATUS: NORMAL
BH BB PRODUCT CODE: NORMAL
BH BB UNIT NUMBER: NORMAL
BUN SERPL-MCNC: 21 MG/DL (ref 8–23)
BUN/CREAT SERPL: 19.3 (ref 7–25)
CALCIUM SPEC-SCNC: 8.1 MG/DL (ref 8.6–10.5)
CHLORIDE SERPL-SCNC: 103 MMOL/L (ref 98–107)
CO2 SERPL-SCNC: 29 MMOL/L (ref 22–29)
CREAT SERPL-MCNC: 1.09 MG/DL (ref 0.57–1)
CROSSMATCH INTERPRETATION: NORMAL
CRP SERPL-MCNC: 3.61 MG/DL (ref 0–0.5)
DEPRECATED RDW RBC AUTO: 56.7 FL (ref 37–54)
ERYTHROCYTE [DISTWIDTH] IN BLOOD BY AUTOMATED COUNT: 19.9 % (ref 12.3–15.4)
ERYTHROCYTE [SEDIMENTATION RATE] IN BLOOD: 5 MM/HR (ref 0–30)
GFR SERPL CREATININE-BSD FRML MDRD: 48 ML/MIN/1.73
GLUCOSE SERPL-MCNC: 78 MG/DL (ref 65–99)
HCT VFR BLD AUTO: 30.4 % (ref 34–46.6)
HGB BLD-MCNC: 8.9 G/DL (ref 12–15.9)
MCH RBC QN AUTO: 23.9 PG (ref 26.6–33)
MCHC RBC AUTO-ENTMCNC: 29.3 G/DL (ref 31.5–35.7)
MCV RBC AUTO: 81.7 FL (ref 79–97)
PLATELET # BLD AUTO: 190 10*3/MM3 (ref 140–450)
PMV BLD AUTO: 11.1 FL (ref 6–12)
POTASSIUM SERPL-SCNC: 3.7 MMOL/L (ref 3.5–5.2)
RBC # BLD AUTO: 3.72 10*6/MM3 (ref 3.77–5.28)
SODIUM SERPL-SCNC: 139 MMOL/L (ref 136–145)
UNIT  ABO: NORMAL
UNIT  RH: NORMAL
WBC # BLD AUTO: 11.77 10*3/MM3 (ref 3.4–10.8)

## 2021-08-25 PROCEDURE — 71045 X-RAY EXAM CHEST 1 VIEW: CPT

## 2021-08-25 PROCEDURE — 97530 THERAPEUTIC ACTIVITIES: CPT

## 2021-08-25 PROCEDURE — 97535 SELF CARE MNGMENT TRAINING: CPT

## 2021-08-25 PROCEDURE — 25010000002 NA FERRIC GLUC CPLX PER 12.5 MG: Performed by: INTERNAL MEDICINE

## 2021-08-25 PROCEDURE — 80048 BASIC METABOLIC PNL TOTAL CA: CPT | Performed by: INTERNAL MEDICINE

## 2021-08-25 PROCEDURE — 99233 SBSQ HOSP IP/OBS HIGH 50: CPT | Performed by: INTERNAL MEDICINE

## 2021-08-25 PROCEDURE — 94799 UNLISTED PULMONARY SVC/PX: CPT

## 2021-08-25 PROCEDURE — 63710000001 PREDNISONE PER 1 MG: Performed by: INTERNAL MEDICINE

## 2021-08-25 PROCEDURE — 86140 C-REACTIVE PROTEIN: CPT | Performed by: INTERNAL MEDICINE

## 2021-08-25 PROCEDURE — 97110 THERAPEUTIC EXERCISES: CPT

## 2021-08-25 PROCEDURE — 99231 SBSQ HOSP IP/OBS SF/LOW 25: CPT | Performed by: FAMILY MEDICINE

## 2021-08-25 PROCEDURE — 97116 GAIT TRAINING THERAPY: CPT

## 2021-08-25 PROCEDURE — 25010000002 FUROSEMIDE PER 20 MG: Performed by: NURSE PRACTITIONER

## 2021-08-25 PROCEDURE — 99221 1ST HOSP IP/OBS SF/LOW 40: CPT | Performed by: PHYSICIAN ASSISTANT

## 2021-08-25 PROCEDURE — 85027 COMPLETE CBC AUTOMATED: CPT | Performed by: INTERNAL MEDICINE

## 2021-08-25 PROCEDURE — 85652 RBC SED RATE AUTOMATED: CPT | Performed by: INTERNAL MEDICINE

## 2021-08-25 RX ORDER — FERROUS SULFATE 325(65) MG
325 TABLET ORAL 2 TIMES DAILY WITH MEALS
Status: DISCONTINUED | OUTPATIENT
Start: 2021-08-25 | End: 2021-08-26 | Stop reason: HOSPADM

## 2021-08-25 RX ORDER — PREDNISONE 10 MG/1
10 TABLET ORAL
Status: DISCONTINUED | OUTPATIENT
Start: 2021-08-26 | End: 2021-08-26 | Stop reason: HOSPADM

## 2021-08-25 RX ORDER — FUROSEMIDE 20 MG/1
20 TABLET ORAL
Status: DISCONTINUED | OUTPATIENT
Start: 2021-08-25 | End: 2021-08-26 | Stop reason: HOSPADM

## 2021-08-25 RX ORDER — PANTOPRAZOLE SODIUM 40 MG/1
40 TABLET, DELAYED RELEASE ORAL
Status: DISCONTINUED | OUTPATIENT
Start: 2021-08-25 | End: 2021-08-26 | Stop reason: HOSPADM

## 2021-08-25 RX ADMIN — MULTIPLE VITAMINS W/ MINERALS TAB 1 TABLET: TAB at 09:41

## 2021-08-25 RX ADMIN — ARFORMOTEROL TARTRATE 15 MCG: 15 SOLUTION RESPIRATORY (INHALATION) at 20:15

## 2021-08-25 RX ADMIN — ALPRAZOLAM 0.5 MG: 0.5 TABLET ORAL at 20:27

## 2021-08-25 RX ADMIN — PREDNISONE 20 MG: 20 TABLET ORAL at 09:41

## 2021-08-25 RX ADMIN — BUDESONIDE 0.5 MG: 0.5 INHALANT RESPIRATORY (INHALATION) at 07:25

## 2021-08-25 RX ADMIN — DOCUSATE SODIUM 50 MG AND SENNOSIDES 8.6 MG 2 TABLET: 8.6; 5 TABLET, FILM COATED ORAL at 09:41

## 2021-08-25 RX ADMIN — Medication 1 CAPSULE: at 09:41

## 2021-08-25 RX ADMIN — IPRATROPIUM BROMIDE 0.5 MG: 0.5 SOLUTION RESPIRATORY (INHALATION) at 07:24

## 2021-08-25 RX ADMIN — FUROSEMIDE 40 MG: 10 INJECTION, SOLUTION INTRAMUSCULAR; INTRAVENOUS at 09:42

## 2021-08-25 RX ADMIN — Medication 325 MG: at 18:14

## 2021-08-25 RX ADMIN — IPRATROPIUM BROMIDE 0.5 MG: 0.5 SOLUTION RESPIRATORY (INHALATION) at 20:15

## 2021-08-25 RX ADMIN — PANTOPRAZOLE SODIUM 40 MG: 40 TABLET, DELAYED RELEASE ORAL at 18:15

## 2021-08-25 RX ADMIN — FUROSEMIDE 20 MG: 20 TABLET ORAL at 18:14

## 2021-08-25 RX ADMIN — CEFUROXIME AXETIL 250 MG: 250 TABLET ORAL at 09:41

## 2021-08-25 RX ADMIN — SODIUM CHLORIDE, PRESERVATIVE FREE 10 ML: 5 INJECTION INTRAVENOUS at 20:27

## 2021-08-25 RX ADMIN — APIXABAN 5 MG: 5 TABLET, FILM COATED ORAL at 20:27

## 2021-08-25 RX ADMIN — LEVOTHYROXINE SODIUM 50 MCG: 50 TABLET ORAL at 06:23

## 2021-08-25 RX ADMIN — SODIUM CHLORIDE, PRESERVATIVE FREE 10 ML: 5 INJECTION INTRAVENOUS at 09:42

## 2021-08-25 RX ADMIN — BUDESONIDE 0.5 MG: 0.5 INHALANT RESPIRATORY (INHALATION) at 20:15

## 2021-08-25 RX ADMIN — OXYCODONE HYDROCHLORIDE AND ACETAMINOPHEN 1000 MG: 500 TABLET ORAL at 09:42

## 2021-08-25 RX ADMIN — SODIUM CHLORIDE 125 MG: 9 INJECTION, SOLUTION INTRAVENOUS at 09:44

## 2021-08-25 RX ADMIN — SERTRALINE HYDROCHLORIDE 150 MG: 100 TABLET ORAL at 09:41

## 2021-08-25 RX ADMIN — PANTOPRAZOLE SODIUM 40 MG: 40 INJECTION, POWDER, FOR SOLUTION INTRAVENOUS at 11:30

## 2021-08-25 RX ADMIN — Medication 1 TABLET: at 18:14

## 2021-08-25 RX ADMIN — BISOPROLOL FUMARATE 5 MG: 5 TABLET, FILM COATED ORAL at 09:41

## 2021-08-25 RX ADMIN — DAPSONE 100 MG: 100 TABLET ORAL at 09:44

## 2021-08-25 RX ADMIN — CEFUROXIME AXETIL 250 MG: 250 TABLET ORAL at 20:27

## 2021-08-25 RX ADMIN — IPRATROPIUM BROMIDE 0.5 MG: 0.5 SOLUTION RESPIRATORY (INHALATION) at 13:08

## 2021-08-25 RX ADMIN — ARFORMOTEROL TARTRATE 15 MCG: 15 SOLUTION RESPIRATORY (INHALATION) at 07:24

## 2021-08-26 VITALS
HEIGHT: 66 IN | HEART RATE: 96 BPM | OXYGEN SATURATION: 96 % | WEIGHT: 130.6 LBS | DIASTOLIC BLOOD PRESSURE: 60 MMHG | RESPIRATION RATE: 18 BRPM | SYSTOLIC BLOOD PRESSURE: 135 MMHG | BODY MASS INDEX: 20.99 KG/M2 | TEMPERATURE: 98.7 F

## 2021-08-26 PROBLEM — J96.21 ACUTE AND CHRONIC RESPIRATORY FAILURE WITH HYPOXIA (HCC): Status: RESOLVED | Noted: 2021-08-05 | Resolved: 2021-08-26

## 2021-08-26 PROBLEM — I50.33 ACUTE ON CHRONIC DIASTOLIC CONGESTIVE HEART FAILURE (HCC): Status: RESOLVED | Noted: 2021-08-21 | Resolved: 2021-08-26

## 2021-08-26 LAB
BACTERIA FLD CULT: NORMAL
GRAM STN SPEC: NORMAL
GRAM STN SPEC: NORMAL
LAB AP CASE REPORT: NORMAL
PATH REPORT.FINAL DX SPEC: NORMAL

## 2021-08-26 PROCEDURE — 99232 SBSQ HOSP IP/OBS MODERATE 35: CPT | Performed by: INTERNAL MEDICINE

## 2021-08-26 PROCEDURE — 63710000001 PREDNISONE PER 5 MG: Performed by: FAMILY MEDICINE

## 2021-08-26 PROCEDURE — 94799 UNLISTED PULMONARY SVC/PX: CPT

## 2021-08-26 PROCEDURE — 99239 HOSP IP/OBS DSCHRG MGMT >30: CPT | Performed by: FAMILY MEDICINE

## 2021-08-26 RX ORDER — POTASSIUM CHLORIDE 750 MG/1
20 CAPSULE, EXTENDED RELEASE ORAL DAILY
Start: 2021-08-26 | End: 2022-02-07

## 2021-08-26 RX ORDER — PREDNISONE 10 MG/1
10 TABLET ORAL
Start: 2021-08-27 | End: 2021-09-03

## 2021-08-26 RX ORDER — MULTIVIT WITH MINERALS/LUTEIN
1000 TABLET ORAL DAILY
Start: 2021-08-26

## 2021-08-26 RX ORDER — PANTOPRAZOLE SODIUM 40 MG/1
40 TABLET, DELAYED RELEASE ORAL
Status: ON HOLD
Start: 2021-08-26 | End: 2022-03-07 | Stop reason: SDUPTHER

## 2021-08-26 RX ORDER — BUDESONIDE 0.5 MG/2ML
0.5 INHALANT ORAL
Status: ON HOLD
Start: 2021-08-26 | End: 2021-10-04

## 2021-08-26 RX ORDER — ALPRAZOLAM 0.5 MG/1
0.5 TABLET ORAL NIGHTLY PRN
Qty: 5 TABLET | Refills: 0 | Status: ON HOLD | OUTPATIENT
Start: 2021-08-26 | End: 2022-02-12 | Stop reason: SDUPTHER

## 2021-08-26 RX ORDER — FERROUS SULFATE 325(65) MG
325 TABLET ORAL 2 TIMES DAILY WITH MEALS
Status: ON HOLD
Start: 2021-08-26 | End: 2022-03-07 | Stop reason: SDUPTHER

## 2021-08-26 RX ORDER — FUROSEMIDE 20 MG/1
20 TABLET ORAL 2 TIMES DAILY
Start: 2021-08-26 | End: 2021-09-19 | Stop reason: HOSPADM

## 2021-08-26 RX ORDER — LEVOTHYROXINE SODIUM 0.05 MG/1
50 TABLET ORAL
Start: 2021-08-26 | End: 2021-09-19 | Stop reason: HOSPADM

## 2021-08-26 RX ADMIN — SODIUM CHLORIDE, PRESERVATIVE FREE 10 ML: 5 INJECTION INTRAVENOUS at 09:10

## 2021-08-26 RX ADMIN — BUDESONIDE 0.5 MG: 0.5 INHALANT RESPIRATORY (INHALATION) at 08:32

## 2021-08-26 RX ADMIN — ARFORMOTEROL TARTRATE 15 MCG: 15 SOLUTION RESPIRATORY (INHALATION) at 08:32

## 2021-08-26 RX ADMIN — Medication 1 CAPSULE: at 09:08

## 2021-08-26 RX ADMIN — BISOPROLOL FUMARATE 5 MG: 5 TABLET, FILM COATED ORAL at 09:07

## 2021-08-26 RX ADMIN — FUROSEMIDE 20 MG: 20 TABLET ORAL at 09:08

## 2021-08-26 RX ADMIN — PANTOPRAZOLE SODIUM 40 MG: 40 TABLET, DELAYED RELEASE ORAL at 09:07

## 2021-08-26 RX ADMIN — OXYCODONE HYDROCHLORIDE AND ACETAMINOPHEN 1000 MG: 500 TABLET ORAL at 09:08

## 2021-08-26 RX ADMIN — LEVOTHYROXINE SODIUM 50 MCG: 50 TABLET ORAL at 04:52

## 2021-08-26 RX ADMIN — Medication 325 MG: at 09:07

## 2021-08-26 RX ADMIN — APIXABAN 5 MG: 5 TABLET, FILM COATED ORAL at 09:07

## 2021-08-26 RX ADMIN — PREDNISONE 10 MG: 10 TABLET ORAL at 09:09

## 2021-08-26 RX ADMIN — MULTIPLE VITAMINS W/ MINERALS TAB 1 TABLET: TAB at 09:09

## 2021-08-26 RX ADMIN — SERTRALINE HYDROCHLORIDE 150 MG: 100 TABLET ORAL at 09:08

## 2021-08-26 RX ADMIN — IPRATROPIUM BROMIDE 0.5 MG: 0.5 SOLUTION RESPIRATORY (INHALATION) at 08:32

## 2021-08-26 RX ADMIN — Medication 1 TABLET: at 09:09

## 2021-08-26 RX ADMIN — IPRATROPIUM BROMIDE 0.5 MG: 0.5 SOLUTION RESPIRATORY (INHALATION) at 13:37

## 2021-08-27 LAB
QT INTERVAL: 328 MS
QTC INTERVAL: 433 MS

## 2021-08-28 ENCOUNTER — HOSPITAL ENCOUNTER (EMERGENCY)
Facility: HOSPITAL | Age: 81
Discharge: NURSING, REHAB OR LONG-TERM CARE | End: 2021-08-29
Attending: EMERGENCY MEDICINE | Admitting: EMERGENCY MEDICINE

## 2021-08-28 ENCOUNTER — APPOINTMENT (OUTPATIENT)
Dept: GENERAL RADIOLOGY | Facility: HOSPITAL | Age: 81
End: 2021-08-28

## 2021-08-28 DIAGNOSIS — S80.12XA LEG HEMATOMA, LEFT, INITIAL ENCOUNTER: Primary | ICD-10-CM

## 2021-08-28 PROCEDURE — 99283 EMERGENCY DEPT VISIT LOW MDM: CPT

## 2021-08-28 PROCEDURE — 73590 X-RAY EXAM OF LOWER LEG: CPT

## 2021-08-28 RX ORDER — CEPHALEXIN 500 MG/1
500 CAPSULE ORAL 3 TIMES DAILY
Qty: 21 CAPSULE | Refills: 0 | Status: SHIPPED | OUTPATIENT
Start: 2021-08-28 | End: 2021-09-04

## 2021-08-28 RX ORDER — CEPHALEXIN 250 MG/1
500 CAPSULE ORAL ONCE
Status: COMPLETED | OUTPATIENT
Start: 2021-08-28 | End: 2021-08-28

## 2021-08-28 RX ORDER — HYDROCODONE BITARTRATE AND ACETAMINOPHEN 5; 325 MG/1; MG/1
1 TABLET ORAL ONCE
Status: COMPLETED | OUTPATIENT
Start: 2021-08-28 | End: 2021-08-28

## 2021-08-28 RX ADMIN — HYDROCODONE BITARTRATE AND ACETAMINOPHEN 1 TABLET: 5; 325 TABLET ORAL at 23:33

## 2021-08-28 RX ADMIN — CEPHALEXIN 500 MG: 250 CAPSULE ORAL at 23:33

## 2021-08-29 VITALS
TEMPERATURE: 96.9 F | WEIGHT: 170 LBS | OXYGEN SATURATION: 98 % | HEIGHT: 66 IN | HEART RATE: 63 BPM | SYSTOLIC BLOOD PRESSURE: 174 MMHG | RESPIRATION RATE: 18 BRPM | DIASTOLIC BLOOD PRESSURE: 67 MMHG | BODY MASS INDEX: 27.32 KG/M2

## 2021-09-13 DIAGNOSIS — Z01.812 BLOOD TESTS PRIOR TO TREATMENT OR PROCEDURE: Primary | ICD-10-CM

## 2021-09-14 ENCOUNTER — APPOINTMENT (OUTPATIENT)
Dept: GENERAL RADIOLOGY | Facility: HOSPITAL | Age: 81
End: 2021-09-14

## 2021-09-14 ENCOUNTER — APPOINTMENT (OUTPATIENT)
Dept: CT IMAGING | Facility: HOSPITAL | Age: 81
End: 2021-09-14

## 2021-09-14 ENCOUNTER — HOSPITAL ENCOUNTER (INPATIENT)
Facility: HOSPITAL | Age: 81
LOS: 5 days | Discharge: HOME | End: 2021-09-19
Attending: EMERGENCY MEDICINE | Admitting: INTERNAL MEDICINE

## 2021-09-14 ENCOUNTER — CLINICAL SUPPORT NO REQUIREMENTS (OUTPATIENT)
Dept: PULMONOLOGY | Facility: CLINIC | Age: 81
End: 2021-09-14

## 2021-09-14 DIAGNOSIS — Z01.812 BLOOD TESTS PRIOR TO TREATMENT OR PROCEDURE: ICD-10-CM

## 2021-09-14 DIAGNOSIS — J18.9 PNEUMONIA DUE TO INFECTIOUS ORGANISM, UNSPECIFIED LATERALITY, UNSPECIFIED PART OF LUNG: Primary | ICD-10-CM

## 2021-09-14 DIAGNOSIS — J96.21 ACUTE ON CHRONIC RESPIRATORY FAILURE WITH HYPOXIA (HCC): ICD-10-CM

## 2021-09-14 DIAGNOSIS — D72.829 LEUKOCYTOSIS, UNSPECIFIED TYPE: ICD-10-CM

## 2021-09-14 DIAGNOSIS — I50.9 CONGESTIVE HEART FAILURE, UNSPECIFIED HF CHRONICITY, UNSPECIFIED HEART FAILURE TYPE (HCC): ICD-10-CM

## 2021-09-14 PROBLEM — I10 ACCELERATED HYPERTENSION: Status: ACTIVE | Noted: 2021-09-14

## 2021-09-14 LAB
ACANTHOCYTES BLD QL SMEAR: NORMAL
ALBUMIN SERPL-MCNC: 3.1 G/DL (ref 3.5–5.2)
ALBUMIN/GLOB SERPL: 1.1 G/DL
ALP SERPL-CCNC: 92 U/L (ref 39–117)
ALT SERPL W P-5'-P-CCNC: 18 U/L (ref 1–33)
ANION GAP SERPL CALCULATED.3IONS-SCNC: 13 MMOL/L (ref 5–15)
ARTERIAL PATENCY WRIST A: ABNORMAL
AST SERPL-CCNC: 22 U/L (ref 1–32)
ATMOSPHERIC PRESS: ABNORMAL MM[HG]
BASE EXCESS BLDA CALC-SCNC: 0.5 MMOL/L (ref 0–2)
BASOPHILS # BLD AUTO: 0.03 10*3/MM3 (ref 0–0.2)
BASOPHILS NFR BLD AUTO: 0.2 % (ref 0–1.5)
BDY SITE: ABNORMAL
BILIRUB SERPL-MCNC: 0.4 MG/DL (ref 0–1.2)
BODY TEMPERATURE: 37 C
BUN SERPL-MCNC: 11 MG/DL (ref 8–23)
BUN/CREAT SERPL: 14.7 (ref 7–25)
BURR CELLS BLD QL SMEAR: NORMAL
CALCIUM SPEC-SCNC: 8.3 MG/DL (ref 8.6–10.5)
CHLORIDE SERPL-SCNC: 104 MMOL/L (ref 98–107)
CO2 BLDA-SCNC: 24.2 MMOL/L (ref 22–33)
CO2 SERPL-SCNC: 21 MMOL/L (ref 22–29)
COHGB MFR BLD: 1.6 % (ref 0–2)
CREAT SERPL-MCNC: 0.75 MG/DL (ref 0.57–1)
D-LACTATE SERPL-SCNC: 3.2 MMOL/L (ref 0.5–2)
DACRYOCYTES BLD QL SMEAR: NORMAL
ELLIPTOCYTES BLD QL SMEAR: NORMAL
EOSINOPHIL # BLD AUTO: 0.02 10*3/MM3 (ref 0–0.4)
EOSINOPHIL NFR BLD AUTO: 0.1 % (ref 0.3–6.2)
EPAP: 0
ERYTHROCYTE [DISTWIDTH] IN BLOOD BY AUTOMATED COUNT: ABNORMAL %
FLUAV SUBTYP SPEC NAA+PROBE: NOT DETECTED
FLUBV RNA ISLT QL NAA+PROBE: NOT DETECTED
GFR SERPL CREATININE-BSD FRML MDRD: 74 ML/MIN/1.73
GLOBULIN UR ELPH-MCNC: 2.8 GM/DL
GLUCOSE SERPL-MCNC: 158 MG/DL (ref 65–99)
HCO3 BLDA-SCNC: 23.2 MMOL/L (ref 20–26)
HCT VFR BLD AUTO: 31.3 % (ref 34–46.6)
HCT VFR BLD CALC: 27.6 %
HGB BLD-MCNC: 9.5 G/DL (ref 12–15.9)
HGB BLDA-MCNC: 9 G/DL (ref 14–18)
HOLD SPECIMEN: NORMAL
HOLD SPECIMEN: NORMAL
IMM GRANULOCYTES # BLD AUTO: 0.09 10*3/MM3 (ref 0–0.05)
IMM GRANULOCYTES NFR BLD AUTO: 0.6 % (ref 0–0.5)
INHALED O2 CONCENTRATION: 60 %
IPAP: 0
LYMPHOCYTES # BLD AUTO: 1.03 10*3/MM3 (ref 0.7–3.1)
LYMPHOCYTES NFR BLD AUTO: 6.9 % (ref 19.6–45.3)
MCH RBC QN AUTO: 27.4 PG (ref 26.6–33)
MCHC RBC AUTO-ENTMCNC: 30.4 G/DL (ref 31.5–35.7)
MCV RBC AUTO: 90.2 FL (ref 79–97)
METHGB BLD QL: 0.4 % (ref 0–1.5)
MODALITY: ABNORMAL
MONOCYTES # BLD AUTO: 1.22 10*3/MM3 (ref 0.1–0.9)
MONOCYTES NFR BLD AUTO: 8.1 % (ref 5–12)
NEUTROPHILS NFR BLD AUTO: 12.63 10*3/MM3 (ref 1.7–7)
NEUTROPHILS NFR BLD AUTO: 84.1 % (ref 42.7–76)
NOTE: ABNORMAL
NRBC BLD AUTO-RTO: 0 /100 WBC (ref 0–0.2)
NT-PROBNP SERPL-MCNC: ABNORMAL PG/ML (ref 0–1800)
OVALOCYTES BLD QL SMEAR: NORMAL
OXYHGB MFR BLDV: 93.9 % (ref 94–99)
PAW @ PEAK INSP FLOW SETTING VENT: 0 CMH2O
PCO2 BLDA: 29.8 MM HG (ref 35–45)
PCO2 TEMP ADJ BLD: 29.8 MM HG (ref 35–45)
PH BLDA: 7.5 PH UNITS (ref 7.35–7.45)
PH, TEMP CORRECTED: 7.5 PH UNITS
PLAT MORPH BLD: NORMAL
PLATELET # BLD AUTO: 287 10*3/MM3 (ref 140–450)
PMV BLD AUTO: 10.1 FL (ref 6–12)
PO2 BLDA: 70.2 MM HG (ref 83–108)
PO2 TEMP ADJ BLD: 70.2 MM HG (ref 83–108)
POTASSIUM SERPL-SCNC: 3.9 MMOL/L (ref 3.5–5.2)
PROCALCITONIN SERPL-MCNC: 0.36 NG/ML (ref 0–0.25)
PROT SERPL-MCNC: 5.9 G/DL (ref 6–8.5)
RBC # BLD AUTO: 3.47 10*6/MM3 (ref 3.77–5.28)
SARS-COV-2 RNA PNL SPEC NAA+PROBE: NOT DETECTED
SCHISTOCYTES BLD QL SMEAR: NORMAL
SODIUM SERPL-SCNC: 138 MMOL/L (ref 136–145)
TOTAL RATE: 0 BREATHS/MINUTE
TROPONIN T SERPL-MCNC: <0.01 NG/ML (ref 0–0.03)
WBC # BLD AUTO: 15.02 10*3/MM3 (ref 3.4–10.8)
WBC MORPH BLD: NORMAL
WHOLE BLOOD HOLD SPECIMEN: NORMAL
WHOLE BLOOD HOLD SPECIMEN: NORMAL

## 2021-09-14 PROCEDURE — 84145 PROCALCITONIN (PCT): CPT | Performed by: NURSE PRACTITIONER

## 2021-09-14 PROCEDURE — 83880 ASSAY OF NATRIURETIC PEPTIDE: CPT | Performed by: EMERGENCY MEDICINE

## 2021-09-14 PROCEDURE — 87636 SARSCOV2 & INF A&B AMP PRB: CPT | Performed by: NURSE PRACTITIONER

## 2021-09-14 PROCEDURE — 85007 BL SMEAR W/DIFF WBC COUNT: CPT | Performed by: EMERGENCY MEDICINE

## 2021-09-14 PROCEDURE — U0005 INFEC AGEN DETEC AMPLI PROBE: HCPCS | Performed by: NURSE PRACTITIONER

## 2021-09-14 PROCEDURE — 25010000002 FUROSEMIDE PER 20 MG: Performed by: NURSE PRACTITIONER

## 2021-09-14 PROCEDURE — 99285 EMERGENCY DEPT VISIT HI MDM: CPT

## 2021-09-14 PROCEDURE — 0 IOPAMIDOL PER 1 ML: Performed by: EMERGENCY MEDICINE

## 2021-09-14 PROCEDURE — 84484 ASSAY OF TROPONIN QUANT: CPT | Performed by: EMERGENCY MEDICINE

## 2021-09-14 PROCEDURE — 71045 X-RAY EXAM CHEST 1 VIEW: CPT

## 2021-09-14 PROCEDURE — 82805 BLOOD GASES W/O2 SATURATION: CPT

## 2021-09-14 PROCEDURE — 36600 WITHDRAWAL OF ARTERIAL BLOOD: CPT

## 2021-09-14 PROCEDURE — 83605 ASSAY OF LACTIC ACID: CPT | Performed by: NURSE PRACTITIONER

## 2021-09-14 PROCEDURE — 80053 COMPREHEN METABOLIC PANEL: CPT | Performed by: EMERGENCY MEDICINE

## 2021-09-14 PROCEDURE — U0004 COV-19 TEST NON-CDC HGH THRU: HCPCS | Performed by: NURSE PRACTITIONER

## 2021-09-14 PROCEDURE — 83050 HGB METHEMOGLOBIN QUAN: CPT

## 2021-09-14 PROCEDURE — 85025 COMPLETE CBC W/AUTO DIFF WBC: CPT | Performed by: EMERGENCY MEDICINE

## 2021-09-14 PROCEDURE — 99211 OFF/OP EST MAY X REQ PHY/QHP: CPT | Performed by: NURSE PRACTITIONER

## 2021-09-14 PROCEDURE — 71275 CT ANGIOGRAPHY CHEST: CPT

## 2021-09-14 PROCEDURE — 99291 CRITICAL CARE FIRST HOUR: CPT | Performed by: INTERNAL MEDICINE

## 2021-09-14 PROCEDURE — 87040 BLOOD CULTURE FOR BACTERIA: CPT | Performed by: NURSE PRACTITIONER

## 2021-09-14 PROCEDURE — 93005 ELECTROCARDIOGRAM TRACING: CPT | Performed by: EMERGENCY MEDICINE

## 2021-09-14 PROCEDURE — 82375 ASSAY CARBOXYHB QUANT: CPT

## 2021-09-14 RX ORDER — LEVOFLOXACIN 5 MG/ML
750 INJECTION, SOLUTION INTRAVENOUS ONCE
Status: DISCONTINUED | OUTPATIENT
Start: 2021-09-14 | End: 2021-09-14

## 2021-09-14 RX ORDER — SODIUM CHLORIDE 0.9 % (FLUSH) 0.9 %
10 SYRINGE (ML) INJECTION AS NEEDED
Status: DISCONTINUED | OUTPATIENT
Start: 2021-09-14 | End: 2021-09-19 | Stop reason: HOSPADM

## 2021-09-14 RX ORDER — FUROSEMIDE 10 MG/ML
40 INJECTION INTRAMUSCULAR; INTRAVENOUS ONCE
Status: COMPLETED | OUTPATIENT
Start: 2021-09-14 | End: 2021-09-14

## 2021-09-14 RX ADMIN — NITROGLYCERIN 1 INCH: 20 OINTMENT TOPICAL at 21:34

## 2021-09-14 RX ADMIN — IOPAMIDOL 75 ML: 755 INJECTION, SOLUTION INTRAVENOUS at 23:37

## 2021-09-14 RX ADMIN — FUROSEMIDE 40 MG: 10 INJECTION, SOLUTION INTRAMUSCULAR; INTRAVENOUS at 21:35

## 2021-09-15 LAB
ANION GAP SERPL CALCULATED.3IONS-SCNC: 11 MMOL/L (ref 5–15)
ANISOCYTOSIS BLD QL: NORMAL
BASOPHILS # BLD AUTO: 0.02 10*3/MM3 (ref 0–0.2)
BASOPHILS NFR BLD AUTO: 0.2 % (ref 0–1.5)
BUN SERPL-MCNC: 9 MG/DL (ref 8–23)
BUN/CREAT SERPL: 13.4 (ref 7–25)
BURR CELLS BLD QL SMEAR: NORMAL
CALCIUM SPEC-SCNC: 8.3 MG/DL (ref 8.6–10.5)
CHLORIDE SERPL-SCNC: 101 MMOL/L (ref 98–107)
CO2 SERPL-SCNC: 21 MMOL/L (ref 22–29)
CREAT SERPL-MCNC: 0.67 MG/DL (ref 0.57–1)
D-LACTATE SERPL-SCNC: 1.9 MMOL/L (ref 0.5–2)
EOSINOPHIL # BLD AUTO: 0.02 10*3/MM3 (ref 0–0.4)
EOSINOPHIL NFR BLD AUTO: 0.2 % (ref 0.3–6.2)
ERYTHROCYTE [DISTWIDTH] IN BLOOD BY AUTOMATED COUNT: ABNORMAL %
GFR SERPL CREATININE-BSD FRML MDRD: 84 ML/MIN/1.73
GLUCOSE SERPL-MCNC: 97 MG/DL (ref 65–99)
HCT VFR BLD AUTO: 26.7 % (ref 34–46.6)
HGB BLD-MCNC: 8 G/DL (ref 12–15.9)
HOLD SPECIMEN: NORMAL
HYPOCHROMIA BLD QL: NORMAL
IMM GRANULOCYTES # BLD AUTO: 0.05 10*3/MM3 (ref 0–0.05)
IMM GRANULOCYTES NFR BLD AUTO: 0.6 % (ref 0–0.5)
L PNEUMO1 AG UR QL IA: NEGATIVE
LYMPHOCYTES # BLD AUTO: 1.49 10*3/MM3 (ref 0.7–3.1)
LYMPHOCYTES NFR BLD AUTO: 16.6 % (ref 19.6–45.3)
MCH RBC QN AUTO: 27 PG (ref 26.6–33)
MCHC RBC AUTO-ENTMCNC: 30 G/DL (ref 31.5–35.7)
MCV RBC AUTO: 90.2 FL (ref 79–97)
MONOCYTES # BLD AUTO: 0.96 10*3/MM3 (ref 0.1–0.9)
MONOCYTES NFR BLD AUTO: 10.7 % (ref 5–12)
MRSA DNA SPEC QL NAA+PROBE: NEGATIVE
NEUTROPHILS NFR BLD AUTO: 6.46 10*3/MM3 (ref 1.7–7)
NEUTROPHILS NFR BLD AUTO: 71.7 % (ref 42.7–76)
NRBC BLD AUTO-RTO: 0 /100 WBC (ref 0–0.2)
PLAT MORPH BLD: NORMAL
PLATELET # BLD AUTO: 228 10*3/MM3 (ref 140–450)
PMV BLD AUTO: 10.4 FL (ref 6–12)
POLYCHROMASIA BLD QL SMEAR: NORMAL
POTASSIUM SERPL-SCNC: 3.2 MMOL/L (ref 3.5–5.2)
RBC # BLD AUTO: 2.96 10*6/MM3 (ref 3.77–5.28)
S PNEUM AG SPEC QL LA: NEGATIVE
SARS-COV-2 RNA NOSE QL NAA+PROBE: NOT DETECTED
SARS-COV-2 RNA RESP QL NAA+PROBE: NOT DETECTED
SODIUM SERPL-SCNC: 133 MMOL/L (ref 136–145)
TSH SERPL DL<=0.05 MIU/L-ACNC: 22.96 UIU/ML (ref 0.27–4.2)
WBC MORPH BLD: NORMAL

## 2021-09-15 PROCEDURE — 87641 MR-STAPH DNA AMP PROBE: CPT

## 2021-09-15 PROCEDURE — 87899 AGENT NOS ASSAY W/OPTIC: CPT | Performed by: NURSE PRACTITIONER

## 2021-09-15 PROCEDURE — 85025 COMPLETE CBC W/AUTO DIFF WBC: CPT | Performed by: NURSE PRACTITIONER

## 2021-09-15 PROCEDURE — 94799 UNLISTED PULMONARY SVC/PX: CPT

## 2021-09-15 PROCEDURE — U0005 INFEC AGEN DETEC AMPLI PROBE: HCPCS | Performed by: INTERNAL MEDICINE

## 2021-09-15 PROCEDURE — 99223 1ST HOSP IP/OBS HIGH 75: CPT | Performed by: INTERNAL MEDICINE

## 2021-09-15 PROCEDURE — 99232 SBSQ HOSP IP/OBS MODERATE 35: CPT | Performed by: INTERNAL MEDICINE

## 2021-09-15 PROCEDURE — 84443 ASSAY THYROID STIM HORMONE: CPT | Performed by: NURSE PRACTITIONER

## 2021-09-15 PROCEDURE — 94640 AIRWAY INHALATION TREATMENT: CPT

## 2021-09-15 PROCEDURE — 99233 SBSQ HOSP IP/OBS HIGH 50: CPT | Performed by: INTERNAL MEDICINE

## 2021-09-15 PROCEDURE — 25010000002 VANCOMYCIN

## 2021-09-15 PROCEDURE — 97165 OT EVAL LOW COMPLEX 30 MIN: CPT

## 2021-09-15 PROCEDURE — 25010000002 FUROSEMIDE PER 20 MG: Performed by: INTERNAL MEDICINE

## 2021-09-15 PROCEDURE — 80048 BASIC METABOLIC PNL TOTAL CA: CPT | Performed by: NURSE PRACTITIONER

## 2021-09-15 PROCEDURE — U0003 INFECTIOUS AGENT DETECTION BY NUCLEIC ACID (DNA OR RNA); SEVERE ACUTE RESPIRATORY SYNDROME CORONAVIRUS 2 (SARS-COV-2) (CORONAVIRUS DISEASE [COVID-19]), AMPLIFIED PROBE TECHNIQUE, MAKING USE OF HIGH THROUGHPUT TECHNOLOGIES AS DESCRIBED BY CMS-2020-01-R: HCPCS | Performed by: INTERNAL MEDICINE

## 2021-09-15 PROCEDURE — 83605 ASSAY OF LACTIC ACID: CPT | Performed by: NURSE PRACTITIONER

## 2021-09-15 PROCEDURE — 85007 BL SMEAR W/DIFF WBC COUNT: CPT | Performed by: NURSE PRACTITIONER

## 2021-09-15 PROCEDURE — 25010000002 CEFTRIAXONE PER 250 MG: Performed by: NURSE PRACTITIONER

## 2021-09-15 PROCEDURE — 25010000002 DIGOXIN PER 500 MCG: Performed by: NURSE PRACTITIONER

## 2021-09-15 PROCEDURE — 25010000002 DIGOXIN PER 500 MCG: Performed by: INTERNAL MEDICINE

## 2021-09-15 RX ORDER — VANCOMYCIN HYDROCHLORIDE 1 G/200ML
15 INJECTION, SOLUTION INTRAVENOUS
Status: DISCONTINUED | OUTPATIENT
Start: 2021-09-15 | End: 2021-09-15

## 2021-09-15 RX ORDER — MAGNESIUM SULFATE HEPTAHYDRATE 40 MG/ML
4 INJECTION, SOLUTION INTRAVENOUS AS NEEDED
Status: DISCONTINUED | OUTPATIENT
Start: 2021-09-15 | End: 2021-09-19 | Stop reason: HOSPADM

## 2021-09-15 RX ORDER — FERROUS SULFATE 325(65) MG
325 TABLET ORAL 2 TIMES DAILY WITH MEALS
Status: DISCONTINUED | OUTPATIENT
Start: 2021-09-15 | End: 2021-09-19 | Stop reason: HOSPADM

## 2021-09-15 RX ORDER — POTASSIUM CHLORIDE 1.5 G/1.77G
40 POWDER, FOR SOLUTION ORAL AS NEEDED
Status: DISCONTINUED | OUTPATIENT
Start: 2021-09-15 | End: 2021-09-19 | Stop reason: HOSPADM

## 2021-09-15 RX ORDER — HYDROCODONE BITARTRATE AND ACETAMINOPHEN 7.5; 325 MG/1; MG/1
1 TABLET ORAL EVERY 6 HOURS PRN
Status: DISCONTINUED | OUTPATIENT
Start: 2021-09-15 | End: 2021-09-19 | Stop reason: HOSPADM

## 2021-09-15 RX ORDER — PANTOPRAZOLE SODIUM 40 MG/1
40 TABLET, DELAYED RELEASE ORAL
Status: DISCONTINUED | OUTPATIENT
Start: 2021-09-15 | End: 2021-09-19 | Stop reason: HOSPADM

## 2021-09-15 RX ORDER — FUROSEMIDE 20 MG/1
20 TABLET ORAL
Status: DISCONTINUED | OUTPATIENT
Start: 2021-09-15 | End: 2021-09-15

## 2021-09-15 RX ORDER — POTASSIUM CHLORIDE 750 MG/1
40 CAPSULE, EXTENDED RELEASE ORAL AS NEEDED
Status: DISCONTINUED | OUTPATIENT
Start: 2021-09-15 | End: 2021-09-19 | Stop reason: HOSPADM

## 2021-09-15 RX ORDER — MAGNESIUM SULFATE HEPTAHYDRATE 40 MG/ML
2 INJECTION, SOLUTION INTRAVENOUS AS NEEDED
Status: DISCONTINUED | OUTPATIENT
Start: 2021-09-15 | End: 2021-09-19 | Stop reason: HOSPADM

## 2021-09-15 RX ORDER — BUDESONIDE 0.5 MG/2ML
0.5 INHALANT ORAL
Status: DISCONTINUED | OUTPATIENT
Start: 2021-09-15 | End: 2021-09-19 | Stop reason: HOSPADM

## 2021-09-15 RX ORDER — BISOPROLOL FUMARATE 5 MG/1
5 TABLET, FILM COATED ORAL
Status: DISCONTINUED | OUTPATIENT
Start: 2021-09-15 | End: 2021-09-19 | Stop reason: HOSPADM

## 2021-09-15 RX ORDER — FUROSEMIDE 10 MG/ML
40 INJECTION INTRAMUSCULAR; INTRAVENOUS ONCE
Status: DISCONTINUED | OUTPATIENT
Start: 2021-09-15 | End: 2021-09-15

## 2021-09-15 RX ORDER — FUROSEMIDE 10 MG/ML
40 INJECTION INTRAMUSCULAR; INTRAVENOUS
Status: COMPLETED | OUTPATIENT
Start: 2021-09-15 | End: 2021-09-17

## 2021-09-15 RX ORDER — ACETAMINOPHEN 325 MG/1
650 TABLET ORAL EVERY 4 HOURS PRN
Status: DISCONTINUED | OUTPATIENT
Start: 2021-09-15 | End: 2021-09-19 | Stop reason: HOSPADM

## 2021-09-15 RX ORDER — SODIUM CHLORIDE 0.9 % (FLUSH) 0.9 %
10 SYRINGE (ML) INJECTION EVERY 12 HOURS SCHEDULED
Status: DISCONTINUED | OUTPATIENT
Start: 2021-09-15 | End: 2021-09-19 | Stop reason: HOSPADM

## 2021-09-15 RX ORDER — DIGOXIN 0.25 MG/ML
250 INJECTION INTRAMUSCULAR; INTRAVENOUS ONCE
Status: COMPLETED | OUTPATIENT
Start: 2021-09-15 | End: 2021-09-15

## 2021-09-15 RX ORDER — ACETAMINOPHEN 650 MG/1
650 SUPPOSITORY RECTAL EVERY 4 HOURS PRN
Status: DISCONTINUED | OUTPATIENT
Start: 2021-09-15 | End: 2021-09-19 | Stop reason: HOSPADM

## 2021-09-15 RX ORDER — ACETAMINOPHEN 160 MG/5ML
650 SOLUTION ORAL EVERY 4 HOURS PRN
Status: DISCONTINUED | OUTPATIENT
Start: 2021-09-15 | End: 2021-09-19 | Stop reason: HOSPADM

## 2021-09-15 RX ORDER — MULTIPLE VITAMINS W/ MINERALS TAB 9MG-400MCG
1 TAB ORAL DAILY
Status: DISCONTINUED | OUTPATIENT
Start: 2021-09-15 | End: 2021-09-19 | Stop reason: HOSPADM

## 2021-09-15 RX ORDER — ASCORBIC ACID 500 MG
1000 TABLET ORAL
Status: DISCONTINUED | OUTPATIENT
Start: 2021-09-15 | End: 2021-09-19 | Stop reason: HOSPADM

## 2021-09-15 RX ORDER — LEVOTHYROXINE SODIUM 0.07 MG/1
75 TABLET ORAL
Status: DISCONTINUED | OUTPATIENT
Start: 2021-09-16 | End: 2021-09-19 | Stop reason: HOSPADM

## 2021-09-15 RX ORDER — SODIUM CHLORIDE 0.9 % (FLUSH) 0.9 %
10 SYRINGE (ML) INJECTION AS NEEDED
Status: DISCONTINUED | OUTPATIENT
Start: 2021-09-15 | End: 2021-09-19 | Stop reason: HOSPADM

## 2021-09-15 RX ORDER — ARFORMOTEROL TARTRATE 15 UG/2ML
15 SOLUTION RESPIRATORY (INHALATION)
Status: DISCONTINUED | OUTPATIENT
Start: 2021-09-15 | End: 2021-09-19 | Stop reason: HOSPADM

## 2021-09-15 RX ORDER — LEVOTHYROXINE SODIUM 0.05 MG/1
50 TABLET ORAL
Status: DISCONTINUED | OUTPATIENT
Start: 2021-09-15 | End: 2021-09-15

## 2021-09-15 RX ORDER — L.ACID,PARA/B.BIFIDUM/S.THERM 8B CELL
1 CAPSULE ORAL DAILY
Status: DISCONTINUED | OUTPATIENT
Start: 2021-09-15 | End: 2021-09-19 | Stop reason: HOSPADM

## 2021-09-15 RX ORDER — ALPRAZOLAM 0.5 MG/1
0.5 TABLET ORAL NIGHTLY PRN
Status: DISCONTINUED | OUTPATIENT
Start: 2021-09-15 | End: 2021-09-19 | Stop reason: HOSPADM

## 2021-09-15 RX ADMIN — Medication 1 CAPSULE: at 09:12

## 2021-09-15 RX ADMIN — BUDESONIDE 0.5 MG: 0.5 INHALANT RESPIRATORY (INHALATION) at 08:01

## 2021-09-15 RX ADMIN — HYDROCODONE BITARTRATE AND ACETAMINOPHEN 1 TABLET: 7.5; 325 TABLET ORAL at 16:33

## 2021-09-15 RX ADMIN — ALPRAZOLAM 0.5 MG: 0.5 TABLET ORAL at 21:30

## 2021-09-15 RX ADMIN — APIXABAN 5 MG: 5 TABLET, FILM COATED ORAL at 20:54

## 2021-09-15 RX ADMIN — Medication 325 MG: at 09:12

## 2021-09-15 RX ADMIN — SODIUM CHLORIDE 1 G: 900 INJECTION INTRAVENOUS at 00:22

## 2021-09-15 RX ADMIN — Medication 325 MG: at 16:33

## 2021-09-15 RX ADMIN — PANTOPRAZOLE SODIUM 40 MG: 40 TABLET, DELAYED RELEASE ORAL at 06:23

## 2021-09-15 RX ADMIN — BUDESONIDE 0.5 MG: 0.5 INHALANT RESPIRATORY (INHALATION) at 20:42

## 2021-09-15 RX ADMIN — SODIUM CHLORIDE, PRESERVATIVE FREE 10 ML: 5 INJECTION INTRAVENOUS at 20:54

## 2021-09-15 RX ADMIN — PANTOPRAZOLE SODIUM 40 MG: 40 TABLET, DELAYED RELEASE ORAL at 16:33

## 2021-09-15 RX ADMIN — IPRATROPIUM BROMIDE 0.5 MG: 0.5 SOLUTION RESPIRATORY (INHALATION) at 12:46

## 2021-09-15 RX ADMIN — BISOPROLOL FUMARATE 5 MG: 5 TABLET, FILM COATED ORAL at 09:12

## 2021-09-15 RX ADMIN — OXYCODONE HYDROCHLORIDE AND ACETAMINOPHEN 1000 MG: 500 TABLET ORAL at 09:12

## 2021-09-15 RX ADMIN — DIGOXIN 250 MCG: 0.25 INJECTION INTRAMUSCULAR; INTRAVENOUS at 22:35

## 2021-09-15 RX ADMIN — IPRATROPIUM BROMIDE 0.5 MG: 0.5 SOLUTION RESPIRATORY (INHALATION) at 20:41

## 2021-09-15 RX ADMIN — HYDROCODONE BITARTRATE AND ACETAMINOPHEN 1 TABLET: 7.5; 325 TABLET ORAL at 11:03

## 2021-09-15 RX ADMIN — LEVOTHYROXINE SODIUM 50 MCG: 50 TABLET ORAL at 06:23

## 2021-09-15 RX ADMIN — ALPRAZOLAM 0.5 MG: 0.5 TABLET ORAL at 02:53

## 2021-09-15 RX ADMIN — APIXABAN 5 MG: 5 TABLET, FILM COATED ORAL at 09:12

## 2021-09-15 RX ADMIN — MULTIPLE VITAMINS W/ MINERALS TAB 1 TABLET: TAB at 09:11

## 2021-09-15 RX ADMIN — DIGOXIN 250 MCG: 0.25 INJECTION INTRAMUSCULAR; INTRAVENOUS at 18:41

## 2021-09-15 RX ADMIN — ARFORMOTEROL TARTRATE 15 MCG: 15 SOLUTION RESPIRATORY (INHALATION) at 20:41

## 2021-09-15 RX ADMIN — POTASSIUM CHLORIDE 40 MEQ: 750 CAPSULE, EXTENDED RELEASE ORAL at 18:41

## 2021-09-15 RX ADMIN — SERTRALINE HYDROCHLORIDE 150 MG: 100 TABLET ORAL at 09:12

## 2021-09-15 RX ADMIN — IPRATROPIUM BROMIDE 0.5 MG: 0.5 SOLUTION RESPIRATORY (INHALATION) at 16:46

## 2021-09-15 RX ADMIN — DOXYCYCLINE 100 MG: 100 INJECTION, POWDER, LYOPHILIZED, FOR SOLUTION INTRAVENOUS at 21:27

## 2021-09-15 RX ADMIN — FUROSEMIDE 40 MG: 10 INJECTION, SOLUTION INTRAMUSCULAR; INTRAVENOUS at 09:11

## 2021-09-15 RX ADMIN — IPRATROPIUM BROMIDE 0.5 MG: 0.5 SOLUTION RESPIRATORY (INHALATION) at 08:01

## 2021-09-15 RX ADMIN — FUROSEMIDE 40 MG: 10 INJECTION, SOLUTION INTRAMUSCULAR; INTRAVENOUS at 16:33

## 2021-09-15 RX ADMIN — ARFORMOTEROL TARTRATE 15 MCG: 15 SOLUTION RESPIRATORY (INHALATION) at 08:02

## 2021-09-15 RX ADMIN — SODIUM CHLORIDE 1 G: 900 INJECTION INTRAVENOUS at 20:54

## 2021-09-15 RX ADMIN — VANCOMYCIN HYDROCHLORIDE 1250 MG: 10 INJECTION, POWDER, LYOPHILIZED, FOR SOLUTION INTRAVENOUS at 00:54

## 2021-09-15 RX ADMIN — POTASSIUM CHLORIDE 40 MEQ: 750 CAPSULE, EXTENDED RELEASE ORAL at 22:35

## 2021-09-15 NOTE — H&P
[facility] Medicine Services  HISTORY AND PHYSICAL    Patient Name: Yin Law  : 1940  MRN: [id number]  Primary Care Physician: Santiago Chaudhry MD  Date of admission: 2021    Subjective   Subjective     Chief Complaint:  Shortness of air     HPI:  Yin Law is a 81 y.o. female with a past medical history significant for COPD on 2L NC at baseline, diastolic congestive heart failure, rheumatoid arthritis, anxiety, depression, essential hypertension and atrial fibrillation presents to the ED with complaints of shortness of air.  Patient was recently discharged from [facility] from 2021 to 2021 secondary to acute on chronic diastolic congestive heart failure along with acute on chronic hypoxemic respiratory failure.  She underwent left thoracentesis on 2021 with transudate fluid removal.  She was discharged to [facility] for rehab.  Daughter at bedside tonight reports she was discharged from [facility] a few days ago.  Today she had been complaining of increased shortness of breath in which she had to increase her baseline oxygen up to 2.5 L but she continued to have no relief therefore EMS was called.  At some point she was seen by an outpatient facility today and had a Covid test performed.  Daughter reports upon EMS arrival they had to continue to increase her oxygen requirements.  Patient currently denies any fever, chills, nausea, vomiting, worsening cough, chest pain, dysuria or abdominal pain.  She does however report increased bilateral lower extremity edema along with diarrhea.  Currently she is requiring 45 L of oxygen on high flow with an oxygen saturation of 91 to 92%.  Chest x-ray shows extensive bilateral pulmonary parenchymal infiltrates with an upper lobe predominance showing mild interval worsening since 2021.  Patient will be admitted to [facility] under the care of the hospitalist for evaluation  "treatment.    Attending HPI;   82 y/o female w/ hx of 2L COPD, diastolic CHF, afib here w/ dyspnea.  Pt recently here 8/21-8/26 with CHF.  Had thoracentesis on 8/23 w/ 450 ml removed.  Pt here now w/ increased o2 requirement to 2.5 L.  Pt states she is just \"really tired\".  Pt states her sx all started after covid vaccine and moncho concurs.  Pt currently feels better on hi flow.  Has lower extremity edema as well.  Notes lle hematoma/wound and HH has been addressing.  Per Moncho, it has been improving.      COVID Details:    Symptoms:    [] NONE [] Fever []  Cough [x] Shortness of breath [] Change in taste/smell      The patient has a COVID HM Topic on their chart, and they are fully vaccinated.      Review of Systems   Constitutional: Positive for activity change, appetite change and fatigue. Negative for chills, diaphoresis, fever and unexpected weight change.   HENT: Negative.    Eyes: Negative for photophobia and visual disturbance.   Respiratory: Positive for shortness of breath. Negative for cough.    Cardiovascular: Positive for leg swelling. Negative for chest pain and palpitations.   Gastrointestinal: Positive for diarrhea. Negative for abdominal distention, abdominal pain, constipation, nausea and vomiting.   Genitourinary: Negative.    Musculoskeletal: Negative.    Skin: Negative.    Neurological: Positive for weakness. Negative for dizziness, speech difficulty, light-headedness and numbness.   Psychiatric/Behavioral: Negative.          All other systems reviewed and are negative.     Personal History     Past Medical History:   Diagnosis Date   • Anxiety and depression    • Arrhythmia     A-FIB   • Asthma    • Chicken pox    • COPD (chronic obstructive pulmonary disease) (CMS/HCC)    • Hyperlipidemia    • Hypertension    • Measles    • Menopause    • Osteoporosis    • Rheumatoid arthritis (CMS/HCC)    • Rheumatoid arthritis (CMS/HCC)    • Tobacco abuse        Past Surgical History:   Procedure Laterality Date "   • APPENDECTOMY     • CARPAL TUNNEL RELEASE Left    • CATARACT EXTRACTION, BILATERAL     • COLON SURGERY     • COLONOSCOPY     • GALLBLADDER SURGERY     • HYSTERECTOMY  1971   • TONSILLECTOMY         Family History: family history includes Alzheimer's disease in her mother; Hypertension in her brother; No Known Problems in her father, maternal grandfather, maternal grandmother, paternal grandfather, paternal grandmother, and sister; Parkinsonism in her brother. Otherwise pertinent FHx was reviewed and unremarkable.     Social History:  reports that she quit smoking about 4 months ago. Her smoking use included cigarettes. She smoked 0.25 packs per day. She has never used smokeless tobacco. She reports that she does not drink alcohol and does not use drugs.  Social History     Social History Narrative    Caffeine: 8 oz        Medications:  ALPRAZolam, HYDROcodone-acetaminophen, Vitamin D (Ergocalciferol), apixaban, ascorbic acid, bisoprolol, budesonide, ferrous sulfate, fish oil, furosemide, lactobacillus acidophilus, levothyroxine, multivitamin with minerals, pantoprazole, potassium chloride, sertraline, and tiotropium bromide-olodaterol    Allergies   Allergen Reactions   • Amiodarone Unknown - High Severity       Objective   Objective     Vital Signs:   Temp:  [98.4 °F (36.9 °C)] 98.4 °F (36.9 °C)  Heart Rate:  [71-92] 74  Resp:  [20-22] 20  BP: (144-201)/() 144/66  Flow (L/min):  [15] 15    Physical Exam  Vitals and nursing note reviewed.   Constitutional:       General: She is not in acute distress.     Appearance: Normal appearance. She is ill-appearing. She is not toxic-appearing or diaphoretic.   HENT:      Head: Normocephalic and atraumatic.      Nose: Nose normal.      Mouth/Throat:      Mouth: Mucous membranes are dry.      Pharynx: Oropharynx is clear.   Eyes:      Conjunctiva/sclera: Conjunctivae normal.      Pupils: Pupils are equal, round, and reactive to light.   Cardiovascular:      Rate and  Rhythm: Normal rate and regular rhythm.      Pulses: Normal pulses.      Heart sounds: Normal heart sounds.   Pulmonary:      Effort: Pulmonary effort is normal.      Comments: Crackles to bilateral upper lobes, decreased breath sounds to bilateral lower lobes  Abdominal:      General: Bowel sounds are normal. There is no distension.      Palpations: Abdomen is soft. There is no mass.      Tenderness: There is no abdominal tenderness. There is no right CVA tenderness, left CVA tenderness, guarding or rebound.      Hernia: No hernia is present.   Musculoskeletal:         General: Tenderness present. No swelling, deformity or signs of injury. Normal range of motion.      Cervical back: Normal range of motion and neck supple.      Right lower leg: Edema present.      Left lower leg: Edema present.   Neurological:      General: No focal deficit present.      Mental Status: She is alert and oriented to person, place, and time. Mental status is at baseline.   Psychiatric:         Mood and Affect: Mood normal.         Behavior: Behavior normal.         Thought Content: Thought content normal.         Judgment: Judgment normal.      separate exam performed by me w/ no changes to the above other than tachypnea but able to speak in full sentences      Results Reviewed:  I have personally reviewed most recent indicated data and agree with findings including:  [x]  Laboratory  [x]  Radiology  [x]  EKG/Telemetry  []  Pathology  []  Cardiac/Vascular Studies  []  Old records  []  Other:  Most pertinent findings include:  bnp 11,917  La 3.2  procal 0.36  Wbc 15  hgb 9.5      LAB RESULTS:      Lab 09/14/21 2049 09/14/21 2023   WBC  --  15.02*   HEMOGLOBIN  --  9.5*   HEMATOCRIT  --  31.3*   PLATELETS  --  287   NEUTROS ABS  --  12.63*   IMMATURE GRANS (ABS)  --  0.09*   LYMPHS ABS  --  1.03   MONOS ABS  --  1.22*   EOS ABS  --  0.02   MCV  --  90.2   PROCALCITONIN 0.36*  --    LACTATE 3.2*  --          Lab 09/14/21 2049   SODIUM  138   POTASSIUM 3.9   CHLORIDE 104   CO2 21.0*   ANION GAP 13.0   BUN 11   CREATININE 0.75   GLUCOSE 158*   CALCIUM 8.3*         Lab 09/14/21 2049   TOTAL PROTEIN 5.9*   ALBUMIN 3.10*   GLOBULIN 2.8   ALT (SGPT) 18   AST (SGOT) 22   BILIRUBIN 0.4   ALK PHOS 92         Lab 09/14/21 2049   PROBNP 11,917.0*   TROPONIN T <0.010                 Brief Urine Lab Results  (Last result in the past 365 days)      Color   Clarity   Blood   Leuk Est   Nitrite   Protein   CREAT   Urine HCG        08/21/21 2314 Yellow Clear Negative Negative Positive Negative             Microbiology Results (last 10 days)     Procedure Component Value - Date/Time    COVID PRE-OP / PRE-PROCEDURE SCREENING ORDER (NO ISOLATION) - Swab, Nasopharynx [[id number]]  (Normal) Collected: 09/14/21 2026    Lab Status: Final result Specimen: Swab from Nasopharynx Updated: 09/14/21 2056    Narrative:      The following orders were created for panel order COVID PRE-OP / PRE-PROCEDURE SCREENING ORDER (NO ISOLATION) - Swab, Nasopharynx.  Procedure                               Abnormality         Status                     ---------                               -----------         ------                     COVID-19 and FLU A/B PCR...[[id number]]  Normal              Final result                 Please view results for these tests on the individual orders.    COVID-19 and FLU A/B PCR - Swab, Nasopharynx [[id number]]  (Normal) Collected: 09/14/21 2026    Lab Status: Final result Specimen: Swab from Nasopharynx Updated: 09/14/21 2056     COVID19 Not Detected     Influenza A PCR Not Detected     Influenza B PCR Not Detected    Narrative:      Fact sheet for providers: https://www.fda.gov/media/998408/download    Fact sheet for patients: https://www.fda.gov/media/461390/download    Test performed by PCR.          XR Chest 1 View    Result Date: 9/14/2021  CR Chest 1 Vw INDICATION: Short of air triage. Covid rule out. COMPARISON:  Chest x-ray 8/25/2021. FINDINGS:  Portable AP view(s) of the chest.  Cardiac silhouette is top normal and partly obscured by pulmonary parenchymal pathology. There are extensive bilateral infiltrates with an upper lobe predominance though there is also airspace disease at the left mid to lower lung and patchy airspace disease at the right midlung. This is worse than on the prior study. On comparison to films dating back to May 2021, the infiltrates are essentially new since that time. Please correlate with the clinical course.     Impression: Extensive bilateral pulmonary parenchymal infiltrates with an upper lobe predominance showing mild interval worsening since 8/25/2021. This could be due to Covid pneumonia though other infectious or inflammatory disease certainly in the differential. Clinical correlation and follow-up recommended. Signer Name: Amy Trejo MD  Signed: 9/14/2021 9:00 PM  Workstation Name: [email or web address]  [facility]      Results for orders placed during the hospital encounter of 07/19/21    Adult Transthoracic Echo Complete W/ Cont if Necessary Per Protocol    Interpretation Summary  · Estimated left ventricular EF = 65%  · Mild aortic valve stenosis is present.  · Aortic valve maximum pressure gradient is 32.9 mmHg. Aortic valve mean pressure gradient is 17.2 mmHg.  · Mild mitral valve regurgitation is present.  · Mild tricuspid valve regurgitation is present.  · Estimated right ventricular systolic pressure from tricuspid regurgitation is 37.0 mmHg.  · There is a large left pleural effusion. There is a moderate sized right pleural effusion.      Assessment/Plan   Assessment & Plan       Multifocal pneumonia    COPD on home oxygen (CMS/HCC)    Rheumatoid arthritis (CMS/HCC)    Sepsis (CMS/HCC)    Acute diastolic CHF (CMS/HCC)    Accelerated hypertension      81-year-old female presents the ED with worsening shortness of breath.      1) sepsis      Multifocal pneumonia  **ABG w/ severe hypoxemia and ARDS by  criteria of pO2 70 w/ FiO2 60%  **clinically improving with high flow  **awaiting CCTA but will consult pulm in a.m. +/- ID  **broaden abx coverage  **consider repeat COVID however negative at OSH and negative here; has been vaccinated  -Lactic acid 3.2, WBC 15.02  -Likely secondary to multifocal pneumonia  -Blood cultures x2  -Reflex lactic pending  -Chest x-ray as noted above  -Obtain CTA of chest and ABG now  -Received 1 dose of Levaquin in the ED will discontinue and start vancomycin and Rocephin  -Obtain MRSA PCR  -Check urine antigens  -COVID-19 negative  -Sputum culture  -Continuous pulse ox, keep O2 saturation greater than 90%    2) acute on chronic diastolic CHF  **fluid status will be difficult to assess given sepsis, lactic acidosis and underlying CHF hx; monitor closely  -proBNP 11,917  -Status post 40 mg of Lasix in the ED  -Continue home Lasix dose  -Strict I's and O's  -Daily weight  -Last echo 7/20/2021 with a EF of 65%, mild aortic valve stenosis, mitral valve regurgitation, mild TVR, RSVP 37  -Consider cardiology consult    3) accelerated hypertension  **may have contributed to above if pt had flash pulm edema  -BP on arrival 201/104  -Nitropaste placed in the ED, will continue  -Continue home Zebeta    4) hypothyroidism  -Continue Synthroid    5) oxygen dependent COPD  -Wears 2 L nasal cannula at baseline  -Continue home inhalers  -Continuous pulse ox  -ABG pending  -Scheduled and as needed DuoNeb's    6) PAF  -Currently in normal sinus rhythm  -on eliquis     7) Anxiety/depression   -on xanax       DVT prophylaxis:  eliquis     CODE STATUS:  CPR, No intubation     This note has been completed as part of a split-shared workflow.     Signature: Electronically signed by AMA Lim, 09/14/21, 10:38 PM EDT.     60 minutes of critical care provided. This time excludes other billable procedures. Time does include preparation of documents, medical consultations, review of old records, and  direct bedside care. Patient was at high risk for life-threatening deterioration due to  ARDS, sepsis.     Electronically signed by Hyacinth Vega MD, 09/14/21, 11:39 PM EDT.

## 2021-09-15 NOTE — PROGRESS NOTES
[facility] at   IP Progress Note      Chief Complaint/Reason for service: Acute on chronic heart failure with preserved EF    Subjective   Subjective: Patient of Dr. Montoya's with PAF previously on amiodarone but it was discontinued due to the concern for amiodarone toxicity.  No documented CAD had a negative nuclear perfusion scan January 2020.  Patient's echocardiogram showed moderate mitral stenosis with a significantly thickened valve and mild aortic stenosis.  She presents now with increasing shortness of breath with a significantly elevated BNP.  She has O2 dependent COPD.  The patient states her primary complaint is she just feels weak and tired.  States her breathing progressively worsened over the last several days to a couple weeks she also noticed increased lower extremity edema.  She denies palpitations.  She denies tightness heaviness squeezing or pressure in her chest jaw throat arms.  She denies syncope.  She states there are times when her breathing at rest is fine.  She gets short of breath with minimal activity she denies blood in her stool    Past medical, surgical, social and family history reviewed in the patient's electronic medical record.    Objective     Vital Sign Min/Max for last 24 hours  Temp  Min: 98.4 °F (36.9 °C)  Max: 98.7 °F (37.1 °C)   BP  Min: 105/46  Max: 201/104   Pulse  Min: 61  Max: 92   Resp  Min: 20  Max: 24   SpO2  Min: 88 %  Max: 100 %   Flow (L/min)  Min: 6  Max: 40      Intake/Output Summary (Last 24 hours) at 9/15/2021 1102  Last data filed at 9/15/2021 0920  Gross per 24 hour   Intake 1103 ml   Output --   Net 1103 ml             Current Facility-Administered Medications:   •  [START ON 9/16/2021] ! Vancomycin trough level before 12:00 Noon dose on Thursday 9/16/21; Hold dose until level checked by a pharmacist, , Does not apply, Once, Guerrero Camejo [facility]  •  acetaminophen (TYLENOL) tablet 650 mg, 650 mg, Oral, Q4H PRN **OR**  acetaminophen (TYLENOL) 160 MG/5ML solution 650 mg, 650 mg, Oral, Q4H PRN **OR** acetaminophen (TYLENOL) suppository 650 mg, 650 mg, Rectal, Q4H PRN, Chana, Eboni, APRN  •  ALPRAZolam (XANAX) tablet 0.5 mg, 0.5 mg, Oral, Nightly PRN, Chana, Eboni, APRN, 0.5 mg at 09/15/21 0253  •  apixaban (ELIQUIS) tablet 5 mg, 5 mg, Oral, Q12H, Chana, Eboni, APRN, 5 mg at 09/15/21 0912  •  arformoterol (BROVANA) nebulizer solution 15 mcg, 15 mcg, Nebulization, BID - RT, 15 mcg at 09/15/21 0802 **AND** ipratropium (ATROVENT) nebulizer solution 0.5 mg, 0.5 mg, Nebulization, 4x Daily - RT, Chana, Eboni, APRN, 0.5 mg at 09/15/21 0801  •  ascorbic acid (VITAMIN C) tablet 1,000 mg, 1,000 mg, Oral, Daily With Breakfast, Chana, Eboni, APRN, 1,000 mg at 09/15/21 0912  •  bisoprolol (ZEBeta) tablet 5 mg, 5 mg, Oral, Q24H, Chana, Eboni, APRN, 5 mg at 09/15/21 0912  •  budesonide (PULMICORT) nebulizer solution 0.5 mg, 0.5 mg, Nebulization, BID - RT, Chana, Eboni, APRN, 0.5 mg at 09/15/21 0801  •  cefTRIAXone (ROCEPHIN) 1 g/100 mL 0.9% NS (MBP), 1 g, Intravenous, Q24H, Chana, Eboni, APRN, Stopped at 09/15/21 0055  •  ferrous sulfate tablet 325 mg, 325 mg, Oral, BID With Meals, Chana, Eboni, APRN, 325 mg at 09/15/21 0912  •  furosemide (LASIX) injection 40 mg, 40 mg, Intravenous, BID, Mellisa Delgado MD, 40 mg at 09/15/21 0911  •  HYDROcodone-acetaminophen (NORCO) 7.5-325 MG per tablet 1 tablet, 1 tablet, Oral, Q6H PRN, Mellisa Delgado MD  •  lactobacillus acidophilus (RISAQUAD) capsule 1 capsule, 1 capsule, Oral, Daily, Chana, Eboni, APRN, 1 capsule at 09/15/21 0912  •  [START ON 9/16/2021] levothyroxine (SYNTHROID, LEVOTHROID) tablet 75 mcg, 75 mcg, Oral, Q AM, Mellisa Delgado MD  •  Magnesium Sulfate 2 gram Bolus, followed by 8 gram infusion (total Mg dose 10 grams)- Mg less than or equal to 1mg/dL, 2 g, Intravenous, PRN **OR** Magnesium Sulfate 2 gram / 50mL Infusion (GIVE X 3 BAGS TO EQUAL 6GM TOTAL  DOSE) - Mg 1.1 - 1.5 mg/dl, 2 g, Intravenous, PRN **OR** Magnesium Sulfate 4 gram infusion- Mg 1.6-1.9 mg/dL, 4 g, Intravenous, PRN, Mellisa Delgado MD  •  multivitamin with minerals 1 tablet, 1 tablet, Oral, Daily, Chana, Eboni, APRN, 1 tablet at 09/15/21 0911  •  pantoprazole (PROTONIX) EC tablet 40 mg, 40 mg, Oral, BID AC, Chana, Eboni, APRN, 40 mg at 09/15/21 0623  •  Pharmacy Consult - [facility], , Does not apply, Daily, Braxton Adan, [facility]  •  Pharmacy to dose vancomycin, , Does not apply, Continuous PRN, Chana, Eboni, APRN  •  potassium chloride (KLOR-CON) packet 40 mEq, 40 mEq, Oral, PRN, Mellisa Delgado MD  •  potassium chloride (MICRO-K) CR capsule 40 mEq, 40 mEq, Oral, PRN, Mellisa Delgado MD  •  sertraline (ZOLOFT) tablet 150 mg, 150 mg, Oral, Daily, Chana, Eboni, APRN, 150 mg at 09/15/21 0912  •  sodium chloride 0.9 % flush 10 mL, 10 mL, Intravenous, PRN, Matty Lawson MD  •  sodium chloride 0.9 % flush 10 mL, 10 mL, Intravenous, Q12H, Chana, Eboni, APRN  •  sodium chloride 0.9 % flush 10 mL, 10 mL, Intravenous, PRN, Chana, Eboni, APRN  •  vancomycin (VANCOCIN) in iso-osmotic dextrose IVPB 1 g (premix) 200 mL, 15 mg/kg, Intravenous, Q18H, Guerrero Camejo, [facility]    Physical Exam: General thin elderly ill-appearing female with resting tachypnea sat 94%        HEENT: No JVP, no icterus       Respiratory: Equal bilateral symmetrical expansion with crackles anteriorly and posteriorly no wheezes or rhonchi       Cardiovascular: Regular rate and rhythm with a grade 3-4 systolic ejection murmur and no edema to palpation with 2+ DP pulses bilaterally       GI: Soft and flat       Lower Extremities: Stasis abnormalities and mild erythema is noted on the right lower extremity left lower extremity is wrapped       Neuro: Facial expressions are symmetrical moves all 4 extremities       Skin: Warm and dry       Psych: Pleasant affect    Results Review: I personally reviewed her echocardiogram and  it shows significant thickening and reduced excursion of the posterior leaf of the mitral valve there is eccentric MR could be worsened it appears EF is normal.  Hemoglobin is 8.0 last time was checked was 9.5.  TSH is elevated 22 BNP is 11,917.  Prior to that it was 7704.  Heart rates in the 70s.  Blood pressure is good.    Radiology Results:  Imaging Results (Last 72 Hours)     Procedure Component Value Units Date/Time    CT Angiogram Chest [[id number]] Collected: 09/14/21 2355     Updated: 09/14/21 2357    Narrative:      CTA Chest    INDICATION:   Pneumonia with shortness of breath.    TECHNIQUE:   CT angiogram of the chest with 100 cc of Isovue-300 IV contrast. 3-D reconstructions were obtained and reviewed.   Radiation dose reduction techniques included automated exposure control or exposure modulation based on body size. Count of known CT and  cardiac nuc med studies performed in previous 12 months: 3.     COMPARISON:   8/23/2021    FINDINGS:   Chest images at mediastinal window show moderately large bilateral pleural effusions. Pleural fluid volumes are slightly greater since the previous scan. There are no pulmonary artery filling defects to suggest emboli. The CT angiographic images also  show no evidence of emboli.    Chest images at lung window show extensive bilateral infiltrates with underlying emphysema. The infiltrates appear similar to the previous scan in August. There is more perhaps increased infiltrate in the right upper lobe compared to the previous scan.      Impression:      Emphysema with extensive bilateral infiltrates with slight worsening in the right upper lobe since the previous scan. Bilateral pleural effusions appear slightly larger. No evidence of pulmonary embolism.    Signer Name: Gomez Luna MD   Signed: 9/14/2021 11:55 PM   Workstation Name: [id number]    [facility]    XR Chest 1 View [[id number]] Collected: 09/14/21 2100     Updated: 09/14/21 2102     Narrative:      CR Chest 1 Vw    INDICATION:   Short of air triage. Covid rule out.     COMPARISON:    Chest x-ray 8/25/2021.    FINDINGS:  Portable AP view(s) of the chest.  Cardiac silhouette is top normal and partly obscured by pulmonary parenchymal pathology. There are extensive bilateral infiltrates with an upper lobe predominance though there is also airspace disease at the left mid to  lower lung and patchy airspace disease at the right midlung. This is worse than on the prior study. On comparison to films dating back to May 2021, the infiltrates are essentially new since that time. Please correlate with the clinical course.      Impression:      Extensive bilateral pulmonary parenchymal infiltrates with an upper lobe predominance showing mild interval worsening since 8/25/2021. This could be due to Covid pneumonia though other infectious or inflammatory disease certainly in the differential.  Clinical correlation and follow-up recommended.    Signer Name: Amy Trejo MD   Signed: 9/14/2021 9:00 PM   Workstation Name: [email or web address]    [facility]          EKG: Sinus rhythm nonspecific abnormalities    ECHO: Echo EF is normal significant abnormalities were noted of the mitral valve    Tele: Sinus rhythm no A. fib    Assessment   Assessment/Plan: 1 acute on chronic heart failure with preserved EF-patient's BNP was significantly elevated.  I agree with IV Lasix.  2 valvular heart disease-her mitral stenosis as well as her MR may be worse than with the echocardiogram as documented.  However she is old and frail not a candidate for any aggressive evaluation  3 PAF there is been no atrial fibrillation documented here since admission.  Patient is not anticoagulated due to significant GI bleeding  4 patient has significant lung disease and would benefit from a higher hemoglobin  5 her clinical exam with anterior crackles suggest something more than just heart failure is going on  Dr. Rashard Montoya  will resume her care tomorrow  Gomez Nolen MD  09/15/21  11:02 EDT

## 2021-09-15 NOTE — PLAN OF CARE
Goal Outcome Evaluation:  Plan of Care Reviewed With: patient        Progress: no change  Outcome Summary: OT evaluation completed with pt. demonstrated decreased strength, balance and endurance along with pain limiting ADL independence warranting continued skilled OT services. Pt. min A supine to sit, sit to stand and mvmt to recliner. SBA to min A expected lower level BADL tasks and Mod A higher level ADL tasks grossly with many rest periods. R Knee pain limiting all standing tasks. Pt. would likely benefit from IRF stay, but pt. verbalizing she would prefer home with family assist and HH therapy services.

## 2021-09-15 NOTE — PLAN OF CARE
Goal Outcome Evaluation:  Plan of Care Reviewed With: patient        Progress: no change  Outcome Summary: VSS. patients BP does run a little on the low side. SBP in 90s. patient is on high flow NC, 50L at 50%. tried multiple times to wean patient to 6L NC and wean down on high flow but would desat into the 80s. Patient had complaints of pain throughout the shift. PRN medicaiton given as requested. patient up to chair currently. Urine output is good since given lasix. Patient lower extremities are mildly swollen. Left leg is wrapped up with kurlex due to weeping. Cont to monitor

## 2021-09-15 NOTE — PROGRESS NOTES
[facility] Medicine Services  PROGRESS NOTE    Patient Name: Yin Law  : 1940  MRN: [id number]    Date of Admission: 2021  Primary Care Physician: Santiago Chaudhry MD    Subjective   Subjective     CC:  F/U dyspnea    HPI:  She is feeling a little better today.    ROS:  Gen- No fevers, chills  CV- No chest pain, palpitations  Resp- No cough, + dyspnea  GI- No N/V/D, abd pain      Objective   Objective     Vital Signs:   Temp:  [98.4 °F (36.9 °C)-98.7 °F (37.1 °C)] 98.4 °F (36.9 °C)  Heart Rate:  [61-92] 70  Resp:  [20-24] 20  BP: (105-201)/() 105/46  Flow (L/min):  [15-40] 40     Physical Exam:  Constitutional: No acute distress, awake, alert, sitting up in chair  HENT: NCAT, mucous membranes moist  Respiratory: crackles bilaterally, respiratory effort normal on high flow nasal cannula  Cardiovascular: RRR, no murmurs, rubs, or gallops  Gastrointestinal: Positive bowel sounds, soft, nontender, nondistended  Musculoskeletal: 1+ bilateral ankle edema  Psychiatric: Appropriate affect, cooperative  Neurologic: Cranial Nerves grossly intact to confrontation, speech clear  Skin: No rashes on exposed surfaces    Results Reviewed:  LAB RESULTS:      Lab 09/15/21  0615 09/15/21  0008 21   WBC  --   --   --  15.02*   HEMOGLOBIN 8.0*  --   --  9.5*   HEMATOCRIT 26.7*  --   --  31.3*   PLATELETS 228  --   --  287   NEUTROS ABS 6.46  --   --  12.63*   IMMATURE GRANS (ABS) 0.05  --   --  0.09*   LYMPHS ABS 1.49  --   --  1.03   MONOS ABS 0.96*  --   --  1.22*   EOS ABS 0.02  --   --  0.02   MCV 90.2  --   --  90.2   PROCALCITONIN  --   --  0.36*  --    LACTATE  --  1.9 3.2*  --          Lab 09/15/21  0615 09/14/21  2049   SODIUM 133* 138   POTASSIUM 3.2* 3.9   CHLORIDE 101 104   CO2 21.0* 21.0*   ANION GAP 11.0 13.0   BUN 9 11   CREATININE 0.67 0.75   GLUCOSE 97 158*   CALCIUM 8.3* 8.3*   TSH 22.960*  --          Lab 21   TOTAL  PROTEIN 5.9*   ALBUMIN 3.10*   GLOBULIN 2.8   ALT (SGPT) 18   AST (SGOT) 22   BILIRUBIN 0.4   ALK PHOS 92         Lab 09/14/21 2049   PROBNP 11,917.0*   TROPONIN T <0.010                 Lab 09/14/21  2307   PH, ARTERIAL 7.501*   PCO2, ARTERIAL 29.8*   PO2 ART 70.2*   FIO2 60   HCO3 ART 23.2   BASE EXCESS ART 0.5   CARBOXYHEMOGLOBIN 1.6     Brief Urine Lab Results  (Last result in the past 365 days)      Color   Clarity   Blood   Leuk Est   Nitrite   Protein   CREAT   Urine HCG        08/21/21 2314 Yellow Clear Negative Negative Positive Negative               Microbiology Results Abnormal     Procedure Component Value - Date/Time    COVID-19,CEPHEID/VASHTI,SHALINI IN-HOUSE(OR EMERGENT/ADD-ON),NP SWAB IN TRANSPORT MEDIA 3-4 HR TAT - Swab, Nasopharynx [[id number]]  (Normal) Collected: 09/15/21 0543    Lab Status: Final result Specimen: Swab from Nasopharynx Updated: 09/15/21 0637     COVID19 Not Detected    Narrative:      Fact sheet for providers: https://www.fda.gov/media/720422/download     Fact sheet for patients: https://www.fda.gov/media/850338/download  Fact sheet for providers: https://www.fda.gov/media/756014/download     Fact sheet for patients: https://www.fda.gov/media/027283/download    COVID PRE-OP / PRE-PROCEDURE SCREENING ORDER (NO ISOLATION) - Swab, Nasopharynx [[id number]]  (Normal) Collected: 09/14/21 2026    Lab Status: Final result Specimen: Swab from Nasopharynx Updated: 09/14/21 2056    Narrative:      The following orders were created for panel order COVID PRE-OP / PRE-PROCEDURE SCREENING ORDER (NO ISOLATION) - Swab, Nasopharynx.  Procedure                               Abnormality         Status                     ---------                               -----------         ------                     COVID-19 and FLU A/B PCR...[[id number]]  Normal              Final result                 Please view results for these tests on the individual orders.    COVID-19 and FLU A/B PCR - Swab, Nasopharynx  [[id number]]  (Normal) Collected: 09/14/21 2026    Lab Status: Final result Specimen: Swab from Nasopharynx Updated: 09/14/21 2056     COVID19 Not Detected     Influenza A PCR Not Detected     Influenza B PCR Not Detected    Narrative:      Fact sheet for providers: https://www.fda.gov/media/203316/download    Fact sheet for patients: https://www.fda.gov/media/695363/download    Test performed by PCR.          XR Chest 1 View    Result Date: 9/14/2021  CR Chest 1 Vw INDICATION: Short of air triage. Covid rule out. COMPARISON:  Chest x-ray 8/25/2021. FINDINGS: Portable AP view(s) of the chest.  Cardiac silhouette is top normal and partly obscured by pulmonary parenchymal pathology. There are extensive bilateral infiltrates with an upper lobe predominance though there is also airspace disease at the left mid to lower lung and patchy airspace disease at the right midlung. This is worse than on the prior study. On comparison to films dating back to May 2021, the infiltrates are essentially new since that time. Please correlate with the clinical course.     Impression: Extensive bilateral pulmonary parenchymal infiltrates with an upper lobe predominance showing mild interval worsening since 8/25/2021. This could be due to Covid pneumonia though other infectious or inflammatory disease certainly in the differential. Clinical correlation and follow-up recommended. Signer Name: Amy Trejo MD  Signed: 9/14/2021 9:00 PM  Workstation Name: [email or web address]  [facility]    CT Angiogram Chest    Result Date: 9/14/2021  CTA Chest INDICATION: Pneumonia with shortness of breath. TECHNIQUE: CT angiogram of the chest with 100 cc of Isovue-300 IV contrast. 3-D reconstructions were obtained and reviewed.   Radiation dose reduction techniques included automated exposure control or exposure modulation based on body size. Count of known CT and cardiac nuc med studies performed in previous 12 months: 3. COMPARISON:  8/23/2021 FINDINGS: Chest images at mediastinal window show moderately large bilateral pleural effusions. Pleural fluid volumes are slightly greater since the previous scan. There are no pulmonary artery filling defects to suggest emboli. The CT angiographic images also show no evidence of emboli. Chest images at lung window show extensive bilateral infiltrates with underlying emphysema. The infiltrates appear similar to the previous scan in August. There is more perhaps increased infiltrate in the right upper lobe compared to the previous scan.     Impression: Emphysema with extensive bilateral infiltrates with slight worsening in the right upper lobe since the previous scan. Bilateral pleural effusions appear slightly larger. No evidence of pulmonary embolism. Signer Name: Gomez Luna MD  Signed: 9/14/2021 11:55 PM  Workstation Name: [facility]  [facility]      Results for orders placed during the hospital encounter of 07/19/21    Adult Transthoracic Echo Complete W/ Cont if Necessary Per Protocol    Interpretation Summary  · Estimated left ventricular EF = 65%  · Mild aortic valve stenosis is present.  · Aortic valve maximum pressure gradient is 32.9 mmHg. Aortic valve mean pressure gradient is 17.2 mmHg.  · Mild mitral valve regurgitation is present.  · Mild tricuspid valve regurgitation is present.  · Estimated right ventricular systolic pressure from tricuspid regurgitation is 37.0 mmHg.  · There is a large left pleural effusion. There is a moderate sized right pleural effusion.      I have reviewed the medications:  Scheduled Meds:[START ON 9/16/2021] Pharmacy Consult, , Does not apply, Once  apixaban, 5 mg, Oral, Q12H  arformoterol, 15 mcg, Nebulization, BID - RT   And  ipratropium, 0.5 mg, Nebulization, 4x Daily - RT  ascorbic acid, 1,000 mg, Oral, Daily With Breakfast  bisoprolol, 5 mg, Oral, Q24H  budesonide, 0.5 mg, Nebulization, BID - RT  cefTRIAXone, 1 g, Intravenous,  Q24H  ferrous sulfate, 325 mg, Oral, BID With Meals  furosemide, 20 mg, Oral, BID  lactobacillus acidophilus, 1 capsule, Oral, Daily  levothyroxine, 50 mcg, Oral, Q AM  multivitamin with minerals, 1 tablet, Oral, Daily  pantoprazole, 40 mg, Oral, BID AC  sertraline, 150 mg, Oral, Daily  sodium chloride, 10 mL, Intravenous, Q12H  vancomycin, 15 mg/kg, Intravenous, Q18H      Continuous Infusions:Pharmacy to dose vancomycin,       PRN Meds:.acetaminophen **OR** acetaminophen **OR** acetaminophen  •  ALPRAZolam  •  Pharmacy to dose vancomycin  •  sodium chloride  •  sodium chloride    Assessment/Plan   Assessment & Plan     Active Hospital Problems    Diagnosis  POA   • **Acute on chronic respiratory failure with hypoxia (CMS/HCC) [J96.21]  Yes   • Accelerated hypertension [I10]  Yes   • Pneumonia due to infectious organism [J18.9]  Yes   • Acute diastolic CHF (CMS/[facility]) [I50.31]  Yes   • Sepsis (CMS/[facility]) [A41.9]  Yes   • Multifocal pneumonia [J18.9]  Yes   • COPD on home oxygen (CMS/HCC) [J44.9]  Yes   • Rheumatoid arthritis (CMS/HCC) [M06.9]  Yes      Resolved Hospital Problems   No resolved problems to display.        Brief Hospital Course to date:  Yin Law is a 81 y.o. 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  She has had at least two negative COVID tests and has been fully vaccinated.  BNP is elevated at over 11,000 and procalcitonin is elevated as well.  CT chest shows multifocal infiltrates overall similar in appearance to her last admission as well as moderate sized bilateral pleural effusions.    Sepsis due to multifocal pneumonia  Acute on chronic respiratory failure with hypoxia  COPD on 2L O2 chronically  -Blood cultures pending, strep pneumo and legionella urinary antigens pending, MRSA PCR pending, sputum  culture  -Currently on vancomycin and ceftriaxone  -Previous infectious workup has been unrevealing with negative cryptococcal antigen, fungitell, histo, blasto, aspergillus.  She has only ever grown candida albicans on sputum culture  -Pulmonary workup included elevated RF (in the setting of known RA), negative quantiferon, +anti-centromere antibody, + atypical p-ANCA with negative MPO and PR3.  She was felt to be too frail for open lung biopsy.  Thoracentesis performed last admission with transudative fluid and negative cytology.  -On brovana and pulmicort  -Pulmonary consultation   -Last pulmonary note from previous admission says to remain on prednisone 20mg daily for 2-4 weeks until outpatient follow up with dapsone for PCP prophylaxis, however at discharge patient was tapered to prednisone 10mg daily x 7 days and dapsone discontinued (ID had recommended continuing until on less than 15mg prednisone daily).  At this point, pulmonary recommends remaining off steroids    Acute on chronic diastolic CHF  -S/P post 40 mg of Lasix in the ED  -Continue diuresis with IV lasix 40mg BID given size of her pleural effusions  -CXR in am.  May require repeat thoracentesis.  -Likely needs higher dose of lasix at discharge (was on 20mg BID)  -Electrolyte replacement  -Cardiology consultation today     Hypertensive emergency  -BP on arrival 201/104  -Improved with nitropaste in ED  -Continue bisoprolol     Hypothyroidism  -TSH 22 (same as last admission and free T4 then was low)  -She was started on levothyroxine 50mcg daily last admission  -Increase to 75mcg daily     Paroxysmal atrial fibrillation  -Continue bisoprolol.  Cannot tolerate amiodarone due to pulmonary toxicity  -Per last cardiology note she could not tolerate Eliquis due to GI bleeding, however she was discharged on Eliquis and this has been continued     Anxiety/depression   -On xanax     DVT prophylaxis:  Medical and mechanical DVT prophylaxis orders are  present.            Disposition: I expect the patient to be discharged TBD.    CODE STATUS:   Code Status and Medical Interventions:   Ordered at: 09/14/21 2244     Limited Support to NOT Include:    Intubation     Level Of Support Discussed With:    Patient     Code Status:    CPR     Medical Interventions (Level of Support Prior to Arrest):    Limited     Given her repeated admissions and significant morbidity, I discussed with her having palliative care see her tomorrow.    Mellisa Delgado MD  09/15/21

## 2021-09-15 NOTE — CONSULTS
Pulmonary Consult Note     Chief Complaint:  Acute on chronic respiratory failure with hypoxia (CMS/HCC)    History of Present Illness     81-year-old female with a past medical history significant for chronic hypoxic respiratory failure, diastolic heart failure, COPD, hypertension, and rheumatoid arthritis.  Who pulmonary was consulted on due to acute on chronic hypoxic respiratory failure in the setting of bilateral infiltrates.  Patient was initially admitted back in July 2021 with acute hypoxic respiratory failure.  At that time had bilateral infiltrates in a patchy distribution.  Was tested for Covid all of which was negative.  She had a fairly large infectious work-up at that time which was all negative.  She also had a large autoimmune work-up which was positive for a anticentromere antibody and known positive rheumatoid factor.  She did have a fairly significantly positive atypical p-ANCA, but this is of unknown significance and lung disease.  At some point prior to this she had been on amiodarone in the differential was made for potential for amiodarone toxicity.  She was placed on steroids with a peripheral long taper over the course of 4 weeks.  And is currently not on steroids at admission.  She was a tanbark up until 2 days ago and was down to 2.5 L nasal cannula.  When she was taken home and gradually start to become more short of breath and therefore was brought to the hospital for further evaluation.  She notes that she feels better on high flow nasal cannula and has no other acute complaints.    Problem List, Surgical History, Family, Social History, and ROS     Patient Active Problem List    Diagnosis    • *Acute on chronic respiratory failure with hypoxia (CMS/[facility]) [J96.21]    • Accelerated hypertension [I10]    • Pneumonia due to infectious organism [J18.9]    • Acute diastolic CHF (CMS/[facility]) [I50.31]    • Elevated troponin [R77.8]    • Sepsis (CMS/[facility]) [A41.9]    • Pleural effusion, bilateral  [J90]    • Multifocal pneumonia [J18.9]    • Recurrent atrial fibrillation with RVR (CMS/[facility]) [I48.91]    • COPD on home oxygen (CMS/[facility]) [J44.9]    • Hypertension and diastolic dysfunction [I10]    • Rheumatoid arthritis (CMS/[facility]) [M06.9]    • Anxiety and depression [F41.9, F32.9]    • Wound of right leg [S81.801A]    • Anemia [D64.9]      Past Surgical History:   Procedure Laterality Date   • APPENDECTOMY     • CARPAL TUNNEL RELEASE Left    • CATARACT EXTRACTION, BILATERAL     • COLON SURGERY     • COLONOSCOPY     • GALLBLADDER SURGERY     • HYSTERECTOMY  1971   • TONSILLECTOMY         Allergies   Allergen Reactions   • Amiodarone Unknown - High Severity     No current facility-administered medications on file prior to encounter.     Current Outpatient Medications on File Prior to Encounter   Medication Sig   • ALPRAZolam (XANAX) 0.5 MG tablet Take 1 tablet by mouth At Night As Needed for Anxiety or Sleep.   • apixaban (ELIQUIS) 5 MG tablet tablet Take 5 mg by mouth 2 (Two) Times a Day.   • ascorbic acid (VITAMIN C) 1000 MG tablet Take 1 tablet by mouth Daily.   • bisoprolol (ZEBeta) 5 MG tablet Take 1 tablet by mouth Daily.   • budesonide (PULMICORT) 0.5 MG/2ML nebulizer solution Take 2 mL by nebulization 2 (Two) Times a Day.   • ferrous sulfate 325 (65 FE) MG tablet Take 1 tablet by mouth 2 (Two) Times a Day With Meals.   • furosemide (LASIX) 20 MG tablet Take 1 tablet by mouth 2 (Two) Times a Day.   • HYDROcodone-acetaminophen (NORCO) 7.5-325 MG per tablet Take 1 tablet by mouth Every 6 (Six) Hours As Needed.   • lactobacillus acidophilus (RISAQUAD) capsule capsule Take 1 capsule by mouth Daily.   • levothyroxine (SYNTHROID, LEVOTHROID) 50 MCG tablet Take 1 tablet by mouth Every Morning.   • multivitamin with minerals (CENTRUM SILVER ADULT 50+ PO) Take 1 tablet by mouth Daily.   • Omega-3 Fatty Acids (fish oil) 1000 MG capsule capsule Take 1,000 mg by mouth Daily With Breakfast.   • pantoprazole (PROTONIX) 40  MG EC tablet Take 1 tablet by mouth 2 (Two) Times a Day Before Meals.   • potassium chloride (MICRO-K) 10 MEQ CR capsule Take 2 capsules by mouth Daily.   • sertraline (ZOLOFT) 100 MG tablet Take 150 mg by mouth Daily. 1.5 tab daily   • STIOLTO RESPIMAT 2.5-2.5 MCG/ACT aerosol solution inhaler Inhale 2 puffs Daily As Needed.   • Vitamin D, Ergocalciferol, 50 MCG (2000 UT) capsule Take 1 tablet by mouth Daily.     MEDICATION LIST AND ALLERGIES REVIEWED.    Family History   Problem Relation Age of Onset   • Alzheimer's disease Mother    • No Known Problems Father    • No Known Problems Sister    • Hypertension Brother    • Parkinsonism Brother    • No Known Problems Maternal Grandmother    • No Known Problems Maternal Grandfather    • No Known Problems Paternal Grandmother    • No Known Problems Paternal Grandfather      Social History     Tobacco Use   • Smoking status: Former Smoker     Packs/day: 0.25     Types: Cigarettes     Quit date: 2021     Years since quittin.4   • Smokeless tobacco: Never Used   • Tobacco comment: 1-2 cigs occas   Vaping Use   • Vaping Use: Never used   Substance Use Topics   • Alcohol use: Never   • Drug use: Never     Social History     Social History Narrative    Caffeine: 8 oz      FAMILY AND SOCIAL HISTORY REVIEWED.    Review of Systems   Constitutional: Positive for activity change and fatigue. Negative for appetite change, chills and fever.   HENT: Negative for congestion, sore throat and voice change.    Eyes: Negative for photophobia and visual disturbance.   Respiratory: Positive for chest tightness and shortness of breath. Negative for cough and wheezing.    Cardiovascular: Negative for chest pain, palpitations and leg swelling.   Gastrointestinal: Negative for abdominal distention and abdominal pain.   Genitourinary: Negative for difficulty urinating and flank pain.   Musculoskeletal: Negative for myalgias and neck stiffness.   Skin: Negative for color change and rash.  "  Neurological: Negative for dizziness, seizures and headaches.   Hematological: Negative for adenopathy.   Psychiatric/Behavioral: Negative for agitation, hallucinations and sleep disturbance.     ALL OTHER SYSTEMS REVIEWED AND ARE NEGATIVE.    Physical Exam and Clinical Information   /46 (BP Location: Left arm, Patient Position: Lying)   Pulse 70   Temp 98.4 °F (36.9 °C) (Oral)   Resp 20   Ht 162.6 cm (64\")   Wt 63.2 kg (139 lb 4.8 oz)   SpO2 92%   BMI 23.91 kg/m²   Physical Exam  Vitals and nursing note reviewed.   Constitutional:       General: She is not in acute distress.     Appearance: She is well-developed and normal weight. She is ill-appearing. She is not toxic-appearing.   HENT:      Head: Normocephalic and atraumatic.      Right Ear: External ear normal.      Left Ear: External ear normal.      Nose: Nose normal.      Mouth/Throat:      Mouth: Mucous membranes are moist.      Pharynx: Oropharynx is clear. No oropharyngeal exudate or posterior oropharyngeal erythema.   Eyes:      Conjunctiva/sclera: Conjunctivae normal.      Pupils: Pupils are equal, round, and reactive to light.   Cardiovascular:      Rate and Rhythm: Regular rhythm. Tachycardia present.      Pulses: Normal pulses.      Heart sounds: Normal heart sounds. No murmur heard.   No friction rub. No gallop.    Pulmonary:      Effort: Pulmonary effort is normal. No respiratory distress.      Breath sounds: Rales present. No wheezing or rhonchi.   Abdominal:      General: Bowel sounds are normal. There is no distension.      Palpations: Abdomen is soft.      Tenderness: There is no abdominal tenderness. There is no rebound.   Musculoskeletal:         General: Normal range of motion.      Cervical back: Normal range of motion and neck supple. No rigidity.      Right lower leg: No edema.      Left lower leg: No edema.   Skin:     General: Skin is warm and dry.      Capillary Refill: Capillary refill takes less than 2 seconds. "   Neurological:      General: No focal deficit present.      Mental Status: She is alert and oriented to person, place, and time.   Psychiatric:         Mood and Affect: Mood normal.         Behavior: Behavior normal.         Thought Content: Thought content normal.         Judgment: Judgment normal.         Results from last 7 days   Lab Units 09/15/21  0615 09/14/21 2023   WBC 10*3/mm3  --  15.02*   HEMOGLOBIN g/dL 8.0* 9.5*   PLATELETS 10*3/mm3 228 287     Results from last 7 days   Lab Units 09/15/21  0615 09/14/21  2049   SODIUM mmol/L 133* 138   POTASSIUM mmol/L 3.2* 3.9   CO2 mmol/L 21.0* 21.0*   BUN mg/dL 9 11   CREATININE mg/dL 0.67 0.75   GLUCOSE mg/dL 97 158*     Estimated Creatinine Clearance: 54.1 mL/min (by C-G formula based on SCr of 0.67 mg/dL).      Results from last 7 days   Lab Units 09/14/21  2307   PH, ARTERIAL pH units 7.501*   PCO2, ARTERIAL mm Hg 29.8*   PO2 ART mm Hg 70.2*     Lab Results   Component Value Date    LACTATE 1.9 09/15/2021            I reviewed the patient's results/ Images and I agree with the reports     Impression     Acute on chronic respiratory failure with hypoxia (CMS/HCC)    Multifocal pneumonia    COPD on home oxygen (CMS/HCC)    Rheumatoid arthritis (CMS/HCC)    Sepsis (CMS/HCC)    Acute diastolic CHF (CMS/HCC)    Accelerated hypertension    Pneumonia due to infectious organism      Plan/Recommendations     - I personally reviewed the pts imaging from CT of the chest from 9/14/2021 showed large bilateral pleural effusions, with stable interstitial changes in the upper lobes bilaterally.  This is compared to the CT scan from August 21, 2021.  Most notably the August 21, 2021 in September CT scans are fairly stable, although there is a difference in appearance compared to the CT scan from July 19, 2021 in which the groundglass changes and infiltrates were much more localized to part of the lung and not being so diffuse.  - I personally reviewed the pts labs from August  2021 which was most notable for a positive anticentromere antibody, and a positive atypical p-ANCA.  proBNP on this admission was elevated at 11,917.  Fungal work-up, TB work-up, and bacterial cultures are all negative.  - I personally reviewed the pts Echo report from July 2021 showed a EF of 65%, mild aortic valvular stenosis, and an elevated RVSP at 37 mmHg.  - I personally reviewed the pts extensive chart with regards to cardiology evaluations, infectious disease evaluations, pulmonary evaluations, and hospitalist evaluations.  On multiple admissions over the last 3 months.    · I think is very likely patient has underlying interstitial lung disease, walk because I am not entirely sure.  In looking back through all of her CT scans the scan from July 2021 appear to be more of an acute pneumonitis, with the most recent CT scans appear to be more scar formation in the setting of likely underlying heart failure.  She is already done a prolonged taper of steroids, therefore did not think further steroids are warranted at this time.  I am more concerned now that the underlying disease process has been at least in somewhat control whether this is autoimmune disease or amiodarone that was the initial etiology, and that we are currently dealing with either a secondary imposed infection or heart failure in the setting of known chronic interstitial lung disease.  · For now I would recommend to hold steroids  · Currently recommend against any bronchoscopy or biopsies, I do not the patient will tolerate the procedures and I am not sure that they provide us any new information.  · I agree with empirically treating for possible concomitant infections  · Also recommend aggressive diuresis in the setting of heart failure  · Brovana, Atrovent, and budesonide for underlying COPD  · Previous fluid studies are reviewed as well, which were consistent with a transudate of effusion.  Which fits with heart failure.  I do not think a  repeat thoracentesis provide us with any benefit unless it was done in the emergent situation due to the fact the patient was decompensating at about to require intubation mechanical ventilation.  · I think is okay to remove the patient from Covid isolation.  She has had multiple tests which are all negative, and she is vaccinated.  Her disease process also appears to have been going on for extended period of time now.  · Wean oxygen saturation to greater than 88%  · Aggressive pulmonary toilet  · Pulmonary will continue to follow thank you for the consult    ANDRÉS Calvillo DO  Pulmonary and Critical Care Medicine  09/15/21 11:02 EDT     CC: Santiago Chaudhry MD

## 2021-09-15 NOTE — THERAPY EVALUATION
Patient Name: Yin Law  : 1940    MRN: [id number]                              Today's Date: 9/15/2021       Admit Date: 2021    Visit Dx:     ICD-10-CM ICD-9-CM   1. Pneumonia due to infectious organism, unspecified laterality, unspecified part of lung  J18.9 486   2. Congestive heart failure, unspecified HF chronicity, unspecified heart failure type (CMS/HCC)  I50.9 428.0   3. Leukocytosis, unspecified type  D72.829 288.60   4. Acute on chronic respiratory failure with hypoxia (CMS/HCC)  J96.21 518.84     799.02     Patient Active Problem List   Diagnosis   • Multifocal pneumonia   • Recurrent atrial fibrillation with RVR (CMS/HCC)   • COPD on home oxygen (CMS/HCC)   • Hypertension and diastolic dysfunction   • Rheumatoid arthritis (CMS/[facility])   • Anxiety and depression   • Wound of right leg   • Anemia   • Elevated troponin   • Sepsis (CMS/HCC)   • Acute on chronic respiratory failure with hypoxia (CMS/HCC)   • Pleural effusion, bilateral   • Acute diastolic CHF (CMS/HCC)   • Accelerated hypertension   • Pneumonia due to infectious organism     Past Medical History:   Diagnosis Date   • Anxiety and depression    • Arrhythmia     A-FIB   • Asthma    • Chicken pox    • COPD (chronic obstructive pulmonary disease) (CMS/HCC)    • Hyperlipidemia    • Hypertension    • Measles    • Menopause    • Osteoporosis    • Rheumatoid arthritis (CMS/HCC)    • Rheumatoid arthritis (CMS/HCC)    • Tobacco abuse      Past Surgical History:   Procedure Laterality Date   • APPENDECTOMY     • CARPAL TUNNEL RELEASE Left    • CATARACT EXTRACTION, BILATERAL     • COLON SURGERY     • COLONOSCOPY     • GALLBLADDER SURGERY     • HYSTERECTOMY  1971   • TONSILLECTOMY       General Information     Row Name 09/15/21 0846          OT Time and Intention    Document Type  evaluation  -SHIVAM     Mode of Treatment  individual therapy;occupational therapy  -SHIVAM     Row Name 09/15/21 0846          General Information    Patient Profile  Reviewed  yes  -SHIVAM     Prior Level of Function  independent:;ADL's;all household mobility;dependent:;home management;driving;shopping With recent illness and admit 8/21 blanca has come to stay with pt. until November.  Priorly pt. fully independent, but admits HM and shopping had become difficult.  -SHIVAM     Existing Precautions/Restrictions  fall;oxygen therapy device and L/min r/o Covid 19, Contact/Airborne  -SHIVAM     Barriers to Rehab  medically complex;previous functional deficit  -SHIVAM     Row Name 09/15/21 0846          Living Environment    Lives With  other relative(s) Blanca on leave of abscence to stay with pt. possibly up through November  -SHIVAM     Row Name 09/15/21 0846          Home Main Entrance    Number of Stairs, Main Entrance  two  -SHIVAM     Row Name 09/15/21 0846          Stairs Within Home, Primary    Number of Stairs, Within Home, Primary  none  -SHIVAM     Row Name 09/15/21 0846          Cognition    Orientation Status (Cognition)  oriented x 4  -SHIVAM     Row Name 09/15/21 0846          Safety Issues, Functional Mobility    Safety Issues Affecting Function (Mobility)  insight into deficits/self-awareness;safety precaution awareness  -SHIVAM     Impairments Affecting Function (Mobility)  balance;endurance/activity tolerance;strength;pain;shortness of breath  -SHIVAM       User Key  (r) = Recorded By, (t) = Taken By, (c) = Cosigned By    Initials Name Provider Type    Carol Fontaine, OT Occupational Therapist          Mobility/ADL's     Row Name 09/15/21 0875          Bed Mobility    Bed Mobility  supine-sit  -SHIVAM     Supine-Sit [place] (Bed Mobility)  minimum assist (75% patient effort)  -SHIVAM     Bed Mobility, Safety Issues  decreased use of legs for bridging/pushing;decreased use of arms for pushing/pulling  -SHIVAM     Assistive Device (Bed Mobility)  bed rails;head of bed elevated  -SHIVAM     Comment (Bed Mobility)  slow mvmt to EOB with several rest periods, 02 drop to 85%, but recovered in under one minute to 90's.   -     Row Name 09/15/21 0849          Transfers    Transfers  sit-stand transfer;bed-chair transfer  -SHIVAM     Comment (Transfers)  cues for direction to turn with HFNC on, pt. limited by R knee pain.  To grossly 85%, but recovered in under a minute to 92%.  -     Bed-Chair [place] (Transfers)  minimum assist (75% patient effort);verbal cues  -     Assistive Device (Bed-Chair Transfers)  walker, front-wheeled  -     Sit-Stand [place] (Transfers)  verbal cues;contact guard  -     Row Name 09/15/21 0849          Sit-Stand Transfer    Assistive Device (Sit-Stand Transfers)  walker, front-wheeled  -     Row Name 09/15/21 0849          Functional Mobility    Functional Mobility- Comment  deferred due to on HFNC and R knee pain  -[facility] Name 09/15/21 0849          Activities of Daily Living    BADL Assessment/Intervention  lower body dressing;upper body dressing  -[facility] Name 09/15/21 0849          Lower Body Dressing Assessment/Training    [place] Level (Lower Body Dressing)  don;socks;moderate assist (50% patient effort)  -SHIVAM     Position (Lower Body Dressing)  edge of bed sitting  -SHIVAM     Comment (Lower Body Dressing)  Pt. donned L socks and OT R sock due to limited activity tolerance.  -     Row Name 09/15/21 0849          Upper Body Dressing Assessment/Training    [place] Level (Upper Body Dressing)  don;pajama/robe;maximum assist (25% patient effort)  -SHIVAM     Position (Upper Body Dressing)  edge of bed sitting  -SHIVAM     Comment (Upper Body Dressing)  limited by lines  -       User Key  (r) = Recorded By, (t) = Taken By, (c) = Cosigned By    Initials Name Provider Type    Carol Fontaine, OT Occupational Therapist        Obj/Interventions     Row Name 09/15/21 0852          Sensory Assessment (Somatosensory)    Sensory Assessment (Somatosensory)  right UE  -SHIVAM     Sensory Subjective Reports  tingling pt. reports prior tingling in R hand for some time, but no loss of touch   -     Row Name 09/15/21 0852          Vision Assessment/Intervention    Visual Impairment/Limitations  corrective lenses for reading  -     Row Name 09/15/21 0852          Range of Motion Comprehensive    General Range of Motion  bilateral upper extremity ROM WNL  -[facility] Name 09/15/21 0852          Strength Comprehensive (MMT)    General Manual Muscle Testing (MMT) Assessment  upper extremity strength deficits identified  -SHIVAM     Comment, General Manual Muscle Testing (MMT) Assessment  BUE grossly 4 to 4+/5  -[facility] Name 09/15/21 0852          Motor Skills    Motor Skills  functional endurance;coordination  -     Coordination  fine motor deficit;gross motor deficit;bilateral;minimal impairment;moderate impairment limited by BUE tremors  -     Functional Endurance  fair  -     Row Name 09/15/21 0852          Balance    Balance Assessment  sitting static balance;sitting dynamic balance;standing static balance;standing dynamic balance  -     Static Sitting Balance  WFL;unsupported;sitting, edge of bed  -SHIVAM     Dynamic Sitting Balance  WFL;unsupported;sitting, edge of bed  -     Static Standing Balance  mild impairment;supported;standing  -SHIVAM     Dynamic Standing Balance  mild impairment;supported;standing  -     Balance Interventions  sit to stand;occupation based/functional task  -       User Key  (r) = Recorded By, (t) = Taken By, (c) = Cosigned By    Initials Name Provider Type    Carol Fontaine, DANIAL Occupational Therapist        Goals/Plan     Row Name 09/15/21 0901          Transfer Goal 1 (OT)    Activity/Assistive Device (Transfer Goal 1, OT)  toilet;commode, bedside without drop arms;commode;sit-to-stand/stand-to-sit;bed-to-chair/chair-to-bed grab bar  -SHIVAM     [place] Level/Cues Needed (Transfer Goal 1, OT)  contact guard assist  -SHIVAM     Time Frame (Transfer Goal 1, OT)  long term goal (LTG);10 days  -SHIVAM     Progress/Outcome (Transfer Goal 1, OT)  goal ongoing  -SHIVAM [place]  Name 09/15/21 0901          Dressing Goal 1 (OT)    Activity/Device (Dressing Goal 1, OT)  lower body dressing socks and undergarment  -SHIVAM     [place]/Cues Needed (Dressing Goal 1, OT)  contact guard assist  -SHIVAM     Time Frame (Dressing Goal 1, OT)  long term goal (LTG);10 days  -SHIVAM     Strategies/Barriers (Dressing Goal 1, OT)  OT sats 90% or greater on NC  -SHIVAM     Progress/Outcome (Dressing Goal 1, OT)  goal ongoing  -SHIVAM     Row Name 09/15/21 0901          Toileting Goal 1 (OT)    Activity/Device (Toileting Goal 1, OT)  toileting skills, all  -SHIVAM     [place] Level/Cues Needed (Toileting Goal 1, OT)  standby assist  -SHIVAM     Time Frame (Toileting Goal 1, OT)  long term goal (LTG);10 days  -SHIVAM     Strategies/Barriers (Toileting Goal 1, OT)  02 sats 90% or greater on NC  -SHIVAM     Progress/Outcome (Toileting Goal 1, OT)  goal ongoing  -SHIVAM     Row Name 09/15/21 0901          Grooming Goal 1 (OT)    Activity/Device (Grooming Goal 1, OT)  hair care;oral care;wash face, hands sink side sitting  -SHIVAM     [place] (Grooming Goal 1, OT)  set-up required  -SHIVAM     Time Frame (Grooming Goal 1, OT)  long term goal (LTG);10 days  -SHIVAM     Strategies/Barriers (Grooming Goal 1, OT)  02 sats 90% or greater NC  -SHIVAM     Progress/Outcome (Grooming Goal 1, OT)  goal ongoing  -SHIVAM     Row Name 09/15/21 0901          Therapy Assessment/Plan (OT)    Planned Therapy Interventions (OT)  activity tolerance training;BADL retraining;functional balance retraining;patient/caregiver education/training;ROM/therapeutic exercise;strengthening exercise;transfer/mobility retraining;occupation/activity based interventions  -SHIVAM       User Key  (r) = Recorded By, (t) = Taken By, (c) = Cosigned By    Initials Name Provider Type    Carol Fontaine, OT Occupational Therapist        Clinical Impression     Row Name 09/15/21 6339          Pain Assessment    Additional Documentation  Pain Scale: Numbers Pre/Post-Treatment (Group)  -SHIVAM     Row Name  09/15/21 0854          Pain Scale: Numbers Pre/Post-Treatment    Pretreatment Pain Rating  4/10  -SHIVAM     Posttreatment Pain Rating  5/10  -SHIVAM     Pain Location - Side  Right  -SHIVAM     Pain Location  knee also some lesser generalized pain, pain 6-7/10 when standing  -SHIVAM     Pre/Posttreatment Pain Comment  nurse alerted that pt. wants pain meds post eating BF  -SHIVAM     Pain Intervention(s)  Repositioned  -SHIVAM     Row Name 09/15/21 0854          Plan of Care Review    Plan of Care Reviewed With  patient  -SHIVAM     Progress  no change  -SHIVAM     Outcome Summary  OT evaluation completed with pt. demonstrated decreased strength, balance and endurance along with pain limiting ADL independence warranting continued skilled OT services. Pt. min A supine to sit, sit to stand and mvmt to recliner. SBA to min A expected lower level BADL tasks and Mod A higher level ADL tasks grossly with many rest periods. R Knee pain limiting all standing tasks. Pt. would likely benefit from IRF stay, but pt. verbalizing she would prefer home with family assist and  therapy services.  -SHIVAM     Row Name 09/15/21 0854          Therapy Assessment/Plan (OT)    Patient/Family Therapy Goal Statement (OT)  regain enough strength to return home with family and become independent enough to eventually return home alone  -     Rehab Potential (OT)  good, to achieve stated therapy goals  -     Criteria for Skilled Therapeutic Interventions Met (OT)  yes;meets criteria;skilled treatment is necessary  -     Therapy Frequency (OT)  daily  -SHIVAM     Row Name 09/15/21 0854          Therapy Plan Review/Discharge Plan (OT)    Equipment Needs Upon Discharge (OT)  -- per pt. she already had a shower chair with wx in shower and a high toilet at home with grab bars  -     Anticipated Discharge Disposition (OT)  inpatient rehabilitation facility pt. hoping to progress enough to return home with family and  therapy services.  -     Row Name 09/15/21 0859           Vital Signs    Pre Systolic BP Rehab  105  -SHIVAM     Pre Treatment Diastolic BP  46  -SHIVAM     Post Systolic BP Rehab  -- unable to get BP to take, as if screen locked  -SHIVAM     Pretreatment Heart Rate (beats/min)  61  -SHIVAM     Posttreatment Heart Rate (beats/min)  69  -SHIVAM     Pre SpO2 (%)  100  -SHIVAM     O2 Delivery Pre Treatment  hi-flow 40L, 50% flow  -SHIVAM     Intra SpO2 (%)  85  -SHIVAM     O2 Delivery Intra Treatment  hi-flow able to recover one minute or less  -SHIVAM     Post SpO2 (%)  96  -SHIVAM     O2 Delivery Post Treatment  hi-flow  -SHIVAM     Pre Patient Position  Supine  -SHIVAM     Intra Patient Position  Standing  -SHIVAM     Post Patient Position  Sitting  -SHIVAM     Row Name 09/15/21 0854          Positioning and Restraints    Pre-Treatment Position  in bed  -SHIVAM     Post Treatment Position  chair  -SHIVAM     In Chair  reclined;call light within reach;encouraged to call for assist;exit alarm on;waffle cushion;heels elevated  -SHIVAM       User Key  (r) = Recorded By, (t) = Taken By, (c) = Cosigned By    Initials Name Provider Type    Carol Fontaine OT Occupational Therapist        Outcome Measures     Row Name 09/15/21 0903          How much help from another is currently needed...    Putting on and taking off regular lower body clothing?  2  -SHIVAM     Bathing (including washing, rinsing, and drying)  2  -SHIVAM     Toileting (which includes using toilet bed pan or urinal)  2  -SHIVAM     Putting on and taking off regular upper body clothing  2  -SHIVAM     Taking care of personal grooming (such as brushing teeth)  2  -SHIVAM     Eating meals  3  -SHIVAM     AM-PAC 6 Clicks Score (OT)  13  -SHIVAM     Row Name 09/15/21 0903          Functional Assessment    Outcome Measure Options  AM-PAC 6 Clicks Daily Activity (OT)  -SHIVAM       User Key  (r) = Recorded By, (t) = Taken By, (c) = Cosigned By    Initials Name Provider Type    Carol Fontaine OT Occupational Therapist          Occupational Therapy Education                 Title: PT OT SLP Therapies (In  Progress)     Topic: Occupational Therapy (In Progress)     Point: ADL training (Done)     Description:   Instruct learner(s) on proper safety adaptation and remediation techniques during self care or transfers.   Instruct in proper use of assistive devices.              Learning Progress Summary           Patient Acceptance, E,D, VU,NR by SHIVAM at 9/15/2021 0904    Comment: reason for consult, noted deficits, discharge option, pacing self, PLB                   Point: Home exercise program (Not Started)     Description:   Instruct learner(s) on appropriate technique for monitoring, assisting and/or progressing therapeutic exercises/activities.              Learner Progress:  Not documented in this visit.          Point: Precautions (Done)     Description:   Instruct learner(s) on prescribed precautions during self-care and functional transfers.              Learning Progress Summary           Patient Acceptance, E,D, VU,NR by SHIVAM at 9/15/2021 0904    Comment: reason for consult, noted deficits, discharge option, pacing self, PLB                   Point: Body mechanics (Done)     Description:   Instruct learner(s) on proper positioning and spine alignment during self-care, functional mobility activities and/or exercises.              Learning Progress Summary           Patient Acceptance, E,D, VU,NR by SHIVAM at 9/15/2021 0904    Comment: reason for consult, noted deficits, discharge option, pacing self, PLB                               User Key     Initials Effective Dates Name Provider Type Discipline    SHIVAM 06/16/21 -  Carol Shaver, OT Occupational Therapist OT              OT Recommendation and Plan  Planned Therapy Interventions (OT): activity tolerance training, BADL retraining, functional balance retraining, patient/caregiver education/training, ROM/therapeutic exercise, strengthening exercise, transfer/mobility retraining, occupation/activity based interventions  Therapy Frequency (OT): daily  Plan of Care  Review  Plan of Care Reviewed With: patient  Progress: no change  Outcome Summary: OT evaluation completed with pt. demonstrated decreased strength, balance and endurance along with pain limiting ADL independence warranting continued skilled OT services. Pt. min A supine to sit, sit to stand and mvmt to recliner. SBA to min A expected lower level BADL tasks and Mod A higher level ADL tasks grossly with many rest periods. R Knee pain limiting all standing tasks. Pt. would likely benefit from IRF stay, but pt. verbalizing she would prefer home with family assist and  therapy services.     Time Calculation:   Time Calculation- OT     Row Name 09/15/21 0906             Time Calculation- OT    OT Start Time  0812  -SHIVAM      OT Received On  09/15/21  -SHIVAM      OT Goal Re-Cert Due Date  09/25/21  -SHIVAM         Untimed Charges    OT Eval/Re-eval Minutes  59  -SHIVAM         Total Minutes    Untimed Charges Total Minutes  59  -SHIVAM       Total Minutes  59  -SHIVAM        User Key  (r) = Recorded By, (t) = Taken By, (c) = Cosigned By    Initials Name Provider Type    Carol Fontaine OT Occupational Therapist        Therapy Charges for Today     Code Description Service Date Service Provider Modifiers Qty    79283804566 HC OT EVAL LOW COMPLEXITY 4 9/15/2021 Carol Shaver OT GO 1               Carol Shaver OT  9/15/2021

## 2021-09-15 NOTE — PLAN OF CARE
Goal Outcome Evaluation:   Patient came up from ED around 0200 on partial rebreather with sats >90s and breathing was very shallow and tachypnic.  PT was placed on highflow nasal cannula at 40L/55%. Pt very anxious and was given PRN zanax. Pt passed dysphagia screen. COVID and MRSA swab collected, waiting on results. Will continue to monitor.

## 2021-09-15 NOTE — ED PROVIDER NOTES
Subjective   Patient presents the ER with difficulty breathing for the last several days.  Typically on 2 L nasal cannula.  Now she reports is not really helping.  She does have a history of heart failure as well as COPD.  She had a recent hospital stay as well.  She tells me symptoms are worse with exertion or moving around.  She tells me her usual oxygen is not helping her.      Shortness of Breath  Severity:  Moderate  Onset quality:  Gradual  Duration:  3 days  Timing:  Constant  Progression:  Worsening  Chronicity:  New  Context: activity    Relieved by:  Rest, sitting up and oxygen  Worsened by:  Activity  Associated symptoms: cough and wheezing    Associated symptoms: no abdominal pain, no chest pain, no diaphoresis, no fever, no sore throat and no vomiting        Review of Systems   Constitutional: Negative for chills, diaphoresis and fever.   HENT: Negative for congestion and sore throat.    Respiratory: Positive for cough, shortness of breath and wheezing. Negative for choking and chest tightness.    Cardiovascular: Negative for chest pain and leg swelling.   Gastrointestinal: Negative for abdominal distention, abdominal pain, anal bleeding, blood in stool, constipation, diarrhea, nausea and vomiting.   Genitourinary: Negative for difficulty urinating, dysuria, flank pain, frequency, hematuria and urgency.   All other systems reviewed and are negative.      Past Medical History:   Diagnosis Date   • Anxiety and depression    • Arrhythmia     A-FIB   • Asthma    • Chicken pox    • COPD (chronic obstructive pulmonary disease) (CMS/HCC)    • Hyperlipidemia    • Hypertension    • Measles    • Menopause    • Osteoporosis    • Rheumatoid arthritis (CMS/HCC)    • Rheumatoid arthritis (CMS/HCC)    • Tobacco abuse        Allergies   Allergen Reactions   • Amiodarone Unknown - High Severity       Past Surgical History:   Procedure Laterality Date   • APPENDECTOMY     • CARPAL TUNNEL RELEASE Left    • CATARACT  EXTRACTION, BILATERAL     • COLON SURGERY     • COLONOSCOPY     • GALLBLADDER SURGERY     • HYSTERECTOMY     • TONSILLECTOMY         Family History   Problem Relation Age of Onset   • Alzheimer's disease Mother    • No Known Problems Father    • No Known Problems Sister    • Hypertension Brother    • Parkinsonism Brother    • No Known Problems Maternal Grandmother    • No Known Problems Maternal Grandfather    • No Known Problems Paternal Grandmother    • No Known Problems Paternal Grandfather        Social History     Socioeconomic History   • Marital status:      Spouse name: Not on file   • Number of children: Not on file   • Years of education: Not on file   • Highest education level: Not on file   Tobacco Use   • Smoking status: Former Smoker     Packs/day: 0.25     Types: Cigarettes     Quit date: 2021     Years since quittin.4   • Smokeless tobacco: Never Used   • Tobacco comment: 1-2 cigs occas   Vaping Use   • Vaping Use: Never used   Substance and Sexual Activity   • Alcohol use: Never   • Drug use: Never   • Sexual activity: Defer           Objective   Physical Exam  Constitutional:       Appearance: She is well-developed. She is ill-appearing.   HENT:      Head: Normocephalic and atraumatic.      Right Ear: External ear normal.      Left Ear: External ear normal.      Nose: Nose normal.   Eyes:      Conjunctiva/sclera: Conjunctivae normal.      Pupils: Pupils are equal, round, and reactive to light.   Cardiovascular:      Rate and Rhythm: Normal rate and regular rhythm.      Heart sounds: Normal heart sounds.   Pulmonary:      Effort: Pulmonary effort is normal.      Breath sounds: Decreased breath sounds and wheezing present.   Abdominal:      General: Bowel sounds are normal.      Palpations: Abdomen is soft.   Musculoskeletal:         General: Normal range of motion.      Cervical back: Normal range of motion and neck supple.      Right lower leg: Edema present.      Left lower  leg: Edema present.   Skin:     General: Skin is warm and dry.   Neurological:      Mental Status: She is alert and oriented to person, place, and time.   Psychiatric:         Behavior: Behavior normal.         Thought Content: Thought content normal.         Judgment: Judgment normal.         Critical Care  Performed by: Santiago Sage APRN  Authorized by: Matty Lawson MD     Critical care provider statement:     Critical care time (minutes):  35    Critical care time was exclusive of:  Separately billable procedures and treating other patients    Critical care was necessary to treat or prevent imminent or life-threatening deterioration of the following conditions:  Respiratory failure    Critical care was time spent personally by me on the following activities:  Ordering and performing treatments and interventions, ordering and review of laboratory studies, ordering and review of radiographic studies, pulse oximetry, re-evaluation of patient's condition, review of old charts, obtaining history from patient or surrogate, examination of patient, evaluation of patient's response to treatment, discussions with consultants and development of treatment plan with patient or surrogate               ED Course  ED Course as of Sep 14 2146   Tue Sep 14, 2021   2136 Hospitalist paged    [JM]   2138 Resp paged to try hiflo. Pt sats dropped quickly to low 90s on 4LNC. Pt placed back on NRB    [JM]   2139 I spoke with Dr. Torres who will admit.    [JM]      ED Course User Index  [JM] Santiago Sage APRN           XR Chest 1 View   Final Result   Extensive bilateral pulmonary parenchymal infiltrates with an upper lobe predominance showing mild interval worsening since 8/25/2021. This could be due to Covid pneumonia though other infectious or inflammatory disease certainly in the differential.   Clinical correlation and follow-up recommended.      Signer Name: Amy Trejo MD    Signed: 9/14/2021 9:00 PM    Workstation Name:  [email or web address]     [facility]    Final diagnoses:   Pneumonia due to infectious organism, unspecified laterality, unspecified part of lung   Congestive heart failure, unspecified HF chronicity, unspecified heart failure type (CMS/[facility])   Leukocytosis, unspecified type   Acute on chronic respiratory failure with hypoxia (CMS/[facility])       ED Disposition  ED Disposition     ED Disposition Condition Comment    Decision to Admit            No follow-up provider specified.       Medication List      No changes were made to your prescriptions during this visit.          Santiago Sage, AMA  09/14/21 2145       Santiago Sage APRN  09/14/21 214

## 2021-09-15 NOTE — PROGRESS NOTES
"Pharmacy Consult-Vancomycin Dosing  Yin Law is a  81 y.o. female receiving vancomycin therapy.     Indication: Sepsis; pneumonia  Consulting Provider: Eboni SERRATO  ID Consult:     Goal AUC: 400 - 600 mg/L*hr    Current Antimicrobial Therapy  Anti-Infectives (From admission, onward)      Ordered     Dose/Rate Route Frequency Start Stop    09/15/21 0125  vancomycin (VANCOCIN) in iso-osmotic dextrose IVPB 1 g (premix) 200 mL     Ordering Provider: Guerrero Camejo, RPH    15 mg/kg × 60.8 kg  over 60 Minutes Intravenous Every 18 Hours 09/15/21 1800 09/23/21 0559    09/14/21 2221  vancomycin 1250 mg/250 mL 0.9% NS IVPB ([facility])     Ordering Provider: Guerrero Camejo, RPH    20 mg/kg × 60.8 kg  over 90 Minutes Intravenous Once 09/14/21 2330      09/14/21 2218  cefTRIAXone (ROCEPHIN) 1 g/100 mL 0.9% NS (MBP)     Ordering Provider: Eboni Zayas APRN    1 g  over 30 Minutes Intravenous Every 24 Hours Scheduled 09/14/21 2315 09/18/21 2059 09/14/21 2218  Pharmacy to dose vancomycin     Ordering Provider: Eboni Zayas APRN     Does not apply Continuous PRN 09/14/21 2217 09/19/21 2216            Allergies  Allergies as of 09/14/2021 - Reviewed 09/14/2021   Allergen Reaction Noted    Amiodarone Unknown - High Severity 09/14/2021       Labs    Results from last 7 days   Lab Units 09/14/21  2049   BUN mg/dL 11   CREATININE mg/dL 0.75       Results from last 7 days   Lab Units 09/14/21 2023   WBC 10*3/mm3 15.02*       Evaluation of Dosing     Last Dose Received in the ED/Outside Facility: None noted  Is Patient on Dialysis or Renal Replacement:     Ht - 167.6 cm (66\")  Wt - 60.8 kg (134 lb)    Estimated Creatinine Clearance: 52 mL/min (by C-G formula based on SCr of 0.75 mg/dL).    Intake & Output (last 3 days)         09/12 0701 - 09/13 0700 09/13 0701 - 09/14 0700 09/14 0701 - 09/15 0700    IV Piggyback   100    Total Intake(mL/kg)   100 (1.6)    Net   +100                   Microbiology and " Radiology  Microbiology Results (last 10 days)       Procedure Component Value - Date/Time    COVID PRE-OP / PRE-PROCEDURE SCREENING ORDER (NO ISOLATION) - Swab, Nasopharynx [[id number]]  (Normal) Collected: 09/14/21 2026    Lab Status: Final result Specimen: Swab from Nasopharynx Updated: 09/14/21 2056    Narrative:      The following orders were created for panel order COVID PRE-OP / PRE-PROCEDURE SCREENING ORDER (NO ISOLATION) - Swab, Nasopharynx.  Procedure                               Abnormality         Status                     ---------                               -----------         ------                     COVID-19 and FLU A/B PCR...[[id number]]  Normal              Final result                 Please view results for these tests on the individual orders.    COVID-19 and FLU A/B PCR - Swab, Nasopharynx [[id number]]  (Normal) Collected: 09/14/21 2026    Lab Status: Final result Specimen: Swab from Nasopharynx Updated: 09/14/21 2056     COVID19 Not Detected     Influenza A PCR Not Detected     Influenza B PCR Not Detected    Narrative:      Fact sheet for providers: https://www.fda.gov/media/053212/download    Fact sheet for patients: https://www.fda.gov/media/665080/download    Test performed by PCR.            Reported Vancomycin Levels                         InsightRX AUC Calculation:    Current AUC: 0 mg/L*hr    Predicted Steady State AUC on Current Dose: 496 mg/L*hr (1000 mg Q18H)  _________________________________    Predicted Steady State AUC on New Dose:  mg/L*hr    Assessment/Plan:   Vancomycin 1250 mg IV x 1, then Vancomycin 1000 mg IV Q18H (16 mg/kg/dose)  Vancomycin trough level before 3rd dose (12:00 Noon dose on Thursday 9/16/21)  MRSA Screen PCR ordered  Pharmacy to follow    Guerrero Camejo [facility]  9/15/21 01:30

## 2021-09-15 NOTE — CASE MANAGEMENT/SOCIAL WORK
Discharge Planning Assessment  [facility]     Patient Name: Yin Law  MRN: [id number]  Today's Date: 9/15/2021    Admit Date: 9/14/2021    Discharge Needs Assessment     Row Name 09/15/21 1536       Living Environment    Lives With  alone    Unique Family Situation  Niece  has been staying with her    Primary Care Provided by  other (see comments)    Provides Primary Care For  no one    Family Caregiver if Needed  other (see comments)    Quality of Family Relationships  involved;helpful    Able to Return to Prior Arrangements  yes       Resource/Environmental Concerns    Resource/Environmental Concerns  none    Transportation Concerns  car, none       Transition Planning    Patient/Family Anticipates Transition to  other (see comments)    Transportation Anticipated  family or friend will provide       Discharge Needs Assessment    Readmission Within the Last 30 Days  current reason for admission unrelated to previous admission    Equipment Currently Used at Home  walker, rolling;cane, straight;oxygen    Concerns to be Addressed  discharge planning    Anticipated Changes Related to Illness  none    Equipment Needed After Discharge  none        Discharge Plan     Row Name 09/15/21 5213       Plan    Plan  Inital CM eval    Patient/Family in Agreement with Plan  yes    Plan Comments  Talked with Patient's niece Yin - the patient lives alone but her niece has been staying with her as her caregiver while taking FMLA. Niece states she was home from [facility] for approx a week - She was doing well and using a walker prior to admission. Has home 02 at 2L at baseline provided by Ailyn - She will likely need rehab again - PT and OT have been ordered. A new palliative care order noted as well - CM will continue to follow - loyda [id number]        Continued Care and Services - Admitted Since 9/14/2021    Coordination has not been started for this encounter.     Selected Continued Care - Prior Encounters Includes  selections from prior encounters from 6/16/2021 to 9/15/2021    Discharged on 8/26/2021 Admission date: 8/21/2021 - Discharge disposition: Skilled Nursing Facility (DC - External)    Destination     Service Provider Selected Services Address Phone Fax Patient Preferred    [facility]  Skilled Nursing [address] [place] [id number] [phone] --                Discharged on 8/12/2021 Admission date: 8/5/2021 - Discharge disposition: Home-Health Care [facility]    Home Medical Care     Service Provider Selected Services Address Phone Fax Patient Preferred    [facility]  Home Health Services [address] [facility] [address] [id number] [phone] --                Discharged on 8/2/2021 Admission date: 7/19/2021 - Discharge disposition: Home-Health Care [facility]    Durable Medical Equipment     Service Provider Selected Services Address Phone Fax Patient Preferred    [facility]  Durable Medical Equipment [address] [id number] [phone] --          Home Medical Care     Service Provider Selected Services Address Phone Fax Patient Preferred    [facility]  Home Health Services [address] [facility] [place] [address] [id number] [phone] --                      Demographic Summary     Row Name 09/15/21 1533       General Information    Admission Type  inpatient    Arrived From  home    Referral Source  admission list    Reason for Consult  discharge planning    Preferred Language  English       Contact Information    Permission Granted to Share Info With          Functional Status     Row Name 09/15/21 1536       Functional Status    Usual Activity Tolerance  moderate    Current Activity Tolerance  fair       Functional Status, IADL    Medications  assistive equipment and person    Meal Preparation  assistive equipment and person    Housekeeping  assistive equipment and person     Laundry  assistive equipment and person    Shopping  assistive equipment and person        Psychosocial    No documentation.       Abuse/Neglect    No documentation.       Legal    No documentation.       Substance Abuse    No documentation.       Patient Forms    No documentation.           Mandi Ayala RN

## 2021-09-16 ENCOUNTER — APPOINTMENT (OUTPATIENT)
Dept: GENERAL RADIOLOGY | Facility: HOSPITAL | Age: 81
End: 2021-09-16

## 2021-09-16 LAB — POTASSIUM SERPL-SCNC: 4.3 MMOL/L (ref 3.5–5.2)

## 2021-09-16 PROCEDURE — 84132 ASSAY OF SERUM POTASSIUM: CPT | Performed by: INTERNAL MEDICINE

## 2021-09-16 PROCEDURE — 97110 THERAPEUTIC EXERCISES: CPT

## 2021-09-16 PROCEDURE — 25010000002 CEFTRIAXONE PER 250 MG: Performed by: NURSE PRACTITIONER

## 2021-09-16 PROCEDURE — 99232 SBSQ HOSP IP/OBS MODERATE 35: CPT | Performed by: INTERNAL MEDICINE

## 2021-09-16 PROCEDURE — 97116 GAIT TRAINING THERAPY: CPT

## 2021-09-16 PROCEDURE — 71045 X-RAY EXAM CHEST 1 VIEW: CPT

## 2021-09-16 PROCEDURE — 94799 UNLISTED PULMONARY SVC/PX: CPT

## 2021-09-16 PROCEDURE — 25010000002 FUROSEMIDE PER 20 MG: Performed by: INTERNAL MEDICINE

## 2021-09-16 PROCEDURE — 99233 SBSQ HOSP IP/OBS HIGH 50: CPT | Performed by: INTERNAL MEDICINE

## 2021-09-16 PROCEDURE — 99232 SBSQ HOSP IP/OBS MODERATE 35: CPT | Performed by: NURSE PRACTITIONER

## 2021-09-16 PROCEDURE — 97162 PT EVAL MOD COMPLEX 30 MIN: CPT

## 2021-09-16 PROCEDURE — 97530 THERAPEUTIC ACTIVITIES: CPT

## 2021-09-16 RX ADMIN — MULTIPLE VITAMINS W/ MINERALS TAB 1 TABLET: TAB at 09:20

## 2021-09-16 RX ADMIN — APIXABAN 5 MG: 5 TABLET, FILM COATED ORAL at 09:20

## 2021-09-16 RX ADMIN — PANTOPRAZOLE SODIUM 40 MG: 40 TABLET, DELAYED RELEASE ORAL at 18:12

## 2021-09-16 RX ADMIN — SODIUM CHLORIDE 1 G: 900 INJECTION INTRAVENOUS at 20:05

## 2021-09-16 RX ADMIN — DICLOFENAC 2 G: 10 GEL TOPICAL at 18:11

## 2021-09-16 RX ADMIN — LEVOTHYROXINE SODIUM 75 MCG: 0.07 TABLET ORAL at 05:26

## 2021-09-16 RX ADMIN — OXYCODONE HYDROCHLORIDE AND ACETAMINOPHEN 1000 MG: 500 TABLET ORAL at 09:21

## 2021-09-16 RX ADMIN — IPRATROPIUM BROMIDE 0.5 MG: 0.5 SOLUTION RESPIRATORY (INHALATION) at 08:56

## 2021-09-16 RX ADMIN — BUDESONIDE 0.5 MG: 0.5 INHALANT RESPIRATORY (INHALATION) at 21:20

## 2021-09-16 RX ADMIN — Medication 325 MG: at 18:12

## 2021-09-16 RX ADMIN — DOXYCYCLINE 100 MG: 100 INJECTION, POWDER, LYOPHILIZED, FOR SOLUTION INTRAVENOUS at 10:09

## 2021-09-16 RX ADMIN — APIXABAN 5 MG: 5 TABLET, FILM COATED ORAL at 20:05

## 2021-09-16 RX ADMIN — PANTOPRAZOLE SODIUM 40 MG: 40 TABLET, DELAYED RELEASE ORAL at 09:19

## 2021-09-16 RX ADMIN — IPRATROPIUM BROMIDE 0.5 MG: 0.5 SOLUTION RESPIRATORY (INHALATION) at 21:20

## 2021-09-16 RX ADMIN — ALPRAZOLAM 0.5 MG: 0.5 TABLET ORAL at 20:32

## 2021-09-16 RX ADMIN — Medication 325 MG: at 09:20

## 2021-09-16 RX ADMIN — FUROSEMIDE 40 MG: 10 INJECTION, SOLUTION INTRAMUSCULAR; INTRAVENOUS at 18:14

## 2021-09-16 RX ADMIN — Medication 1 CAPSULE: at 09:20

## 2021-09-16 RX ADMIN — FUROSEMIDE 40 MG: 10 INJECTION, SOLUTION INTRAMUSCULAR; INTRAVENOUS at 09:21

## 2021-09-16 RX ADMIN — BUDESONIDE 0.5 MG: 0.5 INHALANT RESPIRATORY (INHALATION) at 08:56

## 2021-09-16 RX ADMIN — BISOPROLOL FUMARATE 5 MG: 5 TABLET, FILM COATED ORAL at 09:21

## 2021-09-16 RX ADMIN — ARFORMOTEROL TARTRATE 15 MCG: 15 SOLUTION RESPIRATORY (INHALATION) at 21:20

## 2021-09-16 RX ADMIN — DICLOFENAC 2 G: 10 GEL TOPICAL at 20:05

## 2021-09-16 RX ADMIN — ARFORMOTEROL TARTRATE 15 MCG: 15 SOLUTION RESPIRATORY (INHALATION) at 08:56

## 2021-09-16 RX ADMIN — IPRATROPIUM BROMIDE 0.5 MG: 0.5 SOLUTION RESPIRATORY (INHALATION) at 16:28

## 2021-09-16 RX ADMIN — SERTRALINE HYDROCHLORIDE 150 MG: 100 TABLET ORAL at 09:20

## 2021-09-16 RX ADMIN — DOXYCYCLINE 100 MG: 100 INJECTION, POWDER, LYOPHILIZED, FOR SOLUTION INTRAVENOUS at 20:05

## 2021-09-16 NOTE — PROGRESS NOTES
"  [facility]   Inpatient Progress Note       LOS: 2 days   Patient Care Team:  Santiago Chaudhry MD as PCP - General  Santiago Chaudhry MD as PCP - Family Medicine    Chief Complaint:  Follow-up for PAF    Subjective     Interval History:   Patient up in chair eating breakfast. Notes that breathing feels somewhat better today. Is hoping to come off of high flow oxygen.      Review of Systems:   Pertinent positives noted in history, exam, and assessment. Otherwise reviewed and negative.      Objective     Vitals:  Blood pressure 131/55, pulse 68, temperature 98.1 °F (36.7 °C), temperature source Oral, resp. rate 18, height 162.6 cm (64\"), weight 62.9 kg (138 lb 9.6 oz), SpO2 96 %.     Intake/Output Summary (Last 24 hours) at 9/16/2021 1019  Last data filed at 9/15/2021 2300  Gross per 24 hour   Intake 1200 ml   Output 1875 ml   Net -675 ml     Vitals reviewed.   Constitutional:       Appearance: Well-developed. Frail. Acutely ill-appearing.   Neck:      Vascular: No JVD.      Trachea: No tracheal deviation.   Pulmonary:      Effort: Pulmonary effort is normal.      Comments: Bibasilar Rales.  Cardiovascular:      Normal rate. Irregular rhythm.      Murmurs: There is a grade 3/6 high frequency blowing holosystolic murmur at the apex.   Pulses:     Intact distal pulses.   Edema:     Peripheral edema present.     Ankle: bilateral 1+ edema of the ankle.  Abdominal:      General: Bowel sounds are normal.      Palpations: Abdomen is soft.      Tenderness: There is no abdominal tenderness.   Musculoskeletal:         General: No deformity. Neurological:      Mental Status: Alert and oriented to person, place, and time.            Results Review:     I reviewed the patient's new clinical results.    Results from last 7 days   Lab Units 09/15/21  0615 09/14/21 2023 09/14/21 2023   WBC 10*3/mm3  --   --  15.02*   HEMOGLOBIN g/dL 8.0*   < > 9.5*   HEMATOCRIT % 26.7*   < > 31.3*   PLATELETS " 10*3/mm3 228   < > 287    < > = values in this interval not displayed.     Results from last 7 days   Lab Units 09/16/21  0422 09/15/21  0615 09/15/21  0615 09/14/21  2049 09/14/21  2049   SODIUM mmol/L  --   --  133*   < > 138   POTASSIUM mmol/L 4.3   < > 3.2*   < > 3.9   CHLORIDE mmol/L  --   --  101   < > 104   CO2 mmol/L  --   --  21.0*   < > 21.0*   BUN mg/dL  --   --  9   < > 11   CREATININE mg/dL  --   --  0.67   < > 0.75   CALCIUM mg/dL  --   --  8.3*   < > 8.3*   BILIRUBIN mg/dL  --   --   --   --  0.4   ALK PHOS U/L  --   --   --   --  92   ALT (SGPT) U/L  --   --   --   --  18   AST (SGOT) U/L  --   --   --   --  22   GLUCOSE mg/dL  --   --  97   < > 158*    < > = values in this interval not displayed.     Results from last 7 days   Lab Units 09/16/21  0422 09/15/21  0615 09/15/21  0615   SODIUM mmol/L  --   --  133*   POTASSIUM mmol/L 4.3   < > 3.2*   CHLORIDE mmol/L  --   --  101   CO2 mmol/L  --   --  21.0*   BUN mg/dL  --   --  9   CREATININE mg/dL  --   --  0.67   GLUCOSE mg/dL  --   --  97   CALCIUM mg/dL  --   --  8.3*    < > = values in this interval not displayed.         No results found for: TROPONINI  Results from last 7 days   Lab Units 09/15/21  0615   TSH uIU/mL 22.960*         Results from last 7 days   Lab Units 09/14/21 2049   PROBNP pg/mL 11,917.0*         Tele: Sinus rhythm with occasional PVCs    Assessment/Plan       Acute on chronic respiratory failure with hypoxia (CMS/HCC)    Multifocal pneumonia    COPD on home oxygen (CMS/HCC)    Rheumatoid arthritis (CMS/HCC)    Sepsis (CMS/HCC)    Acute diastolic CHF (CMS/HCC)    Accelerated hypertension    Pneumonia due to infectious organism      1. Acute on chronic diastolic congestive heart failure/pleural effusions  1. Normal EF by echo in July.  2. Currently on IV lasix BID  2. Amiodarone pulmonary toxicity/pneumonitis, most likely  3. PAF  1. Recurrence last evening treated with IV digoxin. Now back in sinus rhythm  2. Anticoagulated  with Eliquis. On appropriate dose.   4. Hypertension  5. Hypothyroidism, elevated TSH on Levothyroxine. Per attending service.   6. Hypokalemia, replacement protocol ordered.       Plan:  · Continue IV diuretics.  · Wean oxygen as tolerated.  · Continue other current CV medications.  · We will need to monitor weight. If drops below 60 kg will need to alter Eliquis dosing.    AMA Arora   Dictated utilizing Dragon dictation

## 2021-09-16 NOTE — PROGRESS NOTES
Pulmonary Consult Follow-up     Hospital:  LOS: 2 days   Ms. Yin Law, 81 y.o. female is followed for:   Acute on chronic respiratory failure with hypoxia (CMS/HCC)        History of present illness:   81-year-old female with a past medical history significant for chronic hypoxic respiratory failure, diastolic heart failure, COPD, hypertension, and rheumatoid arthritis.  Who pulmonary was consulted on due to acute on chronic hypoxic respiratory failure in the setting of bilateral infiltrates.  Patient was initially admitted back in July 2021 with acute hypoxic respiratory failure.  At that time had bilateral infiltrates in a patchy distribution.  Was tested for Covid all of which was negative.  She had a fairly large infectious work-up at that time which was all negative.  She also had a large autoimmune work-up which was positive for a anticentromere antibody and known positive rheumatoid factor.  She did have a fairly significantly positive atypical p-ANCA, but this is of unknown significance and lung disease.  At some point prior to this she had been on amiodarone in the differential was made for potential for amiodarone toxicity.  She was placed on steroids with a peripheral long taper over the course of 4 weeks.  And is currently not on steroids at admission.  She was a tanbark up until 2 days ago and was down to 2.5 L nasal cannula.  When she was taken home and gradually start to become more short of breath and therefore was brought to the hospital for further evaluation.  She notes that she feels better on high flow nasal cannula and has no other acute complaints.      Subjective   Interval History:  Patient is improved this morning.  High flow nasal cannula has been weaned down to 40% and 40 L.  She denies any chest pain, nausea, fever, chills.  Fluid is actually improving.  She has no other acute complaints.           Review of Systems   Constitutional: Positive for fatigue.   Respiratory: Positive  for shortness of breath.    Cardiovascular: Positive for leg swelling. Negative for chest pain.       The patient's past medical, surgical and social history were reviewed and updated in Epic as appropriate.       Objective     Infusions:     Medications:  apixaban, 5 mg, Oral, Q12H  arformoterol, 15 mcg, Nebulization, BID - RT   And  ipratropium, 0.5 mg, Nebulization, 4x Daily - RT  ascorbic acid, 1,000 mg, Oral, Daily With Breakfast  bisoprolol, 5 mg, Oral, Q24H  budesonide, 0.5 mg, Nebulization, BID - RT  cefTRIAXone, 1 g, Intravenous, Q24H  doxycycline, 100 mg, Intravenous, Q12H  ferrous sulfate, 325 mg, Oral, BID With Meals  furosemide, 40 mg, Intravenous, BID  lactobacillus acidophilus, 1 capsule, Oral, Daily  levothyroxine, 75 mcg, Oral, Q AM  multivitamin with minerals, 1 tablet, Oral, Daily  pantoprazole, 40 mg, Oral, BID AC  pharmacy consult - [place], , Does not apply, Daily  sertraline, 150 mg, Oral, Daily  sodium chloride, 10 mL, Intravenous, Q12H      I reviewed the patient's medications.    Vital Sign Min/Max for last 24 hours  Temp  Min: 97.2 °F (36.2 °C)  Max: 98.3 °F (36.8 °C)   BP  Min: 99/59  Max: 149/64   Pulse  Min: 60  Max: 137   Resp  Min: 18  Max: 21   SpO2  Min: 87 %  Max: 100 %   Flow (L/min)  Min: 30  Max: 45       Input/Output for last 24 hour shift  09/15 0701 - 09/16 0700  In: 1560 [P.O.:1560]  Out: 1875 [Urine:1875]   Physical Exam  Vitals and nursing note reviewed.   Constitutional:       General: She is not in acute distress.     Appearance: She is well-developed and normal weight. She is ill-appearing. She is not toxic-appearing.   HENT:      Head: Normocephalic and atraumatic.      Right Ear: External ear normal.      Left Ear: External ear normal.      Nose: Nose normal.      Mouth/Throat:      Mouth: Mucous membranes are moist.      Pharynx: Oropharynx is clear.   Eyes:      Extraocular Movements: Extraocular movements intact.      Conjunctiva/sclera: Conjunctivae normal.    Cardiovascular:      Rate and Rhythm: Normal rate and regular rhythm.      Pulses: Normal pulses.      Heart sounds: Normal heart sounds. No murmur heard.   No gallop.    Pulmonary:      Effort: Pulmonary effort is normal.      Breath sounds: Rales present. No wheezing or rhonchi.   Abdominal:      General: Bowel sounds are normal. There is no distension.      Palpations: Abdomen is soft.      Tenderness: There is no abdominal tenderness.   Musculoskeletal:         General: Normal range of motion.      Cervical back: Normal range of motion and neck supple.      Right lower leg: Edema present.      Left lower leg: Edema present.   Skin:     General: Skin is warm and dry.      Capillary Refill: Capillary refill takes less than 2 seconds.   Neurological:      General: No focal deficit present.      Mental Status: She is alert and oriented to person, place, and time.   Psychiatric:         Mood and Affect: Mood normal.         Thought Content: Thought content normal.         Judgment: Judgment normal.         Results from last 7 days   Lab Units 09/15/21  0615 09/14/21  2023   WBC 10*3/mm3  --  15.02*   HEMOGLOBIN g/dL 8.0* 9.5*   PLATELETS 10*3/mm3 228 287     Results from last 7 days   Lab Units 09/16/21  0422 09/15/21  0615 09/14/21  2049   SODIUM mmol/L  --  133* 138   POTASSIUM mmol/L 4.3 3.2* 3.9   CO2 mmol/L  --  21.0* 21.0*   BUN mg/dL  --  9 11   CREATININE mg/dL  --  0.67 0.75   GLUCOSE mg/dL  --  97 158*     Estimated Creatinine Clearance: 53.8 mL/min (by C-G formula based on SCr of 0.67 mg/dL).    Results from last 7 days   Lab Units 09/14/21  2307   PH, ARTERIAL pH units 7.501*   PCO2, ARTERIAL mm Hg 29.8*   PO2 ART mm Hg 70.2*       I reviewed the patient's new clinical results.  I reviewed the patient's new imaging results/reports including actual images and agree with reports.              Imaging Results (Last 24 Hours)     Procedure Component Value Units Date/Time    XR Chest 1 View [[id number]]  Collected: 09/16/21 0905     Updated: 09/16/21 0910    Narrative:      EXAMINATION: XR CHEST 1 VW-      INDICATION: Evaluate pleural effusions; J18.9-Pneumonia, unspecified  organism; I50.9-Heart failure, unspecified; D72.829-Elevated white blood  cell count, unspecified; J96.21-Acute and chronic respiratory failure  with hypoxia      COMPARISON: 9/14/2021     FINDINGS: Unchanged bilateral upper lung predominant airspace disease.  Minimally increased small right pleural effusion and stable trace left  effusion. No distinct pneumothorax. Unchanged heart and mediastinal  contours.       Impression:      Unchanged bilateral upper lung predominant airspace disease.  Minimally increased small right pleural effusion and stable trace left  effusion. No distinct pneumothorax. Unchanged heart and mediastinal  contours.     This report was finalized on 9/16/2021 9:07 AM by Lonnie Azar.             Assessment/Plan   Impression        Acute on chronic respiratory failure with hypoxia (CMS/HCC)    Multifocal pneumonia    COPD on home oxygen (CMS/HCC)    Rheumatoid arthritis (CMS/HCC)    Sepsis (CMS/HCC)    Acute diastolic CHF (CMS/[facility])    Accelerated hypertension    Pneumonia due to infectious organism       Plan        - imaging from CT of the chest from 9/14/2021 showed large bilateral pleural effusions, with stable interstitial changes in the upper lobes bilaterally.  This is compared to the CT scan from August 21, 2021.  Most notably the August 21, 2021 in September CT scans are fairly stable, although there is a difference in appearance compared to the CT scan from July 19, 2021 in which the groundglass changes and infiltrates were much more localized to part of the lung and not being so diffuse.  - labs from August 2021 which was most notable for a positive anticentromere antibody, and a positive atypical p-ANCA.  proBNP on this admission was elevated at 11,917.  Fungal work-up, TB work-up, and bacterial cultures are  all negative.  - Echo report from July 2021 showed a EF of 65%, mild aortic valvular stenosis, and an elevated RVSP at 37 mmHg.  - extensive chart with regards to cardiology evaluations, infectious disease evaluations, pulmonary evaluations, and hospitalist evaluations.  On multiple admissions over the last 3 months.     · I think is very likely patient has underlying interstitial lung disease. In looking back through all of her CT scans the scan from July 2021 appear to be more of an acute pneumonitis, with the most recent CT scans appear to be more scar formation in the setting of likely underlying heart failure.  She is already done a prolonged taper of steroids, therefore I do not think further steroids are warranted at this time.  I think the underlying disease process is under control whether this is autoimmune disease or amiodarone that was the initial etiology, and that we are currently dealing with either a secondary imposed infection or heart failure in the setting of known chronic interstitial lung disease.  · For now I would recommend to hold steroids  · Currently recommend against any bronchoscopy or biopsies, I do not the patient will tolerate the procedures and I am not sure that they provide us any new information.  · I agree with empirically treating for possible concomitant infections  · Also recommend aggressive diuresis in the setting of heart failure  · Brovana, Atrovent, and budesonide for underlying COPD  · Previous fluid studies are reviewed as well, which were consistent with a transudate of effusion.  Which fits with heart failure.  I do not think a repeat thoracentesis provide us with any benefit unless it was done in the emergent situation due to the fact the patient was decompensating at about to require intubation mechanical ventilation.  · Wean oxygen saturation to greater than 88%, aspects patient is okay to be transferred to nasal cannula at this time given her low high flow  settings.  · Aggressive pulmonary toilet  · Pulmonary will continue to follow     Thank You for the Consult    Deejay Calvillo DO  Pulmonary, Critical care and Sleep Medicine

## 2021-09-16 NOTE — THERAPY EVALUATION
Patient Name: Yin Law  : 1940    MRN: [id number]                              Today's Date: 2021       Admit Date: 2021    Visit Dx:     ICD-10-CM ICD-9-CM   1. Pneumonia due to infectious organism, unspecified laterality, unspecified part of lung  J18.9 486   2. Congestive heart failure, unspecified HF chronicity, unspecified heart failure type (CMS/HCC)  I50.9 428.0   3. Leukocytosis, unspecified type  D72.829 288.60   4. Acute on chronic respiratory failure with hypoxia (CMS/HCC)  J96.21 518.84     799.02     Patient Active Problem List   Diagnosis   • Multifocal pneumonia   • Recurrent atrial fibrillation with RVR (CMS/HCC)   • COPD on home oxygen (CMS/HCC)   • Hypertension and diastolic dysfunction   • Rheumatoid arthritis (CMS/[facility])   • Anxiety and depression   • Wound of right leg   • Anemia   • Elevated troponin   • Sepsis (CMS/HCC)   • Acute on chronic respiratory failure with hypoxia (CMS/HCC)   • Pleural effusion, bilateral   • Acute diastolic CHF (CMS/HCC)   • Accelerated hypertension   • Pneumonia due to infectious organism     Past Medical History:   Diagnosis Date   • Anxiety and depression    • Arrhythmia     A-FIB   • Asthma    • Chicken pox    • COPD (chronic obstructive pulmonary disease) (CMS/HCC)    • Hyperlipidemia    • Hypertension    • Measles    • Menopause    • Osteoporosis    • Rheumatoid arthritis (CMS/HCC)    • Rheumatoid arthritis (CMS/HCC)    • Tobacco abuse      Past Surgical History:   Procedure Laterality Date   • APPENDECTOMY     • CARPAL TUNNEL RELEASE Left    • CATARACT EXTRACTION, BILATERAL     • COLON SURGERY     • COLONOSCOPY     • GALLBLADDER SURGERY     • HYSTERECTOMY  1971   • TONSILLECTOMY       General Information     Row Name 21 0842          Physical Therapy Time and Intention    Document Type  evaluation  -DM     Mode of Treatment  physical therapy  -DM     Row Name 21 0842          General Information    Patient Profile Reviewed   yes  -DM     Prior Level of Function  independent:;all household mobility;gait;transfer;bed mobility;ADL's;dependent:;home management;cooking;cleaning;driving;shopping indep gt w/ R wx (doesn't use her SPC);Niece does HM,driv,shop,cook,clean since adm 8/'21, but p/t that pt was indep in all  -DM     Existing Precautions/Restrictions  fall;oxygen therapy device and L/min;other (see comments) ? Covid 19 r/o'd (d/c'd contact/airborne);adm w/ PNA,unc. HBP,recurr AF w/ RVR & B Pl.Eff, diast CHF,elev trop.;HFNC; occ tremoring;h/o asthma;per nsg, bed Al.@night but no ch.Al.when up  -DM     Barriers to Rehab  medically complex;previous functional deficit;ineffective coping A/D  -DM     Row Name 09/16/21 0842          Living Environment    Lives With  alone;other (see comments) niece staying till Nov.  -DM     Row Name 09/16/21 0842          Home Main Entrance    Number of Stairs, Main Entrance  two garage ent.  -DM     Row Name 09/16/21 0842          Stairs Within Home, Primary    Stairs, Within Home, Primary  1-st. w/ W.I. show. w/ seat & gr.bar, elev comm.seat w/ handlebars (+ floor to ceiling pole for supp)  -DM     Number of Stairs, Within Home, Primary  none  -DM     Row Name 09/16/21 0842          Cognition    Orientation Status (Cognition)  oriented x 4  -DM     Row Name 09/16/21 0842          Safety Issues, Functional Mobility    Safety Issues Affecting Function (Mobility)  safety precaution awareness;safety precautions follow-through/compliance;sequencing abilities  -DM     Impairments Affecting Function (Mobility)  balance;endurance/activity tolerance;pain;postural/trunk control;shortness of breath;strength  -DM       User Key  (r) = Recorded By, (t) = Taken By, (c) = Cosigned By    Initials Name Provider Type    DM Karon Pacheco, PT Physical Therapist        Mobility     Row Name 09/16/21 0842          Bed Mobility    Bed Mobility  rolling left;rolling right;supine-sit;scooting/bridging  -DM     Rolling Left  [place] (Bed Mobility)  verbal cues;contact guard placed mesh panty/pad to secure pure wick cath for mobil  -DM     Rolling Right [place] (Bed Mobility)  verbal cues;contact guard  -DM     Scooting/Bridging [place] (Bed Mobility)  verbal cues;contact guard  -DM     Supine-Sit [place] (Bed Mobility)  verbal cues;minimum assist (75% patient effort)  -DM     Assistive Device (Bed Mobility)  bed rails;head of bed elevated  -DM     Comment (Bed Mobility)  issued mesh panty/pad,tdsocks,mask; nsg assessing,gave meds & performed mult proced;applesauce given by nsg, as tray late & pt hungry; Nsg decl. chair Al,waff cush;pure wick disconn for mobil;on HFNC;once EOB, o2 sat 78%;placed NRB mask at 15 L over HFNC;sat incr to 96% after 1 min.;kept in place for mobil  -DM     Row Name 09/16/21 0842          Transfers    Comment (Transfers)  cues for HP, seq  -DM     Row Name 09/16/21 0842          Sit-Stand Transfer    Sit-Stand [place] (Transfers)  verbal cues;minimum assist (75% patient effort)  -DM     Assistive Device (Sit-Stand Transfers)  walker, front-wheeled  -DM     Row Name 09/16/21 0842          Gait/Stairs (Locomotion)    [place] Level (Gait)  verbal cues;minimum assist (75% patient effort) decl. 2nd gt d/t mod SOB & onset intermitt tremoring(h/o tremors)  -DM     Assistive Device (Gait)  walker, front-wheeled  -DM     Distance in Feet (Gait)  3  -DM     Deviations/Abnormal Patterns (Gait)  bilateral deviations;base of support, narrow;cindi decreased;stride length decreased;weight shifting decreased decr step length; occ R knee discomf.  -DM     Bilateral Gait Deviations  forward flexed posture;heel strike decreased B knee instabil. /weakness  -DM     Comment (Gait/Stairs)  pt on both NRB at 15 L & HFNC for gt to chair(sat. 92%),then removed while chair reposn (short line),& sat.dropped to 82%;placed back on pt,& sat.incr to 96%;kept in place during BP,then doffed for ther exer  -DM        User Key  (r) = Recorded By, (t) = Taken By, (c) = Cosigned By    Initials Name Provider Type    DM Karon Pacheco, PT Physical Therapist        Obj/Interventions     Row Name 09/16/21 0842          Range of Motion Comprehensive    General Range of Motion  lower extremity range of motion deficits identified  -DM     Comment, General Range of Motion  R knee olsen. 15-20 % d/t RA  -DM     Row Name 09/16/21 0842          Strength Comprehensive (MMT)    General Manual Muscle Testing (MMT) Assessment  neck/trunk strength deficits identified;lower extremity strength deficits identified  -DM     Comment, General Manual Muscle Testing (MMT) Assessment  R knee grossly 3- to 4-/5; B quads 4 to 4+/5  -DM     Row Name 09/16/21 0842          Motor Skills    Therapeutic Exercise  hip;knee;ankle;shoulder  -DM     Row Name 09/16/21 0842          Shoulder (Therapeutic Exercise)    Shoulder (Therapeutic Exercise)  AROM (active range of motion)  -DM     Shoulder AROM (Therapeutic Exercise)  bilateral;flexion;extension;aBduction;aDduction;horizontal aBduction/aDduction;sitting;10 repetitions;other (see comments) did pulm set for shldr exer w/ deep inspir (OT not seeing today), + biceps curls  -DM     Row Name 09/16/21 0842          Hip (Therapeutic Exercise)    Hip (Therapeutic Exercise)  AROM (active range of motion);isometric exercises  -DM     Hip AROM (Therapeutic Exercise)  bilateral;flexion;extension;aBduction;aDduction;sitting;external rotation;internal rotation;10 repetitions sitting marches;sat.decr to 90 %  -DM     Hip Isometrics (Therapeutic Exercise)  bilateral;gluteal sets;sitting;10 repetitions  -DM     Row Name 09/16/21 0842          Knee (Therapeutic Exercise)    Knee (Therapeutic Exercise)  AROM (active range of motion);isometric exercises  -DM     Knee AROM (Therapeutic Exercise)  bilateral;flexion;extension;SAQ (short arc quad);LAQ (long arc quad);heel slides;sitting;10 repetitions  -DM     Knee Isometrics  (Therapeutic Exercise)  bilateral;hamstring sets;quad sets;sitting;10 repetitions  -DM     Row Name 09/16/21 0842          Ankle (Therapeutic Exercise)    Ankle (Therapeutic Exercise)  AROM (active range of motion)  -DM     Ankle AROM (Therapeutic Exercise)  bilateral;dorsiflexion;plantarflexion;sitting;10 repetitions;other (see comments) AC  -DM     Row Name 09/16/21 0842          Balance    Balance Assessment  sitting static balance;sitting dynamic balance;standing static balance;standing dynamic balance  -DM     Static Sitting Balance  WNL;unsupported;sitting, edge of bed PLB during marches EOB  -DM     Dynamic Sitting Balance  WNL;unsupported;sitting in chair;sitting, edge of bed recip scooting  -DM     Static Standing Balance  WFL;supported;standing Trunk ext/focusing ahead in mirror  -DM     Dynamic Standing Balance  mild impairment;supported;standing WS to init sidesteps to chair  -DM     Balance Interventions  sitting;standing;static;dynamic;weight shifting activity  -DM       User Key  (r) = Recorded By, (t) = Taken By, (c) = Cosigned By    Initials Name Provider Type    DM Karon Pacheco, PT Physical Therapist        Goals/Plan     Row Name 09/16/21 0842          Bed Mobility Goal 1 (PT)    Activity/Assistive Device (Bed Mobility Goal 1, PT)  bed mobility activities, all  -DM     [place] Level/Cues Needed (Bed Mobility Goal 1, PT)  independent  -DM     Time Frame (Bed Mobility Goal 1, PT)  long term goal (LTG);1 week  -DM     Row Name 09/16/21 0842          Transfer Goal 1 (PT)    Activity/Assistive Device (Transfer Goal 1, PT)  sit-to-stand/stand-to-sit;bed-to-chair/chair-to-bed;walker, rolling  -DM     [place] Level/Cues Needed (Transfer Goal 1, PT)  supervision required  -DM     Time Frame (Transfer Goal 1, PT)  long term goal (LTG);1 week  -DM     Row Name 09/16/21 0842          Gait Training Goal 1 (PT)    Activity/Assistive Device (Gait Training Goal 1, PT)  gait (walking  locomotion);walker, rolling stable VS; O2 sat > 92%  -DM     [place] Level (Gait Training Goal 1, PT)  standby assist  -DM     Distance (Gait Training Goal 1, PT)  50  -DM     Time Frame (Gait Training Goal 1, PT)  long term goal (LTG);1 week  -DM     Row Name 09/16/21 0842          Stairs Goal 1 (PT)    Activity/Assistive Device (Stairs Goal 1, PT)  ascending stairs;descending stairs;cane, straight  -DM     [place] Level/Cues Needed (Stairs Goal 1, PT)  contact guard assist  -DM     Number of Stairs (Stairs Goal 1, PT)  2  -DM     Time Frame (Stairs Goal 1, PT)  long term goal (LTG);1 week  -DM     Row Name 09/16/21 0842          Patient Education Goal (PT)    Activity (Patient Education Goal, PT)  HEP exer  -DM     [place]/Cues/Accuracy (Memory Goal 2, PT)  demonstrates adequately;verbalizes understanding  -DM     Time Frame (Patient Education Goal, PT)  long term goal (LTG);1 week  -DM       User Key  (r) = Recorded By, (t) = Taken By, (c) = Cosigned By    Initials Name Provider Type    DM Karon Pacheco, PT Physical Therapist        Clinical Impression     Row Name 09/16/21 0842          Pain    Additional Documentation  Pain Scale: FACES Pre/Post-Treatment (Group)  -DM     Row Name 09/16/21 0842          Pain Scale: Numbers Pre/Post-Treatment    Pain Location - Side  Right  -DM     Pain Location  knee  -DM     Pain Intervention(s)  Repositioned;Rest;Elevated  -DM     Row Name 09/16/21 0842          Pain Scale: FACES Pre/Post-Treatment    Pain: FACES Scale, Pretreatment  0-->no hurt  -DM     Posttreatment Pain Rating  2-->hurts little bit  -DM     Row Name 09/16/21 0842          Plan of Care Review    Plan of Care Reviewed With  patient  -DM     Progress  improving  -DM     Outcome Summary  PT eval completed. Presents w/ PNA, recurr B pl.eff & AF/RVR, unc. HBP, decr LE strength/endurance, & impaired funct mobil. Transf to EOB w/ min A (desat to 78%;NRB at 15 L placed over HFNC, & sat.incr to  98%), STS w/ R wx & min A, sidestepped 3 ft to chair w/ Jayson, desat to 92%, NRB doffed & sat.again dropped to 82%, replaced & sat.incr to 96% for duration of ther exer, then doffed again & sat.92%. Recommend home w/ niece's assist & HHPT f/u at d/c.  -DM     Row Name 09/16/21 0842          Therapy Assessment/Plan (PT)    Patient/Family Therapy Goals Statement (PT)  improved funct mobil  -DM     Rehab Potential (PT)  good, to achieve stated therapy goals  -DM     Criteria for Skilled Interventions Met (PT)  yes;meets criteria;skilled treatment is necessary  -DM     Row Name 09/16/21 0842          Vital Signs    Pre Systolic BP Rehab  131  -DM     Pre Treatment Diastolic BP  55  -DM     Post Systolic BP Rehab  134  -DM     Post Treatment Diastolic BP  52  -DM     Pretreatment Heart Rate (beats/min)  66  -DM     Intratreatment Heart Rate (beats/min)  88  -DM     Posttreatment Heart Rate (beats/min)  69  -DM     Pre SpO2 (%)  92  -DM     O2 Delivery Pre Treatment  hi-flow  -DM     Intra SpO2 (%)  78  -DM     O2 Delivery Intra Treatment  other (see comments) also placed NRB at 15 L during mobil to stabilize O2 sat  -DM     Post SpO2 (%)  92  -DM     O2 Delivery Post Treatment  hi-flow  -DM     Pre Patient Position  Supine  -DM     Intra Patient Position  Standing  -DM     Post Patient Position  Sitting  -DM     Row Name 09/16/21 0842          Positioning and Restraints    Pre-Treatment Position  in bed  -DM     Post Treatment Position  chair  -DM     In Chair  notified nsg;reclined;call light within reach;encouraged to call for assist;with nsg;legs elevated brought extra pillow for LE's  -DM       User Key  (r) = Recorded By, (t) = Taken By, (c) = Cosigned By    Initials Name Provider Type    Karon Tang, PT Physical Therapist        Outcome Measures     Row Name 09/16/21 0842          How much help from another person do you currently need...    Turning from your back to your side while in flat bed without using  bedrails?  4  -DM     Moving from lying on back to sitting on the side of a flat bed without bedrails?  3  -DM     Moving to and from a bed to a chair (including a wheelchair)?  3  -DM     Standing up from a chair using your arms (e.g., wheelchair, bedside chair)?  3  -DM     Climbing 3-5 steps with a railing?  2  -DM     To walk in hospital room?  3  -DM     AM-PAC 6 Clicks Score (PT)  18  -DM     Row Name 09/16/21 0842          Functional Assessment    Outcome Measure Options  AM-PAC 6 Clicks Basic Mobility (PT)  -DM       User Key  (r) = Recorded By, (t) = Taken By, (c) = Cosigned By    Initials Name Provider Type    Karon Tang, PT Physical Therapist                       Physical Therapy Education                 Title: PT OT SLP Therapies (In Progress)     Topic: Physical Therapy (In Progress)     Point: Mobility training (In Progress)     Learning Progress Summary           Patient Eager, E,D, NR by DM at 9/16/2021 1058                   Point: Home exercise program (In Progress)     Learning Progress Summary           Patient Eager, E,D, NR by DM at 9/16/2021 1058                   Point: Body mechanics (In Progress)     Learning Progress Summary           Patient Eager, E,D, NR by DM at 9/16/2021 1058                   Point: Precautions (In Progress)     Learning Progress Summary           Patient Eager, E,D, NR by DM at 9/16/2021 1058                               User Key     Initials Effective Dates Name Provider Type Discipline    DM 06/16/21 -  Karon Pacheco, PT Physical Therapist PT              PT Recommendation and Plan  Planned Therapy Interventions (PT): bed mobility training, gait training, home exercise program, patient/family education, stair training, strengthening, transfer training  Plan of Care Reviewed With: patient  Progress: improving  Outcome Summary: PT eval completed. Presents w/ PNA, recurr B pl.eff & AF/RVR, unc. HBP, decr LE strength/endurance, & impaired funct mobil.  Transf to EOB w/ min A (desat to 78%;NRB at 15 L placed over HFNC, & sat.incr to 98%), STS w/ R wx & min A, sidestepped 3 ft to chair w/ Jayson, desat to 92%, NRB doffed & sat.again dropped to 82%, replaced & sat.incr to 96% for duration of ther exer, then doffed again & sat.92%. Recommend home w/ niece's assist & HHPT f/u at d/c.     Time Calculation:   PT Charges     Row Name 09/16/21 1059             Time Calculation    Start Time  0842  -DM      PT Received On  09/16/21  -DM      PT Goal Re-Cert Due Date  09/26/21  -DM         Time Calculation- PT    Total Timed Code Minutes- PT  109 minute(s)  -DM         Timed Charges    97320 - PT Therapeutic Exercise Minutes  18  -DM      27000 - Gait Training Minutes   10  -DM      36711 - PT Therapeutic Activity Minutes  21  -DM         Untimed Charges    PT Eval/Re-eval Minutes  60  -DM         Total Minutes    Timed Charges Total Minutes  49  -DM      Untimed Charges Total Minutes  60  -DM       Total Minutes  109  -DM        User Key  (r) = Recorded By, (t) = Taken By, (c) = Cosigned By    Initials Name Provider Type    Karon Tang, PT Physical Therapist        Therapy Charges for Today     Code Description Service Date Service Provider Modifiers Qty    67038432881 HC PT THER PROC EA 15 MIN 9/16/2021 Karon Pacheco, PT GP 1    38137323317 HC GAIT TRAINING EA 15 MIN 9/16/2021 Karon Pacheco, PT GP 1    90404031954 HC PT THERAPEUTIC ACT EA 15 MIN 9/16/2021 Karon Pacheco, PT GP 1    38744050756 HC PT EVAL MOD COMPLEXITY 4 9/16/2021 Karon Pacheco, PT GP 1          PT G-Codes  Outcome Measure Options: AM-PAC 6 Clicks Basic Mobility (PT)  AM-PAC 6 Clicks Score (PT): 18  AM-PAC 6 Clicks Score (OT): 13    Karon Pacheco, PT  9/16/2021

## 2021-09-16 NOTE — CONSULTS
Santiago Chaudhry MD  Consulting physician: Mellisa Delgado  Reason for referral: goc discussion, ACP, symptom management  Chief Complaint   Patient presents with   • Shortness of Breath     HPI:   81 y.o. female with a past medical history significant for COPD on 2L NC at baseline, diastolic congestive heart failure, rheumatoid arthritis, anxiety, depression, essential hypertension and atrial fibrillation presents to the ED with complaints of shortness of air on 9/14/2021.  Recent admissions: 7/19-8/2; 8/5-8/12; 8/21-8/26 for hypoxia respiratory failure, multifocal pneumonia, bilateral pleural effusions, acute diastolic heart failure, sepsis, amiodarone discontinued for possible pulmonary toxicity. Has been at [facility] on 2.5L, discharged home recently, became more dyspneic and has been readmitted.   She has had at least two negative COVID tests and has been fully vaccinated.  BNP is elevated at over 11,000 and procalcitonin is elevated as well.  On 9/14 CXR shows extensive bilateral pulmonary parenchymal infiltrates with an upper lobe predominance showing mild interval worsening since 8/25/2021, see below.   Symptoms:   Patient reports primary arthritic pain is her knees, Right > Left.   + dyspnea - moderate, near to her baseline - worsens with eating/converstation and exertion. Prn hydrocodone/APAP takes usually daily at home.   No nausea or anxiety.   Usually takes alprazolam at bedtime.   Advance care planning discussed:   Code Status:   Current Code Status     Date Active Code Status Order ID Comments User Context       9/14/2021 2244 CPR [id number]  Eboni Zayas APRN ED     Advance Care Planning Activity      Questions for Current Code Status     Question Answer Comment    Code Status CPR     Medical Interventions (Level of Support Prior to Arrest) Limited     Limited Support to NOT Include Intubation     Level Of Support Discussed With Patient         Advance Directive: none on medical record  Surrogate  decision maker: Patient requests her niece, Kisha Elkins.  NOK - two sisters  Past Medical History:   Diagnosis Date   • Anxiety and depression    • Arrhythmia     A-FIB   • Asthma    • Chicken pox    • COPD (chronic obstructive pulmonary disease) (CMS/[facility])    • Hyperlipidemia    • Hypertension    • Measles    • Menopause    • Osteoporosis    • Rheumatoid arthritis (CMS/HCC)    • Rheumatoid arthritis (CMS/[facility])    • Tobacco abuse      Past Surgical History:   Procedure Laterality Date   • APPENDECTOMY     • CARPAL TUNNEL RELEASE Left    • CATARACT EXTRACTION, BILATERAL     • COLON SURGERY     • COLONOSCOPY     • GALLBLADDER SURGERY     • HYSTERECTOMY  1971   • TONSILLECTOMY         Reviewed current scheduled and prn medications for route, type, dose and frequency.    Current Facility-Administered Medications   Medication Dose Route Frequency Provider Last Rate Last Admin   • acetaminophen (TYLENOL) tablet 650 mg  650 mg Oral Q4H PRN Chana, Eboni, APRN        Or   • acetaminophen (TYLENOL) 160 MG/5ML solution 650 mg  650 mg Oral Q4H PRN Chana, Eboni, APRN        Or   • acetaminophen (TYLENOL) suppository 650 mg  650 mg Rectal Q4H PRN Chana, Eboni, APRN       • ALPRAZolam (XANAX) tablet 0.5 mg  0.5 mg Oral Nightly PRN Chana, Eboni, APRN   0.5 mg at 09/15/21 2130   • apixaban (ELIQUIS) tablet 5 mg  5 mg Oral Q12H Chana, Eboni, APRN   5 mg at 09/16/21 0920   • arformoterol (BROVANA) nebulizer solution 15 mcg  15 mcg Nebulization BID - RT Chana, Eboni, APRN   15 mcg at 09/16/21 0856    And   • ipratropium (ATROVENT) nebulizer solution 0.5 mg  0.5 mg Nebulization 4x Daily - RT Chana, Eboni, APRN   0.5 mg at 09/16/21 0856   • ascorbic acid (VITAMIN C) tablet 1,000 mg  1,000 mg Oral Daily With Breakfast Chana, Eboni, APRN   1,000 mg at 09/16/21 0921   • bisoprolol (ZEBeta) tablet 5 mg  5 mg Oral Q24H Chana, Eboni, APRN   5 mg at 09/16/21 0921   • budesonide (PULMICORT) nebulizer solution  0.5 mg  0.5 mg Nebulization BID - RT Chana, Eboni, APRN   0.5 mg at 09/16/21 0856   • cefTRIAXone (ROCEPHIN) 1 g/100 mL 0.9% NS (MBP)  1 g Intravenous Q24H Chana, Eboni, APRN 0 mL/hr at 09/15/21 0055 1 g at 09/15/21 2054   • doxycycline (VIBRAMYCIN) 100 mg/100 mL 0.9% NS MBP  100 mg Intravenous Q12H Mellisa Delgado MD   100 mg at 09/16/21 1009   • ferrous sulfate tablet 325 mg  325 mg Oral BID With Meals Chana, Eboni, APRN   325 mg at 09/16/21 0920   • furosemide (LASIX) injection 40 mg  40 mg Intravenous BID Mellisa Delgado MD   40 mg at 09/16/21 0921   • HYDROcodone-acetaminophen (NORCO) 7.5-325 MG per tablet 1 tablet  1 tablet Oral Q6H PRN Mellisa Delgado MD   1 tablet at 09/15/21 1633   • lactobacillus acidophilus (RISAQUAD) capsule 1 capsule  1 capsule Oral Daily Chana, Eboni, APRN   1 capsule at 09/16/21 0920   • levothyroxine (SYNTHROID, LEVOTHROID) tablet 75 mcg  75 mcg Oral Q AM Mellisa Delgado MD   75 mcg at 09/16/21 0526   • Magnesium Sulfate 2 gram Bolus, followed by 8 gram infusion (total Mg dose 10 grams)- Mg less than or equal to 1mg/dL  2 g Intravenous PRN Mellisa Delgado MD        Or   • Magnesium Sulfate 2 gram / 50mL Infusion (GIVE X 3 BAGS TO EQUAL 6GM TOTAL DOSE) - Mg 1.1 - 1.5 mg/dl  2 g Intravenous PRN Mellisa Delgado MD        Or   • Magnesium Sulfate 4 gram infusion- Mg 1.6-1.9 mg/dL  4 g Intravenous PRN Mellisa Delgado MD       • multivitamin with minerals 1 tablet  1 tablet Oral Daily Chana, Eboni, APRN   1 tablet at 09/16/21 0920   • pantoprazole (PROTONIX) EC tablet 40 mg  40 mg Oral BID AC Chana, Eboni, APRN   40 mg at 09/16/21 0919   • Pharmacy Consult - MTM   Does not apply Daily [facility]       • potassium chloride (KLOR-CON) packet 40 mEq  40 mEq Oral PRN Mellisa Delgado MD       • potassium chloride (MICRO-K) CR capsule 40 mEq  40 mEq Oral PRN Mellisa Delgado MD   40 mEq at 09/15/21 4078   • sertraline (ZOLOFT) tablet 150 mg  150 mg Oral Daily Chana,  "Eboni, APRN   150 mg at 21 0920   • sodium chloride 0.9 % flush 10 mL  10 mL Intravenous PRN Matty Lawson MD       • sodium chloride 0.9 % flush 10 mL  10 mL Intravenous Q12H Chana, Eboni, APRN   10 mL at 09/15/21 2054   • sodium chloride 0.9 % flush 10 mL  10 mL Intravenous PRN Chana, Eboni, APRN            •  acetaminophen **OR** acetaminophen **OR** acetaminophen  •  ALPRAZolam  •  HYDROcodone-acetaminophen  •  magnesium sulfate **OR** magnesium sulfate **OR** magnesium sulfate  •  potassium chloride  •  potassium chloride  •  sodium chloride  •  sodium chloride  Allergies   Allergen Reactions   • Amiodarone Unknown - High Severity     Family History   Problem Relation Age of Onset   • Alzheimer's disease Mother    • No Known Problems Father    • No Known Problems Sister    • Hypertension Brother    • Parkinsonism Brother    • No Known Problems Maternal Grandmother    • No Known Problems Maternal Grandfather    • No Known Problems Paternal Grandmother    • No Known Problems Paternal Grandfather      Social History     Socioeconomic History   • Marital status:      Spouse name: Not on file   • Number of children: Not on file   • Years of education: Not on file   • Highest education level: Not on file   Tobacco Use   • Smoking status: Former Smoker     Packs/day: 0.25     Types: Cigarettes     Quit date: 2021     Years since quittin.4   • Smokeless tobacco: Never Used   • Tobacco comment: 1-2 cigs occas   Vaping Use   • Vaping Use: Never used   Substance and Sexual Activity   • Alcohol use: Never   • Drug use: Never   • Sexual activity: Defer     Review of Systems - +/- per HPI and symptom review.    PPS: 50%   /55 (BP Location: Right arm, Patient Position: Lying)   Pulse 68   Temp 98.1 °F (36.7 °C) (Oral)   Resp 18   Ht 162.6 cm (64\")   Wt 62.9 kg (138 lb 9.6 oz)   SpO2 96%   BMI 23.79 kg/m²   62.9 kg (138 lb 9.6 oz) Body mass index is 23.79 kg/m².  Intake & Output " (last day)       09/15 0701 - 09/16 0700 09/16 0701 - 09/17 0700    P.O. 1560     IV Piggyback      Total Intake(mL/kg) 1560 (24.8)     Urine (mL/kg/hr) 1875 (1.2)     Stool 0     Total Output 1875     Net -315           Urine Unmeasured Occurrence 1 x     Stool Unmeasured Occurrence 1 x           Physical Exam:  General Appearance: No acute distress, up in chair  Head: Normocephalic without obvious abnormality, atraumatic  Eyes: Conjunctivae and sclerae normal, no icterus, JAVIER  Lungs: Clear to auscultation, respirations regular, even and non-labored, nasal cannula  Heart: Regular rhythm and normal rate, normal S1  Abdomen: Normal bowel sounds, soft, non-distended, non-tender  Extremities: Moves all extremities, no redness, no cyanosis, no edema, dressing intact on Left distal shin  Pulses: Distal pulses palpable and equal bilaterally  Skin: Warm and dry  Neurological: Alert, interactive, appropriate conversation, no myoclonus  Reviewed labs and diagnostic results.  Lab Results   Component Value Date    HGBA1C 4.90 07/20/2021     Results from last 7 days   Lab Units 09/15/21  0615 09/14/21  2023 09/14/21  2023   WBC 10*3/mm3  --   --  15.02*   HEMOGLOBIN g/dL 8.0*   < > 9.5*   HEMATOCRIT % 26.7*   < > 31.3*   PLATELETS 10*3/mm3 228   < > 287    < > = values in this interval not displayed.     Results from last 7 days   Lab Units 09/16/21  0422 09/15/21  0615 09/15/21  0615 09/14/21  2049 09/14/21  2049   SODIUM mmol/L  --   --  133*   < > 138   POTASSIUM mmol/L 4.3   < > 3.2*   < > 3.9   CHLORIDE mmol/L  --   --  101   < > 104   CO2 mmol/L  --   --  21.0*   < > 21.0*   BUN mg/dL  --   --  9   < > 11   CREATININE mg/dL  --   --  0.67   < > 0.75   CALCIUM mg/dL  --   --  8.3*   < > 8.3*   BILIRUBIN mg/dL  --   --   --   --  0.4   ALK PHOS U/L  --   --   --   --  92   ALT (SGPT) U/L  --   --   --   --  18   AST (SGOT) U/L  --   --   --   --  22   GLUCOSE mg/dL  --   --  97   < > 158*    < > = values in this interval  not displayed.     Results from last 7 days   Lab Units 09/16/21  0422 09/15/21  0615 09/15/21  0615   SODIUM mmol/L  --   --  133*   POTASSIUM mmol/L 4.3   < > 3.2*   CHLORIDE mmol/L  --   --  101   CO2 mmol/L  --   --  21.0*   BUN mg/dL  --   --  9   CREATININE mg/dL  --   --  0.67   GLUCOSE mg/dL  --   --  97   CALCIUM mg/dL  --   --  8.3*    < > = values in this interval not displayed.     Imaging Results (Last 72 Hours)     Procedure Component Value Units Date/Time    XR Chest 1 View [[id number]] Collected: 09/16/21 0905     Updated: 09/16/21 0910    Narrative:      EXAMINATION: XR CHEST 1 VW-      INDICATION: Evaluate pleural effusions; J18.9-Pneumonia, unspecified  organism; I50.9-Heart failure, unspecified; D72.829-Elevated white blood  cell count, unspecified; J96.21-Acute and chronic respiratory failure  with hypoxia      COMPARISON: 9/14/2021     FINDINGS: Unchanged bilateral upper lung predominant airspace disease.  Minimally increased small right pleural effusion and stable trace left  effusion. No distinct pneumothorax. Unchanged heart and mediastinal  contours.       Impression:      Unchanged bilateral upper lung predominant airspace disease.  Minimally increased small right pleural effusion and stable trace left  effusion. No distinct pneumothorax. Unchanged heart and mediastinal  contours.     This report was finalized on 9/16/2021 9:07 AM by Lonnie Azar.       CT Angiogram Chest [[id number]] Collected: 09/14/21 2355     Updated: 09/14/21 2357    Narrative:      CTA Chest    INDICATION:   Pneumonia with shortness of breath.    TECHNIQUE:   CT angiogram of the chest with 100 cc of Isovue-300 IV contrast. 3-D reconstructions were obtained and reviewed.   Radiation dose reduction techniques included automated exposure control or exposure modulation based on body size. Count of known CT and  cardiac nuc med studies performed in previous 12 months: 3.     COMPARISON:   8/23/2021    FINDINGS:   Chest  images at mediastinal window show moderately large bilateral pleural effusions. Pleural fluid volumes are slightly greater since the previous scan. There are no pulmonary artery filling defects to suggest emboli. The CT angiographic images also  show no evidence of emboli.    Chest images at lung window show extensive bilateral infiltrates with underlying emphysema. The infiltrates appear similar to the previous scan in August. There is more perhaps increased infiltrate in the right upper lobe compared to the previous scan.      Impression:      Emphysema with extensive bilateral infiltrates with slight worsening in the right upper lobe since the previous scan. Bilateral pleural effusions appear slightly larger. No evidence of pulmonary embolism.    Signer Name: Gomez Luna MD   Signed: 9/14/2021 11:55 PM   Workstation Name: [id number]    [facility]    XR Chest 1 View [[id number]] Collected: 09/14/21 2100     Updated: 09/14/21 2102    Narrative:      CR Chest 1 Vw    INDICATION:   Short of air triage. Covid rule out.     COMPARISON:    Chest x-ray 8/25/2021.    FINDINGS:  Portable AP view(s) of the chest.  Cardiac silhouette is top normal and partly obscured by pulmonary parenchymal pathology. There are extensive bilateral infiltrates with an upper lobe predominance though there is also airspace disease at the left mid to  lower lung and patchy airspace disease at the right midlung. This is worse than on the prior study. On comparison to films dating back to May 2021, the infiltrates are essentially new since that time. Please correlate with the clinical course.      Impression:      Extensive bilateral pulmonary parenchymal infiltrates with an upper lobe predominance showing mild interval worsening since 8/25/2021. This could be due to Covid pneumonia though other infectious or inflammatory disease certainly in the differential.  Clinical correlation and follow-up recommended.    Signer  Name: Amy Trejo MD   Signed: 9/14/2021 9:00 PM   Workstation Name: [email or web address]    [facility]      Active Problem List:    Acute on chronic respiratory failure with hypoxia (CMS/HCC)    Multifocal pneumonia    COPD on home oxygen (CMS/HCC)    Rheumatoid arthritis (CMS/HCC)    Sepsis (CMS/HCC)    Acute diastolic CHF (CMS/HCC)    Accelerated hypertension    Pneumonia due to infectious organism    Impression: 81 y.o. female with acute on chronic respiratory failure, acute on chronic diastolic heart failure  Plan:   Dyspnea - continue to monitor, tolerating off HFNC on nasal cannula    Anxiety - alprazolam at bedtime    General musculoskeletal pain - scheduled acetamionphen, diclofenac ointment to bilateral knees    Goals of care - clarified with patient, she does not want any ventilator support, but ok with chest compressions.  Palliative team provide support to patient/family.       Ester Aguilar, AMA  [phone]  09/16/21  10:18 EDT      Time: 60 minutes spent reviewing medical and medication records, assessing and examining patient, discussing with patient and family, answering questions, formulating a plan and documentation of care. > 50% time spent face to face

## 2021-09-16 NOTE — PLAN OF CARE
Problem: Adult Inpatient Plan of Care  Goal: Plan of Care Review  Outcome: Ongoing, Progressing  Goal: Patient-Specific Goal (Individualized)  Outcome: Ongoing, Progressing  Goal: Absence of Hospital-Acquired Illness or Injury  Outcome: Ongoing, Progressing  Intervention: Identify and Manage Fall Risk  Recent Flowsheet Documentation  Taken 9/16/2021 0400 by Lydia Ballard RN  Safety Promotion/Fall Prevention:   activity supervised   assistive device/personal items within reach   clutter free environment maintained   fall prevention program maintained   lighting adjusted   nonskid shoes/slippers when out of bed   room organization consistent   safety round/check completed   elopement precautions  Taken 9/16/2021 0200 by Lydia Ballard RN  Safety Promotion/Fall Prevention:   activity supervised   assistive device/personal items within reach   clutter free environment maintained   fall prevention program maintained   lighting adjusted   nonskid shoes/slippers when out of bed   room organization consistent   safety round/check completed  Taken 9/16/2021 0001 by Lydia Ballard RN  Safety Promotion/Fall Prevention:   activity supervised   assistive device/personal items within reach   clutter free environment maintained   fall prevention program maintained   elopement precautions   gait belt   lighting adjusted   nonskid shoes/slippers when out of bed   room organization consistent   safety round/check completed  Intervention: Prevent Skin Injury  Recent Flowsheet Documentation  Taken 9/16/2021 0400 by Lydia Ballard RN  Body Position: position changed independently  Skin Protection:   adhesive use limited   incontinence pads utilized   tubing/devices free from skin contact  Taken 9/16/2021 0200 by Lydia Ballard RN  Body Position: position changed independently  Skin Protection:   adhesive use limited   incontinence pads utilized   tubing/devices free from skin contact  Taken 9/16/2021 0001 by Elio  JEAN MARIE Freeman  Body Position: position changed independently  Skin Protection:   adhesive use limited   incontinence pads utilized   tubing/devices free from skin contact  Intervention: Prevent and Manage VTE (venous thromboembolism) Risk  Recent Flowsheet Documentation  Taken 9/16/2021 0001 by Lydia Ballard RN  VTE Prevention/Management:   bilateral   dorsiflexion/plantar flexion performed   bleeding risk factor(s) identified  Goal: Optimal Comfort and Wellbeing  Outcome: Ongoing, Progressing  Intervention: Provide Person-Centered Care  Recent Flowsheet Documentation  Taken 9/16/2021 0001 by Lydia Ballard RN  Trust Relationship/Rapport: care explained  Goal: Readiness for Transition of Care  Outcome: Ongoing, Progressing     Problem: Fall Injury Risk  Goal: Absence of Fall and Fall-Related Injury  Outcome: Ongoing, Progressing  Intervention: Identify and Manage Contributors to Fall Injury Risk  Recent Flowsheet Documentation  Taken 9/16/2021 0400 by Lydia Ballard RN  Medication Review/Management: medications reviewed  Taken 9/16/2021 0200 by Lydia Ballard RN  Medication Review/Management: medications reviewed  Taken 9/16/2021 0001 by Lydia Ballard RN  Medication Review/Management: medications reviewed  Intervention: Promote Injury-Free Environment  Recent Flowsheet Documentation  Taken 9/16/2021 0400 by Lydia Ballard RN  Safety Promotion/Fall Prevention:   activity supervised   assistive device/personal items within reach   clutter free environment maintained   fall prevention program maintained   lighting adjusted   nonskid shoes/slippers when out of bed   room organization consistent   safety round/check completed   elopement precautions  Taken 9/16/2021 0200 by Lydia Ballard RN  Safety Promotion/Fall Prevention:   activity supervised   assistive device/personal items within reach   clutter free environment maintained   fall prevention program maintained   lighting adjusted   nonskid  shoes/slippers when out of bed   room organization consistent   safety round/check completed  Taken 9/16/2021 0001 by Lydia Ballard RN  Safety Promotion/Fall Prevention:   activity supervised   assistive device/personal items within reach   clutter free environment maintained   fall prevention program maintained   elopement precautions   gait belt   lighting adjusted   nonskid shoes/slippers when out of bed   room organization consistent   safety round/check completed     Problem: COPD Comorbidity  Goal: Maintenance of COPD Symptom Control  Outcome: Ongoing, Progressing  Intervention: Maintain COPD-Symptom Control  Recent Flowsheet Documentation  Taken 9/16/2021 0400 by Lydia Ballard RN  Medication Review/Management: medications reviewed  Taken 9/16/2021 0200 by Lydia Ballard RN  Medication Review/Management: medications reviewed  Taken 9/16/2021 0001 by Lydia Ballard RN  Medication Review/Management: medications reviewed     Problem: Heart Failure Comorbidity  Goal: Maintenance of Heart Failure Symptom Control  Outcome: Ongoing, Progressing  Intervention: Maintain Heart Failure-Management Strategies  Recent Flowsheet Documentation  Taken 9/16/2021 0400 by Lydia Ballard RN  Medication Review/Management: medications reviewed  Taken 9/16/2021 0200 by Lydia Ballard RN  Medication Review/Management: medications reviewed  Taken 9/16/2021 0001 by Lydia Ballard RN  Medication Review/Management: medications reviewed     Problem: Pain Chronic (Persistent) (Comorbidity Management)  Goal: Acceptable Pain Control and Functional Ability  Outcome: Ongoing, Progressing  Intervention: Manage Persistent Pain  Recent Flowsheet Documentation  Taken 9/16/2021 0400 by Lydia Ballard RN  Medication Review/Management: medications reviewed  Taken 9/16/2021 0200 by Lydia Ballard RN  Medication Review/Management: medications reviewed  Taken 9/16/2021 0001 by Lydia Ballard RN  Medication Review/Management:  medications reviewed     Problem: Fluid Imbalance (Pneumonia)  Goal: Fluid Balance  Outcome: Ongoing, Progressing     Problem: Infection (Pneumonia)  Goal: Resolution of Infection Signs and Symptoms  Outcome: Ongoing, Progressing     Problem: Respiratory Compromise (Pneumonia)  Goal: Effective Oxygenation and Ventilation  Outcome: Ongoing, Progressing  Intervention: Promote Airway Secretion Clearance  Recent Flowsheet Documentation  Taken 9/16/2021 0001 by Lydia Ballard RN  Cough And Deep Breathing: done independently per patient     Problem: Adjustment to Illness (Sepsis/Septic Shock)  Goal: Optimal Coping  Outcome: Ongoing, Progressing     Problem: Bleeding (Sepsis/Septic Shock)  Goal: Absence of Bleeding  Outcome: Ongoing, Progressing     Problem: Glycemic Control Impaired (Sepsis/Septic Shock)  Goal: Blood Glucose Level Within Desired Range  Outcome: Ongoing, Progressing     Problem: Hemodynamic Instability (Sepsis/Septic Shock)  Goal: Effective Tissue Perfusion  Outcome: Ongoing, Progressing     Problem: Infection (Sepsis/Septic Shock)  Goal: Absence of Infection Signs and Symptoms  Outcome: Ongoing, Progressing     Problem: Nutrition Impaired (Sepsis/Septic Shock)  Goal: Optimal Nutrition Intake  Outcome: Ongoing, Progressing     Problem: Respiratory Compromise (Sepsis/Septic Shock)  Goal: Effective Oxygenation and Ventilation  Outcome: Ongoing, Progressing  Intervention: Promote Airway Secretion Clearance  Recent Flowsheet Documentation  Taken 9/16/2021 0400 by Lydia Ballard, RN  Activity Management: activity adjusted per tolerance  Taken 9/16/2021 0200 by Lydia Ballard, RN  Activity Management: activity adjusted per tolerance  Taken 9/16/2021 0001 by Lydia Ballard, RN  Activity Management: activity adjusted per tolerance  Cough And Deep Breathing: done independently per patient     Problem: Skin Injury Risk Increased  Goal: Skin Health and Integrity  Outcome: Ongoing, Progressing  Intervention:  Optimize Skin Protection  Recent Flowsheet Documentation  Taken 9/16/2021 0400 by Lydia Ballard RN  Pressure Reduction Techniques:   frequent weight shift encouraged   pressure points protected   weight shift assistance provided  Pressure Reduction Devices:   pressure-redistributing mattress utilized   positioning supports utilized  Skin Protection:   adhesive use limited   incontinence pads utilized   tubing/devices free from skin contact  Taken 9/16/2021 0200 by Lydia Ballard RN  Pressure Reduction Techniques:   frequent weight shift encouraged   pressure points protected   weight shift assistance provided  Pressure Reduction Devices:   pressure-redistributing mattress utilized   positioning supports utilized  Skin Protection:   adhesive use limited   incontinence pads utilized   tubing/devices free from skin contact  Taken 9/16/2021 0001 by Lydia Ballard RN  Skin Protection:   adhesive use limited   incontinence pads utilized   tubing/devices free from skin contact   Goal Outcome Evaluation:

## 2021-09-16 NOTE — NURSING NOTE
[facility] consulted for: Blister on left shin    Assessment and Care provided: Patient presents with a reapproximated blister on her left shin.  She does have evidence of venous stasis in her bilateral lower lower extremities.  The wound measures 10.5 x 6.5 x 0 cm.  and has very defined wound edges.  The patient states that the wound developed several days ago and appeared to be a large blister.  The skin flap covering the wound bed is secured to the wound base.  The skin covering the wound bed is soft and dusky.   Debridement may be required if there is not improvement over the next several days.  No signs of infection or drainage from the border of the wound.  Cleansed wound and applied Xeroform and covered with an OPTi foam dressing.    Recommendation(s): See wound care orders for dressing changes to be performed every other day.    *Maintain good skin care, keep dry, turn q 2 hr, keep heels elevated and offloaded with heel boots.    *Apply z-guard to bottom and bony prominences daily and as needed with incontinence episodes.  *Follow C.A.R.E protocol if medical devices (Bipap, arthur, Ng tube, etc) are being used.     All skin interventions in place.  Head to toe assessment completed. Heels and bottom blanchable, intact and offloaded. WOC orders placed.    Discussed plan of care with  RN.    Thank you for consulting the [facility] Nurse.  Will continue to follow.  Please contact with questions or if needs arise.

## 2021-09-16 NOTE — PROGRESS NOTES
[facility] Medicine Services  PROGRESS NOTE    Patient Name: Yin Law  : 1940  MRN: [id number]    Date of Admission: 2021  Primary Care Physician: Santiago Chaudhry MD    Subjective   Subjective     CC:  F/U dyspnea    HPI: States her breathing is better today.  Did not notice much voiding yesterday.  No complaints.     ROS:  Gen- No fevers, chills  CV- No chest pain, palpitations  Resp- No cough or dyspnea at rest   GI- No N/V/D, abd pain.  Eating without difficulty      Objective   Objective     Vital Signs:   Temp:  [97.2 °F (36.2 °C)-98.3 °F (36.8 °C)] 98.1 °F (36.7 °C)  Heart Rate:  [] 62  Resp:  [18-21] 18  BP: ()/(48-72) 149/64  Flow (L/min):  [6-40] 35     Physical Exam:  Constitutional: Dozing in chair, wakes easily.  No acute distress, awake, alert  HENT: NCAT, mucous membranes moist  Respiratory: CTA bilat though diminished throughout. Mild lyphosis.  Respiratory effort normal  Cardiovascular: RRR, no murmurs, rubs, or gallops  Gastrointestinal: Positive bowel sounds, soft, nontender, nondistended  Musculoskeletal: tr bilateral ankle edema  Psychiatric: Appropriate affect, cooperative  Neurologic: Cranial Nerves grossly intact to confrontation, speech clear  Skin: No rashes on exposed surfaces    Results Reviewed:  LAB RESULTS:      Lab 09/15/21  0615 09/15/21  0008 21   WBC  --   --   --  15.02*   HEMOGLOBIN 8.0*  --   --  9.5*   HEMATOCRIT 26.7*  --   --  31.3*   PLATELETS 228  --   --  287   NEUTROS ABS 6.46  --   --  12.63*   IMMATURE GRANS (ABS) 0.05  --   --  0.09*   LYMPHS ABS 1.49  --   --  1.03   MONOS ABS 0.96*  --   --  1.22*   EOS ABS 0.02  --   --  0.02   MCV 90.2  --   --  90.2   PROCALCITONIN  --   --  0.36*  --    LACTATE  --  1.9 3.2*  --          Lab 21  0422 09/15/21  0615 21   SODIUM  --  133* 138   POTASSIUM 4.3 3.2* 3.9   CHLORIDE  --  101 104   CO2  --  21.0* 21.0*   ANION  GAP  --  11.0 13.0   BUN  --  9 11   CREATININE  --  0.67 0.75   GLUCOSE  --  97 158*   CALCIUM  --  8.3* 8.3*   TSH  --  22.960*  --          Lab 09/14/21 2049   TOTAL PROTEIN 5.9*   ALBUMIN 3.10*   GLOBULIN 2.8   ALT (SGPT) 18   AST (SGOT) 22   BILIRUBIN 0.4   ALK PHOS 92         Lab 09/14/21 2049   PROBNP 11,917.0*   TROPONIN T <0.010                 Lab 09/14/21  2307   PH, ARTERIAL 7.501*   PCO2, ARTERIAL 29.8*   PO2 ART 70.2*   FIO2 60   HCO3 ART 23.2   BASE EXCESS ART 0.5   CARBOXYHEMOGLOBIN 1.6     Brief Urine Lab Results  (Last result in the past 365 days)      Color   Clarity   Blood   Leuk Est   Nitrite   Protein   CREAT   Urine HCG        08/21/21 2314 Yellow Clear Negative Negative Positive Negative               Microbiology Results Abnormal     Procedure Component Value - Date/Time    S. Pneumo Ag Urine or CSF - Urine, Urine, Clean Catch [[id number]]  (Normal) Collected: 09/15/21 1850    Lab Status: Final result Specimen: Urine, Clean Catch Updated: 09/15/21 2350     Strep Pneumo Ag Negative    Legionella Antigen, Urine - Urine, Urine, Clean Catch [[id number]]  (Normal) Collected: 09/15/21 1850    Lab Status: Final result Specimen: Urine, Clean Catch Updated: 09/15/21 2350     LEGIONELLA ANTIGEN, URINE Negative    Blood Culture - Blood, Arm, Right [[id number]] Collected: 09/14/21 2125    Lab Status: Preliminary result Specimen: Blood from Arm, Right Updated: 09/15/21 2147     Blood Culture No growth at 24 hours    Blood Culture - Blood, Arm, Right [[id number]] Collected: 09/14/21 2135    Lab Status: Preliminary result Specimen: Blood from Arm, Right Updated: 09/15/21 2147     Blood Culture No growth at 24 hours    MRSA Screen, PCR (Inpatient) - Swab, Nares [[id number]]  (Normal) Collected: 09/15/21 0645    Lab Status: Final result Specimen: Swab from Nares Updated: 09/15/21 0903     MRSA PCR Negative    Narrative:      MRSA Negative    COVID-19,CEPHEID/VASHTI,SHALINI IN-HOUSE(OR EMERGENT/ADD-ON),NP SWAB IN  TRANSPORT MEDIA 3-4 HR TAT - Swab, Nasopharynx [[id number]]  (Normal) Collected: 09/15/21 0543    Lab Status: Final result Specimen: Swab from Nasopharynx Updated: 09/15/21 0637     COVID19 Not Detected    Narrative:      Fact sheet for providers: https://www.fda.gov/media/671083/download     Fact sheet for patients: https://www.fda.gov/media/571621/download  Fact sheet for providers: https://www.fda.gov/media/095418/download     Fact sheet for patients: https://www.fda.gov/media/408347/download    COVID PRE-OP / PRE-PROCEDURE SCREENING ORDER (NO ISOLATION) - Swab, Nasopharynx [[id number]]  (Normal) Collected: 09/14/21 2026    Lab Status: Final result Specimen: Swab from Nasopharynx Updated: 09/14/21 2056    Narrative:      The following orders were created for panel order COVID PRE-OP / PRE-PROCEDURE SCREENING ORDER (NO ISOLATION) - Swab, Nasopharynx.  Procedure                               Abnormality         Status                     ---------                               -----------         ------                     COVID-19 and FLU A/B PCR...[[id number]]  Normal              Final result                 Please view results for these tests on the individual orders.    COVID-19 and FLU A/B PCR - Swab, Nasopharynx [[id number]]  (Normal) Collected: 09/14/21 2026    Lab Status: Final result Specimen: Swab from Nasopharynx Updated: 09/14/21 2056     COVID19 Not Detected     Influenza A PCR Not Detected     Influenza B PCR Not Detected    Narrative:      Fact sheet for providers: https://www.fda.gov/media/048215/download    Fact sheet for patients: https://www.fda.gov/media/402359/download    Test performed by PCR.          XR Chest 1 View    Result Date: 9/14/2021  CR Chest 1 Vw INDICATION: Short of air triage. Covid rule out. COMPARISON:  Chest x-ray 8/25/2021. FINDINGS: Portable AP view(s) of the chest.  Cardiac silhouette is top normal and partly obscured by pulmonary parenchymal pathology. There are extensive  bilateral infiltrates with an upper lobe predominance though there is also airspace disease at the left mid to lower lung and patchy airspace disease at the right midlung. This is worse than on the prior study. On comparison to films dating back to May 2021, the infiltrates are essentially new since that time. Please correlate with the clinical course.     Impression: Extensive bilateral pulmonary parenchymal infiltrates with an upper lobe predominance showing mild interval worsening since 8/25/2021. This could be due to Covid pneumonia though other infectious or inflammatory disease certainly in the differential. Clinical correlation and follow-up recommended. Signer Name: Amy Trejo MD  Signed: 9/14/2021 9:00 PM  Workstation Name: [email or web address]  [facility]    CT Angiogram Chest    Result Date: 9/14/2021  CTA Chest INDICATION: Pneumonia with shortness of breath. TECHNIQUE: CT angiogram of the chest with 100 cc of Isovue-300 IV contrast. 3-D reconstructions were obtained and reviewed.   Radiation dose reduction techniques included automated exposure control or exposure modulation based on body size. Count of known CT and cardiac nuc med studies performed in previous 12 months: 3. COMPARISON: 8/23/2021 FINDINGS: Chest images at mediastinal window show moderately large bilateral pleural effusions. Pleural fluid volumes are slightly greater since the previous scan. There are no pulmonary artery filling defects to suggest emboli. The CT angiographic images also show no evidence of emboli. Chest images at lung window show extensive bilateral infiltrates with underlying emphysema. The infiltrates appear similar to the previous scan in August. There is more perhaps increased infiltrate in the right upper lobe compared to the previous scan.     Impression: Emphysema with extensive bilateral infiltrates with slight worsening in the right upper lobe since the previous scan. Bilateral pleural effusions  appear slightly larger. No evidence of pulmonary embolism. Signer Name: Gomez Luna MD  Signed: 9/14/2021 11:55 PM  Workstation Name: [id number]  [facility]      Results for orders placed during the hospital encounter of 07/19/21    Adult Transthoracic Echo Complete W/ Cont if Necessary Per Protocol    Interpretation Summary  · Estimated left ventricular EF = 65%  · Mild aortic valve stenosis is present.  · Aortic valve maximum pressure gradient is 32.9 mmHg. Aortic valve mean pressure gradient is 17.2 mmHg.  · Mild mitral valve regurgitation is present.  · Mild tricuspid valve regurgitation is present.  · Estimated right ventricular systolic pressure from tricuspid regurgitation is 37.0 mmHg.  · There is a large left pleural effusion. There is a moderate sized right pleural effusion.      I have reviewed the medications:  Scheduled Meds:apixaban, 5 mg, Oral, Q12H  arformoterol, 15 mcg, Nebulization, BID - RT   And  ipratropium, 0.5 mg, Nebulization, 4x Daily - RT  ascorbic acid, 1,000 mg, Oral, Daily With Breakfast  bisoprolol, 5 mg, Oral, Q24H  budesonide, 0.5 mg, Nebulization, BID - RT  cefTRIAXone, 1 g, Intravenous, Q24H  doxycycline, 100 mg, Intravenous, Q12H  ferrous sulfate, 325 mg, Oral, BID With Meals  furosemide, 40 mg, Intravenous, BID  lactobacillus acidophilus, 1 capsule, Oral, Daily  levothyroxine, 75 mcg, Oral, Q AM  multivitamin with minerals, 1 tablet, Oral, Daily  pantoprazole, 40 mg, Oral, BID AC  pharmacy consult - [place], , Does not apply, Daily  sertraline, 150 mg, Oral, Daily  sodium chloride, 10 mL, Intravenous, Q12H      Continuous Infusions:   PRN Meds:.•  acetaminophen **OR** acetaminophen **OR** acetaminophen  •  ALPRAZolam  •  HYDROcodone-acetaminophen  •  magnesium sulfate **OR** magnesium sulfate **OR** magnesium sulfate  •  potassium chloride  •  potassium chloride  •  sodium chloride  •  sodium chloride    Assessment/Plan   Assessment & Plan     Active  Hospital Problems    Diagnosis  POA   • **Acute on chronic respiratory failure with hypoxia (CMS/[facility]) [J96.21]  Yes   • Accelerated hypertension [I10]  Yes   • Pneumonia due to infectious organism [J18.9]  Yes   • Acute diastolic CHF (CMS/[facility]) [I50.31]  Yes   • Sepsis (CMS/[facility]) [A41.9]  Yes   • Multifocal pneumonia [J18.9]  Yes   • COPD on home oxygen (CMS/[facility]) [J44.9]  Yes   • Rheumatoid arthritis (CMS/[facility]) [M06.9]  Yes      Resolved Hospital Problems   No resolved problems to display.        Brief Hospital Course to date:  Yin Law is a 81 y.o. 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  Admitted in July for same, had infectious/rheum workup, was suspected of amio toxicity and placed on 4 week steroid taper. Has been at [facility] on 2.5L, discharged home recently, became more dyspneic and has been readmitted.   She has had at least two negative COVID tests and has been fully vaccinated.  BNP is elevated at over 11,000 and procalcitonin is elevated as well.  CT chest shows multifocal infiltrates overall similar in appearance to her last admission as well as moderate sized bilateral pleural effusions.    All problems are new to me.     Infiltrates bilaterally,   - DDx interstitial lung disese, CHF, infection  Acute on chronic respiratory failure with hypoxia  COPD on 2L O2 chronically  - HFNC, flow 35, FIO2 49.   -Blood cultures pending, MRSA screen negative  -Currently on doxycycline and ceftriaxone - stopped vanc for low suspicion and neg MRSA screen   - Previous infectious workup has been unrevealing with negative cryptococcal antigen, fungitell, histo, blasto, aspergillus.  She has only ever grown candida albicans on sputum culture  -Pulmonary workup included elevated RF (in the setting of known RA), negative quantiferon,  +anti-centromere antibody, + atypical p-ANCA with negative MPO and PR3.  She was felt to be too frail for open lung biopsy.  Thoracentesis performed last admission with transudative fluid and negative cytology.  -On brovana and pulmicort  -Pulmonary consultation appreciated     Acute on chronic diastolic CHF  bilat effusions reported, not signif on CXR  - EF 65%, mod MS  - diuresis IV lasix 40mg BID  -Likely needs higher dose of lasix at discharge (was on 20mg BID)  -Electrolyte replacement  -Cardiology consultation      Hypertensive emergency  -BP on arrival 201/104  -Improved with nitropaste in ED  -Continue bisoprolol     Hypothyroidism  -TSH 22 (same as last admission and free T4 then was low)  -She was started on levothyroxine 50mcg daily last admission  -Increased to 75mcg daily     Paroxysmal atrial fibrillation  -Continue bisoprolol.  Cannot tolerate amiodarone due to pulmonary toxicity  -Per last cardiology note she could not tolerate Eliquis due to GI bleeding, however she was discharged on Eliquis and this has been continued     Anxiety/depression   -On xanax     DVT prophylaxis:  Medical and mechanical DVT prophylaxis orders are present.            Disposition: I expect the patient to be discharged TBD.    CODE STATUS:   Code Status and Medical Interventions:   Ordered at: 09/14/21 0663     Limited Support to NOT Include:    Intubation     Level Of Support Discussed With:    Patient     Code Status:    CPR     Medical Interventions (Level of Support Prior to Arrest):    Limited     Given her repeated admissions and significant morbidity, I discussed with her having palliative care see her tomorrow.    Kerrie Vera MD  09/16/21

## 2021-09-16 NOTE — PLAN OF CARE
Patient and daughter in the room during my visit.  Patient was on a O2 canula and still having trouble breathing.  She was not able to vocalize beyond one word answers.  Daughter and patient appreciated the visit. I did not push her to talk, assured her of  presence if she requested another visit.

## 2021-09-16 NOTE — PLAN OF CARE
Problem: Adult Inpatient Plan of Care  Goal: Plan of Care Review  Recent Flowsheet Documentation  Taken 9/16/2021 0842 by Karon Pacheco, PT  Progress: improving  Plan of Care Reviewed With: patient  Outcome Summary:   PT eval completed. Presents w/ PNA, recurr B pl.eff & AF/RVR, unc. HBP, decr LE strength/endurance, & impaired funct mobil. Transf to EOB w/ min A (desat to 78%   NRB at 15 L placed over HFNC, & sat.incr to 98%), STS w/ R wx & min A, sidestepped 3 ft to chair w/ Jayson, desat to 92%, NRB doffed & sat.again dropped to 82%, replaced & sat.incr to 96% for duration of ther exer, then doffed again & sat.92%. Recommend home w/ niece's assist & HHPT f/u at d/c.   Goal Outcome Evaluation:  Plan of Care Reviewed With: patient        Progress: improving  Outcome Summary: PT eval completed. Presents w/ PNA, recurr B pl.eff & AF/RVR, unc. HBP, decr LE strength/endurance, & impaired funct mobil. Transf to EOB w/ min A (desat to 78%;NRB at 15 L placed over HFNC, & sat.incr to 98%), STS w/ R wx & min A, sidestepped 3 ft to chair w/ Jayson, desat to 92%, NRB doffed & sat.again dropped to 82%, replaced & sat.incr to 96% for duration of ther exer, then doffed again & sat.92%. Recommend home w/ niece's assist & HHPT f/u at d/c.

## 2021-09-16 NOTE — PLAN OF CARE
Goal Outcome Evaluation:  Plan of Care Reviewed With: patient, family (niece Yin present)        Progress: no change  Outcome Summary: Palliattive consult for GOC/ACP, symptom management. Pt weaned to nc O2 just prior to initial Palliative encounter; appeared and reported breathing unlabored-to-mild dyspnea at rest, increased work of breathing with talking, recovered with brief rest; Sats maintained in mid-90s throughout. Pt has chronic arthritic pain, and pain from skin wound on left leg; medications adjusted for management. Pt declined offer of scheduled low-dose opioid for dyspnea, although dosage would be much less than she takes occasionally for her arthritis pain at home. Pt becomes anxious and aggravated with continued discussion about her symptoms and options for interventions. Pt was adamant that although she would want chest compressions in the event of cardiac arrest, she would not want ventilator support; will attempt to continue that conversation as we follow for symptoms.    1300 Palliative Team Conference: HARLEY Neely RN, PN; SAFIA Rodriguez, [facility], [facility]; OLIMPIA Treviño, DO; YOHANA Aguilar, APRN; TASHA Calix, W      Problem: Palliative Care  Goal: Enhanced Quality of Life  Outcome: Ongoing, Progressing  Intervention: Maximize Comfort  Flowsheets (Taken 9/16/2021 1604)  Pain Management Interventions: (added scheduled tylenol, diclofenac gel)   pain management plan reviewed with patient/caregiver   other (see comments)  Intervention: Optimize Function  Flowsheets (Taken 9/16/2021 1604)  Fatigue Management: paced activity encouraged  Sleep/Rest Enhancement:   consistent schedule promoted   natural light exposure provided  Intervention: Promote Advance Care Planning  Flowsheets (Taken 9/16/2021 1604)  Life Transition/Adjustment:   palliative care discussed   palliative care initiated  Intervention: Optimize Psychosocial Wellbeing  Flowsheets (Taken 9/16/2021 1604)  Supportive Measures:   positive reinforcement  provided   self-responsibility promoted  Spiritual Activities Assistance: (Spiritual Care consult;  saw today) other (see comments)  Family/Support System Care:   caregiver stress acknowledged   involvement promoted   self-care encouraged   support provided

## 2021-09-17 LAB
ANION GAP SERPL CALCULATED.3IONS-SCNC: 13 MMOL/L (ref 5–15)
BUN SERPL-MCNC: 16 MG/DL (ref 8–23)
BUN/CREAT SERPL: 23.5 (ref 7–25)
CALCIUM SPEC-SCNC: 8.1 MG/DL (ref 8.6–10.5)
CHLORIDE SERPL-SCNC: 100 MMOL/L (ref 98–107)
CO2 SERPL-SCNC: 22 MMOL/L (ref 22–29)
CREAT SERPL-MCNC: 0.68 MG/DL (ref 0.57–1)
DEPRECATED RDW RBC AUTO: 73 FL (ref 37–54)
ERYTHROCYTE [DISTWIDTH] IN BLOOD BY AUTOMATED COUNT: 23.9 % (ref 12.3–15.4)
GFR SERPL CREATININE-BSD FRML MDRD: 83 ML/MIN/1.73
GLUCOSE SERPL-MCNC: 108 MG/DL (ref 65–99)
HCT VFR BLD AUTO: 26.6 % (ref 34–46.6)
HGB BLD-MCNC: 8.3 G/DL (ref 12–15.9)
MCH RBC QN AUTO: 27 PG (ref 26.6–33)
MCHC RBC AUTO-ENTMCNC: 31.2 G/DL (ref 31.5–35.7)
MCV RBC AUTO: 86.6 FL (ref 79–97)
PLATELET # BLD AUTO: 251 10*3/MM3 (ref 140–450)
PMV BLD AUTO: 10.3 FL (ref 6–12)
POTASSIUM SERPL-SCNC: 3.3 MMOL/L (ref 3.5–5.2)
RBC # BLD AUTO: 3.07 10*6/MM3 (ref 3.77–5.28)
SODIUM SERPL-SCNC: 135 MMOL/L (ref 136–145)
WBC # BLD AUTO: 9.03 10*3/MM3 (ref 3.4–10.8)

## 2021-09-17 PROCEDURE — 25010000002 FUROSEMIDE PER 20 MG: Performed by: INTERNAL MEDICINE

## 2021-09-17 PROCEDURE — 99232 SBSQ HOSP IP/OBS MODERATE 35: CPT | Performed by: INTERNAL MEDICINE

## 2021-09-17 PROCEDURE — 94799 UNLISTED PULMONARY SVC/PX: CPT

## 2021-09-17 PROCEDURE — 25010000002 CEFTRIAXONE PER 250 MG: Performed by: INTERNAL MEDICINE

## 2021-09-17 PROCEDURE — 80048 BASIC METABOLIC PNL TOTAL CA: CPT | Performed by: INTERNAL MEDICINE

## 2021-09-17 PROCEDURE — 85027 COMPLETE CBC AUTOMATED: CPT | Performed by: INTERNAL MEDICINE

## 2021-09-17 RX ORDER — TORSEMIDE 20 MG/1
40 TABLET ORAL DAILY
Status: DISCONTINUED | OUTPATIENT
Start: 2021-09-18 | End: 2021-09-19 | Stop reason: HOSPADM

## 2021-09-17 RX ORDER — LISINOPRIL 10 MG/1
10 TABLET ORAL
Status: DISCONTINUED | OUTPATIENT
Start: 2021-09-17 | End: 2021-09-19 | Stop reason: HOSPADM

## 2021-09-17 RX ADMIN — SERTRALINE HYDROCHLORIDE 150 MG: 100 TABLET ORAL at 10:09

## 2021-09-17 RX ADMIN — SODIUM CHLORIDE 1 G: 900 INJECTION INTRAVENOUS at 20:17

## 2021-09-17 RX ADMIN — FUROSEMIDE 40 MG: 10 INJECTION, SOLUTION INTRAMUSCULAR; INTRAVENOUS at 17:30

## 2021-09-17 RX ADMIN — IPRATROPIUM BROMIDE 0.5 MG: 0.5 SOLUTION RESPIRATORY (INHALATION) at 20:00

## 2021-09-17 RX ADMIN — DICLOFENAC 2 G: 10 GEL TOPICAL at 10:09

## 2021-09-17 RX ADMIN — BUDESONIDE 0.5 MG: 0.5 INHALANT RESPIRATORY (INHALATION) at 20:00

## 2021-09-17 RX ADMIN — APIXABAN 5 MG: 5 TABLET, FILM COATED ORAL at 10:09

## 2021-09-17 RX ADMIN — IPRATROPIUM BROMIDE 0.5 MG: 0.5 SOLUTION RESPIRATORY (INHALATION) at 07:19

## 2021-09-17 RX ADMIN — LISINOPRIL 10 MG: 10 TABLET ORAL at 12:13

## 2021-09-17 RX ADMIN — ARFORMOTEROL TARTRATE 15 MCG: 15 SOLUTION RESPIRATORY (INHALATION) at 20:00

## 2021-09-17 RX ADMIN — BUDESONIDE 0.5 MG: 0.5 INHALANT RESPIRATORY (INHALATION) at 07:19

## 2021-09-17 RX ADMIN — PANTOPRAZOLE SODIUM 40 MG: 40 TABLET, DELAYED RELEASE ORAL at 17:30

## 2021-09-17 RX ADMIN — APIXABAN 5 MG: 5 TABLET, FILM COATED ORAL at 20:17

## 2021-09-17 RX ADMIN — FUROSEMIDE 40 MG: 10 INJECTION, SOLUTION INTRAMUSCULAR; INTRAVENOUS at 10:09

## 2021-09-17 RX ADMIN — Medication 325 MG: at 10:09

## 2021-09-17 RX ADMIN — DICLOFENAC 2 G: 10 GEL TOPICAL at 20:18

## 2021-09-17 RX ADMIN — IPRATROPIUM BROMIDE 0.5 MG: 0.5 SOLUTION RESPIRATORY (INHALATION) at 11:18

## 2021-09-17 RX ADMIN — BISOPROLOL FUMARATE 5 MG: 5 TABLET, FILM COATED ORAL at 10:09

## 2021-09-17 RX ADMIN — SODIUM CHLORIDE, PRESERVATIVE FREE 10 ML: 5 INJECTION INTRAVENOUS at 10:10

## 2021-09-17 RX ADMIN — DOXYCYCLINE 100 MG: 100 INJECTION, POWDER, LYOPHILIZED, FOR SOLUTION INTRAVENOUS at 10:10

## 2021-09-17 RX ADMIN — DICLOFENAC 2 G: 10 GEL TOPICAL at 12:14

## 2021-09-17 RX ADMIN — POTASSIUM CHLORIDE 40 MEQ: 750 CAPSULE, EXTENDED RELEASE ORAL at 10:34

## 2021-09-17 RX ADMIN — POTASSIUM CHLORIDE 40 MEQ: 750 CAPSULE, EXTENDED RELEASE ORAL at 17:29

## 2021-09-17 RX ADMIN — DOXYCYCLINE 100 MG: 100 INJECTION, POWDER, LYOPHILIZED, FOR SOLUTION INTRAVENOUS at 20:17

## 2021-09-17 RX ADMIN — ARFORMOTEROL TARTRATE 15 MCG: 15 SOLUTION RESPIRATORY (INHALATION) at 07:19

## 2021-09-17 RX ADMIN — OXYCODONE HYDROCHLORIDE AND ACETAMINOPHEN 1000 MG: 500 TABLET ORAL at 10:09

## 2021-09-17 RX ADMIN — MULTIPLE VITAMINS W/ MINERALS TAB 1 TABLET: TAB at 10:09

## 2021-09-17 RX ADMIN — Medication 1 CAPSULE: at 10:09

## 2021-09-17 RX ADMIN — Medication 325 MG: at 17:30

## 2021-09-17 RX ADMIN — LEVOTHYROXINE SODIUM 75 MCG: 0.07 TABLET ORAL at 05:01

## 2021-09-17 RX ADMIN — DICLOFENAC 2 G: 10 GEL TOPICAL at 17:30

## 2021-09-17 RX ADMIN — SODIUM CHLORIDE, PRESERVATIVE FREE 10 ML: 5 INJECTION INTRAVENOUS at 20:18

## 2021-09-17 RX ADMIN — PANTOPRAZOLE SODIUM 40 MG: 40 TABLET, DELAYED RELEASE ORAL at 10:09

## 2021-09-17 RX ADMIN — ALPRAZOLAM 0.5 MG: 0.5 TABLET ORAL at 21:23

## 2021-09-17 NOTE — PROGRESS NOTES
[facility] Medicine Services  PROGRESS NOTE    Patient Name: Yin Law  : 1940  MRN: [id number]    Date of Admission: 2021  Primary Care Physician: Santiago Chaudhry MD    Subjective   Subjective     CC:  F/U dyspnea    HPI: Weaned to nasal cannula supplemental o2 - now on 3L.  Diuresed over 2000ml yesterday.  Asleep in chair.  Per staff she is stable on 3-4 liters.      ROS: per staff she is stable  Was at [facility] [place], then home for a week before dyspnea brought her back.   PT:  Walked three feet and sat dropped to 82% while oxygen briefly removed.     Objective   Objective     Vital Signs:   Temp:  [98.1 °F (36.7 °C)-98.7 °F (37.1 °C)] 98.1 °F (36.7 °C)  Heart Rate:  [65-78] 66  Resp:  [18] 18  BP: (131-163)/(55-83) 141/83  Flow (L/min):  [2-35] 3     Physical Exam:  Constitutional: asleep, NAD, resps regular   HENT: NCAT,   Respiratory: CTA bilat anteriorly, Mild kyphosis.  Respiratory effort normal on nc oxygen   Cardiovascular: RRR, no murmurs, rubs, or gallops  Gastrointestinal: Positive bowel sounds, soft, nontender, nondistended  Musculoskeletal: tr bilateral ankle edema  Neurologic: Asleep, no facial droop,   Skin: No rashes on exposed surface    Results Reviewed:  LAB RESULTS:      Lab 21  0525 09/15/21  0615 09/15/21  0008 21   WBC 9.03  --   --   --  15.02*   HEMOGLOBIN 8.3* 8.0*  --   --  9.5*   HEMATOCRIT 26.6* 26.7*  --   --  31.3*   PLATELETS 251 228  --   --  287   NEUTROS ABS  --  6.46  --   --  12.63*   IMMATURE GRANS (ABS)  --  0.05  --   --  0.09*   LYMPHS ABS  --  1.49  --   --  1.03   MONOS ABS  --  0.96*  --   --  1.22*   EOS ABS  --  0.02  --   --  0.02   MCV 86.6 90.2  --   --  90.2   PROCALCITONIN  --   --   --  0.36*  --    LACTATE  --   --  1.9 3.2*  --          Lab 21  0525 21  0422 09/15/21  0615 09/14/21  2046   SODIUM 135*  --  133* 138   POTASSIUM 3.3* 4.3 3.2* 3.9   CHLORIDE 100  --  101 104    CO2 22.0  --  21.0* 21.0*   ANION GAP 13.0  --  11.0 13.0   BUN 16  --  9 11   CREATININE 0.68  --  0.67 0.75   GLUCOSE 108*  --  97 158*   CALCIUM 8.1*  --  8.3* 8.3*   TSH  --   --  22.960*  --          Lab 09/14/21 2049   TOTAL PROTEIN 5.9*   ALBUMIN 3.10*   GLOBULIN 2.8   ALT (SGPT) 18   AST (SGOT) 22   BILIRUBIN 0.4   ALK PHOS 92         Lab 09/14/21 2049   PROBNP 11,917.0*   TROPONIN T <0.010                 Lab 09/14/21  2307   PH, ARTERIAL 7.501*   PCO2, ARTERIAL 29.8*   PO2 ART 70.2*   FIO2 60   HCO3 ART 23.2   BASE EXCESS ART 0.5   CARBOXYHEMOGLOBIN 1.6     Brief Urine Lab Results  (Last result in the past 365 days)      Color   Clarity   Blood   Leuk Est   Nitrite   Protein   CREAT   Urine HCG        08/21/21 2314 Yellow Clear Negative Negative Positive Negative               Microbiology Results Abnormal     Procedure Component Value - Date/Time    Blood Culture - Blood, Arm, Right [[id number]] Collected: 09/14/21 2125    Lab Status: Preliminary result Specimen: Blood from Arm, Right Updated: 09/16/21 2200     Blood Culture No growth at 2 days    Blood Culture - Blood, Arm, Right [[id number]] Collected: 09/14/21 2135    Lab Status: Preliminary result Specimen: Blood from Arm, Right Updated: 09/16/21 2200     Blood Culture No growth at 2 days    S. Pneumo Ag Urine or CSF - Urine, Urine, Clean Catch [[id number]]  (Normal) Collected: 09/15/21 1850    Lab Status: Final result Specimen: Urine, Clean Catch Updated: 09/15/21 2350     Strep Pneumo Ag Negative    Legionella Antigen, Urine - Urine, Urine, Clean Catch [[id number]]  (Normal) Collected: 09/15/21 1850    Lab Status: Final result Specimen: Urine, Clean Catch Updated: 09/15/21 2350     LEGIONELLA ANTIGEN, URINE Negative    MRSA Screen, PCR (Inpatient) - Swab, Nares [[id number]]  (Normal) Collected: 09/15/21 0645    Lab Status: Final result Specimen: Swab from Nares Updated: 09/15/21 0903     MRSA PCR Negative    Narrative:      MRSA Negative     COVID-19,CEPHEID/VASHTI,SHALINI IN-HOUSE(OR EMERGENT/ADD-ON),NP SWAB IN TRANSPORT MEDIA 3-4 HR TAT - Swab, Nasopharynx [[id number]]  (Normal) Collected: 09/15/21 0543    Lab Status: Final result Specimen: Swab from Nasopharynx Updated: 09/15/21 0637     COVID19 Not Detected    Narrative:      Fact sheet for providers: https://www.fda.gov/media/918542/download     Fact sheet for patients: https://www.fda.gov/media/796608/download  Fact sheet for providers: https://www.fda.gov/media/775326/download     Fact sheet for patients: https://www.fda.gov/media/655506/download    COVID PRE-OP / PRE-PROCEDURE SCREENING ORDER (NO ISOLATION) - Swab, Nasopharynx [[id number]]  (Normal) Collected: 09/14/21 2026    Lab Status: Final result Specimen: Swab from Nasopharynx Updated: 09/14/21 2056    Narrative:      The following orders were created for panel order COVID PRE-OP / PRE-PROCEDURE SCREENING ORDER (NO ISOLATION) - Swab, Nasopharynx.  Procedure                               Abnormality         Status                     ---------                               -----------         ------                     COVID-19 and FLU A/B PCR...[[id number]]  Normal              Final result                 Please view results for these tests on the individual orders.    COVID-19 and FLU A/B PCR - Swab, Nasopharynx [[id number]]  (Normal) Collected: 09/14/21 2026    Lab Status: Final result Specimen: Swab from Nasopharynx Updated: 09/14/21 2056     COVID19 Not Detected     Influenza A PCR Not Detected     Influenza B PCR Not Detected    Narrative:      Fact sheet for providers: https://www.fda.gov/media/981468/download    Fact sheet for patients: https://www.fda.gov/media/510856/download    Test performed by PCR.          XR Chest 1 View    Result Date: 9/16/2021  EXAMINATION: XR CHEST 1 VW-  INDICATION: Evaluate pleural effusions; J18.9-Pneumonia, unspecified organism; I50.9-Heart failure, unspecified; D72.829-Elevated white blood cell count,  unspecified; J96.21-Acute and chronic respiratory failure with hypoxia  COMPARISON: 9/14/2021  FINDINGS: Unchanged bilateral upper lung predominant airspace disease. Minimally increased small right pleural effusion and stable trace left effusion. No distinct pneumothorax. Unchanged heart and mediastinal contours.      Impression: Unchanged bilateral upper lung predominant airspace disease. Minimally increased small right pleural effusion and stable trace left effusion. No distinct pneumothorax. Unchanged heart and mediastinal contours.  This report was finalized on 9/16/2021 9:07 AM by Lonnie Azar.        Results for orders placed during the hospital encounter of 07/19/21    Adult Transthoracic Echo Complete W/ Cont if Necessary Per Protocol    Interpretation Summary  · Estimated left ventricular EF = 65%  · Mild aortic valve stenosis is present.  · Aortic valve maximum pressure gradient is 32.9 mmHg. Aortic valve mean pressure gradient is 17.2 mmHg.  · Mild mitral valve regurgitation is present.  · Mild tricuspid valve regurgitation is present.  · Estimated right ventricular systolic pressure from tricuspid regurgitation is 37.0 mmHg.  · There is a large left pleural effusion. There is a moderate sized right pleural effusion.      I have reviewed the medications:  Scheduled Meds:apixaban, 5 mg, Oral, Q12H  arformoterol, 15 mcg, Nebulization, BID - RT   And  ipratropium, 0.5 mg, Nebulization, 4x Daily - RT  ascorbic acid, 1,000 mg, Oral, Daily With Breakfast  bisoprolol, 5 mg, Oral, Q24H  budesonide, 0.5 mg, Nebulization, BID - RT  cefTRIAXone, 1 g, Intravenous, Q24H  Diclofenac Sodium, 2 g, Topical, 4x Daily  doxycycline, 100 mg, Intravenous, Q12H  ferrous sulfate, 325 mg, Oral, BID With Meals  furosemide, 40 mg, Intravenous, BID  lactobacillus acidophilus, 1 capsule, Oral, Daily  levothyroxine, 75 mcg, Oral, Q AM  multivitamin with minerals, 1 tablet, Oral, Daily  pantoprazole, 40 mg, Oral, BID   pharmacy  consult - MTM, , Does not apply, Daily  sertraline, 150 mg, Oral, Daily  sodium chloride, 10 mL, Intravenous, Q12H      Continuous Infusions:   PRN Meds:.•  acetaminophen **OR** acetaminophen **OR** acetaminophen  •  ALPRAZolam  •  HYDROcodone-acetaminophen  •  magnesium sulfate **OR** magnesium sulfate **OR** magnesium sulfate  •  potassium chloride  •  potassium chloride  •  sodium chloride  •  sodium chloride    Assessment/Plan   Assessment & Plan     Active Hospital Problems    Diagnosis  POA   • **Acute on chronic respiratory failure with hypoxia (CMS/HCC) [J96.21]  Yes   • Accelerated hypertension [I10]  Yes   • Pneumonia due to infectious organism [J18.9]  Yes   • Acute diastolic CHF (CMS/HCC) [I50.31]  Yes   • Sepsis (CMS/[facility]) [A41.9]  Yes   • Multifocal pneumonia [J18.9]  Yes   • COPD on home oxygen (CMS/[facility]) [J44.9]  Yes   • Rheumatoid arthritis (CMS/[facility]) [M06.9]  Yes      Resolved Hospital Problems   No resolved problems to display.        Brief Hospital Course to date:  Yin Law is a 81 y.o. 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  Admitted in July for same, had infectious/rheum workup, was suspected of amio toxicity and placed on 4 week steroid taper. Has been at [facility] on 2.5L, discharged home recently, became more dyspneic and has been readmitted.   She has had at least two negative COVID tests and has been fully vaccinated.  BNP is elevated at over 11,000 and procalcitonin is elevated as well.  CT chest shows multifocal infiltrates overall similar in appearance to her last admission as well as moderate sized bilateral pleural effusions.    Infiltrates bilaterally,   - DDx interstitial lung disese, CHF, infection  Acute on chronic respiratory failure with hypoxia  COPD on 2L O2 chronically  - HFNC on admission  but now weaned to 3-4L with diuresis.   -Blood cultures pending, MRSA screen negative  -Currently on doxycycline and ceftriaxone - stopped vanc for low suspicion and neg MRSA screen   - Previous infectious workup has been unrevealing with negative cryptococcal antigen, fungitell, histo, blasto, aspergillus.  She has only ever grown candida albicans on sputum culture  - Pulmonary workup included elevated RF (in the setting of known RA), negative quantiferon, +anti-centromere antibody, + atypical p-ANCA with negative MPO and PR3.  She was felt to be too frail for open lung biopsy.  Thoracentesis performed last admission with transudative fluid and negative cytology.  -On brovana and pulmicort  -Pulmonary consultation appreciated     Acute on chronic diastolic CHF  bilat effusions reported, not signif on CXR  - EF 65%, mod MS  - diuresis IV lasix 40mg BID  -Likely needs higher dose of lasix at discharge (was on 20mg BID)  -Electrolyte replacement - replace K today, check Mg tomrrow   -Cardiology consultation      Hypertensive emergency  -BP on arrival 201/104  -Improved with nitropaste in ED  -Continue bisoprolol, add lisinpril      Hypothyroidism  -TSH 22 (same as last admission and free T4 then was low)  -She was started on levothyroxine 50mcg daily last admission  -Increased to 75mcg daily     Paroxysmal atrial fibrillation  -Continue bisoprolol.  Cannot tolerate amiodarone due to pulmonary toxicity  -Per last cardiology note she could not tolerate Eliquis due to GI bleeding, however she was discharged on Eliquis and this has been continued     Anxiety/depression   -On xanax     DVT prophylaxis:  Medical and mechanical DVT prophylaxis orders are present.       AM-PAC 6 Clicks Score (PT): 18 (09/16/21 2000)    Disposition: I expect the patient to be discharged TBD.    CODE STATUS:   Code Status and Medical Interventions:   Ordered at: 09/14/21 1571     Limited Support to NOT Include:    Intubation     Level Of  Support Discussed With:    Patient     Code Status:    CPR     Medical Interventions (Level of Support Prior to Arrest):    Limited     Given her repeated admissions and significant morbidity, I discussed with her having palliative care see her tomorrow.    Kerrie Vera MD  09/17/21

## 2021-09-17 NOTE — CASE MANAGEMENT/SOCIAL WORK
Continued Stay Note  [facility]     Patient Name: Yin Law  MRN: [id number]  Today's Date: 9/17/2021    Admit Date: 9/14/2021    Discharge Plan     Row Name 09/17/21 1452       Plan    Plan  Home with [facility]    Patient/Family in Agreement with Plan  yes    Plan Comments  I have met with Ms. Law at her bedside today to discuss her discharge plan which includes recommendations for home health services at discharge.  She states that she was receiving these services from [facility] prior to this hospital admission.  I have contacted [facility] and confirmed that she was receiving skilled nursing, PT and OT services  I will place resumption of care orders and fax the discharge summary to [phone] when available.  I have also spoken to Ms. Law's sister Enedina today by phone and answered her questions regarding palliative care.  CM will cont to follow Ms. Law's plan of care and assist with discharge planning as recommendations become available.    Final Discharge Disposition Code  06 - home with home health care        Discharge Codes    No documentation.       Expected Discharge Date and Time     Expected Discharge Date Expected Discharge Time    Sep 18, 2021             Monik Elkins RN

## 2021-09-17 NOTE — PLAN OF CARE
Goal Outcome Evaluation:     Continued to wear 3 liters of oxygen, VSS, lisinopril added, NSR on tele. K+ replaced this shift, redraw tonight. Dressing on left chin changed this shift, due every other day.  No c/o from patient, up in chair most of shift.     Pulmonary s/o. Cards said they would see patient again Monday. Plan to continue to IV diuretics and start po tomorrow.     Plan to discharge back home with home health per patient.  Continue to monitor.

## 2021-09-17 NOTE — PLAN OF CARE
Problem: Palliative Care  Goal: Enhanced Quality of Life  Intervention: Optimize Psychosocial Wellbeing  Recent Flowsheet Documentation  Taken 9/17/2021 1239 by Analia Rodriguez, MSW  Supportive Measures: (symptom assessment)   active listening utilized   positive reinforcement provided   verbalization of feelings encouraged   other (see comments)  Visit with patient at bedside, pt up to chair, denies pain and nausea, mild dyspnea at rest, worked with therapy yesterday but limited by dyspnea.  CM following for possible rehab.  No family present.  Patient receptive to supportive presence, active listening, and symptom assessment.    1300 Palliative Care Interdisciplinary Rounds - Palliative Care Team members present:  OLIMPIA Treviño DO; YOHANA Aguilar APRN, ACHPN; HARLEY Neely RN, PN; INGA Rodriguez LCSW, ACHP-SW; Alex Rodriguez MDiv, [facility].

## 2021-09-17 NOTE — PROGRESS NOTES
Intensive Care Follow-up     Hospital:  LOS: 3 days   Ms. Yin Law, 81 y.o. female is followed for:   Acute on chronic respiratory failure with hypoxia (CMS/HCC)            History of present illness:   81-year-old female with a past medical history significant for chronic hypoxic respiratory failure, diastolic heart failure, COPD, hypertension, and rheumatoid arthritis.  Who pulmonary was consulted on due to acute on chronic hypoxic respiratory failure in the setting of bilateral infiltrates.  Patient was initially admitted back in July 2021 with acute hypoxic respiratory failure.  At that time had bilateral infiltrates in a patchy distribution.  Was tested for Covid all of which was negative.  She had a fairly large infectious work-up at that time which was all negative.  She also had a large autoimmune work-up which was positive for a anticentromere antibody and known positive rheumatoid factor.  She did have a fairly significantly positive atypical p-ANCA, but this is of unknown significance and lung disease.  At some point prior to this she had been on amiodarone in the differential was made for potential for amiodarone toxicity.  She was placed on steroids with a peripheral long taper over the course of 4 weeks.  And is currently not on steroids at admission.  She was a tanbark up until 2 days ago and was down to 2.5 L nasal cannula.  When she was taken home and gradually start to become more short of breath and therefore was brought to the hospital for further evaluation.  She notes that she feels better on high flow nasal cannula and has no other acute complaints.      Subjective   Interval History:  Patient stable this morning.  Down to 3 L nasal cannula.  Denies any chest pain, nausea, fever, or chills.  Blood pressure well controlled.  She is afebrile.  No other acute complaints.         The patient's past medical, surgical and social history were reviewed and updated in Epic as appropriate.        Objective     Infusions:     Medications:  apixaban, 5 mg, Oral, Q12H  arformoterol, 15 mcg, Nebulization, BID - RT   And  ipratropium, 0.5 mg, Nebulization, 4x Daily - RT  ascorbic acid, 1,000 mg, Oral, Daily With Breakfast  bisoprolol, 5 mg, Oral, Q24H  budesonide, 0.5 mg, Nebulization, BID - RT  cefTRIAXone, 1 g, Intravenous, Q24H  Diclofenac Sodium, 2 g, Topical, 4x Daily  doxycycline, 100 mg, Intravenous, Q12H  ferrous sulfate, 325 mg, Oral, BID With Meals  furosemide, 40 mg, Intravenous, BID  lactobacillus acidophilus, 1 capsule, Oral, Daily  levothyroxine, 75 mcg, Oral, Q AM  lisinopril, 10 mg, Oral, Q24H  multivitamin with minerals, 1 tablet, Oral, Daily  pantoprazole, 40 mg, Oral, BID AC  pharmacy consult - [place] [facility], , Does not apply, Daily  sertraline, 150 mg, Oral, Daily  sodium chloride, 10 mL, Intravenous, Q12H      I reviewed the patient's medications.    Vital Sign Min/Max for last 24 hours  Temp  Min: 98.1 °F (36.7 °C)  Max: 98.7 °F (37.1 °C)   BP  Min: 141/83  Max: 163/61   Pulse  Min: 65  Max: 78   Resp  Min: 18  Max: 18   SpO2  Min: 86 %  Max: 100 %   Flow (L/min)  Min: 2  Max: 4       Input/Output for last 24 hour shift  09/16 0701 - 09/17 0700  In: 480 [P.O.:480]  Out: 1750 [Urine:1750]      GENERAL : NAD, conversant  RESPIRATORY/THORAX : normal respiratory effort and no intercostal retractions, Coarse crackles bilaterally  CARDIOVASCULAR : Normal S1/S2, RRR. 1+ lower ext edema.  GASTROINTESTINAL : Soft, NT/ND. BS x 4 normoactive. No hepatosplenomegaly.  MUSCULOSKELETAL : No cyanosis, clubbing, or ischemia  NEUROLOGICAL: alert and oriented to person, place and time  PSYCHOLOGICAL : Appropriate affect    Results from last 7 days   Lab Units 09/17/21  0525 09/15/21  0615 09/14/21 2023   WBC 10*3/mm3 9.03  --  15.02*   HEMOGLOBIN g/dL 8.3* 8.0* 9.5*   PLATELETS 10*3/mm3 251 228 287     Results from last 7 days   Lab Units 09/17/21  0525 09/16/21  0422 09/15/21  0615 09/14/21 2049 09/14/21 2049    SODIUM mmol/L 135*  --  133*  --  138   POTASSIUM mmol/L 3.3* 4.3 3.2*   < > 3.9   CO2 mmol/L 22.0  --  21.0*  --  21.0*   BUN mg/dL 16  --  9  --  11   CREATININE mg/dL 0.68  --  0.67  --  0.75   GLUCOSE mg/dL 108*  --  97  --  158*    < > = values in this interval not displayed.     Estimated Creatinine Clearance: 55.9 mL/min (by C-G formula based on SCr of 0.68 mg/dL).    Results from last 7 days   Lab Units 09/14/21  2307   PH, ARTERIAL pH units 7.501*   PCO2, ARTERIAL mm Hg 29.8*   PO2 ART mm Hg 70.2*       I reviewed the patient's new clinical results.  I reviewed the patient's new imaging results/reports including actual images and agree with reports.         Assessment/Plan   Impression        Acute on chronic respiratory failure with hypoxia (CMS/HCC)    Multifocal pneumonia    COPD on home oxygen (CMS/HCC)    Rheumatoid arthritis (CMS/HCC)    Sepsis (CMS/HCC)    Acute diastolic CHF (CMS/HCC)    Accelerated hypertension    Pneumonia due to infectious organism       Plan        - imaging from CT of the chest from 9/14/2021 showed large bilateral pleural effusions, with stable interstitial changes in the upper lobes bilaterally.  This is compared to the CT scan from August 21, 2021.  Most notably the August 21, 2021 in September CT scans are fairly stable, although there is a difference in appearance compared to the CT scan from July 19, 2021 in which the groundglass changes and infiltrates were much more localized to part of the lung and not being so diffuse.  - labs from August 2021 which was most notable for a positive anticentromere antibody, and a positive atypical p-ANCA.  proBNP on this admission was elevated at 11,917.  Fungal work-up, TB work-up, and bacterial cultures are all negative.  - Echo report from July 2021 showed a EF of 65%, mild aortic valvular stenosis, and an elevated RVSP at 37 mmHg.  - extensive chart with regards to cardiology evaluations, infectious disease evaluations, pulmonary  evaluations, and hospitalist evaluations.  On multiple admissions over the last 3 months.     · I think is very likely patient has underlying interstitial lung disease. In looking back through all of her CT scans the scan from July 2021 appear to be more of an acute pneumonitis, with the most recent CT scans appear to be more scar formation in the setting of likely underlying heart failure.  She is already done a prolonged taper of steroids, therefore I do not think further steroids are warranted at this time.  I think the underlying disease process is under control whether this is autoimmune disease or amiodarone that was the initial etiology, and that we are currently dealing with either a secondary imposed infection or heart failure in the setting of known chronic interstitial lung disease.  · No plans for steroids at this time, bronchoscopies or biopsies.  · Continue antibiotic therapy, will defer to the hospitalist regarding antibiotic regimen and length.  · Also recommend aggressive diuresis in the setting of heart failure, diuresis per cardiology  · Brovana, Atrovent, and budesonide for underlying COPD, at the time of discharge she can go back on Stiolto and budesonide which is her home regimen.  · Wean oxygen saturation to greater than 88%, aspects patient is okay to be transferred to nasal cannula at this time given her low high flow settings.  · Aggressive pulmonary toilet  · I did discuss the case with the patient's sister yesterday informing her that unfortunately I think the patient does have underlying interstitial lung disease now post what ever acute process was previously taking place, and she will likely need oxygen from this point forward.  As well she will have very little reserve to tolerate any type of infection or heart failure issue.  She verbalized understanding.  I do agree with palliative care evaluation as her long-term prognosis is very poor.  · At this point no further pulmonary  recommendations.  We will sign off, have went ahead and made an appointment for the patient to be seen in clinic with Xochitl Hawley on 10/15/2021 at 3 PM.     Thank You for the Consult    Plan of care and goals reviewed with multidisciplinary/antibiotic stewardship team during rounds.   I discussed the patient's findings and my recommendations with patient, family and nursing staff       Deejay Calvillo, DO  Pulmonary, Critical care and Sleep Medicine

## 2021-09-17 NOTE — PROGRESS NOTES
"  [facility] at [facility]   Inpatient Progress Note       LOS: 3 days   Patient Care Team:  Santiago Chaudhry MD as PCP - General  Santiago Chaudhry MD as PCP - Family Medicine    Chief Complaint:  Follow-up for PAF    Subjective     Interval History:   Patient in bed. Still with dyspnea. IV diuretics continued. Pulmonary seeing patient as well.  Comfortable, eating lunch on 2 half liters nasal cannula      Review of Systems:   Pertinent positives noted in history, exam, and assessment. Otherwise reviewed and negative.      Objective     Vitals:  Blood pressure 141/83, pulse 66, temperature 98.1 °F (36.7 °C), temperature source Oral, resp. rate 18, height 162.6 cm (64\"), weight 65.3 kg (144 lb), SpO2 94 %.     Intake/Output Summary (Last 24 hours) at 9/17/2021 0810  Last data filed at 9/17/2021 0353  Gross per 24 hour   Intake 480 ml   Output 1750 ml   Net -1270 ml     Vitals reviewed.   Constitutional:       Appearance: Well-developed. Frail. Acutely ill-appearing.   Neck:      Vascular: No JVD.      Trachea: No tracheal deviation.   Pulmonary:      Effort: Pulmonary effort is normal.      Comments: Bibasilar Rales.  Cardiovascular:      Normal rate. Irregular rhythm.      Murmurs: There is a grade 3/6 high frequency blowing holosystolic murmur at the apex.   Pulses:     Intact distal pulses.   Edema:     Peripheral edema present.     Ankle: bilateral trace edema of the ankle.  Abdominal:      General: Bowel sounds are normal.      Palpations: Abdomen is soft.      Tenderness: There is no abdominal tenderness.   Musculoskeletal:         General: No deformity. Neurological:      Mental Status: Alert and oriented to person, place, and time.       I have examined the patient and agree with the above       Results Review:     I reviewed the patient's new clinical results.    Results from last 7 days   Lab Units 09/17/21  0525   WBC 10*3/mm3 9.03   HEMOGLOBIN g/dL 8.3*   HEMATOCRIT % 26.6* "   PLATELETS 10*3/mm3 251     Results from last 7 days   Lab Units 09/17/21  0525 09/15/21  0615 09/14/21  2049   SODIUM mmol/L 135*   < > 138   POTASSIUM mmol/L 3.3*   < > 3.9   CHLORIDE mmol/L 100   < > 104   CO2 mmol/L 22.0   < > 21.0*   BUN mg/dL 16   < > 11   CREATININE mg/dL 0.68   < > 0.75   CALCIUM mg/dL 8.1*   < > 8.3*   BILIRUBIN mg/dL  --   --  0.4   ALK PHOS U/L  --   --  92   ALT (SGPT) U/L  --   --  18   AST (SGOT) U/L  --   --  22   GLUCOSE mg/dL 108*   < > 158*    < > = values in this interval not displayed.     Results from last 7 days   Lab Units 09/17/21  0525   SODIUM mmol/L 135*   POTASSIUM mmol/L 3.3*   CHLORIDE mmol/L 100   CO2 mmol/L 22.0   BUN mg/dL 16   CREATININE mg/dL 0.68   GLUCOSE mg/dL 108*   CALCIUM mg/dL 8.1*         No results found for: TROPONINI  Results from last 7 days   Lab Units 09/15/21  0615   TSH uIU/mL 22.960*         Results from last 7 days   Lab Units 09/14/21  2049   PROBNP pg/mL 11,917.0*         Tele: Sinus rhythm with occasional PVCs    Assessment/Plan       Acute on chronic respiratory failure with hypoxia (CMS/HCC)    Multifocal pneumonia    COPD on home oxygen (CMS/[facility])    Rheumatoid arthritis (CMS/HCC)    Sepsis (CMS/[facility])    Acute diastolic CHF (CMS/[facility])    Accelerated hypertension    Pneumonia due to infectious organism      1. Acute on chronic diastolic congestive heart failure/pleural effusions  1. Normal EF by echo in July.  2. Currently on IV lasix BID  2. Amiodarone pulmonary toxicity/pneumonitis, most likely  3. PAF  1. Recurrence last evening treated with IV digoxin. Now back in sinus rhythm  2. Anticoagulated with Eliquis. On appropriate dose.   4. Hypertension  5. Hypothyroidism, elevated TSH on Levothyroxine. Per attending service.   6. Hypokalemia, improved      Plan:  · Continue IV diuretics, transition to p.o. tomorrow.  · Would send home on torsemide 40 mg daily  · Needs daily weights, and titration of diuretics pending these.  Discussed this with  patient.  · Continue to monitor renal function.   · Will see again on Monday. Please call if any questions.     AMA Arora   Dictated utilizing Dragon dictation  I have seen and examined the patient, I have reviewed the note, discussed the case with the advance practice clinician, made necessary changes and I agree with the final note.    He Montoya MD  09/17/21  12:31 EDT

## 2021-09-18 LAB
ANION GAP SERPL CALCULATED.3IONS-SCNC: 8 MMOL/L (ref 5–15)
BUN SERPL-MCNC: 14 MG/DL (ref 8–23)
BUN/CREAT SERPL: 19.4 (ref 7–25)
CALCIUM SPEC-SCNC: 8.5 MG/DL (ref 8.6–10.5)
CHLORIDE SERPL-SCNC: 104 MMOL/L (ref 98–107)
CO2 SERPL-SCNC: 26 MMOL/L (ref 22–29)
CREAT SERPL-MCNC: 0.72 MG/DL (ref 0.57–1)
GFR SERPL CREATININE-BSD FRML MDRD: 78 ML/MIN/1.73
GLUCOSE SERPL-MCNC: 118 MG/DL (ref 65–99)
MAGNESIUM SERPL-MCNC: 1.6 MG/DL (ref 1.6–2.4)
POTASSIUM SERPL-SCNC: 4.6 MMOL/L (ref 3.5–5.2)
QT INTERVAL: 366 MS
QTC INTERVAL: 442 MS
SODIUM SERPL-SCNC: 138 MMOL/L (ref 136–145)

## 2021-09-18 PROCEDURE — 94799 UNLISTED PULMONARY SVC/PX: CPT

## 2021-09-18 PROCEDURE — 25010000002 CEFTRIAXONE PER 250 MG: Performed by: INTERNAL MEDICINE

## 2021-09-18 PROCEDURE — 80048 BASIC METABOLIC PNL TOTAL CA: CPT | Performed by: INTERNAL MEDICINE

## 2021-09-18 PROCEDURE — 99232 SBSQ HOSP IP/OBS MODERATE 35: CPT | Performed by: INTERNAL MEDICINE

## 2021-09-18 PROCEDURE — 83735 ASSAY OF MAGNESIUM: CPT | Performed by: INTERNAL MEDICINE

## 2021-09-18 RX ADMIN — MULTIPLE VITAMINS W/ MINERALS TAB 1 TABLET: TAB at 09:13

## 2021-09-18 RX ADMIN — BISOPROLOL FUMARATE 5 MG: 5 TABLET, FILM COATED ORAL at 09:09

## 2021-09-18 RX ADMIN — IPRATROPIUM BROMIDE 0.5 MG: 0.5 SOLUTION RESPIRATORY (INHALATION) at 13:47

## 2021-09-18 RX ADMIN — Medication 325 MG: at 09:46

## 2021-09-18 RX ADMIN — LEVOTHYROXINE SODIUM 75 MCG: 0.07 TABLET ORAL at 05:48

## 2021-09-18 RX ADMIN — DICLOFENAC 2 G: 10 GEL TOPICAL at 09:47

## 2021-09-18 RX ADMIN — PANTOPRAZOLE SODIUM 40 MG: 40 TABLET, DELAYED RELEASE ORAL at 09:47

## 2021-09-18 RX ADMIN — DOXYCYCLINE 100 MG: 100 INJECTION, POWDER, LYOPHILIZED, FOR SOLUTION INTRAVENOUS at 09:08

## 2021-09-18 RX ADMIN — BUDESONIDE 0.5 MG: 0.5 INHALANT RESPIRATORY (INHALATION) at 19:34

## 2021-09-18 RX ADMIN — BUDESONIDE 0.5 MG: 0.5 INHALANT RESPIRATORY (INHALATION) at 10:29

## 2021-09-18 RX ADMIN — IPRATROPIUM BROMIDE 0.5 MG: 0.5 SOLUTION RESPIRATORY (INHALATION) at 19:49

## 2021-09-18 RX ADMIN — IPRATROPIUM BROMIDE 0.5 MG: 0.5 SOLUTION RESPIRATORY (INHALATION) at 17:04

## 2021-09-18 RX ADMIN — IPRATROPIUM BROMIDE 0.5 MG: 0.5 SOLUTION RESPIRATORY (INHALATION) at 10:29

## 2021-09-18 RX ADMIN — ALPRAZOLAM 0.5 MG: 0.5 TABLET ORAL at 20:33

## 2021-09-18 RX ADMIN — Medication 1 CAPSULE: at 09:10

## 2021-09-18 RX ADMIN — LISINOPRIL 10 MG: 10 TABLET ORAL at 09:10

## 2021-09-18 RX ADMIN — ARFORMOTEROL TARTRATE 15 MCG: 15 SOLUTION RESPIRATORY (INHALATION) at 10:29

## 2021-09-18 RX ADMIN — PANTOPRAZOLE SODIUM 40 MG: 40 TABLET, DELAYED RELEASE ORAL at 20:33

## 2021-09-18 RX ADMIN — SODIUM CHLORIDE 1 G: 900 INJECTION INTRAVENOUS at 20:33

## 2021-09-18 RX ADMIN — SERTRALINE HYDROCHLORIDE 150 MG: 100 TABLET ORAL at 09:10

## 2021-09-18 RX ADMIN — MULTIPLE VITAMINS W/ MINERALS TAB 1 TABLET: TAB at 09:10

## 2021-09-18 RX ADMIN — OXYCODONE HYDROCHLORIDE AND ACETAMINOPHEN 1000 MG: 500 TABLET ORAL at 09:09

## 2021-09-18 RX ADMIN — Medication 325 MG: at 20:33

## 2021-09-18 RX ADMIN — ARFORMOTEROL TARTRATE 15 MCG: 15 SOLUTION RESPIRATORY (INHALATION) at 19:34

## 2021-09-18 RX ADMIN — TORSEMIDE 40 MG: 20 TABLET ORAL at 09:10

## 2021-09-18 RX ADMIN — APIXABAN 5 MG: 5 TABLET, FILM COATED ORAL at 09:10

## 2021-09-18 RX ADMIN — APIXABAN 5 MG: 5 TABLET, FILM COATED ORAL at 20:33

## 2021-09-18 RX ADMIN — DOXYCYCLINE 100 MG: 100 INJECTION, POWDER, LYOPHILIZED, FOR SOLUTION INTRAVENOUS at 20:34

## 2021-09-18 NOTE — PROGRESS NOTES
"    [facility] Medicine Services  PROGRESS NOTE    Patient Name: Yin Law  : 1940  MRN: [id number]    Date of Admission: 2021  Primary Care Physician: Santiago Chaudhry MD    Subjective   Subjective     CC:  F/U dyspnea    HPI:  Still dyspneic when she gets up to walk and sometimes when she eats.  But notes \"But I'm always short of breath.\"  Thinks she is close to how she felt when she left rehab.  Plans to go home from [facility] and live w sister.     ROS:  No fevers  No chest pain  Eating well, no abd pain or nausea       Objective   Objective     Vital Signs:   Temp:  [98.1 °F (36.7 °C)-98.6 °F (37 °C)] 98.4 °F (36.9 °C)  Heart Rate:  [62-77] 62  Resp:  [16-20] 18  BP: (126-159)/(49-62) 126/55  Flow (L/min):  [3] 3     Physical Exam:  Constitutional: awake in chair, resps regular, eating lunch and NAD  HENT: NCAT,   Respiratory: CTA anteriorly, nonlabored, not tachypneic on NC ox  Cardiovascular: RRR, no murmurs, rubs, or gallops  Gastrointestinal: Positive bowel sounds, soft   Musculoskeletal: tr bilateral ankle edema  Neurologic: Awake and alert and convesant, oriented   Skin: No rashes on exposed surface    Results Reviewed:  LAB RESULTS:      Lab 21  0525 09/15/21  0615 09/15/21  0008 21   WBC 9.03  --   --   --  15.02*   HEMOGLOBIN 8.3* 8.0*  --   --  9.5*   HEMATOCRIT 26.6* 26.7*  --   --  31.3*   PLATELETS 251 228  --   --  287   NEUTROS ABS  --  6.46  --   --  12.63*   IMMATURE GRANS (ABS)  --  0.05  --   --  0.09*   LYMPHS ABS  --  1.49  --   --  1.03   MONOS ABS  --  0.96*  --   --  1.22*   EOS ABS  --  0.02  --   --  0.02   MCV 86.6 90.2  --   --  90.2   PROCALCITONIN  --   --   --  0.36*  --    LACTATE  --   --  1.9 3.2*  --          Lab 21  0555 21  0525 21  0422 09/15/21  0615 09/14/21  2049   SODIUM 138 135*  --  133* 138   POTASSIUM 4.6 3.3* 4.3 3.2* 3.9   CHLORIDE 104 100  --  101 104   CO2 26.0 22.0  -- "  21.0* 21.0*   ANION GAP 8.0 13.0  --  11.0 13.0   BUN 14 16  --  9 11   CREATININE 0.72 0.68  --  0.67 0.75   GLUCOSE 118* 108*  --  97 158*   CALCIUM 8.5* 8.1*  --  8.3* 8.3*   MAGNESIUM 1.6  --   --   --   --    TSH  --   --   --  22.960*  --          Lab 09/14/21 2049   TOTAL PROTEIN 5.9*   ALBUMIN 3.10*   GLOBULIN 2.8   ALT (SGPT) 18   AST (SGOT) 22   BILIRUBIN 0.4   ALK PHOS 92         Lab 09/14/21 2049   PROBNP 11,917.0*   TROPONIN T <0.010                 Lab 09/14/21 2307   PH, ARTERIAL 7.501*   PCO2, ARTERIAL 29.8*   PO2 ART 70.2*   FIO2 60   HCO3 ART 23.2   BASE EXCESS ART 0.5   CARBOXYHEMOGLOBIN 1.6     Brief Urine Lab Results  (Last result in the past 365 days)      Color   Clarity   Blood   Leuk Est   Nitrite   Protein   CREAT   Urine HCG        08/21/21 2314 Yellow Clear Negative Negative Positive Negative               Microbiology Results Abnormal     Procedure Component Value - Date/Time    Blood Culture - Blood, Arm, Right [[id number]] Collected: 09/14/21 2125    Lab Status: Preliminary result Specimen: Blood from Arm, Right Updated: 09/17/21 2145     Blood Culture No growth at 3 days    Blood Culture - Blood, Arm, Right [[id number]] Collected: 09/14/21 2135    Lab Status: Preliminary result Specimen: Blood from Arm, Right Updated: 09/17/21 2145     Blood Culture No growth at 3 days    S. Pneumo Ag Urine or CSF - Urine, Urine, Clean Catch [[id number]]  (Normal) Collected: 09/15/21 1850    Lab Status: Final result Specimen: Urine, Clean Catch Updated: 09/15/21 2350     Strep Pneumo Ag Negative    Legionella Antigen, Urine - Urine, Urine, Clean Catch [[id number]]  (Normal) Collected: 09/15/21 1850    Lab Status: Final result Specimen: Urine, Clean Catch Updated: 09/15/21 2350     LEGIONELLA ANTIGEN, URINE Negative    MRSA Screen, PCR (Inpatient) - Swab, Nares [[id number]]  (Normal) Collected: 09/15/21 0645    Lab Status: Final result Specimen: Swab from Nares Updated: 09/15/21 0903     MRSA PCR  Negative    Narrative:      MRSA Negative    COVID-19,CEPHEID/VASHTI,SHALINI IN-HOUSE(OR EMERGENT/ADD-ON),NP SWAB IN TRANSPORT MEDIA 3-4 HR TAT - Swab, Nasopharynx [[id number]]  (Normal) Collected: 09/15/21 0543    Lab Status: Final result Specimen: Swab from Nasopharynx Updated: 09/15/21 0637     COVID19 Not Detected    Narrative:      Fact sheet for providers: https://www.fda.gov/media/247578/download     Fact sheet for patients: https://www.fda.gov/media/218264/download  Fact sheet for providers: https://www.fda.gov/media/836757/download     Fact sheet for patients: https://www.fda.gov/media/491773/download    COVID PRE-OP / PRE-PROCEDURE SCREENING ORDER (NO ISOLATION) - Swab, Nasopharynx [[id number]]  (Normal) Collected: 09/14/21 2026    Lab Status: Final result Specimen: Swab from Nasopharynx Updated: 09/14/21 2056    Narrative:      The following orders were created for panel order COVID PRE-OP / PRE-PROCEDURE SCREENING ORDER (NO ISOLATION) - Swab, Nasopharynx.  Procedure                               Abnormality         Status                     ---------                               -----------         ------                     COVID-19 and FLU A/B PCR...[[id number]]  Normal              Final result                 Please view results for these tests on the individual orders.    COVID-19 and FLU A/B PCR - Swab, Nasopharynx [[id number]]  (Normal) Collected: 09/14/21 2026    Lab Status: Final result Specimen: Swab from Nasopharynx Updated: 09/14/21 2056     COVID19 Not Detected     Influenza A PCR Not Detected     Influenza B PCR Not Detected    Narrative:      Fact sheet for providers: https://www.fda.gov/media/744037/download    Fact sheet for patients: https://www.fda.gov/media/831201/download    Test performed by PCR.          No radiology results from the last 24 hrs    Results for orders placed during the hospital encounter of 07/19/21    Adult Transthoracic Echo Complete W/ Cont if Necessary Per  Protocol    Interpretation Summary  · Estimated left ventricular EF = 65%  · Mild aortic valve stenosis is present.  · Aortic valve maximum pressure gradient is 32.9 mmHg. Aortic valve mean pressure gradient is 17.2 mmHg.  · Mild mitral valve regurgitation is present.  · Mild tricuspid valve regurgitation is present.  · Estimated right ventricular systolic pressure from tricuspid regurgitation is 37.0 mmHg.  · There is a large left pleural effusion. There is a moderate sized right pleural effusion.      I have reviewed the medications:  Scheduled Meds:apixaban, 5 mg, Oral, Q12H  arformoterol, 15 mcg, Nebulization, BID - RT   And  ipratropium, 0.5 mg, Nebulization, 4x Daily - RT  ascorbic acid, 1,000 mg, Oral, Daily With Breakfast  bisoprolol, 5 mg, Oral, Q24H  budesonide, 0.5 mg, Nebulization, BID - RT  cefTRIAXone, 1 g, Intravenous, Q24H  Diclofenac Sodium, 2 g, Topical, 4x Daily  doxycycline, 100 mg, Intravenous, Q12H  ferrous sulfate, 325 mg, Oral, BID With Meals  lactobacillus acidophilus, 1 capsule, Oral, Daily  levothyroxine, 75 mcg, Oral, Q AM  lisinopril, 10 mg, Oral, Q24H  multivitamin with minerals, 1 tablet, Oral, Daily  pantoprazole, 40 mg, Oral, BID AC  pharmacy consult - MTM, , Does not apply, Daily  sertraline, 150 mg, Oral, Daily  sodium chloride, 10 mL, Intravenous, Q12H  torsemide, 40 mg, Oral, Daily      Continuous Infusions:   PRN Meds:.•  acetaminophen **OR** acetaminophen **OR** acetaminophen  •  ALPRAZolam  •  HYDROcodone-acetaminophen  •  magnesium sulfate **OR** magnesium sulfate **OR** magnesium sulfate  •  potassium chloride  •  potassium chloride  •  sodium chloride  •  sodium chloride    Assessment/Plan   Assessment & Plan     Active Hospital Problems    Diagnosis  POA   • **Acute on chronic respiratory failure with hypoxia (CMS/HCC) [J96.21]  Yes   • Accelerated hypertension [I10]  Yes   • Pneumonia due to infectious organism [J18.9]  Yes   • Acute diastolic CHF (CMS/HCC) [I50.31]  Yes    • Sepsis (CMS/[facility]) [A41.9]  Yes   • Multifocal pneumonia [J18.9]  Yes   • COPD on home oxygen (CMS/[facility]) [J44.9]  Yes   • Rheumatoid arthritis (CMS/[facility]) [M06.9]  Yes      Resolved Hospital Problems   No resolved problems to display.        Brief Hospital Course to date:  Yin Law is a 81 y.o. 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  Admitted in July for same, had infectious/rheum workup, was suspected of amio toxicity and placed on 4 week steroid taper. Has been at [facility] on 2.5L, discharged home recently, became more dyspneic and has been readmitted.   She has had at least two negative COVID tests and has been fully vaccinated.  BNP is elevated at over 11,000 and procalcitonin is elevated as well.  CT chest shows multifocal infiltrates overall similar in appearance to her last admission as well as moderate sized bilateral pleural effusions.    Infiltrates bilaterally,   - DDx interstitial lung disese, CHF, infection  Acute on chronic respiratory failure with hypoxia  COPD on 2L O2 chronically  - HFNC on admission but now weaned to 3L with diuresis.   -Blood cultures pending, MRSA screen negative  -Currently on doxycycline and ceftriaxone - stopped vanc for low suspicion and neg MRSA screen   - Previous infectious workup has been unrevealing with negative cryptococcal antigen, fungitell, histo, blasto, aspergillus.  She has only ever grown candida albicans on sputum culture  - Pulmonary workup included elevated RF (in the setting of known RA), negative quantiferon, +anti-centromere antibody, + atypical p-ANCA with negative MPO and PR3.  She was felt to be too frail for open lung biopsy.  Thoracentesis performed last admission with transudative fluid and negative cytology.  -On brovana and pulmicort  -Pulmonary consultation  appreciated     Acute on chronic diastolic CHF  bilat effusions reported, not signif on CXR  - EF 65%, mod MS  - diuresis IV lasix 40mg BID changed to torsemide oral   -Electrolyte replacement  -Cardiology consultation      Hypertensive emergency  -BP on arrival 201/104  -Improved with nitropaste in ED  -Continue bisoprolol, add lisinpril      Hypothyroidism  -TSH 22 (same as last admission and free T4 then was low)  -She was started on levothyroxine 50mcg daily last admission  -Increased to 75mcg daily     Paroxysmal atrial fibrillation  -Continue bisoprolol.  Cannot tolerate amiodarone due to pulmonary toxicity  -Per last cardiology note she could not tolerate Eliquis due to GI bleeding, however she was discharged on Eliquis and this has been continued     Anxiety/depression   -On xanax     DVT prophylaxis:  Medical and mechanical DVT prophylaxis orders are present.       AM-PAC 6 Clicks Score (PT): 15 (09/18/21 0800)    Disposition: I expect the patient to be discharged to home when she feels ready - 1-2 days     CODE STATUS:   Code Status and Medical Interventions:   Ordered at: 09/14/21 4154     Limited Support to NOT Include:    Intubation     Level Of Support Discussed With:    Patient     Code Status:    CPR     Medical Interventions (Level of Support Prior to Arrest):    Limited     Given her repeated admissions and significant morbidity, I discussed with her having palliative care see her tomorrow.    Kerrie Vera MD  09/18/21

## 2021-09-18 NOTE — PLAN OF CARE
Goal Outcome Evaluation:  Plan of Care Reviewed With: patient        Progress: improving  Outcome Summary: VSS, pt remains on 3L O2 NC, sats >94%, denies pain or discomfort, pt slept well most of the night, will continue to monitor.      Problem: Adult Inpatient Plan of Care  Goal: Absence of Hospital-Acquired Illness or Injury  Intervention: Identify and Manage Fall Risk  Recent Flowsheet Documentation  Taken 9/18/2021 0000 by Rob Baeza RN  Safety Promotion/Fall Prevention: activity supervised  Taken 9/17/2021 2000 by Rob Baeza RN  Safety Promotion/Fall Prevention: activity supervised     Problem: Adult Inpatient Plan of Care  Goal: Absence of Hospital-Acquired Illness or Injury  Intervention: Prevent Skin Injury  Recent Flowsheet Documentation  Taken 9/18/2021 0000 by Rob Baeza RN  Body Position: position changed independently  Taken 9/17/2021 2000 by Rob Baeza RN  Body Position: position changed independently

## 2021-09-19 VITALS
HEART RATE: 65 BPM | RESPIRATION RATE: 18 BRPM | OXYGEN SATURATION: 100 % | BODY MASS INDEX: 25.51 KG/M2 | SYSTOLIC BLOOD PRESSURE: 133 MMHG | WEIGHT: 149.4 LBS | HEIGHT: 64 IN | DIASTOLIC BLOOD PRESSURE: 60 MMHG | TEMPERATURE: 97.6 F

## 2021-09-19 LAB
ANION GAP SERPL CALCULATED.3IONS-SCNC: 9 MMOL/L (ref 5–15)
BACTERIA SPEC AEROBE CULT: NORMAL
BACTERIA SPEC AEROBE CULT: NORMAL
BUN SERPL-MCNC: 17 MG/DL (ref 8–23)
BUN/CREAT SERPL: 23.9 (ref 7–25)
CALCIUM SPEC-SCNC: 8.4 MG/DL (ref 8.6–10.5)
CHLORIDE SERPL-SCNC: 102 MMOL/L (ref 98–107)
CO2 SERPL-SCNC: 25 MMOL/L (ref 22–29)
CREAT SERPL-MCNC: 0.71 MG/DL (ref 0.57–1)
GFR SERPL CREATININE-BSD FRML MDRD: 79 ML/MIN/1.73
GLUCOSE SERPL-MCNC: 93 MG/DL (ref 65–99)
MAGNESIUM SERPL-MCNC: 1.6 MG/DL (ref 1.6–2.4)
POTASSIUM SERPL-SCNC: 3.6 MMOL/L (ref 3.5–5.2)
SODIUM SERPL-SCNC: 136 MMOL/L (ref 136–145)

## 2021-09-19 PROCEDURE — 25010000003 MAGNESIUM SULFATE 4 GM/100ML SOLUTION: Performed by: INTERNAL MEDICINE

## 2021-09-19 PROCEDURE — 83735 ASSAY OF MAGNESIUM: CPT | Performed by: INTERNAL MEDICINE

## 2021-09-19 PROCEDURE — 99239 HOSP IP/OBS DSCHRG MGMT >30: CPT | Performed by: INTERNAL MEDICINE

## 2021-09-19 PROCEDURE — 80048 BASIC METABOLIC PNL TOTAL CA: CPT | Performed by: INTERNAL MEDICINE

## 2021-09-19 PROCEDURE — 94799 UNLISTED PULMONARY SVC/PX: CPT

## 2021-09-19 RX ORDER — TORSEMIDE 20 MG/1
40 TABLET ORAL DAILY
Qty: 30 TABLET | Refills: 5 | Status: SHIPPED | OUTPATIENT
Start: 2021-09-20

## 2021-09-19 RX ORDER — LEVOTHYROXINE SODIUM 0.07 MG/1
75 TABLET ORAL
Qty: 30 TABLET | Refills: 5 | Status: SHIPPED | OUTPATIENT
Start: 2021-09-20

## 2021-09-19 RX ORDER — IPRATROPIUM BROMIDE AND ALBUTEROL SULFATE 2.5; .5 MG/3ML; MG/3ML
1.5 SOLUTION RESPIRATORY (INHALATION) EVERY 6 HOURS PRN
Qty: 360 ML | Refills: 1 | Status: ON HOLD | OUTPATIENT
Start: 2021-09-19 | End: 2021-10-04

## 2021-09-19 RX ORDER — IPRATROPIUM BROMIDE AND ALBUTEROL SULFATE 2.5; .5 MG/3ML; MG/3ML
3 SOLUTION RESPIRATORY (INHALATION) EVERY 6 HOURS PRN
Status: DISCONTINUED | OUTPATIENT
Start: 2021-09-19 | End: 2021-10-04

## 2021-09-19 RX ADMIN — LISINOPRIL 10 MG: 10 TABLET ORAL at 10:05

## 2021-09-19 RX ADMIN — DOXYCYCLINE 100 MG: 100 INJECTION, POWDER, LYOPHILIZED, FOR SOLUTION INTRAVENOUS at 09:56

## 2021-09-19 RX ADMIN — LEVOTHYROXINE SODIUM 75 MCG: 0.07 TABLET ORAL at 06:50

## 2021-09-19 RX ADMIN — ARFORMOTEROL TARTRATE 15 MCG: 15 SOLUTION RESPIRATORY (INHALATION) at 08:32

## 2021-09-19 RX ADMIN — DICLOFENAC 2 G: 10 GEL TOPICAL at 13:05

## 2021-09-19 RX ADMIN — TORSEMIDE 40 MG: 20 TABLET ORAL at 10:05

## 2021-09-19 RX ADMIN — POTASSIUM CHLORIDE 40 MEQ: 750 CAPSULE, EXTENDED RELEASE ORAL at 10:06

## 2021-09-19 RX ADMIN — PANTOPRAZOLE SODIUM 40 MG: 40 TABLET, DELAYED RELEASE ORAL at 06:50

## 2021-09-19 RX ADMIN — DICLOFENAC 2 G: 10 GEL TOPICAL at 10:07

## 2021-09-19 RX ADMIN — Medication 1 CAPSULE: at 10:06

## 2021-09-19 RX ADMIN — BUDESONIDE 0.5 MG: 0.5 INHALANT RESPIRATORY (INHALATION) at 08:32

## 2021-09-19 RX ADMIN — Medication 325 MG: at 10:05

## 2021-09-19 RX ADMIN — IPRATROPIUM BROMIDE 0.5 MG: 0.5 SOLUTION RESPIRATORY (INHALATION) at 12:54

## 2021-09-19 RX ADMIN — SERTRALINE HYDROCHLORIDE 150 MG: 100 TABLET ORAL at 10:05

## 2021-09-19 RX ADMIN — BISOPROLOL FUMARATE 5 MG: 5 TABLET, FILM COATED ORAL at 10:06

## 2021-09-19 RX ADMIN — MULTIPLE VITAMINS W/ MINERALS TAB 1 TABLET: TAB at 10:06

## 2021-09-19 RX ADMIN — MAGNESIUM SULFATE HEPTAHYDRATE 4 G: 40 INJECTION, SOLUTION INTRAVENOUS at 13:05

## 2021-09-19 RX ADMIN — APIXABAN 5 MG: 5 TABLET, FILM COATED ORAL at 10:06

## 2021-09-19 RX ADMIN — IPRATROPIUM BROMIDE 0.5 MG: 0.5 SOLUTION RESPIRATORY (INHALATION) at 08:32

## 2021-09-19 RX ADMIN — OXYCODONE HYDROCHLORIDE AND ACETAMINOPHEN 1000 MG: 500 TABLET ORAL at 10:06

## 2021-09-19 NOTE — PLAN OF CARE
Goal Outcome Evaluation:  Plan of Care Reviewed With: patient      Progress: no change      Problem: Adult Inpatient Plan of Care  Goal: Absence of Hospital-Acquired Illness or Injury  Intervention: Identify and Manage Fall Risk  Recent Flowsheet Documentation  Taken 9/19/2021 0407 by Rob Baeza RN  Safety Promotion/Fall Prevention: activity supervised  Taken 9/19/2021 0000 by Rob Baeza RN  Safety Promotion/Fall Prevention: activity supervised  Taken 9/18/2021 2000 by Rob Baeza RN  Safety Promotion/Fall Prevention: activity supervised  Intervention: Prevent Skin Injury  Recent Flowsheet Documentation  Taken 9/19/2021 0407 by Rob Baeza RN  Body Position: position maintained  Taken 9/19/2021 0000 by Rob Baeza RN  Body Position: position maintained  Taken 9/18/2021 2000 by Rob Baeza RN  Body Position: position changed independently     Problem: Adult Inpatient Plan of Care  Goal: Absence of Hospital-Acquired Illness or Injury  Intervention: Prevent Skin Injury  Recent Flowsheet Documentation  Taken 9/19/2021 0407 by Rob Baeza RN  Body Position: position maintained  Taken 9/19/2021 0000 by Rob Baeza RN  Body Position: position maintained  Taken 9/18/2021 2000 by Rob Baeza RN  Body Position: position changed independently     Problem: Respiratory Compromise (Pneumonia)  Goal: Effective Oxygenation and Ventilation  Intervention: Optimize Oxygenation and Ventilation  Recent Flowsheet Documentation  Taken 9/19/2021 0407 by Rob Baeza RN  Head of Bed (HOB): HOB elevated  Taken 9/19/2021 0000 by Rob Baeza RN  Head of Bed (HOB): HOB elevated  Taken 9/18/2021 2000 by Rob Baeza RN  Head of Bed (HOB): HOB elevated

## 2021-09-19 NOTE — DISCHARGE SUMMARY
[facility] Medicine Services  DISCHARGE SUMMARY    Patient Name: Yin Law  : 1940  MRN: [id number]    Date of Admission: 2021  8:06 PM  Date of Discharge:  2021  Primary Care Physician: Santiago Chaudhry MD    Consults     Date and Time Order Name Status Description    2021 12:35 AM Inpatient Palliative Care MD Consult Completed     9/15/2021  2:36 AM Inpatient Pulmonology Consult Completed     2021  8:21 AM Inpatient Gastroenterology Consult Completed     2021  7:10 AM Inpatient Infectious Diseases Consult Completed     2021 12:34 AM Inpatient Cardiology Consult Completed     2021  8:29 PM Inpatient Infectious Diseases Consult Completed           Hospital Course     Presenting Problem:   Pneumonia due to infectious organism, unspecified laterality, unspecified part of lung [J18.9]    Active Hospital Problems    Diagnosis  POA   • **Acute on chronic respiratory failure with hypoxia (CMS/HCC) [J96.21]  Yes   • Accelerated hypertension [I10]  Yes   • Pneumonia due to infectious organism [J18.9]  Yes   • Acute diastolic CHF (CMS/HCC) [I50.31]  Yes   • Sepsis (CMS/HCC) [A41.9]  Yes   • Multifocal pneumonia [J18.9]  Yes   • COPD on home oxygen (CMS/HCC) [J44.9]  Yes   • Rheumatoid arthritis (CMS/HCC) [M06.9]  Yes      Resolved Hospital Problems   No resolved problems to display.          Hospital Course:  Yin Law is a 81 y.o. 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  She was discharged to [facility].  A week ago she went home from [facility] on 2.5L nc.  At  home she developed worsening shortness of breath [facility]  BNP was elevated at over 11,000 and procalcitonin is elevated as well.  CT chest shows multifocal infiltrates overall similar in appearance to her last admission as well as moderate sized bilateral pleural effusions.     Decompensated CHF:  Her BP was 200 systolic when she arrived but improved quickly.  Cardiology was consulted.  She was diuresed and her oxygen needs decreased from HFNC on admission to 3L at discharge.  She states she feels baseline.  Her diuretic is increased to torsemide 40mg daily.    Persistent lung infiltrates:  Pulm service was consulted.  They reviewed past workup, felt she does have an interstitial lung disease but did not recommend further steroids.  She got five days of ceftriaxone/doxycycline as CAP could not be ruled out.  She was never febrile.  She is going home on her usual regimen of Stiolto and Brovana.  She requested duonebs so those are prescribed.       Hypothyroidism - TSH 22 (same as last admission, despite starting synthroid at that time.  Synthroid is increased to 75mcg daily.  Needs TSH recheck in 6 weeks.      Paroxysmal atrial fibrillation - Continue bisoprolol.  Cannot tolerate amiodarone due to pulmonary toxicity.  Continue Eliquis.     Anxiety/depression  -On xanax.  She met with the Palliative Svc and clarified that she did not want intubation or vent support but was okay with chest compressions.      She worked with PT.  She does not want to go to rehab.  She  is going home with her sister and a niece.       Discharge Follow Up Recommendations for outpatient labs/diagnostics:   *PCP 2 weeks.     *FU TSH in 6-12 weeks (Synthroid increased)    *Pulm clinic FU scheduled for mid October.     Day of Discharge     HPI:   Feels like her breathing is baseline.  Is dyspneic with activity but says that is chronic.  She wants to go home today.  Sister and niece will be living with her.      Review of  Systems  Gen- No fevers, chills  CV- No chest pain, palpitations  GI- No N/V/D, abd pain    Vital Signs:   Temp:  [97.6 °F (36.4 °C)-98.4 °F (36.9 °C)] 97.6 °F (36.4 °C)  Heart Rate:  [63-74] 63  Resp:  [17-18] 18  BP: (124-134)/(50-86) 133/60     Physical Exam:  Constitutional: Awake, alert in chair on 3L nc oxygen.  Niece is at bedside   Eyes: PER, sclerae anicteric, no conjunctival injection  HENT: NCAT, mucous membranes moist  Neck: Supple,  trachea midline  Respiratory: diminsihed throughout, no crackles, nonlabored respirations.  Mod kyphosis.    Cardiovascular: RRR, no murmurs, rubs, or gallops, palpable pedal pulses bilaterally  Gastrointestinal: Positive bowel sounds, soft, nontender, nondistended  Musculoskeletal: No bilateral ankle edema, no clubbing or cyanosis to extremities  Psychiatric: Appropriate affect, cooperative  Neurologic: Oriented x 3,  Cranial Nerves grossly intact to confrontation, speech clear  Skin: No rashes      Pertinent  and/or Most Recent Results     LAB RESULTS:      Lab 09/17/21  0525 09/15/21  0615 09/15/21  0008 09/14/21 2049 09/14/21 2023   WBC 9.03  --   --   --  15.02*   HEMOGLOBIN 8.3* 8.0*  --   --  9.5*   HEMATOCRIT 26.6* 26.7*  --   --  31.3*   PLATELETS 251 228  --   --  287   NEUTROS ABS  --  6.46  --   --  12.63*   IMMATURE GRANS (ABS)  --  0.05  --   --  0.09*   LYMPHS ABS  --  1.49  --   --  1.03   MONOS ABS  --  0.96*  --   --  1.22*   EOS ABS  --  0.02  --   --  0.02   MCV 86.6 90.2  --   --  90.2   PROCALCITONIN  --   --   --  0.36*  --    LACTATE  --   --  1.9 3.2*  --          Lab 09/19/21  0553 09/18/21  0555 09/17/21  0525 09/16/21 0422 09/15/21  0615 09/14/21 2049 09/14/21 2049   SODIUM 136 138 135*  --  133*  --  138   POTASSIUM 3.6 4.6 3.3* 4.3 3.2*   < > 3.9   CHLORIDE 102 104 100  --  101  --  104   CO2 25.0 26.0 22.0  --  21.0*  --  21.0*   ANION GAP 9.0 8.0 13.0  --  11.0  --  13.0   BUN 17 14 16  --  9  --  11   CREATININE 0.71 0.72 0.68  --  0.67   --  0.75   GLUCOSE 93 118* 108*  --  97  --  158*   CALCIUM 8.4* 8.5* 8.1*  --  8.3*  --  8.3*   MAGNESIUM 1.6 1.6  --   --   --   --   --    TSH  --   --   --   --  22.960*  --   --     < > = values in this interval not displayed.         Lab 09/14/21 2049   TOTAL PROTEIN 5.9*   ALBUMIN 3.10*   GLOBULIN 2.8   ALT (SGPT) 18   AST (SGOT) 22   BILIRUBIN 0.4   ALK PHOS 92         Lab 09/14/21 2049   PROBNP 11,917.0*   TROPONIN T <0.010                 Lab 09/14/21  2307   PH, ARTERIAL 7.501*   PCO2, ARTERIAL 29.8*   PO2 ART 70.2*   FIO2 60   HCO3 ART 23.2   BASE EXCESS ART 0.5   CARBOXYHEMOGLOBIN 1.6     Brief Urine Lab Results  (Last result in the past 365 days)      Color   Clarity   Blood   Leuk Est   Nitrite   Protein   CREAT   Urine HCG        08/21/21 2314 Yellow Clear Negative Negative Positive Negative             Microbiology Results (last 10 days)     Procedure Component Value - Date/Time    Legionella Antigen, Urine - Urine, Urine, Clean Catch [[id number]]  (Normal) Collected: 09/15/21 1850    Lab Status: Final result Specimen: Urine, Clean Catch Updated: 09/15/21 2350     LEGIONELLA ANTIGEN, URINE Negative    S. Pneumo Ag Urine or CSF - Urine, Urine, Clean Catch [[id number]]  (Normal) Collected: 09/15/21 1850    Lab Status: Final result Specimen: Urine, Clean Catch Updated: 09/15/21 2350     Strep Pneumo Ag Negative    MRSA Screen, PCR (Inpatient) - Swab, Nares [[id number]]  (Normal) Collected: 09/15/21 0645    Lab Status: Final result Specimen: Swab from Nares Updated: 09/15/21 0903     MRSA PCR Negative    Narrative:      MRSA Negative    COVID-19,CEPHEID/VASHTI,SHALINI IN-HOUSE(OR EMERGENT/ADD-ON),NP SWAB IN TRANSPORT MEDIA 3-4 HR TAT - Swab, Nasopharynx [[id number]]  (Normal) Collected: 09/15/21 0543    Lab Status: Final result Specimen: Swab from Nasopharynx Updated: 09/15/21 0637     COVID19 Not Detected    Narrative:      Fact sheet for providers: https://www.fda.gov/media/538457/download     Fact sheet for  patients: https://www.fda.gov/media/324534/download  Fact sheet for providers: https://www.fda.gov/media/462090/download     Fact sheet for patients: https://www.fda.gov/media/494760/download    Blood Culture - Blood, Arm, Right [[id number]] Collected: 09/14/21 2135    Lab Status: Preliminary result Specimen: Blood from Arm, Right Updated: 09/18/21 2200     Blood Culture No growth at 4 days    Blood Culture - Blood, Arm, Right [[id number]] Collected: 09/14/21 2125    Lab Status: Preliminary result Specimen: Blood from Arm, Right Updated: 09/18/21 2200     Blood Culture No growth at 4 days    COVID PRE-OP / PRE-PROCEDURE SCREENING ORDER (NO ISOLATION) - Swab, Nasopharynx [[id number]]  (Normal) Collected: 09/14/21 2026    Lab Status: Final result Specimen: Swab from Nasopharynx Updated: 09/14/21 2056    Narrative:      The following orders were created for panel order COVID PRE-OP / PRE-PROCEDURE SCREENING ORDER (NO ISOLATION) - Swab, Nasopharynx.  Procedure                               Abnormality         Status                     ---------                               -----------         ------                     COVID-19 and FLU A/B PCR...[[id number]]  Normal              Final result                 Please view results for these tests on the individual orders.    COVID-19 and FLU A/B PCR - Swab, Nasopharynx [[id number]]  (Normal) Collected: 09/14/21 2026    Lab Status: Final result Specimen: Swab from Nasopharynx Updated: 09/14/21 2056     COVID19 Not Detected     Influenza A PCR Not Detected     Influenza B PCR Not Detected    Narrative:      Fact sheet for providers: https://www.fda.gov/media/853005/download    Fact sheet for patients: https://www.fda.gov/media/413579/download    Test performed by PCR.    COVID-19 PCR, [facility], NP SWAB IN LEXAR VIRAL TRANSPORT MEDIA/ORAL SWISH 24-30 HR TAT - Swab, Nasopharynx [[id number]] Collected: 09/14/21 1054    Lab Status: Final result Specimen: Swab from Nasopharynx  Updated: 09/15/21 1510     SARS-CoV-2 LIGIA Not Detected          XR Chest 1 View    Result Date: 9/16/2021  EXAMINATION: XR CHEST 1 VW-  INDICATION: Evaluate pleural effusions; J18.9-Pneumonia, unspecified organism; I50.9-Heart failure, unspecified; D72.829-Elevated white blood cell count, unspecified; J96.21-Acute and chronic respiratory failure with hypoxia  COMPARISON: 9/14/2021  FINDINGS: Unchanged bilateral upper lung predominant airspace disease. Minimally increased small right pleural effusion and stable trace left effusion. No distinct pneumothorax. Unchanged heart and mediastinal contours.      Unchanged bilateral upper lung predominant airspace disease. Minimally increased small right pleural effusion and stable trace left effusion. No distinct pneumothorax. Unchanged heart and mediastinal contours.  This report was finalized on 9/16/2021 9:07 AM by Lonnie Azar.      XR Chest 1 View    Result Date: 9/14/2021  CR Chest 1 Vw INDICATION: Short of air triage. Covid rule out. COMPARISON:  Chest x-ray 8/25/2021. FINDINGS: Portable AP view(s) of the chest.  Cardiac silhouette is top normal and partly obscured by pulmonary parenchymal pathology. There are extensive bilateral infiltrates with an upper lobe predominance though there is also airspace disease at the left mid to lower lung and patchy airspace disease at the right midlung. This is worse than on the prior study. On comparison to films dating back to May 2021, the infiltrates are essentially new since that time. Please correlate with the clinical course.     Extensive bilateral pulmonary parenchymal infiltrates with an upper lobe predominance showing mild interval worsening since 8/25/2021. This could be due to Covid pneumonia though other infectious or inflammatory disease certainly in the differential. Clinical correlation and follow-up recommended. Signer Name: Amy Trejo MD  Signed: 9/14/2021 9:00 PM  Workstation Name: [id number]  [facility]    CT Angiogram Chest    Result Date: 9/14/2021  CTA Chest INDICATION: Pneumonia with shortness of breath. TECHNIQUE: CT angiogram of the chest with 100 cc of Isovue-300 IV contrast. 3-D reconstructions were obtained and reviewed.   Radiation dose reduction techniques included automated exposure control or exposure modulation based on body size. Count of known CT and cardiac nuc med studies performed in previous 12 months: 3. COMPARISON: 8/23/2021 FINDINGS: Chest images at mediastinal window show moderately large bilateral pleural effusions. Pleural fluid volumes are slightly greater since the previous scan. There are no pulmonary artery filling defects to suggest emboli. The CT angiographic images also show no evidence of emboli. Chest images at lung window show extensive bilateral infiltrates with underlying emphysema. The infiltrates appear similar to the previous scan in August. There is more perhaps increased infiltrate in the right upper lobe compared to the previous scan.     Emphysema with extensive bilateral infiltrates with slight worsening in the right upper lobe since the previous scan. Bilateral pleural effusions appear slightly larger. No evidence of pulmonary embolism. Signer Name: Gomez Luna MD  Signed: 9/14/2021 11:55 PM  Workstation Name: [id number]  [facility]              Results for orders placed during the hospital encounter of 07/19/21    Adult Transthoracic Echo Complete W/ Cont if Necessary Per Protocol    Interpretation Summary  · Estimated left ventricular EF = 65%  · Mild aortic valve stenosis is present.  · Aortic valve maximum pressure gradient is 32.9 mmHg. Aortic valve mean pressure gradient is 17.2 mmHg.  · Mild mitral valve regurgitation is present.  · Mild tricuspid valve regurgitation is present.  · Estimated right ventricular systolic pressure from tricuspid regurgitation is 37.0 mmHg.  · There is a large left pleural  effusion. There is a moderate sized right pleural effusion.      Plan for Follow-up of Pending Labs/Results:  I will follow up   Pending Labs     Order Current Status    Blood Culture - Blood, Arm, Right Preliminary result    Blood Culture - Blood, Arm, Right Preliminary result        Discharge Details        Discharge Medications      New Medications      Instructions Start Date   ipratropium-albuterol 0.5-2.5 mg/3 ml nebulizer  Commonly known as: DUO-NEB   1.5 mL, Nebulization, Every 6 Hours PRN      torsemide 20 MG tablet  Commonly known as: DEMADEX   40 mg, Oral, Daily   Start Date: September 20, 2021        Changes to Medications      Instructions Start Date   levothyroxine 75 MCG tablet  Commonly known as: SYNTHROID, LEVOTHROID  What changed:   · medication strength  · how much to take   75 mcg, Oral, Every Early Morning   Start Date: September 20, 2021        Continue These Medications      Instructions Start Date   ALPRAZolam 0.5 MG tablet  Commonly known as: XANAX   0.5 mg, Oral, Nightly PRN      apixaban 5 MG tablet tablet  Commonly known as: ELIQUIS   5 mg, Oral, 2 Times Daily      ascorbic acid 1000 MG tablet  Commonly known as: VITAMIN C   1,000 mg, Oral, Daily      bisoprolol 5 MG tablet  Commonly known as: ZEBeta   5 mg, Oral, Every 24 Hours Scheduled      budesonide 0.5 MG/2ML nebulizer solution  Commonly known as: PULMICORT   0.5 mg, Nebulization, 2 Times Daily - RT      ferrous sulfate 325 (65 FE) MG tablet   325 mg, Oral, 2 Times Daily With Meals      fish oil 1000 MG capsule capsule   1,000 mg, Oral, Daily With Breakfast      HYDROcodone-acetaminophen 7.5-325 MG per tablet  Commonly known as: NORCO   1 tablet, Oral, Every 6 Hours PRN      lactobacillus acidophilus capsule capsule   1 capsule, Oral, Daily      multivitamin with minerals tablet tablet   1 tablet, Oral, Daily      pantoprazole 40 MG EC tablet  Commonly known as: PROTONIX   40 mg, Oral, 2 Times Daily Before Meals      potassium  chloride 10 MEQ CR capsule  Commonly known as: MICRO-K   20 mEq, Oral, Daily      sertraline 100 MG tablet  Commonly known as: ZOLOFT   150 mg, Oral, Daily, 1.5 tab daily      Stiolto Respimat 2.5-2.5 MCG/ACT aerosol solution inhaler  Generic drug: tiotropium bromide-olodaterol   2 puffs, Inhalation, Daily PRN      Vitamin D (Ergocalciferol) 50 MCG (2000 UT) capsule   1 tablet, Oral, Daily         Stop These Medications    furosemide 20 MG tablet  Commonly known as: LASIX            Allergies   Allergen Reactions   • Amiodarone Unknown - High Severity         Discharge Disposition:  Home or Self Care    Diet:  Hospital:  Diet Order   Procedures   • Diet Regular; Cardiac       Activity:  As tolerated       CODE STATUS:    Code Status and Medical Interventions:   Ordered at: 09/14/21 2241     Limited Support to NOT Include:    Intubation     Level Of Support Discussed With:    Patient     Code Status:    CPR     Medical Interventions (Level of Support Prior to Arrest):    Limited       Future Appointments   Date Time Provider Department Center   10/15/2021  3:00 PM Xochitl Hawley APRN [facility] SHALINI SHALINI Vera MD  09/19/21      Time Spent on Discharge:  I spent  40  minutes on this discharge activity which included: face-to-face encounter with the patient, reviewing the data in the system, coordination of the care with the nursing staff as well as consultants, documentation, and entering orders.

## 2021-09-19 NOTE — DISCHARGE PLACEMENT REQUEST
"Yin Bhatti (81 y.o. Female)     Date of Birth Social Security Number Address Home Phone MRN    1940  [address]  [place] [address] [phone] [id number]    Latter-day Marital Status          Mormonism        Admission Date Admission Type Admitting Provider Attending Provider Department, Room/Bed    9/14/21 Emergency Kerrie Vera MD Mini, Jocelyn, MD [facility] 5G, S566/1    Discharge Date Discharge Disposition Discharge Destination         Home or Self Care              Attending Provider: Kerrie Vera MD    Allergies: Amiodarone    Isolation: None   Infection: None   Code Status: CPR    Ht: 162.6 cm (64\")   Wt: 67.8 kg (149 lb 6.4 oz)    Admission Cmt: None   Principal Problem: Acute on chronic respiratory failure with hypoxia (CMS/HCC) [J96.21]                 Active Insurance as of 9/14/2021     Primary Coverage     Payor Plan Insurance Group Employer/Plan Group    MEDICARE MEDICARE A & B      Payor Plan Address Payor Plan Phone Number Payor Plan Fax Number Effective Dates    [address] [phone]  9/1/2004 - None Entered    [facility] [id number]       Subscriber Name Subscriber Birth Date Member ID       YIN BHATTI 1940 [id number]           Secondary Coverage     Payor Plan Insurance Group Employer/Plan Group    [facility] SUPP [id number]     Payor Plan Address Payor Plan Phone Number Payor Plan Fax Number Effective Dates    [address]   12/1/2016 - None Entered    [facility] [id number]       Subscriber Name Subscriber Birth Date Member ID       YIN BHATTI 1940 [id number]                 Emergency Contacts      (Rel.) Home Phone Work Phone Mobile Phone    Enedina Mauricio (Sister) [phone] -- [phone]    STEVIE FOWLER (Relative) [phone] -- [phone]        [facility] 5G  [address] [facility] [address]  Phone:  [phone]  Fax:  " [phone]        Patient:     Yin Law MRN:  [id number]   Jason ARROYO [facility] [address] :  1940  SSN:    Phone: [phone] Sex:  F      INSURANCE PAYOR PLAN GROUP # SUBSCRIBER ID   Primary:  Secondary:    MEDICARE ANTHEM BLUE CROSS [id number]  [id number]    [id number]  [id number]   Admitting Diagnosis: Pneumonia due to infectious organism, unspecified laterality, unspecified part of lung [J18.9]  Order Date:  Sep 17, 2021         Notify Home Health       (Order ID: [id number])     Diagnosis:         Priority:  Routine Expected Date:   Expiration Date:        Interval:  Until Discontinued Count:    Comments: Resume previous home health orders.        Specimen Type:   Specimen Source:   Specimen Taken Date:   Specimen Taken Time:                   Authorizing Provider:Kerrie Vera MD  Authorizing Provider's NPI: [id number]  Order Entered By: Monik Elkins RN 2021  3:00 PM     Electronically signed by: Kerrie Vera MD 2021  3:00 PM               Physical Therapy Notes (most recent note)      Karon Pacheco, PT at 21 1101  Version 1 of 1         Patient Name: Yin Law  : 1940    MRN: [id number]                              Today's Date: 2021       Admit Date: 2021    Visit Dx:     ICD-10-CM ICD-9-CM   1. Pneumonia due to infectious organism, unspecified laterality, unspecified part of lung  J18.9 486   2. Congestive heart failure, unspecified HF chronicity, unspecified heart failure type (CMS/[facility])  I50.9 428.0   3. Leukocytosis, unspecified type  D72.829 288.60   4. Acute on chronic respiratory failure with hypoxia (CMS/[facility])  J96.21 518.84     799.02     Patient Active Problem List   Diagnosis   • Multifocal pneumonia   • Recurrent atrial fibrillation with RVR (CMS/[facility])   • COPD on home oxygen (CMS/[facility])   • Hypertension and diastolic dysfunction   • Rheumatoid arthritis (CMS/[facility])   • Anxiety and depression   • Wound  of right leg   • Anemia   • Elevated troponin   • Sepsis (CMS/HCC)   • Acute on chronic respiratory failure with hypoxia (CMS/HCC)   • Pleural effusion, bilateral   • Acute diastolic CHF (CMS/HCC)   • Accelerated hypertension   • Pneumonia due to infectious organism     Past Medical History:   Diagnosis Date   • Anxiety and depression    • Arrhythmia     A-FIB   • Asthma    • Chicken pox    • COPD (chronic obstructive pulmonary disease) (CMS/HCC)    • Hyperlipidemia    • Hypertension    • Measles    • Menopause    • Osteoporosis    • Rheumatoid arthritis (CMS/HCC)    • Rheumatoid arthritis (CMS/HCC)    • Tobacco abuse      Past Surgical History:   Procedure Laterality Date   • APPENDECTOMY     • CARPAL TUNNEL RELEASE Left    • CATARACT EXTRACTION, BILATERAL     • COLON SURGERY     • COLONOSCOPY     • GALLBLADDER SURGERY     • HYSTERECTOMY  1971   • TONSILLECTOMY       General Information     Row Name 09/16/21 0842          Physical Therapy Time and Intention    Document Type  evaluation  -DM     Mode of Treatment  physical therapy  -DM     Row Name 09/16/21 0842          General Information    Patient Profile Reviewed  yes  -DM     Prior Level of Function  independent:;all household mobility;gait;transfer;bed mobility;ADL's;dependent:;home management;cooking;cleaning;driving;shopping indep gt w/ R wx (doesn't use her SPC);Niece does HM,driv,shop,cook,clean since adm 8/'21, but p/t that pt was indep in all  -DM     Existing Precautions/Restrictions  fall;oxygen therapy device and L/min;other (see comments) ? Covid 19 r/o'd (d/c'd contact/airborne);adm w/ PNA,unc. HBP,recurr AF w/ RVR & B Pl.Eff, diast CHF,elev trop.;HFNC; occ tremoring;h/o asthma;per nsg, bed Al.@night but no ch.Al.when up  -DM     Barriers to Rehab  medically complex;previous functional deficit;ineffective coping A/D  -DM     Row Name 09/16/21 0842          Living Environment    Lives With  alone;other (see comments) niece staying till Nov.  -DM      Row Name 09/16/21 0842          Home Main Entrance    Number of Stairs, Main Entrance  two garage ent.  -DM     Row Name 09/16/21 0842          Stairs Within Home, Primary    Stairs, Within Home, Primary  1-st. w/ W.I. show. w/ seat & gr.bar, elev comm.seat w/ handlebars (+ floor to ceiling pole for supp)  -DM     Number of Stairs, Within Home, Primary  none  -DM     Row Name 09/16/21 0842          Cognition    Orientation Status (Cognition)  oriented x 4  -DM     Row Name 09/16/21 0842          Safety Issues, Functional Mobility    Safety Issues Affecting Function (Mobility)  safety precaution awareness;safety precautions follow-through/compliance;sequencing abilities  -DM     Impairments Affecting Function (Mobility)  balance;endurance/activity tolerance;pain;postural/trunk control;shortness of breath;strength  -DM       User Key  (r) = Recorded By, (t) = Taken By, (c) = Cosigned By    Initials Name Provider Type    DM Karon Pacheco, PT Physical Therapist        Mobility     Row Name 09/16/21 0842          Bed Mobility    Bed Mobility  rolling left;rolling right;supine-sit;scooting/bridging  -DM     Rolling Left [place] (Bed Mobility)  verbal cues;contact guard placed mesh panty/pad to secure pure wick cath for mobil  -DM     Rolling Right [place] (Bed Mobility)  verbal cues;contact guard  -DM     Scooting/Bridging [place] (Bed Mobility)  verbal cues;contact guard  -DM     Supine-Sit [place] (Bed Mobility)  verbal cues;minimum assist (75% patient effort)  -DM     Assistive Device (Bed Mobility)  bed rails;head of bed elevated  -DM     Comment (Bed Mobility)  issued mesh panty/pad,tdsocks,mask; nsg assessing,gave meds & performed mult proced;applesauce given by nsg, as tray late & pt hungry; Nsg decl. chair Al,waff cush;pure wick disconn for mobil;on HFNC;once EOB, o2 sat 78%;placed NRB mask at 15 L over HFNC;sat incr to 96% after 1 min.;kept in place for mobil  -DM     Row Name 09/16/21 0842           Transfers    Comment (Transfers)  cues for HP, seq  -DM     Row Name 09/16/21 0842          Sit-Stand Transfer    Sit-Stand [place] (Transfers)  verbal cues;minimum assist (75% patient effort)  -DM     Assistive Device (Sit-Stand Transfers)  walker, front-wheeled  -DM     Row Name 09/16/21 0842          Gait/Stairs (Locomotion)    [place] Level (Gait)  verbal cues;minimum assist (75% patient effort) decl. 2nd gt d/t mod SOB & onset intermitt tremoring(h/o tremors)  -DM     Assistive Device (Gait)  walker, front-wheeled  -DM     Distance in Feet (Gait)  3  -DM     Deviations/Abnormal Patterns (Gait)  bilateral deviations;base of support, narrow;cindi decreased;stride length decreased;weight shifting decreased decr step length; occ R knee discomf.  -DM     Bilateral Gait Deviations  forward flexed posture;heel strike decreased B knee instabil. /weakness  -DM     Comment (Gait/Stairs)  pt on both NRB at 15 L & HFNC for gt to chair(sat. 92%),then removed while chair reposn (short line),& sat.dropped to 82%;placed back on pt,& sat.incr to 96%;kept in place during BP,then doffed for ther exer  -DM       User Key  (r) = Recorded By, (t) = Taken By, (c) = Cosigned By    Initials Name Provider Type    Karon Tang, PT Physical Therapist        Obj/Interventions     Row Name 09/16/21 0842          Range of Motion Comprehensive    General Range of Motion  lower extremity range of motion deficits identified  -DM     Comment, General Range of Motion  R knee olsen. 15-20 % d/t RA  -DM     Row Name 09/16/21 0842          Strength Comprehensive (MMT)    General Manual Muscle Testing (MMT) Assessment  neck/trunk strength deficits identified;lower extremity strength deficits identified  -DM     Comment, General Manual Muscle Testing (MMT) Assessment  R knee grossly 3- to 4-/5; B quads 4 to 4+/5  -DM     Row Name 09/16/21 0842          Motor Skills    Therapeutic Exercise  hip;knee;ankle;shoulder  -DM     Row  Name 09/16/21 0842          Shoulder (Therapeutic Exercise)    Shoulder (Therapeutic Exercise)  AROM (active range of motion)  -DM     Shoulder AROM (Therapeutic Exercise)  bilateral;flexion;extension;aBduction;aDduction;horizontal aBduction/aDduction;sitting;10 repetitions;other (see comments) did pulm set for shldr exer w/ deep inspir (OT not seeing today), + biceps curls  -DM     Row Name 09/16/21 0842          Hip (Therapeutic Exercise)    Hip (Therapeutic Exercise)  AROM (active range of motion);isometric exercises  -DM     Hip AROM (Therapeutic Exercise)  bilateral;flexion;extension;aBduction;aDduction;sitting;external rotation;internal rotation;10 repetitions sitting marches;sat.decr to 90 %  -DM     Hip Isometrics (Therapeutic Exercise)  bilateral;gluteal sets;sitting;10 repetitions  -DM     Row Name 09/16/21 0842          Knee (Therapeutic Exercise)    Knee (Therapeutic Exercise)  AROM (active range of motion);isometric exercises  -DM     Knee AROM (Therapeutic Exercise)  bilateral;flexion;extension;SAQ (short arc quad);LAQ (long arc quad);heel slides;sitting;10 repetitions  -DM     Knee Isometrics (Therapeutic Exercise)  bilateral;hamstring sets;quad sets;sitting;10 repetitions  -DM     Row Name 09/16/21 0842          Ankle (Therapeutic Exercise)    Ankle (Therapeutic Exercise)  AROM (active range of motion)  -DM     Ankle AROM (Therapeutic Exercise)  bilateral;dorsiflexion;plantarflexion;sitting;10 repetitions;other (see comments) AC  -DM     Row Name 09/16/21 0842          Balance    Balance Assessment  sitting static balance;sitting dynamic balance;standing static balance;standing dynamic balance  -DM     Static Sitting Balance  WNL;unsupported;sitting, edge of bed PLB during marches EOB  -DM     Dynamic Sitting Balance  WNL;unsupported;sitting in chair;sitting, edge of bed recip scooting  -DM     Static Standing Balance  WFL;supported;standing Trunk ext/focusing ahead in mirror  -DM     Dynamic  Standing Balance  mild impairment;supported;standing WS to init sidesteps to chair  -DM     Balance Interventions  sitting;standing;static;dynamic;weight shifting activity  -DM       User Key  (r) = Recorded By, (t) = Taken By, (c) = Cosigned By    Initials Name Provider Type    DM Karon Pacheco, PT Physical Therapist        Goals/Plan     Row Name 09/16/21 0842          Bed Mobility Goal 1 (PT)    Activity/Assistive Device (Bed Mobility Goal 1, PT)  bed mobility activities, all  -DM     [place] Level/Cues Needed (Bed Mobility Goal 1, PT)  independent  -DM     Time Frame (Bed Mobility Goal 1, PT)  long term goal (LTG);1 week  -DM     Row Name 09/16/21 0842          Transfer Goal 1 (PT)    Activity/Assistive Device (Transfer Goal 1, PT)  sit-to-stand/stand-to-sit;bed-to-chair/chair-to-bed;walker, rolling  -DM     [place] Level/Cues Needed (Transfer Goal 1, PT)  supervision required  -DM     Time Frame (Transfer Goal 1, PT)  long term goal (LTG);1 week  -DM     Row Name 09/16/21 0842          Gait Training Goal 1 (PT)    Activity/Assistive Device (Gait Training Goal 1, PT)  gait (walking locomotion);walker, rolling stable VS; O2 sat > 92%  -DM     [place] Level (Gait Training Goal 1, PT)  standby assist  -DM     Distance (Gait Training Goal 1, PT)  50  -DM     Time Frame (Gait Training Goal 1, PT)  long term goal (LTG);1 week  -DM     Row Name 09/16/21 0842          Stairs Goal 1 (PT)    Activity/Assistive Device (Stairs Goal 1, PT)  ascending stairs;descending stairs;cane, straight  -DM     [place] Level/Cues Needed (Stairs Goal 1, PT)  contact guard assist  -DM     Number of Stairs (Stairs Goal 1, PT)  2  -DM     Time Frame (Stairs Goal 1, PT)  long term goal (LTG);1 week  -DM     Row Name 09/16/21 0842          Patient Education Goal (PT)    Activity (Patient Education Goal, PT)  HEP exer  -DM     [place]/Cues/Accuracy (Memory Goal 2, PT)  demonstrates adequately;verbalizes understanding   -DM     Time Frame (Patient Education Goal, PT)  long term goal (LTG);1 week  -DM       User Key  (r) = Recorded By, (t) = Taken By, (c) = Cosigned By    Initials Name Provider Type    DM Karon Pacheco, PT Physical Therapist        Clinical Impression     Row Name 09/16/21 0842          Pain    Additional Documentation  Pain Scale: FACES Pre/Post-Treatment (Group)  -DM     Row Name 09/16/21 0842          Pain Scale: Numbers Pre/Post-Treatment    Pain Location - Side  Right  -DM     Pain Location  knee  -DM     Pain Intervention(s)  Repositioned;Rest;Elevated  -DM     Row Name 09/16/21 0842          Pain Scale: FACES Pre/Post-Treatment    Pain: FACES Scale, Pretreatment  0-->no hurt  -DM     Posttreatment Pain Rating  2-->hurts little bit  -DM     Row Name 09/16/21 0842          Plan of Care Review    Plan of Care Reviewed With  patient  -DM     Progress  improving  -DM     Outcome Summary  PT eval completed. Presents w/ PNA, recurr B pl.eff & AF/RVR, unc. HBP, decr LE strength/endurance, & impaired funct mobil. Transf to EOB w/ min A (desat to 78%;NRB at 15 L placed over HFNC, & sat.incr to 98%), STS w/ R wx & min A, sidestepped 3 ft to chair w/ Jayson, desat to 92%, NRB doffed & sat.again dropped to 82%, replaced & sat.incr to 96% for duration of ther exer, then doffed again & sat.92%. Recommend home w/ niece's assist & HHPT f/u at d/c.  -DM     Row Name 09/16/21 0842          Therapy Assessment/Plan (PT)    Patient/Family Therapy Goals Statement (PT)  improved funct mobil  -DM     Rehab Potential (PT)  good, to achieve stated therapy goals  -DM     Criteria for Skilled Interventions Met (PT)  yes;meets criteria;skilled treatment is necessary  -DM     Row Name 09/16/21 0842          Vital Signs    Pre Systolic BP Rehab  131  -DM     Pre Treatment Diastolic BP  55  -DM     Post Systolic BP Rehab  134  -DM     Post Treatment Diastolic BP  52  -DM     Pretreatment Heart Rate (beats/min)  66  -DM     Intratreatment  Heart Rate (beats/min)  88  -DM     Posttreatment Heart Rate (beats/min)  69  -DM     Pre SpO2 (%)  92  -DM     O2 Delivery Pre Treatment  hi-flow  -DM     Intra SpO2 (%)  78  -DM     O2 Delivery Intra Treatment  other (see comments) also placed NRB at 15 L during mobil to stabilize O2 sat  -DM     Post SpO2 (%)  92  -DM     O2 Delivery Post Treatment  hi-flow  -DM     Pre Patient Position  Supine  -DM     Intra Patient Position  Standing  -DM     Post Patient Position  Sitting  -DM     Row Name 09/16/21 0842          Positioning and Restraints    Pre-Treatment Position  in bed  -DM     Post Treatment Position  chair  -DM     In Chair  notified nsg;reclined;call light within reach;encouraged to call for assist;with nsg;legs elevated brought extra pillow for LE's  -DM       User Key  (r) = Recorded By, (t) = Taken By, (c) = Cosigned By    Initials Name Provider Type    Karon Tang, PT Physical Therapist        Outcome Measures     Row Name 09/16/21 0842          How much help from another person do you currently need...    Turning from your back to your side while in flat bed without using bedrails?  4  -DM     Moving from lying on back to sitting on the side of a flat bed without bedrails?  3  -DM     Moving to and from a bed to a chair (including a wheelchair)?  3  -DM     Standing up from a chair using your arms (e.g., wheelchair, bedside chair)?  3  -DM     Climbing 3-5 steps with a railing?  2  -DM     To walk in hospital room?  3  -DM     AM-PAC 6 Clicks Score (PT)  18  -DM     Row Name 09/16/21 0842          Functional Assessment    Outcome Measure Options  AM-PAC 6 Clicks Basic Mobility (PT)  -DM       User Key  (r) = Recorded By, (t) = Taken By, (c) = Cosigned By    Initials Name Provider Type    Karon Tang PT Physical Therapist                       Physical Therapy Education                 Title: PT OT SLP Therapies (In Progress)     Topic: Physical Therapy (In Progress)     Point:  Mobility training (In Progress)     Learning Progress Summary           Patient Eager, E,D, NR by DM at 9/16/2021 1058                   Point: Home exercise program (In Progress)     Learning Progress Summary           Patient Eager, E,D, NR by DM at 9/16/2021 1058                   Point: Body mechanics (In Progress)     Learning Progress Summary           Patient Eager, E,D, NR by DM at 9/16/2021 1058                   Point: Precautions (In Progress)     Learning Progress Summary           Patient Eager, E,D, NR by DM at 9/16/2021 1058                               User Key     Initials Effective Dates Name Provider Type Discipline     06/16/21 -  Karon Pacheco, PT Physical Therapist PT              PT Recommendation and Plan  Planned Therapy Interventions (PT): bed mobility training, gait training, home exercise program, patient/family education, stair training, strengthening, transfer training  Plan of Care Reviewed With: patient  Progress: improving  Outcome Summary: PT eval completed. Presents w/ PNA, recurr B pl.eff & AF/RVR, unc. HBP, decr LE strength/endurance, & impaired funct mobil. Transf to EOB w/ min A (desat to 78%;NRB at 15 L placed over HFNC, & sat.incr to 98%), STS w/ R wx & min A, sidestepped 3 ft to chair w/ Jayson, desat to 92%, NRB doffed & sat.again dropped to 82%, replaced & sat.incr to 96% for duration of ther exer, then doffed again & sat.92%. Recommend home w/ niece's assist & HHPT f/u at d/c.     Time Calculation:   PT Charges     Row Name 09/16/21 1059             Time Calculation    Start Time  0842  -DM      PT Received On  09/16/21  -      PT Goal Re-Cert Due Date  09/26/21  -DM         Time Calculation- PT    Total Timed Code Minutes- PT  109 minute(s)  -DM         Timed Charges    17035 - PT Therapeutic Exercise Minutes  18  -DM      68428 - Gait Training Minutes   10  -DM      51907 - PT Therapeutic Activity Minutes  21  -DM         Untimed Charges    PT Eval/Re-eval Minutes   60  -DM         Total Minutes    Timed Charges Total Minutes  49  -DM      Untimed Charges Total Minutes  60  -DM       Total Minutes  109  -DM        User Key  (r) = Recorded By, (t) = Taken By, (c) = Cosigned By    Initials Name Provider Type    Karon Tang, PT Physical Therapist        Therapy Charges for Today     Code Description Service Date Service Provider Modifiers Qty    67914603449 HC PT THER PROC EA 15 MIN 2021 Karon Pacheco, PT GP 1    78303901932 HC GAIT TRAINING EA 15 MIN 2021 Karon Pacheco, PT GP 1    08396266139 HC PT THERAPEUTIC ACT EA 15 MIN 2021 Karon Pacheco, PT GP 1    69941752240 HC PT EVAL MOD COMPLEXITY 4 2021 Karon Pacheco, PT GP 1          PT G-Codes  Outcome Measure Options: AM-PAC 6 Clicks Basic Mobility (PT)  AM-PAC 6 Clicks Score (PT): 18  AM-PAC 6 Clicks Score (OT): 13    Karon Pacheco, PT  2021      Electronically signed by Karon Pacheco, PT at 21 1101          Occupational Therapy Notes (most recent note)      Carol Shaver, OT at 09/15/21 0812          Patient Name: Yin Law  : 1940    MRN: [id number]                              Today's Date: 9/15/2021       Admit Date: 2021    Visit Dx:     ICD-10-CM ICD-9-CM   1. Pneumonia due to infectious organism, unspecified laterality, unspecified part of lung  J18.9 486   2. Congestive heart failure, unspecified HF chronicity, unspecified heart failure type (CMS/[facility])  I50.9 428.0   3. Leukocytosis, unspecified type  D72.829 288.60   4. Acute on chronic respiratory failure with hypoxia (CMS/[facility])  J96.21 518.84     799.02     Patient Active Problem List   Diagnosis   • Multifocal pneumonia   • Recurrent atrial fibrillation with RVR (CMS/[facility])   • COPD on home oxygen (CMS/[facility])   • Hypertension and diastolic dysfunction   • Rheumatoid arthritis (CMS/[facility])   • Anxiety and depression   • Wound of right leg   • Anemia   • Elevated troponin   • Sepsis (CMS/[facility])   •  Acute on chronic respiratory failure with hypoxia (CMS/HCC)   • Pleural effusion, bilateral   • Acute diastolic CHF (CMS/[facility])   • Accelerated hypertension   • Pneumonia due to infectious organism     Past Medical History:   Diagnosis Date   • Anxiety and depression    • Arrhythmia     A-FIB   • Asthma    • Chicken pox    • COPD (chronic obstructive pulmonary disease) (CMS/[facility])    • Hyperlipidemia    • Hypertension    • Measles    • Menopause    • Osteoporosis    • Rheumatoid arthritis (CMS/HCC)    • Rheumatoid arthritis (CMS/[facility])    • Tobacco abuse      Past Surgical History:   Procedure Laterality Date   • APPENDECTOMY     • CARPAL TUNNEL RELEASE Left    • CATARACT EXTRACTION, BILATERAL     • COLON SURGERY     • COLONOSCOPY     • GALLBLADDER SURGERY     • HYSTERECTOMY  1971   • TONSILLECTOMY       General Information     Row Name 09/15/21 0846          OT Time and Intention    Document Type  evaluation  -SHIVAM     Mode of Treatment  individual therapy;occupational therapy  -SHIVAM     Row Name 09/15/21 0846          General Information    Patient Profile Reviewed  yes  -SHIVAM     Prior Level of Function  independent:;ADL's;all household mobility;dependent:;home management;driving;shopping With recent illness and admit 8/21 blanca has come to stay with pt. until November.  Priorly pt. fully independent, but admits HM and shopping had become difficult.  -SHIVAM     Existing Precautions/Restrictions  fall;oxygen therapy device and L/min r/o Covid 19, Contact/Airborne  -SHIVAM     Barriers to Rehab  medically complex;previous functional deficit  -SHIVAM     Row Name 09/15/21 0846          Living Environment    Lives With  other relative(s) Blanca on leave of abscence to stay with pt. possibly up through November  -SHIVAM     Row Name 09/15/21 0846          Home Main Entrance    Number of Stairs, Main Entrance  two  -SHIVAM     Row Name 09/15/21 0846          Stairs Within Home, Primary    Number of Stairs, Within Home, Primary  none  -SHIVAM     Row Name  09/15/21 0846          Cognition    Orientation Status (Cognition)  oriented x 4  -     Row Name 09/15/21 0846          Safety Issues, Functional Mobility    Safety Issues Affecting Function (Mobility)  insight into deficits/self-awareness;safety precaution awareness  -     Impairments Affecting Function (Mobility)  balance;endurance/activity tolerance;strength;pain;shortness of breath  -       User Key  (r) = Recorded By, (t) = Taken By, (c) = Cosigned By    Initials Name Provider Type    Carol Fontaine, OT Occupational Therapist          Mobility/ADL's     Row Name 09/15/21 0849          Bed Mobility    Bed Mobility  supine-sit  -SHIVAM     Supine-Sit [place] (Bed Mobility)  minimum assist (75% patient effort)  -     Bed Mobility, Safety Issues  decreased use of legs for bridging/pushing;decreased use of arms for pushing/pulling  -     Assistive Device (Bed Mobility)  bed rails;head of bed elevated  -SHIVAM     Comment (Bed Mobility)  slow mvmt to EOB with several rest periods, 02 drop to 85%, but recovered in under one minute to 90's.  -     Row Name 09/15/21 0849          Transfers    Transfers  sit-stand transfer;bed-chair transfer  -SHIVAM     Comment (Transfers)  cues for direction to turn with HFNC on, pt. limited by R knee pain.  To grossly 85%, but recovered in under a minute to 92%.  -SHIVAM     Bed-Chair [place] (Transfers)  minimum assist (75% patient effort);verbal cues  -     Assistive Device (Bed-Chair Transfers)  walker, front-wheeled  -SHIVAM     Sit-Stand [place] (Transfers)  verbal cues;contact guard  -     Row Name 09/15/21 0849          Sit-Stand Transfer    Assistive Device (Sit-Stand Transfers)  walker, front-wheeled  -SHIVAM     Row Name 09/15/21 0849          Functional Mobility    Functional Mobility- Comment  deferred due to on HFNC and R knee pain  -     Row Name 09/15/21 0849          Activities of Daily Living    BADL Assessment/Intervention  lower body dressing;upper body  dressing  -[facility] Name 09/15/21 0849          Lower Body Dressing Assessment/Training    [place] Level (Lower Body Dressing)  don;socks;moderate assist (50% patient effort)  -SHIVAM     Position (Lower Body Dressing)  edge of bed sitting  -SHIVAM     Comment (Lower Body Dressing)  Pt. donned L socks and OT R sock due to limited activity tolerance.  -[facility] Name 09/15/21 0849          Upper Body Dressing Assessment/Training    [place] Level (Upper Body Dressing)  don;pajama/robe;maximum assist (25% patient effort)  -SHIVAM     Position (Upper Body Dressing)  edge of bed sitting  -SHIVAM     Comment (Upper Body Dressing)  limited by lines  -       User Key  (r) = Recorded By, (t) = Taken By, (c) = Cosigned By    Initials Name Provider Type    Carol Fontaine OT Occupational Therapist        Obj/Interventions     Row Name 09/15/21 0852          Sensory Assessment (Somatosensory)    Sensory Assessment (Somatosensory)  right UE  -SHIVAM     Sensory Subjective Reports  tingling pt. reports prior tingling in R hand for some time, but no loss of touch  -SHIVAM     Row Name 09/15/21 0852          Vision Assessment/Intervention    Visual Impairment/Limitations  corrective lenses for reading  -[facility] Name 09/15/21 0852          Range of Motion Comprehensive    General Range of Motion  bilateral upper extremity ROM WNL  -SHIVAM     Row Name 09/15/21 0852          Strength Comprehensive (MMT)    General Manual Muscle Testing (MMT) Assessment  upper extremity strength deficits identified  -     Comment, General Manual Muscle Testing (MMT) Assessment  BUE grossly 4 to 4+/5  -[facility] Name 09/15/21 0852          Motor Skills    Motor Skills  functional endurance;coordination  -     Coordination  fine motor deficit;gross motor deficit;bilateral;minimal impairment;moderate impairment limited by BUE tremors  -     Functional Endurance  fair  -SHIVAM     Row Name 09/15/21 0852          Balance    Balance Assessment  sitting static  balance;sitting dynamic balance;standing static balance;standing dynamic balance  -SHIVAM     Static Sitting Balance  WFL;unsupported;sitting, edge of bed  -SHIVAM     Dynamic Sitting Balance  WFL;unsupported;sitting, edge of bed  -SHIVAM     Static Standing Balance  mild impairment;supported;standing  -SHIVAM     Dynamic Standing Balance  mild impairment;supported;standing  -SHIVAM     Balance Interventions  sit to stand;occupation based/functional task  -SHIVAM       User Key  (r) = Recorded By, (t) = Taken By, (c) = Cosigned By    Initials Name Provider Type    Carol Fontaine, OT Occupational Therapist        Goals/Plan     Row Name 09/15/21 0901          Transfer Goal 1 (OT)    Activity/Assistive Device (Transfer Goal 1, OT)  toilet;commode, bedside without drop arms;commode;sit-to-stand/stand-to-sit;bed-to-chair/chair-to-bed grab bar  -SHIVAM     [place] Level/Cues Needed (Transfer Goal 1, OT)  contact guard assist  -SHIVAM     Time Frame (Transfer Goal 1, OT)  long term goal (LTG);10 days  -SHIVAM     Progress/Outcome (Transfer Goal 1, OT)  goal ongoing  -SHIVAM     Row Name 09/15/21 0901          Dressing Goal 1 (OT)    Activity/Device (Dressing Goal 1, OT)  lower body dressing socks and undergarment  -SHIVAM     [place]/Cues Needed (Dressing Goal 1, OT)  contact guard assist  -SHIVAM     Time Frame (Dressing Goal 1, OT)  long term goal (LTG);10 days  -SHIVAM     Strategies/Barriers (Dressing Goal 1, OT)  OT sats 90% or greater on NC  -SHIVAM     Progress/Outcome (Dressing Goal 1, OT)  goal ongoing  -SHIVAM     Row Name 09/15/21 0901          Toileting Goal 1 (OT)    Activity/Device (Toileting Goal 1, OT)  toileting skills, all  -SHIVAM     [place] Level/Cues Needed (Toileting Goal 1, OT)  standby assist  -SHIVAM     Time Frame (Toileting Goal 1, OT)  long term goal (LTG);10 days  -SIHVAM     Strategies/Barriers (Toileting Goal 1, OT)  02 sats 90% or greater on NC  -SHIVAM     Progress/Outcome (Toileting Goal 1, OT)  goal ongoing  -SHIVAM     Row Name 09/15/21 0901           Grooming Goal 1 (OT)    Activity/Device (Grooming Goal 1, OT)  hair care;oral care;wash face, hands sink side sitting  -SHIVAM     [place] (Grooming Goal 1, OT)  set-up required  -SHIVAM     Time Frame (Grooming Goal 1, OT)  long term goal (LTG);10 days  -SHIVAM     Strategies/Barriers (Grooming Goal 1, OT)  02 sats 90% or greater NC  -SHIVAM     Progress/Outcome (Grooming Goal 1, OT)  goal ongoing  -SHIVAM     Row Name 09/15/21 0901          Therapy Assessment/Plan (OT)    Planned Therapy Interventions (OT)  activity tolerance training;BADL retraining;functional balance retraining;patient/caregiver education/training;ROM/therapeutic exercise;strengthening exercise;transfer/mobility retraining;occupation/activity based interventions  -SHIVAM       User Key  (r) = Recorded By, (t) = Taken By, (c) = Cosigned By    Initials Name Provider Type    Carol Fontaine, OT Occupational Therapist        Clinical Impression     Row Name 09/15/21 0888          Pain Assessment    Additional Documentation  Pain Scale: Numbers Pre/Post-Treatment (Group)  -SHIVAM     Row Name 09/15/21 0854          Pain Scale: Numbers Pre/Post-Treatment    Pretreatment Pain Rating  4/10  -SHIVAM     Posttreatment Pain Rating  5/10  -SHIVAM     Pain Location - Side  Right  -SHIVAM     Pain Location  knee also some lesser generalized pain, pain 6-7/10 when standing  -SHIVAM     Pre/Posttreatment Pain Comment  nurse alerted that pt. wants pain meds post eating BF  -SHIVAM     Pain Intervention(s)  Repositioned  -SHIVAM     Row Name 09/15/21 0894          Plan of Care Review    Plan of Care Reviewed With  patient  -SHIVAM     Progress  no change  -SHIAVM     Outcome Summary  OT evaluation completed with pt. demonstrated decreased strength, balance and endurance along with pain limiting ADL independence warranting continued skilled OT services. Pt. min A supine to sit, sit to stand and mvmt to recliner. SBA to min A expected lower level BADL tasks and Mod A higher level ADL tasks grossly with many  rest periods. R Knee pain limiting all standing tasks. Pt. would likely benefit from IRF stay, but pt. verbalizing she would prefer home with family assist and  therapy services.  -SHIVAM     [place] Name 09/15/21 0854          Therapy Assessment/Plan (OT)    Patient/Family Therapy Goal Statement (OT)  regain enough strength to return home with family and become independent enough to eventually return home alone  -     Rehab Potential (OT)  good, to achieve stated therapy goals  -SHIVAM     Criteria for Skilled Therapeutic Interventions Met (OT)  yes;meets criteria;skilled treatment is necessary  -     Therapy Frequency (OT)  daily  -SHIVAM [place] Name 09/15/21 0854          Therapy Plan Review/Discharge Plan (OT)    Equipment Needs Upon Discharge (OT)  -- per pt. she already had a shower chair with wx in shower and a high toilet at home with grab bars  -SHIVAM     Anticipated Discharge Disposition (OT)  inpatient rehabilitation facility pt. hoping to progress enough to return home with family and HH therapy services.  -SHIVAM     [place] Name 09/15/21 0854          Vital Signs    Pre Systolic BP Rehab  105  -SHIVAM     Pre Treatment Diastolic BP  46  -SHIVAM     Post Systolic BP Rehab  -- unable to get BP to take, as if screen locked  -SHIVAM     Pretreatment Heart Rate (beats/min)  61  -SHIVAM     Posttreatment Heart Rate (beats/min)  69  -SHIVAM     Pre SpO2 (%)  100  -SHIVAM     O2 Delivery Pre Treatment  hi-flow 40L, 50% flow  -SHIVAM     Intra SpO2 (%)  85  -SHIVAM     O2 Delivery Intra Treatment  hi-flow able to recover one minute or less  -SHIVAM     Post SpO2 (%)  96  -SHIVAM     O2 Delivery Post Treatment  hi-flow  -SHIVAM     Pre Patient Position  Supine  -SHIVAM     Intra Patient Position  Standing  -SHIVAM     Post Patient Position  Sitting  -SHIVAM     [place] Name 09/15/21 0854          Positioning and Restraints    Pre-Treatment Position  in bed  -SHIVAM     Post Treatment Position  chair  -SHIVAM     In Chair  reclined;call light within reach;encouraged to call for assist;exit alarm  on;waffle cushion;heels elevated  -SHIVAM       User Key  (r) = Recorded By, (t) = Taken By, (c) = Cosigned By    Initials Name Provider Type    Carol Fontaine OT Occupational Therapist        Outcome Measures     Row Name 09/15/21 0903          How much help from another is currently needed...    Putting on and taking off regular lower body clothing?  2  -SHIVAM     Bathing (including washing, rinsing, and drying)  2  -SHIVAM     Toileting (which includes using toilet bed pan or urinal)  2  -SHIVAM     Putting on and taking off regular upper body clothing  2  -SHIVAM     Taking care of personal grooming (such as brushing teeth)  2  -SHIVAM     Eating meals  3  -SHIVAM     AM-PAC 6 Clicks Score (OT)  13  -SHIVAM     Row Name 09/15/21 0903          Functional Assessment    Outcome Measure Options  AM-PAC 6 Clicks Daily Activity (OT)  -SHIVAM       User Key  (r) = Recorded By, (t) = Taken By, (c) = Cosigned By    Initials Name Provider Type    Carol Fontaine OT Occupational Therapist          Occupational Therapy Education                 Title: PT OT SLP Therapies (In Progress)     Topic: Occupational Therapy (In Progress)     Point: ADL training (Done)     Description:   Instruct learner(s) on proper safety adaptation and remediation techniques during self care or transfers.   Instruct in proper use of assistive devices.              Learning Progress Summary           Patient Acceptance, E,D, VU,NR by SHIVAM at 9/15/2021 0904    Comment: reason for consult, noted deficits, discharge option, pacing self, PLB                   Point: Home exercise program (Not Started)     Description:   Instruct learner(s) on appropriate technique for monitoring, assisting and/or progressing therapeutic exercises/activities.              Learner Progress:  Not documented in this visit.          Point: Precautions (Done)     Description:   Instruct learner(s) on prescribed precautions during self-care and functional transfers.              Learning Progress  Summary           Patient Acceptance, E,D, VU,NR by  at 9/15/2021 0904    Comment: reason for consult, noted deficits, discharge option, pacing self, PLB                   Point: Body mechanics (Done)     Description:   Instruct learner(s) on proper positioning and spine alignment during self-care, functional mobility activities and/or exercises.              Learning Progress Summary           Patient Acceptance, E,D, VU,NR by  at 9/15/2021 0904    Comment: reason for consult, noted deficits, discharge option, pacing self, PLB                               User Key     Initials Effective Dates Name Provider Type Discipline     06/16/21 -  Carol Shaver, OT Occupational Therapist OT              OT Recommendation and Plan  Planned Therapy Interventions (OT): activity tolerance training, BADL retraining, functional balance retraining, patient/caregiver education/training, ROM/therapeutic exercise, strengthening exercise, transfer/mobility retraining, occupation/activity based interventions  Therapy Frequency (OT): daily  Plan of Care Review  Plan of Care Reviewed With: patient  Progress: no change  Outcome Summary: OT evaluation completed with pt. demonstrated decreased strength, balance and endurance along with pain limiting ADL independence warranting continued skilled OT services. Pt. min A supine to sit, sit to stand and mvmt to recliner. SBA to min A expected lower level BADL tasks and Mod A higher level ADL tasks grossly with many rest periods. R Knee pain limiting all standing tasks. Pt. would likely benefit from IRF stay, but pt. verbalizing she would prefer home with family assist and HH therapy services.     Time Calculation:   Time Calculation- OT     Row Name 09/15/21 0906             Time Calculation- OT    OT Start Time  0812  -SHIVAM      OT Received On  09/15/21  -SHIVAM      OT Goal Re-Cert Due Date  09/25/21  -SHIVAM         Untimed Charges    OT Eval/Re-eval Minutes  59  -SHIVAM         Total Minutes     Untimed Charges Total Minutes  59  -SHIVAM       Total Minutes  59  -SHIVAM        User Key  (r) = Recorded By, (t) = Taken By, (c) = Cosigned By    Initials Name Provider Type    Carol Fontaine OT Occupational Therapist        Therapy Charges for Today     Code Description Service Date Service Provider Modifiers Qty    34065369605 HC OT EVAL LOW COMPLEXITY 4 9/15/2021 Carol Shaver OT GO 1               Carol Shaver OT  9/15/2021    Electronically signed by Carol Shaver OT at 09/15/21 1212          Discharge Summary      Kerrie Vera MD at 21 1212              [facility] Medicine Services  DISCHARGE SUMMARY    Patient Name: Yin Law  : 1940  MRN: [id number]    Date of Admission: 2021  8:06 PM  Date of Discharge:  2021  Primary Care Physician: Santiago Chaudhry MD    Consults     Date and Time Order Name Status Description    2021 12:35 AM Inpatient Palliative Care MD Consult Completed     9/15/2021  2:36 AM Inpatient Pulmonology Consult Completed     2021  8:21 AM Inpatient Gastroenterology Consult Completed     2021  7:10 AM Inpatient Infectious Diseases Consult Completed     2021 12:34 AM Inpatient Cardiology Consult Completed     2021  8:29 PM Inpatient Infectious Diseases Consult Completed           Hospital Course     Presenting Problem:   Pneumonia due to infectious organism, unspecified laterality, unspecified part of lung [J18.9]    Active Hospital Problems    Diagnosis  POA   • **Acute on chronic respiratory failure with hypoxia (CMS/[facility]) [J96.21]  Yes   • Accelerated hypertension [I10]  Yes   • Pneumonia due to infectious organism [J18.9]  Yes   • Acute diastolic CHF (CMS/[facility]) [I50.31]  Yes   • Sepsis (CMS/[facility]) [A41.9]  Yes   • Multifocal pneumonia [J18.9]  Yes   • COPD on home oxygen (CMS/[facility]) [J44.9]  Yes   • Rheumatoid arthritis (CMS/[facility]) [M06.9]  Yes      Resolved Hospital Problems   No resolved problems to  display.          Hospital Course:  Yin Law is a 81 y.o. 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  She was discharged to [facility].  A week ago she went home from [facility] on 2.5L nc.  At home she developed worsening shortness of breath and came to [facility] ED.  BNP was elevated at over 11,000 and procalcitonin is elevated as well.  CT chest shows multifocal infiltrates overall similar in appearance to her last admission as well as moderate sized bilateral pleural effusions.     Decompensated CHF:  Her BP was 200 systolic when she arrived but improved quickly.  Cardiology was consulted.  She was diuresed and her oxygen needs decreased from HFNC on admission to 3L at discharge.  She states she feels baseline.  Her diuretic is increased to torsemide 40mg daily.    Persistent lung infiltrates:  Pulm service was consulted.  They reviewed past workup, felt she does have an interstitial lung disease but did not recommend further steroids.  She got five days of ceftriaxone/doxycycline as CAP could not be ruled out.  She was never febrile.  She is going home on her usual regimen of Stiolto and Brovana.  She requested duonebs so those are prescribed.       Hypothyroidism - TSH 22 (same as last admission, despite starting synthroid at that time.  Synthroid is increased to 75mcg daily.  Needs TSH recheck in 6 weeks.      Paroxysmal atrial fibrillation - Continue bisoprolol.  Cannot tolerate amiodarone due to pulmonary toxicity.  Continue Eliquis.     Anxiety/depression  -On xanax.  She met with the Palliative Svc and clarified that she did not  want intubation or vent support but was okay with chest compressions.      She worked with PT.  She does not want to go to rehab.  She  is going home with her sister and a niece.       Discharge Follow Up Recommendations for outpatient labs/diagnostics:   *PCP 2 weeks.     *FU TSH in 6-12 weeks (Synthroid increased)    *Pulm clinic FU scheduled for mid October.     Day of Discharge     HPI:   Feels like her breathing is baseline.  Is dyspneic with activity but says that is chronic.  She wants to go home today.  Sister and niece will be living with her.      Review of Systems  Gen- No fevers, chills  CV- No chest pain, palpitations  GI- No N/V/D, abd pain    Vital Signs:   Temp:  [97.6 °F (36.4 °C)-98.4 °F (36.9 °C)] 97.6 °F (36.4 °C)  Heart Rate:  [63-74] 63  Resp:  [17-18] 18  BP: (124-134)/(50-86) 133/60     Physical Exam:  Constitutional: Awake, alert in chair on 3L nc oxygen.  Niece is at bedside   Eyes: PER, sclerae anicteric, no conjunctival injection  HENT: NCAT, mucous membranes moist  Neck: Supple,  trachea midline  Respiratory: diminsihed throughout, no crackles, nonlabored respirations.  Mod kyphosis.    Cardiovascular: RRR, no murmurs, rubs, or gallops, palpable pedal pulses bilaterally  Gastrointestinal: Positive bowel sounds, soft, nontender, nondistended  Musculoskeletal: No bilateral ankle edema, no clubbing or cyanosis to extremities  Psychiatric: Appropriate affect, cooperative  Neurologic: Oriented x 3,  Cranial Nerves grossly intact to confrontation, speech clear  Skin: No rashes      Pertinent  and/or Most Recent Results     LAB RESULTS:      Lab 09/17/21  0525 09/15/21  0615 09/15/21  0008 09/14/21 2049 09/14/21 2023   WBC 9.03  --   --   --  15.02*   HEMOGLOBIN 8.3* 8.0*  --   --  9.5*   HEMATOCRIT 26.6* 26.7*  --   --  31.3*   PLATELETS 251 228  --   --  287   NEUTROS ABS  --  6.46  --   --  12.63*   IMMATURE GRANS (ABS)  --  0.05  --   --  0.09*   LYMPHS ABS  --  1.49  --   --  1.03    MONOS ABS  --  0.96*  --   --  1.22*   EOS ABS  --  0.02  --   --  0.02   MCV 86.6 90.2  --   --  90.2   PROCALCITONIN  --   --   --  0.36*  --    LACTATE  --   --  1.9 3.2*  --          Lab 09/19/21  0553 09/18/21  0555 09/17/21  0525 09/16/21  0422 09/15/21  0615 09/14/21 2049 09/14/21 2049   SODIUM 136 138 135*  --  133*  --  138   POTASSIUM 3.6 4.6 3.3* 4.3 3.2*   < > 3.9   CHLORIDE 102 104 100  --  101  --  104   CO2 25.0 26.0 22.0  --  21.0*  --  21.0*   ANION GAP 9.0 8.0 13.0  --  11.0  --  13.0   BUN 17 14 16  --  9  --  11   CREATININE 0.71 0.72 0.68  --  0.67  --  0.75   GLUCOSE 93 118* 108*  --  97  --  158*   CALCIUM 8.4* 8.5* 8.1*  --  8.3*  --  8.3*   MAGNESIUM 1.6 1.6  --   --   --   --   --    TSH  --   --   --   --  22.960*  --   --     < > = values in this interval not displayed.         Lab 09/14/21 2049   TOTAL PROTEIN 5.9*   ALBUMIN 3.10*   GLOBULIN 2.8   ALT (SGPT) 18   AST (SGOT) 22   BILIRUBIN 0.4   ALK PHOS 92         Lab 09/14/21 2049   PROBNP 11,917.0*   TROPONIN T <0.010                 Lab 09/14/21  2307   PH, ARTERIAL 7.501*   PCO2, ARTERIAL 29.8*   PO2 ART 70.2*   FIO2 60   HCO3 ART 23.2   BASE EXCESS ART 0.5   CARBOXYHEMOGLOBIN 1.6     Brief Urine Lab Results  (Last result in the past 365 days)      Color   Clarity   Blood   Leuk Est   Nitrite   Protein   CREAT   Urine HCG        08/21/21 2314 Yellow Clear Negative Negative Positive Negative             Microbiology Results (last 10 days)     Procedure Component Value - Date/Time    Legionella Antigen, Urine - Urine, Urine, Clean Catch [[id number]]  (Normal) Collected: 09/15/21 1850    Lab Status: Final result Specimen: Urine, Clean Catch Updated: 09/15/21 2350     LEGIONELLA ANTIGEN, URINE Negative    S. Pneumo Ag Urine or CSF - Urine, Urine, Clean Catch [[id number]]  (Normal) Collected: 09/15/21 1850    Lab Status: Final result Specimen: Urine, Clean Catch Updated: 09/15/21 2350     Strep Pneumo Ag Negative    MRSA Screen, PCR  (Inpatient) - Swab, Nares [[id number]]  (Normal) Collected: 09/15/21 0645    Lab Status: Final result Specimen: Swab from Nares Updated: 09/15/21 0903     MRSA PCR Negative    Narrative:      MRSA Negative    COVID-19,CEPHEID/VASHTI,SHALINI IN-HOUSE(OR EMERGENT/ADD-ON),NP SWAB IN TRANSPORT MEDIA 3-4 HR TAT - Swab, Nasopharynx [[id number]]  (Normal) Collected: 09/15/21 0543    Lab Status: Final result Specimen: Swab from Nasopharynx Updated: 09/15/21 0637     COVID19 Not Detected    Narrative:      Fact sheet for providers: https://www.fda.gov/media/919372/download     Fact sheet for patients: https://www.fda.gov/media/238860/download  Fact sheet for providers: https://www.fda.gov/media/083505/download     Fact sheet for patients: https://www.fda.gov/media/248913/download    Blood Culture - Blood, Arm, Right [[id number]] Collected: 09/14/21 2135    Lab Status: Preliminary result Specimen: Blood from Arm, Right Updated: 09/18/21 2200     Blood Culture No growth at 4 days    Blood Culture - Blood, Arm, Right [[id number]] Collected: 09/14/21 2125    Lab Status: Preliminary result Specimen: Blood from Arm, Right Updated: 09/18/21 2200     Blood Culture No growth at 4 days    COVID PRE-OP / PRE-PROCEDURE SCREENING ORDER (NO ISOLATION) - Swab, Nasopharynx [[id number]]  (Normal) Collected: 09/14/21 2026    Lab Status: Final result Specimen: Swab from Nasopharynx Updated: 09/14/21 2056    Narrative:      The following orders were created for panel order COVID PRE-OP / PRE-PROCEDURE SCREENING ORDER (NO ISOLATION) - Swab, Nasopharynx.  Procedure                               Abnormality         Status                     ---------                               -----------         ------                     COVID-19 and FLU A/B PCR...[[id number]]  Normal              Final result                 Please view results for these tests on the individual orders.    COVID-19 and FLU A/B PCR - Swab, Nasopharynx [[id number]]  (Normal) Collected:  09/14/21 2026    Lab Status: Final result Specimen: Swab from Nasopharynx Updated: 09/14/21 2056     COVID19 Not Detected     Influenza A PCR Not Detected     Influenza B PCR Not Detected    Narrative:      Fact sheet for providers: https://www.fda.gov/media/098810/download    Fact sheet for patients: https://www.fda.gov/media/848417/download    Test performed by PCR.    COVID-19 PCR, [facility], NP SWAB IN [facility] TRANSPORT MEDIA/ORAL SWISH 24-30 HR TAT - Swab, Nasopharynx [[id number]] Collected: 09/14/21 1054    Lab Status: Final result Specimen: Swab from Nasopharynx Updated: 09/15/21 1510     SARS-CoV-2 LIGIA Not Detected          XR Chest 1 View    Result Date: 9/16/2021  EXAMINATION: XR CHEST 1 VW-  INDICATION: Evaluate pleural effusions; J18.9-Pneumonia, unspecified organism; I50.9-Heart failure, unspecified; D72.829-Elevated white blood cell count, unspecified; J96.21-Acute and chronic respiratory failure with hypoxia  COMPARISON: 9/14/2021  FINDINGS: Unchanged bilateral upper lung predominant airspace disease. Minimally increased small right pleural effusion and stable trace left effusion. No distinct pneumothorax. Unchanged heart and mediastinal contours.      Unchanged bilateral upper lung predominant airspace disease. Minimally increased small right pleural effusion and stable trace left effusion. No distinct pneumothorax. Unchanged heart and mediastinal contours.  This report was finalized on 9/16/2021 9:07 AM by Lonnie Azar.      XR Chest 1 View    Result Date: 9/14/2021  CR Chest 1 Vw INDICATION: Short of air triage. Covid rule out. COMPARISON:  Chest x-ray 8/25/2021. FINDINGS: Portable AP view(s) of the chest.  Cardiac silhouette is top normal and partly obscured by pulmonary parenchymal pathology. There are extensive bilateral infiltrates with an upper lobe predominance though there is also airspace disease at the left mid to lower lung and patchy airspace disease at the right midlung. This is  worse than on the prior study. On comparison to films dating back to May 2021, the infiltrates are essentially new since that time. Please correlate with the clinical course.     Extensive bilateral pulmonary parenchymal infiltrates with an upper lobe predominance showing mild interval worsening since 8/25/2021. This could be due to Covid pneumonia though other infectious or inflammatory disease certainly in the differential. Clinical correlation and follow-up recommended. Signer Name: Amy Trejo MD  Signed: 9/14/2021 9:00 PM  Workstation Name: [facility]  Radiology [facility]    CT Angiogram Chest    Result Date: 9/14/2021  CTA Chest INDICATION: Pneumonia with shortness of breath. TECHNIQUE: CT angiogram of the chest with 100 cc of Isovue-300 IV contrast. 3-D reconstructions were obtained and reviewed.   Radiation dose reduction techniques included automated exposure control or exposure modulation based on body size. Count of known CT and cardiac nuc med studies performed in previous 12 months: 3. COMPARISON: 8/23/2021 FINDINGS: Chest images at mediastinal window show moderately large bilateral pleural effusions. Pleural fluid volumes are slightly greater since the previous scan. There are no pulmonary artery filling defects to suggest emboli. The CT angiographic images also show no evidence of emboli. Chest images at lung window show extensive bilateral infiltrates with underlying emphysema. The infiltrates appear similar to the previous scan in August. There is more perhaps increased infiltrate in the right upper lobe compared to the previous scan.     Emphysema with extensive bilateral infiltrates with slight worsening in the right upper lobe since the previous scan. Bilateral pleural effusions appear slightly larger. No evidence of pulmonary embolism. Signer Name: Gomez Luna MD  Signed: 9/14/2021 11:55 PM  [facility]station Name: [email or web address]  Radiology [facility]              Results  for orders placed during the hospital encounter of 07/19/21    Adult Transthoracic Echo Complete W/ Cont if Necessary Per Protocol    Interpretation Summary  · Estimated left ventricular EF = 65%  · Mild aortic valve stenosis is present.  · Aortic valve maximum pressure gradient is 32.9 mmHg. Aortic valve mean pressure gradient is 17.2 mmHg.  · Mild mitral valve regurgitation is present.  · Mild tricuspid valve regurgitation is present.  · Estimated right ventricular systolic pressure from tricuspid regurgitation is 37.0 mmHg.  · There is a large left pleural effusion. There is a moderate sized right pleural effusion.      Plan for Follow-up of Pending Labs/Results:  I will follow up   Pending Labs     Order Current Status    Blood Culture - Blood, Arm, Right Preliminary result    Blood Culture - Blood, Arm, Right Preliminary result        Discharge Details        Discharge Medications      New Medications      Instructions Start Date   ipratropium-albuterol 0.5-2.5 mg/3 ml nebulizer  Commonly known as: DUO-NEB   1.5 mL, Nebulization, Every 6 Hours PRN      torsemide 20 MG tablet  Commonly known as: DEMADEX   40 mg, Oral, Daily   Start Date: September 20, 2021        Changes to Medications      Instructions Start Date   levothyroxine 75 MCG tablet  Commonly known as: SYNTHROID, LEVOTHROID  What changed:   · medication strength  · how much to take   75 mcg, Oral, Every Early Morning   Start Date: September 20, 2021        Continue These Medications      Instructions Start Date   ALPRAZolam 0.5 MG tablet  Commonly known as: XANAX   0.5 mg, Oral, Nightly PRN      apixaban 5 MG tablet tablet  Commonly known as: ELIQUIS   5 mg, Oral, 2 Times Daily      ascorbic acid 1000 MG tablet  Commonly known as: VITAMIN C   1,000 mg, Oral, Daily      bisoprolol 5 MG tablet  Commonly known as: ZEBeta   5 mg, Oral, Every 24 Hours Scheduled      budesonide 0.5 MG/2ML nebulizer solution  Commonly known as: PULMICORT   0.5 mg,  Nebulization, 2 Times Daily - RT      ferrous sulfate 325 (65 FE) MG tablet   325 mg, Oral, 2 Times Daily With Meals      fish oil 1000 MG capsule capsule   1,000 mg, Oral, Daily With Breakfast      HYDROcodone-acetaminophen 7.5-325 MG per tablet  Commonly known as: NORCO   1 tablet, Oral, Every 6 Hours PRN      lactobacillus acidophilus capsule capsule   1 capsule, Oral, Daily      multivitamin with minerals tablet tablet   1 tablet, Oral, Daily      pantoprazole 40 MG EC tablet  Commonly known as: PROTONIX   40 mg, Oral, 2 Times Daily Before Meals      potassium chloride 10 MEQ CR capsule  Commonly known as: MICRO-K   20 mEq, Oral, Daily      sertraline 100 MG tablet  Commonly known as: ZOLOFT   150 mg, Oral, Daily, 1.5 tab daily      Stiolto Respimat 2.5-2.5 MCG/ACT aerosol solution inhaler  Generic drug: tiotropium bromide-olodaterol   2 puffs, Inhalation, Daily PRN      Vitamin D (Ergocalciferol) 50 MCG (2000 UT) capsule   1 tablet, Oral, Daily         Stop These Medications    furosemide 20 MG tablet  Commonly known as: LASIX            Allergies   Allergen Reactions   • Amiodarone Unknown - High Severity         Discharge Disposition:  Home or Self Care    Diet:  Hospital:  Diet Order   Procedures   • Diet Regular; Cardiac       Activity:  As tolerated       CODE STATUS:    Code Status and Medical Interventions:   Ordered at: 09/14/21 2249     Limited Support to NOT Include:    Intubation     Level Of Support Discussed With:    Patient     Code Status:    CPR     Medical Interventions (Level of Support Prior to Arrest):    Limited       Future Appointments   Date Time Provider Department Center   10/15/2021  3:00 PM Xochitl Hawley APRN [facility]                 Kerrie Vera MD  09/19/21      Time Spent on Discharge:  I spent  40  minutes on this discharge activity which included: face-to-face encounter with the patient, reviewing the data in the system, coordination of the care with the nursing staff  as well as consultants, documentation, and entering orders.            Electronically signed by Kerrie Vera MD at 09/19/21 2975

## 2021-09-19 NOTE — DISCHARGE PLACEMENT REQUEST
"Yin Bhatti (81 y.o. Female)     Date of Birth Social Security Number Address Home Phone MRN    1940  [address] [facility] [id number] [phone] [id number]    Quaker Marital Status          Anabaptist        Admission Date Admission Type Admitting Provider Attending Provider Department, Room/Bed    9/14/21 Emergency Kerrie Vera MD Mini, Jocelyn, MD [facility] 5G, S566/1    Discharge Date Discharge Disposition Discharge Destination         Home or Self Care              Attending Provider: Kerrie Vera MD    Allergies: Amiodarone    Isolation: None   Infection: None   Code Status: CPR    Ht: 162.6 cm (64\")   Wt: 67.8 kg (149 lb 6.4 oz)    Admission Cmt: None   Principal Problem: Acute on chronic respiratory failure with hypoxia (CMS/HCC) [J96.21]                 Active Insurance as of 9/14/2021     Primary Coverage     Payor Plan Insurance Group Employer/Plan Group    MEDICARE MEDICARE A & B      Payor Plan Address Payor Plan Phone Number Payor Plan Fax Number Effective Dates    [address] [phone]  9/1/2004 - None Entered    [facility] [id number]       Subscriber Name Subscriber Birth Date Member ID       YIN BHATTI 1940 [id number]           Secondary Coverage     Payor Plan Insurance Group Employer/Plan Group    [id number]     Payor Plan Address Payor Plan Phone Number Payor Plan Fax Number Effective Dates    [address]   12/1/2016 - None Entered    [facility] [id number]       Subscriber Name Subscriber Birth Date Member ID       YIN BHATTI 1940 [id number]                 Emergency Contacts      (Rel.) Home Phone Work Phone Mobile Phone    Enedina Mauricio (Sister) [phone] -- [phone]    STEVIE FOWLER (Relative) [phone] -- [phone]               Physical Therapy Notes (most recent note)      Karon Pacheco M, PT at 09/16/21 1101  Version 1 of 1         Patient " Name: Yin Law  : 1940    MRN: [id number]                              Today's Date: 2021       Admit Date: 2021    Visit Dx:     ICD-10-CM ICD-9-CM   1. Pneumonia due to infectious organism, unspecified laterality, unspecified part of lung  J18.9 486   2. Congestive heart failure, unspecified HF chronicity, unspecified heart failure type (CMS/HCC)  I50.9 428.0   3. Leukocytosis, unspecified type  D72.829 288.60   4. Acute on chronic respiratory failure with hypoxia (CMS/HCC)  J96.21 518.84     799.02     Patient Active Problem List   Diagnosis   • Multifocal pneumonia   • Recurrent atrial fibrillation with RVR (CMS/HCC)   • COPD on home oxygen (CMS/HCC)   • Hypertension and diastolic dysfunction   • Rheumatoid arthritis (CMS/[facility])   • Anxiety and depression   • Wound of right leg   • Anemia   • Elevated troponin   • Sepsis (CMS/[facility])   • Acute on chronic respiratory failure with hypoxia (CMS/HCC)   • Pleural effusion, bilateral   • Acute diastolic CHF (CMS/HCC)   • Accelerated hypertension   • Pneumonia due to infectious organism     Past Medical History:   Diagnosis Date   • Anxiety and depression    • Arrhythmia     A-FIB   • Asthma    • Chicken pox    • COPD (chronic obstructive pulmonary disease) (CMS/HCC)    • Hyperlipidemia    • Hypertension    • Measles    • Menopause    • Osteoporosis    • Rheumatoid arthritis (CMS/[facility])    • Rheumatoid arthritis (CMS/HCC)    • Tobacco abuse      Past Surgical History:   Procedure Laterality Date   • APPENDECTOMY     • CARPAL TUNNEL RELEASE Left    • CATARACT EXTRACTION, BILATERAL     • COLON SURGERY     • COLONOSCOPY     • GALLBLADDER SURGERY     • HYSTERECTOMY  1971   • TONSILLECTOMY       General Information     Row Name 21 0842          Physical Therapy Time and Intention    Document Type  evaluation  -DM     Mode of Treatment  physical therapy  -DM     Row Name 21 0842          General Information    Patient Profile Reviewed  yes  -DM      Prior Level of Function  independent:;all household mobility;gait;transfer;bed mobility;ADL's;dependent:;home management;cooking;cleaning;driving;shopping indep gt w/ R wx (doesn't use her SPC);Niece does HM,driv,shop,cook,clean since adm 8/'21, but p/t that pt was indep in all  -DM     Existing Precautions/Restrictions  fall;oxygen therapy device and L/min;other (see comments) ? Covid 19 r/o'd (d/c'd contact/airborne);adm w/ PNA,unc. HBP,recurr AF w/ RVR & B Pl.Eff, diast CHF,elev trop.;HFNC; occ tremoring;h/o asthma;per nsg, bed Al.@night but no ch.Al.when up  -DM     Barriers to Rehab  medically complex;previous functional deficit;ineffective coping A/D  -DM     Row Name 09/16/21 0842          Living Environment    Lives With  alone;other (see comments) niece staying till Nov.  -DM     Row Name 09/16/21 0842          Home Main Entrance    Number of Stairs, Main Entrance  two garage ent.  -DM     Row Name 09/16/21 0842          Stairs Within Home, Primary    Stairs, Within Home, Primary  1-st. w/ W.I. show. w/ seat & gr.bar, elev comm.seat w/ handlebars (+ floor to ceiling pole for supp)  -DM     Number of Stairs, Within Home, Primary  none  -DM     Row Name 09/16/21 0842          Cognition    Orientation Status (Cognition)  oriented x 4  -DM     Row Name 09/16/21 0842          Safety Issues, Functional Mobility    Safety Issues Affecting Function (Mobility)  safety precaution awareness;safety precautions follow-through/compliance;sequencing abilities  -DM     Impairments Affecting Function (Mobility)  balance;endurance/activity tolerance;pain;postural/trunk control;shortness of breath;strength  -DM       User Key  (r) = Recorded By, (t) = Taken By, (c) = Cosigned By    Initials Name Provider Type    DM Karon Pacheco, PT Physical Therapist        Mobility     Row Name 09/16/21 0842          Bed Mobility    Bed Mobility  rolling left;rolling right;supine-sit;scooting/bridging  -DM     Rolling Left  Ashley (Bed Mobility)  verbal cues;contact guard placed mesh panty/pad to secure pure wick cath for mobil  -DM     Rolling Right [place] (Bed Mobility)  verbal cues;contact guard  -DM     Scooting/Bridging [place] (Bed Mobility)  verbal cues;contact guard  -DM     Supine-Sit [place] (Bed Mobility)  verbal cues;minimum assist (75% patient effort)  -DM     Assistive Device (Bed Mobility)  bed rails;head of bed elevated  -DM     Comment (Bed Mobility)  issued mesh panty/pad,tdsocks,mask; nsg assessing,gave meds & performed mult proced;applesauce given by nsg, as tray late & pt hungry; Nsg decl. chair Al,waff cush;pure wick disconn for mobil;on HFNC;once EOB, o2 sat 78%;placed NRB mask at 15 L over HFNC;sat incr to 96% after 1 min.;kept in place for mobil  -DM     Row Name 09/16/21 0842          Transfers    Comment (Transfers)  cues for HP, seq  -DM     Row Name 09/16/21 0842          Sit-Stand Transfer    Sit-Stand [place] (Transfers)  verbal cues;minimum assist (75% patient effort)  -DM     Assistive Device (Sit-Stand Transfers)  walker, front-wheeled  -DM     Row Name 09/16/21 0842          Gait/Stairs (Locomotion)    Ashley Level (Gait)  verbal cues;minimum assist (75% patient effort) decl. 2nd gt d/t mod SOB & onset intermitt tremoring(h/o tremors)  -DM     Assistive Device (Gait)  walker, front-wheeled  -DM     Distance in Feet (Gait)  3  -DM     Deviations/Abnormal Patterns (Gait)  bilateral deviations;base of support, narrow;cindi decreased;stride length decreased;weight shifting decreased decr step length; occ R knee discomf.  -DM     Bilateral Gait Deviations  forward flexed posture;heel strike decreased B knee instabil. /weakness  -DM     Comment (Gait/Stairs)  pt on both NRB at 15 L & HFNC for gt to chair(sat. 92%),then removed while chair reposn (short line),& sat.dropped to 82%;placed back on pt,& sat.incr to 96%;kept in place during BP,then doffed for ther exer  -DM        User Key  (r) = Recorded By, (t) = Taken By, (c) = Cosigned By    Initials Name Provider Type    DM Karon Pacheco, PT Physical Therapist        Obj/Interventions     Row Name 09/16/21 0842          Range of Motion Comprehensive    General Range of Motion  lower extremity range of motion deficits identified  -DM     Comment, General Range of Motion  R knee olsen. 15-20 % d/t RA  -DM     Row Name 09/16/21 0842          Strength Comprehensive (MMT)    General Manual Muscle Testing (MMT) Assessment  neck/trunk strength deficits identified;lower extremity strength deficits identified  -DM     Comment, General Manual Muscle Testing (MMT) Assessment  R knee grossly 3- to 4-/5; B quads 4 to 4+/5  -DM     Row Name 09/16/21 0842          Motor Skills    Therapeutic Exercise  hip;knee;ankle;shoulder  -DM     Row Name 09/16/21 0842          Shoulder (Therapeutic Exercise)    Shoulder (Therapeutic Exercise)  AROM (active range of motion)  -DM     Shoulder AROM (Therapeutic Exercise)  bilateral;flexion;extension;aBduction;aDduction;horizontal aBduction/aDduction;sitting;10 repetitions;other (see comments) did pulm set for shldr exer w/ deep inspir (OT not seeing today), + biceps curls  -DM     Row Name 09/16/21 0842          Hip (Therapeutic Exercise)    Hip (Therapeutic Exercise)  AROM (active range of motion);isometric exercises  -DM     Hip AROM (Therapeutic Exercise)  bilateral;flexion;extension;aBduction;aDduction;sitting;external rotation;internal rotation;10 repetitions sitting marches;sat.decr to 90 %  -DM     Hip Isometrics (Therapeutic Exercise)  bilateral;gluteal sets;sitting;10 repetitions  -DM     Row Name 09/16/21 0842          Knee (Therapeutic Exercise)    Knee (Therapeutic Exercise)  AROM (active range of motion);isometric exercises  -DM     Knee AROM (Therapeutic Exercise)  bilateral;flexion;extension;SAQ (short arc quad);LAQ (long arc quad);heel slides;sitting;10 repetitions  -DM     Knee Isometrics  (Therapeutic Exercise)  bilateral;hamstring sets;quad sets;sitting;10 repetitions  -DM     Row Name 09/16/21 0842          Ankle (Therapeutic Exercise)    Ankle (Therapeutic Exercise)  AROM (active range of motion)  -DM     Ankle AROM (Therapeutic Exercise)  bilateral;dorsiflexion;plantarflexion;sitting;10 repetitions;other (see comments) AC  -DM     Row Name 09/16/21 0842          Balance    Balance Assessment  sitting static balance;sitting dynamic balance;standing static balance;standing dynamic balance  -DM     Static Sitting Balance  WNL;unsupported;sitting, edge of bed PLB during marches EOB  -DM     Dynamic Sitting Balance  WNL;unsupported;sitting in chair;sitting, edge of bed recip scooting  -DM     Static Standing Balance  WFL;supported;standing Trunk ext/focusing ahead in mirror  -DM     Dynamic Standing Balance  mild impairment;supported;standing WS to init sidesteps to chair  -DM     Balance Interventions  sitting;standing;static;dynamic;weight shifting activity  -DM       User Key  (r) = Recorded By, (t) = Taken By, (c) = Cosigned By    Initials Name Provider Type    DM Karon Pacheco, PT Physical Therapist        Goals/Plan     Row Name 09/16/21 0842          Bed Mobility Goal 1 (PT)    Activity/Assistive Device (Bed Mobility Goal 1, PT)  bed mobility activities, all  -DM     [place] Level/Cues Needed (Bed Mobility Goal 1, PT)  independent  -DM     Time Frame (Bed Mobility Goal 1, PT)  long term goal (LTG);1 week  -DM     Row Name 09/16/21 0842          Transfer Goal 1 (PT)    Activity/Assistive Device (Transfer Goal 1, PT)  sit-to-stand/stand-to-sit;bed-to-chair/chair-to-bed;walker, rolling  -DM     [place] Level/Cues Needed (Transfer Goal 1, PT)  supervision required  -DM     Time Frame (Transfer Goal 1, PT)  long term goal (LTG);1 week  -DM     Row Name 09/16/21 0842          Gait Training Goal 1 (PT)    Activity/Assistive Device (Gait Training Goal 1, PT)  gait (walking  locomotion);walker, rolling stable VS; O2 sat > 92%  -DM     [place] Level (Gait Training Goal 1, PT)  standby assist  -DM     Distance (Gait Training Goal 1, PT)  50  -DM     Time Frame (Gait Training Goal 1, PT)  long term goal (LTG);1 week  -DM     Row Name 09/16/21 0842          Stairs Goal 1 (PT)    Activity/Assistive Device (Stairs Goal 1, PT)  ascending stairs;descending stairs;cane, straight  -DM     [place] Level/Cues Needed (Stairs Goal 1, PT)  contact guard assist  -DM     Number of Stairs (Stairs Goal 1, PT)  2  -DM     Time Frame (Stairs Goal 1, PT)  long term goal (LTG);1 week  -DM     Row Name 09/16/21 0842          Patient Education Goal (PT)    Activity (Patient Education Goal, PT)  HEP exer  -DM     [place]/Cues/Accuracy (Memory Goal 2, PT)  demonstrates adequately;verbalizes understanding  -DM     Time Frame (Patient Education Goal, PT)  long term goal (LTG);1 week  -DM       User Key  (r) = Recorded By, (t) = Taken By, (c) = Cosigned By    Initials Name Provider Type    DM Karon Pacheco, PT Physical Therapist        Clinical Impression     Row Name 09/16/21 0842          Pain    Additional Documentation  Pain Scale: FACES Pre/Post-Treatment (Group)  -DM     Row Name 09/16/21 0842          Pain Scale: Numbers Pre/Post-Treatment    Pain Location - Side  Right  -DM     Pain Location  knee  -DM     Pain Intervention(s)  Repositioned;Rest;Elevated  -DM     Row Name 09/16/21 0842          Pain Scale: FACES Pre/Post-Treatment    Pain: FACES Scale, Pretreatment  0-->no hurt  -DM     Posttreatment Pain Rating  2-->hurts little bit  -DM     Row Name 09/16/21 0842          Plan of Care Review    Plan of Care Reviewed With  patient  -DM     Progress  improving  -DM     Outcome Summary  PT eval completed. Presents w/ PNA, recurr B pl.eff & AF/RVR, unc. HBP, decr LE strength/endurance, & impaired funct mobil. Transf to EOB w/ min A (desat to 78%;NRB at 15 L placed over HFNC, & sat.incr to  98%), STS w/ R wx & min A, sidestepped 3 ft to chair w/ Jayson, desat to 92%, NRB doffed & sat.again dropped to 82%, replaced & sat.incr to 96% for duration of ther exer, then doffed again & sat.92%. Recommend home w/ niece's assist & HHPT f/u at d/c.  -DM     Row Name 09/16/21 0842          Therapy Assessment/Plan (PT)    Patient/Family Therapy Goals Statement (PT)  improved funct mobil  -DM     Rehab Potential (PT)  good, to achieve stated therapy goals  -DM     Criteria for Skilled Interventions Met (PT)  yes;meets criteria;skilled treatment is necessary  -DM     Row Name 09/16/21 0842          Vital Signs    Pre Systolic BP Rehab  131  -DM     Pre Treatment Diastolic BP  55  -DM     Post Systolic BP Rehab  134  -DM     Post Treatment Diastolic BP  52  -DM     Pretreatment Heart Rate (beats/min)  66  -DM     Intratreatment Heart Rate (beats/min)  88  -DM     Posttreatment Heart Rate (beats/min)  69  -DM     Pre SpO2 (%)  92  -DM     O2 Delivery Pre Treatment  hi-flow  -DM     Intra SpO2 (%)  78  -DM     O2 Delivery Intra Treatment  other (see comments) also placed NRB at 15 L during mobil to stabilize O2 sat  -DM     Post SpO2 (%)  92  -DM     O2 Delivery Post Treatment  hi-flow  -DM     Pre Patient Position  Supine  -DM     Intra Patient Position  Standing  -DM     Post Patient Position  Sitting  -DM     Row Name 09/16/21 0842          Positioning and Restraints    Pre-Treatment Position  in bed  -DM     Post Treatment Position  chair  -DM     In Chair  notified nsg;reclined;call light within reach;encouraged to call for assist;with nsg;legs elevated brought extra pillow for LE's  -DM       User Key  (r) = Recorded By, (t) = Taken By, (c) = Cosigned By    Initials Name Provider Type    Karon Tang, PT Physical Therapist        Outcome Measures     Row Name 09/16/21 0842          How much help from another person do you currently need...    Turning from your back to your side while in flat bed without using  bedrails?  4  -DM     Moving from lying on back to sitting on the side of a flat bed without bedrails?  3  -DM     Moving to and from a bed to a chair (including a wheelchair)?  3  -DM     Standing up from a chair using your arms (e.g., wheelchair, bedside chair)?  3  -DM     Climbing 3-5 steps with a railing?  2  -DM     To walk in hospital room?  3  -DM     AM-PAC 6 Clicks Score (PT)  18  -DM     Row Name 09/16/21 0842          Functional Assessment    Outcome Measure Options  AM-PAC 6 Clicks Basic Mobility (PT)  -DM       User Key  (r) = Recorded By, (t) = Taken By, (c) = Cosigned By    Initials Name Provider Type    Karon Tang, PT Physical Therapist                       Physical Therapy Education                 Title: PT OT SLP Therapies (In Progress)     Topic: Physical Therapy (In Progress)     Point: Mobility training (In Progress)     Learning Progress Summary           Patient Eager, E,D, NR by DM at 9/16/2021 1058                   Point: Home exercise program (In Progress)     Learning Progress Summary           Patient Eager, E,D, NR by DM at 9/16/2021 1058                   Point: Body mechanics (In Progress)     Learning Progress Summary           Patient Eager, E,D, NR by DM at 9/16/2021 1058                   Point: Precautions (In Progress)     Learning Progress Summary           Patient Eager, E,D, NR by DM at 9/16/2021 1058                               User Key     Initials Effective Dates Name Provider Type Discipline    DM 06/16/21 -  Karon Pacheco, PT Physical Therapist PT              PT Recommendation and Plan  Planned Therapy Interventions (PT): bed mobility training, gait training, home exercise program, patient/family education, stair training, strengthening, transfer training  Plan of Care Reviewed With: patient  Progress: improving  Outcome Summary: PT eval completed. Presents w/ PNA, recurr B pl.eff & AF/RVR, unc. HBP, decr LE strength/endurance, & impaired funct mobil.  Transf to EOB w/ min A (desat to 78%;NRB at 15 L placed over HFNC, & sat.incr to 98%), STS w/ R wx & min A, sidestepped 3 ft to chair w/ Jayson, desat to 92%, NRB doffed & sat.again dropped to 82%, replaced & sat.incr to 96% for duration of ther exer, then doffed again & sat.92%. Recommend home w/ niece's assist & HHPT f/u at d/c.     Time Calculation:   PT Charges     Row Name 21 1059             Time Calculation    Start Time  0842  -DM      PT Received On  21  -DM      PT Goal Re-Cert Due Date  21  -DM         Time Calculation- PT    Total Timed Code Minutes- PT  109 minute(s)  -DM         Timed Charges    94647 - PT Therapeutic Exercise Minutes  18  -DM      66783 - Gait Training Minutes   10  -DM      55373 - PT Therapeutic Activity Minutes  21  -DM         Untimed Charges    PT Eval/Re-eval Minutes  60  -DM         Total Minutes    Timed Charges Total Minutes  49  -DM      Untimed Charges Total Minutes  60  -DM       Total Minutes  109  -DM        User Key  (r) = Recorded By, (t) = Taken By, (c) = Cosigned By    Initials Name Provider Type    DM Karon Pacheco, PT Physical Therapist        Therapy Charges for Today     Code Description Service Date Service Provider Modifiers Qty    16938793205 HC PT THER PROC EA 15 MIN 2021 Karon Pacheco, PT GP 1    00013440597 HC GAIT TRAINING EA 15 MIN 2021 Karon Pacheco, PT GP 1    13409555563 HC PT THERAPEUTIC ACT EA 15 MIN 2021 Karon Pacheco, PT GP 1    63269311658 HC PT EVAL MOD COMPLEXITY 4 2021 Karon Pacheco, PT GP 1          PT G-Codes  Outcome Measure Options: AM-PAC 6 Clicks Basic Mobility (PT)  AM-PAC 6 Clicks Score (PT): 18  AM-PAC 6 Clicks Score (OT): 13    Karon Pacheco PT  2021      Electronically signed by Karon Pacheco, PT at 21 1101          Occupational Therapy Notes (most recent note)      Carol Shaver, OT at 09/15/21 0812          Patient Name: Yin Law  : 1940    MRN:  [id number]                              Today's Date: 9/15/2021       Admit Date: 9/14/2021    Visit Dx:     ICD-10-CM ICD-9-CM   1. Pneumonia due to infectious organism, unspecified laterality, unspecified part of lung  J18.9 486   2. Congestive heart failure, unspecified HF chronicity, unspecified heart failure type (CMS/HCC)  I50.9 428.0   3. Leukocytosis, unspecified type  D72.829 288.60   4. Acute on chronic respiratory failure with hypoxia (CMS/HCC)  J96.21 518.84     799.02     Patient Active Problem List   Diagnosis   • Multifocal pneumonia   • Recurrent atrial fibrillation with RVR (CMS/HCC)   • COPD on home oxygen (CMS/HCC)   • Hypertension and diastolic dysfunction   • Rheumatoid arthritis (CMS/HCC)   • Anxiety and depression   • Wound of right leg   • Anemia   • Elevated troponin   • Sepsis (CMS/HCC)   • Acute on chronic respiratory failure with hypoxia (CMS/HCC)   • Pleural effusion, bilateral   • Acute diastolic CHF (CMS/HCC)   • Accelerated hypertension   • Pneumonia due to infectious organism     Past Medical History:   Diagnosis Date   • Anxiety and depression    • Arrhythmia     A-FIB   • Asthma    • Chicken pox    • COPD (chronic obstructive pulmonary disease) (CMS/HCC)    • Hyperlipidemia    • Hypertension    • Measles    • Menopause    • Osteoporosis    • Rheumatoid arthritis (CMS/HCC)    • Rheumatoid arthritis (CMS/HCC)    • Tobacco abuse      Past Surgical History:   Procedure Laterality Date   • APPENDECTOMY     • CARPAL TUNNEL RELEASE Left    • CATARACT EXTRACTION, BILATERAL     • COLON SURGERY     • COLONOSCOPY     • GALLBLADDER SURGERY     • HYSTERECTOMY  1971   • TONSILLECTOMY       General Information     Row Name 09/15/21 0846          OT Time and Intention    Document Type  evaluation  -SHIVAM     Mode of Treatment  individual therapy;occupational therapy  -SHIVAM     Row Name 09/15/21 0846          General Information    Patient Profile Reviewed  yes  -SHIVAM     Prior Level of Function   independent:;ADL's;all household mobility;dependent:;home management;driving;shopping With recent illness and admit 8/21 blanca has come to stay with pt. until November.  Priorly pt. fully independent, but admits HM and shopping had become difficult.  -SHIVAM     Existing Precautions/Restrictions  fall;oxygen therapy device and L/min r/o Covid 19, Contact/Airborne  -SHIVAM     Barriers to Rehab  medically complex;previous functional deficit  -SHIVAM     Row Name 09/15/21 0846          Living Environment    Lives With  other relative(s) Blanca on leave of abscence to stay with pt. possibly up through November  -SHIVAM     Row Name 09/15/21 0846          Home Main Entrance    Number of Stairs, Main Entrance  two  -SHIVAM     Row Name 09/15/21 0846          Stairs Within Home, Primary    Number of Stairs, Within Home, Primary  none  -SHIVAM     Row Name 09/15/21 0846          Cognition    Orientation Status (Cognition)  oriented x 4  -SHIVAM     Row Name 09/15/21 0846          Safety Issues, Functional Mobility    Safety Issues Affecting Function (Mobility)  insight into deficits/self-awareness;safety precaution awareness  -SHIVAM     Impairments Affecting Function (Mobility)  balance;endurance/activity tolerance;strength;pain;shortness of breath  -SHIVAM       User Key  (r) = Recorded By, (t) = Taken By, (c) = Cosigned By    Initials Name Provider Type    Carol Fontaine, OT Occupational Therapist          Mobility/ADL's     Row Name 09/15/21 1267          Bed Mobility    Bed Mobility  supine-sit  -SHIVAM     Supine-Sit [place] (Bed Mobility)  minimum assist (75% patient effort)  -SHIVAM     Bed Mobility, Safety Issues  decreased use of legs for bridging/pushing;decreased use of arms for pushing/pulling  -SHIVAM     Assistive Device (Bed Mobility)  bed rails;head of bed elevated  -SHIVAM     Comment (Bed Mobility)  slow mvmt to EOB with several rest periods, 02 drop to 85%, but recovered in under one minute to 90's.  -SHIVAM     Row Name 09/15/21 0806           Transfers    Transfers  sit-stand transfer;bed-chair transfer  -SHIVAM     Comment (Transfers)  cues for direction to turn with HFNC on, pt. limited by R knee pain.  To grossly 85%, but recovered in under a minute to 92%.  -SHIVAM     Bed-Chair [place] (Transfers)  minimum assist (75% patient effort);verbal cues  -SHIVAM     Assistive Device (Bed-Chair Transfers)  walker, front-wheeled  -SHIVAM     Sit-Stand [place] (Transfers)  verbal cues;contact guard  -     Row Name 09/15/21 0849          Sit-Stand Transfer    Assistive Device (Sit-Stand Transfers)  walker, front-wheeled  -SHIVAM     Row Name 09/15/21 0849          Functional Mobility    Functional Mobility- Comment  deferred due to on HFNC and R knee pain  -     Row Name 09/15/21 0849          Activities of Daily Living    BADL Assessment/Intervention  lower body dressing;upper body dressing  -     Row Name 09/15/21 0849          Lower Body Dressing Assessment/Training    [place] Level (Lower Body Dressing)  don;socks;moderate assist (50% patient effort)  -SHIVAM     Position (Lower Body Dressing)  edge of bed sitting  -SHIVAM     Comment (Lower Body Dressing)  Pt. donned L socks and OT R sock due to limited activity tolerance.  -     Row Name 09/15/21 0849          Upper Body Dressing Assessment/Training    [place] Level (Upper Body Dressing)  don;pajama/robe;maximum assist (25% patient effort)  -SHIVAM     Position (Upper Body Dressing)  edge of bed sitting  -SHIVAM     Comment (Upper Body Dressing)  limited by lines  -SHIVAM       User Key  (r) = Recorded By, (t) = Taken By, (c) = Cosigned By    Initials Name Provider Type    Carol Fontaine OT Occupational Therapist        Obj/Interventions     Row Name 09/15/21 0852          Sensory Assessment (Somatosensory)    Sensory Assessment (Somatosensory)  right UE  -SHIVAM     Sensory Subjective Reports  tingling pt. reports prior tingling in R hand for some time, but no loss of touch  -SHIVAM     Row Name 09/15/21 0836           Vision Assessment/Intervention    Visual Impairment/Limitations  corrective lenses for reading  -     Row Name 09/15/21 0852          Range of Motion Comprehensive    General Range of Motion  bilateral upper extremity ROM WNL  -[facility] Name 09/15/21 0852          Strength Comprehensive (MMT)    General Manual Muscle Testing (MMT) Assessment  upper extremity strength deficits identified  -     Comment, General Manual Muscle Testing (MMT) Assessment  BUE grossly 4 to 4+/5  -[facility] Name 09/15/21 0852          Motor Skills    Motor Skills  functional endurance;coordination  -     Coordination  fine motor deficit;gross motor deficit;bilateral;minimal impairment;moderate impairment limited by BUE tremors  -     Functional Endurance  fair  -     Row Name 09/15/21 0852          Balance    Balance Assessment  sitting static balance;sitting dynamic balance;standing static balance;standing dynamic balance  -     Static Sitting Balance  WFL;unsupported;sitting, edge of bed  -SHIVAM     Dynamic Sitting Balance  WFL;unsupported;sitting, edge of bed  -SHIVAM     Static Standing Balance  mild impairment;supported;standing  -SHIVAM     Dynamic Standing Balance  mild impairment;supported;standing  -     Balance Interventions  sit to stand;occupation based/functional task  -       User Key  (r) = Recorded By, (t) = Taken By, (c) = Cosigned By    Initials Name Provider Type    SHIVAM Carol Shaver, OT Occupational Therapist        Goals/Plan     [facility] Name 09/15/21 0901          Transfer Goal 1 (OT)    Activity/Assistive Device (Transfer Goal 1, OT)  toilet;commode, bedside without drop arms;commode;sit-to-stand/stand-to-sit;bed-to-chair/chair-to-bed grab bar  -SHIVAM     [place] Level/Cues Needed (Transfer Goal 1, OT)  contact guard assist  -SHIVAM     Time Frame (Transfer Goal 1, OT)  long term goal (LTG);10 days  -SHIVAM     Progress/Outcome (Transfer Goal 1, OT)  goal ongoing  -[facility] Name 09/15/21 0901          Dressing Goal 1  (OT)    Activity/Device (Dressing Goal 1, OT)  lower body dressing socks and undergarment  -SHIAVM     [place]/Cues Needed (Dressing Goal 1, OT)  contact guard assist  -SHIVAM     Time Frame (Dressing Goal 1, OT)  long term goal (LTG);10 days  -SHIVAM     Strategies/Barriers (Dressing Goal 1, OT)  OT sats 90% or greater on NC  -SHIVAM     Progress/Outcome (Dressing Goal 1, OT)  goal ongoing  -SHIVAM     Row Name 09/15/21 0901          Toileting Goal 1 (OT)    Activity/Device (Toileting Goal 1, OT)  toileting skills, all  -SHIVAM     [place] Level/Cues Needed (Toileting Goal 1, OT)  standby assist  -SHIVAM     Time Frame (Toileting Goal 1, OT)  long term goal (LTG);10 days  -SHIVAM     Strategies/Barriers (Toileting Goal 1, OT)  02 sats 90% or greater on NC  -SHIVAM     Progress/Outcome (Toileting Goal 1, OT)  goal ongoing  -SHIVAM     Row Name 09/15/21 0901          Grooming Goal 1 (OT)    Activity/Device (Grooming Goal 1, OT)  hair care;oral care;wash face, hands sink side sitting  -SHIVAM     [place] (Grooming Goal 1, OT)  set-up required  -SHIVAM     Time Frame (Grooming Goal 1, OT)  long term goal (LTG);10 days  -SHIVAM     Strategies/Barriers (Grooming Goal 1, OT)  02 sats 90% or greater NC  -SHIVAM     Progress/Outcome (Grooming Goal 1, OT)  goal ongoing  -SHIVAM     Row Name 09/15/21 0901          Therapy Assessment/Plan (OT)    Planned Therapy Interventions (OT)  activity tolerance training;BADL retraining;functional balance retraining;patient/caregiver education/training;ROM/therapeutic exercise;strengthening exercise;transfer/mobility retraining;occupation/activity based interventions  -SHIVAM       User Key  (r) = Recorded By, (t) = Taken By, (c) = Cosigned By    Initials Name Provider Type    Carol Fontaine, OT Occupational Therapist        Clinical Impression     Row Name 09/15/21 0862          Pain Assessment    Additional Documentation  Pain Scale: Numbers Pre/Post-Treatment (Group)  -SHIVAM     Row Name 09/15/21 0863          Pain Scale: Numbers  Pre/Post-Treatment    Pretreatment Pain Rating  4/10  -SHIVAM     Posttreatment Pain Rating  5/10  -SHIVAM     Pain Location - Side  Right  -SHIVAM     Pain Location  knee also some lesser generalized pain, pain 6-7/10 when standing  -SHIVAM     Pre/Posttreatment Pain Comment  nurse alerted that pt. wants pain meds post eating BF  -SHIVAM     Pain Intervention(s)  Repositioned  -SHIVAM     Row Name 09/15/21 0854          Plan of Care Review    Plan of Care Reviewed With  patient  -SHIVAM     Progress  no change  -SHIVAM     Outcome Summary  OT evaluation completed with pt. demonstrated decreased strength, balance and endurance along with pain limiting ADL independence warranting continued skilled OT services. Pt. min A supine to sit, sit to stand and mvmt to recliner. SBA to min A expected lower level BADL tasks and Mod A higher level ADL tasks grossly with many rest periods. R Knee pain limiting all standing tasks. Pt. would likely benefit from IRF stay, but pt. verbalizing she would prefer home with family assist and  therapy services.  -SHIVAM     Row Name 09/15/21 0854          Therapy Assessment/Plan (OT)    Patient/Family Therapy Goal Statement (OT)  regain enough strength to return home with family and become independent enough to eventually return home alone  -     Rehab Potential (OT)  good, to achieve stated therapy goals  -     Criteria for Skilled Therapeutic Interventions Met (OT)  yes;meets criteria;skilled treatment is necessary  -     Therapy Frequency (OT)  daily  -SHIVAM     Row Name 09/15/21 0854          Therapy Plan Review/Discharge Plan (OT)    Equipment Needs Upon Discharge (OT)  -- per pt. she already had a shower chair with wx in shower and a high toilet at home with grab bars  -     Anticipated Discharge Disposition (OT)  inpatient rehabilitation facility pt. hoping to progress enough to return home with family and HH therapy services.  -SHIVAM     Row Name 09/15/21 0854          Vital Signs    Pre Systolic BP Rehab  105   -SHIVAM     Pre Treatment Diastolic BP  46  -SHIVAM     Post Systolic BP Rehab  -- unable to get BP to take, as if screen locked  -SHIVAM     Pretreatment Heart Rate (beats/min)  61  -SHIVAM     Posttreatment Heart Rate (beats/min)  69  -SHIVAM     Pre SpO2 (%)  100  -SHIVAM     O2 Delivery Pre Treatment  hi-flow 40L, 50% flow  -SHIVAM     Intra SpO2 (%)  85  -SHIVAM     O2 Delivery Intra Treatment  hi-flow able to recover one minute or less  -SHIVAM     Post SpO2 (%)  96  -SHIVAM     O2 Delivery Post Treatment  hi-flow  -SHIVAM     Pre Patient Position  Supine  -SHIVAM     Intra Patient Position  Standing  -SHIVAM     Post Patient Position  Sitting  -SHIVAM     Row Name 09/15/21 0854          Positioning and Restraints    Pre-Treatment Position  in bed  -SHIVAM     Post Treatment Position  chair  -SHIVAM     In Chair  reclined;call light within reach;encouraged to call for assist;exit alarm on;waffle cushion;heels elevated  -SHIVAM       User Key  (r) = Recorded By, (t) = Taken By, (c) = Cosigned By    Initials Name Provider Type    Carol Fontaine OT Occupational Therapist        Outcome Measures     Row Name 09/15/21 0903          How much help from another is currently needed...    Putting on and taking off regular lower body clothing?  2  -SHIVAM     Bathing (including washing, rinsing, and drying)  2  -SHIVMA     Toileting (which includes using toilet bed pan or urinal)  2  -SHIVAM     Putting on and taking off regular upper body clothing  2  -SHIVAM     Taking care of personal grooming (such as brushing teeth)  2  -SHIVAM     Eating meals  3  -SHIVAM     AM-PAC 6 Clicks Score (OT)  13  -SHIVAM     Row Name 09/15/21 0903          Functional Assessment    Outcome Measure Options  AM-PAC 6 Clicks Daily Activity (OT)  -SHIVAM       User Key  (r) = Recorded By, (t) = Taken By, (c) = Cosigned By    Initials Name Provider Type    Carol Fontaine OT Occupational Therapist          Occupational Therapy Education                 Title: PT OT SLP Therapies (In Progress)     Topic: Occupational Therapy (In  Progress)     Point: ADL training (Done)     Description:   Instruct learner(s) on proper safety adaptation and remediation techniques during self care or transfers.   Instruct in proper use of assistive devices.              Learning Progress Summary           Patient Acceptance, E,D, VU,NR by SHIVAM at 9/15/2021 0904    Comment: reason for consult, noted deficits, discharge option, pacing self, PLB                   Point: Home exercise program (Not Started)     Description:   Instruct learner(s) on appropriate technique for monitoring, assisting and/or progressing therapeutic exercises/activities.              Learner Progress:  Not documented in this visit.          Point: Precautions (Done)     Description:   Instruct learner(s) on prescribed precautions during self-care and functional transfers.              Learning Progress Summary           Patient Acceptance, E,D, VU,NR by SHIVAM at 9/15/2021 0904    Comment: reason for consult, noted deficits, discharge option, pacing self, PLB                   Point: Body mechanics (Done)     Description:   Instruct learner(s) on proper positioning and spine alignment during self-care, functional mobility activities and/or exercises.              Learning Progress Summary           Patient Acceptance, E,D, VU,NR by SHIVAM at 9/15/2021 0904    Comment: reason for consult, noted deficits, discharge option, pacing self, PLB                               User Key     Initials Effective Dates Name Provider Type Discipline    SHIVAM 06/16/21 -  Carol Shaver, OT Occupational Therapist OT              OT Recommendation and Plan  Planned Therapy Interventions (OT): activity tolerance training, BADL retraining, functional balance retraining, patient/caregiver education/training, ROM/therapeutic exercise, strengthening exercise, transfer/mobility retraining, occupation/activity based interventions  Therapy Frequency (OT): daily  Plan of Care Review  Plan of Care Reviewed With:  patient  Progress: no change  Outcome Summary: OT evaluation completed with pt. demonstrated decreased strength, balance and endurance along with pain limiting ADL independence warranting continued skilled OT services. Pt. min A supine to sit, sit to stand and mvmt to recliner. SBA to min A expected lower level BADL tasks and Mod A higher level ADL tasks grossly with many rest periods. R Knee pain limiting all standing tasks. Pt. would likely benefit from IRF stay, but pt. verbalizing she would prefer home with family assist and HH therapy services.     Time Calculation:   Time Calculation- OT     Row Name 09/15/21 0906             Time Calculation- OT    OT Start Time  812  -SHIVAM      OT Received On  09/15/21  -SHIVAM      OT Goal Re-Cert Due Date  21  -SHIVAM         Untimed Charges    OT Eval/Re-eval Minutes  59  -SHIVAM         Total Minutes    Untimed Charges Total Minutes  59  -HSIVAM       Total Minutes  59  -SHIVAM        User Key  (r) = Recorded By, (t) = Taken By, (c) = Cosigned By    Initials Name Provider Type    Carol Fontaine OT Occupational Therapist        Therapy Charges for Today     Code Description Service Date Service Provider Modifiers Qty    64134428427 HC OT EVAL LOW COMPLEXITY 4 9/15/2021 Carol Shaver OT GO 1               Carol Shaver OT  9/15/2021    Electronically signed by Carol Shaver OT at 09/15/21 1212          Discharge Summary      Kerrie Vera MD at 21 1212              [facility] Medicine Services  DISCHARGE SUMMARY    Patient Name: Yin Law  : 1940  MRN: [id number]    Date of Admission: 2021  8:06 PM  Date of Discharge:  2021  Primary Care Physician: Santiago Chaudhry MD    Consults     Date and Time Order Name Status Description    2021 12:35 AM Inpatient Palliative Care MD Consult Completed     9/15/2021  2:36 AM Inpatient Pulmonology Consult Completed     2021  8:21 AM Inpatient Gastroenterology Consult  Completed     8/22/2021  7:10 AM Inpatient Infectious Diseases Consult Completed     8/22/2021 12:34 AM Inpatient Cardiology Consult Completed     8/21/2021  8:29 PM Inpatient Infectious Diseases Consult Completed           Hospital Course     Presenting Problem:   Pneumonia due to infectious organism, unspecified laterality, unspecified part of lung [J18.9]    Active Hospital Problems    Diagnosis  POA   • **Acute on chronic respiratory failure with hypoxia (CMS/[facility]) [J96.21]  Yes   • Accelerated hypertension [I10]  Yes   • Pneumonia due to infectious organism [J18.9]  Yes   • Acute diastolic CHF (CMS/[facility]) [I50.31]  Yes   • Sepsis (CMS/[facility]) [A41.9]  Yes   • Multifocal pneumonia [J18.9]  Yes   • COPD on home oxygen (CMS/[facility]) [J44.9]  Yes   • Rheumatoid arthritis (CMS/[facility]) [M06.9]  Yes      Resolved Hospital Problems   No resolved problems to display.          Hospital Course:  Yin Law is a 81 y.o. 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  She was discharged to [facility].  A week ago she went home from [facility] on 2.5L nc.  At home she developed worsening shortness of breath and came to [facility] ED.  BNP was elevated at over 11,000 and procalcitonin is elevated as well.  CT chest shows multifocal infiltrates overall similar in appearance to her last admission as well as moderate sized bilateral pleural effusions.     Decompensated CHF:  Her BP was 200 systolic when she arrived but improved quickly.  Cardiology was consulted.  She was diuresed and her oxygen needs decreased from HFNC on admission to 3L at discharge.  She states she feels  baseline.  Her diuretic is increased to torsemide 40mg daily.    Persistent lung infiltrates:  Pulm service was consulted.  They reviewed past workup, felt she does have an interstitial lung disease but did not recommend further steroids.  She got five days of ceftriaxone/doxycycline as CAP could not be ruled out.  She was never febrile.  She is going home on her usual regimen of Stiolto and Brovana.  She requested duonebs so those are prescribed.       Hypothyroidism - TSH 22 (same as last admission, despite starting synthroid at that time.  Synthroid is increased to 75mcg daily.  Needs TSH recheck in 6 weeks.      Paroxysmal atrial fibrillation - Continue bisoprolol.  Cannot tolerate amiodarone due to pulmonary toxicity.  Continue Eliquis.     Anxiety/depression  -On xanax.  She met with the Palliative Svc and clarified that she did not want intubation or vent support but was okay with chest compressions.      She worked with PT.  She does not want to go to rehab.  She  is going home with her sister and a niece.       Discharge Follow Up Recommendations for outpatient labs/diagnostics:   *PCP 2 weeks.     *FU TSH in 6-12 weeks (Synthroid increased)    *Pulm clinic FU scheduled for mid October.     Day of Discharge     HPI:   Feels like her breathing is baseline.  Is dyspneic with activity but says that is chronic.  She wants to go home today.  Sister and niece will be living with her.      Review of Systems  Gen- No fevers, chills  CV- No chest pain, palpitations  GI- No N/V/D, abd pain    Vital Signs:   Temp:  [97.6 °F (36.4 °C)-98.4 °F (36.9 °C)] 97.6 °F (36.4 °C)  Heart Rate:  [63-74] 63  Resp:  [17-18] 18  BP: (124-134)/(50-86) 133/60     Physical Exam:  Constitutional: Awake, alert in chair on 3L nc oxygen.  Niece is at bedside   Eyes: PER, sclerae anicteric, no conjunctival injection  HENT: NCAT, mucous membranes moist  Neck: Supple,  trachea midline  Respiratory: diminsihed throughout, no crackles,  nonlabored respirations.  Mod kyphosis.    Cardiovascular: RRR, no murmurs, rubs, or gallops, palpable pedal pulses bilaterally  Gastrointestinal: Positive bowel sounds, soft, nontender, nondistended  Musculoskeletal: No bilateral ankle edema, no clubbing or cyanosis to extremities  Psychiatric: Appropriate affect, cooperative  Neurologic: Oriented x 3,  Cranial Nerves grossly intact to confrontation, speech clear  Skin: No rashes      Pertinent  and/or Most Recent Results     LAB RESULTS:      Lab 09/17/21  0525 09/15/21  0615 09/15/21  0008 09/14/21 2049 09/14/21 2023   WBC 9.03  --   --   --  15.02*   HEMOGLOBIN 8.3* 8.0*  --   --  9.5*   HEMATOCRIT 26.6* 26.7*  --   --  31.3*   PLATELETS 251 228  --   --  287   NEUTROS ABS  --  6.46  --   --  12.63*   IMMATURE GRANS (ABS)  --  0.05  --   --  0.09*   LYMPHS ABS  --  1.49  --   --  1.03   MONOS ABS  --  0.96*  --   --  1.22*   EOS ABS  --  0.02  --   --  0.02   MCV 86.6 90.2  --   --  90.2   PROCALCITONIN  --   --   --  0.36*  --    LACTATE  --   --  1.9 3.2*  --          Lab 09/19/21  0553 09/18/21  0555 09/17/21  0525 09/16/21  0422 09/15/21  0615 09/14/21 2049 09/14/21 2049   SODIUM 136 138 135*  --  133*  --  138   POTASSIUM 3.6 4.6 3.3* 4.3 3.2*   < > 3.9   CHLORIDE 102 104 100  --  101  --  104   CO2 25.0 26.0 22.0  --  21.0*  --  21.0*   ANION GAP 9.0 8.0 13.0  --  11.0  --  13.0   BUN 17 14 16  --  9  --  11   CREATININE 0.71 0.72 0.68  --  0.67  --  0.75   GLUCOSE 93 118* 108*  --  97  --  158*   CALCIUM 8.4* 8.5* 8.1*  --  8.3*  --  8.3*   MAGNESIUM 1.6 1.6  --   --   --   --   --    TSH  --   --   --   --  22.960*  --   --     < > = values in this interval not displayed.         Lab 09/14/21 2049   TOTAL PROTEIN 5.9*   ALBUMIN 3.10*   GLOBULIN 2.8   ALT (SGPT) 18   AST (SGOT) 22   BILIRUBIN 0.4   ALK PHOS 92         Lab 09/14/21 2049   PROBNP 11,917.0*   TROPONIN T <0.010                 Lab 09/14/21  2307   PH, ARTERIAL 7.501*   PCO2, ARTERIAL  29.8*   PO2 ART 70.2*   FIO2 60   HCO3 ART 23.2   BASE EXCESS ART 0.5   CARBOXYHEMOGLOBIN 1.6     Brief Urine Lab Results  (Last result in the past 365 days)      Color   Clarity   Blood   Leuk Est   Nitrite   Protein   CREAT   Urine HCG        08/21/21 2314 Yellow Clear Negative Negative Positive Negative             Microbiology Results (last 10 days)     Procedure Component Value - Date/Time    Legionella Antigen, Urine - Urine, Urine, Clean Catch [[id number]]  (Normal) Collected: 09/15/21 1850    Lab Status: Final result Specimen: Urine, Clean Catch Updated: 09/15/21 2350     LEGIONELLA ANTIGEN, URINE Negative    S. Pneumo Ag Urine or CSF - Urine, Urine, Clean Catch [[id number]]  (Normal) Collected: 09/15/21 1850    Lab Status: Final result Specimen: Urine, Clean Catch Updated: 09/15/21 2350     Strep Pneumo Ag Negative    MRSA Screen, PCR (Inpatient) - Swab, Nares [[id number]]  (Normal) Collected: 09/15/21 0645    Lab Status: Final result Specimen: Swab from Nares Updated: 09/15/21 0903     MRSA PCR Negative    Narrative:      MRSA Negative    COVID-19,CEPHEID/VASHTI,SHALINI IN-HOUSE(OR EMERGENT/ADD-ON),NP SWAB IN TRANSPORT MEDIA 3-4 HR TAT - Swab, Nasopharynx [[id number]]  (Normal) Collected: 09/15/21 0543    Lab Status: Final result Specimen: Swab from Nasopharynx Updated: 09/15/21 0637     COVID19 Not Detected    Narrative:      Fact sheet for providers: https://www.fda.gov/media/753530/download     Fact sheet for patients: https://www.fda.gov/media/146376/download  Fact sheet for providers: https://www.fda.gov/media/896344/download     Fact sheet for patients: https://www.fda.gov/media/117804/download    Blood Culture - Blood, Arm, Right [[id number]] Collected: 09/14/21 2135    Lab Status: Preliminary result Specimen: Blood from Arm, Right Updated: 09/18/21 2200     Blood Culture No growth at 4 days    Blood Culture - Blood, Arm, Right [[id number]] Collected: 09/14/21 2125    Lab Status: Preliminary result Specimen:  Blood from Arm, Right Updated: 09/18/21 2200     Blood Culture No growth at 4 days    COVID PRE-OP / PRE-PROCEDURE SCREENING ORDER (NO ISOLATION) - Swab, Nasopharynx [[id number]]  (Normal) Collected: 09/14/21 2026    Lab Status: Final result Specimen: Swab from Nasopharynx Updated: 09/14/21 2056    Narrative:      The following orders were created for panel order COVID PRE-OP / PRE-PROCEDURE SCREENING ORDER (NO ISOLATION) - Swab, Nasopharynx.  Procedure                               Abnormality         Status                     ---------                               -----------         ------                     COVID-19 and FLU A/B PCR...[[id number]]  Normal              Final result                 Please view results for these tests on the individual orders.    COVID-19 and FLU A/B PCR - Swab, Nasopharynx [[id number]]  (Normal) Collected: 09/14/21 2026    Lab Status: Final result Specimen: Swab from Nasopharynx Updated: 09/14/21 2056     COVID19 Not Detected     Influenza A PCR Not Detected     Influenza B PCR Not Detected    Narrative:      Fact sheet for providers: https://www.fda.gov/media/396836/download    Fact sheet for patients: https://www.fda.gov/media/470077/download    Test performed by PCR.    COVID-19 PCR, "[employer] LABS, NP SWAB IN LEXAR VIRAL TRANSPORT MEDIA/ORAL SWISH 24-30 HR TAT - Swab, Nasopharynx [[id number]] Collected: 09/14/21 1054    Lab Status: Final result Specimen: Swab from Nasopharynx Updated: 09/15/21 1510     SARS-CoV-2 LIGIA Not Detected          XR Chest 1 View    Result Date: 9/16/2021  EXAMINATION: XR CHEST 1 VW-  INDICATION: Evaluate pleural effusions; J18.9-Pneumonia, unspecified organism; I50.9-Heart failure, unspecified; D72.829-Elevated white blood cell count, unspecified; J96.21-Acute and chronic respiratory failure with hypoxia  COMPARISON: 9/14/2021  FINDINGS: Unchanged bilateral upper lung predominant airspace disease. Minimally increased small right pleural effusion and stable  trace left effusion. No distinct pneumothorax. Unchanged heart and mediastinal contours.      Unchanged bilateral upper lung predominant airspace disease. Minimally increased small right pleural effusion and stable trace left effusion. No distinct pneumothorax. Unchanged heart and mediastinal contours.  This report was finalized on 9/16/2021 9:07 AM by Lonnie Azar.      XR Chest 1 View    Result Date: 9/14/2021  CR Chest 1 Vw INDICATION: Short of air triage. Covid rule out. COMPARISON:  Chest x-ray 8/25/2021. FINDINGS: Portable AP view(s) of the chest.  Cardiac silhouette is top normal and partly obscured by pulmonary parenchymal pathology. There are extensive bilateral infiltrates with an upper lobe predominance though there is also airspace disease at the left mid to lower lung and patchy airspace disease at the right midlung. This is worse than on the prior study. On comparison to films dating back to May 2021, the infiltrates are essentially new since that time. Please correlate with the clinical course.     Extensive bilateral pulmonary parenchymal infiltrates with an upper lobe predominance showing mild interval worsening since 8/25/2021. This could be due to Covid pneumonia though other infectious or inflammatory disease certainly in the differential. Clinical correlation and follow-up recommended. Signer Name: Amy Trejo MD  Signed: 9/14/2021 9:00 PM  Workstation Name: [email or web address]  [facility]    CT Angiogram Chest    Result Date: 9/14/2021  CTA Chest INDICATION: Pneumonia with shortness of breath. TECHNIQUE: CT angiogram of the chest with 100 cc of Isovue-300 IV contrast. 3-D reconstructions were obtained and reviewed.   Radiation dose reduction techniques included automated exposure control or exposure modulation based on body size. Count of known CT and cardiac nuc med studies performed in previous 12 months: 3. COMPARISON: 8/23/2021 FINDINGS: Chest images at mediastinal  window show moderately large bilateral pleural effusions. Pleural fluid volumes are slightly greater since the previous scan. There are no pulmonary artery filling defects to suggest emboli. The CT angiographic images also show no evidence of emboli. Chest images at lung window show extensive bilateral infiltrates with underlying emphysema. The infiltrates appear similar to the previous scan in August. There is more perhaps increased infiltrate in the right upper lobe compared to the previous scan.     Emphysema with extensive bilateral infiltrates with slight worsening in the right upper lobe since the previous scan. Bilateral pleural effusions appear slightly larger. No evidence of pulmonary embolism. Signer Name: Gomez Luna MD  Signed: 9/14/2021 11:55 PM  Workstation Name: [id number]  [facility]              Results for orders placed during the hospital encounter of 07/19/21    Adult Transthoracic Echo Complete W/ Cont if Necessary Per Protocol    Interpretation Summary  · Estimated left ventricular EF = 65%  · Mild aortic valve stenosis is present.  · Aortic valve maximum pressure gradient is 32.9 mmHg. Aortic valve mean pressure gradient is 17.2 mmHg.  · Mild mitral valve regurgitation is present.  · Mild tricuspid valve regurgitation is present.  · Estimated right ventricular systolic pressure from tricuspid regurgitation is 37.0 mmHg.  · There is a large left pleural effusion. There is a moderate sized right pleural effusion.      Plan for Follow-up of Pending Labs/Results:  I will follow up   Pending Labs     Order Current Status    Blood Culture - Blood, Arm, Right Preliminary result    Blood Culture - Blood, Arm, Right Preliminary result        Discharge Details        Discharge Medications      New Medications      Instructions Start Date   ipratropium-albuterol 0.5-2.5 mg/3 ml nebulizer  Commonly known as: DUO-NEB   1.5 mL, Nebulization, Every 6 Hours PRN      torsemide 20 MG  tablet  Commonly known as: DEMADEX   40 mg, Oral, Daily   Start Date: September 20, 2021        Changes to Medications      Instructions Start Date   levothyroxine 75 MCG tablet  Commonly known as: SYNTHROID, LEVOTHROID  What changed:   · medication strength  · how much to take   75 mcg, Oral, Every Early Morning   Start Date: September 20, 2021        Continue These Medications      Instructions Start Date   ALPRAZolam 0.5 MG tablet  Commonly known as: XANAX   0.5 mg, Oral, Nightly PRN      apixaban 5 MG tablet tablet  Commonly known as: ELIQUIS   5 mg, Oral, 2 Times Daily      ascorbic acid 1000 MG tablet  Commonly known as: VITAMIN C   1,000 mg, Oral, Daily      bisoprolol 5 MG tablet  Commonly known as: ZEBeta   5 mg, Oral, Every 24 Hours Scheduled      budesonide 0.5 MG/2ML nebulizer solution  Commonly known as: PULMICORT   0.5 mg, Nebulization, 2 Times Daily - RT      ferrous sulfate 325 (65 FE) MG tablet   325 mg, Oral, 2 Times Daily With Meals      fish oil 1000 MG capsule capsule   1,000 mg, Oral, Daily With Breakfast      HYDROcodone-acetaminophen 7.5-325 MG per tablet  Commonly known as: NORCO   1 tablet, Oral, Every 6 Hours PRN      lactobacillus acidophilus capsule capsule   1 capsule, Oral, Daily      multivitamin with minerals tablet tablet   1 tablet, Oral, Daily      pantoprazole 40 MG EC tablet  Commonly known as: PROTONIX   40 mg, Oral, 2 Times Daily Before Meals      potassium chloride 10 MEQ CR capsule  Commonly known as: MICRO-K   20 mEq, Oral, Daily      sertraline 100 MG tablet  Commonly known as: ZOLOFT   150 mg, Oral, Daily, 1.5 tab daily      Stiolto Respimat 2.5-2.5 MCG/ACT aerosol solution inhaler  Generic drug: tiotropium bromide-olodaterol   2 puffs, Inhalation, Daily PRN      Vitamin D (Ergocalciferol) 50 MCG (2000 UT) capsule   1 tablet, Oral, Daily         Stop These Medications    furosemide 20 MG tablet  Commonly known as: LASIX            Allergies   Allergen Reactions   •  Amiodarone Unknown - High Severity         Discharge Disposition:  Home or Self Care    Diet:  Hospital:  Diet Order   Procedures   • Diet Regular; Cardiac       Activity:  As tolerated       CODE STATUS:    Code Status and Medical Interventions:   Ordered at: 09/14/21 1438     Limited Support to NOT Include:    Intubation     Level Of Support Discussed With:    Patient     Code Status:    CPR     Medical Interventions (Level of Support Prior to Arrest):    Limited       Future Appointments   Date Time Provider Department Center   10/15/2021  3:00 PM Xochitl Hawley APRN [facility]                 Kerrie Vera MD  09/19/21      Time Spent on Discharge:  I spent  40  minutes on this discharge activity which included: face-to-face encounter with the patient, reviewing the data in the system, coordination of the care with the nursing staff as well as consultants, documentation, and entering orders.            Electronically signed by Kerrie Vera MD at 09/19/21 [id number]

## 2021-09-19 NOTE — CASE MANAGEMENT/SOCIAL WORK
Case Management Discharge Note      Final Note: Patient will be going home today with family.  [facility] health aware that patient is leaving today, and discharge summary has been faxed to them.  Home nebulizer will be going home with patient for pulmicort nebs.  Nebulizer machine provided by [employer].  No other needs expressed.     Family called about wheelchair.  Patient and family stated that patient received one walker one to two months prior.  Advised patient and family that a wheelchair would not be covered by insurance, however they are able to buy one over the counter at a medical store for around $100.00.  Family voiced understanding.     Selected Continued Care - Admitted Since 9/14/2021     Destination    No services have been selected for the patient.              Durable Medical Equipment     Service Provider Selected Services Address Phone Fax Patient Preferred    [facility] [place]  Durable Medical Equipment [address], [facility] [address] [phone] --       Internal Comment last updated by Click, Rajwinder CARSON RN 9/19/2021 1202    Home nebulizer                     Dialysis/Infusion    No services have been selected for the patient.              Home Medical Care     Service Provider Selected Services Address Phone Fax Patient Preferred    [facility]  Home Health Services [address], [facility] [address] [phone] --          Therapy    No services have been selected for the patient.              Community Resources    No services have been selected for the patient.              Community & DME    No services have been selected for the patient.                Selected Continued Care - Prior Encounters Includes selections from prior encounters from 6/16/2021 to 9/19/2021    Discharged on 8/26/2021 Admission date: 8/21/2021 - Discharge disposition: Skilled Nursing Facility (DC - External)    Destination     Service Provider  Selected Services Address Phone Fax Patient Preferred    [facility]  Skilled Nursing [address] [place] [id number] [phone] --                Discharged on 8/12/2021 Admission date: 8/5/2021 - Discharge disposition: Home-Health Care [facility]    Home Medical Care     Service Provider Selected Services Address Phone Fax Patient Preferred    [facility]  Home Health Services [facility] [facility] [address] [id number] [phone] --                Discharged on 8/2/2021 Admission date: 7/19/2021 - Discharge disposition: Home-Health Care [facility]    Durable Medical Equipment     Service Provider Selected Services Address Phone Fax Patient Preferred    [facility]  Durable Medical Equipment [address] [address] [phone] --          Home Medical Care     Service Provider Selected Services Address Phone Fax Patient Preferred    [facility]  Home Health Services [address] [place] [address] [address] [address] [phone] --                         Final Discharge Disposition Code: 06 - home with home health care

## 2021-09-20 ENCOUNTER — READMISSION MANAGEMENT (OUTPATIENT)
Dept: CALL CENTER | Facility: HOSPITAL | Age: 81
End: 2021-09-20

## 2021-09-20 NOTE — OUTREACH NOTE
Prep Survey      Responses   [facility]lev[facility] facility patient discharged from?  [facility]   Is LACE score < 7 ?  No   Emergency Room discharge w/ pulse ox?  No   Eligibility  Readm Mgmt   Discharge diagnosis  Acute on chronic respiratory failure with hypoxia /Decompensated CHF   Does the patient have one of the following disease processes/diagnoses(primary or secondary)?  CHF   Does the patient have Home health ordered?  Yes   What is the Home health agency?   [facility]   Prep survey completed?  Yes          Lani Baum RN

## 2021-09-20 NOTE — DISCHARGE PLACEMENT REQUEST
"Case Management  [phone]    Yin Bhatti (81 y.o. Female)     Date of Birth Social Security Number Address Home Phone MRN    1940  [address] [place] [id number] [phone] [id number]    [employer]oration[employer] Marital Status          Taoist        Admission Date Admission Type Admitting Provider Attending Provider Department, Room/Bed    9/14/21 Emergency Kerrie Vera MD  [facility] 5G, S566/1    Discharge Date Discharge Disposition Discharge Destination        9/19/2021 Home or Self Care              Attending Provider: (none)   Allergies: Amiodarone    Isolation: None   Infection: None   Code Status: Prior    Ht: 162.6 cm (64\")   Wt: 67.8 kg (149 lb 6.4 oz)    Admission Cmt: None   Principal Problem: Acute on chronic respiratory failure with hypoxia (CMS/HCC) [J96.21]                 Active Insurance as of 9/14/2021     Primary Coverage     Payor Plan Insurance Group Employer/Plan Group    MEDICARE MEDICARE A & B      Payor Plan Address Payor Plan Phone Number Payor Plan Fax Number Effective Dates    [address] [phone]  9/1/2004 - None Entered    [facility] [id number]       Subscriber Name Subscriber Birth Date Member ID       YIN BHATTI 1940 [id number]           Secondary Coverage     Payor Plan Insurance Group Employer/Plan Group    ANTH BLUE CROSS [facility] access [facility] SUPP [id number]     Payor Plan Address Payor Plan Phone Number Payor Plan Fax Number Effective Dates    [address]   12/1/2016 - None Entered    [facility] [id number]       Subscriber Name Subscriber Birth Date Member ID       YIN BHATTI 1940 [id number]                 Emergency Contacts      (Rel.) Home Phone Work Phone Mobile Phone    Enedina Mauricio (Sister) [phone] -- [phone]    STEVIE FOWLER (Relative) [phone] -- [phone]        [facility] 5G  [address] [address]  Phone:  [phone]  Fax:  " [phone]        Patient:     Yin Law MRN:  [id number]   [address] [facility] [address] :  1940  SSN:    Phone: [phone] Sex:  F      INSURANCE PAYOR PLAN GROUP # SUBSCRIBER ID   Primary:  Secondary:    MEDICARE  ANTH BLUE CROSS [id number]  [id number]    [id number]  [id number]   Admitting Diagnosis: Pneumonia due to infectious organism, unspecified laterality, unspecified part of lung [J18.9]  Order Date:  Sep 17, 2021         Notify Home Health       (Order ID: [id number])     Diagnosis:         Priority:  Routine Expected Date:   Expiration Date:        Interval:  Until Discontinued Count:    Comments: Resume previous home health orders.        Specimen Type:   Specimen Source:   Specimen Taken Date:   Specimen Taken Time:                   Authorizing Provider:Kerrie Vera MD  Authorizing Provider's NPI: [id number]  Order Entered By: Monik Elkins RN 2021  3:00 PM     Electronically signed by: Kerrie Vera MD 2021  3:00 PM                 History & Physical      Day, Hyacinth MEJIA MD at 21              [facility] Medicine Services  HISTORY AND PHYSICAL    Patient Name: Yin Law  : 1940  MRN: [id number]  Primary Care Physician: Santiago Chaudhry MD  Date of admission: 2021    Subjective   Subjective     Chief Complaint:  Shortness of air     HPI:  Yin Law is a 81 y.o. female with a past medical history significant for COPD on 2L NC at baseline, diastolic congestive heart failure, rheumatoid arthritis, anxiety, depression, essential hypertension and atrial fibrillation presents to the ED with complaints of shortness of air.  Patient was recently discharged from [facility] from 2021 to 2021 secondary to acute on chronic diastolic congestive heart failure along with acute on chronic hypoxemic respiratory failure.  She underwent left thoracentesis on 2021 with transudate  "fluid removal.  She was discharged to [facility] for rehab.  Daughter at bedside tonight reports she was discharged from [facility] a few days ago.  Today she had been complaining of increased shortness of breath in which she had to increase her baseline oxygen up to 2.5 L but she continued to have no relief therefore EMS was called.  At some point she was seen by an outpatient facility today and had a Covid test performed.  Daughter reports upon EMS arrival they had to continue to increase her oxygen requirements.  Patient currently denies any fever, chills, nausea, vomiting, worsening cough, chest pain, dysuria or abdominal pain.  She does however report increased bilateral lower extremity edema along with diarrhea.  Currently she is requiring 45 L of oxygen on high flow with an oxygen saturation of 91 to 92%.  Chest x-ray shows extensive bilateral pulmonary parenchymal infiltrates with an upper lobe predominance showing mild interval worsening since 8/25/2021.  Patient will be admitted to [facility] under the care of the hospitalist for evaluation treatment.    Attending HPI;   82 y/o female w/ hx of 2L COPD, diastolic CHF, afib here w/ dyspnea.  Pt recently here 8/21-8/26 with CHF.  Had thoracentesis on 8/23 w/ 450 ml removed.  Pt here now w/ increased o2 requirement to 2.5 L.  Pt states she is just \"really tired\".  Pt states her sx all started after covid vaccine and moncho concurs.  Pt currently feels better on hi flow.  Has lower extremity edema as well.  Notes lle hematoma/wound and HH has been addressing.  Per Moncho, it has been improving.      COVID Details:    Symptoms:    [] NONE [] Fever []  Cough [x] Shortness of breath [] Change in taste/smell      The patient has a COVID HM Topic on their chart, and they are fully vaccinated.      Review of Systems   Constitutional: Positive for activity change, appetite change and fatigue. Negative for chills, diaphoresis, fever and unexpected " weight change.   HENT: Negative.    Eyes: Negative for photophobia and visual disturbance.   Respiratory: Positive for shortness of breath. Negative for cough.    Cardiovascular: Positive for leg swelling. Negative for chest pain and palpitations.   Gastrointestinal: Positive for diarrhea. Negative for abdominal distention, abdominal pain, constipation, nausea and vomiting.   Genitourinary: Negative.    Musculoskeletal: Negative.    Skin: Negative.    Neurological: Positive for weakness. Negative for dizziness, speech difficulty, light-headedness and numbness.   Psychiatric/Behavioral: Negative.          All other systems reviewed and are negative.     Personal History     Past Medical History:   Diagnosis Date   • Anxiety and depression    • Arrhythmia     A-FIB   • Asthma    • Chicken pox    • COPD (chronic obstructive pulmonary disease) (CMS/HCC)    • Hyperlipidemia    • Hypertension    • Measles    • Menopause    • Osteoporosis    • Rheumatoid arthritis (CMS/HCC)    • Rheumatoid arthritis (CMS/HCC)    • Tobacco abuse        Past Surgical History:   Procedure Laterality Date   • APPENDECTOMY     • CARPAL TUNNEL RELEASE Left    • CATARACT EXTRACTION, BILATERAL     • COLON SURGERY     • COLONOSCOPY     • GALLBLADDER SURGERY     • HYSTERECTOMY  1971   • TONSILLECTOMY         Family History: family history includes Alzheimer's disease in her mother; Hypertension in her brother; No Known Problems in her father, maternal grandfather, maternal grandmother, paternal grandfather, paternal grandmother, and sister; Parkinsonism in her brother. Otherwise pertinent FHx was reviewed and unremarkable.     Social History:  reports that she quit smoking about 4 months ago. Her smoking use included cigarettes. She smoked 0.25 packs per day. She has never used smokeless tobacco. She reports that she does not drink alcohol and does not use drugs.  Social History     Social History Narrative    Caffeine: 8 oz         Medications:  ALPRAZolam, HYDROcodone-acetaminophen, Vitamin D (Ergocalciferol), apixaban, ascorbic acid, bisoprolol, budesonide, ferrous sulfate, fish oil, furosemide, lactobacillus acidophilus, levothyroxine, multivitamin with minerals, pantoprazole, potassium chloride, sertraline, and tiotropium bromide-olodaterol    Allergies   Allergen Reactions   • Amiodarone Unknown - High Severity       Objective   Objective     Vital Signs:   Temp:  [98.4 °F (36.9 °C)] 98.4 °F (36.9 °C)  Heart Rate:  [71-92] 74  Resp:  [20-22] 20  BP: (144-201)/() 144/66  Flow (L/min):  [15] 15    Physical Exam  Vitals and nursing note reviewed.   Constitutional:       General: She is not in acute distress.     Appearance: Normal appearance. She is ill-appearing. She is not toxic-appearing or diaphoretic.   HENT:      Head: Normocephalic and atraumatic.      Nose: Nose normal.      Mouth/Throat:      Mouth: Mucous membranes are dry.      Pharynx: Oropharynx is clear.   Eyes:      Conjunctiva/sclera: Conjunctivae normal.      Pupils: Pupils are equal, round, and reactive to light.   Cardiovascular:      Rate and Rhythm: Normal rate and regular rhythm.      Pulses: Normal pulses.      Heart sounds: Normal heart sounds.   Pulmonary:      Effort: Pulmonary effort is normal.      Comments: Crackles to bilateral upper lobes, decreased breath sounds to bilateral lower lobes  Abdominal:      General: Bowel sounds are normal. There is no distension.      Palpations: Abdomen is soft. There is no mass.      Tenderness: There is no abdominal tenderness. There is no right CVA tenderness, left CVA tenderness, guarding or rebound.      Hernia: No hernia is present.   Musculoskeletal:         General: Tenderness present. No swelling, deformity or signs of injury. Normal range of motion.      Cervical back: Normal range of motion and neck supple.      Right lower leg: Edema present.      Left lower leg: Edema present.   Neurological:       General: No focal deficit present.      Mental Status: She is alert and oriented to person, place, and time. Mental status is at baseline.   Psychiatric:         Mood and Affect: Mood normal.         Behavior: Behavior normal.         Thought Content: Thought content normal.         Judgment: Judgment normal.      separate exam performed by me w/ no changes to the above other than tachypnea but able to speak in full sentences      Results Reviewed:  I have personally reviewed most recent indicated data and agree with findings including:  [x]  Laboratory  [x]  Radiology  [x]  EKG/Telemetry  []  Pathology  []  Cardiac/Vascular Studies  []  Old records  []  Other:  Most pertinent findings include:  bnp 11,917  La 3.2  procal 0.36  Wbc 15  hgb 9.5      LAB RESULTS:      Lab 09/14/21 2049 09/14/21 2023   WBC  --  15.02*   HEMOGLOBIN  --  9.5*   HEMATOCRIT  --  31.3*   PLATELETS  --  287   NEUTROS ABS  --  12.63*   IMMATURE GRANS (ABS)  --  0.09*   LYMPHS ABS  --  1.03   MONOS ABS  --  1.22*   EOS ABS  --  0.02   MCV  --  90.2   PROCALCITONIN 0.36*  --    LACTATE 3.2*  --          Lab 09/14/21 2049   SODIUM 138   POTASSIUM 3.9   CHLORIDE 104   CO2 21.0*   ANION GAP 13.0   BUN 11   CREATININE 0.75   GLUCOSE 158*   CALCIUM 8.3*         Lab 09/14/21 2049   TOTAL PROTEIN 5.9*   ALBUMIN 3.10*   GLOBULIN 2.8   ALT (SGPT) 18   AST (SGOT) 22   BILIRUBIN 0.4   ALK PHOS 92         Lab 09/14/21 2049   PROBNP 11,917.0*   TROPONIN T <0.010                 Brief Urine Lab Results  (Last result in the past 365 days)      Color   Clarity   Blood   Leuk Est   Nitrite   Protein   CREAT   Urine HCG        08/21/21 2314 Yellow Clear Negative Negative Positive Negative             Microbiology Results (last 10 days)     Procedure Component Value - Date/Time    COVID PRE-OP / PRE-PROCEDURE SCREENING ORDER (NO ISOLATION) - Swab, Nasopharynx [[id number]]  (Normal) Collected: 09/14/21 2026    Lab Status: Final result Specimen: Swab from  Nasopharynx Updated: 09/14/21 2056    Narrative:      The following orders were created for panel order COVID PRE-OP / PRE-PROCEDURE SCREENING ORDER (NO ISOLATION) - Swab, Nasopharynx.  Procedure                               Abnormality         Status                     ---------                               -----------         ------                     COVID-19 and FLU A/B PCR...[[id number]]  Normal              Final result                 Please view results for these tests on the individual orders.    COVID-19 and FLU A/B PCR - Swab, Nasopharynx [[id number]]  (Normal) Collected: 09/14/21 2026    Lab Status: Final result Specimen: Swab from Nasopharynx Updated: 09/14/21 2056     COVID19 Not Detected     Influenza A PCR Not Detected     Influenza B PCR Not Detected    Narrative:      Fact sheet for providers: https://www.fda.gov/media/586565/download    Fact sheet for patients: https://www.fda.gov/media/492715/download    Test performed by PCR.          XR Chest 1 View    Result Date: 9/14/2021  CR Chest 1 Vw INDICATION: Short of air triage. Covid rule out. COMPARISON:  Chest x-ray 8/25/2021. FINDINGS: Portable AP view(s) of the chest.  Cardiac silhouette is top normal and partly obscured by pulmonary parenchymal pathology. There are extensive bilateral infiltrates with an upper lobe predominance though there is also airspace disease at the left mid to lower lung and patchy airspace disease at the right midlung. This is worse than on the prior study. On comparison to films dating back to May 2021, the infiltrates are essentially new since that time. Please correlate with the clinical course.     Impression: Extensive bilateral pulmonary parenchymal infiltrates with an upper lobe predominance showing mild interval worsening since 8/25/2021. This could be due to Covid pneumonia though other infectious or inflammatory disease certainly in the differential. Clinical correlation and follow-up recommended. Signer  Name: Amy Trejo MD  Signed: 9/14/2021 9:00 PM  Workstation Name: [email or web address]  [facility]      Results for orders placed during the hospital encounter of 07/19/21    Adult Transthoracic Echo Complete W/ Cont if Necessary Per Protocol    Interpretation Summary  · Estimated left ventricular EF = 65%  · Mild aortic valve stenosis is present.  · Aortic valve maximum pressure gradient is 32.9 mmHg. Aortic valve mean pressure gradient is 17.2 mmHg.  · Mild mitral valve regurgitation is present.  · Mild tricuspid valve regurgitation is present.  · Estimated right ventricular systolic pressure from tricuspid regurgitation is 37.0 mmHg.  · There is a large left pleural effusion. There is a moderate sized right pleural effusion.      Assessment/Plan   Assessment & Plan       Multifocal pneumonia    COPD on home oxygen (CMS/[facility])    Rheumatoid arthritis (CMS/[facility])    Sepsis (CMS/[facility])    Acute diastolic CHF (CMS/[facility])    Accelerated hypertension      81-year-old female presents the ED with worsening shortness of breath.      1) sepsis      Multifocal pneumonia  **ABG w/ severe hypoxemia and ARDS by criteria of pO2 70 w/ FiO2 60%  **clinically improving with high flow  **awaiting CCTA but will consult pulm in a.m. +/- ID  **broaden abx coverage  **consider repeat COVID however negative at OSH and negative here; has been vaccinated  -Lactic acid 3.2, WBC 15.02  -Likely secondary to multifocal pneumonia  -Blood cultures x2  -Reflex lactic pending  -Chest x-ray as noted above  -Obtain CTA of chest and ABG now  -Received 1 dose of Levaquin in the ED will discontinue and start vancomycin and Rocephin  -Obtain MRSA PCR  -Check urine antigens  -COVID-19 negative  -Sputum culture  -Continuous pulse ox, keep O2 saturation greater than 90%    2) acute on chronic diastolic CHF  **fluid status will be difficult to assess given sepsis, lactic acidosis and underlying CHF hx; monitor closely  -proBNP 11,917  -Status post  40 mg of Lasix in the ED  -Continue home Lasix dose  -Strict I's and O's  -Daily weight  -Last echo 7/20/2021 with a EF of 65%, mild aortic valve stenosis, mitral valve regurgitation, mild TVR, RSVP 37  -Consider cardiology consult    3) accelerated hypertension  **may have contributed to above if pt had flash pulm edema  -BP on arrival 201/104  -Nitropaste placed in the ED, will continue  -Continue home Zebeta    4) hypothyroidism  -Continue Synthroid    5) oxygen dependent COPD  -Wears 2 L nasal cannula at baseline  -Continue home inhalers  -Continuous pulse ox  -ABG pending  -Scheduled and as needed DuoNeb's    6) PAF  -Currently in normal sinus rhythm  -on eliquis     7) Anxiety/depression   -on xanax       DVT prophylaxis:  eliquis     CODE STATUS:  CPR, No intubation     This note has been completed as part of a split-shared workflow.     Signature: Electronically signed by AMA Lim, 09/14/21, 10:38 PM EDT.     60 minutes of critical care provided. This time excludes other billable procedures. Time does include preparation of documents, medical consultations, review of old records, and direct bedside care. Patient was at high risk for life-threatening deterioration due to  ARDS, sepsis.     Electronically signed by Hyacinth Vega MD, 09/14/21, 11:39 PM EDT.                Electronically signed by Hyacinth Vega MD at 09/14/21 [id number]

## 2021-09-26 ENCOUNTER — READMISSION MANAGEMENT (OUTPATIENT)
Dept: CALL CENTER | Facility: HOSPITAL | Age: 81
End: 2021-09-26

## 2021-09-26 NOTE — OUTREACH NOTE
COVID-19 Week 1 Survey      Responses   [facility] patient discharged from?  [place]   Does the patient have one of the following disease processes/diagnoses(primary or secondary)?  CHF   Call start time  1413   Call end time  1417   Discharge diagnosis  Acute on chronic respiratory failure with hypoxia /Decompensated CHF   Is patient permission given to speak with other caregiver?  Yes   List who call center can speak with  Yin- IESHA   Person spoke with today (if not patient) and relationship  IESHA- Yin   DME comments  Home O2 @ 3L   Psychosocial issues?  No   Pulse Ox monitoring  Intermittent   Pulse Ox device source  Patient   O2 Sat: education provided  Sat levels, Monitoring frequency          Hyun Pascual, RN

## 2021-09-29 ENCOUNTER — READMISSION MANAGEMENT (OUTPATIENT)
Dept: CALL CENTER | Facility: HOSPITAL | Age: 81
End: 2021-09-29

## 2021-09-29 NOTE — OUTREACH NOTE
CHF Week 2 Survey      Responses   [facility] patient discharged from?  [place]   Does the patient have one of the following disease processes/diagnoses(primary or secondary)?  CHF   Week 2 attempt successful?  Yes   Call start time  1127   Call end time  1130   Discharge diagnosis  Acute on chronic respiratory failure with hypoxia /Decompensated CHF   Person spoke with today (if not patient) and relationship  Niece   Is the patient taking all medications as directed (includes completed medication regime)?  Yes   Has the patient kept scheduled appointments due by today?  Yes   What is the Home health agency?   [facility]   Has home health visited the patient within 72 hours of discharge?  Yes   DME comments  Home O2 @ 3L   Pulse Ox monitoring  Intermittent   Pulse Ox device source  Patient   O2 Sat comments  % with 3L O2   O2 Sat: education provided  Sat levels, Monitoring frequency   Psychosocial issues?  No   Comments  SOA is still same w/exertion, which is not her normal.    What is the patient's perception of their health status since discharge?  Improving   Is the patient weighing daily?  Yes   CHF Week 2 call completed?  Yes          Hyun Pascual RN

## 2021-10-04 ENCOUNTER — APPOINTMENT (OUTPATIENT)
Dept: GENERAL RADIOLOGY | Facility: HOSPITAL | Age: 81
End: 2021-10-04

## 2021-10-04 ENCOUNTER — HOSPITAL ENCOUNTER (INPATIENT)
Facility: HOSPITAL | Age: 81
LOS: 3 days | Discharge: HOME HEALTH | End: 2021-10-07
Attending: EMERGENCY MEDICINE | Admitting: INTERNAL MEDICINE

## 2021-10-04 ENCOUNTER — READMISSION MANAGEMENT (OUTPATIENT)
Dept: CALL CENTER | Facility: HOSPITAL | Age: 81
End: 2021-10-04

## 2021-10-04 DIAGNOSIS — J44.1 COPD EXACERBATION (HCC): ICD-10-CM

## 2021-10-04 DIAGNOSIS — I50.9 ACUTE ON CHRONIC CONGESTIVE HEART FAILURE, UNSPECIFIED HEART FAILURE TYPE (HCC): ICD-10-CM

## 2021-10-04 DIAGNOSIS — A41.9 ACUTE SEPSIS (HCC): Primary | ICD-10-CM

## 2021-10-04 DIAGNOSIS — J96.01 ACUTE RESPIRATORY FAILURE WITH HYPOXIA (HCC): ICD-10-CM

## 2021-10-04 DIAGNOSIS — J18.9 PNEUMONIA OF RIGHT LOWER LOBE DUE TO INFECTIOUS ORGANISM: ICD-10-CM

## 2021-10-04 PROBLEM — E43 SEVERE MALNUTRITION (HCC): Status: ACTIVE | Noted: 2021-10-04

## 2021-10-04 LAB
ABO GROUP BLD: NORMAL
ALBUMIN SERPL-MCNC: 3.8 G/DL (ref 3.5–5.2)
ALBUMIN/GLOB SERPL: 1.2 G/DL
ALP SERPL-CCNC: 94 U/L (ref 39–117)
ALT SERPL W P-5'-P-CCNC: 14 U/L (ref 1–33)
ANION GAP SERPL CALCULATED.3IONS-SCNC: 10 MMOL/L (ref 5–15)
ANION GAP SERPL CALCULATED.3IONS-SCNC: 12 MMOL/L (ref 5–15)
ARTERIAL PATENCY WRIST A: ABNORMAL
AST SERPL-CCNC: 23 U/L (ref 1–32)
ATMOSPHERIC PRESS: ABNORMAL MM[HG]
BASE EXCESS BLDA CALC-SCNC: 3.9 MMOL/L (ref 0–2)
BASOPHILS # BLD AUTO: 0.01 10*3/MM3 (ref 0–0.2)
BASOPHILS # BLD AUTO: 0.05 10*3/MM3 (ref 0–0.2)
BASOPHILS NFR BLD AUTO: 0.1 % (ref 0–1.5)
BASOPHILS NFR BLD AUTO: 0.4 % (ref 0–1.5)
BDY SITE: ABNORMAL
BILIRUB SERPL-MCNC: 0.3 MG/DL (ref 0–1.2)
BILIRUB UR QL STRIP: NEGATIVE
BLD GP AB SCN SERPL QL: NEGATIVE
BODY TEMPERATURE: 37 C
BUN SERPL-MCNC: 26 MG/DL (ref 8–23)
BUN SERPL-MCNC: 27 MG/DL (ref 8–23)
BUN/CREAT SERPL: 29.2 (ref 7–25)
BUN/CREAT SERPL: 33.8 (ref 7–25)
CALCIUM SPEC-SCNC: 8.5 MG/DL (ref 8.6–10.5)
CALCIUM SPEC-SCNC: 8.9 MG/DL (ref 8.6–10.5)
CHLORIDE SERPL-SCNC: 98 MMOL/L (ref 98–107)
CHLORIDE SERPL-SCNC: 99 MMOL/L (ref 98–107)
CLARITY UR: CLEAR
CO2 BLDA-SCNC: 29.3 MMOL/L (ref 22–33)
CO2 SERPL-SCNC: 24 MMOL/L (ref 22–29)
CO2 SERPL-SCNC: 29 MMOL/L (ref 22–29)
COHGB MFR BLD: 1.8 % (ref 0–2)
COLOR UR: YELLOW
CREAT SERPL-MCNC: 0.8 MG/DL (ref 0.57–1)
CREAT SERPL-MCNC: 0.89 MG/DL (ref 0.57–1)
D-LACTATE SERPL-SCNC: 1.8 MMOL/L (ref 0.5–2)
D-LACTATE SERPL-SCNC: 2.1 MMOL/L (ref 0.5–2)
DEPRECATED RDW RBC AUTO: 73.7 FL (ref 37–54)
DEPRECATED RDW RBC AUTO: 77.3 FL (ref 37–54)
EOSINOPHIL # BLD AUTO: 0 10*3/MM3 (ref 0–0.4)
EOSINOPHIL # BLD AUTO: 0.01 10*3/MM3 (ref 0–0.4)
EOSINOPHIL NFR BLD AUTO: 0 % (ref 0.3–6.2)
EOSINOPHIL NFR BLD AUTO: 0.1 % (ref 0.3–6.2)
ERYTHROCYTE [DISTWIDTH] IN BLOOD BY AUTOMATED COUNT: 21.4 % (ref 12.3–15.4)
ERYTHROCYTE [DISTWIDTH] IN BLOOD BY AUTOMATED COUNT: 21.9 % (ref 12.3–15.4)
GFR SERPL CREATININE-BSD FRML MDRD: 61 ML/MIN/1.73
GFR SERPL CREATININE-BSD FRML MDRD: 69 ML/MIN/1.73
GLOBULIN UR ELPH-MCNC: 3.1 GM/DL
GLUCOSE SERPL-MCNC: 168 MG/DL (ref 65–99)
GLUCOSE SERPL-MCNC: 192 MG/DL (ref 65–99)
GLUCOSE UR STRIP-MCNC: NEGATIVE MG/DL
HCO3 BLDA-SCNC: 28.1 MMOL/L (ref 20–26)
HCT VFR BLD AUTO: 22.6 % (ref 34–46.6)
HCT VFR BLD AUTO: 25.4 % (ref 34–46.6)
HCT VFR BLD AUTO: 26.6 % (ref 34–46.6)
HCT VFR BLD CALC: 19.8 %
HGB BLD-MCNC: 6.7 G/DL (ref 12–15.9)
HGB BLD-MCNC: 7.7 G/DL (ref 12–15.9)
HGB BLD-MCNC: 7.9 G/DL (ref 12–15.9)
HGB BLDA-MCNC: 6.5 G/DL (ref 14–18)
HGB UR QL STRIP.AUTO: NEGATIVE
HOLD SPECIMEN: NORMAL
IMM GRANULOCYTES # BLD AUTO: 0.06 10*3/MM3 (ref 0–0.05)
IMM GRANULOCYTES # BLD AUTO: 0.09 10*3/MM3 (ref 0–0.05)
IMM GRANULOCYTES NFR BLD AUTO: 0.5 % (ref 0–0.5)
IMM GRANULOCYTES NFR BLD AUTO: 0.7 % (ref 0–0.5)
INHALED O2 CONCENTRATION: 40 %
KETONES UR QL STRIP: NEGATIVE
LEUKOCYTE ESTERASE UR QL STRIP.AUTO: NEGATIVE
LYMPHOCYTES # BLD AUTO: 0.51 10*3/MM3 (ref 0.7–3.1)
LYMPHOCYTES # BLD AUTO: 1.02 10*3/MM3 (ref 0.7–3.1)
LYMPHOCYTES NFR BLD AUTO: 4.6 % (ref 19.6–45.3)
LYMPHOCYTES NFR BLD AUTO: 7.4 % (ref 19.6–45.3)
Lab: ABNORMAL
MAGNESIUM SERPL-MCNC: 2.9 MG/DL (ref 1.6–2.4)
MCH RBC QN AUTO: 27.7 PG (ref 26.6–33)
MCH RBC QN AUTO: 28.1 PG (ref 26.6–33)
MCHC RBC AUTO-ENTMCNC: 28.9 G/DL (ref 31.5–35.7)
MCHC RBC AUTO-ENTMCNC: 29.6 G/DL (ref 31.5–35.7)
MCV RBC AUTO: 93.4 FL (ref 79–97)
MCV RBC AUTO: 97.1 FL (ref 79–97)
METHGB BLD QL: 0.5 % (ref 0–1.5)
MODALITY: ABNORMAL
MONOCYTES # BLD AUTO: 0.16 10*3/MM3 (ref 0.1–0.9)
MONOCYTES # BLD AUTO: 0.84 10*3/MM3 (ref 0.1–0.9)
MONOCYTES NFR BLD AUTO: 1.4 % (ref 5–12)
MONOCYTES NFR BLD AUTO: 6.1 % (ref 5–12)
NEUTROPHILS NFR BLD AUTO: 10.37 10*3/MM3 (ref 1.7–7)
NEUTROPHILS NFR BLD AUTO: 11.83 10*3/MM3 (ref 1.7–7)
NEUTROPHILS NFR BLD AUTO: 85.3 % (ref 42.7–76)
NEUTROPHILS NFR BLD AUTO: 93.4 % (ref 42.7–76)
NITRITE UR QL STRIP: NEGATIVE
NOTE: ABNORMAL
NOTIFIED BY: ABNORMAL
NOTIFIED WHO: ABNORMAL
NRBC BLD AUTO-RTO: 0 /100 WBC (ref 0–0.2)
NRBC BLD AUTO-RTO: 0 /100 WBC (ref 0–0.2)
NT-PROBNP SERPL-MCNC: 3158 PG/ML (ref 0–1800)
OXYHGB MFR BLDV: 92.6 % (ref 94–99)
PCO2 BLDA: 40.1 MM HG (ref 35–45)
PCO2 TEMP ADJ BLD: 40.1 MM HG (ref 35–45)
PH BLDA: 7.46 PH UNITS (ref 7.35–7.45)
PH UR STRIP.AUTO: 6.5 [PH] (ref 5–8)
PH, TEMP CORRECTED: 7.46 PH UNITS
PLATELET # BLD AUTO: 255 10*3/MM3 (ref 140–450)
PLATELET # BLD AUTO: 310 10*3/MM3 (ref 140–450)
PMV BLD AUTO: 10.6 FL (ref 6–12)
PMV BLD AUTO: 10.6 FL (ref 6–12)
PO2 BLDA: 67.4 MM HG (ref 83–108)
PO2 TEMP ADJ BLD: 67.4 MM HG (ref 83–108)
POTASSIUM SERPL-SCNC: 4.1 MMOL/L (ref 3.5–5.2)
POTASSIUM SERPL-SCNC: 4.5 MMOL/L (ref 3.5–5.2)
PROCALCITONIN SERPL-MCNC: 0.07 NG/ML (ref 0–0.25)
PROT SERPL-MCNC: 6.9 G/DL (ref 6–8.5)
PROT UR QL STRIP: NEGATIVE
RBC # BLD AUTO: 2.42 10*6/MM3 (ref 3.77–5.28)
RBC # BLD AUTO: 2.74 10*6/MM3 (ref 3.77–5.28)
RH BLD: POSITIVE
SARS-COV-2 RNA RESP QL NAA+PROBE: NOT DETECTED
SODIUM SERPL-SCNC: 134 MMOL/L (ref 136–145)
SODIUM SERPL-SCNC: 138 MMOL/L (ref 136–145)
SP GR UR STRIP: 1.01 (ref 1–1.03)
T&S EXPIRATION DATE: NORMAL
TROPONIN T SERPL-MCNC: 0.01 NG/ML (ref 0–0.03)
UROBILINOGEN UR QL STRIP: NORMAL
VENTILATOR MODE: ABNORMAL
WBC # BLD AUTO: 11.11 10*3/MM3 (ref 3.4–10.8)
WBC # BLD AUTO: 13.84 10*3/MM3 (ref 3.4–10.8)
WHOLE BLOOD HOLD SPECIMEN: NORMAL
WHOLE BLOOD HOLD SPECIMEN: NORMAL

## 2021-10-04 PROCEDURE — 86901 BLOOD TYPING SEROLOGIC RH(D): CPT | Performed by: INTERNAL MEDICINE

## 2021-10-04 PROCEDURE — 36430 TRANSFUSION BLD/BLD COMPNT: CPT

## 2021-10-04 PROCEDURE — 99284 EMERGENCY DEPT VISIT MOD MDM: CPT

## 2021-10-04 PROCEDURE — 85025 COMPLETE CBC W/AUTO DIFF WBC: CPT | Performed by: INTERNAL MEDICINE

## 2021-10-04 PROCEDURE — U0005 INFEC AGEN DETEC AMPLI PROBE: HCPCS | Performed by: EMERGENCY MEDICINE

## 2021-10-04 PROCEDURE — 99221 1ST HOSP IP/OBS SF/LOW 40: CPT | Performed by: PHYSICIAN ASSISTANT

## 2021-10-04 PROCEDURE — 85014 HEMATOCRIT: CPT | Performed by: INTERNAL MEDICINE

## 2021-10-04 PROCEDURE — 83605 ASSAY OF LACTIC ACID: CPT | Performed by: EMERGENCY MEDICINE

## 2021-10-04 PROCEDURE — 83050 HGB METHEMOGLOBIN QUAN: CPT

## 2021-10-04 PROCEDURE — 87040 BLOOD CULTURE FOR BACTERIA: CPT | Performed by: EMERGENCY MEDICINE

## 2021-10-04 PROCEDURE — 94799 UNLISTED PULMONARY SVC/PX: CPT

## 2021-10-04 PROCEDURE — P9016 RBC LEUKOCYTES REDUCED: HCPCS

## 2021-10-04 PROCEDURE — 85018 HEMOGLOBIN: CPT | Performed by: INTERNAL MEDICINE

## 2021-10-04 PROCEDURE — 81003 URINALYSIS AUTO W/O SCOPE: CPT | Performed by: NURSE PRACTITIONER

## 2021-10-04 PROCEDURE — 25010000002 PIPERACILLIN SOD-TAZOBACTAM PER 1 G: Performed by: EMERGENCY MEDICINE

## 2021-10-04 PROCEDURE — 99223 1ST HOSP IP/OBS HIGH 75: CPT | Performed by: INTERNAL MEDICINE

## 2021-10-04 PROCEDURE — 36600 WITHDRAWAL OF ARTERIAL BLOOD: CPT

## 2021-10-04 PROCEDURE — 86923 COMPATIBILITY TEST ELECTRIC: CPT

## 2021-10-04 PROCEDURE — 94660 CPAP INITIATION&MGMT: CPT

## 2021-10-04 PROCEDURE — 93005 ELECTROCARDIOGRAM TRACING: CPT | Performed by: EMERGENCY MEDICINE

## 2021-10-04 PROCEDURE — 83880 ASSAY OF NATRIURETIC PEPTIDE: CPT | Performed by: EMERGENCY MEDICINE

## 2021-10-04 PROCEDURE — 82375 ASSAY CARBOXYHB QUANT: CPT

## 2021-10-04 PROCEDURE — 86850 RBC ANTIBODY SCREEN: CPT | Performed by: INTERNAL MEDICINE

## 2021-10-04 PROCEDURE — 71045 X-RAY EXAM CHEST 1 VIEW: CPT

## 2021-10-04 PROCEDURE — 82805 BLOOD GASES W/O2 SATURATION: CPT

## 2021-10-04 PROCEDURE — 80053 COMPREHEN METABOLIC PANEL: CPT | Performed by: EMERGENCY MEDICINE

## 2021-10-04 PROCEDURE — 84145 PROCALCITONIN (PCT): CPT | Performed by: EMERGENCY MEDICINE

## 2021-10-04 PROCEDURE — 25010000002 FUROSEMIDE PER 20 MG: Performed by: EMERGENCY MEDICINE

## 2021-10-04 PROCEDURE — 83735 ASSAY OF MAGNESIUM: CPT | Performed by: NURSE PRACTITIONER

## 2021-10-04 PROCEDURE — U0003 INFECTIOUS AGENT DETECTION BY NUCLEIC ACID (DNA OR RNA); SEVERE ACUTE RESPIRATORY SYNDROME CORONAVIRUS 2 (SARS-COV-2) (CORONAVIRUS DISEASE [COVID-19]), AMPLIFIED PROBE TECHNIQUE, MAKING USE OF HIGH THROUGHPUT TECHNOLOGIES AS DESCRIBED BY CMS-2020-01-R: HCPCS | Performed by: EMERGENCY MEDICINE

## 2021-10-04 PROCEDURE — 94640 AIRWAY INHALATION TREATMENT: CPT

## 2021-10-04 PROCEDURE — 25010000002 DEXAMETHASONE PER 1 MG: Performed by: EMERGENCY MEDICINE

## 2021-10-04 PROCEDURE — 84484 ASSAY OF TROPONIN QUANT: CPT | Performed by: EMERGENCY MEDICINE

## 2021-10-04 PROCEDURE — 85025 COMPLETE CBC W/AUTO DIFF WBC: CPT | Performed by: EMERGENCY MEDICINE

## 2021-10-04 PROCEDURE — 25010000002 FUROSEMIDE PER 20 MG: Performed by: NURSE PRACTITIONER

## 2021-10-04 PROCEDURE — 86900 BLOOD TYPING SEROLOGIC ABO: CPT | Performed by: INTERNAL MEDICINE

## 2021-10-04 PROCEDURE — 25010000002 CEFTRIAXONE PER 250 MG: Performed by: NURSE PRACTITIONER

## 2021-10-04 PROCEDURE — 86900 BLOOD TYPING SEROLOGIC ABO: CPT

## 2021-10-04 RX ORDER — IPRATROPIUM BROMIDE AND ALBUTEROL SULFATE 2.5; .5 MG/3ML; MG/3ML
3 SOLUTION RESPIRATORY (INHALATION)
Status: DISPENSED | OUTPATIENT
Start: 2021-10-04 | End: 2021-10-05

## 2021-10-04 RX ORDER — ALPRAZOLAM 0.5 MG/1
0.5 TABLET ORAL ONCE
Status: COMPLETED | OUTPATIENT
Start: 2021-10-04 | End: 2021-10-04

## 2021-10-04 RX ORDER — IPRATROPIUM BROMIDE AND ALBUTEROL SULFATE 2.5; .5 MG/3ML; MG/3ML
3 SOLUTION RESPIRATORY (INHALATION) EVERY 6 HOURS PRN
Status: DISCONTINUED | OUTPATIENT
Start: 2021-10-04 | End: 2021-10-07 | Stop reason: HOSPADM

## 2021-10-04 RX ORDER — SODIUM CHLORIDE 0.9 % (FLUSH) 0.9 %
10 SYRINGE (ML) INJECTION AS NEEDED
Status: DISCONTINUED | OUTPATIENT
Start: 2021-10-04 | End: 2021-10-04

## 2021-10-04 RX ORDER — ACETAMINOPHEN 650 MG/1
650 SUPPOSITORY RECTAL EVERY 4 HOURS PRN
Status: DISCONTINUED | OUTPATIENT
Start: 2021-10-04 | End: 2021-10-07 | Stop reason: HOSPADM

## 2021-10-04 RX ORDER — BUDESONIDE 0.5 MG/2ML
0.5 INHALANT ORAL
Status: DISCONTINUED | OUTPATIENT
Start: 2021-10-04 | End: 2021-10-07 | Stop reason: HOSPADM

## 2021-10-04 RX ORDER — BISOPROLOL FUMARATE 5 MG/1
5 TABLET, FILM COATED ORAL
Status: DISCONTINUED | OUTPATIENT
Start: 2021-10-04 | End: 2021-10-07 | Stop reason: HOSPADM

## 2021-10-04 RX ORDER — FUROSEMIDE 10 MG/ML
40 INJECTION INTRAMUSCULAR; INTRAVENOUS EVERY 12 HOURS
Status: DISCONTINUED | OUTPATIENT
Start: 2021-10-04 | End: 2021-10-07 | Stop reason: HOSPADM

## 2021-10-04 RX ORDER — ACETAMINOPHEN 160 MG/5ML
650 SOLUTION ORAL EVERY 4 HOURS PRN
Status: DISCONTINUED | OUTPATIENT
Start: 2021-10-04 | End: 2021-10-07 | Stop reason: HOSPADM

## 2021-10-04 RX ORDER — POTASSIUM CHLORIDE 750 MG/1
20 CAPSULE, EXTENDED RELEASE ORAL DAILY
Status: DISCONTINUED | OUTPATIENT
Start: 2021-10-04 | End: 2021-10-07 | Stop reason: HOSPADM

## 2021-10-04 RX ORDER — ACETAMINOPHEN 325 MG/1
650 TABLET ORAL EVERY 4 HOURS PRN
Status: DISCONTINUED | OUTPATIENT
Start: 2021-10-04 | End: 2021-10-07 | Stop reason: HOSPADM

## 2021-10-04 RX ORDER — L.ACID,PARA/B.BIFIDUM/S.THERM 8B CELL
1 CAPSULE ORAL DAILY
Status: DISCONTINUED | OUTPATIENT
Start: 2021-10-04 | End: 2021-10-07 | Stop reason: HOSPADM

## 2021-10-04 RX ORDER — PANTOPRAZOLE SODIUM 40 MG/10ML
40 INJECTION, POWDER, LYOPHILIZED, FOR SOLUTION INTRAVENOUS EVERY 12 HOURS SCHEDULED
Status: DISCONTINUED | OUTPATIENT
Start: 2021-10-04 | End: 2021-10-07 | Stop reason: HOSPADM

## 2021-10-04 RX ORDER — SODIUM CHLORIDE 0.9 % (FLUSH) 0.9 %
10 SYRINGE (ML) INJECTION AS NEEDED
Status: DISCONTINUED | OUTPATIENT
Start: 2021-10-04 | End: 2021-10-07 | Stop reason: HOSPADM

## 2021-10-04 RX ORDER — LEVOTHYROXINE SODIUM 0.07 MG/1
75 TABLET ORAL
Status: DISCONTINUED | OUTPATIENT
Start: 2021-10-04 | End: 2021-10-07 | Stop reason: HOSPADM

## 2021-10-04 RX ORDER — ALBUTEROL SULFATE 90 UG/1
2 AEROSOL, METERED RESPIRATORY (INHALATION) EVERY 4 HOURS PRN
COMMUNITY

## 2021-10-04 RX ORDER — FUROSEMIDE 10 MG/ML
40 INJECTION INTRAMUSCULAR; INTRAVENOUS ONCE
Status: COMPLETED | OUTPATIENT
Start: 2021-10-04 | End: 2021-10-04

## 2021-10-04 RX ORDER — ASCORBIC ACID 500 MG
1000 TABLET ORAL DAILY
Status: DISCONTINUED | OUTPATIENT
Start: 2021-10-04 | End: 2021-10-07 | Stop reason: HOSPADM

## 2021-10-04 RX ORDER — MULTIPLE VITAMINS W/ MINERALS TAB 9MG-400MCG
1 TAB ORAL DAILY
Status: DISCONTINUED | OUTPATIENT
Start: 2021-10-04 | End: 2021-10-07 | Stop reason: HOSPADM

## 2021-10-04 RX ORDER — FERROUS SULFATE 325(65) MG
325 TABLET ORAL 2 TIMES DAILY WITH MEALS
Status: DISCONTINUED | OUTPATIENT
Start: 2021-10-04 | End: 2021-10-05

## 2021-10-04 RX ORDER — ASPIRIN 81 MG/1
324 TABLET, CHEWABLE ORAL ONCE
Status: DISCONTINUED | OUTPATIENT
Start: 2021-10-04 | End: 2021-10-05

## 2021-10-04 RX ORDER — PANTOPRAZOLE SODIUM 40 MG/1
40 TABLET, DELAYED RELEASE ORAL
Status: DISCONTINUED | OUTPATIENT
Start: 2021-10-04 | End: 2021-10-04

## 2021-10-04 RX ORDER — SODIUM CHLORIDE 0.9 % (FLUSH) 0.9 %
10 SYRINGE (ML) INJECTION EVERY 12 HOURS SCHEDULED
Status: DISCONTINUED | OUTPATIENT
Start: 2021-10-04 | End: 2021-10-07 | Stop reason: HOSPADM

## 2021-10-04 RX ADMIN — PANTOPRAZOLE SODIUM 40 MG: 40 TABLET, DELAYED RELEASE ORAL at 10:23

## 2021-10-04 RX ADMIN — SODIUM CHLORIDE, PRESERVATIVE FREE 10 ML: 5 INJECTION INTRAVENOUS at 10:27

## 2021-10-04 RX ADMIN — IPRATROPIUM BROMIDE AND ALBUTEROL SULFATE 3 ML: 2.5; .5 SOLUTION RESPIRATORY (INHALATION) at 14:15

## 2021-10-04 RX ADMIN — FUROSEMIDE 40 MG: 10 INJECTION, SOLUTION INTRAMUSCULAR; INTRAVENOUS at 10:25

## 2021-10-04 RX ADMIN — DOXYCYCLINE 100 MG: 100 INJECTION, POWDER, LYOPHILIZED, FOR SOLUTION INTRAVENOUS at 05:48

## 2021-10-04 RX ADMIN — ALPRAZOLAM 0.5 MG: 0.5 TABLET ORAL at 22:27

## 2021-10-04 RX ADMIN — FUROSEMIDE 40 MG: 10 INJECTION, SOLUTION INTRAMUSCULAR; INTRAVENOUS at 02:10

## 2021-10-04 RX ADMIN — POTASSIUM CHLORIDE 20 MEQ: 750 CAPSULE, EXTENDED RELEASE ORAL at 10:23

## 2021-10-04 RX ADMIN — BUDESONIDE 0.5 MG: 0.5 INHALANT RESPIRATORY (INHALATION) at 06:55

## 2021-10-04 RX ADMIN — DOXYCYCLINE 100 MG: 100 INJECTION, POWDER, LYOPHILIZED, FOR SOLUTION INTRAVENOUS at 17:49

## 2021-10-04 RX ADMIN — IPRATROPIUM BROMIDE AND ALBUTEROL SULFATE 3 ML: 2.5; .5 SOLUTION RESPIRATORY (INHALATION) at 19:44

## 2021-10-04 RX ADMIN — SODIUM CHLORIDE 1 G: 900 INJECTION INTRAVENOUS at 05:48

## 2021-10-04 RX ADMIN — BUDESONIDE 0.5 MG: 0.5 INHALANT RESPIRATORY (INHALATION) at 19:44

## 2021-10-04 RX ADMIN — BISOPROLOL FUMARATE 5 MG: 5 TABLET, FILM COATED ORAL at 10:24

## 2021-10-04 RX ADMIN — SERTRALINE HYDROCHLORIDE 150 MG: 100 TABLET ORAL at 10:25

## 2021-10-04 RX ADMIN — OXYCODONE HYDROCHLORIDE AND ACETAMINOPHEN 1000 MG: 500 TABLET ORAL at 10:22

## 2021-10-04 RX ADMIN — Medication 1 CAPSULE: at 10:23

## 2021-10-04 RX ADMIN — FERROUS SULFATE TAB 325 MG (65 MG ELEMENTAL FE) 325 MG: 325 (65 FE) TAB at 10:25

## 2021-10-04 RX ADMIN — FUROSEMIDE 40 MG: 10 INJECTION, SOLUTION INTRAMUSCULAR; INTRAVENOUS at 20:04

## 2021-10-04 RX ADMIN — FERROUS SULFATE TAB 325 MG (65 MG ELEMENTAL FE) 325 MG: 325 (65 FE) TAB at 17:49

## 2021-10-04 RX ADMIN — MULTIPLE VITAMINS W/ MINERALS TAB 1 TABLET: TAB at 10:25

## 2021-10-04 RX ADMIN — TAZOBACTAM SODIUM AND PIPERACILLIN SODIUM 4.5 G: 500; 4 INJECTION, SOLUTION INTRAVENOUS at 04:26

## 2021-10-04 RX ADMIN — PANTOPRAZOLE SODIUM 40 MG: 40 INJECTION, POWDER, FOR SOLUTION INTRAVENOUS at 20:04

## 2021-10-04 RX ADMIN — DEXAMETHASONE SODIUM PHOSPHATE 10 MG: 10 INJECTION INTRAMUSCULAR; INTRAVENOUS at 01:59

## 2021-10-04 RX ADMIN — IPRATROPIUM BROMIDE AND ALBUTEROL SULFATE 3 ML: 2.5; .5 SOLUTION RESPIRATORY (INHALATION) at 06:47

## 2021-10-04 NOTE — OUTREACH NOTE
CHF Week 3 Survey      Responses   [facility] patient discharged from?  [place]   Does the patient have one of the following disease processes/diagnoses(primary or secondary)?  CHF   Week 3 attempt successful?  No   Revoke  Readmitted          Delores Kramer RN

## 2021-10-04 NOTE — PLAN OF CARE
Problem: COPD Comorbidity  Goal: Maintenance of COPD Symptom Control  Outcome: Ongoing, Progressing  Intervention: Maintain COPD-Symptom Control  Recent Flowsheet Documentation  Taken 10/4/2021 0800 by Serenity Cardenas, RN  Medication Review/Management: medications reviewed   Goal Outcome Evaluation:              Outcome Summary: Pt received 1 unit blood today. Hct 25.4 Hgb 7.9

## 2021-10-04 NOTE — PROGRESS NOTES
Malnutrition Severity Assessment    Patient Name:  Yin Law  YOB: 1940  MRN: [id number]  Admit Date:  10/4/2021    Patient meets criteria for : Severe Malnutrition (Pt meets criteria for severe malnutrition in the context of acute illness as evidenced by moderate muscle loss, moderate subcutaneous fat loss, and unintentional wt loss of 13 lbs in 1 month (8.7%).)     Malnutrition Severity Assessment  Malnutrition Type: Acute Disease or Injury - Related Malnutrition     Malnutrition Type (last 8 hours)      Malnutrition Severity Assessment     Row Name 10/04/21 1434       Malnutrition Severity Assessment    Malnutrition Type  Acute Disease or Injury - Related Malnutrition    Row Name 10/04/21 1434       Unintentional Weight Loss     Unintentional Weight Loss Findings  Severe    Unintentional Weight Loss   Weight loss greater than 5% in one month    Row Name 10/04/21 1434       Muscle Loss    Loss of Muscle Mass Findings  Moderate    Clavicle Bone Region  Moderate - some protrusion in females, visible in males    Acromion Bone Region  Moderate - acromion may slightly protrude    Row Name 10/04/21 1434       Fat Loss    Subcutaneous Fat Loss Findings  Moderate    Orbital Region   Moderate -  somewhat hollowness, slightly dark circles    Upper Arm Region  Moderate - some fat tissue, not ample    Row Name 10/04/21 1434       Criteria Met (Must meet criteria for severity in at least 2 of these categories: M Wasting, Fat Loss, Fluid, Secondary Signs, Wt. Status, Intake)    Patient meets criteria for   Severe Malnutrition Pt meets criteria for severe malnutrition in the context of acute illness as evidenced by moderate muscle loss, moderate subcutaneous fat loss, and unintentional wt loss of 13 lbs in 1 month (8.7%).          Electronically signed by:  Merlin Mcintyre RD  10/04/21 14:37 EDT

## 2021-10-04 NOTE — CASE MANAGEMENT/SOCIAL WORK
Discharge Planning Assessment  [facility]     Patient Name: Yin Law  MRN: [id number]  Today's Date: 10/4/2021    Admit Date: 10/4/2021    Discharge Needs Assessment     Row Name 10/04/21 1416       Living Environment    Lives With  alone    Current Living Arrangements  home/apartment/condo    Primary Care Provided by  self    Provides Primary Care For  no one    Family Caregiver if Needed  child(lina), adult;sibling(s)    Family Caregiver Names  Sister Enedina Mauricio. Niece Yin Elkins.    Quality of Family Relationships  helpful;involved;supportive    Able to Return to Prior Arrangements  yes       Transition Planning    Patient/Family Anticipates Transition to  home    Patient/Family Anticipated Services at Transition      Transportation Anticipated  family or friend will provide       Discharge Needs Assessment    Readmission Within the Last 30 Days  previous discharge plan unsuccessful    Current Outpatient/Agency/Support Group  homecare agency    Equipment Currently Used at Home  wheelchair;walker, rolling;cane, straight;oxygen;pulse ox;respiratory supplies;nebulizer    Concerns to be Addressed  discharge planning    Discharge Facility/Level of Care Needs  home with home health    Current Discharge Risk  chronically ill;lives alone        Discharge Plan     Row Name 10/04/21 1419       Plan    Plan  Home with home health    Patient/Family in Agreement with Plan  yes    Plan Comments     I met with Ms. Law today at the bedside to discuss discharge planning. Ms. Law lives in [facility] alone. She has a wheelchair, rolling walker, straight cane and nebulizer in the home. Ms. Law is currently on 3L of oxygen at home via [employer].     Ms Law is independent with ADL's however, requires assistance with cleaing her home and laundry. Ms. Law's sister in law and Niece Kisha help her in the home as well as provides transportation for her shopping and appointments.     She is  current with [facility] for physical therapy, occupational therapy, and skilled nursing. Case management will continue to follow Ms. Law's progress and provide for her any anticipated discharge needs. Family can transport her by car at the time of discharge.        Final Discharge Disposition Code  06 - home with home health care        Continued Care and Services - Admitted Since 10/4/2021     Durable Medical Equipment     Service Provider Request Status Selected Services Address Phone Fax Patient Preferred    [facility] [place]  Pending - No Request Sent N/A [address] [id number] [phone] --          Home Medical Care     Service Provider Request Status Selected Services Address Phone Fax Patient Preferred    [facility]  Pending - No Request Sent N/A [address] [id number] [phone] --       Internal Comment last updated by Chapis Trammell RN 10/4/2021 0944    Pt, Ot, SN                       Selected Continued Care - Prior Encounters Includes selections from prior encounters from 7/6/2021 to 10/4/2021    Discharged on 9/19/2021 Admission date: 9/14/2021 - Discharge disposition: Home or Self Care    Durable Medical Equipment     Service Provider Selected Services Address Phone Fax Patient Preferred    [facility]  Durable Medical Equipment [address] [id number] [phone] --       Internal Comment last updated by Rajwinder Mabry RN 9/19/2021 1308    Home oxygen, used prior to admission                     Home Medical Care     Service Provider Selected Services Address Phone Fax Patient Preferred    [facility]  Home Health Services [address] [id number] [phone] --                Discharged on 8/26/2021 Admission date: 8/21/2021 - Discharge disposition: Skilled Nursing Facility (DC - External)     Destination     Service Provider Selected Services Address Phone Fax Patient Preferred    [facility]  Skilled Nursing [address] [place] [id number] [phone] --                Discharged on 8/12/2021 Admission date: 8/5/2021 - Discharge disposition: Home-Health Care [facility]    Home Medical Care     Service Provider Selected Services Address Phone Fax Patient Preferred    [facility] [address] [id number] [phone] --                Discharged on 8/2/2021 Admission date: 7/19/2021 - Discharge disposition: Home-Health Care [facility]    Durable Medical Equipment     Service Provider Selected Services Address Phone Fax Patient Preferred    [facility]  Durable Medical Equipment [address] [id number] [phone] --          Home Medical Care     Service Provider Selected Services Address Phone Fax Patient Preferred    [facility] [address] [place] [address] [address] [id number] [phone] --                      Demographic Summary     Row Name 10/04/21 1415       Contact Information    Contact Information Comments I confirmed Ms. Law has Medicare and [place] [employer] supplement. Primary provider is Santiago Chaudhry MD.   Fully vaccinated for Covid 19.   No advanced directive or living will.        Functional Status     Row Name 10/04/21 1416       Functional Status, IADL    Medications  independent    Meal Preparation  independent    Housekeeping  assistive person    Laundry  assistive person    Shopping  assistive person       Mental Status    General Appearance WDL  WDL       Mental Status Summary    Recent Changes in Mental Status/Cognitive Functioning  unable to assess       Employment/    Employment Status  retired        Chapis Trammell, RN

## 2021-10-04 NOTE — PROGRESS NOTES
Clinical Nutrition   Reason For Visit: Unintentional weight loss    Patient Name: Yin Law  YOB: 1940  MRN: [id number]  Date of Encounter: 10/04/21 12:24 EDT  Admission date: 10/4/2021    Comments:   - RD ordering strawberry Boost Glucose Control TID.  - Pt meets criteria for severe malnutrition in the context of acute illness as evidenced by moderate muscle loss, moderate subcutaneous fat loss, and unintentional wt loss of 13 lbs in 1 month (8.7%). See MSA note.    Nutrition Assessment     Admission Problem List:    Acute sepsis (HCC)     Applicable medical tests/procedures since admission:  10/4: WOC consult for LLE    PMH: She  has a past medical history of Anxiety and depression, Arrhythmia, Asthma, Chicken pox, COPD (chronic obstructive pulmonary disease) (CMS/HCC), Hyperlipidemia, Hypertension, Measles, Menopause, Osteoporosis, Rheumatoid arthritis (CMS/HCC), Rheumatoid arthritis (CMS/HCC), and Tobacco abuse.   PSxH: She  has a past surgical history that includes Hysterectomy (1971); Colon surgery; Colonoscopy; Gallbladder surgery; Appendectomy; Cataract extraction, bilateral; Carpal tunnel release (Left); and Tonsillectomy.     Reported/Observed/Food/Nutrition Related History     Pt resting in bed at time of RD visit with family member at bedside. Pt reported UBW of 141 lbs about 1 month ago with unintentional wt loss. Pt reports current wt of 133 lbs. Pt reported appetite is good and had not changed in the last month. Pt agreeable to strawberry ONS. NKFA. No chewing or swallowing issues.    Anthropometrics   Height: 66 in  Weight: 137 lbs (bed scale 10/4)  BMI: 22.14  BMI classification: Normal: 18.5-24.9kg/m2   IBW: 130 lbs  UBW: Per -138 lbs  Weight change: Noted 170 lbs on 8/28 -- likely an outlier. Unintentional wt loss of 13 lbs in 1 month (8.7%).    Weight Weight (kg) Weight (lbs) Weight Method   10/4/2021 62.234 kg 137 lb 3.2 oz Bed scale   10/4/2021 60.328 kg 133 lb  Stated   9/19/2021 67.767 kg 149 lb 6.4 oz Bed scale   9/18/2021 68.675 kg 151 lb 6.4 oz Bed scale   9/17/2021 65.318 kg 144 lb Bed scale   9/16/2021 62.869 kg 138 lb 9.6 oz Bed scale   9/15/2021 63.186 kg 139 lb 4.8 oz Bed scale   9/14/2021 60.782 kg 134 lb Stated   8/28/2021 77.111 kg 170 lb -   8/26/2021 59.24 kg 130 lb 9.6 oz Bed scale   8/25/2021 61.054 kg 134 lb 9.6 oz Bed scale   8/24/2021 62.097 kg 136 lb 14.4 oz Bed scale   8/21/2021 68.04 kg 150 lb Stated   8/17/2021 62.681 kg 138 lb 3 oz -   8/12/2021 62.869 kg 138 lb 9.6 oz Bed scale   5/21: 143 lbs  Nutrition Focused Physical Exam - 10/4     Subcutaneous fat:  Orbital Moderate   Buccal Moderate   Tricep Unable to determine at this time   Ribs- thoracic and lumbar region, lower back, midaxillary line Unable to determine at this time     Muscle:  Temple/temporalis Mild   Clavicle/acromion- pectoralis, deltoid Moderate   Shoulder- deltoid Moderate   Scapula- trapezius, latissimus dorsi Unable to determine at this time   Interosseous- dorsal hand Unable to determine at this time   Thigh- quadriceps No muscle loss identified   Calf- gastrocnemius No muscle loss identified     Labs reviewed   Labs reviewed: Yes    Results from last 7 days   Lab Units 10/04/21  0659 10/04/21  0146   SODIUM mmol/L 134* 138   POTASSIUM mmol/L 4.1 4.5   CHLORIDE mmol/L 98 99   CO2 mmol/L 24.0 29.0   BUN mg/dL 27* 26*   CREATININE mg/dL 0.80 0.89   GLUCOSE mg/dL 168* 192*   CALCIUM mg/dL 8.5* 8.9   MAGNESIUM mg/dL 2.9*  --      Results from last 7 days   Lab Units 10/04/21  0659 10/04/21  0146   WBC 10*3/mm3 11.11* 13.84*   ALBUMIN g/dL  --  3.80     Lab Results   Lab Value Date/Time    HGBA1C 4.90 07/20/2021 0524    HGBA1C 4.60 (L) 05/21/2021 1249     Medications reviewed   Medications reviewed: Yes  Pertinent: vit C, ferrous sulfate, lasix, probiotic, KCl    Current Nutrition Prescription   PO: Diet Regular; Cardiac  Oral Nutrition Supplement:      Average PO intake:  insufficient data    Nutrition Diagnosis     Problem Increased nutrient needs (protein)   Etiology Skin integrity   Signs/Symptoms Wound at LLE     Problem Malnutrition   Etiology Energy needs > energy intake   Signs/Symptoms Moderate muscle loss; moderate subcutaneous fat loss; 8.7% wt loss     Intervention   Intervention: Follow treatment progress, Care plan reviewed, Interview for preferences, Supplement provided   - RD ordering strawberry Boost Glucose Control TID.    Goal:   General: Nutrition support treatment  PO: Establish PO, Tolerate PO     Additional goals: No further weight loss    Monitoring/Evaluation:   Monitoring/Evaluation: Per protocol, PO intake, Supplement intake, Pertinent labs, Weight, Skin status, POC/GOC    Merlin Mcintyre RD  Time Spent: 30 min

## 2021-10-04 NOTE — CONSULTS
[facility]ology Consult    Referring Provider: Shila Crump MD   PCP: Santiago Chaudhry MD    Reason for Consultation: Iron deficiency anemia     Chief complaint: Shortness of breath     History of present illness:    Yin Law is a pleasant 81 y.o. female who is admitted with shortness of breath and acute on chronic hypoxic respiratory failure.  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  Her labs show worsening anemia with H&H of 6.7 and 22.  She denies any melena, hematochezia nor hematemesis.       She has history of remote right colectomy with Dr. 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   She has had a repeat colonoscopy since that time with Dr. Tripp in 2017.  She is known to myself for recent consultation in August 2021 (8/25/21) for anemia.   We arranged outpatient EGD and Colonoscopy with Dr. Tripp on 9/14/21 but unfortunately the patient was readmitted at that time.  She has been on Ferrous sulfate 325 mg BID as well as Protonix.      She complains of dyspnea, profound weakness and fatigue.      Allergies:  Amiodarone    Scheduled Meds:  ascorbic acid, 1,000 mg, Oral, Daily  aspirin, 324 mg, Oral, Once  bisoprolol, 5 mg, Oral, Q24H  budesonide, 0.5 mg, Nebulization, BID - RT  cefTRIAXone, 1 g, Intravenous, Q24H  doxycycline, 100 mg, Intravenous, Q12H  ferrous sulfate, 325 mg, Oral, BID With Meals  furosemide, 40 mg, Intravenous, Q12H  ipratropium-albuterol, 3 mL, Nebulization, Q6H - RT  lactobacillus acidophilus, 1 capsule, Oral, Daily  levothyroxine, 75 mcg, Oral, Q AM  multivitamin with minerals, 1 tablet, Oral, Daily  pantoprazole, 40 mg, Intravenous, Q12H  pharmacy consult - [facility], , Does not apply, Daily  potassium chloride, 20 mEq, Oral, Daily  sertraline, 150 mg, Oral, Daily  sodium chloride, 10 mL, Intravenous,  Q12H         Infusions:       PRN Meds:  •  acetaminophen **OR** acetaminophen **OR** acetaminophen  •  ipratropium-albuterol  •  sodium chloride    Home Meds:  Facility-Administered Medications Prior to Admission   Medication Dose Route Frequency Provider Last Rate Last Admin   • [DISCONTINUED] ipratropium-albuterol (DUO-NEB) nebulizer solution 3 mL  3 mL Nebulization Q6H PRN Kerrie Vera MD         Medications Prior to Admission   Medication Sig Dispense Refill Last Dose   • ALPRAZolam (XANAX) 0.5 MG tablet Take 1 tablet by mouth At Night As Needed for Anxiety or Sleep. 5 tablet 0 10/3/2021 at Unknown time   • apixaban (ELIQUIS) 5 MG tablet tablet Take 5 mg by mouth 2 (Two) Times a Day.   10/3/2021 at Unknown time   • ascorbic acid (VITAMIN C) 1000 MG tablet Take 1 tablet by mouth Daily.   10/3/2021 at Unknown time   • bisoprolol (ZEBeta) 5 MG tablet Take 1 tablet by mouth Daily. 30 tablet 0 10/3/2021 at Unknown time   • Budeson-Glycopyrrol-Formoterol (BREZTRI) 160-9-4.8 MCG/ACT aerosol inhaler Inhale 2 puffs 2 (Two) Times a Day.      • HYDROcodone-acetaminophen (NORCO) 7.5-325 MG per tablet Take 1 tablet by mouth Every 6 (Six) Hours As Needed. Patient taking: once a day prn   10/3/2021 at Unknown time   • lactobacillus acidophilus (RISAQUAD) capsule capsule Take 1 capsule by mouth Daily. 7 capsule 0 10/3/2021 at Unknown time   • levothyroxine (SYNTHROID, LEVOTHROID) 75 MCG tablet Take 1 tablet by mouth Every Morning. 30 tablet 5 10/3/2021 at Unknown time   • multivitamin with minerals (CENTRUM SILVER ADULT 50+ PO) Take 1 tablet by mouth Daily.   10/3/2021 at Unknown time   • Omega-3 Fatty Acids (fish oil) 1000 MG capsule capsule Take 1,000 mg by mouth Daily With Breakfast.   10/3/2021 at Unknown time   • pantoprazole (PROTONIX) 40 MG EC tablet Take 1 tablet by mouth 2 (Two) Times a Day Before Meals.   10/3/2021 at Unknown time   • potassium chloride (MICRO-K) 10 MEQ CR capsule Take 2 capsules by mouth Daily.    10/3/2021 at Unknown time   • sertraline (ZOLOFT) 100 MG tablet Take 150 mg by mouth Daily. 1.5 tab daily   10/3/2021 at Unknown time   • Vitamin D, Ergocalciferol, 50 MCG (2000 UT) capsule Take 1 tablet by mouth Daily.   10/3/2021 at Unknown time   • albuterol sulfate  (90 Base) MCG/ACT inhaler Inhale 2 puffs Every 4 (Four) Hours As Needed for Wheezing. Patient taking: Inhale 2 puffs by mouth every 4-6 hours      • ferrous sulfate 325 (65 FE) MG tablet Take 1 tablet by mouth 2 (Two) Times a Day With Meals.      • torsemide (DEMADEX) 20 MG tablet Take 2 tablets by mouth Daily. 30 tablet 5        ROS: Review of Systems   Constitutional: Positive for fatigue.   HENT: Negative.    Eyes: Negative.    Respiratory: Positive for cough and shortness of breath.    Cardiovascular: Positive for leg swelling.   Gastrointestinal: Negative.    Endocrine: Negative.    Genitourinary: Negative.    Musculoskeletal: Negative.    Skin: Positive for pallor.   Allergic/Immunologic: Negative.    Neurological: Positive for weakness.   Psychiatric/Behavioral: Negative.        PAST MED HX:  Past Medical History:   Diagnosis Date   • Anxiety and depression    • Arrhythmia     A-FIB   • Asthma    • Chicken pox    • COPD (chronic obstructive pulmonary disease) (CMS/HCC)    • Hyperlipidemia    • Hypertension    • Measles    • Menopause    • Osteoporosis    • Rheumatoid arthritis (CMS/HCC)    • Rheumatoid arthritis (CMS/HCC)    • Tobacco abuse        PAST SURG HX:  Past Surgical History:   Procedure Laterality Date   • APPENDECTOMY     • CARPAL TUNNEL RELEASE Left    • CATARACT EXTRACTION, BILATERAL     • COLON SURGERY     • COLONOSCOPY     • GALLBLADDER SURGERY     • HYSTERECTOMY  1971   • TONSILLECTOMY         FAM HX:  Family History   Problem Relation Age of Onset   • Alzheimer's disease Mother    • No Known Problems Father    • No Known Problems Sister    • Hypertension Brother    • Parkinsonism Brother    • No Known Problems Maternal  "Grandmother    • No Known Problems Maternal Grandfather    • No Known Problems Paternal Grandmother    • No Known Problems Paternal Grandfather        SOC HX:  Social History     Socioeconomic History   • Marital status:      Spouse name: Not on file   • Number of children: Not on file   • Years of education: Not on file   • Highest education level: Not on file   Tobacco Use   • Smoking status: Former Smoker     Packs/day: 0.25     Types: Cigarettes     Quit date: 2021     Years since quittin.4   • Smokeless tobacco: Never Used   • Tobacco comment: 1-2 cigs occas   Vaping Use   • Vaping Use: Never used   Substance and Sexual Activity   • Alcohol use: Never   • Drug use: Never   • Sexual activity: Defer       PHYSICAL EXAM  /51   Pulse 74   Temp 97.8 °F (36.6 °C) (Oral)   Resp 20   Ht 167.6 cm (66\")   Wt 62.2 kg (137 lb 3.2 oz)   SpO2 100%   BMI 22.14 kg/m²   Wt Readings from Last 3 Encounters:   10/04/21 62.2 kg (137 lb 3.2 oz)   21 67.8 kg (149 lb 6.4 oz)   21 77.1 kg (170 lb)   ,body mass index is 22.14 kg/m².  Physical Exam  Constitutional:       Comments: Appears frail and fatigued.     HENT:      Head: Normocephalic and atraumatic.   Eyes:      General: No scleral icterus.  Cardiovascular:      Rate and Rhythm: Normal rate and regular rhythm.   Pulmonary:      Effort: No respiratory distress.      Comments: Mild tachypnea in conversation  4 L O2 via nasal cannula   Abdominal:      General: Bowel sounds are normal. There is no distension.      Palpations: Abdomen is soft.      Tenderness: There is no abdominal tenderness.   Musculoskeletal:      Right lower leg: Edema present.      Left lower leg: Edema present.   Skin:     Coloration: Skin is pale.   Neurological:      Mental Status: She is oriented to person, place, and time.   Psychiatric:         Thought Content: Thought content normal.       Results Review:   I reviewed the patient's new clinical results.    Lab " Results   Component Value Date    WBC 11.11 (H) 10/04/2021    HGB 6.7 (C) 10/04/2021    HGB 7.7 (L) 10/04/2021    HGB 8.3 (L) 09/17/2021    HCT 22.6 (L) 10/04/2021    MCV 93.4 10/04/2021     10/04/2021       Lab Results   Component Value Date    INR 1.08 08/23/2021       Lab Results   Component Value Date    GLUCOSE 168 (H) 10/04/2021    BUN 27 (H) 10/04/2021    CREATININE 0.80 10/04/2021    EGFRIFNONA 69 10/04/2021    BCR 33.8 (H) 10/04/2021     (L) 10/04/2021    K 4.1 10/04/2021    CO2 24.0 10/04/2021    CALCIUM 8.5 (L) 10/04/2021    ALBUMIN 3.80 10/04/2021    ALKPHOS 94 10/04/2021    BILITOT 0.3 10/04/2021    ALT 14 10/04/2021    AST 23 10/04/2021      Ref. Range 8/23/2021 04:52   Iron Latest Ref Range: 37 - 145 mcg/dL 14 (L)   Ferritin Latest Ref Range: 13.00 - 150.00 ng/mL 182.00 (H)   Iron Saturation Latest Ref Range: 20 - 50 % 4 (L)   Transferrin Latest Ref Range: 200 - 360 mg/dL 228   TIBC Latest Ref Range: 298 - 536 mcg/dL 340   Folate Latest Ref Range: 4.78 - 24.20 ng/mL >20.00     ASSESSMENTS/PLANS    1. Acute on chronic anemia  2. Iron deficiency  3. Acute on chronic hypoxic respiratory failure  4. Diastolic CHF   5. History of tubulovillous adenoma with high grade dysplasia s/p right colon resection in 2014     Ms. Law is a pleasant 81 year old female with history of chronic hypoxic respiratory failure that presents with worsening dyspnea.  She is found to have worsening anemia as well with H&H of 6.7 and 22.  She denies any overt GI bleeding but also does not look at her stools.   Her previous hemoccult in August was negative.     >>> Agree with transfusion of PRBCs  >>> Empiric BID PPI  >>> I do not think she would tolerate a colonoscopy prep at this time.  We discussed inpatient EGD to evaluate for upper GI source of blood loss including peptic ulcer disease as she has been on steroids.  She declines any endoscopy at this time but would like to think about it.   >>> Repeat hemoccult       I discussed the patient's findings and my recommendations with patient    PAVEL Valadez  10/04/21  14:55 EDT

## 2021-10-04 NOTE — NURSING NOTE
[facility] consulted for: Left lower extremity wounds    Assessment and Care provided: Patient is familiar to [facility] nurse.  During last admission, evaluated and treated left lower extremity wound that presented as a blister that had ruptured versus skin tear.  At this time the left lower extremity wound on the anterior portion of her leg is without the skin flap that was previously noted.  The wound measures 10 x 6 x 0.2 cm.  The dermis is exposed and is red and moist.  Periwound edges are defined and appear slightly denuded.  Scant serosanguineous drainage noted on dressing once removed.  Cleansed with wound cleanser and debrided lightly with 4 x 4 gauze.  Xeroform applied to wound bed and secured with an OPTi foam dressing.    Superior to the chronic wound, it is a small open area that appears to be a skin tear.  The wound measures 2 x 2.5 x 0.2 cm.  I cleansed the wound bed with wound cleanser also and lightly debrided with 4 x 4 gauze.  Xeroform and an OPTi foam dressing was applied to the wound bed.    Recommendation(s): See wound care orders for specific instructions.  Dressing changed to be performed every other day RN staff.    *Maintain good skin care, keep dry, turn q 2 hr, keep heels elevated and offloaded with heel boots.    *Apply z-guard to bottom and bony prominences daily and as needed with incontinence episodes.  *Follow C.A.R.E protocol if medical devices (Bipap, arthur, Ng tube, etc) are being used.     All skin interventions in place.  Head to toe assessment completed. Heels and bottom blanchable, intact and offloaded.     Thank you for consulting the [facility] Nurse.  Will continue to follow.  Please contact with questions or if needs arise.                This note is dictated utilizing voice recognition software. Unfortunately, this leads to occasional typographical errors. I apologize in advance if the situation occurs. If questions occur please do not hesitate to contact me.        pressure ulcer > stage 2 on 11/3/2020

## 2021-10-04 NOTE — ED PROVIDER NOTES
[facility]    EMERGENCY DEPARTMENT ENCOUNTER      Pt Name: Yin Law  MRN: [id number]  YOB: 1940  Date of evaluation: 10/4/2021  Provider: Darshan Gauthier MD    CHIEF COMPLAINT       Chief Complaint   Patient presents with   • Shortness of Breath         HISTORY OF PRESENT ILLNESS  (Location/Symptom, Timing/Onset, Context/Setting, Quality, Duration, Modifying Factors, Severity.)   Yin Law is a 81 y.o. female who presents to the emergency department with sudden onset shortness of breath that began just prior to arrival.  It is moderate to severe in severity and worse with exertion.  She was given supplemental oxygen by EMS with some improvement.  She denies any associated chest pain, abdominal pain, back pain.  She has a history of COPD and wears oxygen at home.      Nursing notes were reviewed.    REVIEW OF SYSTEMS    (2-9 systems for level 4, 10 or more for level 5)   ROS:  General:  No fevers, no chills, no weakness  Cardiovascular:  No chest pain, no palpitations  Respiratory: Shortness breath  Gastrointestinal:  No pain, no nausea, no vomiting, no diarrhea  Musculoskeletal:  No muscle pain, no joint pain  Skin:  No rash  Neurologic:  No speech problems, no headache, no extremity numbness, no extremity tingling, no extremity weakness  Psychiatric:  No anxiety  Genitourinary:  No dysuria, no hematuria    Except as noted above the remainder of the review of systems was reviewed and negative.       PAST MEDICAL HISTORY     Past Medical History:   Diagnosis Date   • Anxiety and depression    • Arrhythmia     A-FIB   • Asthma    • Chicken pox    • COPD (chronic obstructive pulmonary disease) (HCC)    • Hyperlipidemia    • Hypertension    • Measles    • Menopause    • Osteoporosis    • Rheumatoid arthritis (HCC)    • Rheumatoid arthritis (HCC)    • Tobacco abuse          SURGICAL HISTORY       Past Surgical History:   Procedure Laterality Date   • APPENDECTOMY     • CARPAL TUNNEL RELEASE  Left    • CATARACT EXTRACTION, BILATERAL     • COLON SURGERY     • COLONOSCOPY     • GALLBLADDER SURGERY     • HYSTERECTOMY  1971   • TONSILLECTOMY           CURRENT MEDICATIONS     No current facility-administered medications for this encounter.    Current Outpatient Medications:   •  ALPRAZolam (XANAX) 0.5 MG tablet, Take 1 tablet by mouth At Night As Needed for Anxiety or Sleep., Disp: 5 tablet, Rfl: 0  •  ascorbic acid (VITAMIN C) 1000 MG tablet, Take 1 tablet by mouth Daily., Disp: , Rfl:   •  bisoprolol (ZEBeta) 5 MG tablet, Take 1 tablet by mouth Daily., Disp: 30 tablet, Rfl: 0  •  Budeson-Glycopyrrol-Formoterol (BREZTRI) 160-9-4.8 MCG/ACT aerosol inhaler, Inhale 2 puffs 2 (Two) Times a Day., Disp: , Rfl:   •  HYDROcodone-acetaminophen (NORCO) 7.5-325 MG per tablet, Take 1 tablet by mouth Every 6 (Six) Hours As Needed. Patient taking: once a day prn, Disp: , Rfl:   •  lactobacillus acidophilus (RISAQUAD) capsule capsule, Take 1 capsule by mouth Daily., Disp: 7 capsule, Rfl: 0  •  levothyroxine (SYNTHROID, LEVOTHROID) 75 MCG tablet, Take 1 tablet by mouth Every Morning., Disp: 30 tablet, Rfl: 5  •  multivitamin with minerals (CENTRUM SILVER ADULT 50+ PO), Take 1 tablet by mouth Daily., Disp: , Rfl:   •  Omega-3 Fatty Acids (fish oil) 1000 MG capsule capsule, Take 1,000 mg by mouth Daily With Breakfast., Disp: , Rfl:   •  pantoprazole (PROTONIX) 40 MG EC tablet, Take 1 tablet by mouth 2 (Two) Times a Day Before Meals., Disp: , Rfl:   •  potassium chloride (MICRO-K) 10 MEQ CR capsule, Take 2 capsules by mouth Daily., Disp: , Rfl:   •  sertraline (ZOLOFT) 100 MG tablet, Take 150 mg by mouth Daily. 1.5 tab daily, Disp: , Rfl:   •  Vitamin D, Ergocalciferol, 50 MCG (2000 UT) capsule, Take 1 tablet by mouth Daily., Disp: , Rfl:   •  albuterol sulfate  (90 Base) MCG/ACT inhaler, Inhale 2 puffs Every 4 (Four) Hours As Needed for Wheezing. Patient taking: Inhale 2 puffs by mouth every 4-6 hours, Disp: , Rfl:   •   ferrous sulfate 325 (65 FE) MG tablet, Take 1 tablet by mouth 2 (Two) Times a Day With Meals., Disp: , Rfl:   •  metOLazone (ZAROXOLYN) 5 MG tablet, Take 1 tablet by mouth Daily As Needed (Increased SOA, LE edema, weight gain > 3 pounds)., Disp: 30 tablet, Rfl: 0  •  predniSONE (DELTASONE) 10 MG tablet, Take 4 tablets by mouth Daily for 7 days, THEN 3 tablets Daily for 7 days, THEN 2 tablets Daily for 7 days, THEN 1 tablet Daily for 7 days., Disp: 70 tablet, Rfl: 0  •  torsemide (DEMADEX) 20 MG tablet, Take 2 tablets by mouth Daily., Disp: 30 tablet, Rfl: 5    ALLERGIES     Amiodarone    FAMILY HISTORY       Family History   Problem Relation Age of Onset   • Alzheimer's disease Mother    • No Known Problems Father    • No Known Problems Sister    • Hypertension Brother    • Parkinsonism Brother    • No Known Problems Maternal Grandmother    • No Known Problems Maternal Grandfather    • No Known Problems Paternal Grandmother    • No Known Problems Paternal Grandfather           SOCIAL HISTORY       Social History     Socioeconomic History   • Marital status:    Tobacco Use   • Smoking status: Former Smoker     Packs/day: 0.25     Types: Cigarettes     Quit date: 2021     Years since quittin.5   • Smokeless tobacco: Never Used   • Tobacco comment: 1-2 cigs occas   Vaping Use   • Vaping Use: Never used   Substance and Sexual Activity   • Alcohol use: Never   • Drug use: Never   • Sexual activity: Defer         PHYSICAL EXAM    (up to 7 for level 4, 8 or more for level 5)     Vitals:    10/07/21 0423 10/07/21 0612 10/07/21 0650 10/07/21 0730   BP: 136/58  139/54    BP Location: Left arm  Left arm    Patient Position: Lying  Lying    Pulse: 63 65 62 61   Resp: 16  16 20   Temp: 97.8 °F (36.6 °C)  98.3 °F (36.8 °C)    TempSrc: Axillary  Oral    SpO2: 100% 100% 99% 100%   Weight:       Height:           Physical Exam  General: Awake, alert, ill-appearing  HEENT: Conjunctivae normal.  Neck: Trachea  midline.  Cardiac: Heart regular rate, rhythm, no murmurs, rubs, or gallops  Lungs: Tachypneic, diffusely coarse  Chest wall: There is no tenderness to palpation over the chest wall or over ribs  Abdomen: Abdomen is soft, nontender, nondistended. There are no firm or pulsatile masses, no rebound rigidity or guarding.   Musculoskeletal: No deformity.  Neuro: Alert and oriented x 4.  Dermatology: Skin is warm and dry  Psych: Mentation is grossly normal, cognition is grossly normal. Affect is appropriate.  '    DIAGNOSTIC RESULTS     EKG: All EKG's are interpreted by the Emergency Department Physician who either signs or Co-signs this chart in the absence of a cardiologist.    ECG 12 Lead   Final Result   Test Reason : SOA Protocol   Blood Pressure :   */*   mmHG   Vent. Rate :  88 BPM     Atrial Rate :  88 BPM      P-R Int : 158 ms          QRS Dur :  90 ms       QT Int : 382 ms       P-R-T Axes :  76  76  -9 degrees      QTc Int : 462 ms      Normal sinus rhythm   Possible Left atrial enlargement   Cannot rule out Inferior infarct (cited on or before 04-OCT-2021)   Abnormal ECG   When compared with ECG of 14-SEP-2021 20:23,   Nonspecific T wave abnormality no longer evident in Anterior leads   T wave inversion now evident in Lateral leads   Confirmed by ORIANA ACUNA ([id number]) on 10/5/2021 2:47:09 PM      Referred By: EDMD           Confirmed By: ORIANA ACUNA          RADIOLOGY:   Non-plain film images such as CT, Ultrasound and MRI are read by the radiologist. Plain radiographic images are visualized and preliminarily interpreted by the emergency physician with the below findings:      [x] Radiologist's Report Reviewed:  XR Chest PA & Lateral   Final Result   Persistent small-to-moderate bilateral pleural effusions,   similar to comparison. Redemonstrated multifocal bilateral pulmonary   opacities, likely minimally improved from comparison. No distinct   pneumothorax. Unchanged heart and mediastinal contours.        This report was finalized on 10/7/2021 8:35 AM by Lonnie Azar.          XR Chest 1 View   Final Result   Congestive heart failure with extensive pulmonary   interstitial edema, mild to moderate bibasilar atelectasis and   relatively small pleural effusions. The findings are stable to slightly   worse compared to 2:07 AM exam.       D:  10/04/2021   E:  10/04/2021                This report was finalized on 10/4/2021 9:02 PM by Dr. Augie Leiva MD.          XR Chest 1 View   Final Result   Slight worsening in right basilar infiltrates, otherwise stable appearance of the chest.      Signer Name: Gomez Crane MD    Signed: 10/4/2021 2:31 AM    Workstation Name: [email or web address]     [facility]            ED BEDSIDE ULTRASOUND:   Performed by ED Physician - none    LABS:    I have reviewed and interpreted all of the currently available lab results from this visit (if applicable):  Results for orders placed or performed during the hospital encounter of 10/04/21   COVID-19,CEPHEID/VASHTI,SHALINI IN-HOUSE(OR EMERGENT/ADD-ON),NP SWAB IN TRANSPORT MEDIA 3-4 HR TAT - Swab, Nasopharynx    Specimen: Nasopharynx; Swab   Result Value Ref Range    COVID19 Not Detected Not Detected - Ref. Range   Blood Culture - Blood, Arm, Right    Specimen: Arm, Right; Blood   Result Value Ref Range    Blood Culture No growth at 5 days    Blood Culture - Blood, Arm, Left    Specimen: Arm, Left; Blood   Result Value Ref Range    Blood Culture No growth at 5 days    Comprehensive Metabolic Panel    Specimen: Blood   Result Value Ref Range    Glucose 192 (H) 65 - 99 mg/dL    BUN 26 (H) 8 - 23 mg/dL    Creatinine 0.89 0.57 - 1.00 mg/dL    Sodium 138 136 - 145 mmol/L    Potassium 4.5 3.5 - 5.2 mmol/L    Chloride 99 98 - 107 mmol/L    CO2 29.0 22.0 - 29.0 mmol/L    Calcium 8.9 8.6 - 10.5 mg/dL    Total Protein 6.9 6.0 - 8.5 g/dL    Albumin 3.80 3.50 - 5.20 g/dL    ALT (SGPT) 14 1 - 33 U/L    AST (SGOT) 23 1 - 32 U/L    Alkaline  Phosphatase 94 39 - 117 U/L    Total Bilirubin 0.3 0.0 - 1.2 mg/dL    eGFR Non African Amer 61 >60 mL/min/1.73    Globulin 3.1 gm/dL    A/G Ratio 1.2 g/dL    BUN/Creatinine Ratio 29.2 (H) 7.0 - 25.0    Anion Gap 10.0 5.0 - 15.0 mmol/L   BNP    Specimen: Blood   Result Value Ref Range    proBNP 3,158.0 (H) 0.0-1,800.0 pg/mL   Troponin    Specimen: Blood   Result Value Ref Range    Troponin T 0.010 0.000 - 0.030 ng/mL   CBC Auto Differential    Specimen: Blood   Result Value Ref Range    WBC 13.84 (H) 3.40 - 10.80 10*3/mm3    RBC 2.74 (L) 3.77 - 5.28 10*6/mm3    Hemoglobin 7.7 (L) 12.0 - 15.9 g/dL    Hematocrit 26.6 (L) 34.0 - 46.6 %    MCV 97.1 (H) 79.0 - 97.0 fL    MCH 28.1 26.6 - 33.0 pg    MCHC 28.9 (L) 31.5 - 35.7 g/dL    RDW 21.9 (H) 12.3 - 15.4 %    RDW-SD 77.3 (H) 37.0 - 54.0 fl    MPV 10.6 6.0 - 12.0 fL    Platelets 310 140 - 450 10*3/mm3    Neutrophil % 85.3 (H) 42.7 - 76.0 %    Lymphocyte % 7.4 (L) 19.6 - 45.3 %    Monocyte % 6.1 5.0 - 12.0 %    Eosinophil % 0.1 (L) 0.3 - 6.2 %    Basophil % 0.4 0.0 - 1.5 %    Immature Grans % 0.7 (H) 0.0 - 0.5 %    Neutrophils, Absolute 11.83 (H) 1.70 - 7.00 10*3/mm3    Lymphocytes, Absolute 1.02 0.70 - 3.10 10*3/mm3    Monocytes, Absolute 0.84 0.10 - 0.90 10*3/mm3    Eosinophils, Absolute 0.01 0.00 - 0.40 10*3/mm3    Basophils, Absolute 0.05 0.00 - 0.20 10*3/mm3    Immature Grans, Absolute 0.09 (H) 0.00 - 0.05 10*3/mm3    nRBC 0.0 0.0 - 0.2 /100 WBC   Procalcitonin    Specimen: Blood   Result Value Ref Range    Procalcitonin 0.07 0.00 - 0.25 ng/mL   Lactic Acid, Plasma    Specimen: Blood   Result Value Ref Range    Lactate 2.1 (C) 0.5 - 2.0 mmol/L   STAT Lactic Acid, Reflex    Specimen: Blood   Result Value Ref Range    Lactate 1.8 0.5 - 2.0 mmol/L   Urinalysis With Culture If Indicated - Urine, Clean Catch    Specimen: Urine, Clean Catch   Result Value Ref Range    Color, UA Yellow Yellow, Straw    Appearance, UA Clear Clear    pH, UA 6.5 5.0 - 8.0    Specific Gravity,  UA 1.011 1.001 - 1.030    Glucose, UA Negative Negative    Ketones, UA Negative Negative    Bilirubin, UA Negative Negative    Blood, UA Negative Negative    Protein, UA Negative Negative    Leuk Esterase, UA Negative Negative    Nitrite, UA Negative Negative    Urobilinogen, UA 0.2 E.U./dL 0.2 - 1.0 E.U./dL   CBC Auto Differential    Specimen: Blood   Result Value Ref Range    WBC 11.11 (H) 3.40 - 10.80 10*3/mm3    RBC 2.42 (L) 3.77 - 5.28 10*6/mm3    Hemoglobin 6.7 (C) 12.0 - 15.9 g/dL    Hematocrit 22.6 (L) 34.0 - 46.6 %    MCV 93.4 79.0 - 97.0 fL    MCH 27.7 26.6 - 33.0 pg    MCHC 29.6 (L) 31.5 - 35.7 g/dL    RDW 21.4 (H) 12.3 - 15.4 %    RDW-SD 73.7 (H) 37.0 - 54.0 fl    MPV 10.6 6.0 - 12.0 fL    Platelets 255 140 - 450 10*3/mm3    Neutrophil % 93.4 (H) 42.7 - 76.0 %    Lymphocyte % 4.6 (L) 19.6 - 45.3 %    Monocyte % 1.4 (L) 5.0 - 12.0 %    Eosinophil % 0.0 (L) 0.3 - 6.2 %    Basophil % 0.1 0.0 - 1.5 %    Immature Grans % 0.5 0.0 - 0.5 %    Neutrophils, Absolute 10.37 (H) 1.70 - 7.00 10*3/mm3    Lymphocytes, Absolute 0.51 (L) 0.70 - 3.10 10*3/mm3    Monocytes, Absolute 0.16 0.10 - 0.90 10*3/mm3    Eosinophils, Absolute 0.00 0.00 - 0.40 10*3/mm3    Basophils, Absolute 0.01 0.00 - 0.20 10*3/mm3    Immature Grans, Absolute 0.06 (H) 0.00 - 0.05 10*3/mm3    nRBC 0.0 0.0 - 0.2 /100 WBC   Basic Metabolic Panel    Specimen: Blood   Result Value Ref Range    Glucose 168 (H) 65 - 99 mg/dL    BUN 27 (H) 8 - 23 mg/dL    Creatinine 0.80 0.57 - 1.00 mg/dL    Sodium 134 (L) 136 - 145 mmol/L    Potassium 4.1 3.5 - 5.2 mmol/L    Chloride 98 98 - 107 mmol/L    CO2 24.0 22.0 - 29.0 mmol/L    Calcium 8.5 (L) 8.6 - 10.5 mg/dL    eGFR Non African Amer 69 >60 mL/min/1.73    BUN/Creatinine Ratio 33.8 (H) 7.0 - 25.0    Anion Gap 12.0 5.0 - 15.0 mmol/L   Magnesium    Specimen: Blood   Result Value Ref Range    Magnesium 2.9 (H) 1.6 - 2.4 mg/dL   Blood Gas, Arterial With Co-Ox    Specimen: Arterial Blood   Result Value Ref Range    Site  Right Radial     Darrell's Test N/A     pH, Arterial 7.455 (H) 7.350 - 7.450 pH units    pCO2, Arterial 40.1 35.0 - 45.0 mm Hg    pO2, Arterial 67.4 (L) 83.0 - 108.0 mm Hg    HCO3, Arterial 28.1 (H) 20.0 - 26.0 mmol/L    Base Excess, Arterial 3.9 (H) 0.0 - 2.0 mmol/L    Hemoglobin, Blood Gas 6.5 (C) 14 - 18 g/dL    Hematocrit, Blood Gas 19.8 %    Oxyhemoglobin 92.6 (L) 94 - 99 %    Methemoglobin 0.50 0.00 - 1.50 %    Carboxyhemoglobin 1.8 0 - 2 %    CO2 Content 29.3 22 - 33 mmol/L    Temperature 37.0 C    Barometric Pressure for Blood Gas      Modality Nasal Cannula     FIO2 40 %    Ventilator Mode       Note      Notified Who SOLIS CASTILLO RN     Notified By [id number]     Notified Time 10/04/2021 07:59     pH, Temp Corrected 7.455 pH Units    pCO2, Temperature Corrected 40.1 35 - 45 mm Hg    pO2, Temperature Corrected 67.4 (L) 83 - 108 mm Hg   Hemoglobin & Hematocrit, Blood    Specimen: Blood   Result Value Ref Range    Hemoglobin 7.9 (L) 12.0 - 15.9 g/dL    Hematocrit 25.4 (L) 34.0 - 46.6 %   CBC (No Diff)    Specimen: Blood   Result Value Ref Range    WBC 12.25 (H) 3.40 - 10.80 10*3/mm3    RBC 2.83 (L) 3.77 - 5.28 10*6/mm3    Hemoglobin 8.0 (L) 12.0 - 15.9 g/dL    Hematocrit 27.2 (L) 34.0 - 46.6 %    MCV 96.1 79.0 - 97.0 fL    MCH 28.3 26.6 - 33.0 pg    MCHC 29.4 (L) 31.5 - 35.7 g/dL    RDW 21.2 (H) 12.3 - 15.4 %    RDW-SD 74.5 (H) 37.0 - 54.0 fl    MPV 10.7 6.0 - 12.0 fL    Platelets 262 140 - 450 10*3/mm3   Basic Metabolic Panel    Specimen: Blood   Result Value Ref Range    Glucose 88 65 - 99 mg/dL    BUN 22 8 - 23 mg/dL    Creatinine 0.77 0.57 - 1.00 mg/dL    Sodium 134 (L) 136 - 145 mmol/L    Potassium 3.8 3.5 - 5.2 mmol/L    Chloride 100 98 - 107 mmol/L    CO2 19.0 (L) 22.0 - 29.0 mmol/L    Calcium 8.8 8.6 - 10.5 mg/dL    eGFR Non African Amer 72 >60 mL/min/1.73    BUN/Creatinine Ratio 28.6 (H) 7.0 - 25.0    Anion Gap 15.0 5.0 - 15.0 mmol/L   ECG 12 Lead   Result Value Ref Range    QT Interval 382 ms    QTC  Interval 462 ms   Type & Screen    Specimen: Blood   Result Value Ref Range    ABO Type O     RH type Positive     Antibody Screen Negative     T&S Expiration Date 10/7/2021 11:59:59 PM    Prepare RBC, 1 Units   Result Value Ref Range    Product Code O7016B52     Unit Number [id number]     UNIT  ABO O     UNIT  RH POS     Crossmatch Interpretation Compatible     Dispense Status RE     Blood Expiration Date 202110292359     Blood Type Barcode 5100    Prepare RBC, 1 Units   Result Value Ref Range    Product Code X2830W43     Unit Number [id number]     UNIT  ABO O     UNIT  RH POS     Crossmatch Interpretation Compatible     Dispense Status PT     Blood Expiration Date 202110292359     Blood Type Barcode 5100    Green Top (Gel)   Result Value Ref Range    Extra Tube Hold for add-ons.    Lavender Top   Result Value Ref Range    Extra Tube hold for add-on    Gold Top - SST   Result Value Ref Range    Extra Tube Hold for add-ons.    Gray Top   Result Value Ref Range    Extra Tube Hold for add-ons.    Light Blue Top   Result Value Ref Range    Extra Tube hold for add-on         All other labs were within normal range or not returned as of this dictation.      EMERGENCY DEPARTMENT COURSE and DIFFERENTIAL DIAGNOSIS/MDM:   Vitals:    Vitals:    10/07/21 0423 10/07/21 0612 10/07/21 0650 10/07/21 0730   BP: 136/58  139/54    BP Location: Left arm  Left arm    Patient Position: Lying  Lying    Pulse: 63 65 62 61   Resp: 16  16 20   Temp: 97.8 °F (36.6 °C)  98.3 °F (36.8 °C)    TempSrc: Axillary  Oral    SpO2: 100% 100% 99% 100%   Weight:       Height:           ED Course as of 10/25/21 2230   Mon Oct 04, 2021   0329 I spoke with Dr. Palmer who accepts patient for admission.  On reevaluation, the patient is stable on BiPAP.  Her presentation is concerning for combination of acute sepsis syndrome, COPD exacerbation, and CHF exacerbation with associated pneumonia.  She was given BiPAP, IV steroids, broad-spectrum IV  antibiotics, and IV Lasix in the ED.  There is no evidence of pneumothorax on chest x-ray and ECG and troponin demonstrate no evidence of acute myocardial ischemia/injury.  She given these other findings and the fact that the patient is already anticoagulated, feel that PE is very unlikely. [NS]      ED Course User Index  [NS] Darshan Gauthier MD         MEDICATIONS ADMINISTERED IN ED:  Medications   ipratropium-albuterol (DUO-NEB) nebulizer solution 3 mL (3 mL Nebulization Not Given 10/5/21 0155)   furosemide (LASIX) injection 40 mg (40 mg Intravenous Given 10/4/21 0210)   dexamethasone (DECADRON) IVPB 10 mg (0 mg Intravenous Stopped 10/4/21 0434)   ALPRAZolam (XANAX) tablet 0.5 mg (0.5 mg Oral Given 10/4/21 2227)         CRITICAL CARE TIME    Approximately 35 minutes of discontinuous critical care time was provided to this patient by myself absent of any time spent performing procedures.  Patient presents critically ill with acute proximal respiratory failure secondary to acute sepsis syndrome pneumonia with concomitant exacerbation of COPD and CHF placing the cardiovascular, respiratory, neurologic systems at risk requiring the following interventions: Initiation of BiPAP therapy, application of IV Lasix and IV antibiotics, interpretation of lab/ECG/imaging, frequent reassessment, coordination of admission with the following response: Resolution of hypoxia.  Patient at high risk of deterioration and possibly death without these interventions.        FINAL IMPRESSION      1. Acute sepsis (HCC)    2. Pneumonia of right lower lobe due to infectious organism    3. Acute on chronic congestive heart failure, unspecified heart failure type (HCC)    4. COPD exacerbation (HCC)    5. Acute respiratory failure with hypoxia (HCC)          DISPOSITION/PLAN     ED Disposition     ED Disposition Condition Comment    Decision to Admit  Level of Care: Telemetry [5]   Diagnosis: Acute sepsis (HCC) [[id number]]   Admitting  Physician: CAROLINA ALY [[id number]]   Attending Physician: CAROLINA ALY [[id number]]   Isolate for COVID?: No [0]   Certification: I Certify That Inpatient Hospital Services Are Medically Necessary For Greater Than 2 Midnights              Darshan Gauthier MD  Attending Emergency Physician               Darshan Gauthier MD  10/25/21 5474

## 2021-10-04 NOTE — PLAN OF CARE
Goal Outcome Evaluation:  Plan of Care Reviewed With: patient     AOX4 but forgetful at times, 15 L via non-rebreather, VSS, NSR, NPO. ABX administered as ordered. WOC consulted based on LLE wounds. Dressings on both wounds of LLE reinforced and assessed as documented. Pt rested well after settled with admission. No further complaints at this time.      Progress: no change

## 2021-10-04 NOTE — H&P
[facility]  HISTORY AND PHYSICAL    Patient Name: Yin Law  : 1940  MRN: [id number]  Primary Care Physician: Santiago Chaudhry MD  Date of admission: 10/4/2021      Subjective   Subjective     Chief Complaint:  Worsening dyspnea/hypoxia    HPI:  Yin Law is a 81 y.o. female  to ED with history of extensive lung disease-chronically on 3 L O2, has been always short of breath progressively getting worse over last few weeks, does complain of some increased pedal edema, she has been compliant with her torsemide as far as patient can tell.  Tonight she went to bed got up and felt very short of breath, EMS found her hypoxic with oxygen sats in low 80s, and was brought to ED.  Patient denies any complaint of associated fever or chills, no complaint of cough, no complaint of aspiration.  Denies any chest pain or palpitations.  On ED work-up her systolic blood pressure was found to be in 180s, chronically anemic-denies any acute bleeding.  Has been compliant with her inhalers as well.  Chest x-ray suspicion for CHF versus pneumonia.    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  Patient was admitted from - secondary to acute on chronic hypoxic respiratory failure.        No complaints of fever, chills, sore throat,   No complaints of bleeding per GI or .  Denies constipation, diarrhea, N/V, change in appetite.  No new joint pain,, does have superficial skin breakdown on the left lower extremity  No focal weakness, speech changes or vision changes.  No dysuria.    In the ED patient got aspirin 325, Lasix 40, bank, Zosyn, Zithromax, Decadron 10 mg  Current COVID Risks are:  [] Fever []  Cough [] Shortness of breath [] Fatigue [] Change in taste or smell    []  Exposure to COVID positive patient  [] High risk facility   []  NONE  COVID VACCINE status    The patient has a COVID HM Topic on their chart, and they are fully vaccinated.      Review of Systems   Complete ROS done, which is negative except as above and HPI.  Old records reviewed and summarized in PM hx      Personal History     Past Medical History:   Diagnosis Date   • Anxiety and depression    • Arrhythmia     A-FIB   • Asthma    • Chicken pox    • COPD (chronic obstructive pulmonary disease) (CMS/HCC)    • Hyperlipidemia    • Hypertension    • Measles    • Menopause    • Osteoporosis    • Rheumatoid arthritis (CMS/HCC)    • Rheumatoid arthritis (CMS/HCC)    • Tobacco abuse    Paroxysmal A. 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     • COLONOSCOPY     • GALLBLADDER SURGERY     • HYSTERECTOMY  1971   • TONSILLECTOMY         Family History: family history includes Alzheimer's disease in her mother; Hypertension in her brother; No Known Problems in her father, maternal grandfather, maternal grandmother, paternal grandfather, paternal grandmother, and sister; Parkinsonism in her brother. Otherwise pertinent FHx was reviewed and unremarkable.     Social History:  reports that she quit smoking about 5 months ago. Her smoking use included cigarettes. She smoked 0.25 packs per day. She has never used smokeless tobacco. She reports that she does not drink alcohol and does not use  drugs.      Medications:  Available home medication information reviewed.  (Not in a hospital admission)      Allergies   Allergen Reactions   • Amiodarone Unknown - High Severity       Objective   Objective     Vital Signs:   Temp:  [98.2 °F (36.8 °C)] 98.2 °F (36.8 °C)  Heart Rate:  [90]  Resp:  [22] % on nonrebreather,  BP: (177)/(75) 177/75        Physical Exam     GENERAL-slightly distressed-on BiPAP, well nourished.  RS-tachypneic, prolonged expiration, could not appreciate any crackles anteriorly,  CVS- s1s2 Regular, difficult to hear secondary to coarse breath sounds no murmur.  ABD-distended lower abdomen, nontender, bowel sounds positive  EXT-2+ edema, left lower extremity under dressing mid leg secondary to superficial skin wound.  NEURO-patient is alert, awake, can answer questions appropriately, generalized weakness but moving all 4 extremities spontaneously  EYES- Conjunctivae are normal. Pupils are equal, round, and reactive to light. No scleral icterus.   ENT- no external ear nose lesions, mucosa dry.  NECK- No JVD present. No tracheal deviation present. No thyromegaly present,No cervical lymphadenopathy.  JOINTS/MSK- no deformity, no swelling.  SKIN-as above  PSYCHIATRIC- Normal mood and affect. Behavior is normal. Thought content normal.     Results Reviewed:  I have personally reviewed current lab and radiology data.    Results from last 7 days   Lab Units 10/04/21  0146   WBC 10*3/mm3 13.84*   HEMOGLOBIN g/dL 7.7*   HEMATOCRIT % 26.6*   PLATELETS 10*3/mm3 310     Results from last 7 days   Lab Units 10/04/21  0146   SODIUM mmol/L 138   POTASSIUM mmol/L 4.5   CHLORIDE mmol/L 99   CO2 mmol/L 29.0   BUN mg/dL 26*   CREATININE mg/dL 0.89   GLUCOSE mg/dL 192*   CALCIUM mg/dL 8.9   ALT (SGPT) U/L 14   AST (SGOT) U/L 23   TROPONIN T ng/mL 0.010   PROBNP pg/mL 3,158.0*   LACTATE mmol/L 2.1*   PROCALCITONIN ng/mL 0.07     Estimated Creatinine Clearance: 46.4 mL/min (by C-G formula based on SCr of  0.89 mg/dL).  Brief Urine Lab Results  (Last result in the past 365 days)      Color   Clarity   Blood   Leuk Est   Nitrite   Protein   CREAT   Urine HCG        08/21/21 2314 Yellow Clear Negative Negative Positive Negative             Imaging Results (Last 24 Hours)     Procedure Component Value Units Date/Time    XR Chest 1 View [[id number]] Collected: 10/04/21 0231     Updated: 10/04/21 0233    Narrative:      CR Chest 1 Vw    INDICATION:   Shortness of air.     COMPARISON:    9/16/2021    FINDINGS:  Portable AP view(s) of the chest.  Heart remains enlarged. Atherosclerotic disease is present in the aorta. There are small bilateral pleural effusions, right larger than left. These are similar to the previous study. There are coarse bilateral  infiltrates. These are slightly worsened at the right lung base but otherwise largely stable. No pneumothorax is identified.      Impression:      Slight worsening in right basilar infiltrates, otherwise stable appearance of the chest.    Signer Name: Gomez Crane MD   Signed: 10/4/2021 2:31 AM   Workstation Name: [email or web address]    [facility]        Results for orders placed during the hospital encounter of 07/19/21    Adult Transthoracic Echo Complete W/ Cont if Necessary Per Protocol    Interpretation Summary  · Estimated left ventricular EF = 65%  · Mild aortic valve stenosis is present.  · Aortic valve maximum pressure gradient is 32.9 mmHg. Aortic valve mean pressure gradient is 17.2 mmHg.  · Mild mitral valve regurgitation is present.  · Mild tricuspid valve regurgitation is present.  · Estimated right ventricular systolic pressure from tricuspid regurgitation is 37.0 mmHg.  · There is a large left pleural effusion. There is a moderate sized right pleural effusion.    Troponin-less than 0.01, proBNP 3100  Ekg-sinus rhythm 80 bpm, , T wave inversion in lead III, aVF as well as minimal ST depression in V5, V6 similar to old EKG.  Chest  x-ray-slight worsening of the right basilar infiltrate otherwise similar appearance.  WBC-1 13, neutrophils of 85, lactic acid 2.1, pro-Juan Miguel 0.07  Hemoglobin 8, hematocrit 26, RDW 22, platelet 310  Glucose 192, BUN/creatinine 26/0.8, LFTs WNL, albumin 3.8 COVID-19 78 -        Assessment/Plan   Active Hospital Problems    Diagnosis  POA   • Acute sepsis (HCC) [A41.9]  Yes       Assessment & Plan     Acute hypoxic respiratory failure, on chronic respiratory failure-currently on BiPAP 30%-saturating 100%.  -Clinically appears to be volume overload-Lasix 40 every 12,  CHF navigator consult.  -Elevated white count, UA pending, clinically does not complain of any cough or respiration-we will continue Rocephin, doxycycline for now, repeat chest x-ray in a.m.  -Duo nebs every 24 hour, will try to continue her home nebs.    Paroxysmal A. fib, CHADS2 VASC= 5  -Eliquis 5 mg every 12, bisoprolol 5 mg daily-currently in sinus rhythm.    COPD/amiodarone pulmonary toxicity/interstitial lung disease/3 L nasal cannula at home -Pulmicort,stiolto inhaler  -If poor improvement patient may need pulmonary input.    Anxiety-Xanax 0.5 mg nightly    Chronic pain-Norco-we will try to avoid secondary to respiratory compromise    Hypothyroid-Synthroid 75 mcg    GERD-Protonix 40 mg twice daily    CHF, diastolic-torsemide 40 mg daily- as above    HX RA    Anemia-likely a combination of chronic disease/iron deficiency, stool occult negative in August patient was scheduled for outpatient EGD/colonoscopy on 9/14/2021, supposed to be on iron supplement twice a day.  -We will start with stool occult, will defer to day team for possible GI consult and work-up while patient is here.    DVT prophylaxis:    -TEDs/SCDs  -Lovenox-patient is on Eliquis.    CODE STATUS:    Code Status and Medical Interventions:   Ordered at: 10/04/21 9628     Level Of Support Discussed With:    Patient, discussed with patient-states that she wants to try ventilator at least  once if needed, did clarify the risk since patient has extensive pulmonary disease.     Code Status:    CPR     Medical Interventions (Level of Support Prior to Arrest):    Full         Admission Status:  I believe this patient meets inpatient criteria secondary to acute CHF,

## 2021-10-04 NOTE — PROGRESS NOTES
[facility] Medicine Services  ADMISSION FOLLOW-UP NOTE          Patient admitted after midnight, H&P by my partner performed earlier on today's date reviewed.  Interim findings, labs, and charting also reviewed.        [facility] Problem List has been managed and updated to include any new diagnoses:  Active Hospital Problems    Diagnosis  POA   • Acute sepsis (HCC) [A41.9]  Yes      Resolved Hospital Problems   No resolved problems to display.         ADDITIONAL PLAN:  - detailed assessment and plan from admission reviewed  - Patient admitted with respiratory failure likely due to CHF based on CXR. Continue IV diuresis as long as she tolerates along with BS abx until cultures return negative.  - Is more anemic today - was supposed to undergo outpatient EGD/c-scope however looks like this never occurred. Will hold her AC and ask GI to see.     Shila Crump II, DO  10/04/21

## 2021-10-05 LAB
BH BB BLOOD EXPIRATION DATE: NORMAL
BH BB BLOOD TYPE BARCODE: 5100
BH BB DISPENSE STATUS: NORMAL
BH BB PRODUCT CODE: NORMAL
BH BB UNIT NUMBER: NORMAL
CROSSMATCH INTERPRETATION: NORMAL
QT INTERVAL: 382 MS
QTC INTERVAL: 462 MS
UNIT  ABO: NORMAL
UNIT  RH: NORMAL

## 2021-10-05 PROCEDURE — 94660 CPAP INITIATION&MGMT: CPT

## 2021-10-05 PROCEDURE — 94799 UNLISTED PULMONARY SVC/PX: CPT

## 2021-10-05 PROCEDURE — 63710000001 PREDNISONE PER 1 MG: Performed by: INTERNAL MEDICINE

## 2021-10-05 PROCEDURE — 99233 SBSQ HOSP IP/OBS HIGH 50: CPT | Performed by: INTERNAL MEDICINE

## 2021-10-05 PROCEDURE — 25010000002 FUROSEMIDE PER 20 MG: Performed by: NURSE PRACTITIONER

## 2021-10-05 PROCEDURE — 25010000002 CEFTRIAXONE PER 250 MG: Performed by: NURSE PRACTITIONER

## 2021-10-05 PROCEDURE — 25010000002 NA FERRIC GLUC CPLX PER 12.5 MG: Performed by: PHYSICIAN ASSISTANT

## 2021-10-05 PROCEDURE — 99232 SBSQ HOSP IP/OBS MODERATE 35: CPT | Performed by: PHYSICIAN ASSISTANT

## 2021-10-05 RX ORDER — HYDROCODONE BITARTRATE AND ACETAMINOPHEN 5; 325 MG/1; MG/1
1 TABLET ORAL EVERY 4 HOURS PRN
Status: DISCONTINUED | OUTPATIENT
Start: 2021-10-05 | End: 2021-10-07 | Stop reason: HOSPADM

## 2021-10-05 RX ORDER — ALPRAZOLAM 0.5 MG/1
0.5 TABLET ORAL NIGHTLY
Status: DISCONTINUED | OUTPATIENT
Start: 2021-10-05 | End: 2021-10-07 | Stop reason: HOSPADM

## 2021-10-05 RX ORDER — PREDNISONE 20 MG/1
40 TABLET ORAL
Status: DISCONTINUED | OUTPATIENT
Start: 2021-10-05 | End: 2021-10-07 | Stop reason: HOSPADM

## 2021-10-05 RX ADMIN — IPRATROPIUM BROMIDE AND ALBUTEROL SULFATE 3 ML: 2.5; .5 SOLUTION RESPIRATORY (INHALATION) at 03:54

## 2021-10-05 RX ADMIN — SODIUM CHLORIDE, PRESERVATIVE FREE 10 ML: 5 INJECTION INTRAVENOUS at 21:28

## 2021-10-05 RX ADMIN — SODIUM CHLORIDE 250 MG: 9 INJECTION, SOLUTION INTRAVENOUS at 15:20

## 2021-10-05 RX ADMIN — PREDNISONE 40 MG: 20 TABLET ORAL at 15:20

## 2021-10-05 RX ADMIN — LEVOTHYROXINE SODIUM 75 MCG: 0.07 TABLET ORAL at 06:16

## 2021-10-05 RX ADMIN — SODIUM CHLORIDE 1 G: 900 INJECTION INTRAVENOUS at 06:18

## 2021-10-05 RX ADMIN — OXYCODONE HYDROCHLORIDE AND ACETAMINOPHEN 1000 MG: 500 TABLET ORAL at 09:06

## 2021-10-05 RX ADMIN — FERROUS SULFATE TAB 325 MG (65 MG ELEMENTAL FE) 325 MG: 325 (65 FE) TAB at 09:06

## 2021-10-05 RX ADMIN — DOXYCYCLINE 100 MG: 100 INJECTION, POWDER, LYOPHILIZED, FOR SOLUTION INTRAVENOUS at 05:18

## 2021-10-05 RX ADMIN — HYDROCODONE BITARTRATE AND ACETAMINOPHEN 1 TABLET: 5; 325 TABLET ORAL at 11:54

## 2021-10-05 RX ADMIN — ALPRAZOLAM 0.5 MG: 0.5 TABLET ORAL at 21:28

## 2021-10-05 RX ADMIN — Medication 1 CAPSULE: at 09:06

## 2021-10-05 RX ADMIN — BUDESONIDE 0.5 MG: 0.5 INHALANT RESPIRATORY (INHALATION) at 08:37

## 2021-10-05 RX ADMIN — BUDESONIDE 0.5 MG: 0.5 INHALANT RESPIRATORY (INHALATION) at 21:12

## 2021-10-05 RX ADMIN — FUROSEMIDE 40 MG: 10 INJECTION, SOLUTION INTRAMUSCULAR; INTRAVENOUS at 09:06

## 2021-10-05 RX ADMIN — PANTOPRAZOLE SODIUM 40 MG: 40 INJECTION, POWDER, FOR SOLUTION INTRAVENOUS at 21:27

## 2021-10-05 RX ADMIN — POTASSIUM CHLORIDE 20 MEQ: 750 CAPSULE, EXTENDED RELEASE ORAL at 09:06

## 2021-10-05 RX ADMIN — PANTOPRAZOLE SODIUM 40 MG: 40 INJECTION, POWDER, FOR SOLUTION INTRAVENOUS at 09:06

## 2021-10-05 RX ADMIN — POTASSIUM CHLORIDE 20 MEQ: 750 CAPSULE, EXTENDED RELEASE ORAL at 09:07

## 2021-10-05 RX ADMIN — SERTRALINE HYDROCHLORIDE 150 MG: 100 TABLET ORAL at 09:06

## 2021-10-05 RX ADMIN — BISOPROLOL FUMARATE 5 MG: 5 TABLET, FILM COATED ORAL at 09:07

## 2021-10-05 RX ADMIN — IPRATROPIUM BROMIDE AND ALBUTEROL SULFATE 3 ML: 2.5; .5 SOLUTION RESPIRATORY (INHALATION) at 12:46

## 2021-10-05 RX ADMIN — IPRATROPIUM BROMIDE AND ALBUTEROL SULFATE 3 ML: 2.5; .5 SOLUTION RESPIRATORY (INHALATION) at 08:37

## 2021-10-05 RX ADMIN — MULTIPLE VITAMINS W/ MINERALS TAB 1 TABLET: TAB at 09:06

## 2021-10-05 NOTE — CONSULTS
Santiago Chaudhry MD  Consulting physician: Shila Crump  Reason for referral: goc discussion, ACP  Chief Complaint   Patient presents with   • Shortness of Breath     HPI:  81 y.o. female with a past medical history significant for COPD on 2L NC at baseline, underlying interstitial lung disease, diastolic congestive heart failure, rheumatoid arthritis, anxiety, depression, essential hypertension and atrial fibrillation presents to the ED with complaints of worsening shortness of air and increase pedal edema on 10/4/2021.   proBNP 3158.0  Labs showed worsening anemia: Hgb 6.7/Hct 22.    Patient is known to palliative medicine from most recent admission, 9/14-9/19.  Symptoms:   Patient reports chronic hip pain, previous reported bilateral knee pain - managed at home with diclofenac ointment.   + dyspnea - reports primarily with exertion. Patient reports that dyspnea seems to gradually progress and suddenly unable to manage it at home.   + chronic anxiety - regularly takes night time alprazolam at 2200  No nausea - + appetite - reports usually eats 3 meals each day but is eating less  Reports feeling better after blood transfusion.  Advance care planning discussed:   Code Status:   Current Code Status     Date Active Code Status Order ID Comments User Context       10/5/2021 1352 No CPR [id number]  Shila Crump M II, DO Inpatient     Advance Care Planning Activity      Questions for Current Code Status     Question Answer Comment    Code Status No CPR     Medical Interventions (Level of Support Prior to Arrest) Limited     Limited Support to NOT Include Intubation         Advance Directive: none on medical record  Surrogate decision maker: Enedina de leon  Past Medical History:   Diagnosis Date   • Anxiety and depression    • Arrhythmia     A-FIB   • Asthma    • Chicken pox    • COPD (chronic obstructive pulmonary disease) (CMS/[facility])    • Hyperlipidemia    • Hypertension    • Measles    • Menopause    •  Osteoporosis    • Rheumatoid arthritis (CMS/HCC)    • Rheumatoid arthritis (CMS/HCC)    • Tobacco abuse      Past Surgical History:   Procedure Laterality Date   • APPENDECTOMY     • CARPAL TUNNEL RELEASE Left    • CATARACT EXTRACTION, BILATERAL     • COLON SURGERY     • COLONOSCOPY     • GALLBLADDER SURGERY     • HYSTERECTOMY  1971   • TONSILLECTOMY         Reviewed current scheduled and prn medications for route, type, dose and frequency.    Current Facility-Administered Medications   Medication Dose Route Frequency Provider Last Rate Last Admin   • acetaminophen (TYLENOL) tablet 650 mg  650 mg Oral Q4H PRN Ann Marie Sage APRN        Or   • acetaminophen (TYLENOL) 160 MG/5ML solution 650 mg  650 mg Oral Q4H PRN Ann Marie Sage APRN        Or   • acetaminophen (TYLENOL) suppository 650 mg  650 mg Rectal Q4H PRN Ann Marie Sage APRN       • ascorbic acid (VITAMIN C) tablet 1,000 mg  1,000 mg Oral Daily Ann Marie Sage APRN   1,000 mg at 10/05/21 0906   • aspirin chewable tablet 324 mg  324 mg Oral Once Darshan Gauthier MD       • bisoprolol (ZEBeta) tablet 5 mg  5 mg Oral Q24H Ann Marie Sage APRN   5 mg at 10/05/21 0907   • budesonide (PULMICORT) nebulizer solution 0.5 mg  0.5 mg Nebulization BID - RT Ann Marie Sage APRN   0.5 mg at 10/05/21 0837   • ferric gluconate (FERRLECIT) 250 mg in sodium chloride 0.9 % 120 mL IVPB  250 mg Intravenous Daily Christina Luo PA       • furosemide (LASIX) injection 40 mg  40 mg Intravenous Q12H Ann Marie Sage APRN   40 mg at 10/05/21 0906   • HYDROcodone-acetaminophen (NORCO) 5-325 MG per tablet 1 tablet  1 tablet Oral Q4H PRN Shila Crump II DO   1 tablet at 10/05/21 1154   • ipratropium-albuterol (DUO-NEB) nebulizer solution 3 mL  3 mL Nebulization Q6H PRN Ann Marie Sage APRN   3 mL at 10/05/21 1246   • lactobacillus acidophilus (RISAQUAD) capsule 1 capsule  1 capsule Oral Daily Ann Marie Sage APRN   1 capsule at 10/05/21 0906   • levothyroxine  (SYNTHROID, LEVOTHROID) tablet 75 mcg  75 mcg Oral Q AM Ann Marie Sage, APRN   75 mcg at 10/05/21 0616   • multivitamin with minerals 1 tablet  1 tablet Oral Daily Ann Marie Sage, APRN   1 tablet at 10/05/21 0906   • pantoprazole (PROTONIX) injection 40 mg  40 mg Intravenous Q12H Shila Crump YAMILETH II, DO   40 mg at 10/05/21 0906   • Pharmacy Consult - MTM   Does not apply Daily Riedel, Taylor, PharmD       • potassium chloride (MICRO-K) CR capsule 20 mEq  20 mEq Oral Daily Ann Marie Sage, APRN   20 mEq at 10/05/21 0907   • sertraline (ZOLOFT) tablet 150 mg  150 mg Oral Daily Ann Marie Sage, APRN   150 mg at 10/05/21 0906   • sodium chloride 0.9 % flush 10 mL  10 mL Intravenous Q12H Ann Marie Sage, APRN   10 mL at 10/04/21 1027   • sodium chloride 0.9 % flush 10 mL  10 mL Intravenous PRN Ann Marie Sage APRN            •  acetaminophen **OR** acetaminophen **OR** acetaminophen  •  HYDROcodone-acetaminophen  •  ipratropium-albuterol  •  sodium chloride  Allergies   Allergen Reactions   • Amiodarone Unknown - High Severity     Family History   Problem Relation Age of Onset   • Alzheimer's disease Mother    • No Known Problems Father    • No Known Problems Sister    • Hypertension Brother    • Parkinsonism Brother    • No Known Problems Maternal Grandmother    • No Known Problems Maternal Grandfather    • No Known Problems Paternal Grandmother    • No Known Problems Paternal Grandfather      Social History     Socioeconomic History   • Marital status:      Spouse name: Not on file   • Number of children: Not on file   • Years of education: Not on file   • Highest education level: Not on file   Tobacco Use   • Smoking status: Former Smoker     Packs/day: 0.25     Types: Cigarettes     Quit date: 2021     Years since quittin.4   • Smokeless tobacco: Never Used   • Tobacco comment: 1-2 cigs occas   Vaping Use   • Vaping Use: Never used   Substance and Sexual Activity   • Alcohol use: Never   •  "Drug use: Never   • Sexual activity: Defer       Review of Systems - +/- per HPI and symptom review.      PPS: 50%  /51 (BP Location: Left arm, Patient Position: Lying)   Pulse 68   Temp 98.5 °F (36.9 °C) (Oral)   Resp 22   Ht 167.6 cm (66\")   Wt 62.2 kg (137 lb 3.2 oz)   SpO2 97%   BMI 22.14 kg/m²   62.2 kg (137 lb 3.2 oz) Body mass index is 22.14 kg/m².  Intake & Output (last day)       10/04 0701 - 10/05 0700 10/05 0701 - 10/06 0700    P.O. 525     Blood 292     Total Intake(mL/kg) 817 (13.1)     Urine (mL/kg/hr) 2300 (1.5) 700 (1.6)    Total Output 2300 700    Net -1483 -700              Physical Exam:  General Appearance: No acute distress, up in chair  Head: Normocephalic without obvious abnormality, atraumatic  Eyes: Conjunctivae and sclerae normal, no icterus, JAVIER  Back: No kyphosis present, no tenderness midline  Lungs: Clear to auscultation, respirations regular, even and non-labored  Heart: Regular rhythm and normal rate, normal S1  Abdomen: Normal bowel sounds, soft, non-distended, non-tender  Extremities: Moves all extremities, no redness, no cyanosis, no edema, dressing in place on LLE shin  Pulses: Distal pulses palpable and equal bilaterally  Skin: Warm and dry  Neurological: Alert, interactive, appropriate conversation, no myoclonus     Reviewed labs and diagnostic results.  Lab Results   Component Value Date    HGBA1C 4.90 07/20/2021     Results from last 7 days   Lab Units 10/04/21  1504 10/04/21  0659 10/04/21  0659   WBC 10*3/mm3  --   --  11.11*   HEMOGLOBIN g/dL 7.9*   < > 6.7*   HEMATOCRIT % 25.4*   < > 22.6*   PLATELETS 10*3/mm3  --   --  255    < > = values in this interval not displayed.     Results from last 7 days   Lab Units 10/04/21  0659 10/04/21  0146 10/04/21  0146   SODIUM mmol/L 134*   < > 138   POTASSIUM mmol/L 4.1   < > 4.5   CHLORIDE mmol/L 98   < > 99   CO2 mmol/L 24.0   < > 29.0   BUN mg/dL 27*   < > 26*   CREATININE mg/dL 0.80   < > 0.89   CALCIUM mg/dL 8.5*   " < > 8.9   BILIRUBIN mg/dL  --   --  0.3   ALK PHOS U/L  --   --  94   ALT (SGPT) U/L  --   --  14   AST (SGOT) U/L  --   --  23   GLUCOSE mg/dL 168*   < > 192*    < > = values in this interval not displayed.     Results from last 7 days   Lab Units 10/04/21  0659   SODIUM mmol/L 134*   POTASSIUM mmol/L 4.1   CHLORIDE mmol/L 98   CO2 mmol/L 24.0   BUN mg/dL 27*   CREATININE mg/dL 0.80   GLUCOSE mg/dL 168*   CALCIUM mg/dL 8.5*     Imaging Results (Last 72 Hours)     Procedure Component Value Units Date/Time    XR Chest 1 View [[id number]] Collected: 10/04/21 1238     Updated: 10/04/21 2105    Narrative:      EXAMINATION: XR CHEST 1 VW-10/04/2021:     INDICATION: Dyspnea; A41.9-Sepsis, unspecified organism;  J18.9-Pneumonia, unspecified organism; I50.9-Heart failure, unspecified;  J44.1-Chronic obstructive pulmonary disease with (acute) exacerbation;  J96.01-Acute respiratory failure with hypoxia.     COMPARISON: 10/04/2021 2:07 AM.     FINDINGS: The current image is timed 9:45 AM. The heart shadow remains  enlarged. There is a persistent perihilar edema pattern present, stable  to slightly further increased from the prior study. Bibasilar effusion  and atelectasis is stable to only slightly increased. No pneumothorax is  identified.       Impression:      Congestive heart failure with extensive pulmonary  interstitial edema, mild to moderate bibasilar atelectasis and  relatively small pleural effusions. The findings are stable to slightly  worse compared to 2:07 AM exam.     D:  10/04/2021  E:  10/04/2021            This report was finalized on 10/4/2021 9:02 PM by Dr. Augie Leiva MD.       XR Chest 1 View [[id number]] Collected: 10/04/21 0231     Updated: 10/04/21 0233    Narrative:      CR Chest 1 Vw    INDICATION:   Shortness of air.     COMPARISON:    9/16/2021    FINDINGS:  Portable AP view(s) of the chest.  Heart remains enlarged. Atherosclerotic disease is present in the aorta. There are small bilateral pleural  effusions, right larger than left. These are similar to the previous study. There are coarse bilateral  infiltrates. These are slightly worsened at the right lung base but otherwise largely stable. No pneumothorax is identified.      Impression:      Slight worsening in right basilar infiltrates, otherwise stable appearance of the chest.    Signer Name: Gomez Crane MD   Signed: 10/4/2021 2:31 AM   Workstation [facility]: [email or web address]    [facility]        Impression: 81 y.o. female with acute on chronic hypoxic respiratory failure, interstitial lung disease, symptomatic anemia  Plan:   Dyspnea - continue to monitor  Patient describes adapting at home with maintaining function with exertional dyspnea.     Chronic joint pain - prn hydrocodone/APAP and ointments effective.     Anxiety - reports takes alprazolam at home nightly    Goals of care discussion - patient is still managing at home with assistance of niece, sister and brother. She is hopeful to maintain and stay at home as long as possible.   Palliative team provide support to pt/family.       Ester Aguilar, AMA  [phone]  10/05/21  14:07 EDT      Time: 60  minutes spent reviewing medical and medication records, assessing and examining patient, discussing with patient, answering questions, formulating a plan and documentation of care. > 50% time spent face to face

## 2021-10-05 NOTE — CASE MANAGEMENT/SOCIAL WORK
Continued Stay Note  [facility]     Patient Name: Yin Law  MRN: [id number]  Today's Date: 10/5/2021    Admit Date: 10/4/2021    Discharge Plan     Row Name 10/05/21 1523       Plan    Plan Comments     Ms. Law is not medically ready for discharge. Palliative care has been consulted. Case management will continue to follow Ms. Law's progress and provide for her any anticipated discharge needs.        Final Discharge Disposition Code  30 - still a patient       Expected Discharge Date and Time     Expected Discharge Date Expected Discharge Time    Oct 8, 2021             Chapis Trammell RN

## 2021-10-05 NOTE — PROGRESS NOTES
[facility] Medicine Services  PROGRESS NOTE    Patient Name: Yin Law  : 1940  MRN: [id number]    Date of Admission: 10/4/2021  Primary Care Physician: Santiago Chaudhry MD    Subjective   Subjective     CC: SOA    HPI: Up in chair. Quite SOA at time of my arrival as she was just bathed. Doesn't feel much better than she did yesterday.    ROS:  Gen- No fevers, chills  CV- No chest pain, palpitations  GI- No N/V/D, abd pain     Objective   Objective     Vital Signs:   Temp:  [97.4 °F (36.3 °C)-98.5 °F (36.9 °C)] 98.5 °F (36.9 °C)  Heart Rate:  [63-86] 68  Resp:  [20-26] 22  BP: (127-138)/(51-58) 135/51  Flow (L/min):  [4-5] 4     Physical Exam:  Constitutional: Mod distress, awake, alert  HENT: NCAT, mucous membranes moist, O2 via NC  Respiratory: Increased WOB, crackles  Cardiovascular: RRR, no murmurs, rubs, or gallops  Gastrointestinal: Positive bowel sounds, soft, nontender, nondistended  Musculoskeletal: No bilateral ankle edema  Psychiatric: Appropriate affect, cooperative  Neurologic: Oriented x 3, strength symmetric in all extremities, Cranial Nerves grossly intact to confrontation, speech clear  Skin: No rashes    Results Reviewed:  LAB RESULTS:      Lab 10/04/21  1504 10/04/21  0659 10/04/21  0146   WBC  --  11.11* 13.84*   HEMOGLOBIN 7.9* 6.7* 7.7*   HEMATOCRIT 25.4* 22.6* 26.6*   PLATELETS  --  255 310   NEUTROS ABS  --  10.37* 11.83*   IMMATURE GRANS (ABS)  --  0.06* 0.09*   LYMPHS ABS  --  0.51* 1.02   MONOS ABS  --  0.16 0.84   EOS ABS  --  0.00 0.01   MCV  --  93.4 97.1*   PROCALCITONIN  --   --  0.07   LACTATE  --  1.8 2.1*         Lab 10/04/21  0659 10/04/21  0146   SODIUM 134* 138   POTASSIUM 4.1 4.5   CHLORIDE 98 99   CO2 24.0 29.0   ANION GAP 12.0 10.0   BUN 27* 26*   CREATININE 0.80 0.89   GLUCOSE 168* 192*   CALCIUM 8.5* 8.9   MAGNESIUM 2.9*  --          Lab 10/04/21  0146   TOTAL PROTEIN 6.9   ALBUMIN 3.80   GLOBULIN 3.1   ALT (SGPT) 14   AST  (SGOT) 23   BILIRUBIN 0.3   ALK PHOS 94         Lab 10/04/21  0146   PROBNP 3,158.0*   TROPONIN T 0.010             Lab 10/04/21  0659   ABO TYPING O   RH TYPING Positive   ANTIBODY SCREEN Negative         Lab 10/04/21  0746   PH, ARTERIAL 7.455*   PCO2, ARTERIAL 40.1   PO2 ART 67.4*   FIO2 40   HCO3 ART 28.1*   BASE EXCESS ART 3.9*   CARBOXYHEMOGLOBIN 1.8     Brief Urine Lab Results  (Last result in the past 365 days)      Color   Clarity   Blood   Leuk Est   Nitrite   Protein   CREAT   Urine HCG        10/04/21 1116 Yellow Clear Negative Negative Negative Negative               Microbiology Results Abnormal     Procedure Component Value - Date/Time    Blood Culture - Blood, Arm, Right [[id number]] Collected: 10/04/21 0312    Lab Status: Preliminary result Specimen: Blood from Arm, Right Updated: 10/05/21 0330     Blood Culture No growth at 24 hours    Blood Culture - Blood, Arm, Left [[id number]] Collected: 10/04/21 0312    Lab Status: Preliminary result Specimen: Blood from Arm, Left Updated: 10/05/21 0330     Blood Culture No growth at 24 hours    COVID PRE-OP / PRE-PROCEDURE SCREENING ORDER (NO ISOLATION) - Swab, Nasopharynx [[id number]]  (Normal) Collected: 10/04/21 0149    Lab Status: Final result Specimen: Swab from Nasopharynx Updated: 10/04/21 0245    Narrative:      The following orders were created for panel order COVID PRE-OP / PRE-PROCEDURE SCREENING ORDER (NO ISOLATION) - Swab, Nasopharynx.  Procedure                               Abnormality         Status                     ---------                               -----------         ------                     COVID-19,CEPHEID/VASHTI,LE...[[id number]]  Normal              Final result                 Please view results for these tests on the individual orders.    COVID-19,CEPHEID/VASHTI,SHALINI IN-HOUSE(OR EMERGENT/ADD-ON),NP SWAB IN TRANSPORT MEDIA 3-4 HR TAT - Swab, Nasopharynx [[id number]]  (Normal) Collected: 10/04/21 0149    Lab Status: Final result  Specimen: Swab from Nasopharynx Updated: 10/04/21 0245     COVID19 Not Detected    Narrative:      Fact sheet for providers: https://www.fda.gov/media/684445/download     Fact sheet for patients: https://www.fda.gov/media/812765/download  Fact sheet for providers: https://www.fda.gov/media/917180/download     Fact sheet for patients: https://www.fda.gov/media/822626/download        CXR personally reviewed showing CHF. Agree with interpretation.    XR Chest 1 View    Result Date: 10/4/2021  EXAMINATION: XR CHEST 1 VW-10/04/2021:  INDICATION: Dyspnea; A41.9-Sepsis, unspecified organism; J18.9-Pneumonia, unspecified organism; I50.9-Heart failure, unspecified; J44.1-Chronic obstructive pulmonary disease with (acute) exacerbation; J96.01-Acute respiratory failure with hypoxia.  COMPARISON: 10/04/2021 2:07 AM.  FINDINGS: The current image is timed 9:45 AM. The heart shadow remains enlarged. There is a persistent perihilar edema pattern present, stable to slightly further increased from the prior study. Bibasilar effusion and atelectasis is stable to only slightly increased. No pneumothorax is identified.      Impression: Congestive heart failure with extensive pulmonary interstitial edema, mild to moderate bibasilar atelectasis and relatively small pleural effusions. The findings are stable to slightly worse compared to 2:07 AM exam.  D:  10/04/2021 E:  10/04/2021     This report was finalized on 10/4/2021 9:02 PM by Dr. Augie Leiva MD.      XR Chest 1 View    Result Date: 10/4/2021  CR Chest 1 Vw INDICATION: Shortness of air. COMPARISON:  9/16/2021 FINDINGS: Portable AP view(s) of the chest.  Heart remains enlarged. Atherosclerotic disease is present in the aorta. There are small bilateral pleural effusions, right larger than left. These are similar to the previous study. There are coarse bilateral infiltrates. These are slightly worsened at the right lung base but otherwise largely stable. No pneumothorax is identified.      Impression: Slight worsening in right basilar infiltrates, otherwise stable appearance of the chest. Signer Name: Gomez Crane MD  Signed: 10/4/2021 2:31 AM  Workstation Name: [email or web address]  [facility]      Results for orders placed during the hospital encounter of 07/19/21    Adult Transthoracic Echo Complete W/ Cont if Necessary Per Protocol    Interpretation Summary  · Estimated left ventricular EF = 65%  · Mild aortic valve stenosis is present.  · Aortic valve maximum pressure gradient is 32.9 mmHg. Aortic valve mean pressure gradient is 17.2 mmHg.  · Mild mitral valve regurgitation is present.  · Mild tricuspid valve regurgitation is present.  · Estimated right ventricular systolic pressure from tricuspid regurgitation is 37.0 mmHg.  · There is a large left pleural effusion. There is a moderate sized right pleural effusion.      I have reviewed the medications:  Scheduled Meds:ascorbic acid, 1,000 mg, Oral, Daily  aspirin, 324 mg, Oral, Once  bisoprolol, 5 mg, Oral, Q24H  budesonide, 0.5 mg, Nebulization, BID - RT  ferric gluconate, 250 mg, Intravenous, Daily  furosemide, 40 mg, Intravenous, Q12H  lactobacillus acidophilus, 1 capsule, Oral, Daily  levothyroxine, 75 mcg, Oral, Q AM  multivitamin with minerals, 1 tablet, Oral, Daily  pantoprazole, 40 mg, Intravenous, Q12H  pharmacy consult - MTM, , Does not apply, Daily  potassium chloride, 20 mEq, Oral, Daily  sertraline, 150 mg, Oral, Daily  sodium chloride, 10 mL, Intravenous, Q12H      Continuous Infusions:   PRN Meds:.•  acetaminophen **OR** acetaminophen **OR** acetaminophen  •  HYDROcodone-acetaminophen  •  ipratropium-albuterol  •  sodium chloride    Assessment/Plan   Assessment & Plan     Active Hospital Problems    Diagnosis  POA   • Acute sepsis (HCC) [A41.9]  Yes   • Severe malnutrition (CMS/HCC) [E43]  Yes      Resolved Hospital Problems   No resolved problems to display.        Brief Hospital Course to date:  Yin MCQUEEN  Thanh is a 81 y.o. female female  to ED with history of extensive lung disease-chronically on 3 L O2, has been always short of breath progressively getting worse over last few weeks. This is my first day assessing patient's active medical issues.    Acute on chronic respiratory failure with hypoxia  Suspected underlying ILD  COPD on 2L O2 chronically   -CX negative and does not seem c/w infection. Will stop abx and observe.  -Pulmonary followed during last visit. Workup included elevated RF (in the setting of known RA), negative quantiferon, +anti-centromere antibody, + atypical p-ANCA with negative MPO and PR3.  She was felt to be too frail for open lung biopsy.  Thoracentesis performed last admission with transudative fluid and negative cytology. Palliative care was recommended.  -Continue IV steroids, nebs/inhalers.  -Consult palliative.     Acute on chronic diastolic CHF  -Continue BID IV diuresis as long as she will tolerate.  -Repeat CXR in am.    Acute/chronic anemia  -Transfused 1 unit blood w/ appropriate response. CTM.  -GI has evaluated, felt too frail for endoscopic eval at this point. Recs empiric tx with IV PP.  -IV iron    Hypothyroidism  -Synthroid.     Paroxysmal atrial fibrillation  -Continue bisoprolol.  Cannot tolerate amiodarone due to pulmonary toxicity. Will stop Eliqius indefinitely.   -Per last cardiology note she could not tolerate Eliquis due to GI bleeding, however she was discharged on Eliquis at last visit.     Anxiety/depression   -On xanax     This patient's problems and plans were partially entered by my partner and updated as appropriate by me 10/05/21.    DVT prophylaxis:  No DVT prophylaxis order currently exists.       AM-PAC 6 Clicks Score (PT): 18 (10/04/21 2015)    Disposition: I expect the patient to be discharged TBD.    CODE STATUS:   Code Status and Medical Interventions:   Ordered at: 10/05/21 1352     Limited Support to NOT Include:    Intubation     Code Status:    No  CPR     Medical Interventions (Level of Support Prior to Arrest):    Limited       Shila Crump II, DO  10/05/21

## 2021-10-05 NOTE — PROGRESS NOTES
"GI Daily Progress Note  Subjective:    Chief Complaint:  Follow up anemia     Ms. Law is in mild respiratory distress after receiving a bath earlier.  She is on 5 L O2.  She is tachypneic in conversation.  Had a bowel movement earlier that was nonbloody.      Objective:    /54 (BP Location: Left arm, Patient Position: Lying)   Pulse 80   Temp 97.9 °F (36.6 °C) (Oral)   Resp 22   Ht 167.6 cm (66\")   Wt 62.2 kg (137 lb 3.2 oz)   SpO2 97%   BMI 22.14 kg/m²     Physical Exam  Constitutional:       Appearance: She is ill-appearing.   Pulmonary:      Effort: Tachypnea and accessory muscle usage present.      Comments: Mild respiratory distress  Tachypnea   Coarse breath sounds  Abdominal:      General: Bowel sounds are normal.      Palpations: Abdomen is soft.      Tenderness: There is no abdominal tenderness.   Skin:     General: Skin is warm and dry.      Coloration: Skin is pale.   Neurological:      Mental Status: She is alert and oriented to person, place, and time.         Lab  Lab Results   Component Value Date    WBC 11.11 (H) 10/04/2021    HGB 7.9 (L) 10/04/2021    HGB 6.7 (C) 10/04/2021    HGB 7.7 (L) 10/04/2021    MCV 93.4 10/04/2021     10/04/2021    INR 1.08 08/23/2021       Lab Results   Component Value Date    GLUCOSE 168 (H) 10/04/2021    BUN 27 (H) 10/04/2021    CREATININE 0.80 10/04/2021    EGFRIFNONA 69 10/04/2021    BCR 33.8 (H) 10/04/2021     (L) 10/04/2021    K 4.1 10/04/2021    CO2 24.0 10/04/2021    CALCIUM 8.5 (L) 10/04/2021    ALBUMIN 3.80 10/04/2021    ALKPHOS 94 10/04/2021    BILITOT 0.3 10/04/2021    ALT 14 10/04/2021    AST 23 10/04/2021       Assessment:    1. Acute on chronic anemia  2. Iron deficiency  3. Acute on chronic hypoxic respiratory failure  4. Diastolic CHF   5. 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   She has had appropriate rise " in H&H following transfusion of 1 unit of PRBCs.   She had a bowel movement today that was non bloody.      >>> Will add IV Ferric Gluconate     PAVEL Valadez  10/05/21  09:42 EDT

## 2021-10-06 ENCOUNTER — APPOINTMENT (OUTPATIENT)
Dept: GENERAL RADIOLOGY | Facility: HOSPITAL | Age: 81
End: 2021-10-06

## 2021-10-06 PROBLEM — A41.9 ACUTE SEPSIS (HCC): Status: RESOLVED | Noted: 2021-10-04 | Resolved: 2021-10-06

## 2021-10-06 LAB
ANION GAP SERPL CALCULATED.3IONS-SCNC: 15 MMOL/L (ref 5–15)
BUN SERPL-MCNC: 22 MG/DL (ref 8–23)
BUN/CREAT SERPL: 28.6 (ref 7–25)
CALCIUM SPEC-SCNC: 8.8 MG/DL (ref 8.6–10.5)
CHLORIDE SERPL-SCNC: 100 MMOL/L (ref 98–107)
CO2 SERPL-SCNC: 19 MMOL/L (ref 22–29)
CREAT SERPL-MCNC: 0.77 MG/DL (ref 0.57–1)
DEPRECATED RDW RBC AUTO: 74.5 FL (ref 37–54)
ERYTHROCYTE [DISTWIDTH] IN BLOOD BY AUTOMATED COUNT: 21.2 % (ref 12.3–15.4)
GFR SERPL CREATININE-BSD FRML MDRD: 72 ML/MIN/1.73
GLUCOSE SERPL-MCNC: 88 MG/DL (ref 65–99)
HCT VFR BLD AUTO: 27.2 % (ref 34–46.6)
HGB BLD-MCNC: 8 G/DL (ref 12–15.9)
MCH RBC QN AUTO: 28.3 PG (ref 26.6–33)
MCHC RBC AUTO-ENTMCNC: 29.4 G/DL (ref 31.5–35.7)
MCV RBC AUTO: 96.1 FL (ref 79–97)
PLATELET # BLD AUTO: 262 10*3/MM3 (ref 140–450)
PMV BLD AUTO: 10.7 FL (ref 6–12)
POTASSIUM SERPL-SCNC: 3.8 MMOL/L (ref 3.5–5.2)
RBC # BLD AUTO: 2.83 10*6/MM3 (ref 3.77–5.28)
SODIUM SERPL-SCNC: 134 MMOL/L (ref 136–145)
WBC # BLD AUTO: 12.25 10*3/MM3 (ref 3.4–10.8)

## 2021-10-06 PROCEDURE — 94799 UNLISTED PULMONARY SVC/PX: CPT

## 2021-10-06 PROCEDURE — 99232 SBSQ HOSP IP/OBS MODERATE 35: CPT | Performed by: INTERNAL MEDICINE

## 2021-10-06 PROCEDURE — 71046 X-RAY EXAM CHEST 2 VIEWS: CPT

## 2021-10-06 PROCEDURE — 97116 GAIT TRAINING THERAPY: CPT

## 2021-10-06 PROCEDURE — 97162 PT EVAL MOD COMPLEX 30 MIN: CPT

## 2021-10-06 PROCEDURE — 97535 SELF CARE MNGMENT TRAINING: CPT

## 2021-10-06 PROCEDURE — 80048 BASIC METABOLIC PNL TOTAL CA: CPT | Performed by: INTERNAL MEDICINE

## 2021-10-06 PROCEDURE — 85027 COMPLETE CBC AUTOMATED: CPT | Performed by: INTERNAL MEDICINE

## 2021-10-06 PROCEDURE — 63710000001 PREDNISONE PER 1 MG: Performed by: INTERNAL MEDICINE

## 2021-10-06 PROCEDURE — 97166 OT EVAL MOD COMPLEX 45 MIN: CPT

## 2021-10-06 PROCEDURE — 25010000002 NA FERRIC GLUC CPLX PER 12.5 MG: Performed by: PHYSICIAN ASSISTANT

## 2021-10-06 PROCEDURE — 25010000002 FUROSEMIDE PER 20 MG: Performed by: NURSE PRACTITIONER

## 2021-10-06 RX ADMIN — OXYCODONE HYDROCHLORIDE AND ACETAMINOPHEN 1000 MG: 500 TABLET ORAL at 08:58

## 2021-10-06 RX ADMIN — ALPRAZOLAM 0.5 MG: 0.5 TABLET ORAL at 21:34

## 2021-10-06 RX ADMIN — BISOPROLOL FUMARATE 5 MG: 5 TABLET, FILM COATED ORAL at 08:58

## 2021-10-06 RX ADMIN — PREDNISONE 40 MG: 20 TABLET ORAL at 08:59

## 2021-10-06 RX ADMIN — BUDESONIDE 0.5 MG: 0.5 INHALANT RESPIRATORY (INHALATION) at 20:22

## 2021-10-06 RX ADMIN — PANTOPRAZOLE SODIUM 40 MG: 40 INJECTION, POWDER, FOR SOLUTION INTRAVENOUS at 21:34

## 2021-10-06 RX ADMIN — FUROSEMIDE 40 MG: 10 INJECTION, SOLUTION INTRAMUSCULAR; INTRAVENOUS at 21:34

## 2021-10-06 RX ADMIN — SERTRALINE HYDROCHLORIDE 150 MG: 100 TABLET ORAL at 08:58

## 2021-10-06 RX ADMIN — FUROSEMIDE 40 MG: 10 INJECTION, SOLUTION INTRAMUSCULAR; INTRAVENOUS at 08:59

## 2021-10-06 RX ADMIN — SODIUM CHLORIDE 250 MG: 9 INJECTION, SOLUTION INTRAVENOUS at 08:57

## 2021-10-06 RX ADMIN — Medication 1 CAPSULE: at 08:59

## 2021-10-06 RX ADMIN — POTASSIUM CHLORIDE 20 MEQ: 750 CAPSULE, EXTENDED RELEASE ORAL at 08:59

## 2021-10-06 RX ADMIN — PANTOPRAZOLE SODIUM 40 MG: 40 INJECTION, POWDER, FOR SOLUTION INTRAVENOUS at 08:59

## 2021-10-06 RX ADMIN — HYDROCODONE BITARTRATE AND ACETAMINOPHEN 1 TABLET: 5; 325 TABLET ORAL at 15:56

## 2021-10-06 RX ADMIN — LEVOTHYROXINE SODIUM 75 MCG: 0.07 TABLET ORAL at 05:59

## 2021-10-06 RX ADMIN — MULTIPLE VITAMINS W/ MINERALS TAB 1 TABLET: TAB at 08:59

## 2021-10-06 RX ADMIN — SODIUM CHLORIDE, PRESERVATIVE FREE 10 ML: 5 INJECTION INTRAVENOUS at 21:35

## 2021-10-06 RX ADMIN — SODIUM CHLORIDE, PRESERVATIVE FREE 10 ML: 5 INJECTION INTRAVENOUS at 08:57

## 2021-10-06 RX ADMIN — BUDESONIDE 0.5 MG: 0.5 INHALANT RESPIRATORY (INHALATION) at 10:10

## 2021-10-06 NOTE — PROGRESS NOTES
Clinical Nutrition   Reason For Visit: Unintentional weight loss    Patient Name: Yin Law  YOB: 1940  MRN: [id number]  Date of Encounter: 10/06/21 14:26 EDT  Admission date: 10/4/2021    Nutrition Assessment     Admission Problem List:    COPD on home oxygen (CMS/HCC)    Acute on chronic respiratory failure with hypoxia (HCC)    Acute on chronic diastolic congestive heart failure due to HTN (CMS/HCC)    Severe malnutrition (CMS/HCC)     Applicable medical tests/procedures since admission:  10/4: WOC consult for LLE    PMH: She  has a past medical history of Anxiety and depression, Arrhythmia, Asthma, Chicken pox, COPD (chronic obstructive pulmonary disease) (CMS/HCC), Hyperlipidemia, Hypertension, Measles, Menopause, Osteoporosis, Rheumatoid arthritis (CMS/HCC), Rheumatoid arthritis (CMS/HCC), and Tobacco abuse.   PSxH: She  has a past surgical history that includes Hysterectomy (1971); Colon surgery; Colonoscopy; Gallbladder surgery; Appendectomy; Cataract extraction, bilateral; Carpal tunnel release (Left); and Tonsillectomy.     Reported/Observed/Food/Nutrition Related History     10/16: Pt sitting in chair eating lunch at time of RD visit. Pt reported she dislikes ONS; RD will d/c. Pt reported appetite is good. No other requests or preferences at this time.  LBM 10/05.    10/04: Pt resting in bed at time of RD visit with family member at bedside. Pt reported UBW of 141 lbs about 1 month ago with unintentional wt loss. Pt reports current wt of 133 lbs. Pt reported appetite is good and had not changed in the last month. Pt agreeable to strawberry ONS. NKFA. No chewing or swallowing issues.     Anthropometrics   Height: 66 in  Weight: 137 lbs (bed scale 10/4)  BMI: 22.14  BMI classification: Normal: 18.5-24.9kg/m2   IBW: 130 lbs  UBW: Per -138 lbs  Weight change: Noted 170 lbs on 8/28 -- likely an outlier. Unintentional wt loss of 13 lbs in 1 month (8.7%).    Weight Weight (kg) Weight  (lbs) Weight Method   10/4/2021 62.234 kg 137 lb 3.2 oz Bed scale   10/4/2021 60.328 kg 133 lb Stated   9/19/2021 67.767 kg 149 lb 6.4 oz Bed scale   9/18/2021 68.675 kg 151 lb 6.4 oz Bed scale   9/17/2021 65.318 kg 144 lb Bed scale   9/16/2021 62.869 kg 138 lb 9.6 oz Bed scale   9/15/2021 63.186 kg 139 lb 4.8 oz Bed scale   9/14/2021 60.782 kg 134 lb Stated   8/28/2021 77.111 kg 170 lb -   8/26/2021 59.24 kg 130 lb 9.6 oz Bed scale   8/25/2021 61.054 kg 134 lb 9.6 oz Bed scale   8/24/2021 62.097 kg 136 lb 14.4 oz Bed scale   8/21/2021 68.04 kg 150 lb Stated   8/17/2021 62.681 kg 138 lb 3 oz -   8/12/2021 62.869 kg 138 lb 9.6 oz Bed scale   5/21: 143 lbs  Nutrition Focused Physical Exam - 10/4     Subcutaneous fat:  Orbital Moderate   Buccal Moderate   Tricep Unable to determine at this time   Ribs- thoracic and lumbar region, lower back, midaxillary line Unable to determine at this time     Muscle:  Temple/temporalis Mild   Clavicle/acromion- pectoralis, deltoid Moderate   Shoulder- deltoid Moderate   Scapula- trapezius, latissimus dorsi Unable to determine at this time   Interosseous- dorsal hand Unable to determine at this time   Thigh- quadriceps No muscle loss identified   Calf- gastrocnemius No muscle loss identified     Labs reviewed   Labs reviewed: Yes    Results from last 7 days   Lab Units 10/06/21  1046 10/04/21  0659 10/04/21  0146   SODIUM mmol/L 134* 134* 138   POTASSIUM mmol/L 3.8 4.1 4.5   CHLORIDE mmol/L 100 98 99   CO2 mmol/L 19.0* 24.0 29.0   BUN mg/dL 22 27* 26*   CREATININE mg/dL 0.77 0.80 0.89   GLUCOSE mg/dL 88 168* 192*   CALCIUM mg/dL 8.8 8.5* 8.9   MAGNESIUM mg/dL  --  2.9*  --      Results from last 7 days   Lab Units 10/06/21  0819 10/04/21  0659 10/04/21  0146   WBC 10*3/mm3 12.25* 11.11* 13.84*   ALBUMIN g/dL  --   --  3.80     Lab Results   Lab Value Date/Time    HGBA1C 4.90 07/20/2021 0524    HGBA1C 4.60 (L) 05/21/2021 1249     Medications reviewed   Medications reviewed:  Yes  Pertinent: vit C, ferrous sulfate, lasix, probiotic, KCl    Current Nutrition Prescription   PO: Diet Regular; Cardiac  Oral Nutrition Supplement: Boost GC TID     Average PO intake: insufficient data    Nutrition Diagnosis   10/04, 10/06  Problem Increased nutrient needs (protein)   Etiology Skin integrity   Signs/Symptoms Wound at LLE   Status: ongoing    10/04, 10/06  Problem Malnutrition   Etiology Energy needs > energy intake   Signs/Symptoms Moderate muscle loss; moderate subcutaneous fat loss; 8.7% wt loss   Status: ongoing    Intervention   Intervention: Follow treatment progress, Care plan reviewed   - d/c strawberry Boost Glucose Control TID.    Goal:   General: Nutrition support treatment  PO: Establish PO, Tolerate PO     Additional goals: No further weight loss    Monitoring/Evaluation:   Monitoring/Evaluation: Per protocol, PO intake, Pertinent labs, Weight, Skin status, POC/GOC    Merlin Mcintyre RD  Time Spent: 20 min

## 2021-10-06 NOTE — THERAPY EVALUATION
Patient Name: Yin Law  : 1940    MRN: [id number]                              Today's Date: 10/6/2021       Admit Date: 10/4/2021    Visit Dx:     ICD-10-CM ICD-9-CM   1. Acute sepsis ([facility])  A41.9 038.9     995.91   2. Pneumonia of right lower lobe due to infectious organism  J18.9 486   3. Acute on chronic congestive heart failure, unspecified heart failure type ([facility])  I50.9 428.0   4. COPD exacerbation ([facility])  J44.1 491.21   5. Acute respiratory failure with hypoxia ([facility])  J96.01 518.81     Patient Active Problem List   Diagnosis   • Multifocal pneumonia   • Recurrent atrial fibrillation with RVR (CMS/[facility])   • COPD on home oxygen (CMS/[facility])   • Hypertension and diastolic dysfunction   • Rheumatoid arthritis ([facility])   • Anxiety and depression   • Wound of right leg   • Anemia   • Elevated troponin   • Sepsis ([facility])   • Acute on chronic respiratory failure with hypoxia ([facility])   • Pleural effusion, bilateral   • Acute diastolic CHF (CMS/[facility])   • Accelerated hypertension   • Pneumonia due to infectious organism   • Acute sepsis ([facility])   • Severe malnutrition (CMS/[facility])     Past Medical History:   Diagnosis Date   • Anxiety and depression    • Arrhythmia     A-FIB   • Asthma    • Chicken pox    • COPD (chronic obstructive pulmonary disease) (CMS/[facility])    • Hyperlipidemia    • Hypertension    • Measles    • Menopause    • Osteoporosis    • Rheumatoid arthritis (CMS/[facility])    • Rheumatoid arthritis (CMS/[facility])    • Tobacco abuse      Past Surgical History:   Procedure Laterality Date   • APPENDECTOMY     • CARPAL TUNNEL RELEASE Left    • CATARACT EXTRACTION, BILATERAL     • COLON SURGERY     • COLONOSCOPY     • GALLBLADDER SURGERY     • HYSTERECTOMY  1971   • TONSILLECTOMY       General Information     Row Name 10/06/21 0938          OT Time and Intention    Document Type  evaluation  -MA     Mode of Treatment  occupational therapy  -MA     Row Name 10/06/21 0938          General Information    Patient Profile  Reviewed  yes  -MA     Prior Level of Function  independent:;ADL's;bed mobility;transfer;all household mobility Pt uses RW, 2LNC at baseline.  -MA     Existing Precautions/Restrictions  fall;oxygen therapy device and L/min  -MA     Barriers to Rehab  medically complex  -MA     Row Name 10/06/21 0938          Living Environment    Lives With  other relative(s) niece  -MA     Row Name 10/06/21 0938          Home Main Entrance    Number of Stairs, Main Entrance  two  -MA     Row Name 10/06/21 0938          Stairs Within Home, Primary    Number of Stairs, Within Home, Primary  none  -MA     Row Name 10/06/21 0938          Cognition    Orientation Status (Cognition)  oriented x 3  -MA     Row Name 10/06/21 0938          Safety Issues, Functional Mobility    Safety Issues Affecting Function (Mobility)  awareness of need for assistance;insight into deficits/self-awareness;safety precaution awareness;safety precautions follow-through/compliance;sequencing abilities  -MA     Impairments Affecting Function (Mobility)  balance;endurance/activity tolerance;postural/trunk control;shortness of breath;strength  -MA       User Key  (r) = Recorded By, (t) = Taken By, (c) = Cosigned By    Initials Name Provider Type    Andreia Hatch OT Occupational Therapist          Mobility/ADL's     Row Name 10/06/21 1023          Transfers    Transfers  sit-stand transfer;toilet transfer  -MA     Comment (Transfers)  Pt CGA for toilet transfer w/ grab bar use on L side. VC's for sequencing and hand placement.  -MA     Sit-Stand [place] (Transfers)  contact guard;verbal cues  -MA     [place] Level (Toilet Transfer)  contact guard;verbal cues  -MA     Assistive Device (Toilet Transfer)  commode;grab bars/safety frame;walker, front-wheeled  -MA     Row Name 10/06/21 1023          Sit-Stand Transfer    Assistive Device (Sit-Stand Transfers)  walker, front-wheeled  -MA     Row Name 10/06/21 1023          Toilet Transfer    Type (Toilet  Transfer)  sit-stand;stand-sit  -MA     Row Name 10/06/21 1023          Functional Mobility    Functional Mobility- Comment  Deferred to PT  -MA     Row Name 10/06/21 1023          Activities of Daily Living    BADL Assessment/Intervention  upper body dressing;lower body dressing;grooming;toileting;feeding  -MA     Row Name 10/06/21 1023          Upper Body Dressing Assessment/Training    [place] Level (Upper Body Dressing)  don;doff;pajama/robe back gown  -MA     Position (Upper Body Dressing)  unsupported sitting  -MA     Comment (Upper Body Dressing)  Pt mod A for UBD d/t IV line  -MA     Row Name 10/06/21 1023          Lower Body Dressing Assessment/Training    [place] Level (Lower Body Dressing)  don;shoes/slippers;set up  -MA     Position (Lower Body Dressing)  unsupported sitting  -MA     Comment (Lower Body Dressing)  Pt SUA to don slip-on shoes.  -MA     Row Name 10/06/21 1023          Grooming Assessment/Training    [place] Level (Grooming)  wash face, hands;supervision  -MA     Position (Grooming)  sink side  -MA     Row Name 10/06/21 1023          Toileting Assessment/Training    [place] Level (Toileting)  adjust/manage clothing;perform perineal hygiene;supervision  -MA     Assistive Devices (Toileting)  commode;grab bar/safety frame  -MA     Position (Toileting)  supported sitting  -MA     Row Name 10/06/21 1023          Self-Feeding Assessment/Training    [place] Level (Feeding)  liquids to mouth;independent  -MA     Position (Self-Feeding)  supported sitting  -MA       User Key  (r) = Recorded By, (t) = Taken By, (c) = Cosigned By    Initials Name Provider Type    Andreia Hatch OT Occupational Therapist        Obj/Interventions     Row Name 10/06/21 1025          Sensory Assessment (Somatosensory)    Sensory Assessment (Somatosensory)  UE sensation intact  -MA     Row Name 10/06/21 1025          Vision Assessment/Intervention    Visual Impairment/Limitations  WFL  -MA      Row Name 10/06/21 1025          Range of Motion Comprehensive    General Range of Motion  bilateral upper extremity ROM WFL  -MA     Row Name 10/06/21 1025          Strength Comprehensive (MMT)    General Manual Muscle Testing (MMT) Assessment  upper extremity strength deficits identified  -MA     Comment, General Manual Muscle Testing (MMT) Assessment  BUE grossly 4/5  -MA     Row Name 10/06/21 1025          Balance    Balance Assessment  sitting dynamic balance;standing dynamic balance  -MA     Dynamic Sitting Balance  WFL;supported;other (see comments) sitting on commode  -MA     Dynamic Standing Balance  WFL;supported;standing  -MA     Balance Interventions  sit to stand;occupation based/functional task  -MA       User Key  (r) = Recorded By, (t) = Taken By, (c) = Cosigned By    Initials Name Provider Type    Anrdeia Hatch OT Occupational Therapist        Goals/Plan     Row Name 10/06/21 1029          Transfer Goal 1 (OT)    Activity/Assistive Device (Transfer Goal 1, OT)  sit-to-stand/stand-to-sit;bed-to-chair/chair-to-bed;toilet;commode;walker, rolling  -MA     [place] Level/Cues Needed (Transfer Goal 1, OT)  standby assist  -MA     Time Frame (Transfer Goal 1, OT)  long term goal (LTG);10 days  -MA     Progress/Outcome (Transfer Goal 1, OT)  goal ongoing  -MA     Row Name 10/06/21 1029          Grooming Goal 1 (OT)    Activity/Device (Grooming Goal 1, OT)  hair care;oral care;wash face, hands standing sinkside maintaining O2 sats > 90%  -MA     [place] (Grooming Goal 1, OT)  set-up required  -MA     Time Frame (Grooming Goal 1, OT)  long term goal (LTG);10 days  -MA     Progress/Outcome (Grooming Goal 1, OT)  goal ongoing  -MA     Row Name 10/06/21 1029          Strength Goal 1 (OT)    Strength Goal 1 (OT)  Pt will tolerate 10 reps of BUE pulmonary TE to support ADL independence.  -MA     Time Frame (Strength Goal 1, OT)  long term goal (LTG);10 days  -MA     Progress/Outcome (Strength Goal  1, OT)  goal ongoing  -MA       User Key  (r) = Recorded By, (t) = Taken By, (c) = Cosigned By    Initials Name Provider Type    Anderia Hatch, OT Occupational Therapist        Clinical Impression     Row Name 10/06/21 0938          Pain Assessment    Additional Documentation  Pain Scale: Numbers Pre/Post-Treatment (Group)  -MA     Row Name 10/06/21 0938          Pain Scale: Numbers Pre/Post-Treatment    Pretreatment Pain Rating  0/10 - no pain  -MA     Posttreatment Pain Rating  0/10 - no pain  -MA     Row Name 10/06/21 0938          Plan of Care Review    Plan of Care Reviewed With  patient  -MA     Outcome Summary  OT eval complete. Pt presents w/ SOA, decreased activity tolerance, and generalized weakness limiting her ADL independence. Pt would benefit from continued skilled IPOT services to address current functional deficits. Recommend home w/ A at discharge.  -MA     Row Name 10/06/21 0938          Therapy Assessment/Plan (OT)    Rehab Potential (OT)  good, to achieve stated therapy goals  -MA     Criteria for Skilled Therapeutic Interventions Met (OT)  yes;skilled treatment is necessary  -MA     Therapy Frequency (OT)  daily  -MA     Row Name 10/06/21 0938          Therapy Plan Review/Discharge Plan (OT)    Anticipated Discharge Disposition (OT)  home with assist  -MA     Row Name 10/06/21 0938          Vital Signs    Pre Systolic BP Rehab  134  -MA     Pre Treatment Diastolic BP  55  -MA     Post Systolic BP Rehab  149  -MA     Post Treatment Diastolic BP  56  -MA     Pretreatment Heart Rate (beats/min)  69  -MA     Posttreatment Heart Rate (beats/min)  67  -MA     Pre SpO2 (%)  97  -MA     O2 Delivery Pre Treatment  nasal cannula  -MA     O2 Delivery Intra Treatment  nasal cannula  -MA     Post SpO2 (%)  97  -MA     O2 Delivery Post Treatment  nasal cannula  -MA     Pre Patient Position  Sitting  -MA     Intra Patient Position  Standing  -MA     Post Patient Position  Sitting  -MA     Row Name  10/06/21 0938          Positioning and Restraints    Pre-Treatment Position  in bed  -MA     Post Treatment Position  chair  -MA     In Chair  notified nsg;reclined;call light within reach;encouraged to call for assist;exit alarm on;waffle cushion;legs elevated  -MA       User Key  (r) = Recorded By, (t) = Taken By, (c) = Cosigned By    Initials Name Provider Type    Andreia Hatch OT Occupational Therapist        Outcome Measures     Row Name 10/06/21 1030          How much help from another is currently needed...    Putting on and taking off regular lower body clothing?  3  -MA     Bathing (including washing, rinsing, and drying)  3  -MA     Toileting (which includes using toilet bed pan or urinal)  3  -MA     Putting on and taking off regular upper body clothing  3  -MA     Taking care of personal grooming (such as brushing teeth)  3  -MA     Eating meals  3  -MA     AM-PAC 6 Clicks Score (OT)  18  -MA     Row Name 10/06/21 1028          How much help from another person do you currently need...    Turning from your back to your side while in flat bed without using bedrails?  3  -SS     Moving from lying on back to sitting on the side of a flat bed without bedrails?  3  -SS     Moving to and from a bed to a chair (including a wheelchair)?  3  -SS     Standing up from a chair using your arms (e.g., wheelchair, bedside chair)?  3  -SS     Climbing 3-5 steps with a railing?  3  -SS     To walk in hospital room?  3  -SS     AM-PAC 6 Clicks Score (PT)  18  -SS     Row Name 10/06/21 1030 10/06/21 1028       Functional Assessment    Outcome Measure Options  AM-PAC 6 Clicks Daily Activity (OT)  -MA  AM-PAC 6 Clicks Basic Mobility (PT)  -SS      User Key  (r) = Recorded By, (t) = Taken By, (c) = Cosigned By    Initials Name Provider Type    Andreia Hatch OT Occupational Therapist    SS Rebecca Zamudio PT Physical Therapist          Occupational Therapy Education                 Title: PT OT SLP Therapies (In  Progress)     Topic: Occupational Therapy (In Progress)     Point: ADL training (Done)     Description:   Instruct learner(s) on proper safety adaptation and remediation techniques during self care or transfers.   Instruct in proper use of assistive devices.              Learning Progress Summary           Patient Acceptance, E, VU by MA at 10/6/2021 0938                   Point: Home exercise program (Not Started)     Description:   Instruct learner(s) on appropriate technique for monitoring, assisting and/or progressing therapeutic exercises/activities.              Learner Progress:  Not documented in this visit.          Point: Precautions (Done)     Description:   Instruct learner(s) on prescribed precautions during self-care and functional transfers.              Learning Progress Summary           Patient Acceptance, E, VU by MA at 10/6/2021 0938                   Point: Body mechanics (Done)     Description:   Instruct learner(s) on proper positioning and spine alignment during self-care, functional mobility activities and/or exercises.              Learning Progress Summary           Patient Acceptance, E, VU by MA at 10/6/2021 0938                               User Key     Initials Effective Dates Name Provider Type Discipline    MA 11/19/20 -  Andreia Chase OT Occupational Therapist OT              OT Recommendation and Plan  Therapy Frequency (OT): daily  Plan of Care Review  Plan of Care Reviewed With: patient  Outcome Summary: OT eval complete. Pt presents w/ SOA, decreased activity tolerance, and generalized weakness limiting her ADL independence. Pt would benefit from continued skilled IPOT services to address current functional deficits. Recommend home w/ A at discharge.     Time Calculation:   Time Calculation- OT     Row Name 10/06/21 1032 10/06/21 1031 10/06/21 1029       Time Calculation- OT    OT Start Time  --  0938 -MA  --    OT Received On  --  10/06/21  -MA  --    OT Goal Re-Cert Due  Date  --  10/16/21  -MA  --       Timed Charges    36368 - Gait Training Minutes   --  --  10  -SS    05203 - OT Self Care/Mgmt Minutes  9  -MA  --  --       Untimed Charges    OT Eval/Re-eval Minutes  33  -MA  --  --       Total Minutes    Timed Charges Total Minutes  9  -MA  --  10  -SS    Untimed Charges Total Minutes  33  -MA  --  --     Total Minutes  42  -MA  --  10  -SS      User Key  (r) = Recorded By, (t) = Taken By, (c) = Cosigned By    Initials Name Provider Type    Andreia Hatch OT Occupational Therapist    SS Rebecca Zamudio, PT Physical Therapist        Therapy Charges for Today     Code Description Service Date Service Provider Modifiers Qty    05116257362 HC OT SELF CARE/MGMT/TRAIN EA 15 MIN 10/6/2021 Andreia Chase OT GO 1    84920221622 HC OT EVAL MOD COMPLEXITY 3 10/6/2021 Andreia Chase OT GO 1               Andreia Chase OT  10/6/2021

## 2021-10-06 NOTE — PLAN OF CARE
Goal Outcome Evaluation:  Plan of Care Reviewed With: patient           Outcome Summary: PT ethel complete. Pt. performed bed mobility with stand by assist. She performed sit to stand transfer w/contact guard assist. She ambulated 50' w/rolling walker, contact guard assist. Gait limited by SOA, fatigue, weakness. Recommend home with assist and outpatient PT.

## 2021-10-06 NOTE — THERAPY EVALUATION
Patient Name: Yin Law  : 1940    MRN: [id number]                              Today's Date: 10/6/2021       Admit Date: 10/4/2021    Visit Dx:     ICD-10-CM ICD-9-CM   1. Acute sepsis ([facility])  A41.9 038.9     995.91   2. Pneumonia of right lower lobe due to infectious organism  J18.9 486   3. Acute on chronic congestive heart failure, unspecified heart failure type ([facility])  I50.9 428.0   4. COPD exacerbation ([facility])  J44.1 491.21   5. Acute respiratory failure with hypoxia ([facility])  J96.01 518.81     Patient Active Problem List   Diagnosis   • Multifocal pneumonia   • Recurrent atrial fibrillation with RVR (CMS/[facility])   • COPD on home oxygen (CMS/[facility])   • Hypertension and diastolic dysfunction   • Rheumatoid arthritis ([facility])   • Anxiety and depression   • Wound of right leg   • Anemia   • Elevated troponin   • Sepsis ([facility])   • Acute on chronic respiratory failure with hypoxia ([facility])   • Pleural effusion, bilateral   • Acute diastolic CHF (CMS/[facility])   • Accelerated hypertension   • Pneumonia due to infectious organism   • Acute sepsis ([facility])   • Severe malnutrition (CMS/[facility])     Past Medical History:   Diagnosis Date   • Anxiety and depression    • Arrhythmia     A-FIB   • Asthma    • Chicken pox    • COPD (chronic obstructive pulmonary disease) (CMS/[facility])    • Hyperlipidemia    • Hypertension    • Measles    • Menopause    • Osteoporosis    • Rheumatoid arthritis (CMS/[facility])    • Rheumatoid arthritis (CMS/[facility])    • Tobacco abuse      Past Surgical History:   Procedure Laterality Date   • APPENDECTOMY     • CARPAL TUNNEL RELEASE Left    • CATARACT EXTRACTION, BILATERAL     • COLON SURGERY     • COLONOSCOPY     • GALLBLADDER SURGERY     • HYSTERECTOMY  1971   • TONSILLECTOMY       General Information     Row Name 10/06/21 1018          Physical Therapy Time and Intention    Document Type  evaluation  -SS     Mode of Treatment  physical therapy  -SS     Row Name 10/06/21 1018          General Information    Patient  Profile Reviewed  yes  -     Prior Level of Function  independent:;all household mobility;gait;transfer;bed mobility;ADL's;using stairs  -     Existing Precautions/Restrictions  fall;oxygen therapy device and L/min  -     Barriers to Rehab  medically complex  -     Row Name 10/06/21 1018          Living Environment    Lives With  other (see comments) niece  -     Row Name 10/06/21 1018          Home Main Entrance    Number of Stairs, Main Entrance  two  -SS     Stair Railings, Main Entrance  railing on right side (ascending);other (see comments) stable shelf on L  -     Row Name 10/06/21 1018          Stairs Within Home, Primary    Number of Stairs, Within Home, Primary  none  -     Row Name 10/06/21 1018          Cognition    Orientation Status (Cognition)  oriented x 3  -     Row Name 10/06/21 1018          Safety Issues, Functional Mobility    Safety Issues Affecting Function (Mobility)  awareness of need for assistance;insight into deficits/self-awareness;positioning of assistive device;problem-solving;safety precaution awareness;safety precautions follow-through/compliance;sequencing abilities  -     Impairments Affecting Function (Mobility)  balance;endurance/activity tolerance;postural/trunk control;shortness of breath;strength  -       User Key  (r) = Recorded By, (t) = Taken By, (c) = Cosigned By    Initials Name Provider Type     Rebecca Zamudio PT Physical Therapist        Mobility     Row Name 10/06/21 1020          Bed Mobility    Bed Mobility  scooting/bridging;supine-sit  -     Scooting/Bridging [place] (Bed Mobility)  standby assist;verbal cues  -     Supine-Sit [place] (Bed Mobility)  standby assist;verbal cues  -     Assistive Device (Bed Mobility)  bed rails;head of bed elevated  -     Comment (Bed Mobility)  VC for sequencing, hand placement  -     Row Name 10/06/21 1020          Transfers    Comment (Transfers)  VC for hand placement, appropriate  alignment, lowering with eccentric control  -SS     Row Name 10/06/21 1020          Sit-Stand Transfer    Sit-Stand [place] (Transfers)  contact guard  -SS     Assistive Device (Sit-Stand Transfers)  walker, front-wheeled  -SS     Row Name 10/06/21 1020          Gait/Stairs (Locomotion)    [place] Level (Gait)  contact guard  -SS     Assistive Device (Gait)  walker, front-wheeled  -SS     Distance in Feet (Gait)  50  -SS     Deviations/Abnormal Patterns (Gait)  bilateral deviations;cindi decreased;gait speed decreased  -     Bilateral Gait Deviations  forward flexed posture;heel strike decreased  -     Comment (Gait/Stairs)  Pt. ambulated with a step through gait pattern at a decreased speed. VC for upright posture, controlled breathing. Gait limited by SOA, fatigue, weakness.  -       User Key  (r) = Recorded By, (t) = Taken By, (c) = Cosigned By    Initials Name Provider Type     Rebecca Zamudio, PT Physical Therapist        Obj/Interventions     Row Name 10/06/21 1022          Range of Motion Comprehensive    General Range of Motion  bilateral lower extremity ROM WFL  -SS     Row Name 10/06/21 1022          Strength Comprehensive (MMT)    Comment, General Manual Muscle Testing (MMT) Assessment  BLE gross 4-/5  -SS     Row Name 10/06/21 1022          Motor Skills    Motor Skills  functional endurance  -     Functional Endurance  moderate impairment  -SS     Row Name 10/06/21 1022          Balance    Balance Assessment  sitting static balance;sitting dynamic balance;standing static balance;standing dynamic balance  -SS     Static Sitting Balance  WFL;supported;sitting, edge of bed  -SS     Dynamic Sitting Balance  WFL;supported;sitting, edge of bed  -SS     Static Standing Balance  WFL;supported  -SS     Dynamic Standing Balance  WFL;supported  -SS     Balance Interventions  sitting;standing;sit to stand;supported;static;dynamic  -     Row Name 10/06/21 1022          Sensory Assessment  (Somatosensory)    Sensory Assessment (Somatosensory)  LE sensation intact denies numbness/tingling  -SS       User Key  (r) = Recorded By, (t) = Taken By, (c) = Cosigned By    Initials Name Provider Type    SS Rebecca Zamudio, PT Physical Therapist        Goals/Plan     Row Name 10/06/21 1027          Bed Mobility Goal 1 (PT)    Activity/Assistive Device (Bed Mobility Goal 1, PT)  bed mobility activities, all  -SS     [place] Level/Cues Needed (Bed Mobility Goal 1, PT)  independent  -SS     Time Frame (Bed Mobility Goal 1, PT)  long term goal (LTG);10 days  -SS     Row Name 10/06/21 1027          Transfer Goal 1 (PT)    Activity/Assistive Device (Transfer Goal 1, PT)  sit-to-stand/stand-to-sit;bed-to-chair/chair-to-bed  -SS     [place] Level/Cues Needed (Transfer Goal 1, PT)  independent  -SS     Time Frame (Transfer Goal 1, PT)  long term goal (LTG);10 days  -     Row Name 10/06/21 1027          Gait Training Goal 1 (PT)    Activity/Assistive Device (Gait Training Goal 1, PT)  gait (walking locomotion);assistive device use;walker, rolling  -SS     [place] Level (Gait Training Goal 1, PT)  modified independence  -SS     Distance (Gait Training Goal 1, PT)  150  -SS     Time Frame (Gait Training Goal 1, PT)  long term goal (LTG);10 days  -SS     Row Name 10/06/21 1027          Stairs Goal 1 (PT)    Activity/Assistive Device (Stairs Goal 1, PT)  stairs, all skills;using handrail, left;using handrail, right  -SS     [place] Level/Cues Needed (Stairs Goal 1, PT)  modified independence  -SS     Number of Stairs (Stairs Goal 1, PT)  2  -SS       User Key  (r) = Recorded By, (t) = Taken By, (c) = Cosigned By    Initials Name Provider Type    SS Rebecca Zamudio, PT Physical Therapist        Clinical Impression     Row Name 10/06/21 1022          Pain    Additional Documentation  Pain Scale: Numbers Pre/Post-Treatment (Group)  -     Row Name 10/06/21 1022          Pain Scale: Numbers Pre/Post-Treatment     Pretreatment Pain Rating  0/10 - no pain  -SS     Posttreatment Pain Rating  0/10 - no pain  -SS     Pain Intervention(s)  Repositioned;Ambulation/increased activity  -     Row Name 10/06/21 1022          Plan of Care Review    Plan of Care Reviewed With  patient  -SS     Outcome Summary  PT eval complete. Pt. performed bed mobility with stand by assist. She performed sit to stand transfer w/contact guard assist. She ambulated 50' w/rolling walker, contact guard assist. Gait limited by SOA, fatigue, weakness. Recommend home with assist and outpatient PT.  -     Row Name 10/06/21 1022          Therapy Assessment/Plan (PT)    Rehab Potential (PT)  good, to achieve stated therapy goals  -     Criteria for Skilled Interventions Met (PT)  yes;meets criteria;skilled treatment is necessary  -     Row Name 10/06/21 1022          Vital Signs    Pre Systolic BP Rehab  134  -SS     Pre Treatment Diastolic BP  55  -SS     Post Systolic BP Rehab  149  -SS     Post Treatment Diastolic BP  56  -SS     Pretreatment Heart Rate (beats/min)  72  -SS     Posttreatment Heart Rate (beats/min)  67  -SS     Pre SpO2 (%)  98  -SS     O2 Delivery Pre Treatment  nasal cannula 4L  -SS     O2 Delivery Intra Treatment  nasal cannula upon sitting up from supine - O2 monitor reading 72% - may not be accurate due to history of poor readings - turned O2 for 5L per RN  -SS     Post SpO2 (%)  98  -SS     O2 Delivery Post Treatment  nasal cannula 5L  -SS     Pre Patient Position  Supine  -SS     Intra Patient Position  Sitting  -SS     Post Patient Position  Sitting  -SS     Row Name 10/06/21 1022          Positioning and Restraints    Pre-Treatment Position  in bed  -SS     Post Treatment Position  chair  -SS     In Chair  notified nsg;reclined;call light within reach;encouraged to call for assist;exit alarm on;with other staff;legs elevated  -       User Key  (r) = Recorded By, (t) = Taken By, (c) = Cosigned By    Initials Name Provider  Type    SS Rebecca Zamudio, DELIA Physical Therapist        Outcome Measures     Row Name 10/06/21 1028          How much help from another person do you currently need...    Turning from your back to your side while in flat bed without using bedrails?  3  -SS     Moving from lying on back to sitting on the side of a flat bed without bedrails?  3  -SS     Moving to and from a bed to a chair (including a wheelchair)?  3  -SS     Standing up from a chair using your arms (e.g., wheelchair, bedside chair)?  3  -SS     Climbing 3-5 steps with a railing?  3  -SS     To walk in hospital room?  3  -SS     AM-PAC 6 Clicks Score (PT)  18  -SS     Row Name 10/06/21 1028          Functional Assessment    Outcome Measure Options  AM-PAC 6 Clicks Basic Mobility (PT)  -       User Key  (r) = Recorded By, (t) = Taken By, (c) = Cosigned By    Initials Name Provider Type     Rebecca Zamudio PT Physical Therapist                       Physical Therapy Education                 Title: PT OT SLP Therapies (In Progress)     Topic: Physical Therapy (In Progress)     Point: Mobility training (Done)     Learning Progress Summary           Patient Acceptance, E, VU,NR by  at 10/6/2021 1029    Comment: Educated pt. safety/technique with bed mobility, transfers, ambulation, PT POC                   Point: Home exercise program (Not Started)     Learner Progress:  Not documented in this visit.          Point: Body mechanics (Done)     Learning Progress Summary           Patient Acceptance, E, VU,NR by  at 10/6/2021 1029    Comment: Educated pt. safety/technique with bed mobility, transfers, ambulation, PT POC                   Point: Precautions (Done)     Learning Progress Summary           Patient Acceptance, E, VU,NR by  at 10/6/2021 1029    Comment: Educated pt. safety/technique with bed mobility, transfers, ambulation, PT POC                               User Key     Initials Effective Dates Name Provider Type Discipline      06/01/21 -  Rebecca Zamudio PT Physical Therapist PT              PT Recommendation and Plan  Planned Therapy Interventions (PT): balance training, bed mobility training, gait training, home exercise program, patient/family education, postural re-education, stair training, strengthening, transfer training  Plan of Care Reviewed With: patient  Outcome Summary: PT eval complete. Pt. performed bed mobility with stand by assist. She performed sit to stand transfer w/contact guard assist. She ambulated 50' w/rolling walker, contact guard assist. Gait limited by SOA, fatigue, weakness. Recommend home with assist and outpatient PT.     Time Calculation:   PT Charges     Row Name 10/06/21 1029             Time Calculation    Start Time  0941  -SS      Stop Time  1012  -SS      Time Calculation (min)  31 min  -SS      PT Received On  10/06/21  -SS      PT Goal Re-Cert Due Date  10/16/21  -SS         Timed Charges    26094 - Gait Training Minutes   10  -SS         Untimed Charges    PT Eval/Re-eval Minutes  50  -SS         Total Minutes    Timed Charges Total Minutes  10  -SS      Untimed Charges Total Minutes  50  -SS       Total Minutes  60  -SS        User Key  (r) = Recorded By, (t) = Taken By, (c) = Cosigned By    Initials Name Provider Type    SS Rebecca Zamudio, PT Physical Therapist        Therapy Charges for Today     Code Description Service Date Service Provider Modifiers Qty    00351858356 HC GAIT TRAINING EA 15 MIN 10/6/2021 Rebecca Zamudio, PT GP 1    [id number] HC PT EVAL MOD COMPLEXITY 4 10/6/2021 Rebecca Zamudio, PT GP 1          PT G-Codes  Outcome Measure Options: AM-PAC 6 Clicks Basic Mobility (PT)  AM-PAC 6 Clicks Score (PT): 18    Rebecca Zamudio PT  10/6/2021

## 2021-10-06 NOTE — NURSING NOTE
Wocn follow up for: Left lower leg skin tear    Assessment: Able to assess.  Dressing change ordered every other day.  Change this morning per RN.  Patient did not wish to have another dressing change.  Per daughter wound looks so much better.  Continue with current therapy of Xeroform and gauze wrapping.    Improvement: Yes    Recommendations/ changes: No changes to current therapy    Areas of concern/ new issues: None noted    Skin interventions in place.    Specialty bed: No    Pressure Injury Protocol (initiate for Brian Score of 18 or less):   *Maintain good skin care, keep dry, turn q 2 hr, keep heels elevated and offloaded with heel boots.    *Apply z-guard to sacrococcygeal area/ perineal area BID or daily and PRN per incontinent episodes.  *Follow C.A.R.E protocol if medical devices (Bipap, arthur, Ng tube, etc) are being used.     Woc will follow    Please contact with questions or concerns.

## 2021-10-06 NOTE — PLAN OF CARE
Goal Outcome Evaluation:  Plan of Care Reviewed With: patient           Outcome Summary: OT eval complete. Pt presents w/ SOA, decreased activity tolerance, and generalized weakness limiting her ADL independence. Pt would benefit from continued skilled IPOT services to address current functional deficits. Recommend home w/ A at discharge.

## 2021-10-06 NOTE — PROGRESS NOTES
[facility] Medicine Services  PROGRESS NOTE    Patient Name: Yin Law  : 1940  MRN: [id number]    Date of Admission: 10/4/2021  Primary Care Physician: Santiago Chaudhry MD    Subjective   Subjective     CC: f/u SOA    HPI: Up in bed with niece in room. Feels a little better today but has not yet been OOB.    ROS:  Gen- No fevers, chills  CV- No chest pain, palpitations  GI- No N/V/D, abd pain     Objective   Objective     Vital Signs:   Temp:  [96.8 °F (36 °C)-98.3 °F (36.8 °C)] 96.8 °F (36 °C)  Heart Rate:  [56-71] 56  Resp:  [16-20] 16  BP: (130-155)/(55-65) 130/59  Flow (L/min):  [4-6] 4     Physical Exam:  Constitutional: No acute distress, awake, alert, looks a little better  HENT: NCAT, mucous membranes moist, O2 via NC  Respiratory: Normal WOB, diminished  Cardiovascular: RRR, no murmurs, rubs, or gallops  Gastrointestinal: Positive bowel sounds, soft, nontender, nondistended  Musculoskeletal: No bilateral ankle edema  Psychiatric: Appropriate affect, cooperative  Neurologic: Oriented x 3, strength symmetric in all extremities, Cranial Nerves grossly intact to confrontation, speech clear  Skin: No rashes    Results Reviewed:  LAB RESULTS:      Lab 10/06/21  0819 10/04/21  1504 10/04/21  0659 10/04/21  0146   WBC 12.25*  --  11.11* 13.84*   HEMOGLOBIN 8.0* 7.9* 6.7* 7.7*   HEMATOCRIT 27.2* 25.4* 22.6* 26.6*   PLATELETS 262  --  255 310   NEUTROS ABS  --   --  10.37* 11.83*   IMMATURE GRANS (ABS)  --   --  0.06* 0.09*   LYMPHS ABS  --   --  0.51* 1.02   MONOS ABS  --   --  0.16 0.84   EOS ABS  --   --  0.00 0.01   MCV 96.1  --  93.4 97.1*   PROCALCITONIN  --   --   --  0.07   LACTATE  --   --  1.8 2.1*         Lab 10/06/21  1046 10/04/21  0659 10/04/21  0146   SODIUM 134* 134* 138   POTASSIUM 3.8 4.1 4.5   CHLORIDE 100 98 99   CO2 19.0* 24.0 29.0   ANION GAP 15.0 12.0 10.0   BUN 22 27* 26*   CREATININE 0.77 0.80 0.89   GLUCOSE 88 168* 192*   CALCIUM 8.8 8.5* 8.9    MAGNESIUM  --  2.9*  --          Lab 10/04/21  0146   TOTAL PROTEIN 6.9   ALBUMIN 3.80   GLOBULIN 3.1   ALT (SGPT) 14   AST (SGOT) 23   BILIRUBIN 0.3   ALK PHOS 94         Lab 10/04/21  0146   PROBNP 3,158.0*   TROPONIN T 0.010             Lab 10/04/21  0659   ABO TYPING O   RH TYPING Positive   ANTIBODY SCREEN Negative         Lab 10/04/21  0746   PH, ARTERIAL 7.455*   PCO2, ARTERIAL 40.1   PO2 ART 67.4*   FIO2 40   HCO3 ART 28.1*   BASE EXCESS ART 3.9*   CARBOXYHEMOGLOBIN 1.8     Brief Urine Lab Results  (Last result in the past 365 days)      Color   Clarity   Blood   Leuk Est   Nitrite   Protein   CREAT   Urine HCG        10/04/21 1116 Yellow Clear Negative Negative Negative Negative               Microbiology Results Abnormal     Procedure Component Value - Date/Time    Blood Culture - Blood, Arm, Right [[id number]] Collected: 10/04/21 0312    Lab Status: Preliminary result Specimen: Blood from Arm, Right Updated: 10/06/21 0330     Blood Culture No growth at 2 days    Blood Culture - Blood, Arm, Left [[id number]] Collected: 10/04/21 0312    Lab Status: Preliminary result Specimen: Blood from Arm, Left Updated: 10/06/21 0330     Blood Culture No growth at 2 days    COVID PRE-OP / PRE-PROCEDURE SCREENING ORDER (NO ISOLATION) - Swab, Nasopharynx [[id number]]  (Normal) Collected: 10/04/21 0149    Lab Status: Final result Specimen: Swab from Nasopharynx Updated: 10/04/21 0245    Narrative:      The following orders were created for panel order COVID PRE-OP / PRE-PROCEDURE SCREENING ORDER (NO ISOLATION) - Swab, Nasopharynx.  Procedure                               Abnormality         Status                     ---------                               -----------         ------                     COVID-19,CEPHEID/VASHTI,LE...[[id number]]  Normal              Final result                 Please view results for these tests on the individual orders.    COVID-19,CEPHEID/VASHTI,SHALINI IN-HOUSE(OR EMERGENT/ADD-ON),NP SWAB IN  TRANSPORT MEDIA 3-4 HR TAT - Swab, Nasopharynx [[id number]]  (Normal) Collected: 10/04/21 0149    Lab Status: Final result Specimen: Swab from Nasopharynx Updated: 10/04/21 0245     COVID19 Not Detected    Narrative:      Fact sheet for providers: https://www.fda.gov/media/730439/download     Fact sheet for patients: https://www.fda.gov/media/670241/download  Fact sheet for providers: https://www.fda.gov/media/443802/download     Fact sheet for patients: https://www.fda.gov/media/389752/download          No radiology results from the last 24 hrs    Results for orders placed during the hospital encounter of 07/19/21    Adult Transthoracic Echo Complete W/ Cont if Necessary Per Protocol    Interpretation Summary  · Estimated left ventricular EF = 65%  · Mild aortic valve stenosis is present.  · Aortic valve maximum pressure gradient is 32.9 mmHg. Aortic valve mean pressure gradient is 17.2 mmHg.  · Mild mitral valve regurgitation is present.  · Mild tricuspid valve regurgitation is present.  · Estimated right ventricular systolic pressure from tricuspid regurgitation is 37.0 mmHg.  · There is a large left pleural effusion. There is a moderate sized right pleural effusion.      I have reviewed the medications:  Scheduled Meds:ALPRAZolam, 0.5 mg, Oral, Nightly  ascorbic acid, 1,000 mg, Oral, Daily  bisoprolol, 5 mg, Oral, Q24H  budesonide, 0.5 mg, Nebulization, BID - RT  ferric gluconate, 250 mg, Intravenous, Daily  furosemide, 40 mg, Intravenous, Q12H  lactobacillus acidophilus, 1 capsule, Oral, Daily  levothyroxine, 75 mcg, Oral, Q AM  multivitamin with minerals, 1 tablet, Oral, Daily  pantoprazole, 40 mg, Intravenous, Q12H  pharmacy consult - MTM, , Does not apply, Daily  potassium chloride, 20 mEq, Oral, Daily  predniSONE, 40 mg, Oral, Daily With Breakfast  sertraline, 150 mg, Oral, Daily  sodium chloride, 10 mL, Intravenous, Q12H      Continuous Infusions:   PRN Meds:.•  acetaminophen **OR** acetaminophen **OR**  acetaminophen  •  HYDROcodone-acetaminophen  •  ipratropium-albuterol  •  sodium chloride    Assessment/Plan   Assessment & Plan     Active Hospital Problems    Diagnosis  POA   • Acute sepsis (HCC) [A41.9]  Yes   • Severe malnutrition (CMS/HCC) [E43]  Yes      Resolved Hospital Problems   No resolved problems to display.        Brief Hospital Course to date:  Yin Law is a 81 y.o. female female  to ED with history of extensive lung disease-chronically on 3 L O2, has been always short of breath progressively getting worse over last few weeks. This is my first day assessing patient's active medical issues.     Acute on chronic respiratory failure with hypoxia  Suspected underlying ILD  COPD on 2L O2 chronically   -CX negative and does not seem c/w infection. Doing well off ABX  -Pulmonary followed during last visit. Workup included elevated RF (in the setting of known RA), negative quantiferon, +anti-centromere antibody, + atypical p-ANCA with negative MPO and PR3.  She was felt to be too frail for open lung biopsy.  Thoracentesis performed last admission with transudative fluid and negative cytology. Has appointment with Pulmonary 10/15 - should keep that appointment.  -Continue PO steroids, plan for slow taper at d/c w/ pulmonary follow up. Continue nebs/inhalers.  -Palliative following.     Acute on chronic diastolic CHF  -Continue BID IV diuresis as long as she will tolerate. Plan to increase dose of diuretics at d/c.  -Repeat CXR pending.     Acute/chronic anemia  -Transfused 1 unit blood w/ appropriate response. CTM.  -GI has evaluated, felt too frail for endoscopic eval at this point. Recs empiric tx with IV PPI. Will also be off of AC going forward so hopefully this will help.  -IV iron     Hypothyroidism  -Synthroid.     Paroxysmal atrial fibrillation  -Continue bisoprolol.  Cannot tolerate amiodarone due to pulmonary toxicity. Will stop Eliqius indefinitely given recurrent anemia. Per last  cardiology note she could not tolerate Eliquis due to GI bleeding, however she was discharged on Eliquis at last visit. I d/w family and they are agreeable.     Anxiety/depression   -On xanax      This patient's problems and plans were partially entered by my partner and updated as appropriate by me 10/06/21.    DVT prophylaxis:  No DVT prophylaxis order currently exists.       AM-PAC 6 Clicks Score (PT): 18 (10/06/21 1028)    Disposition: I expect the patient to be discharged home with family in 1-2 days.    CODE STATUS:   Code Status and Medical Interventions:   Ordered at: 10/05/21 1352     Limited Support to NOT Include:    Intubation     Code Status:    No CPR     Medical Interventions (Level of Support Prior to Arrest):    Limited       Shila Crump II, DO  10/06/21

## 2021-10-06 NOTE — CASE MANAGEMENT/SOCIAL WORK
Continued Stay Note  [facility]     Patient Name: Yin aLw  MRN: [id number]  Today's Date: 10/6/2021    Admit Date: 10/4/2021    Discharge Plan     Row Name 10/06/21 1356       Plan    Plan  Home w/Home Health    Patient/Family in Agreement with Plan  yes    Plan Comments  Spoke with patient in room.  Her plan is still to return home with [facility] at discharge.  CM will continue to follow.  Nolvia Bowen RN x.[phone]    Final Discharge Disposition Code  06 - home with home health care        Discharge Codes    No documentation.       Expected Discharge Date and Time     Expected Discharge Date Expected Discharge Time    Oct 8, 2021             Nolvia Bowen RN

## 2021-10-06 NOTE — PROGRESS NOTES
H&H is stable post transfusion.  No overt GI bleeding observed.  Patient's chronic respiratory failure precludes elective endoscopy.  Recommend anemia be followed up closely outpatient by her primary care physician.   Complete IV Iron inpatient.       We will sign off.  Please call for any questions or concerns.

## 2021-10-07 ENCOUNTER — READMISSION MANAGEMENT (OUTPATIENT)
Dept: CALL CENTER | Facility: HOSPITAL | Age: 81
End: 2021-10-07

## 2021-10-07 VITALS
SYSTOLIC BLOOD PRESSURE: 139 MMHG | TEMPERATURE: 98.3 F | OXYGEN SATURATION: 100 % | HEART RATE: 61 BPM | RESPIRATION RATE: 20 BRPM | DIASTOLIC BLOOD PRESSURE: 54 MMHG | HEIGHT: 66 IN | WEIGHT: 137.2 LBS | BODY MASS INDEX: 22.05 KG/M2

## 2021-10-07 PROBLEM — I50.33 ACUTE ON CHRONIC DIASTOLIC CONGESTIVE HEART FAILURE (HCC): Status: RESOLVED | Noted: 2021-08-21 | Resolved: 2021-10-07

## 2021-10-07 PROBLEM — J96.21 ACUTE ON CHRONIC RESPIRATORY FAILURE WITH HYPOXIA (HCC): Status: RESOLVED | Noted: 2021-08-05 | Resolved: 2021-10-07

## 2021-10-07 PROCEDURE — 25010000002 FUROSEMIDE PER 20 MG: Performed by: NURSE PRACTITIONER

## 2021-10-07 PROCEDURE — 94799 UNLISTED PULMONARY SVC/PX: CPT

## 2021-10-07 PROCEDURE — 63710000001 PREDNISONE PER 1 MG: Performed by: INTERNAL MEDICINE

## 2021-10-07 PROCEDURE — 99239 HOSP IP/OBS DSCHRG MGMT >30: CPT | Performed by: INTERNAL MEDICINE

## 2021-10-07 RX ORDER — PREDNISONE 10 MG/1
TABLET ORAL
Qty: 70 TABLET | Refills: 0 | Status: SHIPPED | OUTPATIENT
Start: 2021-10-07 | End: 2021-11-04

## 2021-10-07 RX ORDER — METOLAZONE 5 MG/1
5 TABLET ORAL DAILY PRN
Qty: 30 TABLET | Refills: 0 | Status: SHIPPED | OUTPATIENT
Start: 2021-10-07 | End: 2022-02-07

## 2021-10-07 RX ADMIN — BUDESONIDE 0.5 MG: 0.5 INHALANT RESPIRATORY (INHALATION) at 07:30

## 2021-10-07 RX ADMIN — PREDNISONE 40 MG: 20 TABLET ORAL at 08:58

## 2021-10-07 RX ADMIN — MULTIPLE VITAMINS W/ MINERALS TAB 1 TABLET: TAB at 08:59

## 2021-10-07 RX ADMIN — SODIUM CHLORIDE, PRESERVATIVE FREE 10 ML: 5 INJECTION INTRAVENOUS at 08:59

## 2021-10-07 RX ADMIN — POTASSIUM CHLORIDE 20 MEQ: 750 CAPSULE, EXTENDED RELEASE ORAL at 08:58

## 2021-10-07 RX ADMIN — OXYCODONE HYDROCHLORIDE AND ACETAMINOPHEN 1000 MG: 500 TABLET ORAL at 08:58

## 2021-10-07 RX ADMIN — Medication 1 CAPSULE: at 08:58

## 2021-10-07 RX ADMIN — PANTOPRAZOLE SODIUM 40 MG: 40 INJECTION, POWDER, FOR SOLUTION INTRAVENOUS at 08:59

## 2021-10-07 RX ADMIN — FUROSEMIDE 40 MG: 10 INJECTION, SOLUTION INTRAMUSCULAR; INTRAVENOUS at 08:59

## 2021-10-07 RX ADMIN — LEVOTHYROXINE SODIUM 75 MCG: 0.07 TABLET ORAL at 06:11

## 2021-10-07 RX ADMIN — SERTRALINE HYDROCHLORIDE 150 MG: 100 TABLET ORAL at 08:59

## 2021-10-07 RX ADMIN — HYDROCODONE BITARTRATE AND ACETAMINOPHEN 1 TABLET: 5; 325 TABLET ORAL at 12:24

## 2021-10-07 RX ADMIN — BISOPROLOL FUMARATE 5 MG: 5 TABLET, FILM COATED ORAL at 08:58

## 2021-10-07 NOTE — DISCHARGE SUMMARY
[facility] Medicine Services  DISCHARGE SUMMARY    Patient Name: Yin Law  : 1940  MRN: [id number]    Date of Admission: 10/4/2021  1:25 AM  Date of Discharge: 10/7/2021  Primary Care Physician: Santiago Chaudhry MD    Consults     Date and Time Order Name Status Description    10/5/2021  1:54 PM Inpatient Palliative Care MD Consult Completed     10/4/2021  1:38 PM Inpatient Gastroenterology Consult Completed     2021 12:35 AM Inpatient Palliative Care MD Consult Completed     9/15/2021  2:36 AM Inpatient Pulmonology Consult Completed           Hospital Course     Presenting Problem:   Acute sepsis (HCC) [A41.9]    Active Hospital Problems    Diagnosis  POA   • Severe malnutrition (CMS/HCC) [E43]  Yes   • COPD on home oxygen (CMS/HCC) [J44.9]  Yes      Resolved Hospital Problems    Diagnosis Date Resolved POA   • Acute sepsis (HCC) [A41.9] 10/06/2021 Yes   • Acute on chronic diastolic congestive heart failure due to HTN (CMS/HCC) [I50.33] 10/07/2021 Yes   • Acute on chronic respiratory failure with hypoxia (HCC) [J96.21] 10/07/2021 Yes          Hospital Course:  Yin Law is a 81 y.o. female female  to ED with history of extensive lung disease-chronically on 3 L O2, has been always short of breath progressively getting worse over last few weeks.     Acute on chronic respiratory failure with hypoxia  Suspected underlying ILD  COPD on 2L O2 chronically   -Upon arrival patient placed on IV abx, IV steroids, IV lasix and ultimately responded well. As she exhibited no evidence ov infection and cultures were negative abx were stopped and she continued to improve.   -Pulmonary followed during last visit. Workup included elevated RF (in the setting of known RA), negative quantiferon, +anti-centromere antibody, + atypical p-ANCA with negative MPO and PR3.  She was felt to be too frail for open lung biopsy.  Thoracentesis performed last admission with transudative  fluid and negative cytology. Has appointment with Pulmonary 10/15 - should keep that appointment.  -Diuretics as below. Continue PO steroids, plan for slow taper at d/c w/ pulmonary follow up.    -Palliative followed during stay. Discussed hospice as avenue to keep her out of hospital which I d/w her is certainly appropriate given severity of her lung disease and frequent readmissions however patient refused.     Acute on chronic diastolic CHF  -She was diuresed aggressively with IV lasix BID and responded well. Will continue Torsemide PO at d/c along with addition of PRN metolazone for symptoms of increased SOA, edema, weight gain. I encouraged her to notify PCP after having to take PRN dose of medication as potential avenue to keep her out of hospital.     Acute/chronic anemia  -Upon arrival she was anemic to 6.7. She was ransfused 1 unit blood w/ appropriate response.   -GI has evaluated, felt too frail for endoscopic eval at this point. Recs empiric tx with IV PPI. Will also be off of AC going forward so hopefully this will help.     Paroxysmal atrial fibrillation  -Will stop Eliqius indefinitely given recurrent anemia. Per last cardiology note she could not tolerate Eliquis due to GI bleeding, however she was discharged on Eliquis at last visit. I d/w family and they are agreeable.    Discharge Follow Up Recommendations for outpatient labs/diagnostics:   Dr. Chaudhry in 1-2 weeks   Pulmonary in 1 week as scheduled    Day of Discharge     HPI: Up in chair eating with family in room. Feels much better. Wants to go home.     Review of Systems  Gen- No fevers, chills  CV- No chest pain, palpitations  Resp- No cough, dyspnea  GI- No N/V/D, abd pain    Vital Signs:   Temp:  [96.8 °F (36 °C)-98.3 °F (36.8 °C)] 98.3 °F (36.8 °C)  Heart Rate:  [56-75] 61  Resp:  [16-20] 20  BP: (127-147)/(50-70) 139/54     Physical Exam:  Constitutional: No acute distress, awake, alert, looks better  HENT: NCAT, mucous membranes moist,  O2 via NC  Respiratory: Clear to auscultation bilaterally, respiratory effort normal   Cardiovascular: RRR, no murmurs, rubs, or gallops  Gastrointestinal: Positive bowel sounds, soft, nontender, nondistended  Musculoskeletal: No bilateral ankle edema  Psychiatric: Appropriate affect, cooperative  Neurologic: Oriented x 3, strength symmetric in all extremities, Cranial Nerves grossly intact to confrontation, speech clear  Skin: No rashes    Pertinent  and/or Most Recent Results     LAB RESULTS:      Lab 10/06/21  0819 10/04/21  1504 10/04/21  0659 10/04/21  0146   WBC 12.25*  --  11.11* 13.84*   HEMOGLOBIN 8.0* 7.9* 6.7* 7.7*   HEMATOCRIT 27.2* 25.4* 22.6* 26.6*   PLATELETS 262  --  255 310   NEUTROS ABS  --   --  10.37* 11.83*   IMMATURE GRANS (ABS)  --   --  0.06* 0.09*   LYMPHS ABS  --   --  0.51* 1.02   MONOS ABS  --   --  0.16 0.84   EOS ABS  --   --  0.00 0.01   MCV 96.1  --  93.4 97.1*   PROCALCITONIN  --   --   --  0.07   LACTATE  --   --  1.8 2.1*         Lab 10/06/21  1046 10/04/21  0659 10/04/21  0146   SODIUM 134* 134* 138   POTASSIUM 3.8 4.1 4.5   CHLORIDE 100 98 99   CO2 19.0* 24.0 29.0   ANION GAP 15.0 12.0 10.0   BUN 22 27* 26*   CREATININE 0.77 0.80 0.89   GLUCOSE 88 168* 192*   CALCIUM 8.8 8.5* 8.9   MAGNESIUM  --  2.9*  --          Lab 10/04/21  0146   TOTAL PROTEIN 6.9   ALBUMIN 3.80   GLOBULIN 3.1   ALT (SGPT) 14   AST (SGOT) 23   BILIRUBIN 0.3   ALK PHOS 94         Lab 10/04/21  0146   PROBNP 3,158.0*   TROPONIN T 0.010             Lab 10/04/21  0659   ABO TYPING O   RH TYPING Positive   ANTIBODY SCREEN Negative         Lab 10/04/21  0746   PH, ARTERIAL 7.455*   PCO2, ARTERIAL 40.1   PO2 ART 67.4*   FIO2 40   HCO3 ART 28.1*   BASE EXCESS ART 3.9*   CARBOXYHEMOGLOBIN 1.8     Brief Urine Lab Results  (Last result in the past 365 days)      Color   Clarity   Blood   Leuk Est   Nitrite   Protein   CREAT   Urine HCG        10/04/21 1116 Yellow Clear Negative Negative Negative Negative              Microbiology Results (last 10 days)     Procedure Component Value - Date/Time    Blood Culture - Blood, Arm, Right [[id number]] Collected: 10/04/21 0312    Lab Status: Preliminary result Specimen: Blood from Arm, Right Updated: 10/07/21 0330     Blood Culture No growth at 3 days    Blood Culture - Blood, Arm, Left [[id number]] Collected: 10/04/21 0312    Lab Status: Preliminary result Specimen: Blood from Arm, Left Updated: 10/07/21 0330     Blood Culture No growth at 3 days    COVID PRE-OP / PRE-PROCEDURE SCREENING ORDER (NO ISOLATION) - Swab, Nasopharynx [[id number]]  (Normal) Collected: 10/04/21 0149    Lab Status: Final result Specimen: Swab from Nasopharynx Updated: 10/04/21 0245    Narrative:      The following orders were created for panel order COVID PRE-OP / PRE-PROCEDURE SCREENING ORDER (NO ISOLATION) - Swab, Nasopharynx.  Procedure                               Abnormality         Status                     ---------                               -----------         ------                     COVID-19,CEPHEID/VASHTI,LE...[[id number]]  Normal              Final result                 Please view results for these tests on the individual orders.    COVID-19,CEPHEID/VASHTI,[facility] IN-HOUSE(OR EMERGENT/ADD-ON),NP SWAB IN TRANSPORT MEDIA 3-4 HR TAT - Swab, Nasopharynx [[id number]]  (Normal) Collected: 10/04/21 0149    Lab Status: Final result Specimen: Swab from Nasopharynx Updated: 10/04/21 0245     COVID19 Not Detected    Narrative:      Fact sheet for providers: https://www.fda.gov/media/194173/download     Fact sheet for patients: https://www.fda.gov/media/106527/download  Fact sheet for providers: https://www.fda.gov/media/459659/download     Fact sheet for patients: https://www.fda.gov/media/334124/download          XR Chest 1 View    Result Date: 10/4/2021  EXAMINATION: XR CHEST 1 VW-10/04/2021:  INDICATION: Dyspnea; A41.9-Sepsis, unspecified organism; J18.9-Pneumonia, unspecified organism; I50.9-Heart failure,  unspecified; J44.1-Chronic obstructive pulmonary disease with (acute) exacerbation; J96.01-Acute respiratory failure with hypoxia.  COMPARISON: 10/04/2021 2:07 AM.  FINDINGS: The current image is timed 9:45 AM. The heart shadow remains enlarged. There is a persistent perihilar edema pattern present, stable to slightly further increased from the prior study. Bibasilar effusion and atelectasis is stable to only slightly increased. No pneumothorax is identified.      Congestive heart failure with extensive pulmonary interstitial edema, mild to moderate bibasilar atelectasis and relatively small pleural effusions. The findings are stable to slightly worse compared to 2:07 AM exam.  D:  10/04/2021 E:  10/04/2021     This report was finalized on 10/4/2021 9:02 PM by Dr. Augie Leiva MD.      XR Chest 1 View    Result Date: 10/4/2021  CR Chest 1 Vw INDICATION: Shortness of air. COMPARISON:  9/16/2021 FINDINGS: Portable AP view(s) of the chest.  Heart remains enlarged. Atherosclerotic disease is present in the aorta. There are small bilateral pleural effusions, right larger than left. These are similar to the previous study. There are coarse bilateral infiltrates. These are slightly worsened at the right lung base but otherwise largely stable. No pneumothorax is identified.     Slight worsening in right basilar infiltrates, otherwise stable appearance of the chest. Signer Name: Gomez Crane MD  Signed: 10/4/2021 2:31 AM  Workstation Name: [facility]  [facility]    XR Chest PA & Lateral    Result Date: 10/7/2021  EXAMINATION: XR CHEST PA AND LATERAL-  INDICATION: chf, effusions; A41.9-Sepsis, unspecified organism; J18.9-Pneumonia, unspecified organism; I50.9-Heart failure, unspecified; J44.1-Chronic obstructive pulmonary disease with (acute) exacerbation; J96.01-Acute respiratory failure with hypoxia  COMPARISON: 10/4/2021  FINDINGS: Persistent small-to-moderate bilateral pleural effusions, similar  to comparison. Redemonstrated multifocal bilateral pulmonary opacities, likely minimally improved from comparison. No distinct pneumothorax. Unchanged heart and mediastinal contours.      Persistent small-to-moderate bilateral pleural effusions, similar to comparison. Redemonstrated multifocal bilateral pulmonary opacities, likely minimally improved from comparison. No distinct pneumothorax. Unchanged heart and mediastinal contours.  This report was finalized on 10/7/2021 8:35 AM by Lonnie Azar.                Results for orders placed during the hospital encounter of 07/19/21    Adult Transthoracic Echo Complete W/ Cont if Necessary Per Protocol    Interpretation Summary  · Estimated left ventricular EF = 65%  · Mild aortic valve stenosis is present.  · Aortic valve maximum pressure gradient is 32.9 mmHg. Aortic valve mean pressure gradient is 17.2 mmHg.  · Mild mitral valve regurgitation is present.  · Mild tricuspid valve regurgitation is present.  · Estimated right ventricular systolic pressure from tricuspid regurgitation is 37.0 mmHg.  · There is a large left pleural effusion. There is a moderate sized right pleural effusion.      Plan for Follow-up of Pending Labs/Results: None  Pending Labs     Order Current Status    Blood Culture - Blood, Arm, Left Preliminary result    Blood Culture - Blood, Arm, Right Preliminary result        Discharge Details        Discharge Medications      New Medications      Instructions Start Date   metOLazone 5 MG tablet  Commonly known as: ZAROXOLYN   5 mg, Oral, Daily PRN      predniSONE 10 MG tablet  Commonly known as: DELTASONE   Take 4 tablets by mouth Daily for 7 days, THEN 3 tablets Daily for 7 days, THEN 2 tablets Daily for 7 days, THEN 1 tablet Daily for 7 days.   Start Date: October 7, 2021        Continue These Medications      Instructions Start Date   albuterol sulfate  (90 Base) MCG/ACT inhaler  Commonly known as: PROVENTIL HFA;VENTOLIN HFA;PROAIR HFA    2 puffs, Inhalation, Every 4 Hours PRN, Patient taking: Inhale 2 puffs by mouth every 4-6 hours       ALPRAZolam 0.5 MG tablet  Commonly known as: XANAX   0.5 mg, Oral, Nightly PRN      ascorbic acid 1000 MG tablet  Commonly known as: VITAMIN C   1,000 mg, Oral, Daily      bisoprolol 5 MG tablet  Commonly known as: ZEBeta   5 mg, Oral, Every 24 Hours Scheduled      Budeson-Glycopyrrol-Formoterol 160-9-4.8 MCG/ACT aerosol inhaler  Commonly known as: BREZTRI   2 puffs, Inhalation, 2 Times Daily      ferrous sulfate 325 (65 FE) MG tablet   325 mg, Oral, 2 Times Daily With Meals      fish oil 1000 MG capsule capsule   1,000 mg, Oral, Daily With Breakfast      HYDROcodone-acetaminophen 7.5-325 MG per tablet  Commonly known as: NORCO   1 tablet, Oral, Every 6 Hours PRN, Patient taking: once a day prn      lactobacillus acidophilus capsule capsule   1 capsule, Oral, Daily      levothyroxine 75 MCG tablet  Commonly known as: SYNTHROID, LEVOTHROID   75 mcg, Oral, Every Early Morning      multivitamin with minerals tablet tablet   1 tablet, Oral, Daily      pantoprazole 40 MG EC tablet  Commonly known as: PROTONIX   40 mg, Oral, 2 Times Daily Before Meals      potassium chloride 10 MEQ CR capsule  Commonly known as: MICRO-K   20 mEq, Oral, Daily      sertraline 100 MG tablet  Commonly known as: ZOLOFT   150 mg, Oral, Daily, 1.5 tab daily      torsemide 20 MG tablet  Commonly known as: DEMADEX   40 mg, Oral, Daily      Vitamin D (Ergocalciferol) 50 MCG (2000 UT) capsule   1 tablet, Oral, Daily         Stop These Medications    apixaban 5 MG tablet tablet  Commonly known as: ELIQUIS            Allergies   Allergen Reactions   • Amiodarone Unknown - High Severity         Discharge Disposition:  Home or Self Care    Diet:  Hospital:  Diet Order   Procedures   • Diet Regular; Cardiac       Activity:      Restrictions or Other Recommendations:  -Take all medications as directed  -Follow a low salt diet, goal = 2g or less  daily  -Weigh yourself every morning before breakfast, write it down and compare to yesterday’s weight. If you have weight gain greater than 3lbs in 1 day or 5 lbs or more in 1 week , call your PCP or Heart Failure Clinic.  -If you experience any of the following symptoms please contact your PCP or Heart Failure Clinic:    Increased shortness of breath   Increased swelling of feet or stomach   Dry hacking cough   Shortness of breath when laying down flat    - [facility] Failure [facility]:  [phone]         CODE STATUS:    Code Status and Medical Interventions:   Ordered at: 10/05/21 1352     Limited Support to NOT Include:    Intubation     Code Status:    No CPR     Medical Interventions (Level of Support Prior to Arrest):    Limited       Future Appointments   Date Time Provider Department Center   10/15/2021  3:00 PM Xochitl Hawley APRN [facility]       Additional Instructions for the Follow-ups that You Need to Schedule     Discharge Follow-up with PCP   As directed       Currently Documented PCP:    Santiago Chaudhry MD    PCP Phone Number:    [phone]     Follow Up Details: 1-2 week hospital follow up         Discharge Follow-up with Specialty: [facility]   As directed      [facility]    Follow Up Details: As scheduled 10/15                     Shila Crump II, DO  10/07/21      Time Spent on Discharge:  I spent  34  minutes on this discharge activity which included: face-to-face encounter with the patient, reviewing the data in the system, coordination of the care with the nursing staff as well as consultants, documentation, and entering orders.

## 2021-10-07 NOTE — PLAN OF CARE
Problem: Palliative Care  Goal: Enhanced Quality of Life  Intervention: Optimize Psychosocial Wellbeing  Recent Flowsheet Documentation  Taken 10/7/2021 1102 by Analia Rodriguez, MSW  Supportive Measures: (symptom assessment)   active listening utilized   positive reinforcement provided   verbalization of feelings encouraged   other (see comments)    Home with HH today.  1300 Palliative Care IDT - Palliative Team members present:  OLIMPIA Treviño DO; YOHANA Aguilar APRN; HARLEY Neely RN, CHPN; SHANNON Bucio RN, CHPN; IN[facility] Rodriguez [facility], [facility]; SAFIA Rodriguez MDiv; Hospice CM IVONNE Hagan RN, CHPN

## 2021-10-07 NOTE — CASE MANAGEMENT/SOCIAL WORK
Case Management Discharge Note      Final Note: Patient's plan is to return home with [facility].  Resume orders for PT, OT and skilled nursing faxed to [phone].  Notified agency that patient would be discharging today.  Will fax discharge summary when available.  Nolvia Bowen, RN x.[phone]         Selected Continued Care - Admitted Since 10/4/2021     Destination    No services have been selected for the patient.              Durable Medical Equipment    No services have been selected for the patient.              Dialysis/Infusion    No services have been selected for the patient.              Home Medical Care    No services have been selected for the patient.              Therapy    No services have been selected for the patient.              Community Resources    No services have been selected for the patient.              Community & DME    No services have been selected for the patient.                Selected Continued Care - Prior Encounters Includes selections from prior encounters from 7/6/2021 to 10/7/2021    Discharged on 9/19/2021 Admission date: 9/14/2021 - Discharge disposition: Home or Self Care    Durable Medical Equipment     Service Provider Selected Services Address Phone Fax Patient Preferred    [facility] [place]  D[facility] Medical Equipment [address] [address] [phone] --       Internal Comment last updated by Click, Rajwinder CARSON RN 9/19/2021 1308    Home oxygen, used prior to admission                     Home Medical Care     Service Provider Selected Services Address Phone Fax Patient Preferred    [facility] [address] [id number] [phone] --                Discharged on 8/26/2021 Admission date: 8/21/2021 - Discharge disposition: Skilled Nursing Facility (DC - External)    Destination     Service Provider Selected Services Address Phone Fax Patient  Preferred    [facility]  Skilled Nursing [address] [id number] [phone] --                Discharged on 8/12/2021 Admission date: 8/5/2021 - Discharge disposition: Home-Health Care [facility]    Home Medical Care     Service Provider Selected Services Address Phone Fax Patient Preferred    Replaced by [facility]  Home Health Services [address] [place] [address] [address], [place] [id number] [phone] --                Discharged on 8/2/2021 Admission date: 7/19/2021 - Discharge disposition: Home-Health Care [facility]    Durable Medical Equipment     Service Provider Selected Services Address Phone Fax Patient Preferred    [facility]  Durable Medical Equipment [address] [id number] [phone] --          Home Medical Care     Service Provider Selected Services Address Phone Fax Patient Preferred    Replaced by [facility]  Home Health Services [address] [place] [address] [address], [facility] [address] [phone] --                         Final Discharge Disposition Code: 06 - home with home health care

## 2021-10-07 NOTE — DISCHARGE PLACEMENT REQUEST
"Yin Bhatti (81 y.o. Female)     Case Management  [phone]      Date of Birth Social Security Number Address Home Phone MRN    1940  [address] [place] [id number] [phone] [id number]    Islam Marital Status          Taoist        Admission Date Admission Type Admitting Provider Attending Provider Department, Room/Bed    10/4/21 Emergency Shila Crump II, Shila Scott II, DO [facility] 3F, S325/1    Discharge Date Discharge Disposition Discharge Destination         Home or Self Care              Attending Provider: Shila Crump II, DO    Allergies: Amiodarone    Isolation: None   Infection: None   Code Status: No CPR    Ht: 167.6 cm (66\")   Wt: 62.2 kg (137 lb 3.2 oz)    Admission Cmt: None   Principal Problem: None                Active Insurance as of 10/4/2021     Primary Coverage     Payor Plan Insurance Group Employer/Plan Group    MEDICARE MEDICARE A & B      Payor Plan Address Payor Plan Phone Number Payor Plan Fax Number Effective Dates    [address] [phone]  9/1/2004 - None Entered    [facility] [id number]       Subscriber Name Subscriber Birth Date Member ID       YIN BHATTI 1940 [id number]           Secondary Coverage     Payor Plan Insurance Group Employer/Plan Group    ANTHEM BLUE CROSS [facility] SUPP [id number]     Payor Plan Address Payor Plan Phone Number Payor Plan Fax Number Effective Dates    [address]   12/1/2016 - None Entered    [facility] [id number]       Subscriber Name Subscriber Birth Date Member ID       YIN BHATTI 1940 [id number]                 Emergency Contacts      (Rel.) Home Phone Work Phone Mobile Phone    Enedina Mauricio (Sister) [phone] -- [phone]    STEVIE FOWLER (Relative) [phone] -- [phone]        [facility] 3F  [address] [facility] [address]  Phone:  [phone]  Fax:  [phone] Date: Oct 7, "       Ambulatory Referral to Home Health     Patient:  Yin Law MRN:  [id number]   [address]  [facility] [address] :  1940  SSN:    Phone: [phone] Sex:  F      INSURANCE PAYOR PLAN GROUP # SUBSCRIBER ID   Primary:  Secondary:    MEDICARE  ANTH BLUE CROSS [id number]  [id number]    [id number]  [id number]      Referring Provider Information:  LEA NESBITT II Phone: [phone] Fax: [phone]      Referral Information:   # Visits:  999 Referral Type: Home Health [42]   Urgency:  Routine Referral Reason: Specialty Services Required   Start Date: Oct 7, 2021 End Date:  To be determined by Insurer   Diagnosis: Acute sepsis (HCC) (A41.9 [ICD-10-CM] 038.9,995.91 [ICD-9-CM])      Refer to Dept:   Refer to Provider:   Refer to Facility:       Face to Face Visit Date: 10/7/2021  Follow-up provider for Plan of Care? I treated the patient in an acute care facility and will not continue treatment after discharge.  Follow-up provider: LARISSA ROSE [[id number]]  Reason/Clinical Findings: respiratory failure  Describe mobility limitations that make leaving home difficult: impaired functional mobility, balance, gait and endurance  Nursing/Therapeutic Services Requested: Skilled Nursing  Nursing/Therapeutic Services Requested: Physical Therapy  Nursing/Therapeutic Services Requested: Occupational Therapy  Skilled nursing orders: Cardiopulmonary assessments  Skilled nursing orders: Neurovascular assessments  Skilled nursing orders: COPD management  PT orders: Therapeutic exercise  PT orders: Gait Training  PT orders: Transfer training  PT orders: Strengthening  PT orders: Home safety assessment  Weight Bearing Status: As Tolerated  Occupational orders: Activities of daily living  Occupational orders: Energy conservation  Occupational orders: Strengthening  Occupational orders: Home safety assessment  Frequency: 1 Week 1     This document serves as a request of services and  does not constitute Insurance authorization or approval of services.  To determine eligibility, please contact the members Insurance carrier to verify and review coverage.     If you have medical questions regarding this request for services. Please contact [address] at [phone] during normal business hours.       Verbal Order Mode: Telephone with readback   Authorizing Provider: Shila Crump II, DO  Authorizing Provider's NPI: [id number]     Order Entered By: Nolvia Bowen RN 10/7/2021 11:46 AM     Electronically signed by:  Shila Crump II, DO                 Discharge Summary      Shila Crump II, DO at 10/07/21 0749              [facility] Medicine Services  DISCHARGE SUMMARY    Patient Name: Yin Law  : 1940  MRN: [id number]    Date of Admission: 10/4/2021  1:25 AM  Date of Discharge: 10/7/2021  Primary Care Physician: Santiago Chaudhry MD    Consults     Date and Time Order Name Status Description    10/5/2021  1:54 PM Inpatient Palliative Care MD Consult Completed     10/4/2021  1:38 PM Inpatient Gastroenterology Consult Completed     2021 12:35 AM Inpatient Palliative Care MD Consult Completed     9/15/2021  2:36 AM Inpatient Pulmonology Consult Completed           Hospital Course     Presenting Problem:   Acute sepsis (HCC) [A41.9]    Active Hospital Problems    Diagnosis  POA   • Severe malnutrition (CMS/HCC) [E43]  Yes   • COPD on home oxygen (CMS/HCC) [J44.9]  Yes      Resolved Hospital Problems    Diagnosis Date Resolved POA   • Acute sepsis (HCC) [A41.9] 10/06/2021 Yes   • Acute on chronic diastolic congestive heart failure due to HTN (CMS/HCC) [I50.33] 10/07/2021 Yes   • Acute on chronic respiratory failure with hypoxia (HCC) [J96.21] 10/07/2021 Yes          Hospital Course:  Yin Law is a 81 y.o. female female  to ED with history of extensive lung disease-chronically on 3 L O2, has been always short of  breath progressively getting worse over last few weeks.     Acute on chronic respiratory failure with hypoxia  Suspected underlying ILD  COPD on 2L O2 chronically   -Upon arrival patient placed on IV abx, IV steroids, IV lasix and ultimately responded well. As she exhibited no evidence ov infection and cultures were negative abx were stopped and she continued to improve.   -Pulmonary followed during last visit. Workup included elevated RF (in the setting of known RA), negative quantiferon, +anti-centromere antibody, + atypical p-ANCA with negative MPO and PR3.  She was felt to be too frail for open lung biopsy.  Thoracentesis performed last admission with transudative fluid and negative cytology. Has appointment with Pulmonary 10/15 - should keep that appointment.  -Diuretics as below. Continue PO steroids, plan for slow taper at d/c w/ pulmonary follow up.    -Palliative followed during stay. Discussed hospice as avenue to keep her out of hospital which I d/w her is certainly appropriate given severity of her lung disease and frequent readmissions however patient refused.     Acute on chronic diastolic CHF  -She was diuresed aggressively with IV lasix BID and responded well. Will continue Torsemide PO at d/c along with addition of PRN metolazone for symptoms of increased SOA, edema, weight gain. I encouraged her to notify PCP after having to take PRN dose of medication as potential avenue to keep her out of hospital.     Acute/chronic anemia  -Upon arrival she was anemic to 6.7. She was ransfused 1 unit blood w/ appropriate response.   -GI has evaluated, felt too frail for endoscopic eval at this point. Recs empiric tx with IV PPI. Will also be off of AC going forward so hopefully this will help.     Paroxysmal atrial fibrillation  -Will stop Eliqius indefinitely given recurrent anemia. Per last cardiology note she could not tolerate Eliquis due to GI bleeding, however she was discharged on Eliquis at last visit. I  d/w family and they are agreeable.    Discharge Follow Up Recommendations for outpatient labs/diagnostics:   Dr. Chaudhry in 1-2 weeks   Pulmonary in 1 week as scheduled    Day of Discharge     HPI: Up in chair eating with family in room. Feels much better. Wants to go home.     Review of Systems  Gen- No fevers, chills  CV- No chest pain, palpitations  Resp- No cough, dyspnea  GI- No N/V/D, abd pain    Vital Signs:   Temp:  [96.8 °F (36 °C)-98.3 °F (36.8 °C)] 98.3 °F (36.8 °C)  Heart Rate:  [56-75] 61  Resp:  [16-20] 20  BP: (127-147)/(50-70) 139/54     Physical Exam:  Constitutional: No acute distress, awake, alert, looks better  HENT: NCAT, mucous membranes moist, O2 via NC  Respiratory: Clear to auscultation bilaterally, respiratory effort normal   Cardiovascular: RRR, no murmurs, rubs, or gallops  Gastrointestinal: Positive bowel sounds, soft, nontender, nondistended  Musculoskeletal: No bilateral ankle edema  Psychiatric: Appropriate affect, cooperative  Neurologic: Oriented x 3, strength symmetric in all extremities, Cranial Nerves grossly intact to confrontation, speech clear  Skin: No rashes    Pertinent  and/or Most Recent Results     LAB RESULTS:      Lab 10/06/21  0819 10/04/21  1504 10/04/21  0659 10/04/21  0146   WBC 12.25*  --  11.11* 13.84*   HEMOGLOBIN 8.0* 7.9* 6.7* 7.7*   HEMATOCRIT 27.2* 25.4* 22.6* 26.6*   PLATELETS 262  --  255 310   NEUTROS ABS  --   --  10.37* 11.83*   IMMATURE GRANS (ABS)  --   --  0.06* 0.09*   LYMPHS ABS  --   --  0.51* 1.02   MONOS ABS  --   --  0.16 0.84   EOS ABS  --   --  0.00 0.01   MCV 96.1  --  93.4 97.1*   PROCALCITONIN  --   --   --  0.07   LACTATE  --   --  1.8 2.1*         Lab 10/06/21  1046 10/04/21  0659 10/04/21  0146   SODIUM 134* 134* 138   POTASSIUM 3.8 4.1 4.5   CHLORIDE 100 98 99   CO2 19.0* 24.0 29.0   ANION GAP 15.0 12.0 10.0   BUN 22 27* 26*   CREATININE 0.77 0.80 0.89   GLUCOSE 88 168* 192*   CALCIUM 8.8 8.5* 8.9   MAGNESIUM  --  2.9*  --          Lab  10/04/21  0146   TOTAL PROTEIN 6.9   ALBUMIN 3.80   GLOBULIN 3.1   ALT (SGPT) 14   AST (SGOT) 23   BILIRUBIN 0.3   ALK PHOS 94         Lab 10/04/21  0146   PROBNP 3,158.0*   TROPONIN T 0.010             Lab 10/04/21  0659   ABO TYPING O   RH TYPING Positive   ANTIBODY SCREEN Negative         Lab 10/04/21  0746   PH, ARTERIAL 7.455*   PCO2, ARTERIAL 40.1   PO2 ART 67.4*   FIO2 40   HCO3 ART 28.1*   BASE EXCESS ART 3.9*   CARBOXYHEMOGLOBIN 1.8     Brief Urine Lab Results  (Last result in the past 365 days)      Color   Clarity   Blood   Leuk Est   Nitrite   Protein   CREAT   Urine HCG        10/04/21 1116 Yellow Clear Negative Negative Negative Negative             Microbiology Results (last 10 days)     Procedure Component Value - Date/Time    Blood Culture - Blood, Arm, Right [[id number]] Collected: 10/04/21 0312    Lab Status: Preliminary result Specimen: Blood from Arm, Right Updated: 10/07/21 0330     Blood Culture No growth at 3 days    Blood Culture - Blood, Arm, Left [[id number]] Collected: 10/04/21 0312    Lab Status: Preliminary result Specimen: Blood from Arm, Left Updated: 10/07/21 0330     Blood Culture No growth at 3 days    COVID PRE-OP / PRE-PROCEDURE SCREENING ORDER (NO ISOLATION) - Swab, Nasopharynx [[id number]]  (Normal) Collected: 10/04/21 0149    Lab Status: Final result Specimen: Swab from Nasopharynx Updated: 10/04/21 0245    Narrative:      The following orders were created for panel order COVID PRE-OP / PRE-PROCEDURE SCREENING ORDER (NO ISOLATION) - Swab, Nasopharynx.  Procedure                               Abnormality         Status                     ---------                               -----------         ------                     COVID-19,CEPHEID/VASHTI,LE...[[id number]]  Normal              Final result                 Please view results for these tests on the individual orders.    COVID-19,CEPHEID/VASHTI,[facility] IN-HOUSE(OR EMERGENT/ADD-ON),NP SWAB IN TRANSPORT MEDIA 3-4 HR TAT - Swab,  Nasopharynx [[id number]]  (Normal) Collected: 10/04/21 0149    Lab Status: Final result Specimen: Swab from Nasopharynx Updated: 10/04/21 0245     COVID19 Not Detected    Narrative:      Fact sheet for providers: https://www.fda.gov/media/150550/download     Fact sheet for patients: https://www.fda.gov/media/739467/download  Fact sheet for providers: https://www.fda.gov/media/268418/download     Fact sheet for patients: https://www.fda.gov/media/703088/download          XR Chest 1 View    Result Date: 10/4/2021  EXAMINATION: XR CHEST 1 VW-10/04/2021:  INDICATION: Dyspnea; A41.9-Sepsis, unspecified organism; J18.9-Pneumonia, unspecified organism; I50.9-Heart failure, unspecified; J44.1-Chronic obstructive pulmonary disease with (acute) exacerbation; J96.01-Acute respiratory failure with hypoxia.  COMPARISON: 10/04/2021 2:07 AM.  FINDINGS: The current image is timed 9:45 AM. The heart shadow remains enlarged. There is a persistent perihilar edema pattern present, stable to slightly further increased from the prior study. Bibasilar effusion and atelectasis is stable to only slightly increased. No pneumothorax is identified.      Congestive heart failure with extensive pulmonary interstitial edema, mild to moderate bibasilar atelectasis and relatively small pleural effusions. The findings are stable to slightly worse compared to 2:07 AM exam.  D:  10/04/2021 E:  10/04/2021     This report was finalized on 10/4/2021 9:02 PM by Dr. Augie Leiva MD.      XR Chest 1 View    Result Date: 10/4/2021  CR Chest 1 Vw INDICATION: Shortness of air. COMPARISON:  9/16/2021 FINDINGS: Portable AP view(s) of the chest.  Heart remains enlarged. Atherosclerotic disease is present in the aorta. There are small bilateral pleural effusions, right larger than left. These are similar to the previous study. There are coarse bilateral infiltrates. These are slightly worsened at the right lung base but otherwise largely stable. No pneumothorax is  identified.     Slight worsening in right basilar infiltrates, otherwise stable appearance of the chest. Signer Name: Gomez Crane MD  Signed: 10/4/2021 2:31 AM  Workstation Name: [email or web address]  [facility]    XR Chest PA & Lateral    Result Date: 10/7/2021  EXAMINATION: XR CHEST PA AND LATERAL-  INDICATION: chf, effusions; A41.9-Sepsis, unspecified organism; J18.9-Pneumonia, unspecified organism; I50.9-Heart failure, unspecified; J44.1-Chronic obstructive pulmonary disease with (acute) exacerbation; J96.01-Acute respiratory failure with hypoxia  COMPARISON: 10/4/2021  FINDINGS: Persistent small-to-moderate bilateral pleural effusions, similar to comparison. Redemonstrated multifocal bilateral pulmonary opacities, likely minimally improved from comparison. No distinct pneumothorax. Unchanged heart and mediastinal contours.      Persistent small-to-moderate bilateral pleural effusions, similar to comparison. Redemonstrated multifocal bilateral pulmonary opacities, likely minimally improved from comparison. No distinct pneumothorax. Unchanged heart and mediastinal contours.  This report was finalized on 10/7/2021 8:35 AM by Lonnie Azar.                Results for orders placed during the hospital encounter of 07/19/21    Adult Transthoracic Echo Complete W/ Cont if Necessary Per Protocol    Interpretation Summary  · Estimated left ventricular EF = 65%  · Mild aortic valve stenosis is present.  · Aortic valve maximum pressure gradient is 32.9 mmHg. Aortic valve mean pressure gradient is 17.2 mmHg.  · Mild mitral valve regurgitation is present.  · Mild tricuspid valve regurgitation is present.  · Estimated right ventricular systolic pressure from tricuspid regurgitation is 37.0 mmHg.  · There is a large left pleural effusion. There is a moderate sized right pleural effusion.      Plan for Follow-up of Pending Labs/Results: None  Pending Labs     Order Current Status    Blood Culture - Blood,  Arm, Left Preliminary result    Blood Culture - Blood, Arm, Right Preliminary result        Discharge Details        Discharge Medications      New Medications      Instructions Start Date   metOLazone 5 MG tablet  Commonly known as: ZAROXOLYN   5 mg, Oral, Daily PRN      predniSONE 10 MG tablet  Commonly known as: DELTASONE   Take 4 tablets by mouth Daily for 7 days, THEN 3 tablets Daily for 7 days, THEN 2 tablets Daily for 7 days, THEN 1 tablet Daily for 7 days.   Start Date: October 7, 2021        Continue These Medications      Instructions Start Date   albuterol sulfate  (90 Base) MCG/ACT inhaler  Commonly known as: PROVENTIL HFA;VENTOLIN HFA;PROAIR HFA   2 puffs, Inhalation, Every 4 Hours PRN, Patient taking: Inhale 2 puffs by mouth every 4-6 hours       ALPRAZolam 0.5 MG tablet  Commonly known as: XANAX   0.5 mg, Oral, Nightly PRN      ascorbic acid 1000 MG tablet  Commonly known as: VITAMIN C   1,000 mg, Oral, Daily      bisoprolol 5 MG tablet  Commonly known as: ZEBeta   5 mg, Oral, Every 24 Hours Scheduled      Budeson-Glycopyrrol-Formoterol 160-9-4.8 MCG/ACT aerosol inhaler  Commonly known as: BREZTRI   2 puffs, Inhalation, 2 Times Daily      ferrous sulfate 325 (65 FE) MG tablet   325 mg, Oral, 2 Times Daily With Meals      fish oil 1000 MG capsule capsule   1,000 mg, Oral, Daily With Breakfast      HYDROcodone-acetaminophen 7.5-325 MG per tablet  Commonly known as: NORCO   1 tablet, Oral, Every 6 Hours PRN, Patient taking: once a day prn      lactobacillus acidophilus capsule capsule   1 capsule, Oral, Daily      levothyroxine 75 MCG tablet  Commonly known as: SYNTHROID, LEVOTHROID   75 mcg, Oral, Every Early Morning      multivitamin with minerals tablet tablet   1 tablet, Oral, Daily      pantoprazole 40 MG EC tablet  Commonly known as: PROTONIX   40 mg, Oral, 2 Times Daily Before Meals      potassium chloride 10 MEQ CR capsule  Commonly known as: MICRO-K   20 mEq, Oral, Daily      sertraline  100 MG tablet  Commonly known as: ZOLOFT   150 mg, Oral, Daily, 1.5 tab daily      torsemide 20 MG tablet  Commonly known as: DEMADEX   40 mg, Oral, Daily      Vitamin D (Ergocalciferol) 50 MCG (2000 UT) capsule   1 tablet, Oral, Daily         Stop These Medications    apixaban 5 MG tablet tablet  Commonly known as: ELIQUIS            Allergies   Allergen Reactions   • Amiodarone Unknown - High Severity         Discharge Disposition:  Home or Self Care    Diet:  Hospital:  Diet Order   Procedures   • Diet Regular; Cardiac       Activity:      Restrictions or Other Recommendations:  -Take all medications as directed  -Follow a low salt diet, goal = 2g or less daily  -Weigh yourself every morning before breakfast, write it down and compare to yesterday’s weight. If you have weight gain greater than 3lbs in 1 day or 5 lbs or more in 1 week , call your PCP or Heart Failure Clinic.  -If you experience any of the following symptoms please contact your PCP or Heart Failure Clinic:    Increased shortness of breath   Increased swelling of feet or stomach   Dry hacking cough   Shortness of breath when laying down flat    - [facility]:  [phone]         CODE STATUS:    Code Status and Medical Interventions:   Ordered at: 10/05/21 1352     Limited Support to NOT Include:    Intubation     Code Status:    No CPR     Medical Interventions (Level of Support Prior to Arrest):    Limited       Future Appointments   Date Time Provider Department Center   10/15/2021  3:00 PM Xochitl Hawley APRN [facility]       Additional Instructions for the Follow-ups that You Need to Schedule     Discharge Follow-up with PCP   As directed       Currently Documented PCP:    Santiago Chaudhry MD    PCP Phone Number:    [phone]     Follow Up Details: 1-2 week hospital follow up         Discharge Follow-up with Specialty: [facility]   As directed      Specialty: [facility]     Follow Up Details: As scheduled 10/15                     Shila Crump II, DO  10/07/21      Time Spent on Discharge:  I spent  34  minutes on this discharge activity which included: face-to-face encounter with the patient, reviewing the data in the system, coordination of the care with the nursing staff as well as consultants, documentation, and entering orders.          Electronically signed by Shila Crump II, DO at 10/07/21 0955

## 2021-10-07 NOTE — PLAN OF CARE
Goal Outcome Evaluation:  Plan of Care Reviewed With: patient, family A&Ox's 4. NSR noted on tele monitor. SPO2 92% on 4L NC. Denied pain. No S/S of distress noted. Up the chair this am. Pt's niece at bedside. Continue to monitor.

## 2021-10-07 NOTE — DISCHARGE PLACEMENT REQUEST
"Yin Bhatti (81 y.o. Female)     Case Management  [phone]      Date of Birth Social Security Number Address Home Phone MRN    1940  [address] [place] [id number] [phone] [id number]    M[employer]on Marital Status          Restoration        Admission Date Admission Type Admitting Provider Attending Provider Department, Room/Bed    10/4/21 Emergency Shila Crump II, Shila Scott II, DO [facility] 3F, S325/1    Discharge Date Discharge Disposition Discharge Destination         Home or Self Care              Attending Provider: Shila Crump II, DO    Allergies: Amiodarone    Isolation: None   Infection: None   Code Status: No CPR    Ht: 167.6 cm (66\")   Wt: 62.2 kg (137 lb 3.2 oz)    Admission Cmt: None   Principal Problem: None                Active Insurance as of 10/4/2021     Primary Coverage     Payor Plan Insurance Group Employer/Plan Group    MEDICARE MEDICARE A & B      Payor Plan Address Payor Plan Phone Number Payor Plan Fax Number Effective Dates    [address] [phone]  9/1/2004 - None Entered    [facility] [id number]       Subscriber Name Subscriber Birth Date Member ID       YIN BHATTI 1940 [id number]           Secondary Coverage     Payor Plan Insurance Group Employer/Plan Group    ANTHEM BLUE CROSS Critical access hospital SUPP [id number]     Payor Plan Address Payor Plan Phone Number Payor Plan Fax Number Effective Dates    [address]   12/1/2016 - None Entered    [facility] [id number]       Subscriber Name Subscriber Birth Date Member ID       YIN BHATTI 1940 [id number]                 Emergency Contacts      (Rel.) Home Phone Work Phone Mobile Phone    Enedina Mauricio (Sister) [phone] -- [phone]    STEVIE FOWLER (Relative) [phone] -- [phone]          [facility] 3F  [address] [facility] [address]  Phone:  [phone]  Fax:  [phone] Date: Oct 7, " Medication requested    temazepam    Last RX written:  1/13/21  Last RX dispensed (per PDMP):  1/13/21    Last office visit:  Rein 8/31/20  Upcoming office visit:  New pt visit 4/27/21    Patient due, prepped.  Med set up to sign                    Ambulatory Referral to Home Health     Patient:  Yin Law MRN:  [id number]   [address]  [facility] [address] :  1940  SSN:    Phone: [phone] Sex:  F      INSURANCE PAYOR PLAN GROUP # SUBSCRIBER ID   Primary:  Secondary:    MEDICARE  ANTH BLUE CROSS [id number]  [id number]    [id number]  [id number]      Referring Provider Information:  LEA NESBITT II Phone: [phone] Fax: [phone]      Referral Information:   # Visits:  999 Referral Type: Home Health [42]   Urgency:  Routine Referral Reason: Specialty Services Required   Start Date: Oct 7, 2021 End Date:  To be determined by Insurer   Diagnosis: Acute sepsis (HCC) (A41.9 [ICD-10-CM] 038.9,995.91 [ICD-9-CM])      Refer to Dept:   Refer to Provider:   Refer to Facility:       Face to Face Visit Date: 10/7/2021  Follow-up provider for Plan of Care? I treated the patient in an acute care facility and will not continue treatment after discharge.  Follow-up provider: LARISSA ROSE [[id number]]  Reason/Clinical Findings: respiratory failure  Describe mobility limitations that make leaving home difficult: impaired functional mobility, balance, gait and endurance  Nursing/Therapeutic Services Requested: Skilled Nursing  Nursing/Therapeutic Services Requested: Physical Therapy  Nursing/Therapeutic Services Requested: Occupational Therapy  Skilled nursing orders: Other (resume home health)  PT orders: Other (add comment) (resume home health)  Occupational orders: Other (resume home health)  Frequency: 1 Week 1     This document serves as a request of services and does not constitute Insurance authorization or approval of services.  To determine eligibility, please contact the members Insurance carrier to verify and review coverage.     If you have medical questions regarding this request for services. Please contact [address] at [phone] during normal business hours.       Verbal Order  Mode: Telephone with readback   Authorizing Provider: Shila Crump II, DO  Authorizing Provider's NPI: [id number]     Order Entered By: Nolvia Bowen RN 10/7/2021 10:17 AM     Electronically signed by:  Shila Crump DO               Discharge Summary      Shila Crump II, DO at 10/07/21 0749              [facility] Medicine Services  DISCHARGE SUMMARY    Patient Name: Yin Law  : 1940  MRN: [id number]    Date of Admission: 10/4/2021  1:25 AM  Date of Discharge: 10/7/2021  Primary Care Physician: Santiago Chaudhry MD    Consults     Date and Time Order Name Status Description    10/5/2021  1:54 PM Inpatient Palliative Care MD Consult Completed     10/4/2021  1:38 PM Inpatient Gastroenterology Consult Completed     2021 12:35 AM Inpatient Palliative Care MD Consult Completed     9/15/2021  2:36 AM Inpatient Pulmonology Consult Completed           Hospital Course     Presenting Problem:   Acute sepsis (HCC) [A41.9]    Active Hospital Problems    Diagnosis  POA   • Severe malnutrition (CMS/HCC) [E43]  Yes   • COPD on home oxygen (CMS/HCC) [J44.9]  Yes      Resolved Hospital Problems    Diagnosis Date Resolved POA   • Acute sepsis (HCC) [A41.9] 10/06/2021 Yes   • Acute on chronic diastolic congestive heart failure due to HTN (CMS/HCC) [I50.33] 10/07/2021 Yes   • Acute on chronic respiratory failure with hypoxia (HCC) [J96.21] 10/07/2021 Yes          Hospital Course:  Yin Law is a 81 y.o. female female  to ED with history of extensive lung disease-chronically on 3 L O2, has been always short of breath progressively getting worse over last few weeks.     Acute on chronic respiratory failure with hypoxia  Suspected underlying ILD  COPD on 2L O2 chronically   -Upon arrival patient placed on IV abx, IV steroids, IV lasix and ultimately responded well. As she exhibited no evidence ov infection and cultures were negative abx were stopped and she  continued to improve.   -Pulmonary followed during last visit. Workup included elevated RF (in the setting of known RA), negative quantiferon, +anti-centromere antibody, + atypical p-ANCA with negative MPO and PR3.  She was felt to be too frail for open lung biopsy.  Thoracentesis performed last admission with transudative fluid and negative cytology. Has appointment with Pulmonary 10/15 - should keep that appointment.  -Diuretics as below. Continue PO steroids, plan for slow taper at d/c w/ pulmonary follow up.    -Palliative followed during stay. Discussed hospice as avenue to keep her out of hospital which I d/w her is certainly appropriate given severity of her lung disease and frequent readmissions however patient refused.     Acute on chronic diastolic CHF  -She was diuresed aggressively with IV lasix BID and responded well. Will continue Torsemide PO at d/c along with addition of PRN metolazone for symptoms of increased SOA, edema, weight gain. I encouraged her to notify PCP after having to take PRN dose of medication as potential avenue to keep her out of hospital.     Acute/chronic anemia  -Upon arrival she was anemic to 6.7. She was ransfused 1 unit blood w/ appropriate response.   -GI has evaluated, felt too frail for endoscopic eval at this point. Recs empiric tx with IV PPI. Will also be off of AC going forward so hopefully this will help.     Paroxysmal atrial fibrillation  -Will stop Eliqius indefinitely given recurrent anemia. Per last cardiology note she could not tolerate Eliquis due to GI bleeding, however she was discharged on Eliquis at last visit. I d/w family and they are agreeable.    Discharge Follow Up Recommendations for outpatient labs/diagnostics:   Dr. Chaudhry in 1-2 weeks   Pulmonary in 1 week as scheduled    Day of Discharge     HPI: Up in chair eating with family in room. Feels much better. Wants to go home.     Review of Systems  Gen- No fevers, chills  CV- No chest pain,  palpitations  Resp- No cough, dyspnea  GI- No N/V/D, abd pain    Vital Signs:   Temp:  [96.8 °F (36 °C)-98.3 °F (36.8 °C)] 98.3 °F (36.8 °C)  Heart Rate:  [56-75] 61  Resp:  [16-20] 20  BP: (127-147)/(50-70) 139/54     Physical Exam:  Constitutional: No acute distress, awake, alert, looks better  HENT: NCAT, mucous membranes moist, O2 via NC  Respiratory: Clear to auscultation bilaterally, respiratory effort normal   Cardiovascular: RRR, no murmurs, rubs, or gallops  Gastrointestinal: Positive bowel sounds, soft, nontender, nondistended  Musculoskeletal: No bilateral ankle edema  Psychiatric: Appropriate affect, cooperative  Neurologic: Oriented x 3, strength symmetric in all extremities, Cranial Nerves grossly intact to confrontation, speech clear  Skin: No rashes    Pertinent  and/or Most Recent Results     LAB RESULTS:      Lab 10/06/21  0819 10/04/21  1504 10/04/21  0659 10/04/21  0146   WBC 12.25*  --  11.11* 13.84*   HEMOGLOBIN 8.0* 7.9* 6.7* 7.7*   HEMATOCRIT 27.2* 25.4* 22.6* 26.6*   PLATELETS 262  --  255 310   NEUTROS ABS  --   --  10.37* 11.83*   IMMATURE GRANS (ABS)  --   --  0.06* 0.09*   LYMPHS ABS  --   --  0.51* 1.02   MONOS ABS  --   --  0.16 0.84   EOS ABS  --   --  0.00 0.01   MCV 96.1  --  93.4 97.1*   PROCALCITONIN  --   --   --  0.07   LACTATE  --   --  1.8 2.1*         Lab 10/06/21  1046 10/04/21  0659 10/04/21  0146   SODIUM 134* 134* 138   POTASSIUM 3.8 4.1 4.5   CHLORIDE 100 98 99   CO2 19.0* 24.0 29.0   ANION GAP 15.0 12.0 10.0   BUN 22 27* 26*   CREATININE 0.77 0.80 0.89   GLUCOSE 88 168* 192*   CALCIUM 8.8 8.5* 8.9   MAGNESIUM  --  2.9*  --          Lab 10/04/21  0146   TOTAL PROTEIN 6.9   ALBUMIN 3.80   GLOBULIN 3.1   ALT (SGPT) 14   AST (SGOT) 23   BILIRUBIN 0.3   ALK PHOS 94         Lab 10/04/21  0146   PROBNP 3,158.0*   TROPONIN T 0.010             Lab 10/04/21  0659   ABO TYPING O   RH TYPING Positive   ANTIBODY SCREEN Negative         Lab 10/04/21  0746   PH, ARTERIAL 7.455*    PCO2, ARTERIAL 40.1   PO2 ART 67.4*   FIO2 40   HCO3 ART 28.1*   BASE EXCESS ART 3.9*   CARBOXYHEMOGLOBIN 1.8     Brief Urine Lab Results  (Last result in the past 365 days)      Color   Clarity   Blood   Leuk Est   Nitrite   Protein   CREAT   Urine HCG        10/04/21 1116 Yellow Clear Negative Negative Negative Negative             Microbiology Results (last 10 days)     Procedure Component Value - Date/Time    Blood Culture - Blood, Arm, Right [[id number]] Collected: 10/04/21 0312    Lab Status: Preliminary result Specimen: Blood from Arm, Right Updated: 10/07/21 0330     Blood Culture No growth at 3 days    Blood Culture - Blood, Arm, Left [[id number]] Collected: 10/04/21 0312    Lab Status: Preliminary result Specimen: Blood from Arm, Left Updated: 10/07/21 0330     Blood Culture No growth at 3 days    COVID PRE-OP / PRE-PROCEDURE SCREENING ORDER (NO ISOLATION) - Swab, Nasopharynx [[id number]]  (Normal) Collected: 10/04/21 0149    Lab Status: Final result Specimen: Swab from Nasopharynx Updated: 10/04/21 0245    Narrative:      The following orders were created for panel order COVID PRE-OP / PRE-PROCEDURE SCREENING ORDER (NO ISOLATION) - Swab, Nasopharynx.  Procedure                               Abnormality         Status                     ---------                               -----------         ------                     COVID-19,CEPHEID/VASHTI,LE...[[id number]]  Normal              Final result                 Please view results for these tests on the individual orders.    COVID-19,CEPHEID/VASHTI,SHALINI IN-HOUSE(OR EMERGENT/ADD-ON),NP SWAB IN TRANSPORT MEDIA 3-4 HR TAT - Swab, Nasopharynx [[id number]]  (Normal) Collected: 10/04/21 0149    Lab Status: Final result Specimen: Swab from Nasopharynx Updated: 10/04/21 0245     COVID19 Not Detected    Narrative:      Fact sheet for providers: https://www.fda.gov/media/019863/download     Fact sheet for patients: https://www.fda.gov/media/082099/download  Fact sheet for  providers: https://www.fda.gov/media/264511/download     Fact sheet for patients: https://www.fda.gov/media/478934/download          XR Chest 1 View    Result Date: 10/4/2021  EXAMINATION: XR CHEST 1 VW-10/04/2021:  INDICATION: Dyspnea; A41.9-Sepsis, unspecified organism; J18.9-Pneumonia, unspecified organism; I50.9-Heart failure, unspecified; J44.1-Chronic obstructive pulmonary disease with (acute) exacerbation; J96.01-Acute respiratory failure with hypoxia.  COMPARISON: 10/04/2021 2:07 AM.  FINDINGS: The current image is timed 9:45 AM. The heart shadow remains enlarged. There is a persistent perihilar edema pattern present, stable to slightly further increased from the prior study. Bibasilar effusion and atelectasis is stable to only slightly increased. No pneumothorax is identified.      Congestive heart failure with extensive pulmonary interstitial edema, mild to moderate bibasilar atelectasis and relatively small pleural effusions. The findings are stable to slightly worse compared to 2:07 AM exam.  D:  10/04/2021 E:  10/04/2021     This report was finalized on 10/4/2021 9:02 PM by Dr. Augie Leiva MD.      XR Chest 1 View    Result Date: 10/4/2021  CR Chest 1 Vw INDICATION: Shortness of air. COMPARISON:  9/16/2021 FINDINGS: Portable AP view(s) of the chest.  Heart remains enlarged. Atherosclerotic disease is present in the aorta. There are small bilateral pleural effusions, right larger than left. These are similar to the previous study. There are coarse bilateral infiltrates. These are slightly worsened at the right lung base but otherwise largely stable. No pneumothorax is identified.     Slight worsening in right basilar infiltrates, otherwise stable appearance of the chest. Signer Name: Gomez Crane MD  Signed: 10/4/2021 2:31 AM  Workstation Name: [email or web address]  [facility]    XR Chest PA & Lateral    Result Date: 10/7/2021  EXAMINATION: XR CHEST PA AND LATERAL-  INDICATION: chf,  effusions; A41.9-Sepsis, unspecified organism; J18.9-Pneumonia, unspecified organism; I50.9-Heart failure, unspecified; J44.1-Chronic obstructive pulmonary disease with (acute) exacerbation; J96.01-Acute respiratory failure with hypoxia  COMPARISON: 10/4/2021  FINDINGS: Persistent small-to-moderate bilateral pleural effusions, similar to comparison. Redemonstrated multifocal bilateral pulmonary opacities, likely minimally improved from comparison. No distinct pneumothorax. Unchanged heart and mediastinal contours.      Persistent small-to-moderate bilateral pleural effusions, similar to comparison. Redemonstrated multifocal bilateral pulmonary opacities, likely minimally improved from comparison. No distinct pneumothorax. Unchanged heart and mediastinal contours.  This report was finalized on 10/7/2021 8:35 AM by Lonnie Azar.                Results for orders placed during the hospital encounter of 07/19/21    Adult Transthoracic Echo Complete W/ Cont if Necessary Per Protocol    Interpretation Summary  · Estimated left ventricular EF = 65%  · Mild aortic valve stenosis is present.  · Aortic valve maximum pressure gradient is 32.9 mmHg. Aortic valve mean pressure gradient is 17.2 mmHg.  · Mild mitral valve regurgitation is present.  · Mild tricuspid valve regurgitation is present.  · Estimated right ventricular systolic pressure from tricuspid regurgitation is 37.0 mmHg.  · There is a large left pleural effusion. There is a moderate sized right pleural effusion.      Plan for Follow-up of Pending Labs/Results: None  Pending Labs     Order Current Status    Blood Culture - Blood, Arm, Left Preliminary result    Blood Culture - Blood, Arm, Right Preliminary result        Discharge Details        Discharge Medications      New Medications      Instructions Start Date   metOLazone 5 MG tablet  Commonly known as: ZAROXOLYN   5 mg, Oral, Daily PRN      predniSONE 10 MG tablet  Commonly known as: DELTASONE   Take 4  tablets by mouth Daily for 7 days, THEN 3 tablets Daily for 7 days, THEN 2 tablets Daily for 7 days, THEN 1 tablet Daily for 7 days.   Start Date: October 7, 2021        Continue These Medications      Instructions Start Date   albuterol sulfate  (90 Base) MCG/ACT inhaler  Commonly known as: PROVENTIL HFA;VENTOLIN HFA;PROAIR HFA   2 puffs, Inhalation, Every 4 Hours PRN, Patient taking: Inhale 2 puffs by mouth every 4-6 hours       ALPRAZolam 0.5 MG tablet  Commonly known as: XANAX   0.5 mg, Oral, Nightly PRN      ascorbic acid 1000 MG tablet  Commonly known as: VITAMIN C   1,000 mg, Oral, Daily      bisoprolol 5 MG tablet  Commonly known as: ZEBeta   5 mg, Oral, Every 24 Hours Scheduled      Budeson-Glycopyrrol-Formoterol 160-9-4.8 MCG/ACT aerosol inhaler  Commonly known as: BREZTRI   2 puffs, Inhalation, 2 Times Daily      ferrous sulfate 325 (65 FE) MG tablet   325 mg, Oral, 2 Times Daily With Meals      fish oil 1000 MG capsule capsule   1,000 mg, Oral, Daily With Breakfast      HYDROcodone-acetaminophen 7.5-325 MG per tablet  Commonly known as: NORCO   1 tablet, Oral, Every 6 Hours PRN, Patient taking: once a day prn      lactobacillus acidophilus capsule capsule   1 capsule, Oral, Daily      levothyroxine 75 MCG tablet  Commonly known as: SYNTHROID, LEVOTHROID   75 mcg, Oral, Every Early Morning      multivitamin with minerals tablet tablet   1 tablet, Oral, Daily      pantoprazole 40 MG EC tablet  Commonly known as: PROTONIX   40 mg, Oral, 2 Times Daily Before Meals      potassium chloride 10 MEQ CR capsule  Commonly known as: MICRO-K   20 mEq, Oral, Daily      sertraline 100 MG tablet  Commonly known as: ZOLOFT   150 mg, Oral, Daily, 1.5 tab daily      torsemide 20 MG tablet  Commonly known as: DEMADEX   40 mg, Oral, Daily      Vitamin D (Ergocalciferol) 50 MCG (2000 UT) capsule   1 tablet, Oral, Daily         Stop These Medications    apixaban 5 MG tablet tablet  Commonly known as: ELIQUIS             Allergies   Allergen Reactions   • Amiodarone Unknown - High Severity         Discharge Disposition:  Home or Self Care    Diet:  Hospital:  Diet Order   Procedures   • Diet Regular; Cardiac       Activity:      Restrictions or Other Recommendations:  -Take all medications as directed  -Follow a low salt diet, goal = 2g or less daily  -Weigh yourself every morning before breakfast, write it down and compare to yesterday’s weight. If you have weight gain greater than 3lbs in 1 day or 5 lbs or more in 1 week , call your PCP or Heart Failure Clinic.  -If you experience any of the following symptoms please contact your PCP or Heart Failure Clinic:    Increased shortness of breath   Increased swelling of feet or stomach   Dry hacking cough   Shortness of breath when laying down flat    - [facility] Failure Clinic:  [phone]         CODE STATUS:    Code Status and Medical Interventions:   Ordered at: 10/05/21 1352     Limited Support to NOT Include:    Intubation     Code Status:    No CPR     Medical Interventions (Level of Support Prior to Arrest):    Limited       Future Appointments   Date Time Provider Department Center   10/15/2021  3:00 PM Xochitl Hawley APRN [facility]       Additional Instructions for the Follow-ups that You Need to Schedule     Discharge Follow-up with PCP   As directed       Currently Documented PCP:    Santiago Chaudhry MD    PCP Phone Number:    [phone]     Follow Up Details: 1-2 week hospital follow up         Discharge Follow-up with Specialty: Pulmonary  - [facility]   As directed      Specialty: Pulmonary Assred - [facility]    Follow Up Details: As scheduled 10/15                     Shila Crump II, DO  10/07/21      Time Spent on Discharge:  I spent  34  minutes on this discharge activity which included: face-to-face encounter with the patient, reviewing the data in the system, coordination of the care with the nursing staff as well as consultants,  documentation, and entering orders.          Electronically signed by Shila Crump II, DO at 10/07/21 0930

## 2021-10-08 LAB
BH BB BLOOD EXPIRATION DATE: NORMAL
BH BB BLOOD TYPE BARCODE: 5100
BH BB DISPENSE STATUS: NORMAL
BH BB PRODUCT CODE: NORMAL
BH BB UNIT NUMBER: NORMAL
CROSSMATCH INTERPRETATION: NORMAL
UNIT  ABO: NORMAL
UNIT  RH: NORMAL

## 2021-10-08 NOTE — OUTREACH NOTE
Prep Survey      Responses   Nondenominational facility patient discharged from?  [facility]   Is LACE score < 7 ?  No   Emergency Room discharge w/ pulse ox?  No   Eligibility  Readm Mgmt   Discharge diagnosis   Acute on chronic respiratory failure with hypoxiaCOPD on 2L O2 chronically Acute on chronic diastolic CHF   Does the patient have one of the following disease processes/diagnoses(primary or secondary)?  COPD/Pneumonia   Does the patient have Home health ordered?  Yes   What is the Home health agency?   [facility]   Is there a DME ordered?  Yes   What DME was ordered?  [facility]   Prep survey completed?  Yes          Delores Kramer RN

## 2021-10-09 LAB
BACTERIA SPEC AEROBE CULT: NORMAL
BACTERIA SPEC AEROBE CULT: NORMAL

## 2021-10-12 ENCOUNTER — READMISSION MANAGEMENT (OUTPATIENT)
Dept: CALL CENTER | Facility: HOSPITAL | Age: 81
End: 2021-10-12

## 2021-10-12 NOTE — OUTREACH NOTE
COPD/PN Week 1 Survey      Responses   [facility] patient discharged from? [facility]   Does the patient have one of the following disease processes/diagnoses(primary or secondary)? COPD/Pneumonia   Was the primary reason for admission: COPD exacerbation   Week 1 attempt successful? Yes   Call start time 0848   Call end time 0856   Discharge diagnosis  Acute on chronic respiratory failure with hypoxiaCOPD on 2L O2 chronically Acute on chronic diastolic CHF   Meds reviewed with patient/caregiver? Yes   Is the patient having any side effects they believe may be caused by any medication additions or changes? No   Does the patient have all medications ordered at discharge? Yes   Is the patient taking all medications as directed (includes completed medication regime)? Yes   Does the patient have a primary care provider?  Yes   Does the patient have an appointment with their PCP or specialist within 7 days of discharge? Yes   Comments Has appt with PCP tomorrow.  Pulmonary appt Oct 15 @ 3:00.  Phone and address given.   What is the Home health agency?  [facility]   Has home health visited the patient within 72 hours of discharge? Yes   What DME was ordered? [facility]   Has all DME been delivered? Yes   DME comments Home O2 @ 3L   Pulse Ox monitoring Intermittent   Pulse Ox device source Patient   O2 Sat comments 95% on 3L oxygen.    O2 Sat: education provided Sat levels,  Monitoring frequency,  When to seek care   Did the patient receive a copy of their discharge instructions? Yes   Nursing interventions Reviewed instructions with patient   What is the patient's perception of their health status since discharge? Improving   Nursing Interventions Nurse provided patient education   If the patient is a current smoker, are they able to teach back resources for cessation? Not a smoker   Is the patient/caregiver able to teach back the hierarchy of who to call/visit for symptoms/problems? PCP,  Specialist, Home health nurse, Urgent Care, ED, 911 Yes   Additional teach back comments States she is doing well.  She also weighs daily and has kept the same weight.  She is aware if greater than 2lb weight gain in a day or 5lbs in a week, she is to contact her cardiologist.  Her appetite has been good and sleeping well.  States she is not having any issues with her breathing.   Is the patient able to teach back COPD zones? Yes   Nursing interventions Education provided on various zones   Patient reports what zone on this call? Green Zone   Green Zone Reports doing well,  Breathing without shortness of breath,  Usual activity and exercise level,  Usual amount of phlegm/mucus without difficulty coughing up,  Sleeping well,  Appetite is good   Green Zone interventions: Take daily medications,  Use oxygen as prescribed,  Continue regular exercise/diet plan   Week 1 call completed? Yes   Wrap up additional comments Pt given time and date of Pulmonary appt.  She was also given address and phone #.  Denies any other questions or needs at this time          Jordyn Power LPN

## 2021-10-21 ENCOUNTER — OFFICE VISIT (OUTPATIENT)
Dept: PULMONOLOGY | Facility: CLINIC | Age: 81
End: 2021-10-21

## 2021-10-21 ENCOUNTER — READMISSION MANAGEMENT (OUTPATIENT)
Dept: CALL CENTER | Facility: HOSPITAL | Age: 81
End: 2021-10-21

## 2021-10-21 VITALS
OXYGEN SATURATION: 94 % | DIASTOLIC BLOOD PRESSURE: 80 MMHG | BODY MASS INDEX: 21.21 KG/M2 | SYSTOLIC BLOOD PRESSURE: 134 MMHG | HEART RATE: 80 BPM | TEMPERATURE: 97.6 F | WEIGHT: 132 LBS | HEIGHT: 66 IN

## 2021-10-21 DIAGNOSIS — J44.9 CHRONIC OBSTRUCTIVE PULMONARY DISEASE, UNSPECIFIED COPD TYPE (HCC): Primary | ICD-10-CM

## 2021-10-21 DIAGNOSIS — J18.9 MULTIFOCAL PNEUMONIA: ICD-10-CM

## 2021-10-21 DIAGNOSIS — J90 PLEURAL EFFUSION, BILATERAL: ICD-10-CM

## 2021-10-21 PROCEDURE — 99214 OFFICE O/P EST MOD 30 MIN: CPT | Performed by: NURSE PRACTITIONER

## 2021-10-21 NOTE — PROGRESS NOTES
"[facility] Pulmonary Follow up      Chief Complaint  Shortness of Breath and Follow-up    Subjective          Yin Law presents to [facility] PULMONARY & CRITICAL CARE MEDICINE for hospital follow-up.  She has been in the hospital quite a bit since July with worsening hypoxic respiratory failure with bilateral patchy infiltrates.  She was seen by our physicians while in the hospital.  She does have some underlying COPD with a history of tobacco use.    On her last admission she was seen for these bilateral infiltrates.  Her infectious work-up was largely negative.  Her autoimmune work-up did show a positive ANCA and a positive rheumatoid factor with a history of known rheumatoid arthritis.  At this time she is completing a prolonged steroid taper.  She has 2 more weeks.  She also had a large pleural effusion and underwent thoracentesis in August.  Likely due to acute on chronic heart failure with her underlying lung disease.    At this time she is doing very well.  She is actually off of her oxygen during the day and only using it at night.  She has been monitoring her oxygen saturations at home.  They are occasionally hard to find due to some circulation issues but they have never been below 94%.  She denies a significant cough or sputum production.  She is back on her Breztri.  She does have nebulizers at home but has not used them.  She is up moving around quite a bit more.  She is even doing chores in the house such as dusting and sweeping.    Objective     Vital Signs:   /80   Pulse 80   Temp 97.6 °F (36.4 °C)   Ht 167.6 cm (66\")   Wt 59.9 kg (132 lb)   SpO2 94% Comment: resting, room air  BMI 21.31 kg/m²       Immunization History   Administered Date(s) Administered   • COVID-19 (PFIZER) 03/17/2021, 04/14/2021   • Influenza, Unspecified 12/21/2011, 12/26/2012, 10/03/2013, 09/29/2014, 10/20/2016, 10/19/2017, 09/11/2018, 10/16/2019, 11/02/2020, 09/27/2021   • Pneumococcal " Conjugate 13-Valent (PCV13) 09/29/2014, 07/21/2015   • Pneumococcal Polysaccharide (PPSV23) 06/12/2009, 12/08/2009   • Tdap 07/21/2015       Physical Exam  Vitals reviewed.   Constitutional:       Appearance: She is well-developed.   HENT:      Head: Normocephalic and atraumatic.   Eyes:      Pupils: Pupils are equal, round, and reactive to light.   Cardiovascular:      Rate and Rhythm: Normal rate and regular rhythm.      Heart sounds: No murmur heard.      Pulmonary:      Effort: Pulmonary effort is normal. No respiratory distress.      Breath sounds: Normal breath sounds. No wheezing or rales.   Abdominal:      General: Bowel sounds are normal. There is no distension.      Palpations: Abdomen is soft.   Musculoskeletal:         General: Normal range of motion.      Cervical back: Normal range of motion and neck supple.   Skin:     General: Skin is warm and dry.      Findings: No erythema.   Neurological:      Mental Status: She is alert and oriented to person, place, and time.   Psychiatric:         Behavior: Behavior normal.          Result Review :       Data reviewed: Radiologic studies CT scan of the chest and most recent chest x-ray and Recent hospitalization notes From Dr. Calvillo's pulmonary consult and her discharge summary                    Assessment and Plan    Problem List Items Addressed This Visit        Pulmonary and Pneumonias    Multifocal pneumonia    COPD on home oxygen (CMS/[facility]) - Primary    Pleural effusion, bilateral          We did review her hospital records today in the office.  At this time she seems to be improving nicely.  She will complete 2 more weeks of prednisone I would like for her to follow-up with repeat imaging.  We will get a chest x-ray here in the office to assure improvement or resolution of these bilateral opacities.    I would like for her also to do a 6-minute walk test on her follow-up.    Continue on her Breztri for her COPD.  We did discuss using her albuterol  nebulizers as needed for any worsening shortness of breath cough or sputum production.    Follow-up in 4 weeks for recheck.      Follow Up     No follow-ups on file.  Patient was given instructions and counseling regarding her condition or for health maintenance advice. Please see specific information pulled into the AVS if appropriate.     I spent 35 minutes caring for Yin on this date of service. This time includes time spent by me in the following activities:preparing for the visit, reviewing tests, obtaining and/or reviewing a separately obtained history, performing a medically appropriate examination and/or evaluation , counseling and educating the patient/family/caregiver, ordering medications, tests, or procedures, referring and communicating with other health care professionals , documenting information in the medical record, independently interpreting results and communicating that information with the patient/family/caregiver and care coordination      AMA Way, ACNP  [facility] Critical [facility]  Electronically signed

## 2021-10-21 NOTE — OUTREACH NOTE
COPD/PN Week 2 Survey      Responses   [facility] patient discharged from? [place]   Does the patient have one of the following disease processes/diagnoses(primary or secondary)? COPD/Pneumonia   Was the primary reason for admission: COPD exacerbation   Week 2 attempt successful? Yes   Call start time 1406   Call end time 1412   Meds reviewed with patient/caregiver? Yes   Is the patient having any side effects they believe may be caused by any medication additions or changes? No   Does the patient have all medications ordered at discharge? Yes   Is the patient taking all medications as directed (includes completed medication regime)? Yes   Does the patient have a primary care provider?  Yes   Does the patient have an appointment with their PCP or specialist within 7 days of discharge? Yes   Has the patient kept scheduled appointments due by today? Yes   What is the Home health agency?  [facility]   Has home health visited the patient within 72 hours of discharge? Yes   What DME was [facility]   Has all DME been delivered? Yes   DME comments Home O2 @ 3L   Psychosocial issues? No   Did the patient receive a copy of their discharge instructions? Yes   Nursing interventions Reviewed instructions with patient   What is the patient's perception of their health status since discharge? Improving   Nursing Interventions Nurse provided patient education   If the patient is a current smoker, are they able to teach back resources for cessation? Not a smoker   Is the patient/caregiver able to teach back the hierarchy of who to call/visit for symptoms/problems? PCP, Specialist, Home health nurse, Urgent Care, ED, 911 Yes   Is the patient able to teach back COPD zones? Yes   Nursing interventions Education provided on various zones   Patient reports what zone on this call? Green Zone   Green Zone Reports doing well,  Breathing without shortness of breath,  Usual activity and exercise  level,  Usual amount of phlegm/mucus without difficulty coughing up,  Sleeping well,  Appetite is good   Green Zone interventions: Take daily medications,  Use oxygen as prescribed,  Continue regular exercise/diet plan   Week 2 call completed? Yes          Erwin Damico RN

## 2021-10-28 ENCOUNTER — READMISSION MANAGEMENT (OUTPATIENT)
Dept: CALL CENTER | Facility: HOSPITAL | Age: 81
End: 2021-10-28

## 2021-10-28 NOTE — OUTREACH NOTE
COPD/PN Week 3 Survey      Responses   [facility] patient discharged from? [place]   Does the patient have one of the following disease processes/diagnoses(primary or secondary)? COPD/Pneumonia   Was the primary reason for admission: COPD exacerbation   Week 3 attempt successful? Yes   Call start time 1313   Call end time 1318   Discharge diagnosis  Acute on chronic respiratory failure with hypoxiaCOPD on 2L O2 chronically Acute on chronic diastolic CHF   Meds reviewed with patient/caregiver? Yes   Is the patient having any side effects they believe may be caused by any medication additions or changes? No   Does the patient have all medications ordered at discharge? Yes   Is the patient taking all medications as directed (includes completed medication regime)? Yes   Medication comments finished steroids   Does the patient have a primary care provider?  Yes   Does the patient have an appointment with their PCP or specialist within 7 days of discharge? Yes   Has the patient kept scheduled appointments due by today? Yes   What is the Home health agency?  [facility]   Has home health visited the patient within 72 hours of discharge? Yes   What DME was ordered? [facility]   DME comments Home O2 @ 3L at nighttime and as needed during the day   Pulse Ox monitoring Intermittent   Pulse Ox device source Patient   O2 Sat comments 91-97% on room air   O2 Sat: education provided Sat levels,  Monitoring frequency,  When to seek care   Psychosocial issues? No   Did the patient receive a copy of their discharge instructions? Yes   Nursing interventions Reviewed instructions with patient   What is the patient's perception of their health status since discharge? Improving  [Cough in the evening but otherwise she denies any issues--no shortness of air, no edema.  Weight remains steady at 134-136 everyday.]   Nursing Interventions Nurse provided patient education   Are the patient's immunizations up  to date?  Yes   If the patient is a current smoker, are they able to teach back resources for cessation? Not a smoker   Is the patient/caregiver able to teach back the hierarchy of who to call/visit for symptoms/problems? PCP, Specialist, Home health nurse, Urgent Care, ED, 911 Yes   Week 3 call completed? Yes          Tita Fiore RN

## 2021-11-05 ENCOUNTER — READMISSION MANAGEMENT (OUTPATIENT)
Dept: CALL CENTER | Facility: HOSPITAL | Age: 81
End: 2021-11-05

## 2021-11-05 NOTE — OUTREACH NOTE
COPD/PN Week 4 Survey      Responses   [facility] patient discharged from? [place]   Does the patient have one of the following disease processes/diagnoses(primary or secondary)? COPD/Pneumonia   Was the primary reason for admission: COPD exacerbation   Week 4 attempt successful? Yes   Call start time 1538   Call end time 1540   Discharge diagnosis  Acute on chronic respiratory failure with hypoxiaCOPD on 2L O2 chronically Acute on chronic diastolic CHF   Meds reviewed with patient/caregiver? Yes   Is the patient taking all medications as directed (includes completed medication regime)? Yes   Has the patient kept scheduled appointments due by today? Yes   Is the patient still receiving Home Health Services? Yes   Pulse Ox monitoring Intermittent   O2 Sat comments Not checked today   What is the patient's perception of their health status since discharge? Improving   Is the patient able to teach back COPD zones? Yes   Patient reports what zone on this call? Green Zone   Green Zone Breathing without shortness of breath,  Reports doing well   Green Zone interventions: Take daily medications,  Use oxygen as prescribed   Week 4 call completed? Yes   Would the patient like one additional call? No   Graduated Yes   Is the patient interested in additional calls from an ambulatory ?  NOTE:  applies to high risk patients requiring additional follow-up. No   Did the patient feel the follow up calls were helpful during their recovery period? Yes   Was the number of calls appropriate? Yes          Micki Abad RN

## 2021-11-30 ENCOUNTER — INFUSION (OUTPATIENT)
Dept: ONCOLOGY | Facility: HOSPITAL | Age: 81
End: 2021-11-30

## 2021-11-30 VITALS
TEMPERATURE: 97.7 F | RESPIRATION RATE: 20 BRPM | HEART RATE: 56 BPM | SYSTOLIC BLOOD PRESSURE: 121 MMHG | DIASTOLIC BLOOD PRESSURE: 53 MMHG

## 2021-11-30 DIAGNOSIS — D50.8 OTHER IRON DEFICIENCY ANEMIA: Primary | ICD-10-CM

## 2021-11-30 PROCEDURE — 96374 THER/PROPH/DIAG INJ IV PUSH: CPT

## 2021-11-30 PROCEDURE — 25010000002 FERUMOXYTOL 510 MG/17ML SOLUTION 510 MG VIAL: Performed by: INTERNAL MEDICINE

## 2021-11-30 RX ADMIN — FERUMOXYTOL 510 MG: 510 INJECTION INTRAVENOUS at 14:55

## 2021-12-01 ENCOUNTER — OFFICE VISIT (OUTPATIENT)
Dept: PULMONOLOGY | Facility: CLINIC | Age: 81
End: 2021-12-01

## 2021-12-01 VITALS
HEIGHT: 66 IN | OXYGEN SATURATION: 92 % | SYSTOLIC BLOOD PRESSURE: 120 MMHG | TEMPERATURE: 98.6 F | WEIGHT: 132 LBS | BODY MASS INDEX: 21.21 KG/M2 | HEART RATE: 78 BPM | DIASTOLIC BLOOD PRESSURE: 70 MMHG

## 2021-12-01 DIAGNOSIS — J18.9 MULTIFOCAL PNEUMONIA: ICD-10-CM

## 2021-12-01 DIAGNOSIS — J90 PLEURAL EFFUSION, BILATERAL: Primary | ICD-10-CM

## 2021-12-01 DIAGNOSIS — J44.9 CHRONIC OBSTRUCTIVE PULMONARY DISEASE, UNSPECIFIED COPD TYPE (HCC): ICD-10-CM

## 2021-12-01 PROCEDURE — 99214 OFFICE O/P EST MOD 30 MIN: CPT | Performed by: NURSE PRACTITIONER

## 2021-12-01 NOTE — PROGRESS NOTES
"[facility] Pulmonary Follow up      Chief Complaint  COPD    Subjective          Yin Law presents to [facility] PULMONARY & CRITICAL CARE MEDICINE for follow-up on her pneumonia with pleural effusion and recent hospitalization.  I saw her in October after being hospitalized several times in the last few months for pneumonia, respiratory failure, and a pleural effusion.    She continues to have some shortness of breath with activity but feels like she is back to baseline.  She has an occasional cough but mostly in the evenings.  She is on oxygen at 5 L currently.  They did have to turn it up recently, due to some worsening shortness of breath with activity.    She was also found to have worsening anemia and has been on iron transfusions starting this week.    She does continue on her Breztri twice a day and uses her albuterol rescue 1-2 times a day   She does have albuterol nebulizers but has not been using them.        Objective     Vital Signs:   /70   Pulse 78   Temp 98.6 °F (37 °C)   Ht 167.6 cm (66\")   Wt 59.9 kg (132 lb)   SpO2 92% Comment: 5 liters  at rest  BMI 21.31 kg/m²       Immunization History   Administered Date(s) Administered   • COVID-19 (PFIZER) 03/17/2021, 04/14/2021   • Influenza, Unspecified 12/21/2011, 12/26/2012, 10/03/2013, 09/29/2014, 10/20/2016, 10/19/2017, 09/11/2018, 10/16/2019, 11/02/2020, 09/27/2021   • Pneumococcal Conjugate 13-Valent (PCV13) 09/29/2014, 07/21/2015   • Pneumococcal Polysaccharide (PPSV23) 06/12/2009, 12/08/2009   • Tdap 07/21/2015       Physical Exam  Vitals reviewed.   Constitutional:       General: She is not in acute distress.     Appearance: She is well-developed. She is not ill-appearing.   HENT:      Head: Normocephalic and atraumatic.   Eyes:      Pupils: Pupils are equal, round, and reactive to light.   Cardiovascular:      Rate and Rhythm: Normal rate and regular rhythm.      Heart sounds: No murmur heard.      Pulmonary:    "   Effort: Pulmonary effort is normal. No respiratory distress.      Breath sounds: Normal breath sounds. No wheezing or rales.   Abdominal:      General: Bowel sounds are normal. There is no distension.      Palpations: Abdomen is soft.   Musculoskeletal:         General: Normal range of motion.      Cervical back: Normal range of motion and neck supple.   Skin:     General: Skin is warm and dry.      Findings: No erythema.   Neurological:      Mental Status: She is alert and oriented to person, place, and time.   Psychiatric:         Behavior: Behavior normal.          Result Review :                PA and lateral chest x-ray done the office today reviewed by me  Awaiting final MD interpretation                 Assessment and Plan    Problem List Items Addressed This Visit        Pulmonary and Pneumonias    Multifocal pneumonia    COPD on home oxygen (CMS/HCC)    Pleural effusion, bilateral - Primary    Relevant Orders    XR Chest PA & Lateral        We did discuss that her worsening anemia may be the reason why she is bit a bit more short of breath recently.  We did talk about after she finishes up her iron infusions to continue to try to wean down her oxygen based on her symptoms.  It is very difficult for her to get oxygen saturation on a finger pulse oximetry secondary to poor circulation.    We did review her chest x-ray today in the office.  There seems to be some chronic changes of the lower lobes, but no significant worsening pleural fluid.    Continue on her Breztri twice daily.    Follow-up here in the office in 3 months or sooner as needed.      Follow Up     No follow-ups on file.  Patient was given instructions and counseling regarding her condition or for health maintenance advice. Please see specific information pulled into the AVS if appropriate.     I spent 35 minutes caring for Yin on this date of service. This time includes time spent by me in the following activities:preparing for the visit,  reviewing tests, obtaining and/or reviewing a separately obtained history, performing a medically appropriate examination and/or evaluation , counseling and educating the patient/family/caregiver, ordering medications, tests, or procedures, referring and communicating with other health care professionals , documenting information in the medical record and independently interpreting results and communicating that information with the patient/family/caregiver      AMA Way, ACNP  [facility] Critical Care [facility]  Electronically signed

## 2021-12-06 ENCOUNTER — INFUSION (OUTPATIENT)
Dept: ONCOLOGY | Facility: HOSPITAL | Age: 81
End: 2021-12-06

## 2021-12-06 VITALS — DIASTOLIC BLOOD PRESSURE: 62 MMHG | SYSTOLIC BLOOD PRESSURE: 137 MMHG | HEART RATE: 67 BPM

## 2021-12-06 DIAGNOSIS — D50.8 OTHER IRON DEFICIENCY ANEMIA: Primary | ICD-10-CM

## 2021-12-06 PROCEDURE — 25010000002 FERUMOXYTOL 510 MG/17ML SOLUTION 510 MG VIAL: Performed by: INTERNAL MEDICINE

## 2021-12-06 PROCEDURE — 96374 THER/PROPH/DIAG INJ IV PUSH: CPT

## 2021-12-06 RX ADMIN — FERUMOXYTOL 510 MG: 510 INJECTION INTRAVENOUS at 15:36

## 2021-12-09 ENCOUNTER — TRANSCRIBE ORDERS (OUTPATIENT)
Dept: GENERAL RADIOLOGY | Facility: HOSPITAL | Age: 81
End: 2021-12-09

## 2021-12-09 ENCOUNTER — HOSPITAL ENCOUNTER (OUTPATIENT)
Dept: GENERAL RADIOLOGY | Facility: HOSPITAL | Age: 81
Discharge: HOME | End: 2021-12-09
Admitting: INTERNAL MEDICINE

## 2021-12-09 DIAGNOSIS — M25.551 PAIN IN RIGHT HIP: Primary | ICD-10-CM

## 2021-12-09 DIAGNOSIS — M25.551 PAIN IN RIGHT HIP: ICD-10-CM

## 2021-12-09 PROCEDURE — 73502 X-RAY EXAM HIP UNI 2-3 VIEWS: CPT

## 2022-02-07 ENCOUNTER — APPOINTMENT (OUTPATIENT)
Dept: GENERAL RADIOLOGY | Facility: HOSPITAL | Age: 82
End: 2022-02-07

## 2022-02-07 ENCOUNTER — ANESTHESIA (OUTPATIENT)
Dept: PERIOP | Facility: HOSPITAL | Age: 82
End: 2022-02-07

## 2022-02-07 ENCOUNTER — ANESTHESIA (OUTPATIENT)
Dept: EMERGENCY DEPT | Facility: HOSPITAL | Age: 82
End: 2022-02-07

## 2022-02-07 ENCOUNTER — ANESTHESIA EVENT (OUTPATIENT)
Dept: EMERGENCY DEPT | Facility: HOSPITAL | Age: 82
End: 2022-02-07

## 2022-02-07 ENCOUNTER — ANESTHESIA EVENT (OUTPATIENT)
Dept: PERIOP | Facility: HOSPITAL | Age: 82
End: 2022-02-07

## 2022-02-07 ENCOUNTER — ANESTHESIA EVENT CONVERTED (OUTPATIENT)
Dept: ANESTHESIOLOGY | Facility: HOSPITAL | Age: 82
End: 2022-02-07

## 2022-02-07 ENCOUNTER — HOSPITAL ENCOUNTER (INPATIENT)
Facility: HOSPITAL | Age: 82
LOS: 5 days | Discharge: NURSING, REHAB OR LONG-TERM CARE | End: 2022-02-12
Attending: EMERGENCY MEDICINE | Admitting: ORTHOPAEDIC SURGERY

## 2022-02-07 DIAGNOSIS — F32.A ANXIETY AND DEPRESSION: ICD-10-CM

## 2022-02-07 DIAGNOSIS — S50.11XA CONTUSION OF RIGHT FOREARM, INITIAL ENCOUNTER: ICD-10-CM

## 2022-02-07 DIAGNOSIS — F41.9 ANXIETY AND DEPRESSION: ICD-10-CM

## 2022-02-07 DIAGNOSIS — S72.145A CLOSED NONDISPLACED INTERTROCHANTERIC FRACTURE OF LEFT FEMUR, INITIAL ENCOUNTER: Primary | ICD-10-CM

## 2022-02-07 DIAGNOSIS — T14.8XXA SKIN ABRASION: ICD-10-CM

## 2022-02-07 DIAGNOSIS — I48.19 ATRIAL FIBRILLATION, PERSISTENT: ICD-10-CM

## 2022-02-07 DIAGNOSIS — J96.21 ACUTE AND CHRONIC RESPIRATORY FAILURE WITH HYPOXIA: ICD-10-CM

## 2022-02-07 PROBLEM — S72.009A HIP FRACTURE: Status: ACTIVE | Noted: 2022-02-07

## 2022-02-07 LAB
ABO GROUP BLD: NORMAL
ALBUMIN SERPL-MCNC: 4 G/DL (ref 3.5–5.2)
ALBUMIN/GLOB SERPL: 1.4 G/DL
ALP SERPL-CCNC: 130 U/L (ref 39–117)
ALT SERPL W P-5'-P-CCNC: 15 U/L (ref 1–33)
ANION GAP SERPL CALCULATED.3IONS-SCNC: 10 MMOL/L (ref 5–15)
AST SERPL-CCNC: 29 U/L (ref 1–32)
BASOPHILS # BLD AUTO: 0.07 10*3/MM3 (ref 0–0.2)
BASOPHILS NFR BLD AUTO: 1.1 % (ref 0–1.5)
BILIRUB SERPL-MCNC: 0.4 MG/DL (ref 0–1.2)
BLD GP AB SCN SERPL QL: NEGATIVE
BUN SERPL-MCNC: 12 MG/DL (ref 8–23)
BUN/CREAT SERPL: 14.5 (ref 7–25)
CALCIUM SPEC-SCNC: 9.8 MG/DL (ref 8.6–10.5)
CHLORIDE SERPL-SCNC: 98 MMOL/L (ref 98–107)
CO2 SERPL-SCNC: 30 MMOL/L (ref 22–29)
CREAT SERPL-MCNC: 0.83 MG/DL (ref 0.57–1)
DEPRECATED RDW RBC AUTO: 55 FL (ref 37–54)
EOSINOPHIL # BLD AUTO: 0.24 10*3/MM3 (ref 0–0.4)
EOSINOPHIL NFR BLD AUTO: 3.7 % (ref 0.3–6.2)
ERYTHROCYTE [DISTWIDTH] IN BLOOD BY AUTOMATED COUNT: 15.2 % (ref 12.3–15.4)
GFR SERPL CREATININE-BSD FRML MDRD: 66 ML/MIN/1.73
GLOBULIN UR ELPH-MCNC: 2.9 GM/DL
GLUCOSE SERPL-MCNC: 94 MG/DL (ref 65–99)
HCT VFR BLD AUTO: 36.3 % (ref 34–46.6)
HGB BLD-MCNC: 11.5 G/DL (ref 12–15.9)
HOLD SPECIMEN: NORMAL
IMM GRANULOCYTES # BLD AUTO: 0.06 10*3/MM3 (ref 0–0.05)
IMM GRANULOCYTES NFR BLD AUTO: 0.9 % (ref 0–0.5)
INR PPP: 1.03 (ref 0.85–1.16)
LYMPHOCYTES # BLD AUTO: 1.7 10*3/MM3 (ref 0.7–3.1)
LYMPHOCYTES NFR BLD AUTO: 26.3 % (ref 19.6–45.3)
MCH RBC QN AUTO: 31 PG (ref 26.6–33)
MCHC RBC AUTO-ENTMCNC: 31.7 G/DL (ref 31.5–35.7)
MCV RBC AUTO: 97.8 FL (ref 79–97)
MONOCYTES # BLD AUTO: 0.55 10*3/MM3 (ref 0.1–0.9)
MONOCYTES NFR BLD AUTO: 8.5 % (ref 5–12)
NEUTROPHILS NFR BLD AUTO: 3.85 10*3/MM3 (ref 1.7–7)
NEUTROPHILS NFR BLD AUTO: 59.5 % (ref 42.7–76)
NRBC BLD AUTO-RTO: 0 /100 WBC (ref 0–0.2)
PLATELET # BLD AUTO: 231 10*3/MM3 (ref 140–450)
PMV BLD AUTO: 10.6 FL (ref 6–12)
POTASSIUM SERPL-SCNC: 4.1 MMOL/L (ref 3.5–5.2)
PROT SERPL-MCNC: 6.9 G/DL (ref 6–8.5)
PROTHROMBIN TIME: 13.2 SECONDS (ref 11.4–14.4)
QT INTERVAL: 454 MS
QTC INTERVAL: 449 MS
RBC # BLD AUTO: 3.71 10*6/MM3 (ref 3.77–5.28)
RH BLD: POSITIVE
SARS-COV-2 RDRP RESP QL NAA+PROBE: NORMAL
SODIUM SERPL-SCNC: 138 MMOL/L (ref 136–145)
T&S EXPIRATION DATE: NORMAL
WBC NRBC COR # BLD: 6.47 10*3/MM3 (ref 3.4–10.8)
WHOLE BLOOD HOLD SPECIMEN: NORMAL
WHOLE BLOOD HOLD SPECIMEN: NORMAL

## 2022-02-07 PROCEDURE — C1776 JOINT DEVICE (IMPLANTABLE): HCPCS | Performed by: ORTHOPAEDIC SURGERY

## 2022-02-07 PROCEDURE — 72170 X-RAY EXAM OF PELVIS: CPT

## 2022-02-07 PROCEDURE — 25010000002 ROPIVACAINE PER 1 MG

## 2022-02-07 PROCEDURE — 25010000002 NEOSTIGMINE 10 MG/10ML SOLUTION: Performed by: NURSE ANESTHETIST, CERTIFIED REGISTERED

## 2022-02-07 PROCEDURE — C1713 ANCHOR/SCREW BN/BN,TIS/BN: HCPCS | Performed by: ORTHOPAEDIC SURGERY

## 2022-02-07 PROCEDURE — 86901 BLOOD TYPING SEROLOGIC RH(D): CPT | Performed by: EMERGENCY MEDICINE

## 2022-02-07 PROCEDURE — 73090 X-RAY EXAM OF FOREARM: CPT

## 2022-02-07 PROCEDURE — C1889 IMPLANT/INSERT DEVICE, NOC: HCPCS | Performed by: ORTHOPAEDIC SURGERY

## 2022-02-07 PROCEDURE — 99223 1ST HOSP IP/OBS HIGH 75: CPT | Performed by: INTERNAL MEDICINE

## 2022-02-07 PROCEDURE — 25010000002 ONDANSETRON PER 1 MG: Performed by: INTERNAL MEDICINE

## 2022-02-07 PROCEDURE — 25010000002 FENTANYL CITRATE (PF) 100 MCG/2ML SOLUTION: Performed by: ANESTHESIOLOGY

## 2022-02-07 PROCEDURE — 93005 ELECTROCARDIOGRAM TRACING: CPT | Performed by: EMERGENCY MEDICINE

## 2022-02-07 PROCEDURE — 25010000002 FENTANYL CITRATE (PF) 50 MCG/ML SOLUTION: Performed by: NURSE ANESTHETIST, CERTIFIED REGISTERED

## 2022-02-07 PROCEDURE — 25010000002 MORPHINE PER 10 MG: Performed by: EMERGENCY MEDICINE

## 2022-02-07 PROCEDURE — 85610 PROTHROMBIN TIME: CPT | Performed by: EMERGENCY MEDICINE

## 2022-02-07 PROCEDURE — 80053 COMPREHEN METABOLIC PANEL: CPT | Performed by: EMERGENCY MEDICINE

## 2022-02-07 PROCEDURE — 86900 BLOOD TYPING SEROLOGIC ABO: CPT | Performed by: EMERGENCY MEDICINE

## 2022-02-07 PROCEDURE — 87635 SARS-COV-2 COVID-19 AMP PRB: CPT | Performed by: EMERGENCY MEDICINE

## 2022-02-07 PROCEDURE — 0 CEFAZOLIN IN DEXTROSE 2-4 GM/100ML-% SOLUTION: Performed by: ORTHOPAEDIC SURGERY

## 2022-02-07 PROCEDURE — 25010000002 DEXAMETHASONE PER 1 MG: Performed by: NURSE ANESTHETIST, CERTIFIED REGISTERED

## 2022-02-07 PROCEDURE — 25010000002 FENTANYL CITRATE (PF) 50 MCG/ML SOLUTION

## 2022-02-07 PROCEDURE — 99285 EMERGENCY DEPT VISIT HI MDM: CPT

## 2022-02-07 PROCEDURE — 99284 EMERGENCY DEPT VISIT MOD MDM: CPT

## 2022-02-07 PROCEDURE — 85025 COMPLETE CBC W/AUTO DIFF WBC: CPT | Performed by: EMERGENCY MEDICINE

## 2022-02-07 PROCEDURE — 73552 X-RAY EXAM OF FEMUR 2/>: CPT

## 2022-02-07 PROCEDURE — 25010000002 ROPIVACAINE PER 1 MG: Performed by: NURSE ANESTHETIST, CERTIFIED REGISTERED

## 2022-02-07 PROCEDURE — 25010000002 ONDANSETRON PER 1 MG: Performed by: NURSE ANESTHETIST, CERTIFIED REGISTERED

## 2022-02-07 PROCEDURE — 25010000002 PROPOFOL 10 MG/ML EMULSION: Performed by: NURSE ANESTHETIST, CERTIFIED REGISTERED

## 2022-02-07 PROCEDURE — 25010000002 MORPHINE PER 10 MG: Performed by: INTERNAL MEDICINE

## 2022-02-07 PROCEDURE — 73502 X-RAY EXAM HIP UNI 2-3 VIEWS: CPT

## 2022-02-07 PROCEDURE — 0SRS0J9 REPLACEMENT OF LEFT HIP JOINT, FEMORAL SURFACE WITH SYNTHETIC SUBSTITUTE, CEMENTED, OPEN APPROACH: ICD-10-PCS | Performed by: ORTHOPAEDIC SURGERY

## 2022-02-07 PROCEDURE — 71045 X-RAY EXAM CHEST 1 VIEW: CPT

## 2022-02-07 PROCEDURE — 86850 RBC ANTIBODY SCREEN: CPT | Performed by: EMERGENCY MEDICINE

## 2022-02-07 PROCEDURE — 25010000002 ONDANSETRON PER 1 MG: Performed by: EMERGENCY MEDICINE

## 2022-02-07 DEVICE — DEV CONTRL TISS STRATAFIX SPIRAL PDO BIDIR MO4 36X36CM: Type: IMPLANTABLE DEVICE | Site: HIP | Status: FUNCTIONAL

## 2022-02-07 DEVICE — HD FEM/HIP UNIV COCR 12/14TPR 28MM MIN3.5: Type: IMPLANTABLE DEVICE | Site: HIP | Status: FUNCTIONAL

## 2022-02-07 DEVICE — CMT BONE REFOBACIN R W/GENT 1X40: Type: IMPLANTABLE DEVICE | Site: HIP | Status: FUNCTIONAL

## 2022-02-07 DEVICE — SUT FW 5 W .5 CIR CUT NDL 48M AR7211: Type: IMPLANTABLE DEVICE | Site: HIP | Status: FUNCTIONAL

## 2022-02-07 DEVICE — CUP ACET RINGLOC BIPOL 28MM 47MM: Type: IMPLANTABLE DEVICE | Site: HIP | Status: FUNCTIONAL

## 2022-02-07 DEVICE — AVENIR® STANDARD STEM CEMENTED 4
Type: IMPLANTABLE DEVICE | Site: HIP | Status: FUNCTIONAL
Brand: AVENIR®

## 2022-02-07 DEVICE — CAP PRT HIP BIPOL: Type: IMPLANTABLE DEVICE | Site: HIP | Status: FUNCTIONAL

## 2022-02-07 DEVICE — BONE PREPARATION KIT
Type: IMPLANTABLE DEVICE | Site: HIP | Status: FUNCTIONAL
Brand: BIOPREP

## 2022-02-07 RX ORDER — ROPIVACAINE HYDROCHLORIDE 5 MG/ML
INJECTION, SOLUTION EPIDURAL; INFILTRATION; PERINEURAL
Status: COMPLETED | OUTPATIENT
Start: 2022-02-07 | End: 2022-02-07

## 2022-02-07 RX ORDER — SODIUM CHLORIDE 0.9 % (FLUSH) 0.9 %
3 SYRINGE (ML) INJECTION EVERY 12 HOURS SCHEDULED
Status: DISCONTINUED | OUTPATIENT
Start: 2022-02-07 | End: 2022-02-07 | Stop reason: HOSPADM

## 2022-02-07 RX ORDER — MAGNESIUM HYDROXIDE 1200 MG/15ML
LIQUID ORAL AS NEEDED
Status: DISCONTINUED | OUTPATIENT
Start: 2022-02-07 | End: 2022-02-07 | Stop reason: HOSPADM

## 2022-02-07 RX ORDER — CEFAZOLIN SODIUM 2 G/100ML
2 INJECTION, SOLUTION INTRAVENOUS EVERY 8 HOURS
Status: COMPLETED | OUTPATIENT
Start: 2022-02-08 | End: 2022-02-08

## 2022-02-07 RX ORDER — DROPERIDOL 2.5 MG/ML
0.62 INJECTION, SOLUTION INTRAMUSCULAR; INTRAVENOUS AS NEEDED
Status: DISCONTINUED | OUTPATIENT
Start: 2022-02-07 | End: 2022-02-07 | Stop reason: HOSPADM

## 2022-02-07 RX ORDER — SODIUM CHLORIDE 0.9 % (FLUSH) 0.9 %
10 SYRINGE (ML) INJECTION AS NEEDED
Status: DISCONTINUED | OUTPATIENT
Start: 2022-02-07 | End: 2022-02-10

## 2022-02-07 RX ORDER — ALPRAZOLAM 0.5 MG/1
0.5 TABLET ORAL NIGHTLY PRN
Status: DISCONTINUED | OUTPATIENT
Start: 2022-02-07 | End: 2022-02-12 | Stop reason: HOSPADM

## 2022-02-07 RX ORDER — NEOSTIGMINE METHYLSULFATE 1 MG/ML
INJECTION, SOLUTION INTRAVENOUS AS NEEDED
Status: DISCONTINUED | OUTPATIENT
Start: 2022-02-07 | End: 2022-02-07 | Stop reason: SURG

## 2022-02-07 RX ORDER — PROMETHAZINE HYDROCHLORIDE 25 MG/1
25 SUPPOSITORY RECTAL ONCE AS NEEDED
Status: DISCONTINUED | OUTPATIENT
Start: 2022-02-07 | End: 2022-02-07 | Stop reason: HOSPADM

## 2022-02-07 RX ORDER — ONDANSETRON 4 MG/1
4 TABLET, FILM COATED ORAL EVERY 6 HOURS PRN
Status: DISCONTINUED | OUTPATIENT
Start: 2022-02-07 | End: 2022-02-12 | Stop reason: HOSPADM

## 2022-02-07 RX ORDER — DEXAMETHASONE SODIUM PHOSPHATE 4 MG/ML
INJECTION, SOLUTION INTRA-ARTICULAR; INTRALESIONAL; INTRAMUSCULAR; INTRAVENOUS; SOFT TISSUE AS NEEDED
Status: DISCONTINUED | OUTPATIENT
Start: 2022-02-07 | End: 2022-02-07 | Stop reason: SURG

## 2022-02-07 RX ORDER — DROPERIDOL 2.5 MG/ML
0.62 INJECTION, SOLUTION INTRAMUSCULAR; INTRAVENOUS ONCE AS NEEDED
Status: DISCONTINUED | OUTPATIENT
Start: 2022-02-07 | End: 2022-02-07 | Stop reason: HOSPADM

## 2022-02-07 RX ORDER — BUDESONIDE AND FORMOTEROL FUMARATE DIHYDRATE 160; 4.5 UG/1; UG/1
2 AEROSOL RESPIRATORY (INHALATION)
Status: DISCONTINUED | OUTPATIENT
Start: 2022-02-07 | End: 2022-02-12 | Stop reason: HOSPADM

## 2022-02-07 RX ORDER — PROMETHAZINE HYDROCHLORIDE 25 MG/1
25 TABLET ORAL ONCE AS NEEDED
Status: DISCONTINUED | OUTPATIENT
Start: 2022-02-07 | End: 2022-02-07 | Stop reason: HOSPADM

## 2022-02-07 RX ORDER — BISOPROLOL FUMARATE 5 MG/1
5 TABLET, FILM COATED ORAL
Status: DISCONTINUED | OUTPATIENT
Start: 2022-02-07 | End: 2022-02-10

## 2022-02-07 RX ORDER — SODIUM CHLORIDE 0.9 % (FLUSH) 0.9 %
3-10 SYRINGE (ML) INJECTION AS NEEDED
Status: DISCONTINUED | OUTPATIENT
Start: 2022-02-07 | End: 2022-02-07 | Stop reason: HOSPADM

## 2022-02-07 RX ORDER — ROPIVACAINE HYDROCHLORIDE 2 MG/ML
8 INJECTION, SOLUTION EPIDURAL; INFILTRATION CONTINUOUS
Status: DISCONTINUED | OUTPATIENT
Start: 2022-02-07 | End: 2022-02-12 | Stop reason: HOSPADM

## 2022-02-07 RX ORDER — FERROUS SULFATE 325(65) MG
325 TABLET ORAL 2 TIMES DAILY WITH MEALS
Status: DISCONTINUED | OUTPATIENT
Start: 2022-02-07 | End: 2022-02-12 | Stop reason: HOSPADM

## 2022-02-07 RX ORDER — SODIUM CHLORIDE 0.9 % (FLUSH) 0.9 %
3 SYRINGE (ML) INJECTION EVERY 12 HOURS SCHEDULED
Status: DISCONTINUED | OUTPATIENT
Start: 2022-02-07 | End: 2022-02-11

## 2022-02-07 RX ORDER — ONDANSETRON 2 MG/ML
4 INJECTION INTRAMUSCULAR; INTRAVENOUS ONCE AS NEEDED
Status: DISCONTINUED | OUTPATIENT
Start: 2022-02-07 | End: 2022-02-07 | Stop reason: HOSPADM

## 2022-02-07 RX ORDER — ONDANSETRON 2 MG/ML
4 INJECTION INTRAMUSCULAR; INTRAVENOUS EVERY 6 HOURS PRN
Status: DISCONTINUED | OUTPATIENT
Start: 2022-02-07 | End: 2022-02-12 | Stop reason: HOSPADM

## 2022-02-07 RX ORDER — CEFAZOLIN SODIUM 2 G/100ML
2 INJECTION, SOLUTION INTRAVENOUS ONCE
Status: COMPLETED | OUTPATIENT
Start: 2022-02-07 | End: 2022-02-07

## 2022-02-07 RX ORDER — SODIUM CHLORIDE 0.9 % (FLUSH) 0.9 %
10 SYRINGE (ML) INJECTION EVERY 12 HOURS SCHEDULED
Status: DISCONTINUED | OUTPATIENT
Start: 2022-02-07 | End: 2022-02-12 | Stop reason: HOSPADM

## 2022-02-07 RX ORDER — PANTOPRAZOLE SODIUM 40 MG/1
40 TABLET, DELAYED RELEASE ORAL
Status: DISCONTINUED | OUTPATIENT
Start: 2022-02-07 | End: 2022-02-12 | Stop reason: HOSPADM

## 2022-02-07 RX ORDER — SODIUM CHLORIDE, SODIUM LACTATE, POTASSIUM CHLORIDE, CALCIUM CHLORIDE 600; 310; 30; 20 MG/100ML; MG/100ML; MG/100ML; MG/100ML
9 INJECTION, SOLUTION INTRAVENOUS CONTINUOUS PRN
Status: DISCONTINUED | OUTPATIENT
Start: 2022-02-07 | End: 2022-02-12 | Stop reason: HOSPADM

## 2022-02-07 RX ORDER — FENTANYL CITRATE 50 UG/ML
INJECTION, SOLUTION INTRAMUSCULAR; INTRAVENOUS
Status: COMPLETED
Start: 2022-02-07 | End: 2022-02-07

## 2022-02-07 RX ORDER — BUPIVACAINE HCL/0.9 % NACL/PF 0.125 %
PLASTIC BAG, INJECTION (ML) EPIDURAL AS NEEDED
Status: DISCONTINUED | OUTPATIENT
Start: 2022-02-07 | End: 2022-02-07 | Stop reason: SURG

## 2022-02-07 RX ORDER — ONDANSETRON 2 MG/ML
4 INJECTION INTRAMUSCULAR; INTRAVENOUS ONCE
Status: COMPLETED | OUTPATIENT
Start: 2022-02-07 | End: 2022-02-07

## 2022-02-07 RX ORDER — ACETAMINOPHEN 325 MG/1
650 TABLET ORAL EVERY 8 HOURS SCHEDULED
Status: DISPENSED | OUTPATIENT
Start: 2022-02-07 | End: 2022-02-09

## 2022-02-07 RX ORDER — TRANEXAMIC ACID 10 MG/ML
1000 INJECTION, SOLUTION INTRAVENOUS ONCE
Status: DISCONTINUED | OUTPATIENT
Start: 2022-02-07 | End: 2022-02-07 | Stop reason: HOSPADM

## 2022-02-07 RX ORDER — DILTIAZEM HYDROCHLORIDE 180 MG/1
180 CAPSULE, COATED, EXTENDED RELEASE ORAL DAILY
COMMUNITY
End: 2022-04-29 | Stop reason: HOSPADM

## 2022-02-07 RX ORDER — NALOXONE HCL 0.4 MG/ML
0.4 VIAL (ML) INJECTION
Status: DISCONTINUED | OUTPATIENT
Start: 2022-02-07 | End: 2022-02-12 | Stop reason: HOSPADM

## 2022-02-07 RX ORDER — LEVOTHYROXINE SODIUM 0.07 MG/1
75 TABLET ORAL
Status: DISCONTINUED | OUTPATIENT
Start: 2022-02-08 | End: 2022-02-12 | Stop reason: HOSPADM

## 2022-02-07 RX ORDER — HYDROCODONE BITARTRATE AND ACETAMINOPHEN 7.5; 325 MG/1; MG/1
1 TABLET ORAL EVERY 6 HOURS PRN
Status: DISCONTINUED | OUTPATIENT
Start: 2022-02-07 | End: 2022-02-12 | Stop reason: HOSPADM

## 2022-02-07 RX ORDER — ROCURONIUM BROMIDE 10 MG/ML
INJECTION, SOLUTION INTRAVENOUS AS NEEDED
Status: DISCONTINUED | OUTPATIENT
Start: 2022-02-07 | End: 2022-02-07 | Stop reason: SURG

## 2022-02-07 RX ORDER — SODIUM CHLORIDE 0.9 % (FLUSH) 0.9 %
10 SYRINGE (ML) INJECTION AS NEEDED
Status: DISCONTINUED | OUTPATIENT
Start: 2022-02-07 | End: 2022-02-07 | Stop reason: HOSPADM

## 2022-02-07 RX ORDER — LABETALOL HYDROCHLORIDE 5 MG/ML
5 INJECTION, SOLUTION INTRAVENOUS
Status: DISCONTINUED | OUTPATIENT
Start: 2022-02-07 | End: 2022-02-07 | Stop reason: HOSPADM

## 2022-02-07 RX ORDER — ACETAMINOPHEN 325 MG/1
650 TABLET ORAL EVERY 4 HOURS PRN
Status: DISCONTINUED | OUTPATIENT
Start: 2022-02-07 | End: 2022-02-12 | Stop reason: HOSPADM

## 2022-02-07 RX ORDER — TORSEMIDE 20 MG/1
40 TABLET ORAL DAILY
Status: DISCONTINUED | OUTPATIENT
Start: 2022-02-07 | End: 2022-02-12 | Stop reason: HOSPADM

## 2022-02-07 RX ORDER — FENTANYL CITRATE 50 UG/ML
INJECTION, SOLUTION INTRAMUSCULAR; INTRAVENOUS AS NEEDED
Status: DISCONTINUED | OUTPATIENT
Start: 2022-02-07 | End: 2022-02-07 | Stop reason: SURG

## 2022-02-07 RX ORDER — POTASSIUM CHLORIDE 20 MEQ/1
40 TABLET, EXTENDED RELEASE ORAL 2 TIMES DAILY
Status: ON HOLD | COMMUNITY
End: 2022-02-07

## 2022-02-07 RX ORDER — MORPHINE SULFATE 2 MG/ML
2 INJECTION, SOLUTION INTRAMUSCULAR; INTRAVENOUS ONCE
Status: COMPLETED | OUTPATIENT
Start: 2022-02-07 | End: 2022-02-07

## 2022-02-07 RX ORDER — PROPOFOL 10 MG/ML
VIAL (ML) INTRAVENOUS AS NEEDED
Status: DISCONTINUED | OUTPATIENT
Start: 2022-02-07 | End: 2022-02-07 | Stop reason: SURG

## 2022-02-07 RX ORDER — ROPIVACAINE HYDROCHLORIDE 2 MG/ML
INJECTION, SOLUTION EPIDURAL; INFILTRATION; PERINEURAL
Status: DISPENSED
Start: 2022-02-07 | End: 2022-02-07

## 2022-02-07 RX ORDER — HYDROMORPHONE HYDROCHLORIDE 1 MG/ML
0.5 INJECTION, SOLUTION INTRAMUSCULAR; INTRAVENOUS; SUBCUTANEOUS
Status: DISCONTINUED | OUTPATIENT
Start: 2022-02-07 | End: 2022-02-07 | Stop reason: HOSPADM

## 2022-02-07 RX ORDER — MORPHINE SULFATE 2 MG/ML
1 INJECTION, SOLUTION INTRAMUSCULAR; INTRAVENOUS EVERY 4 HOURS PRN
Status: DISCONTINUED | OUTPATIENT
Start: 2022-02-07 | End: 2022-02-12 | Stop reason: HOSPADM

## 2022-02-07 RX ORDER — GLYCOPYRROLATE 0.2 MG/ML
INJECTION INTRAMUSCULAR; INTRAVENOUS AS NEEDED
Status: DISCONTINUED | OUTPATIENT
Start: 2022-02-07 | End: 2022-02-07 | Stop reason: SURG

## 2022-02-07 RX ORDER — HYDRALAZINE HYDROCHLORIDE 20 MG/ML
5 INJECTION INTRAMUSCULAR; INTRAVENOUS
Status: DISCONTINUED | OUTPATIENT
Start: 2022-02-07 | End: 2022-02-07 | Stop reason: HOSPADM

## 2022-02-07 RX ORDER — SODIUM CHLORIDE 0.9 % (FLUSH) 0.9 %
1-10 SYRINGE (ML) INJECTION AS NEEDED
Status: DISCONTINUED | OUTPATIENT
Start: 2022-02-07 | End: 2022-02-11

## 2022-02-07 RX ORDER — FENTANYL CITRATE 50 UG/ML
25 INJECTION, SOLUTION INTRAMUSCULAR; INTRAVENOUS
Status: DISCONTINUED | OUTPATIENT
Start: 2022-02-07 | End: 2022-02-07 | Stop reason: HOSPADM

## 2022-02-07 RX ORDER — ONDANSETRON 2 MG/ML
INJECTION INTRAMUSCULAR; INTRAVENOUS AS NEEDED
Status: DISCONTINUED | OUTPATIENT
Start: 2022-02-07 | End: 2022-02-07 | Stop reason: SURG

## 2022-02-07 RX ORDER — MEPERIDINE HYDROCHLORIDE 25 MG/ML
12.5 INJECTION INTRAMUSCULAR; INTRAVENOUS; SUBCUTANEOUS
Status: DISCONTINUED | OUTPATIENT
Start: 2022-02-07 | End: 2022-02-07 | Stop reason: HOSPADM

## 2022-02-07 RX ORDER — MIDAZOLAM HYDROCHLORIDE 1 MG/ML
0.5 INJECTION INTRAMUSCULAR; INTRAVENOUS
Status: DISCONTINUED | OUTPATIENT
Start: 2022-02-07 | End: 2022-02-07 | Stop reason: HOSPADM

## 2022-02-07 RX ORDER — TRANEXAMIC ACID 10 MG/ML
1000 INJECTION, SOLUTION INTRAVENOUS ONCE
Status: COMPLETED | OUTPATIENT
Start: 2022-02-07 | End: 2022-02-07

## 2022-02-07 RX ORDER — ROPIVACAINE HYDROCHLORIDE 2 MG/ML
INJECTION, SOLUTION EPIDURAL; INFILTRATION; PERINEURAL
Status: COMPLETED
Start: 2022-02-07 | End: 2022-02-07

## 2022-02-07 RX ORDER — HYDROCODONE BITARTRATE AND ACETAMINOPHEN 5; 325 MG/1; MG/1
1 TABLET ORAL ONCE AS NEEDED
Status: DISCONTINUED | OUTPATIENT
Start: 2022-02-07 | End: 2022-02-07 | Stop reason: HOSPADM

## 2022-02-07 RX ORDER — SODIUM CHLORIDE 0.9 % (FLUSH) 0.9 %
10 SYRINGE (ML) INJECTION AS NEEDED
Status: DISCONTINUED | OUTPATIENT
Start: 2022-02-07 | End: 2022-02-12 | Stop reason: HOSPADM

## 2022-02-07 RX ORDER — IPRATROPIUM BROMIDE AND ALBUTEROL SULFATE 2.5; .5 MG/3ML; MG/3ML
3 SOLUTION RESPIRATORY (INHALATION) ONCE AS NEEDED
Status: DISCONTINUED | OUTPATIENT
Start: 2022-02-07 | End: 2022-02-07 | Stop reason: HOSPADM

## 2022-02-07 RX ORDER — FENTANYL CITRATE 50 UG/ML
50 INJECTION, SOLUTION INTRAMUSCULAR; INTRAVENOUS
Status: DISCONTINUED | OUTPATIENT
Start: 2022-02-07 | End: 2022-02-07 | Stop reason: HOSPADM

## 2022-02-07 RX ORDER — LIDOCAINE HYDROCHLORIDE 10 MG/ML
INJECTION, SOLUTION EPIDURAL; INFILTRATION; INTRACAUDAL; PERINEURAL AS NEEDED
Status: DISCONTINUED | OUTPATIENT
Start: 2022-02-07 | End: 2022-02-07 | Stop reason: SURG

## 2022-02-07 RX ORDER — ONDANSETRON 2 MG/ML
4 INJECTION INTRAMUSCULAR; INTRAVENOUS EVERY 6 HOURS PRN
Status: DISCONTINUED | OUTPATIENT
Start: 2022-02-07 | End: 2022-02-07 | Stop reason: SDUPTHER

## 2022-02-07 RX ORDER — ALBUTEROL SULFATE 90 UG/1
2 AEROSOL, METERED RESPIRATORY (INHALATION) EVERY 4 HOURS PRN
Status: DISCONTINUED | OUTPATIENT
Start: 2022-02-07 | End: 2022-02-12 | Stop reason: HOSPADM

## 2022-02-07 RX ADMIN — BISOPROLOL FUMARATE 5 MG: 5 TABLET, FILM COATED ORAL at 16:46

## 2022-02-07 RX ADMIN — Medication 200 MCG: at 17:07

## 2022-02-07 RX ADMIN — ONDANSETRON 4 MG: 2 INJECTION INTRAMUSCULAR; INTRAVENOUS at 06:57

## 2022-02-07 RX ADMIN — ROPIVACAINE HYDROCHLORIDE 8 ML/HR: 2 INJECTION, SOLUTION EPIDURAL; INFILTRATION at 18:40

## 2022-02-07 RX ADMIN — Medication 200 MCG: at 18:03

## 2022-02-07 RX ADMIN — DEXAMETHASONE SODIUM PHOSPHATE 4 MG: 4 INJECTION INTRA-ARTICULAR; INTRALESIONAL; INTRAMUSCULAR; INTRAVENOUS; SOFT TISSUE at 17:20

## 2022-02-07 RX ADMIN — FENTANYL CITRATE 25 MCG: 50 INJECTION, SOLUTION INTRAMUSCULAR; INTRAVENOUS at 19:21

## 2022-02-07 RX ADMIN — NEOSTIGMINE METHYLSULFATE 3 MG: 0.5 INJECTION INTRAVENOUS at 18:17

## 2022-02-07 RX ADMIN — MORPHINE SULFATE 2 MG: 2 INJECTION, SOLUTION INTRAMUSCULAR; INTRAVENOUS at 08:26

## 2022-02-07 RX ADMIN — GLYCOPYRROLATE 0.4 MG: 0.2 INJECTION INTRAMUSCULAR; INTRAVENOUS at 18:17

## 2022-02-07 RX ADMIN — MORPHINE SULFATE 2 MG: 2 INJECTION, SOLUTION INTRAMUSCULAR; INTRAVENOUS at 06:57

## 2022-02-07 RX ADMIN — Medication 200 MCG: at 17:09

## 2022-02-07 RX ADMIN — FENTANYL CITRATE 25 MCG: 50 INJECTION, SOLUTION INTRAMUSCULAR; INTRAVENOUS at 19:05

## 2022-02-07 RX ADMIN — TRANEXAMIC ACID 1000 MG: 10 INJECTION, SOLUTION INTRAVENOUS at 17:22

## 2022-02-07 RX ADMIN — PANTOPRAZOLE SODIUM 40 MG: 40 TABLET, DELAYED RELEASE ORAL at 16:40

## 2022-02-07 RX ADMIN — ONDANSETRON 4 MG: 2 INJECTION INTRAMUSCULAR; INTRAVENOUS at 18:17

## 2022-02-07 RX ADMIN — ROPIVACAINE HYDROCHLORIDE 20 ML: 5 INJECTION, SOLUTION EPIDURAL; INFILTRATION; PERINEURAL at 10:45

## 2022-02-07 RX ADMIN — SODIUM CHLORIDE, POTASSIUM CHLORIDE, SODIUM LACTATE AND CALCIUM CHLORIDE 9 ML/HR: 600; 310; 30; 20 INJECTION, SOLUTION INTRAVENOUS at 16:40

## 2022-02-07 RX ADMIN — Medication 200 MCG: at 17:18

## 2022-02-07 RX ADMIN — TRANEXAMIC ACID 1000 MG: 10 INJECTION, SOLUTION INTRAVENOUS at 18:04

## 2022-02-07 RX ADMIN — CEFAZOLIN SODIUM 2 G: 2 INJECTION, SOLUTION INTRAVENOUS at 23:57

## 2022-02-07 RX ADMIN — MORPHINE SULFATE 1 MG: 2 INJECTION, SOLUTION INTRAMUSCULAR; INTRAVENOUS at 14:19

## 2022-02-07 RX ADMIN — SODIUM CHLORIDE, PRESERVATIVE FREE 10 ML: 5 INJECTION INTRAVENOUS at 21:24

## 2022-02-07 RX ADMIN — PROPOFOL 60 MG: 10 INJECTION, EMULSION INTRAVENOUS at 16:59

## 2022-02-07 RX ADMIN — LIDOCAINE HYDROCHLORIDE 50 MG: 10 INJECTION, SOLUTION EPIDURAL; INFILTRATION; INTRACAUDAL; PERINEURAL at 16:59

## 2022-02-07 RX ADMIN — FENTANYL CITRATE 100 MCG: 50 INJECTION, SOLUTION INTRAMUSCULAR; INTRAVENOUS at 16:59

## 2022-02-07 RX ADMIN — ONDANSETRON 4 MG: 2 INJECTION INTRAMUSCULAR; INTRAVENOUS at 17:20

## 2022-02-07 RX ADMIN — ACETAMINOPHEN 650 MG: 325 TABLET, FILM COATED ORAL at 21:24

## 2022-02-07 RX ADMIN — Medication 100 MCG: at 17:06

## 2022-02-07 RX ADMIN — CEFAZOLIN SODIUM 2 G: 2 INJECTION, SOLUTION INTRAVENOUS at 17:14

## 2022-02-07 RX ADMIN — ONDANSETRON 4 MG: 2 INJECTION INTRAMUSCULAR; INTRAVENOUS at 14:19

## 2022-02-07 RX ADMIN — ROCURONIUM BROMIDE 40 MG: 10 INJECTION INTRAVENOUS at 16:59

## 2022-02-07 NOTE — ANESTHESIA PROCEDURE NOTES
Peripheral Block      Patient reassessed immediately prior to procedure    Patient location during procedure: pre-op  Start time: 2/7/2022 11:05 AM  Stop time: 2/7/2022 11:19 AM  Reason for block: procedure for pain and at surgeon's request  Performed by  CRNA: Santiago Joe, CRNA  Assisted by: Anne Gonzalez RN  Preanesthetic Checklist  Completed: patient identified, IV checked, site marked, risks and benefits discussed, surgical consent, monitors and equipment checked, pre-op evaluation and timeout performed  Prep:  Pt Position: supine  Sterile barriers:cap, gloves and mask  Prep: ChloraPrep  Patient monitoring: blood pressure monitoring, continuous pulse oximetry and EKG  Procedure    Sedation: no  Performed under: local infiltration  Guidance:ultrasound guided  Images:still images obtained, printed/placed on chart    Laterality:left  Block Type:fascia iliaca compartment  Injection Technique:catheter  Needle Type:echogenic  Needle Gauge:18 G  Resistance on Injection: none  Catheter Size:20 G (20g)  Cath Depth at skin: 12 cm    Medications Used: ropivacaine (NAROPIN) 0.5 % injection, 20 mL      Medications  Preservative Free Saline:20ml    Post Assessment  Injection Assessment: negative aspiration for heme, no paresthesia on injection and incremental injection  Patient Tolerance:comfortable throughout block  Complications:no  Additional Notes  Procedure:                 Pt placed in supine position.   The insertion site was prepped in sterile fashion with Chlorapreop and clear plastic drapes.  Analgesia was provided by skin infiltration at insertion site with Lidocaine 1% 3mls.  A B-Flynn 18 g , 4 inch echogenic Touhy needle was advance In-plane under ultrasound guidance. The   Anterior superior Iliac crest was initially visualized and the probe was directed slightly medially and slightly towards the umbilicus.  The course of the needle was tracked over the sartorius muscle through the fascia Iliacus and  into the anterior portion of the Iliacus muscle.  Major vessels where identified and avoided as where structures of the peritoneal cavity.  LA injection was made incrementally in 1-5ml amounts spread was visualized superiorly below fascia iliacus.  Injection was completed with negative aspiration of blood and negative intravascular injection.  Injection pressures where normal or minimal resistance.  A 20 g B-Flynn wire styleted catheter was then advance thru the needle and very easily placed in a superior or cephalad direction.  The catheter was secured at insertion site with exofin tissue adhesive, benzoin, and steristreps.  A CHG tegaderm dressing was placed over the insertion site and the nerve catheter labeled and capped.  Thank You.

## 2022-02-07 NOTE — CONSULTS
Orthopedic Consult      Patient: Yin Law    Date of Admission: 2/7/2022  6:44 AM    YOB: 1940    Medical Record Number: [id number]    Attending Physician: Darnell Beltran MD    Consulting Physician: Zhane Kasper PA-C      Chief Complaints: Hip fracture (HCC) [S72.009A]      History of Present Illness: 81 y.o. female admitted to [facility] with Hip fracture (HCC) [S72.009A]. I was consulted for further evaluation and treatment of left hip fracture. Onset of symptoms was abrupt starting a few hours ago.  Symptoms are associated with pain and inability to bear weight.  Symptoms are aggravated by movement.  Symptoms improve with rest. 82 y/o F presents after a fall at home this morning, she states she tripped over oxygen tank and landed directly onto her L hip. She had immediate pain and was unable to bear weight on the LLE. Prior to her fall she was independent with ADLs and ambulated with a cane. She denies any other injury at the time of her fall, denies paresthesias.        Allergies   Allergen Reactions   • Dicloxacillin Shortness Of Breath   • Amiodarone Unknown - High Severity   • Propoxyphene Unknown - Low Severity        Home Medications:  Medications Prior to Admission   Medication Sig Dispense Refill Last Dose   • albuterol sulfate  (90 Base) MCG/ACT inhaler Inhale 2 puffs Every 4 (Four) Hours As Needed for Wheezing. Patient taking: Inhale 2 puffs by mouth every 4-6 hours   2/6/2022 at Unknown time   • ALPRAZolam (XANAX) 0.5 MG tablet Take 1 tablet by mouth At Night As Needed for Anxiety or Sleep. 5 tablet 0 2/6/2022 at Unknown time   • ascorbic acid (VITAMIN C) 1000 MG tablet Take 1 tablet by mouth Daily. (Patient taking differently: Take 1,000 mg by mouth Daily. OTC)   2/6/2022 at Unknown time   • bisoprolol (ZEBeta) 5 MG tablet Take 1 tablet by mouth Daily. 30 tablet 0 2/6/2022 at Unknown time   • Budeson-Glycopyrrol-Formoterol (BREZTRI) 160-9-4.8 MCG/ACT  aerosol inhaler Inhale 2 puffs 2 (Two) Times a Day.   2/6/2022 at Unknown time   • dilTIAZem CD (CARDIZEM CD) 180 MG 24 hr capsule Take 180 mg by mouth Daily.   2/6/2022 at Unknown time   • ferrous sulfate 325 (65 FE) MG tablet Take 1 tablet by mouth 2 (Two) Times a Day With Meals.   2/6/2022 at Unknown time   • HYDROcodone-acetaminophen (NORCO) 7.5-325 MG per tablet Take 1 tablet by mouth Every 6 (Six) Hours As Needed. Patient taking: once a day prn   Past Week at Unknown time   • levothyroxine (SYNTHROID, LEVOTHROID) 75 MCG tablet Take 1 tablet by mouth Every Morning. 30 tablet 5 2/6/2022 at Unknown time   • multivitamin with minerals (CENTRUM SILVER ADULT 50+ PO) Take 1 tablet by mouth Daily. OTC   2/6/2022 at Unknown time   • Omega-3 Fatty Acids (fish oil) 1000 MG capsule capsule Take 1,000 mg by mouth Daily With Breakfast. OTC   2/6/2022 at Unknown time   • pantoprazole (PROTONIX) 40 MG EC tablet Take 1 tablet by mouth 2 (Two) Times a Day Before Meals.   2/6/2022 at Unknown time   • sertraline (ZOLOFT) 100 MG tablet Take 150 mg by mouth Daily. 1.5 tab daily   2/6/2022 at Unknown time   • torsemide (DEMADEX) 20 MG tablet Take 2 tablets by mouth Daily. 30 tablet 5 2/6/2022 at Unknown time   • Vitamin D, Ergocalciferol, 50 MCG (2000 UT) capsule Take 1 tablet by mouth Daily. OTC   2/6/2022 at Unknown time         Past Medical History:   Diagnosis Date   • Anxiety and depression    • Arrhythmia     A-FIB   • Asthma    • Chicken pox    • COPD (chronic obstructive pulmonary disease) (HCC)    • Hyperlipidemia    • Hypertension    • Measles    • Menopause    • Osteoporosis    • Rheumatoid arthritis (HCC)    • Rheumatoid arthritis (HCC)    • Tobacco abuse         Past Surgical History:   Procedure Laterality Date   • APPENDECTOMY     • CARPAL TUNNEL RELEASE Left    • CATARACT EXTRACTION, BILATERAL     • COLON SURGERY     • COLONOSCOPY     • GALLBLADDER SURGERY     • HYSTERECTOMY  1971   • TONSILLECTOMY          Social  History     Occupational History   • Not on file   Tobacco Use   • Smoking status: Former Smoker     Packs/day: 0.25     Types: Cigarettes     Quit date: 2021     Years since quittin.8   • Smokeless tobacco: Never Used   • Tobacco comment: 1-2 cigs occas   Vaping Use   • Vaping Use: Never used   Substance and Sexual Activity   • Alcohol use: Never   • Drug use: Never   • Sexual activity: Defer      Social History     Social History Narrative    Caffeine: 8 oz         Family History   Problem Relation Age of Onset   • Alzheimer's disease Mother    • No Known Problems Father    • No Known Problems Sister    • Hypertension Brother    • Parkinsonism Brother    • No Known Problems Maternal Grandmother    • No Known Problems Maternal Grandfather    • No Known Problems Paternal Grandmother    • No Known Problems Paternal Grandfather          Review of Systems:   HEENT: Patient denies any headaches, vision changes, change in hearing, or tinnitus, Patient denies any rhinorrhea, epistaxis, sinus pain, mouth or dental problems, sore throat or hoarseness, or dysphagia  Pulmonary: Patient denies any cough, congestion, SOA, or wheezing  Cardiovascular: Patient denies any chest pain, dyspnea, palpitations, weakness, intolerance of exercise, varicosities, swelling of extremities, known murmur  Gastrointestinal:  Patient denies nausea, vomiting, diarrhea, constipation, loss  of appetite, change in appetite, dysphagia, gas, heartburn, melena, change in bowel habits, use of laxatives or other drugs to alter the function of the gastrointestinal tract.  Genital/Urinary: Patient denies dysuria, change in color of urine, change in frequency of urination, pain with urgency, incontinence, retention, or nocturia.  Musculoskeletal: Positive for L hip pain. Patient denies increased warmth; redness; or swelling of joints; deformity; crepitation: pain the neck, or the back, especially with movement.  Neurological: Patient denies  "dizziness, tremor, ataxia, difficulty in speaking, change in speech, paresthesia, loss of sensation, seizures, syncope, changes in memory.  Endocrine system: Patient denies tremors, palpitations, intolerance of heat or cold, polyuria, polydipsia, polyphagia, diaphoresis, exophthalmos, or goiter.  Psychological: Patient denies thoughts/plans or harming self or other; depression,  insomnia, night terrors, jaci, memory loss, disorientation.  Skin: Patient denies any bruising, rashes, discoloration, pruritus, wounds, ulcers, decubiti, changes in the hair or nails  Hematopoietic: Patient denies history of spontaneous or excessive bleeding, epistaxis, hematuria, melena, fatigue, enlarged or tender lymph nodes, pallor, history of anemia.    Physical Exam: 81 y.o. female  General Appearance:    Alert, cooperative, in no acute distress                   Vitals:    02/07/22 1400 02/07/22 1431 02/07/22 1520 02/07/22 1624   BP: 149/61 169/70 173/73 163/64   BP Location:   Right arm Right arm   Patient Position:   Lying Lying   Pulse: 65 69 80 78   Resp:   16 18   Temp:   98.5 °F (36.9 °C) 99.6 °F (37.6 °C)   TempSrc:   Oral Temporal   SpO2: 100% 97% 93% 99%   Weight:   55.8 kg (123 lb) 55.8 kg (123 lb 0.3 oz)   Height:   167.6 cm (66\") 167.6 cm (65.98\")        Head:    Normocephalic, without obvious abnormality, atraumatic      Right Upper Extremity:  No obvious deformity, painless ROM shoulder, elbow, wrist, no joint instability, normal distal strength and sensation to light touch, skin intact without cyanosis, clubbing, edema; +2 radial pulse  Left Upper Extremity:  No obvious deformity, painless ROM shoulder, elbow, wrist, no joint instability, normal distal strength and sensation to light touch, skin intact without cyanosis, clubbing, edema; +2 radial pulse  Right Lower Extremity:  No obvious deformity, painless ROM hip, knee, ankle, compartments soft, normal distal strength and sensation to light touch, skin intact " without cyanosis, clubbing, edema; +2 dorsalis pedis pulse  Left Lower Extremity:  LLE shortened, painful ROM of hip. Compartments soft, normal distal strength and sensation to light touch, skin intact without cyanosis, clubbing, edema; +2 dorsalis pedis pulse         Diagnostic Tests:    I have reviewed the labs, radiology results and diagnostic studies: 2 views of L hip demonstrate displaced transcervical hip fracture     Results from last 7 days   Lab Units 02/07/22  0644   WBC 10*3/mm3 6.47   HEMOGLOBIN g/dL 11.5*   PLATELETS 10*3/mm3 231     Results from last 7 days   Lab Units 02/07/22  0644   SODIUM mmol/L 138   POTASSIUM mmol/L 4.1   CO2 mmol/L 30.0*   CREATININE mg/dL 0.83   GLUCOSE mg/dL 94           Assessment:  Patient Active Problem List   Diagnosis   • Multifocal pneumonia   • Recurrent atrial fibrillation with RVR (CMS/HCC)   • COPD on home oxygen (CMS/HCC)   • Hypertension and diastolic dysfunction   • Rheumatoid arthritis (HCC)   • Anxiety and depression   • Wound of right leg   • Anemia   • Elevated troponin   • Sepsis (HCC)   • Pleural effusion, bilateral   • Acute diastolic CHF (CMS/HCC)   • Accelerated hypertension   • Pneumonia due to infectious organism   • Severe malnutrition (CMS/HCC)   • Hip fracture ([facility])           Plan:  The patient voiced understanding of the risks, benefits, and alternative forms of treatment that were discussed and the patient consents to proceed with hip hemiarthroplasty. We discussed the risks which include bleeding, infection, nerve injury, failure of fixation, loss of limb and life. She is agreeable to the plan.    NPO  Consented/marked/posted.  To OR today,             Zhane Kasper PA-C  02/07/22  16:37 EST

## 2022-02-07 NOTE — H&P
[facility] Medicine Services  HISTORY AND PHYSICAL    Patient Name: iYn Law  : 1940  MRN: [id number]  Primary Care Physician: Santiago Chaudhry MD  Date of admission: 2022      Subjective   Subjective     Chief Complaint:  Left hip pain    HPI:  Yin Law is a 81 y.o. female with h/o Diastolic CHF, afib, COPD on home 5LNC, and RA,  found to have a let hip fracture after tripping over her oxygen tank.  States that she was taken off anticoagulation for her AFIB d/t anemia but denies GIB.  She follows with Dr. Montoya for her heart disease.  She denies any CP or SOA with climbing a flight of stairs as long as she's wearing her oxygen.  She states that the last surgery she had was a DELISA when she was about 28yo but had no issues with anesthesia at that time.       COVID Details:    Symptoms:    [x] NONE [] Fever []  Cough [] Shortness of breath [] Change in taste/smell      Review of Systems   Gen- No fevers, chills  CV- No chest pain, palpitations  Resp- No cough, dyspnea  GI- No N/V/D, abd pain      All other systems reviewed and are negative.     Personal History     Past Medical History:   Diagnosis Date   • Anxiety and depression    • Arrhythmia     A-FIB   • Asthma    • Chicken pox    • COPD (chronic obstructive pulmonary disease) (HCC)    • Hyperlipidemia    • Hypertension    • Measles    • Menopause    • Osteoporosis    • Rheumatoid arthritis (HCC)    • Rheumatoid arthritis (HCC)    • Tobacco abuse        Past Surgical History:   Procedure Laterality Date   • APPENDECTOMY     • CARPAL TUNNEL RELEASE Left    • CATARACT EXTRACTION, BILATERAL     • COLON SURGERY     • COLONOSCOPY     • GALLBLADDER SURGERY     • HYSTERECTOMY     • TONSILLECTOMY         Family History:  family history includes Alzheimer's disease in her mother; Hypertension in her brother; No Known Problems in her father, maternal grandfather, maternal grandmother, paternal grandfather,  paternal grandmother, and sister; Parkinsonism in her brother. Otherwise pertinent FHx was reviewed and unremarkable.     Social History:  reports that she quit smoking about 9 months ago. Her smoking use included cigarettes. She smoked 0.25 packs per day. She has never used smokeless tobacco. She reports that she does not drink alcohol and does not use drugs.  Social History     Social History Narrative    Caffeine: 8 oz        Medications:  Available home medication information reviewed.  (Not in a hospital admission)      Allergies   Allergen Reactions   • Dicloxacillin Shortness Of Breath   • Amiodarone Unknown - High Severity   • Propoxyphene Unknown - Low Severity       Objective   Objective     Vital Signs:   Temp:  [98.3 °F (36.8 °C)] 98.3 °F (36.8 °C)  Heart Rate:  [60-72] 72  Resp:  [16] 16  BP: (129-150)/(54-88) 139/88  Flow (L/min):  [3] 3       Physical Exam   Constitutional: Awake, alert, family at bedside  Eyes: PERRLA, sclerae anicteric, no conjunctival injection  HENT: NCAT, mucous membranes moist  Neck: Supple, no thyromegaly, no lymphadenopathy, trachea midline  Respiratory: Clear to auscultation bilaterally, nonlabored respirations   Cardiovascular: RRR, no murmurs, rubs, or gallops, palpable pedal pulses bilaterally  Gastrointestinal: Positive bowel sounds, soft, nontender, nondistended  Musculoskeletal: No bilateral ankle edema, no clubbing or cyanosis to extremities  Psychiatric: Appropriate affect, cooperative  Neurologic: Oriented x 3, strength symmetric in all extremities, Cranial Nerves grossly intact to confrontation, speech clear  Skin: No rashes      Result Review:  I have personally reviewed the results from the time of this admission to 2/7/2022 12:50 EST and agree with these findings:  [x]  Laboratory  []  Microbiology  [x]  Radiology  [x]  EKG/Telemetry   []  Cardiology/Vascular   []  Pathology  []  Old records  []  Other:  Most notable findings include:      LAB RESULTS:      Lab  02/07/22  0644   WBC 6.47   HEMOGLOBIN 11.5*   HEMATOCRIT 36.3   PLATELETS 231   NEUTROS ABS 3.85   IMMATURE GRANS (ABS) 0.06*   LYMPHS ABS 1.70   MONOS ABS 0.55   EOS ABS 0.24   MCV 97.8*   PROTIME 13.2   INR 1.03         Lab 02/07/22  0644   SODIUM 138   POTASSIUM 4.1   CHLORIDE 98   CO2 30.0*   ANION GAP 10.0   BUN 12   CREATININE 0.83   GLUCOSE 94   CALCIUM 9.8         Lab 02/07/22  0644   TOTAL PROTEIN 6.9   ALBUMIN 4.00   GLOBULIN 2.9   ALT (SGPT) 15   AST (SGOT) 29   BILIRUBIN 0.4   ALK PHOS 130*                 Lab 02/07/22  0728   ABO TYPING O   RH TYPING Positive   ANTIBODY SCREEN Negative         UA    Urinalysis 8/5/21 8/21/21 8/21/21 8/21/21 8/21/21 10/4/21     1208 1208 2314 2314    Squamous Epithelial Cells, UA   None Seen  None Seen    Specific [place], UA 1.014 1.009  1.008  1.011   Ketones, UA Negative Negative  Negative  Negative   Blood, UA Negative Trace (A)  Negative  Negative   Leukocytes, UA Negative Negative  Negative  Negative   Nitrite, UA Negative Positive (A)  Positive (A)  Negative   RBC, UA   3-6 (A)  0-2    WBC, UA   0-2  13-20 (A)    Bacteria, UA   4+ (A)  3+ (A)    (A) Abnormal value              Microbiology Results (last 10 days)     Procedure Component Value - Date/Time    COVID PRE-OP / PRE-PROCEDURE SCREENING ORDER (NO ISOLATION) - Swab, Nasopharynx [[id number]]  (Normal) Collected: 02/07/22 0943    Lab Status: Final result Specimen: Swab from Nasopharynx Updated: 02/07/22 1012    Narrative:      The following orders were created for panel order COVID PRE-OP / PRE-PROCEDURE SCREENING ORDER (NO ISOLATION) - Swab, Nasopharynx.  Procedure                               Abnormality         Status                     ---------                               -----------         ------                     COVID-19, ABBOTT IN-HOUS...[[id number]]  Normal              Final result                 Please view results for these tests on the individual orders.    COVID-19, ABBOTT IN-HOUSE,NASAL  Swab (NO TRANSPORT MEDIA) 2 HR TAT - Swab, Nasopharynx [[id number]]  (Normal) Collected: 02/07/22 0943    Lab Status: Final result Specimen: Swab from Nasopharynx Updated: 02/07/22 1012     COVID19 Presumptive Negative    Narrative:      Fact sheet for providers: https://www.fda.gov/media/455167/download     Fact sheet for patients: https://www.fda.gov/media/984383/download    Test performed by PCR.  If inconsistent with clinical signs and symptoms patient should be tested with different authorized molecular test.          XR Forearm 2 View Right    Result Date: 2/7/2022  DATE OF EXAM: 2/7/2022 7:10 AM  PROCEDURE: XR FOREARM 2 VW RIGHT-  INDICATIONS: fall, R forearm injury  COMPARISON: No Comparisons Available  TECHNIQUE: A minimum of two routine standard radiographic views were obtained of the right forearm.   FINDINGS: There are multiple small bony fragments adjacent to the triquetral bone consistent with an avulsion fracture fragments.  Other carpal bones appear intact.  There are degenerative changes of the triscaphe joint and first metacarpal trapezium joint.      Impression:  1.  Small bony fragments adjacent to the triquetral bone consistent with avulsion fracture fragments. 2.  No acute fracture or dislocation of the radius or ulna.  This report was finalized on 2/7/2022 7:32 AM by Martin Dorsey MD.      XR Femur 2 View Left    Result Date: 2/7/2022  XR FEMUR 2 VW LEFT-  Date of Exam: 2/7/2022 7:10 AM  Indication: fall, LLE injury.  Comparison Exams: None available.  FINDINGS: Study is limited secondary to diffuse osteopenia  There is a fracture of the neck of the left femur. No evidence of dislocation of the femoral head at the hip joint.  Evaluation of the pelvic structures is limited by severe osteopenia. Advanced degenerative changes are noted at the knee. Femoral shaft appears intact. Relative disuse atrophy noted of the soft tissues      Impression: Acute fracture femoral neck. No evidence of  dislocation.  No other definite acute osseous abnormality noted given limitations from severe osteopenia  This report was finalized on 2/7/2022 7:31 AM by Jorge Luis Dale.      XR Chest 1 View    Result Date: 2/7/2022  XR CHEST 1 VW-  Date of Exam: 2/7/2022 7:10 AM  Indication: hip fx.  Comparison:?12/1/2021  Technique:?A single view of the chest was obtained.  FINDINGS:  ?Heart size and pulmonary vessels are within normal limits.  Small right pleural effusion is unchanged.  There is minimal right basilar airspace disease likely due to atelectasis.  Left lung is clear.  No left pleural effusion or pneumothorax.  Bony structures appear within normal limits.        Impression:   1.  Small right pleural effusion and right basilar airspace disease likely due to atelectasis.   This report was finalized on 2/7/2022 7:33 AM by Martin Dorsey MD.      XR Pelvis 1 or 2 View    Result Date: 2/7/2022  DATE OF EXAM: 2/7/2022 7:10 AM  PROCEDURE: XR PELVIS 1 OR 2 VW-  INDICATIONS: L hip fx  COMPARISON: 12/09/2021  TECHNIQUE: An AP radiologic view of the pelvis was obtained.   FINDINGS: Study limited by severe osteopenia.  Acute fracture left femoral neck noted no dislocation femoral head.  Visualized bony pelvis appears to be grossly intact. Evaluation of the sacrum and coccyx limited by severe osteopenia and overlying bowel gas and fecal material. Limited imaging of the right hip is grossly intact  Advanced degenerative changes noted of the visualized lumbar spine.      Impression: Fracture proximal left femur.  No definite acute fracture or dislocation noted given limitations from severe osteopenia.  Additional imaging can be obtained as clinically indicated  This report was finalized on 2/7/2022 7:33 AM by Jorge Luis Dale.      Peripheral Block    Result Date: 2/7/2022  Santiago Joe, SHAYLEE     2/7/2022 11:19 AM Peripheral Block Patient reassessed immediately prior to procedure Patient location during procedure: pre-op  Start time: 2/7/2022 11:05 AM Stop time: 2/7/2022 11:19 AM Reason for block: procedure for pain and at surgeon's request Performed by CRNA: Santiago Joe, CRNA Assisted by: Anne Gonzalez RN Preanesthetic Checklist Completed: patient identified, IV checked, site marked, risks and benefits discussed, surgical consent, monitors and equipment checked, pre-op evaluation and timeout performed Prep: Pt Position: supine Sterile barriers:cap, gloves and mask Prep: ChloraPrep Patient monitoring: blood pressure monitoring, continuous pulse oximetry and EKG Procedure Sedation: no Performed under: local infiltration Guidance:ultrasound guided Images:still images obtained, printed/placed on chart Laterality:left Block Type:fascia iliaca compartment Injection Technique:catheter Needle Type:echogenic Needle Gauge:18 G Resistance on Injection: none Catheter Size:20 G (20g) Cath Depth at skin: 12 cm Medications Used: ropivacaine (NAROPIN) 0.5 % injection, 20 mL Medications Preservative Free Saline:20ml Post Assessment Injection Assessment: negative aspiration for heme, no paresthesia on injection and incremental injection Patient Tolerance:comfortable throughout block Complications:no Additional Notes Procedure:         Pt placed in supine position.   The insertion site was prepped in sterile fashion with Chlorapreop and clear plastic drapes.  Analgesia was provided by skin infiltration at insertion site with Lidocaine 1% 3mls.  A B-Flynn 18 g , 4 inch echogenic Touhy needle was advance In-plane under ultrasound guidance. The   Anterior superior Iliac crest was initially visualized and the probe was directed slightly medially and slightly towards the umbilicus.  The course of the needle was tracked over the sartorius muscle through the fascia Iliacus and into the anterior portion of the Iliacus muscle.  Major vessels where identified and avoided as where structures of the peritoneal cavity.  LA injection was made incrementally  in 1-5ml amounts spread was visualized superiorly below fascia iliacus.  Injection was completed with negative aspiration of blood and negative intravascular injection.  Injection pressures where normal or minimal resistance.  A 20 g B-Flynn wire styleted catheter was then advance thru the needle and very easily placed in a superior or cephalad direction.  The catheter was secured at insertion site with exofin tissue adhesive, benzoin, and steristreps.  A CHG tegaderm dressing was placed over the insertion site and the nerve catheter labeled and capped.  Thank You.     Peripheral Block    Result Date: 2/7/2022  Santiago Mace CRNA     2/7/2022 10:45 AM Peripheral Block Patient reassessed immediately prior to procedure Patient location during procedure: pre-op Reason for block: procedure for pain and at surgeon's request Performed by SHAYLEE: Santiago Mace CRNA Preanesthetic Checklist Completed: patient identified, IV checked, site marked, risks and benefits discussed, surgical consent, monitors and equipment checked, pre-op evaluation and timeout performed Prep: Sterile barriers:cap, gloves and mask Prep: ChloraPrep Patient monitoring: blood pressure monitoring, continuous pulse oximetry and EKG Procedure Performed under: local infiltration Guidance:ultrasound guided Images:still images obtained, printed/placed on chart Block Type:fascia iliaca compartment Injection Technique:catheter Needle Type:echogenic Needle Gauge:18 G Resistance on Injection: none Catheter Size:20 G (20g) Medications Preservative Free Saline:10ml Post Assessment Injection Assessment: negative aspiration for heme, no paresthesia on injection and incremental injection Patient Tolerance:comfortable throughout block Complications:no Additional Notes Procedure:         Pt placed in supine position.   The insertion site was prepped in sterile fashion with Chlorapreop and clear plastic drapes.  Analgesia was provided by skin infiltration at insertion  site with Lidocaine 1% 3mls.  A B-Flynn 18 g , 4 inch echogenic Touhy needle was advance In-plane under ultrasound guidance. The   Anterior superior Iliac crest was initially visualized and the probe was directed slightly medially and slightly towards the umbilicus.  The course of the needle was tracked over the sartorius muscle through the fascia Iliacus and into the anterior portion of the Iliacus muscle.  Major vessels where identified and avoided as where structures of the peritoneal cavity.  LA injection was made incrementally in 1-5ml amounts spread was visualized superiorly below fascia iliacus.  Injection was completed with negative aspiration of blood and negative intravascular injection.  Injection pressures where normal or minimal resistance.  A 20 g B-Flynn wire styleted catheter was then advance thru the needle and very easily placed in a superior or cephalad direction.  The catheter was secured at insertion site with exofin tissue adhesive, benzoin, and steristreps.  A CHG tegaderm dressing was placed over the insertion site and the nerve catheter labeled and capped.  Thank You.       Results for orders placed during the hospital encounter of 07/19/21    Adult Transthoracic Echo Complete W/ Cont if Necessary Per Protocol    Interpretation Summary  · Estimated left ventricular EF = 65%  · Mild aortic valve stenosis is present.  · Aortic valve maximum pressure gradient is 32.9 mmHg. Aortic valve mean pressure gradient is 17.2 mmHg.  · Mild mitral valve regurgitation is present.  · Mild tricuspid valve regurgitation is present.  · Estimated right ventricular systolic pressure from tricuspid regurgitation is 37.0 mmHg.  · There is a large left pleural effusion. There is a moderate sized right pleural effusion.      Assessment/Plan   Assessment & Plan     Active Hospital Problems    Diagnosis  POA   • Hip fracture (HCC) [S72.009A]  Yes       Ms. Yin Law is an 82yo female with h/o  Diastolic CHF,  afib, COPD on home 5LNC, and RA,  found to have a let hip fracture after tripping over her oxygen tank.     Left Hip Fracture  --prn lortab and prn morphine now  --ortho/Dr. Powell called by ER physician, plans for OR this afternoon  --ordered lovenox to start tomorrow if ok with ortho  --EKG shows sinus le    Chronic DHF  --continue home meds inclusing torsemide  --monitor oxygenation/fluid status closely perioperatively  --follows with Dr. Montoya outpatient    Afib  --not on anticoagulation as taken off d/t anemia while taking  --cont bisoprolol with hold parameters    COPD  Chronic Respiratory Failure  --home O2 dependent on 5LNC  --duonebs  --monitor oxygenation close perioperatively  --follows with pulm/[address] outpatient    Hypothyroid  --continue home synthroid    DVT prophylaxis:  None today d/t planned surgery.  Ordered lovenox to start tomorrow if ok with ortho      CODE STATUS:  FULL  Code Status and Medical Interventions:   Ordered at: 02/07/22 1250     Level Of Support Discussed With:    Patient     Code Status (Patient has no pulse and is not breathing):    CPR (Attempt to Resuscitate)     Medical Interventions (Patient has pulse or is breathing):    Full Support       Admission Status:  I believe this patient meets INPATIENT status due to left hip fracture.  I feel patient’s risk for adverse outcomes and need for care warrant INPATIENT evaluation and I predict the patient’s care encounter to likely last beyond 2 midnights.      Darnell Beltran MD  02/07/22

## 2022-02-07 NOTE — CASE MANAGEMENT/SOCIAL WORK
Discharge Planning Assessment  [facility]     Patient Name: Yin Law  MRN: [id number]  Today's Date: 2/7/2022    Admit Date: 2/7/2022     Discharge Needs Assessment     Row Name 02/07/22 0952       Living Environment    Lives With alone; other (see comments)  Meliton Beard assists    Current Living Arrangements home/apartment/condo    Primary Care Provided by self; other (see comments)  family    Provides Primary Care For no one, unable/limited ability to care for self    Family Caregiver if Needed other relative(s)    Family Caregiver Names sister - Enedina/Niece - Kisha    Quality of Family Relationships helpful; involved; supportive    Able to Return to Prior Arrangements yes       Resource/Environmental Concerns    Resource/Environmental Concerns none    Transportation Concerns car, none       Transition Planning    Patient/Family Anticipates Transition to inpatient rehabilitation facility    Patient/Family Anticipated Services at Transition ; rehabilitation services    Transportation Anticipated family or friend will provide       Discharge Needs Assessment    Readmission Within the Last 30 Days no previous admission in last 30 days    Equipment Currently Used at Home cane, straight; oxygen; nebulizer; pulse ox; wheelchair; walker, rolling; respiratory supplies    Concerns to be Addressed discharge planning    Anticipated Changes Related to Illness none    Discharge Facility/Level of Care Needs nursing facility, skilled    Current Discharge Risk lives alone; dependent with mobility/activities of daily living; physical impairment               Discharge Plan     Row Name 02/07/22 0956       Plan    Plan initial    Plan Comments CM spoke with patient and niKisha larios at bedside. Patient resides in [facility], alone. Patient has a wheelchair, rolling walker, straight cane and nebulizer in her home. Patient is on O2 at home provided by [employer]. Patient is independent with ADL's, however, requires  assistance with house keeping and laundry. Patient's sister in law and niece Kisha assist her in the home as well as provide transportation for groceries and appointments. Patient denies any current home health or outpatient services. Patient has used [facility]  in the past. Patient has medical insurance, prescription coverage and is able to afford/obtain medications without difficulty. Patient and niece would like a referral to [facility] or [facility] for skilled rehab @ discharge. Goal is to return back home after rehab. Case management will continue to follow.    Final Discharge Disposition Code 30 - still a patient              Continued Care and Services - Admitted Since 2/7/2022    Coordination has not been started for this encounter.          Demographic Summary     Row Name 02/07/22 0934       General Information    Arrived From home    Referral Source emergency department    Preferred Language English     Used During This Interaction no       Contact Information    Contact Information Comments STEVIE FOWLER Relative [phone]               Functional Status     Row Name 02/07/22 0951       Functional Status    Usual Activity Tolerance fair    Current Activity Tolerance poor       Functional Status, IADL    Medications independent    Meal Preparation assistive person    Housekeeping assistive person    Laundry assistive person    Shopping assistive equipment and person       Mental Status    General Appearance WDL WDL       Mental Status Summary    Recent Changes in Mental Status/Cognitive Functioning no changes       Employment/    Employment Status retired               Psychosocial    No documentation.                Abuse/Neglect    No documentation.                Legal    No documentation.                Substance Abuse    No documentation.                Patient Forms    No documentation.                   Deysi Saucedo RN

## 2022-02-07 NOTE — ANESTHESIA PREPROCEDURE EVALUATION
Anesthesia Evaluation     Patient summary reviewed and Nursing notes reviewed   NPO Solid Status: > 8 hours  NPO Liquid Status: > 8 hours           Airway   Mallampati: I  TM distance: >3 FB  Neck ROM: limited  Dental    (+) edentulous    Pulmonary    (+) a smoker (quit 4/21) Former, COPD moderate, asthma,home oxygen, decreased breath sounds,   Cardiovascular     Rhythm: regular  Rate: normal    (+) hypertension, dysrhythmias Paroxysmal Atrial Fib, hyperlipidemia,     ROS comment:   Echo 7/21: EF 65%, mild AS (mean 17, max 33), mild MR/TR (RVSP 37)    EKG stress test 1/20: normal EF, no ischemia, wnl    Neuro/Psych- negative ROS  GI/Hepatic/Renal/Endo    (+)   thyroid problem hypothyroidism    Musculoskeletal     Abdominal    Substance History - negative use     OB/GYN negative ob/gyn ROS         Other   arthritis,      ROS/Med Hx Other: 3-4 l 02 at home                Anesthesia Plan    ASA 4     general     intravenous induction     Anesthetic plan, all risks, benefits, and alternatives have been provided, discussed and informed consent has been obtained with: patient.    Plan discussed with CRNA.        CODE STATUS:    Level Of Support Discussed With: Patient  Code Status (Patient has no pulse and is not breathing): CPR (Attempt to Resuscitate)  Medical Interventions (Patient has pulse or is breathing): Full Support

## 2022-02-07 NOTE — ANESTHESIA POSTPROCEDURE EVALUATION
Patient: Yin MCQUEEN Bridgman    Procedure Summary     Date: 02/07/22 Room / Location:  [facility]  /  [facility]    Anesthesia Start: 1653 Anesthesia Stop: 1835    Procedure: HIP HEMIARTHROPLASTY (Left Hip) Diagnosis:     Surgeons: Napoleon Powell Jr., MD Provider: Sammy Carney MD    Anesthesia Type: general ASA Status: 4          Anesthesia Type: general    Vitals  No vitals data found for the desired time range.          Post Anesthesia Care and Evaluation    Patient location during evaluation: PACU  Patient participation: complete - patient participated  Level of consciousness: awake and alert  Pain management: adequate  Airway patency: patent  Anesthetic complications: No anesthetic complications  PONV Status: none  Cardiovascular status: hemodynamically stable and acceptable  Respiratory status: nonlabored ventilation, acceptable and nasal cannula  Hydration status: acceptable

## 2022-02-07 NOTE — ANESTHESIA PROCEDURE NOTES
Airway  Urgency: elective    Date/Time: 2/7/2022 5:00 PM  Airway not difficult    General Information and Staff    Patient location during procedure: OR  CRNA: Ascencion Gonzalez III, CRNA    Indications and Patient Condition  Indications for airway management: airway protection    Preoxygenated: yes  MILS not maintained throughout  Mask difficulty assessment: 0 - not attempted    Final Airway Details  Final airway type: endotracheal airway      Successful airway: ETT  Cuffed: yes   Successful intubation technique: direct laryngoscopy  Blade: Maria Ines  Blade size: 3  ETT size (mm): 7.0  Cormack-Lehane Classification: grade IIa - partial view of glottis  Placement verified by: chest auscultation and capnometry   Measured from: lips  ETT/EBT  to lips (cm): 21  Number of attempts at approach: 1  Assessment: lips, teeth, and gum same as pre-op and atraumatic intubation    Additional Comments  Negative epigastric sounds, Breath sound equal bilaterally with symmetric chest rise and fall.

## 2022-02-07 NOTE — ANESTHESIA PROCEDURE NOTES
Peripheral Block      Patient reassessed immediately prior to procedure    Patient location during procedure: pre-op  Reason for block: procedure for pain and at surgeon's request  Performed by  CRNA: Santiago Mace CRNA  Preanesthetic Checklist  Completed: patient identified, IV checked, site marked, risks and benefits discussed, surgical consent, monitors and equipment checked, pre-op evaluation and timeout performed  Prep:  Sterile barriers:cap, gloves and mask  Prep: ChloraPrep  Patient monitoring: blood pressure monitoring, continuous pulse oximetry and EKG  Procedure  Performed under: local infiltration  Guidance:ultrasound guided  Images:still images obtained, printed/placed on chart    Block Type:fascia iliaca compartment  Injection Technique:catheter  Needle Type:echogenic  Needle Gauge:18 G  Resistance on Injection: none  Catheter Size:20 G (20g)          Medications  Preservative Free Saline:10ml    Post Assessment  Injection Assessment: negative aspiration for heme, no paresthesia on injection and incremental injection  Patient Tolerance:comfortable throughout block  Complications:no  Additional Notes  Procedure:                 Pt placed in supine position.   The insertion site was prepped in sterile fashion with Chlorapreop and clear plastic drapes.  Analgesia was provided by skin infiltration at insertion site with Lidocaine 1% 3mls.  A B-Flynn 18 g , 4 inch echogenic Touhy needle was advance In-plane under ultrasound guidance. The   Anterior superior Iliac crest was initially visualized and the probe was directed slightly medially and slightly towards the umbilicus.  The course of the needle was tracked over the sartorius muscle through the fascia Iliacus and into the anterior portion of the Iliacus muscle.  Major vessels where identified and avoided as where structures of the peritoneal cavity.  LA injection was made incrementally in 1-5ml amounts spread was visualized superiorly below fascia  iliacus.  Injection was completed with negative aspiration of blood and negative intravascular injection.  Injection pressures where normal or minimal resistance.  A 20 g B-Flynn wire styleted catheter was then advance thru the needle and very easily placed in a superior or cephalad direction.  The catheter was secured at insertion site with exofin tissue adhesive, benzoin, and steristreps.  A CHG tegaderm dressing was placed over the insertion site and the nerve catheter labeled and capped.  Thank You.

## 2022-02-07 NOTE — OP NOTE
DATE OF OPERATION: 02/07/22  PREOPERATIVE DIAGNOSIS: left displaced femoral neck fracture.      POSTOPERATIVE DIAGNOSIS: left displaced femoral neck fracture.      PROCEDURE PERFORMED: Open treatment of displaced femoral neck fracture with hemiarthroplasty.      SURGEON: Napoleon Powell Jr., MD     Circulator: Ilir Dill RN  Scrub Person: Elizabeth Delgado; Fabiana Neri  Vendor Representative: Avery Cox  Nursing Assistant: Msiha Hicks  Assistant: Zhane Kasper PA-C     Assistant: Zhane Kasper PA-C  was responsible for performing the following activities: Retraction, Suction, Irrigation, Suturing, Closing and Placing Dressing and their skilled assistance was necessary for the success of this case.     ANESTHESIA: General.      ESTIMATED BLOOD LOSS: 100 mL.      COMPLICATIONS: None.      DISPOSITION: To the recovery room in a stable condition.        Implants:    Implant Name Type Inv. Item Serial No.  Lot No. LRB No. Used Action   DEV CONTRL TISS STRATAFIX SPIRAL PDO BIDIR MO4 [id number] - [id number] Implant DEV CONTRL TISS STRATAFIX SPIRAL PDO BIDIR MO4 [id number]  [employer]  [employer] [id number] Left 1 Implanted   SUT FW 5 W .5 CIR CUT NDL 48M [id number] - [id number] Implant SUT FW 5 W .5 CIR CUT NDL 48M [id number]  [employer] [id number] Left 1 Implanted   SUT FW 5 W .5 CIR CUT NDL 48M [id number] - [id number] Implant SUT FW 5 W .5 CIR CUT NDL 48M [id number]  [employer] [id number] Left 1 Implanted   SUT FW 5 W .5 CIR CUT NDL 48M [id number] - [id number] Implant SUT FW 5 W .5 CIR CUT NDL 48M [id number]  [employer] [id number] Left 1 Implanted   CMT BONE REFOBACIN R W/GENT 1X40 - [id number] Implant CMT BONE REFOBACIN R W/GENT 1X40  [employer] [id number] Left 1 Implanted   CMT BONE REFOBACIN R W/GENT 1X40 - [id number] Implant CMT BONE REFOBACIN R W/GENT 1X40  [employer] [id number] Left 1 Implanted   STEM FEM/HIP AVENIR CMT STD SZ4 - [id number] Implant STEM FEM/HIP AVENIR CMT STD SZ4  [employer] [id number] Left 1  Implanted   HD FEM/HIP UNIV COCR 12/14TPR 28MM MIN3.5 - [id number] Implant HD FEM/HIP UNIV COCR 12/14TPR 28MM MIN3.5  [employer] [id number] Left 1 Implanted   CUP RNGLC BIPOL 28 47MM - [id number] Implant CUP RNGLC BIPOL 28 47MM  [employer] [id number] Left 1 Implanted        INDICATIONS FOR PROCEDURE: This is a 81-year-old who sustained a displaced femoral neck fracture. After discussion of risks, benefits, and alternatives, surgery was recommended as a necessity. The patient understood and wished to proceed.      DESCRIPTION OF PROCEDURE: In the holding area, the patient identified the left hip as the correct operative extremity. This was initialed by the surgeon with the patient's acknowledgment. The patient was then taken to the operating room and placed in the supine position. Upon induction of adequate anesthesia, the patient was rolled to the lateral decubitus position with all bony prominences padded. The operative hip and lower extremity were prepped and draped in the usual sterile fashion. Timeout confirmed the correct patient and operative extremity and that antibiotics were on board. A standard posterolateral approach to the hip was carried out and was carried sharply through the skin and subcutaneous tissue. The fascia was opened in line with the incision and the gluteus erlidna was split bluntly and the trochanteric bursa was opened. Next, the short external rotators were exposed. The piriformis was tagged and released as were the remaining short external rotators along with the capsule. After capsulotomy was performed, the fracture was readily identified. The head was removed. The remainder of the neck was resected to approximately 1 cm above the lesser trochanter. The acetabulum was inspected and intact. The canal was entered, reamed and broached, and trialing was carried out. The appropriate size and position were chosen that allowed restoration of near approximate leg length as well as provided  stability in the anterior as well as posteriorly to the position of sleep and 90° of flexion, adduction, and internal rotation. The canal was prepared and a cement restrictor was then placed distally in the canal. Pressurized cement was then placed. The final components were chosen and assembled and implanted. The hip was reduced carefully. The joint was Pulsavac irrigated. The capsule and external rotators were repaired through transosseous tunnels through the posterior greater trochanter. The fascia was closed with 0 Stratafix, subcutaneous tissue with 2-0 Vicryl, and skin with an exofin dressing. Anesthesia was reversed. The patient was taken to the Recovery Room in stable condition. All instrument, needle, and sponge counts were correct.         Napoleon Powell Jr., MD  02/07/22  18:22 EST

## 2022-02-07 NOTE — ED PROVIDER NOTES
[place]    EMERGENCY DEPARTMENT ENCOUNTER      Pt Name: Yin Law  MRN: [id number]  YOB: 1940  Date of evaluation: 2/7/2022  Provider: Darshan Gauthier MD    CHIEF COMPLAINT       Chief Complaint   Patient presents with   • Fall         HISTORY OF PRESENT ILLNESS  (Location/Symptom, Timing/Onset, Context/Setting, Quality, Duration, Modifying Factors, Severity.)   Yin Law is a 81 y.o. female who presents to the emergency department with mechanical fall that occurred prior to arrival.  Patient states that she lost her balance and fell, landing on her left hip.  She has had moderate aching pain in her left hip is worse with movement without any associated distal paresthesia, weakness, or numbness since that time.  She also sustained a skin tear to her right forearm but denies hitting her head or any other associated injury.      Nursing notes were reviewed.    REVIEW OF SYSTEMS    (2-9 systems for level 4, 10 or more for level 5)   ROS:  General:  No fevers, no chills, no weakness  Cardiovascular:  No chest pain, no palpitations  Respiratory:  No shortness of breath, no cough, no wheezing  Gastrointestinal:  No pain, no nausea, no vomiting, no diarrhea  Musculoskeletal: + Hip pain  Skin: + Skin tear  Neurologic:  No speech problems, no headache, no extremity numbness, no extremity tingling, no extremity weakness  Psychiatric:  No anxiety  Genitourinary:  No dysuria, no hematuria    Except as noted above the remainder of the review of systems was reviewed and negative.       PAST MEDICAL HISTORY     Past Medical History:   Diagnosis Date   • Accelerated hypertension 9/14/2021   • Acute diastolic CHF (CMS/HCC) 8/6/2021   • Anxiety and depression    • Arrhythmia     A-FIB   • Asthma    • Chicken pox    • COPD (chronic obstructive pulmonary disease) (formerly [facility])    • Elevated troponin 8/5/2021   • Hyperlipidemia    • Hypertension    • Measles    • Menopause    • Multifocal pneumonia 7/19/2021   •  Osteoporosis    • Pleural effusion, bilateral 8/5/2021   • Pneumonia due to infectious organism 9/14/2021   • Rheumatoid arthritis (HCC)    • Rheumatoid arthritis (HCC)    • Sepsis (HCC) 8/5/2021   • Tobacco abuse          SURGICAL HISTORY       Past Surgical History:   Procedure Laterality Date   • APPENDECTOMY     • CARPAL TUNNEL RELEASE Left    • CATARACT EXTRACTION, BILATERAL     • COLON SURGERY     • COLONOSCOPY     • GALLBLADDER SURGERY     • HIP HEMIARTHROPLASTY Left 2/7/2022    Procedure: HIP HEMIARTHROPLASTY LEFT;  Surgeon: Napoleon Powell Jr., MD;  Location: [facility];  Service: Orthopedics;  Laterality: Left;   • HYSTERECTOMY  1971   • TONSILLECTOMY           CURRENT MEDICATIONS     No current facility-administered medications for this encounter.    Current Outpatient Medications:   •  albuterol sulfate  (90 Base) MCG/ACT inhaler, Inhale 2 puffs Every 4 (Four) Hours As Needed for Wheezing. Patient taking: Inhale 2 puffs by mouth every 4-6 hours, Disp: , Rfl:   •  ascorbic acid (VITAMIN C) 1000 MG tablet, Take 1 tablet by mouth Daily. (Patient taking differently: Take 1,000 mg by mouth Daily. OTC), Disp: , Rfl:   •  Budeson-Glycopyrrol-Formoterol (BREZTRI) 160-9-4.8 MCG/ACT aerosol inhaler, Inhale 2 puffs 2 (Two) Times a Day., Disp: , Rfl:   •  dilTIAZem CD (CARDIZEM CD) 180 MG 24 hr capsule, Take 180 mg by mouth Daily., Disp: , Rfl:   •  ferrous sulfate 325 (65 FE) MG tablet, Take 1 tablet by mouth 2 (Two) Times a Day With Meals., Disp: , Rfl:   •  levothyroxine (SYNTHROID, LEVOTHROID) 75 MCG tablet, Take 1 tablet by mouth Every Morning., Disp: 30 tablet, Rfl: 5  •  multivitamin with minerals (CENTRUM SILVER ADULT 50+ PO), Take 1 tablet by mouth Daily. OTC, Disp: , Rfl:   •  Omega-3 Fatty Acids (fish oil) 1000 MG capsule capsule, Take 1,000 mg by mouth Daily With Breakfast. OTC, Disp: , Rfl:   •  pantoprazole (PROTONIX) 40 MG EC tablet, Take 1 tablet by mouth 2 (Two) Times a Day Before Meals.,  Disp: , Rfl:   •  sertraline (ZOLOFT) 100 MG tablet, Take 150 mg by mouth Daily. 1.5 tab daily, Disp: , Rfl:   •  torsemide (DEMADEX) 20 MG tablet, Take 2 tablets by mouth Daily., Disp: 30 tablet, Rfl: 5  •  Vitamin D, Ergocalciferol, 50 MCG (2000 UT) capsule, Take 1 tablet by mouth Daily. OTC, Disp: , Rfl:   •  ALPRAZolam (XANAX) 0.5 MG tablet, Take 1 tablet by mouth At Night As Needed for Anxiety or Sleep., Disp: 3 tablet, Rfl: 0  •  apixaban (ELIQUIS) 2.5 MG tablet tablet, Take 1 tablet by mouth Every 12 (Twelve) Hours. Indications: Atrial Fibrillation, Disp: 60 tablet, Rfl:   •  bisoprolol (ZEBeta) 10 MG tablet, Take 1 tablet by mouth Daily., Disp: , Rfl:   •  HYDROcodone-acetaminophen (NORCO) 7.5-325 MG per tablet, Take 1 tablet by mouth Every 6 (Six) Hours As Needed for Moderate Pain . Patient taking: once a day prn, Disp: 12 tablet, Rfl: 0    ALLERGIES     Dicloxacillin, Amiodarone, and Propoxyphene    FAMILY HISTORY       Family History   Problem Relation Age of Onset   • Alzheimer's disease Mother    • No Known Problems Father    • No Known Problems Sister    • Hypertension Brother    • Parkinsonism Brother    • No Known Problems Maternal Grandmother    • No Known Problems Maternal Grandfather    • No Known Problems Paternal Grandmother    • No Known Problems Paternal Grandfather           SOCIAL HISTORY       Social History     Socioeconomic History   • Marital status:    Tobacco Use   • Smoking status: Former Smoker     Packs/day: 0.25     Types: Cigarettes     Quit date: 2021     Years since quittin.8   • Smokeless tobacco: Never Used   • Tobacco comment: 1-2 cigs occas   Vaping Use   • Vaping Use: Never used   Substance and Sexual Activity   • Alcohol use: Never   • Drug use: Never   • Sexual activity: Defer         PHYSICAL EXAM    (up to 7 for level 4, 8 or more for level 5)     Vitals:    22 0514 22 0732 22 0902   BP:  150/78 137/64    BP Location:   Left arm Left arm    Patient Position:  Lying Lying    Pulse: 60 59 52 63   Resp: 18 18 16 16   Temp:  97.9 °F (36.6 °C) 98.1 °F (36.7 °C)    TempSrc:  Oral Oral    SpO2: 100% 100% 100% 97%   Weight:       Height:           Physical Exam  General: Awake, alert, no acute distress.  HEENT: Pupils are equally round and reactive to light, EOMI, conjunctivae clear, sclerae white, there is no injection no icterus.  Oral mucosa is moist, no exudate. Uvula is midline. No malocclusion or tenderness over the mandible. There is no hemotympanum, ansari sign, or raccoon eyes.  Neck: Neck is supple, full range of motion, trachea midline. No midline tenderness.  Cardiac: Heart regular rate, rhythm, no murmurs, rubs, or gallops. Peripheral pulses are 2+ throughout.  Lungs: Lungs are clear to auscultation, there is no wheezing, rhonchi, or rales. There is no use of accessory muscles.  Chest wall: There is no tenderness to palpation over the chest wall or over ribs. There are no chest wall ecchymoses.  Abdomen: Abdomen is soft, nontender, nondistended. There are no firm or pulsatile masses, no rebound rigidity or guarding. No abdominal wall ecchymoses.  Musculoskeletal: There is shortening and external rotation of the left lower extremity.  DP and PT pulse 2+ with motor and sensory function intact.  No other area of focal bony tenderness.  Neuro: Motor intact, sensory intact, level of consciousness is normal.  Dermatology: Skin is warm and dry there is a small skin tear of the right forearm.  Psych: Mentation is grossly normal, cognition is grossly normal. Affect is appropriate.        DIAGNOSTIC RESULTS     EKG: All EKGs are interpreted by the Emergency Department Physician who either signs or Co-signs this chart in the absence of a cardiologist.    ECG 12 Lead   Final Result   Test Reason : tachy   Blood Pressure :   */*   mmHG   Vent. Rate : 132 BPM     Atrial Rate : 156 BPM      P-R Int :   * ms          QRS Dur :  92 ms       QT  Int : 336 ms       P-R-T Axes :   *  63 -43 degrees      QTc Int : 497 ms      Atrial fibrillation with rapid ventricular response   Cannot rule out Inferior infarct , age undetermined   Marked ST abnormality, possible lateral subendocardial injury   Abnormal ECG   When compared with ECG of 07-FEB-2022 07:27,   Significant changes have occurred   Confirmed by RD ESTEVEZ MD ([id number]) on 2/13/2022 10:38:29 AM      Referred By: PAVEL TORRES           Confirmed By: RD ESTEVEZ MD      ECG 12 Lead   Final Result   Test Reason : hip fx   Blood Pressure :   */*   mmHG   Vent. Rate :  59 BPM     Atrial Rate :  59 BPM      P-R Int : 162 ms          QRS Dur :  90 ms       QT Int : 454 ms       P-R-T Axes :  83  59  36 degrees      QTc Int : 449 ms      Sinus bradycardia   Septal infarct , age undetermined   Abnormal ECG   When compared with ECG of 04-OCT-2021 01:32,   Vent. rate has decreased BY  29 BPM   ST no longer depressed in Lateral leads   Nonspecific T wave abnormality has replaced inverted T waves in Inferior    leads   T wave inversion no longer evident in Lateral leads   Confirmed by ORIANA ACUNA ([id number]) on 2/7/2022 7:35:03 AM      Referred By: ALIE           Confirmed By: ORIANA ACUNA          RADIOLOGY:   Non-plain film images such as CT, Ultrasound and MRI are read by the radiologist. Plain radiographic images are visualized and preliminarily interpreted by the emergency physician with the below findings:      [x] Radiologist's Report Reviewed:  XR Chest 1 View   Final Result      Small right pleural effusion is unchanged.      There is some scattered patchy interstitial changes throughout the upper lungs bilaterally which appears more prominent than the previous examination and could relate to edema versus infection.      Cardiac silhouette does not appear enlarged.      Electronically signed by:  Martin De La Torre D.O.     2/10/2022 2:40 AM Mountain Time      XR Hip With or Without Pelvis 2 - 3 View Left   Final  Result   Total left hip prosthesis in anatomic alignment.            This report was finalized on 2/7/2022 7:55 PM by Dr. Augie Leiva MD.          XR Pelvis 1 or 2 View   Final Result   Fracture proximal left femur.       No definite acute fracture or dislocation noted given limitations from   severe osteopenia.       Additional imaging can be obtained as clinically indicated       This report was finalized on 2/7/2022 7:33 AM by Jorge Luis Dale.          XR Forearm 2 View Right   Final Result       1.  Small bony fragments adjacent to the triquetral bone consistent with   avulsion fracture fragments.   2.  No acute fracture or dislocation of the radius or ulna.       This report was finalized on 2/7/2022 7:32 AM by Martin Dorsey MD.          XR Femur 2 View Left   Final Result   Acute fracture femoral neck. No evidence of dislocation.       No other definite acute osseous abnormality noted given limitations from   severe osteopenia       This report was finalized on 2/7/2022 7:31 AM by Jorge Luis Dale.          XR Chest 1 View   Final Result           1.  Small right pleural effusion and right basilar airspace disease   likely due to atelectasis.           This report was finalized on 2/7/2022 7:33 AM by Martin Dorsey MD.                ED BEDSIDE ULTRASOUND:   Performed by ED Physician - none    LABS:    I have reviewed and interpreted all of the currently available lab results from this visit (if applicable):  Results for orders placed or performed during the hospital encounter of 02/07/22   COVID-19, [facility] IN-HOUSE,NASAL Swab (NO TRANSPORT MEDIA) 2 HR TAT - Swab, Nasopharynx    Specimen: Nasopharynx; Swab   Result Value Ref Range    COVID19 Presumptive Negative Presumptive Negative - Ref. Range   Blood Culture - Blood, Hand, Left    Specimen: Hand, Left; Blood   Result Value Ref Range    Blood Culture No growth at 4 days    Blood Culture - Blood, Arm, Right    Specimen: Arm, Right; Blood   Result Value Ref  Range    Blood Culture No growth at 4 days    Urine Culture - Urine, Urine, Catheter In/Out    Specimen: Urine, Catheter In/Out   Result Value Ref Range    Urine Culture No growth    COVID-19, [facility] IN-HOUSE,NASAL Swab (NO TRANSPORT MEDIA) 2 HR TAT - Swab, Nasopharynx    Specimen: Nasopharynx; Swab   Result Value Ref Range    COVID19 Presumptive Negative Presumptive Negative - Ref. Range   Comprehensive Metabolic Panel    Specimen: Blood   Result Value Ref Range    Glucose 94 65 - 99 mg/dL    BUN 12 8 - 23 mg/dL    Creatinine 0.83 0.57 - 1.00 mg/dL    Sodium 138 136 - 145 mmol/L    Potassium 4.1 3.5 - 5.2 mmol/L    Chloride 98 98 - 107 mmol/L    CO2 30.0 (H) 22.0 - 29.0 mmol/L    Calcium 9.8 8.6 - 10.5 mg/dL    Total Protein 6.9 6.0 - 8.5 g/dL    Albumin 4.00 3.50 - 5.20 g/dL    ALT (SGPT) 15 1 - 33 U/L    AST (SGOT) 29 1 - 32 U/L    Alkaline Phosphatase 130 (H) 39 - 117 U/L    Total Bilirubin 0.4 0.0 - 1.2 mg/dL    eGFR Non African Amer 66 >60 mL/min/1.73    Globulin 2.9 gm/dL    A/G Ratio 1.4 g/dL    BUN/Creatinine Ratio 14.5 7.0 - 25.0    Anion Gap 10.0 5.0 - 15.0 mmol/L   Protime-INR    Specimen: Blood   Result Value Ref Range    Protime 13.2 11.4 - 14.4 Seconds    INR 1.03 0.85 - 1.16   CBC Auto Differential    Specimen: Blood   Result Value Ref Range    WBC 6.47 3.40 - 10.80 10*3/mm3    RBC 3.71 (L) 3.77 - 5.28 10*6/mm3    Hemoglobin 11.5 (L) 12.0 - 15.9 g/dL    Hematocrit 36.3 34.0 - 46.6 %    MCV 97.8 (H) 79.0 - 97.0 fL    MCH 31.0 26.6 - 33.0 pg    MCHC 31.7 31.5 - 35.7 g/dL    RDW 15.2 12.3 - 15.4 %    RDW-SD 55.0 (H) 37.0 - 54.0 fl    MPV 10.6 6.0 - 12.0 fL    Platelets 231 140 - 450 10*3/mm3    Neutrophil % 59.5 42.7 - 76.0 %    Lymphocyte % 26.3 19.6 - 45.3 %    Monocyte % 8.5 5.0 - 12.0 %    Eosinophil % 3.7 0.3 - 6.2 %    Basophil % 1.1 0.0 - 1.5 %    Immature Grans % 0.9 (H) 0.0 - 0.5 %    Neutrophils, Absolute 3.85 1.70 - 7.00 10*3/mm3    Lymphocytes, Absolute 1.70 0.70 - 3.10 10*3/mm3     Monocytes, Absolute 0.55 0.10 - 0.90 10*3/mm3    Eosinophils, Absolute 0.24 0.00 - 0.40 10*3/mm3    Basophils, Absolute 0.07 0.00 - 0.20 10*3/mm3    Immature Grans, Absolute 0.06 (H) 0.00 - 0.05 10*3/mm3    nRBC 0.0 0.0 - 0.2 /100 WBC   TSH    Specimen: Blood   Result Value Ref Range    TSH 3.880 0.270 - 4.200 uIU/mL   CBC (No Diff)    Specimen: Blood   Result Value Ref Range    WBC 10.04 3.40 - 10.80 10*3/mm3    RBC 2.95 (L) 3.77 - 5.28 10*6/mm3    Hemoglobin 9.0 (L) 12.0 - 15.9 g/dL    Hematocrit 28.5 (L) 34.0 - 46.6 %    MCV 96.6 79.0 - 97.0 fL    MCH 30.5 26.6 - 33.0 pg    MCHC 31.6 31.5 - 35.7 g/dL    RDW 15.0 12.3 - 15.4 %    RDW-SD 53.1 37.0 - 54.0 fl    MPV 10.9 6.0 - 12.0 fL    Platelets 167 140 - 450 10*3/mm3   Basic Metabolic Panel    Specimen: Blood   Result Value Ref Range    Glucose 94 65 - 99 mg/dL    BUN 14 8 - 23 mg/dL    Creatinine 0.83 0.57 - 1.00 mg/dL    Sodium 134 (L) 136 - 145 mmol/L    Potassium 4.5 3.5 - 5.2 mmol/L    Chloride 98 98 - 107 mmol/L    CO2 26.0 22.0 - 29.0 mmol/L    Calcium 8.9 8.6 - 10.5 mg/dL    eGFR Non African Amer 66 >60 mL/min/1.73    BUN/Creatinine Ratio 16.9 7.0 - 25.0    Anion Gap 10.0 5.0 - 15.0 mmol/L   CBC (No Diff)    Specimen: Blood   Result Value Ref Range    WBC 10.78 3.40 - 10.80 10*3/mm3    RBC 2.95 (L) 3.77 - 5.28 10*6/mm3    Hemoglobin 9.0 (L) 12.0 - 15.9 g/dL    Hematocrit 28.9 (L) 34.0 - 46.6 %    MCV 98.0 (H) 79.0 - 97.0 fL    MCH 30.5 26.6 - 33.0 pg    MCHC 31.1 (L) 31.5 - 35.7 g/dL    RDW 15.4 12.3 - 15.4 %    RDW-SD 55.8 (H) 37.0 - 54.0 fl    MPV 11.3 6.0 - 12.0 fL    Platelets 151 140 - 450 10*3/mm3   Basic Metabolic Panel    Specimen: Blood   Result Value Ref Range    Glucose 132 (H) 65 - 99 mg/dL    BUN 24 (H) 8 - 23 mg/dL    Creatinine 1.14 (H) 0.57 - 1.00 mg/dL    Sodium 131 (L) 136 - 145 mmol/L    Potassium 3.7 3.5 - 5.2 mmol/L    Chloride 96 (L) 98 - 107 mmol/L    CO2 23.0 22.0 - 29.0 mmol/L    Calcium 8.8 8.6 - 10.5 mg/dL    eGFR Non African  Amer 46 (L) >60 mL/min/1.73    BUN/Creatinine Ratio 21.1 7.0 - 25.0    Anion Gap 12.0 5.0 - 15.0 mmol/L   Magnesium    Specimen: Blood   Result Value Ref Range    Magnesium 1.8 1.6 - 2.4 mg/dL   Comprehensive Metabolic Panel    Specimen: Blood   Result Value Ref Range    Glucose 121 (H) 65 - 99 mg/dL    BUN 17 8 - 23 mg/dL    Creatinine 0.80 0.57 - 1.00 mg/dL    Sodium 139 136 - 145 mmol/L    Potassium 3.9 3.5 - 5.2 mmol/L    Chloride 100 98 - 107 mmol/L    CO2 25.0 22.0 - 29.0 mmol/L    Calcium 9.0 8.6 - 10.5 mg/dL    Total Protein 6.0 6.0 - 8.5 g/dL    Albumin 3.40 (L) 3.50 - 5.20 g/dL    ALT (SGPT) <5 1 - 33 U/L    AST (SGOT) 32 1 - 32 U/L    Alkaline Phosphatase 105 39 - 117 U/L    Total Bilirubin 0.4 0.0 - 1.2 mg/dL    eGFR Non African Amer 69 >60 mL/min/1.73    Globulin 2.6 gm/dL    A/G Ratio 1.3 g/dL    BUN/Creatinine Ratio 21.3 7.0 - 25.0    Anion Gap 14.0 5.0 - 15.0 mmol/L   CBC (No Diff)    Specimen: Blood   Result Value Ref Range    WBC 8.09 3.40 - 10.80 10*3/mm3    RBC 2.89 (L) 3.77 - 5.28 10*6/mm3    Hemoglobin 8.8 (L) 12.0 - 15.9 g/dL    Hematocrit 27.2 (L) 34.0 - 46.6 %    MCV 94.1 79.0 - 97.0 fL    MCH 30.4 26.6 - 33.0 pg    MCHC 32.4 31.5 - 35.7 g/dL    RDW 15.0 12.3 - 15.4 %    RDW-SD 51.8 37.0 - 54.0 fl    MPV 11.3 6.0 - 12.0 fL    Platelets 148 140 - 450 10*3/mm3   Lactic Acid, Plasma    Specimen: Blood   Result Value Ref Range    Lactate 1.3 0.5 - 2.0 mmol/L   Urinalysis With Culture If Indicated - Urine, Catheter In/Out    Specimen: Urine, Catheter In/Out   Result Value Ref Range    Color, UA Yellow Yellow, Straw    Appearance, UA Clear Clear    pH, UA 6.0 5.0 - 8.0    Specific Gravity, UA 1.009 1.001 - 1.030    Glucose, UA Negative Negative    Ketones, UA Negative Negative    Bilirubin, UA Negative Negative    Blood, UA Negative Negative    Protein, UA Negative Negative    Leuk Esterase, UA Trace (A) Negative    Nitrite, UA Negative Negative    Urobilinogen, UA 0.2 E.U./dL 0.2 - 1.0 E.U./dL    Procalcitonin    Specimen: Blood   Result Value Ref Range    Procalcitonin 0.14 0.00 - 0.25 ng/mL   CBC Auto Differential    Specimen: Blood   Result Value Ref Range    WBC 8.31 3.40 - 10.80 10*3/mm3    RBC 2.88 (L) 3.77 - 5.28 10*6/mm3    Hemoglobin 8.8 (L) 12.0 - 15.9 g/dL    Hematocrit 27.8 (L) 34.0 - 46.6 %    MCV 96.5 79.0 - 97.0 fL    MCH 30.6 26.6 - 33.0 pg    MCHC 31.7 31.5 - 35.7 g/dL    RDW 14.8 12.3 - 15.4 %    RDW-SD 52.2 37.0 - 54.0 fl    MPV 11.4 6.0 - 12.0 fL    Platelets 140 140 - 450 10*3/mm3    Neutrophil % 79.0 (H) 42.7 - 76.0 %    Lymphocyte % 10.8 (L) 19.6 - 45.3 %    Monocyte % 9.1 5.0 - 12.0 %    Eosinophil % 0.2 (L) 0.3 - 6.2 %    Basophil % 0.4 0.0 - 1.5 %    Immature Grans % 0.5 0.0 - 0.5 %    Neutrophils, Absolute 6.56 1.70 - 7.00 10*3/mm3    Lymphocytes, Absolute 0.90 0.70 - 3.10 10*3/mm3    Monocytes, Absolute 0.76 0.10 - 0.90 10*3/mm3    Eosinophils, Absolute 0.02 0.00 - 0.40 10*3/mm3    Basophils, Absolute 0.03 0.00 - 0.20 10*3/mm3    Immature Grans, Absolute 0.04 0.00 - 0.05 10*3/mm3    nRBC 0.0 0.0 - 0.2 /100 WBC   Urinalysis, Microscopic Only - Urine, Catheter In/Out    Specimen: Urine, Catheter In/Out   Result Value Ref Range    RBC, UA 0-2 None Seen, 0-2 /HPF    WBC, UA Too Numerous to Count (A) None Seen, 0-2 /HPF    Bacteria, UA None Seen None Seen, Trace /HPF    Squamous Epithelial Cells, UA None Seen None Seen, 0-2 /HPF    Hyaline Casts, UA 0-6 0 - 6 /LPF    Methodology Automated Microscopy    Basic Metabolic Panel    Specimen: Blood   Result Value Ref Range    Glucose 150 (H) 65 - 99 mg/dL    BUN 17 8 - 23 mg/dL    Creatinine 0.71 0.57 - 1.00 mg/dL    Sodium 137 136 - 145 mmol/L    Potassium 3.0 (L) 3.5 - 5.2 mmol/L    Chloride 99 98 - 107 mmol/L    CO2 28.0 22.0 - 29.0 mmol/L    Calcium 9.2 8.6 - 10.5 mg/dL    eGFR Non African Amer 79 >60 mL/min/1.73    BUN/Creatinine Ratio 23.9 7.0 - 25.0    Anion Gap 10.0 5.0 - 15.0 mmol/L   CBC Auto Differential    Specimen: Blood    Result Value Ref Range    WBC 7.00 3.40 - 10.80 10*3/mm3    RBC 3.02 (L) 3.77 - 5.28 10*6/mm3    Hemoglobin 9.2 (L) 12.0 - 15.9 g/dL    Hematocrit 29.9 (L) 34.0 - 46.6 %    MCV 99.0 (H) 79.0 - 97.0 fL    MCH 30.5 26.6 - 33.0 pg    MCHC 30.8 (L) 31.5 - 35.7 g/dL    RDW 14.6 12.3 - 15.4 %    RDW-SD 53.0 37.0 - 54.0 fl    MPV 11.4 6.0 - 12.0 fL    Platelets 167 140 - 450 10*3/mm3    Neutrophil % 81.7 (H) 42.7 - 76.0 %    Lymphocyte % 10.1 (L) 19.6 - 45.3 %    Monocyte % 6.4 5.0 - 12.0 %    Eosinophil % 1.1 0.3 - 6.2 %    Basophil % 0.3 0.0 - 1.5 %    Immature Grans % 0.4 0.0 - 0.5 %    Neutrophils, Absolute 5.71 1.70 - 7.00 10*3/mm3    Lymphocytes, Absolute 0.71 0.70 - 3.10 10*3/mm3    Monocytes, Absolute 0.45 0.10 - 0.90 10*3/mm3    Eosinophils, Absolute 0.08 0.00 - 0.40 10*3/mm3    Basophils, Absolute 0.02 0.00 - 0.20 10*3/mm3    Immature Grans, Absolute 0.03 0.00 - 0.05 10*3/mm3    nRBC 0.0 0.0 - 0.2 /100 WBC   ECG 12 Lead   Result Value Ref Range    QT Interval 454 ms    QTC Interval 449 ms   ECG 12 Lead   Result Value Ref Range    QT Interval 336 ms    QTC Interval 497 ms   Adult Transthoracic Echo Complete W/ Cont if Necessary Per Protocol   Result Value Ref Range    BSA 1.6 m^2     CV ECHO NEO - BZI_BMI 20.5 kilograms/m^2     CV ECHO NEO - BSA([place]) 1.6 m^2     CV ECHO NEO - BZI_METRIC_WEIGHT 55.8 kg     CV ECHO NEO - BZI_METRIC_HEIGHT 165.1 cm    IVSd 1.2 cm    LVIDd 3.9 cm    LVIDs 2.6 cm    LVPWd 1.3 cm    IVS/LVPW 0.93     FS 32.4 %    EDV(Teich) 65.2 ml    ESV(Teich) 25.2 ml    EF(Teich) 61.3 %    EDV(cubed) 58.5 ml    ESV(cubed) 18.1 ml    EF(cubed) 69.1 %    LV mass(C)d 174.8 grams    LV mass(C)dI 108.7 grams/m^2    SV(Teich) 40.0 ml    SI(Teich) 24.8 ml/m^2    SV(cubed) 40.4 ml    SI(cubed) 25.1 ml/m^2    Ao root diam 2.5 cm    Ao root area 5.1 cm^2    LA dimension 5.0 cm    asc Aorta Diam 3.2 cm    LA/Ao 2.0     LVOT diam 2.0 cm    LVOT area 3.2 cm^2    LVOT area(traced) 3.1 cm^2    LAd  major 6.3 cm    LVLd ap4 7.3 cm    EDV(MOD-sp4) 96.0 ml    LVLs ap4 6.0 cm    ESV(MOD-sp4) 53.0 ml    EF(MOD-sp4) 44.8 %    LVLd ap2 7.0 cm    EDV(MOD-sp2) 100.0 ml    LVLs ap2 5.8 cm    ESV(MOD-sp2) 45.0 ml    EF(MOD-sp2) 55.0 %    LA volume 86.0 ml    EF(MOD-bp) 51.0 %    SV(MOD-sp4) 43.0 ml    SI(MOD-sp4) 26.7 ml/m^2    SV(MOD-sp2) 55.0 ml    SI(MOD-sp2) 34.2 ml/m^2    Ao root area (BSA corrected) 1.6     LV Chávez Vol (BSA corrected) 59.7 ml/m^2    LV Sys Vol (BSA corrected) 32.9 ml/m^2    LA Volume Index 53.5 ml/m^2    MV E max emmett 177.4 cm/sec    MV A max emmett 109.5 cm/sec    MV E/A 1.6     LV IVRT 0.05 sec    MV V2 max 214.8 cm/sec    MV max PG 18.4 mmHg    MV V2 mean 113.1 cm/sec    MV mean PG 6.1 mmHg    MV V2 VTI 58.9 cm    MVA(VTI) 1.1 cm^2    MV P1/2t max emmett 201.5 cm/sec    MV P1/2t 73.7 msec    MVA(P1/2t) 3.0 cm^2    MV dec slope 801.2 cm/sec^2    MV dec time 0.34 sec    Ao pk emmett 204.6 cm/sec    Ao max PG 16.7 mmHg    Ao max PG (full) 13.1 mmHg    Ao V2 mean 141.8 cm/sec    Ao mean PG 9.6 mmHg    Ao mean PG (full) 7.8 mmHg    Ao V2 VTI 44.9 cm    SOHEILA(I,A) 1.4 cm^2    SOHEILA(I,D) 1.4 cm^2    SOHEILA(V,A) 1.5 cm^2    SOHEILA(V,D) 1.5 cm^2    LV V1 max PG 3.7 mmHg    LV V1 mean PG 1.9 mmHg    LV V1 max 96.0 cm/sec    LV V1 mean 61.5 cm/sec    LV V1 VTI 20.1 cm    MR max emmett 567.4 cm/sec    MR max .0 mmHg    MR mean emmett 454.4 cm/sec    MR mean PG 91.7 mmHg    MR .8 cm    SV(Ao) 228.7 ml    SI(Ao) 142.1 ml/m^2    SV(LVOT) 63.7 ml    SI(LVOT) 39.6 ml/m^2    PA V2 max 107.1 cm/sec    PA max PG 4.6 mmHg    PA acc slope 335.1 cm/sec^2    PA acc time 0.14 sec    TR max emmett 387 cm/sec    TR max PG 60 mmHg    PA pr(Accel) 14.0 mmHg    MVA P1/2T LCG 1.1 cm^2    Lat E/e'  17.9     Med E/e' 33.7     Lat Peak E' Emmett 9.8 cm/sec    Med Peak E' Emmett 5.2 cm/sec    Avg E/e' ratio 23.65     Target HR (85%) 117 bpm    Max. Pred. HR (100%) 138 bpm    BH CV VAS BP LEFT /62 mmHg    RV S' 11.50 cm/sec    RV Base 3.20 cm     RV Length 4.70 cm    RV Mid 2.30 cm    RAP systole 8 mmHg    RVSP(TR) 68 mmHg    TAPSE (>1.6) 1.80 cm    Echo EF Estimated 60 %   Type & Screen    Specimen: Blood   Result Value Ref Range    ABO Type O     RH type Positive     Antibody Screen Negative     T&S Expiration Date 2/10/2022 11:59:59 PM    Green Top (Gel)   Result Value Ref Range    Extra Tube Hold for add-ons.    Lavender Top   Result Value Ref Range    Extra Tube hold for add-on    Gold Top - SST   Result Value Ref Range    Extra Tube Hold for add-ons.    Gray Top   Result Value Ref Range    Extra Tube Hold for add-ons.    Light Blue Top   Result Value Ref Range    Extra Tube hold for add-on         All other labs were within normal range or not returned as of this dictation.      EMERGENCY DEPARTMENT COURSE and DIFFERENTIAL DIAGNOSIS/MDM:   Vitals:    Vitals:    02/11/22 2058 02/12/22 0514 02/12/22 0732 02/12/22 0902   BP:  150/78 137/64    BP Location:  Left arm Left arm    Patient Position:  Lying Lying    Pulse: 60 59 52 63   Resp: 18 18 16 16   Temp:  97.9 °F (36.6 °C) 98.1 °F (36.7 °C)    TempSrc:  Oral Oral    SpO2: 100% 100% 100% 97%   Weight:       Height:           ED Course as of 02/14/22 0426   Mon Feb 07, 2022   0733 Spoke w/ Dr. Powell who will consult on the patient. Plan for OR this afternoon. [NS]   0747 Dr. Rodriguez will accept the pt for admission. [NS]      ED Course User Index  [NS] Darshan Gauthier MD       Presentation and work-up are consistent with acute fracture of the left hip.  Orthopedic surgery was consulted and will take the patient to the OR.  She has been admitted to the hospital service.  She also sustained a small skin tear over the right forearm, however no evidence of fracture to this area.  Based on complete history and physical examination, there is no evidence of additional traumatic injury.        MEDICATIONS ADMINISTERED IN ED:  Medications   acetaminophen (TYLENOL) tablet 650 mg (650 mg Oral Given 2/9/22 0615)    morphine injection 2 mg (2 mg Intravenous Given 2/7/22 0657)   ondansetron (ZOFRAN) injection 4 mg (4 mg Intravenous Given 2/7/22 0657)   morphine injection 2 mg (2 mg Intravenous Given 2/7/22 0826)   ceFAZolin in dextrose (ANCEF) IVPB solution 2 g (2 g Intravenous Given 2/7/22 1714)   tranexamic acid 1000 mg in 100 mL 0.7% NaCl infusion (premix) (1,000 mg Intravenous Given 2/7/22 1804)   ceFAZolin in dextrose (ANCEF) IVPB solution 2 g (2 g Intravenous New Bag 2/8/22 0850)   metoprolol tartrate (LOPRESSOR) injection 2.5 mg (2.5 mg Intravenous Given 2/10/22 0112)   sodium chloride 0.9 % bolus 500 mL (500 mL Intravenous New Bag 2/10/22 0405)   digoxin (LANOXIN) injection 250 mcg (250 mcg Intravenous Given 2/10/22 0337)   vancomycin 1250 mg/250 mL 0.9% NS IVPB ([facility]) (1,250 mg Intravenous New Bag 2/10/22 0536)   meropenem (MERREM) 1 g/100 mL 0.9% NS (mbp) (1 g Intravenous New Bag 2/10/22 0435)   sulfur hexafluoride microsph (LUMASON) 60.7-25 MG IV reconstituted suspension reconstituted suspension 2 mL (2 mL Intravenous Given 2/10/22 1556)           FINAL IMPRESSION      1. Closed nondisplaced intertrochanteric fracture of left femur, initial encounter ([facility])    2. Contusion of right forearm, initial encounter    3. Skin abrasion    4. Atrial fibrillation, persistent ([facility])    5. A/C hypoxemic respiratory failure (CMS/HCC)    6. Anxiety and depression          DISPOSITION/PLAN     ED Disposition     ED Disposition Condition Comment    Decision to Admit  Level of Care: Telemetry [5]   Diagnosis: Hip fracture ([facility]) [[id number]]   Admitting Physician: MAYTE MULTANI [[id number]]   Certification: I Certify That Inpatient Hospital Services Are Medically Necessary For Greater Than 2 Midnights              Darshan Gauthier MD  Attending Emergency Physician               Darshan Gauthier MD  02/14/22 7415

## 2022-02-08 LAB
ANION GAP SERPL CALCULATED.3IONS-SCNC: 10 MMOL/L (ref 5–15)
BUN SERPL-MCNC: 14 MG/DL (ref 8–23)
BUN/CREAT SERPL: 16.9 (ref 7–25)
CALCIUM SPEC-SCNC: 8.9 MG/DL (ref 8.6–10.5)
CHLORIDE SERPL-SCNC: 98 MMOL/L (ref 98–107)
CO2 SERPL-SCNC: 26 MMOL/L (ref 22–29)
CREAT SERPL-MCNC: 0.83 MG/DL (ref 0.57–1)
DEPRECATED RDW RBC AUTO: 53.1 FL (ref 37–54)
ERYTHROCYTE [DISTWIDTH] IN BLOOD BY AUTOMATED COUNT: 15 % (ref 12.3–15.4)
GFR SERPL CREATININE-BSD FRML MDRD: 66 ML/MIN/1.73
GLUCOSE SERPL-MCNC: 94 MG/DL (ref 65–99)
HCT VFR BLD AUTO: 28.5 % (ref 34–46.6)
HGB BLD-MCNC: 9 G/DL (ref 12–15.9)
MCH RBC QN AUTO: 30.5 PG (ref 26.6–33)
MCHC RBC AUTO-ENTMCNC: 31.6 G/DL (ref 31.5–35.7)
MCV RBC AUTO: 96.6 FL (ref 79–97)
PLATELET # BLD AUTO: 167 10*3/MM3 (ref 140–450)
PMV BLD AUTO: 10.9 FL (ref 6–12)
POTASSIUM SERPL-SCNC: 4.5 MMOL/L (ref 3.5–5.2)
RBC # BLD AUTO: 2.95 10*6/MM3 (ref 3.77–5.28)
SODIUM SERPL-SCNC: 134 MMOL/L (ref 136–145)
TSH SERPL DL<=0.05 MIU/L-ACNC: 3.88 UIU/ML (ref 0.27–4.2)
WBC NRBC COR # BLD: 10.04 10*3/MM3 (ref 3.4–10.8)

## 2022-02-08 PROCEDURE — 94640 AIRWAY INHALATION TREATMENT: CPT

## 2022-02-08 PROCEDURE — 85027 COMPLETE CBC AUTOMATED: CPT | Performed by: ORTHOPAEDIC SURGERY

## 2022-02-08 PROCEDURE — 94799 UNLISTED PULMONARY SVC/PX: CPT

## 2022-02-08 PROCEDURE — 97530 THERAPEUTIC ACTIVITIES: CPT

## 2022-02-08 PROCEDURE — 25010000002 ENOXAPARIN PER 10 MG: Performed by: ORTHOPAEDIC SURGERY

## 2022-02-08 PROCEDURE — 97110 THERAPEUTIC EXERCISES: CPT

## 2022-02-08 PROCEDURE — 84443 ASSAY THYROID STIM HORMONE: CPT | Performed by: NURSE PRACTITIONER

## 2022-02-08 PROCEDURE — 25010000002 ROPIVACAINE PER 1 MG: Performed by: ORTHOPAEDIC SURGERY

## 2022-02-08 PROCEDURE — 99232 SBSQ HOSP IP/OBS MODERATE 35: CPT | Performed by: INTERNAL MEDICINE

## 2022-02-08 PROCEDURE — 97165 OT EVAL LOW COMPLEX 30 MIN: CPT

## 2022-02-08 PROCEDURE — 94761 N-INVAS EAR/PLS OXIMETRY MLT: CPT

## 2022-02-08 PROCEDURE — 97535 SELF CARE MNGMENT TRAINING: CPT

## 2022-02-08 PROCEDURE — 0 CEFAZOLIN IN DEXTROSE 2-4 GM/100ML-% SOLUTION: Performed by: ORTHOPAEDIC SURGERY

## 2022-02-08 PROCEDURE — 97161 PT EVAL LOW COMPLEX 20 MIN: CPT

## 2022-02-08 PROCEDURE — P9612 CATHETERIZE FOR URINE SPEC: HCPCS

## 2022-02-08 PROCEDURE — 80048 BASIC METABOLIC PNL TOTAL CA: CPT | Performed by: ORTHOPAEDIC SURGERY

## 2022-02-08 RX ADMIN — ACETAMINOPHEN 650 MG: 325 TABLET, FILM COATED ORAL at 20:43

## 2022-02-08 RX ADMIN — FERROUS SULFATE TAB 325 MG (65 MG ELEMENTAL FE) 325 MG: 325 (65 FE) TAB at 18:10

## 2022-02-08 RX ADMIN — LEVOTHYROXINE SODIUM 75 MCG: 0.07 TABLET ORAL at 05:28

## 2022-02-08 RX ADMIN — BISOPROLOL FUMARATE 5 MG: 5 TABLET, FILM COATED ORAL at 08:45

## 2022-02-08 RX ADMIN — CEFAZOLIN SODIUM 2 G: 2 INJECTION, SOLUTION INTRAVENOUS at 08:50

## 2022-02-08 RX ADMIN — ROPIVACAINE HYDROCHLORIDE 8 ML/HR: 2 INJECTION, SOLUTION EPIDURAL; INFILTRATION at 08:50

## 2022-02-08 RX ADMIN — ACETAMINOPHEN 650 MG: 325 TABLET, FILM COATED ORAL at 05:28

## 2022-02-08 RX ADMIN — BUDESONIDE AND FORMOTEROL FUMARATE DIHYDRATE 2 PUFF: 160; 4.5 AEROSOL RESPIRATORY (INHALATION) at 07:59

## 2022-02-08 RX ADMIN — HYDROCODONE BITARTRATE AND ACETAMINOPHEN 1 TABLET: 7.5; 325 TABLET ORAL at 15:48

## 2022-02-08 RX ADMIN — TORSEMIDE 40 MG: 20 TABLET ORAL at 08:43

## 2022-02-08 RX ADMIN — FERROUS SULFATE TAB 325 MG (65 MG ELEMENTAL FE) 325 MG: 325 (65 FE) TAB at 08:45

## 2022-02-08 RX ADMIN — SERTRALINE HYDROCHLORIDE 150 MG: 50 TABLET ORAL at 08:50

## 2022-02-08 RX ADMIN — ROPIVACAINE HYDROCHLORIDE 8 ML/HR: 2 INJECTION, SOLUTION EPIDURAL; INFILTRATION at 20:43

## 2022-02-08 RX ADMIN — HYDROCODONE BITARTRATE AND ACETAMINOPHEN 1 TABLET: 7.5; 325 TABLET ORAL at 08:51

## 2022-02-08 RX ADMIN — PANTOPRAZOLE SODIUM 40 MG: 40 TABLET, DELAYED RELEASE ORAL at 18:10

## 2022-02-08 RX ADMIN — BUDESONIDE AND FORMOTEROL FUMARATE DIHYDRATE 2 PUFF: 160; 4.5 AEROSOL RESPIRATORY (INHALATION) at 21:03

## 2022-02-08 RX ADMIN — ENOXAPARIN SODIUM 40 MG: 40 INJECTION SUBCUTANEOUS at 08:50

## 2022-02-08 NOTE — PROGRESS NOTES
Orthopedic Daily Progress Note      CC: MELISSA overnight, states she got up with PT today and ambulated with a walker    Pain well controlled  General: no fevers, chills  Abdomen: no nausea, vomiting, or diarrhea    No other complaints    Physical Exam:  I have reviewed the vital signs.  Temp:  [98.3 °F (36.8 °C)-99.8 °F (37.7 °C)] 98.6 °F (37 °C)  Heart Rate:  [58-97] 80  Resp:  [15-18] 17  BP: ()/(47-96) 94/53    Objective  General Appearance:    Alert, cooperative, no distress  Extremities: No clubbing, cyanosis, or edema to lower extremities  Pulses:  2+ in distal surgical extremity  Skin: Incision Clean/dry/intact      Results Review:    I have reviewed the labs, radiology results and diagnostic studies: Hgb 9.0    Results from last 7 days   Lab Units 02/08/22  0525   WBC 10*3/mm3 10.04   HEMOGLOBIN g/dL 9.0*   PLATELETS 10*3/mm3 167     Results from last 7 days   Lab Units 02/08/22  0525   SODIUM mmol/L 134*   POTASSIUM mmol/L 4.5   CO2 mmol/L 26.0   CREATININE mg/dL 0.83   GLUCOSE mg/dL 94       I have reviewed the medications.    Assessment/Problem List  POD# 1 Day Post-Op   S/p L hip hemiarthroplasty    Plan  WBAT LLE  Posterior hip precautions x 6 weeks  PT/OT  Lovenox x 2 weeks for DVT prophylaxis       Discharge Planning: I expect patient to be discharged to TBD in TBD days.    Zhane Kasper PA-C  02/08/22  18:39 EST

## 2022-02-08 NOTE — PLAN OF CARE
Goal Outcome Evaluation:  Plan of Care Reviewed With: patient        Progress: no change (OT evaluation)  Outcome Summary: OT evaluation completed. Pt educated on L hip precautions and KI when OOB due to fascia iliaca nerve cath. Pt performed STS with Mod A x2 using RW, stand pivot back and forth from EOB to BSC with Mod HHA x 2 for toileting, and Mod A x 2 for bed mobility. Max A for toileting. Pt would benefit from IP OT in order to address current deficits. OT recs SNF at [place].

## 2022-02-08 NOTE — THERAPY EVALUATION
Patient Name: Yin Law  : 1940    MRN: [id number]                              Today's Date: 2022       Admit Date: 2022    Visit Dx:     ICD-10-CM ICD-9-CM   1. Closed nondisplaced intertrochanteric fracture of left femur, initial encounter ([facility])  S72.145A 820.21   2. Contusion of right forearm, initial encounter  S50.11XA 923.10   3. Skin abrasion  T14.8XXA 919.0   4. Atrial fibrillation, persistent (formerly [facility])  I48.19 427.31     Patient Active Problem List   Diagnosis   • Multifocal pneumonia   • Recurrent atrial fibrillation with RVR (CMS/[facility])   • COPD on home oxygen (CMS/[facility])   • Hypertension and diastolic dysfunction   • Rheumatoid arthritis (formerly [facility])   • Anxiety and depression   • Wound of right leg   • Anemia   • Elevated troponin   • Sepsis ([facility])   • Pleural effusion, bilateral   • Acute diastolic CHF (CMS/[facility])   • Accelerated hypertension   • Pneumonia due to infectious organism   • Severe malnutrition (CMS/[facility])   • Hip fracture ([facility])     Past Medical History:   Diagnosis Date   • Anxiety and depression    • Arrhythmia     A-FIB   • Asthma    • Chicken pox    • COPD (chronic obstructive pulmonary disease) (formerly [facility])    • Hyperlipidemia    • Hypertension    • Measles    • Menopause    • Osteoporosis    • Rheumatoid arthritis (formerly [facility])    • Rheumatoid arthritis (formerly [facility])    • Tobacco abuse      Past Surgical History:   Procedure Laterality Date   • APPENDECTOMY     • CARPAL TUNNEL RELEASE Left    • CATARACT EXTRACTION, BILATERAL     • COLON SURGERY     • COLONOSCOPY     • GALLBLADDER SURGERY     • HIP HEMIARTHROPLASTY Left 2022    Procedure: HIP HEMIARTHROPLASTY LEFT;  Surgeon: Napoleon Powell Jr., MD;  Location: [facility];  Service: Orthopedics;  Laterality: Left;   • HYSTERECTOMY     • TONSILLECTOMY        General Information     Row Name 22 1315          Physical Therapy Time and Intention    Document Type evaluation  -ANA LILIA     Mode of Treatment individual therapy; physical therapy  -ANA LILIA      Row Name 02/08/22 1315          General Information    Patient Profile Reviewed yes  -ANA LILIA     Prior Level of Function independent:; all household mobility; transfer; bed mobility; min assist:; ADL's  -ANA LILIA     Existing Precautions/Restrictions fall; left; hip, posterior; brace worn when out of bed; other (see comments)  L fascia, KI donned when OOB, posterior hip precautions  -ANA LILIA     Barriers to Rehab previous functional deficit; medically complex  -ANA LILIA     Row Name 02/08/22 1315          Living Environment    Lives With alone; other (see comments)  niece able to assist  -ANA LILIA     Row Name 02/08/22 1315          Home Main Entrance    Number of Stairs, Main Entrance two  -ANA LILIA     Stair Railings, Main Entrance railings on both sides of stairs  -ANA LILIA     Row Name 02/08/22 1315          Stairs Within Home, Primary    Stairs, Within Home, Primary 0  -ANA LILIA     Number of Stairs, Within Home, Primary none  -ANA LILIA     Row Name 02/08/22 1315          Cognition    Orientation Status (Cognition) oriented x 4  -ANA LILIA     Row Name 02/08/22 1315          Safety Issues, Functional Mobility    Safety Issues Affecting Function (Mobility) safety precaution awareness; safety precautions follow-through/compliance; awareness of need for assistance; sequencing abilities; insight into deficits/self-awareness  -ANA LILIA     Impairments Affecting Function (Mobility) balance; endurance/activity tolerance; strength; range of motion (ROM); pain  -ANA LILIA           User Key  (r) = Recorded By, (t) = Taken By, (c) = Cosigned By    Initials Name Provider Type    Elgin Peratla PT Physical Therapist               Mobility     Row Name 02/08/22 1315          Bed Mobility    Bed Mobility scooting/bridging; supine-sit  -ANA LILIA     Scooting/Bridging [place] (Bed Mobility) verbal cues; moderate assist (50% patient effort); 2 person assist  -ANA LILIA     Supine-Sit [place] (Bed Mobility) verbal cues; moderate assist (50% patient effort); 2 person assist  -ANA LILIA     Assistive Device  (Bed Mobility) head of bed elevated; draw sheet; bed rails  -     Comment (Bed Mobility) Mod Ax2 for LE management off of EOB and trunk control into sitting; KI donned in supine  -     Row Name 02/08/22 1315          Transfers    Comment (Transfers) Verbal cues for safe hand placement during standing/sitting and moving L LE out for comfort prior to sitting  -     Row Name 02/08/22 1315          Sit-Stand Transfer    Sit-Stand [place] (Transfers) verbal cues; moderate assist (50% patient effort); 2 person assist  -     Assistive Device (Sit-Stand Transfers) walker, front-wheeled  -     Row Name 02/08/22 1315          Gait/Stairs (Locomotion)    [place] Level (Gait) verbal cues; moderate assist (50% patient effort); 2 person assist  -     Assistive Device (Gait) walker, front-wheeled  -     Distance in Feet (Gait) 5  -ANA LILIA     Deviations/Abnormal Patterns (Gait) bilateral deviations; antalgic; cindi decreased; gait speed decreased; stride length decreased  -     Bilateral Gait Deviations forward flexed posture  -     Left Sided Gait Deviations heel strike decreased; weight shift ability decreased  -     [place] Level (Stairs) not tested  -     Comment (Gait/Stairs) Pt ambulated with step to pattern and decreased speed. Verbal cues for maintaining upright posture, body within walker, increase step length, and WB through LEs. Gait limited by pt experieincing dizziness/LH, BP measured at 94/53. RN  notified. Symptoms subsiding with rest and when pt brought back to chair.  -     Row Name 02/08/22 1315          Mobility    Extremity Weight-bearing Status left lower extremity  -     Left Lower Extremity (Weight-bearing Status) weight-bearing as tolerated (WBAT)  -           User Key  (r) = Recorded By, (t) = Taken By, (c) = Cosigned By    Initials Name Provider Type    Elgin Peralta, PT Physical Therapist               Obj/Interventions     Row Name 02/08/22 1315          Range of  Motion Comprehensive    General Range of Motion lower extremity range of motion deficits identified  -     Comment, General Range of Motion R LE AROM WFL; L LE AROM impaired 25%: able to actively DF/PF  -[facility] Name 02/08/22 1315          Strength Comprehensive (MMT)    General Manual Muscle Testing (MMT) Assessment lower extremity strength deficits identified  -     Comment, General Manual Muscle Testing (MMT) Assessment R LE functionally 4-/5; L LE functionally 3+/5;  -     Row Name 02/08/22 1315          Motor Skills    Therapeutic Exercise hip; knee; ankle  -[facility] Name 02/08/22 1315          Hip (Therapeutic Exercise)    Hip (Therapeutic Exercise) isometric exercises; strengthening exercise  -     Hip Isometrics (Therapeutic Exercise) gluteal sets; 10 repetitions  -     Hip Strengthening (Therapeutic Exercise) aBduction; aDduction; 10 repetitions; left  -[facility] Name 02/08/22 1315          Knee (Therapeutic Exercise)    Knee (Therapeutic Exercise) isometric exercises; strengthening exercise  -     Knee Isometrics (Therapeutic Exercise) quad sets; 10 repetitions  -     Knee Strengthening (Therapeutic Exercise) left; heel slides; SLR (straight leg raise); LAQ (long arc quad); 10 repetitions  -[facility] Name 02/08/22 1315          Ankle (Therapeutic Exercise)    Ankle (Therapeutic Exercise) AROM (active range of motion)  -     Ankle AROM (Therapeutic Exercise) bilateral; dorsiflexion; plantarflexion; 10 repetitions  -[facility] Name 02/08/22 1315          Balance    Balance Assessment sitting static balance; sitting dynamic balance; standing static balance; standing dynamic balance  -     Static Sitting Balance mild impairment; supported; sitting, edge of bed  -     Dynamic Sitting Balance mild impairment; supported; sitting, edge of bed  -     Static Standing Balance moderate impairment; supported; standing  -     Dynamic Standing Balance moderate impairment; supported;  standing  -ANA LILIA     Row Name 02/08/22 1315          Sensory Assessment (Somatosensory)    Sensory Assessment (Somatosensory) LE sensation intact  -ANA LILIA           User Key  (r) = Recorded By, (t) = Taken By, (c) = Cosigned By    Initials Name Provider Type    Elgin Peralta, PT Physical Therapist               Goals/Plan     Row Name 02/08/22 1315          Bed Mobility Goal 1 (PT)    Activity/Assistive Device (Bed Mobility Goal 1, PT) sit to supine/supine to sit  -ANA LILIA     [place] Level/Cues Needed (Bed Mobility Goal 1, PT) minimum assist (75% or more patient effort)  -ANA LILIA     Time Frame (Bed Mobility Goal 1, PT) long term goal (LTG); 10 days  -ANA LILIA     [facility] Name 02/08/22 1315          Transfer Goal 1 (PT)    Activity/Assistive Device (Transfer Goal 1, PT) sit-to-stand/stand-to-sit; walker, rolling  -ANA LILIA     [place] Level/Cues Needed (Transfer Goal 1, PT) minimum assist (75% or more patient effort)  -ANA LILIA     Time Frame (Transfer Goal 1, PT) long term goal (LTG); 10 days  -ANA LILIA     Row Name 02/08/22 1315          Gait Training Goal 1 (PT)    Activity/Assistive Device (Gait Training Goal 1, PT) gait (walking locomotion); walker, rolling  -ANA LILIA     [place] Level (Gait Training Goal 1, PT) minimum assist (75% or more patient effort)  -ANA LILIA     Distance (Gait Training Goal 1, PT) 50 feet  -ANA LILIA     Time Frame (Gait Training Goal 1, PT) long term goal (LTG); 10 days  -ANA LILIA           User Key  (r) = Recorded By, (t) = Taken By, (c) = Cosigned By    Initials Name Provider Type    Elgin Peralta, PT Physical Therapist               Clinical Impression     Row Name 02/08/22 1315          Pain    Additional Documentation Pain Scale: Numbers Pre/Post-Treatment (Group)  -ANA LILIA     Row Name 02/08/22 1315          Pain Scale: Numbers Pre/Post-Treatment    Pretreatment Pain Rating 4/10  -ANA LILIA     Posttreatment Pain Rating 8/10  -ANA LILIA     Pain Location - Side Left  -ANA LILIA     Pain Location - Orientation generalized  -ANA LILIA     Pain Location hip  -ANA LILIA      Pain Intervention(s) Ambulation/increased activity; Repositioned  -     Row Name 02/08/22 1315          Therapy Assessment/Plan (PT)    Patient/Family Therapy Goals Statement (PT) Go to rehab  -ANA LILIA     Rehab Potential (PT) good, to achieve stated therapy goals  -     Criteria for Skilled Interventions Met (PT) yes; meets criteria; skilled treatment is necessary  -     Row Name 02/08/22 1315          Vital Signs    Pre Systolic BP Rehab 123  -ANA LILIA     Pre Treatment Diastolic BP 48  -ANA LILIA     Intra Systolic BP Rehab 94  -ANA LILIA     Intra Treatment Diastolic BP 53  -ANA LILIA     Row Name 02/08/22 1315          Positioning and Restraints    Pre-Treatment Position in bed  -ANA LILIA     Post Treatment Position chair  -ANA LILIA     In Chair notified nsg; reclined; encouraged to call for assist; call light within reach; exit alarm on; legs elevated; waffle cushion; on mechanical lift sling  -           User Key  (r) = Recorded By, (t) = Taken By, (c) = Cosigned By    Initials Name Provider Type    Elgin Peralta PT Physical Therapist               Outcome Measures     Row Name 02/08/22 1315          How much help from another person do you currently need...    Turning from your back to your side while in flat bed without using bedrails? 2  -ANA LILIA     Moving from lying on back to sitting on the side of a flat bed without bedrails? 2  -ANA LILIA     Moving to and from a bed to a chair (including a wheelchair)? 2  -ANA LILIA     Standing up from a chair using your arms (e.g., wheelchair, bedside chair)? 2  -ANA LILIA     Climbing 3-5 steps with a railing? 1  -ANA LILIA     To walk in hospital room? 1  -ANA LILIA     AM-PAC 6 Clicks Score (PT) 10  -ANA LILIA     Row Name 02/08/22 1315          Functional Assessment    Outcome Measure Options AM-PAC 6 Clicks Basic Mobility (PT)  -           User Key  (r) = Recorded By, (t) = Taken By, (c) = Cosigned By    Initials Name Provider Type    Elgin Peralta PT Physical Therapist                             Physical Therapy Education                  Title: PT OT SLP Therapies (In Progress)     Topic: Physical Therapy (Done)     Point: Mobility training (Done)     Learning Progress Summary           Patient Acceptance, E,D, VU by ANA LILIA at 2/8/2022 1315    Comment: Educated on safe sequencing with bed mobility, ambulatory transfers, and gait. Reviewed HEP and posterior hip precautions.                   Point: Home exercise program (Done)     Learning Progress Summary           Patient Acceptance, E,D, VU by ANA LILIA at 2/8/2022 1315    Comment: Educated on safe sequencing with bed mobility, ambulatory transfers, and gait. Reviewed HEP and posterior hip precautions.                   Point: Body mechanics (Done)     Learning Progress Summary           Patient Acceptance, E,D, VU by ANA LILIA at 2/8/2022 1315    Comment: Educated on safe sequencing with bed mobility, ambulatory transfers, and gait. Reviewed HEP and posterior hip precautions.                   Point: Precautions (Done)     Learning Progress Summary           Patient Acceptance, E,D, VU by ANA LILIA at 2/8/2022 1315    Comment: Educated on safe sequencing with bed mobility, ambulatory transfers, and gait. Reviewed HEP and posterior hip precautions.                               User Key     Initials Effective Dates Name Provider Type Discipline     06/16/21 -  Elgin Palmer, PT Physical Therapist PT              PT Recommendation and Plan  Planned Therapy Interventions (PT): balance training, bed mobility training, gait training, home exercise program, patient/family education, transfer training, strengthening  Plan of Care Reviewed With: patient  Progress: improving  Outcome Summary: PT eval complete. Pt ambulated 5 feet using RW and mod Ax2 for balance and AD management. Gait limited by pt experieincing dizziness/LH, BP measured at 94/53. RN  notified. Symptoms subsiding with rest and when pt brought back to chair. Bed mobility and STS performed with mod Ax2. Reviewed HEP and posterior hip precautions. Recommend pt  d/c SNF when appropriate.     Time Calculation:    PT Charges     Row Name 02/08/22 1315             Time Calculation    Start Time 1315  -ANA LILIA      PT Received On 02/08/22  -ANA LILIA      PT Goal Re-Cert Due Date 02/18/22  -ANA LILIA              Time Calculation- PT    Total Timed Code Minutes- PT 24 minute(s)  -ANA LILIA              Timed Charges    72160 - PT Therapeutic Exercise Minutes 10  -ANA LILIA      12207 - Gait Training Minutes  4  -ANA LILIA      64449 - PT Therapeutic Activity Minutes 10  -ANA LILIA              Total Minutes    Timed Charges Total Minutes 24  -ANA LILIA       Total Minutes 24  -ANA LILIA            User Key  (r) = Recorded By, (t) = Taken By, (c) = Cosigned By    Initials Name Provider Type    ANA LILIA Elgin Palmer, PT Physical Therapist              Therapy Charges for Today     Code Description Service Date Service Provider Modifiers Qty    49439767683 HC PT THER PROC EA 15 MIN 2/8/2022 Elgin Palmer, PT GP 1    46983928110 HC PT THERAPEUTIC ACT EA 15 MIN 2/8/2022 Elgin Palmer, PT GP 1    08434502449 HC PT EVAL LOW COMPLEXITY 3 2/8/2022 Elgin Palmer, PT GP 1          PT G-Codes  Outcome Measure Options: AM-PAC 6 Clicks Basic Mobility (PT)  AM-PAC 6 Clicks Score (PT): 10    Elgin Palmer PT  2/8/2022

## 2022-02-08 NOTE — THERAPY EVALUATION
Patient Name: Yin Law  : 1940    MRN: [id number]                              Today's Date: 2022       Admit Date: 2022    Visit Dx:     ICD-10-CM ICD-9-CM   1. Closed nondisplaced intertrochanteric fracture of left femur, initial encounter ([facility])  S72.145A 820.21   2. Contusion of right forearm, initial encounter  S50.11XA 923.10   3. Skin abrasion  T14.8XXA 919.0   4. Atrial fibrillation, persistent ([facility])  I48.19 427.31     Patient Active Problem List   Diagnosis   • Multifocal pneumonia   • Recurrent atrial fibrillation with RVR (CMS/[facility])   • COPD on home oxygen (CMS/[facility])   • Hypertension and diastolic dysfunction   • Rheumatoid arthritis ([facility])   • Anxiety and depression   • Wound of right leg   • Anemia   • Elevated troponin   • Sepsis ([facility])   • Pleural effusion, bilateral   • Acute diastolic CHF (CMS/[facility])   • Accelerated hypertension   • Pneumonia due to infectious organism   • Severe malnutrition (CMS/[facility])   • Hip fracture ([facility])     Past Medical History:   Diagnosis Date   • Anxiety and depression    • Arrhythmia     A-FIB   • Asthma    • Chicken pox    • COPD (chronic obstructive pulmonary disease) ([facility])    • Hyperlipidemia    • Hypertension    • Measles    • Menopause    • Osteoporosis    • Rheumatoid arthritis ([facility])    • Rheumatoid arthritis ([facility])    • Tobacco abuse      Past Surgical History:   Procedure Laterality Date   • APPENDECTOMY     • CARPAL TUNNEL RELEASE Left    • CATARACT EXTRACTION, BILATERAL     • COLON SURGERY     • COLONOSCOPY     • GALLBLADDER SURGERY     • HIP HEMIARTHROPLASTY Left 2022    Procedure: HIP HEMIARTHROPLASTY LEFT;  Surgeon: Napoleon Powell Jr., MD;  Location: [facility];  Service: Orthopedics;  Laterality: Left;   • HYSTERECTOMY     • TONSILLECTOMY        General Information     Row Name 22 1517          OT Time and Intention    Document Type evaluation  -AN     Mode of Treatment occupational therapy  -AN     Row Name 22 1517           General Information    Patient Profile Reviewed yes  -AN     Prior Level of Function independent:; all household mobility; community mobility; gait; ADL's  -AN     Existing Precautions/Restrictions fall; left; hip, posterior; brace worn when out of bed; other (see comments); oxygen therapy device and L/min  L fascia iliaca, KI donned when OOB, posterior hip precautions  -AN     Barriers to Rehab medically complex  -AN     Row Name 02/08/22 1517          Living Environment    Lives With alone  niece able to assist  -AN     Row Name 02/08/22 1517          Home Main Entrance    Number of Stairs, Main Entrance two  -AN     Stair Railings, Main Entrance railings on both sides of stairs  -AN     Row Name 02/08/22 1517          Stairs Within Home, Primary    Number of Stairs, Within Home, Primary none  -AN     Row Name 02/08/22 1517          Cognition    Orientation Status (Cognition) oriented x 4  -AN     Row Name 02/08/22 1517          Safety Issues, Functional Mobility    Safety Issues Affecting Function (Mobility) safety precautions follow-through/compliance; safety precaution awareness; positioning of assistive device; judgment; insight into deficits/self-awareness  -AN     Impairments Affecting Function (Mobility) balance; endurance/activity tolerance; strength; range of motion (ROM); pain; shortness of breath  -AN           User Key  (r) = Recorded By, (t) = Taken By, (c) = Cosigned By    Initials Name Provider Type    Court Shoemaker OT Occupational Therapist                 Mobility/ADL's     Row Name 02/08/22 1519          Bed Mobility    Bed Mobility sit-supine  -AN     Sit-Supine [place] (Bed Mobility) moderate assist (50% patient effort); 2 person assist; verbal cues; nonverbal cues (demo/gesture)  -AN     Bed Mobility, Safety Issues decreased use of arms for pushing/pulling; decreased use of legs for bridging/pushing; impaired trunk control for bed mobility  -AN     Assistive Device (Bed  Mobility) head of bed elevated; draw sheet  -AN     Comment (Bed Mobility) Mod A x 2 for LE management and trunk control  -AN     Row Name 02/08/22 1519          Transfers    Transfers sit-stand transfer; bed-chair transfer; toilet transfer  -AN     Comment (Transfers) verbal cues for hand placement and transfer technique  -AN     Bed-Chair [place] (Transfers) moderate assist (50% patient effort); 2 person assist; verbal cues; nonverbal cues (demo/gesture)  -AN     Assistive Device (Bed-Chair Transfers) walker, front-wheeled  -AN     Sit-Stand [place] (Transfers) verbal cues; moderate assist (50% patient effort); 2 person assist; nonverbal cues (demo/gesture)  -AN     [place] Level (Toilet Transfer) verbal cues; nonverbal cues (demo/gesture); moderate assist (50% patient effort); 2 person assist  -AN     Assistive Device (Toilet Transfer) walker, front-wheeled  -AN     Row Name 02/08/22 1519          Sit-Stand Transfer    Assistive Device (Sit-Stand Transfers) walker, front-wheeled  -AN     Row Name 02/08/22 1519          Toilet Transfer    Type (Toilet Transfer) sit-stand; stand-sit; stand pivot/stand step  -AN     Row Name 02/08/22 1519          Functional Mobility    Functional Mobility- Ind. Level not tested  -AN     Row Name 02/08/22 1519          Activities of Daily Living    BADL Assessment/Intervention upper body dressing; lower body dressing; toileting  -AN     Row Name 02/08/22 1519          Mobility    Extremity Weight-bearing Status left lower extremity  -AN     Left Lower Extremity (Weight-bearing Status) weight-bearing as tolerated (WBAT)  -AN     Row Name 02/08/22 1519          Upper Body Dressing Assessment/Training    [place] Level (Upper Body Dressing) doff; minimum assist (75% patient effort)  hospital gown  -AN     Position (Upper Body Dressing) edge of bed sitting  -AN     Row Name 02/08/22 1519          Lower Body Dressing Assessment/Training    [place] Level (Lower  Body Dressing) don; socks; maximum assist (25% patient effort)  -AN     Assistive Devices (Lower Body Dressing) leg ; long-handled shoe horn; reacher; sock-aid; other (see comments)  long handled sponge  -AN     Position (Lower Body Dressing) supported sitting  -AN     Comment (Lower Body Dressing) Pt educated on adaptive equipment for LB ADLS, however did not demonstrate this session; will follow up next session  -AN     Row Name 02/08/22 1519          Toileting Assessment/Training    [place] Level (Toileting) adjust/manage clothing; perform perineal hygiene; maximum assist (25% patient effort)  -AN     Assistive Devices (Toileting) commode, bedside without drop arms  -AN     Position (Toileting) supported standing  -AN     Comment (Toileting) Max A for posterior tracy care in standing  -AN           User Key  (r) = Recorded By, (t) = Taken By, (c) = Cosigned By    Initials Name Provider Type    AN Court De Leon OT Occupational Therapist               Obj/Interventions     Row Name 02/08/22 1527          Sensory Assessment (Somatosensory)    Sensory Assessment (Somatosensory) UE sensation intact  -AN     Row Name 02/08/22 1527          Vision Assessment/Intervention    Visual Impairment/Limitations WFL  -AN     Row Name 02/08/22 1527          Range of Motion Comprehensive    General Range of Motion bilateral upper extremity ROM WFL  -AN     Row Name 02/08/22 1527          Strength Comprehensive (MMT)    General Manual Muscle Testing (MMT) Assessment upper extremity strength deficits identified  -AN     Comment, General Manual Muscle Testing (MMT) Assessment BUE grossly 4/5 observed functionally  -AN     Row Name 02/08/22 1527          Motor Skills    Motor Skills functional endurance  -AN     Functional Endurance required seated rest breaks throughout and PLB due to shortness of breath  -AN     Row Name 02/08/22 1527          Balance    Balance Assessment sitting static balance; sitting dynamic  balance; sit to stand dynamic balance; standing static balance; standing dynamic balance  -AN     Static Sitting Balance mild impairment; sitting, edge of bed  -AN     Dynamic Sitting Balance mild impairment; sitting, edge of bed  -AN     Sit to Stand Dynamic Balance moderate impairment; supported; standing  -AN     Static Standing Balance moderate impairment; supported; standing  -AN     Dynamic Standing Balance moderate impairment; supported; standing  -AN     Balance Interventions standing; sit to stand; supported; static; dynamic; moderate challenge; occupation based/functional task; narrowed base of support  -AN           User Key  (r) = Recorded By, (t) = Taken By, (c) = Cosigned By    Initials Name Provider Type    AN Court De Leon OT Occupational Therapist               Goals/Plan     Row Name 02/08/22 1540          Bed Mobility Goal 1 (OT)    Activity/Assistive Device (Bed Mobility Goal 1, OT) sit to supine/supine to sit  -AN     [place] Level/Cues Needed (Bed Mobility Goal 1, OT) minimum assist (75% or more patient effort)  -AN     Time Frame (Bed Mobility Goal 1, OT) long term goal (LTG); 10 days  -AN     Row Name 02/08/22 1540          Dressing Goal 1 (OT)    Activity/Device (Dressing Goal 1, OT) lower body dressing; reacher; long-handled shoe horn; sock-aid  -AN     [place]/Cues Needed (Dressing Goal 1, OT) minimum assist (75% or more patient effort); 1 person assist  -AN     Time Frame (Dressing Goal 1, OT) long term goal (LTG); 10 days  -AN     Strategies/Barriers (Dressing Goal 1, OT) AE  -AN     Row Name 02/08/22 1540          Toileting Goal 1 (OT)    Activity/Device (Toileting Goal 1, OT) perform perineal hygiene; adjust/manage clothing; commode  -AN     [place] Level/Cues Needed (Toileting Goal 1, OT) minimum assist (75% or more patient effort); 1 person assist  -AN     Time Frame (Toileting Goal 1, OT) long term goal (LTG); 10 days  -AN     Row Name 02/08/22 1540           Therapy Assessment/Plan (OT)    Planned Therapy Interventions (OT) activity tolerance training; adaptive equipment training; BADL retraining; functional balance retraining; occupation/activity based interventions; patient/caregiver education/training; transfer/mobility retraining  -AN           User Key  (r) = Recorded By, (t) = Taken By, (c) = Cosigned By    Initials Name Provider Type    Court Shoemaker, OT Occupational Therapist               Clinical Impression     Row Name 02/08/22 1531          Pain Assessment    Additional Documentation Pain Scale: FACES Pre/Post-Treatment (Group)  -AN     Row Name 02/08/22 1531          Pain Scale: FACES Pre/Post-Treatment    Pain: FACES Scale, Pretreatment 4-->hurts little more  -AN     Posttreatment Pain Rating 4-->hurts little more  -AN     Pain Location - Side Left  -AN     Pain Location - Orientation incisional  -AN     Pain Location extremity  -AN     Pre/Posttreatment Pain Comment RN aware and managing  -AN     Row Name 02/08/22 1531          Plan of Care Review    Plan of Care Reviewed With patient  -AN     Progress no change  OT evaluation  -AN     Outcome Summary OT evaluation completed. Pt educated on L hip precautions and KI when OOB due to fascia iliaca nerve cath. Pt performed STS with Mod A x2 using RW, stand pivot back and forth from EOB to BSC with Mod HHA x 2 for toileting, and Mod A x 2 for bed mobility. Max A for toileting. Pt would benefit from IP OT in order to address current deficits. OT recs SNF at [place].  -AN     Row Name 02/08/22 1531          Therapy Assessment/Plan (OT)    Patient/Family Therapy Goal Statement (OT) Return to PLOF  -AN     Rehab Potential (OT) good, to achieve stated therapy goals  -AN     Criteria for Skilled Therapeutic Interventions Met (OT) yes; skilled treatment is necessary  -AN     Therapy Frequency (OT) daily  -AN     Row Name 02/08/22 1531          Therapy Plan Review/Discharge Plan (OT)    Anticipated Discharge  Disposition (OT) skilled nursing facility  -AN     Row Name 02/08/22 1531          Vital Signs    Pre Systolic BP Rehab 125  -AN     Pre Treatment Diastolic BP 50  -AN     Post Systolic BP Rehab 139  -AN     Post Treatment Diastolic BP 89  -AN     O2 Delivery Pre Treatment nasal cannula  -AN     O2 Delivery Intra Treatment nasal cannula  -AN     O2 Delivery Post Treatment nasal cannula  -AN     Pre Patient Position Sitting  -AN     Intra Patient Position Standing  -AN     Post Patient Position Supine  -AN     Row Name 02/08/22 1531          Positioning and Restraints    Pre-Treatment Position sitting in chair/recliner  -AN     Post Treatment Position bed  -AN     In Bed notified nsg; supine; call light within reach; encouraged to call for assist; exit alarm on; side rails up x3; pillow between legs  -AN           User Key  (r) = Recorded By, (t) = Taken By, (c) = Cosigned By    Initials Name Provider Type    Court Shoemaker OT Occupational Therapist               Outcome Measures     Row Name 02/08/22 1543          How much help from another is currently needed...    Putting on and taking off regular lower body clothing? 2  -AN     Bathing (including washing, rinsing, and drying) 2  -AN     Toileting (which includes using toilet bed pan or urinal) 2  -AN     Putting on and taking off regular upper body clothing 3  -AN     Taking care of personal grooming (such as brushing teeth) 3  -AN     Eating meals 3  -AN     AM-PAC 6 Clicks Score (OT) 15  -AN     Row Name 02/08/22 1315          How much help from another person do you currently need...    Turning from your back to your side while in flat bed without using bedrails? 2  -ANA LILIA     Moving from lying on back to sitting on the side of a flat bed without bedrails? 2  -ANA LILIA     Moving to and from a bed to a chair (including a wheelchair)? 2  -ANA LILIA     Standing up from a chair using your arms (e.g., wheelchair, bedside chair)? 2  -ANA LILIA     Climbing 3-5 steps with a  railing? 1  -ANA LILIA     To walk in hospital room? 1  -ANA LILIA     AM-PAC 6 Clicks Score (PT) 10  -ANA LILIA     Row Name 02/08/22 1543 02/08/22 1315       Functional Assessment    Outcome Measure Options AM-PAC 6 Clicks Daily Activity (OT)  -AN AM-PAC 6 Clicks Basic Mobility (PT)  -ANA LILIA          User Key  (r) = Recorded By, (t) = Taken By, (c) = Cosigned By    Initials Name Provider Type    Elgin Peralta, PT Physical Therapist    Court Shoemaker, OT Occupational Therapist                Occupational Therapy Education                 Title: PT OT SLP Therapies (In Progress)     Topic: Occupational Therapy (In Progress)     Point: ADL training (Done)     Description:   Instruct learner(s) on proper safety adaptation and remediation techniques during self care or transfers.   Instruct in proper use of assistive devices.              Learning Progress Summary           Patient Acceptance, E, VU,NR by AN at 2/8/2022 1544                   Point: Home exercise program (Not Started)     Description:   Instruct learner(s) on appropriate technique for monitoring, assisting and/or progressing therapeutic exercises/activities.              Learner Progress:  Not documented in this visit.          Point: Precautions (Done)     Description:   Instruct learner(s) on prescribed precautions during self-care and functional transfers.              Learning Progress Summary           Patient Acceptance, E, VU,NR by AN at 2/8/2022 1544                   Point: Body mechanics (Done)     Description:   Instruct learner(s) on proper positioning and spine alignment during self-care, functional mobility activities and/or exercises.              Learning Progress Summary           Patient Acceptance, E, VU,NR by AN at 2/8/2022 1544                               User Key     Initials Effective Dates Name Provider Type Discipline    AN 09/21/21 -  Court De Leon OT Occupational Therapist OT              OT Recommendation and Plan  Planned Therapy  Interventions (OT): activity tolerance training, adaptive equipment training, BADL retraining, functional balance retraining, occupation/activity based interventions, patient/caregiver education/training, transfer/mobility retraining  Therapy Frequency (OT): daily  Plan of Care Review  Plan of Care Reviewed With: patient  Progress: no change (OT evaluation)  Outcome Summary: OT evaluation completed. Pt educated on L hip precautions and KI when OOB due to fascia iliaca nerve cath. Pt performed STS with Mod A x2 using RW, stand pivot back and forth from EOB to BSC with Mod HHA x 2 for toileting, and Mod A x 2 for bed mobility. Max A for toileting. Pt would benefit from IP OT in order to address current deficits. OT recs SNF at [place].     Time Calculation:    Time Calculation- OT     Row Name 02/08/22 1546 02/08/22 1315          Time Calculation- OT    OT Start Time 1440  -AN --     OT Received On 02/08/22  -AN --     OT Goal Re-Cert Due Date 02/18/22  -AN --            Timed Charges    24149 - Gait Training Minutes  -- 4  -ANA LILIA     91794 - OT Self Care/Mgmt Minutes 15  -AN --            Untimed Charges    OT Eval/Re-eval Minutes 46  -AN --            Total Minutes    Timed Charges Total Minutes 15  -AN 4  -ANA LILIA     Untimed Charges Total Minutes 46  -AN --      Total Minutes 61  -AN 4  -ANA LILIA           User Key  (r) = Recorded By, (t) = Taken By, (c) = Cosigned By    Initials Name Provider Type    [place] Peralta, PT Physical Therapist    AN Court De Leon, OT Occupational Therapist              Therapy Charges for Today     Code Description Service Date Service Provider Modifiers Qty    84337957704 HC OT SELF CARE/MGMT/TRAIN EA 15 MIN 2/8/2022 Nefreya Court, OT GO 1    98766325194 HC OT EVAL LOW COMPLEXITY 4 2/8/2022 Nefreya Court, OT GO 1    82027415429 HC OT THER SUPP EA 15 MIN 2/8/2022 Nefreya Court, OT GO 2               Court Nedeljko, OT  2/8/2022

## 2022-02-08 NOTE — PLAN OF CARE
Goal Outcome Evaluation:  Plan of Care Reviewed With: patient        Progress: improving     Pt arrived from  PACU. She is alert and oriented X 4. 5L NC, VSS. Ropivacaine at 8 ml/hr. P was unable to void spontaneously. I & O cath.

## 2022-02-08 NOTE — PROGRESS NOTES
[facility]    Acute pain service Inpatient Progress Note    Patient Name: Yin Law  :  1940  MRN:  [id number]        Acute Pain  Service Inpatient Progress Note:    Analgesia:Good  Pain Score:0/10 (Pain 0/10 at rest. 3/10 with movement)  LOC: alert and awake  Resp Status: room air  Cardiac: VS stable  Side Effects:None  Catheter Site:clean, dressing intact and dry  Cath type: peripheral nerve cath(MOOG pump)  Infusion rate: 8ml/hr  Catheter Plan:Catheter to remain Insitu and Continue catheter infusion rate unchanged

## 2022-02-08 NOTE — PROGRESS NOTES
[facility] Medicine Services  PROGRESS NOTE    Patient Name: Yin Law  : 1940  MRN: [id number]    Date of Admission: 2022  Primary Care Physician: Santiago Chaudhry MD    Subjective   Subjective     CC:  Hip fracture    HPI:  C/o that hip is sore but doing well otw.      ROS:  Gen- No fevers, chills  CV- No chest pain, palpitations  Resp- No cough, dyspnea  GI- No N/V/D, abd pain        Objective   Objective     Vital Signs:   Temp:  [98.1 °F (36.7 °C)-99.8 °F (37.7 °C)] 98.4 °F (36.9 °C)  Heart Rate:  [58-97] 64  Resp:  [15-18] 17  BP: (111-173)/(47-96) 126/47  Flow (L/min):  [3-1515] 5     Physical Exam:    Constitutional: No acute distress, awake, alert, sitting up in bed, looks good  HENT: NCAT, mucous membranes moist  Respiratory: Clear to auscultation bilaterally, respiratory effort normal   Cardiovascular: RRR, no murmurs, rubs, or gallops  Gastrointestinal: Positive bowel sounds, soft, nontender, nondistended  Musculoskeletal: No bilateral ankle edema  Psychiatric: Appropriate affect, cooperative  Neurologic: Oriented x 3, strength symmetric in all extremities, Cranial Nerves grossly intact to confrontation, speech clear  Skin: No rashes         Results Reviewed:  LAB RESULTS:      Lab 22  0525 22  0644   WBC 10.04 6.47   HEMOGLOBIN 9.0* 11.5*   HEMATOCRIT 28.5* 36.3   PLATELETS 167 231   NEUTROS ABS  --  3.85   IMMATURE GRANS (ABS)  --  0.06*   LYMPHS ABS  --  1.70   MONOS ABS  --  0.55   EOS ABS  --  0.24   MCV 96.6 97.8*   PROTIME  --  13.2         Lab 22  0525 22  0644   SODIUM 134* 138   POTASSIUM 4.5 4.1   CHLORIDE 98 98   CO2 26.0 30.0*   ANION GAP 10.0 10.0   BUN 14 12   CREATININE 0.83 0.83   GLUCOSE 94 94   CALCIUM 8.9 9.8   TSH 3.880  --          Lab 22  0644   TOTAL PROTEIN 6.9   ALBUMIN 4.00   GLOBULIN 2.9   ALT (SGPT) 15   AST (SGOT) 29   BILIRUBIN 0.4   ALK PHOS 130*         Lab 22  0644   PROTIME 13.2   INR  1.03             Lab 02/07/22  0728   ABO TYPING O   RH TYPING Positive   ANTIBODY SCREEN Negative         Brief Urine Lab Results  (Last result in the past 365 days)      Color   Clarity   Blood   Leuk Est   Nitrite   Protein   CREAT   Urine HCG        10/04/21 1116 Yellow   Clear   Negative   Negative   Negative   Negative                 Microbiology Results Abnormal     Procedure Component Value - Date/Time    COVID PRE-OP / PRE-PROCEDURE SCREENING ORDER (NO ISOLATION) - Swab, Nasopharynx [[id number]]  (Normal) Collected: 02/07/22 0943    Lab Status: Final result Specimen: Swab from Nasopharynx Updated: 02/07/22 1012    Narrative:      The following orders were created for panel order COVID PRE-OP / PRE-PROCEDURE SCREENING ORDER (NO ISOLATION) - Swab, Nasopharynx.  Procedure                               Abnormality         Status                     ---------                               -----------         ------                     COVID-19, ABBOTT IN-HOUS...[[id number]]  Normal              Final result                 Please view results for these tests on the individual orders.    COVID-19, ABBOTT IN-HOUSE,NASAL Swab (NO TRANSPORT MEDIA) 2 HR TAT - Swab, Nasopharynx [[id number]]  (Normal) Collected: 02/07/22 0943    Lab Status: Final result Specimen: Swab from Nasopharynx Updated: 02/07/22 1012     COVID19 Presumptive Negative    Narrative:      Fact sheet for providers: https://www.fda.gov/media/667976/download     Fact sheet for patients: https://www.fda.gov/media/611825/download    Test performed by PCR.  If inconsistent with clinical signs and symptoms patient should be tested with different authorized molecular test.          XR Forearm 2 View Right    Result Date: 2/7/2022  DATE OF EXAM: 2/7/2022 7:10 AM  PROCEDURE: XR FOREARM 2 VW RIGHT-  INDICATIONS: fall, R forearm injury  COMPARISON: No Comparisons Available  TECHNIQUE: A minimum of two routine standard radiographic views were obtained of the right  forearm.   FINDINGS: There are multiple small bony fragments adjacent to the triquetral bone consistent with an avulsion fracture fragments.  Other carpal bones appear intact.  There are degenerative changes of the triscaphe joint and first metacarpal trapezium joint.      Impression:  1.  Small bony fragments adjacent to the triquetral bone consistent with avulsion fracture fragments. 2.  No acute fracture or dislocation of the radius or ulna.  This report was finalized on 2/7/2022 7:32 AM by Martin Dorsey MD.      XR Femur 2 View Left    Result Date: 2/7/2022  XR FEMUR 2 VW LEFT-  Date of Exam: 2/7/2022 7:10 AM  Indication: fall, LLE injury.  Comparison Exams: None available.  FINDINGS: Study is limited secondary to diffuse osteopenia  There is a fracture of the neck of the left femur. No evidence of dislocation of the femoral head at the hip joint.  Evaluation of the pelvic structures is limited by severe osteopenia. Advanced degenerative changes are noted at the knee. Femoral shaft appears intact. Relative disuse atrophy noted of the soft tissues      Impression: Acute fracture femoral neck. No evidence of dislocation.  No other definite acute osseous abnormality noted given limitations from severe osteopenia  This report was finalized on 2/7/2022 7:31 AM by Jorge Luis Dale.      XR Chest 1 View    Result Date: 2/7/2022  XR CHEST 1 VW-  Date of Exam: 2/7/2022 7:10 AM  Indication: hip fx.  Comparison:?12/1/2021  Technique:?A single view of the chest was obtained.  FINDINGS:  ?Heart size and pulmonary vessels are within normal limits.  Small right pleural effusion is unchanged.  There is minimal right basilar airspace disease likely due to atelectasis.  Left lung is clear.  No left pleural effusion or pneumothorax.  Bony structures appear within normal limits.        Impression:   1.  Small right pleural effusion and right basilar airspace disease likely due to atelectasis.   This report was finalized on  2/7/2022 7:33 AM by Martin Dorsey MD.      XR Pelvis 1 or 2 View    Result Date: 2/7/2022  DATE OF EXAM: 2/7/2022 7:10 AM  PROCEDURE: XR PELVIS 1 OR 2 VW-  INDICATIONS: L hip fx  COMPARISON: 12/09/2021  TECHNIQUE: An AP radiologic view of the pelvis was obtained.   FINDINGS: Study limited by severe osteopenia.  Acute fracture left femoral neck noted no dislocation femoral head.  Visualized bony pelvis appears to be grossly intact. Evaluation of the sacrum and coccyx limited by severe osteopenia and overlying bowel gas and fecal material. Limited imaging of the right hip is grossly intact  Advanced degenerative changes noted of the visualized lumbar spine.      Impression: Fracture proximal left femur.  No definite acute fracture or dislocation noted given limitations from severe osteopenia.  Additional imaging can be obtained as clinically indicated  This report was finalized on 2/7/2022 7:33 AM by Jorge Luis Dale.      Peripheral Block    Result Date: 2/7/2022  Santiago Joe CRNA     2/7/2022 11:19 AM Peripheral Block Patient reassessed immediately prior to procedure Patient location during procedure: pre-op Start time: 2/7/2022 11:05 AM Stop time: 2/7/2022 11:19 AM Reason for block: procedure for pain and at surgeon's request Performed by SHAYLEE: Santiago Joe CRNA Assisted by: Anne Gonzalez RN Preanesthetic Checklist Completed: patient identified, IV checked, site marked, risks and benefits discussed, surgical consent, monitors and equipment checked, pre-op evaluation and timeout performed Prep: Pt Position: supine Sterile barriers:cap, gloves and mask Prep: ChloraPrep Patient monitoring: blood pressure monitoring, continuous pulse oximetry and EKG Procedure Sedation: no Performed under: local infiltration Guidance:ultrasound guided Images:still images obtained, printed/placed on chart Laterality:left Block Type:fascia iliaca compartment Injection Technique:catheter Needle Type:echogenic Needle  Gauge:18 G Resistance on Injection: none Catheter Size:20 G (20g) Cath Depth at skin: 12 cm Medications Used: ropivacaine (NAROPIN) 0.5 % injection, 20 mL Medications Preservative Free Saline:20ml Post Assessment Injection Assessment: negative aspiration for heme, no paresthesia on injection and incremental injection Patient Tolerance:comfortable throughout block Complications:no Additional Notes Procedure:         Pt placed in supine position.   The insertion site was prepped in sterile fashion with Chlorapreop and clear plastic drapes.  Analgesia was provided by skin infiltration at insertion site with Lidocaine 1% 3mls.  A B-Flynn 18 g , 4 inch echogenic ToMailanay needle was advance In-plane under ultrasound guidance. The   Anterior superior Iliac crest was initially visualized and the probe was directed slightly medially and slightly towards the umbilicus.  The course of the needle was tracked over the sartorius muscle through the fascia Iliacus and into the anterior portion of the Iliacus muscle.  Major vessels where identified and avoided as where structures of the peritoneal cavity.  LA injection was made incrementally in 1-5ml amounts spread was visualized superiorly below fascia iliacus.  Injection was completed with negative aspiration of blood and negative intravascular injection.  Injection pressures where normal or minimal resistance.  A 20 g B-Flynn wire styleted catheter was then advance thru the needle and very easily placed in a superior or cephalad direction.  The catheter was secured at insertion site with exofin tissue adhesive, benzoin, and steristreps.  A CHG tegaderm dressing was placed over the insertion site and the nerve catheter labeled and capped.  Thank You.     Peripheral Block    Result Date: 2/7/2022  Santiago Mace CRNA     2/7/2022 10:45 AM Peripheral Block Patient reassessed immediately prior to procedure Patient location during procedure: pre-op Reason for block: procedure for pain  and at surgeon's request Performed by CRNA: Santiago Mace CRNA Preanesthetic Checklist Completed: patient identified, IV checked, site marked, risks and benefits discussed, surgical consent, monitors and equipment checked, pre-op evaluation and timeout performed Prep: Sterile barriers:cap, gloves and mask Prep: ChloraPrep Patient monitoring: blood pressure monitoring, continuous pulse oximetry and EKG Procedure Performed under: local infiltration Guidance:ultrasound guided Images:still images obtained, printed/placed on chart Block Type:fascia iliaca compartment Injection Technique:catheter Needle Type:echogenic Needle Gauge:18 G Resistance on Injection: none Catheter Size:20 G (20g) Medications Preservative Free Saline:10ml Post Assessment Injection Assessment: negative aspiration for heme, no paresthesia on injection and incremental injection Patient Tolerance:comfortable throughout block Complications:no Additional Notes Procedure:         Pt placed in supine position.   The insertion site was prepped in sterile fashion with Chlorapreop and clear plastic drapes.  Analgesia was provided by skin infiltration at insertion site with Lidocaine 1% 3mls.  A B-Flynn 18 g , 4 inch echogenic Touhy needle was advance In-plane under ultrasound guidance. The   Anterior superior Iliac crest was initially visualized and the probe was directed slightly medially and slightly towards the umbilicus.  The course of the needle was tracked over the sartorius muscle through the fascia Iliacus and into the anterior portion of the Iliacus muscle.  Major vessels where identified and avoided as where structures of the peritoneal cavity.  LA injection was made incrementally in 1-5ml amounts spread was visualized superiorly below fascia iliacus.  Injection was completed with negative aspiration of blood and negative intravascular injection.  Injection pressures where normal or minimal resistance.  A 20 g B-Flynn wire styleted catheter was  then advance thru the needle and very easily placed in a superior or cephalad direction.  The catheter was secured at insertion site with exofin tissue adhesive, benzoin, and steristreps.  A CHG tegaderm dressing was placed over the insertion site and the nerve catheter labeled and capped.  Thank You.     XR Hip With or Without Pelvis 2 - 3 View Left    Result Date: 2/7/2022  EXAMINATION: XR HIP W OR WO PELVIS 2-3 VIEW LEFT-  INDICATION: s/p L hip lizz; S72.145A-Nondisplaced intertrochanteric fracture of left femur, initial encounter for closed fracture; S50.11XA-Contusion of right forearm, initial encounter; T14.8XXA-Other injury of unspecified body region, initial encounter  COMPARISON: 2/7/2022  FINDINGS: Total left hip arthroplasty is now seen. No abnormal lucency is seen around the components. No fracture line or bony avulsion is identified. Soft tissue drain is noted in place.       Impression: Total left hip prosthesis in anatomic alignment.    This report was finalized on 2/7/2022 7:55 PM by Dr. Augie Leiva MD.        Results for orders placed during the hospital encounter of 07/19/21    Adult Transthoracic Echo Complete W/ Cont if Necessary Per Protocol    Interpretation Summary  · Estimated left ventricular EF = 65%  · Mild aortic valve stenosis is present.  · Aortic valve maximum pressure gradient is 32.9 mmHg. Aortic valve mean pressure gradient is 17.2 mmHg.  · Mild mitral valve regurgitation is present.  · Mild tricuspid valve regurgitation is present.  · Estimated right ventricular systolic pressure from tricuspid regurgitation is 37.0 mmHg.  · There is a large left pleural effusion. There is a moderate sized right pleural effusion.      I have reviewed the medications:  Scheduled Meds:acetaminophen, 650 mg, Oral, Q8H  bisoprolol, 5 mg, Oral, Q24H  budesonide-formoterol, 2 puff, Inhalation, BID - RT  ceFAZolin, 2 g, Intravenous, Q8H  enoxaparin, 40 mg, Subcutaneous, Q24H  ferrous sulfate, 325 mg, Oral,  BID With Meals  levothyroxine, 75 mcg, Oral, Q AM  pantoprazole, 40 mg, Oral, BID AC  sertraline, 150 mg, Oral, Daily  sodium chloride, 10 mL, Intravenous, Q12H  sodium chloride, 3 mL, Intravenous, Q12H  torsemide, 40 mg, Oral, Daily      Continuous Infusions:lactated ringers, 9 mL/hr, Last Rate: 9 mL/hr (02/07/22 1640)  ropivacaine (NAROPIN) 0.2% peripheral nerve cath (moog), 8 mL/hr, Last Rate: 8 mL/hr (02/07/22 1840)      PRN Meds:.acetaminophen  •  albuterol sulfate HFA  •  ALPRAZolam  •  HYDROcodone-acetaminophen  •  lactated ringers  •  Morphine **AND** naloxone  •  ondansetron **OR** ondansetron  •  sodium chloride  •  sodium chloride  •  sodium chloride  •  Tetanus-Diphth-Acell Pertussis    Assessment/Plan   Assessment & Plan     Active Hospital Problems    Diagnosis  POA   • Hip fracture (HCC) [S72.009A]  Yes      Resolved Hospital Problems   No resolved problems to display.        Brief Hospital Course to date:  Yin Law is a 81 y.o. female with h/o  Diastolic CHF, afib, COPD on home 5LNC, and RA,  found to have a let hip fracture after tripping over her oxygen tank.      Left Hip Fracture  --s/p open treatment of displaced femoral neck fracture on 2/7/22 per Dr. Powell  --prn lortab and prn morphine now  --lovenox to start today if ok with ortho    Anemia  --likely of blood loss during surgery.  Monitor       Chronic DHF  --continue home meds inclusing torsemide  --monitor oxygenation/fluid status closely perioperatively  --follows with Dr. Montoya outpatient     Afib  --not on anticoagulation as taken off d/t anemia while taking  --cont bisoprolol with hold parameters     COPD  Chronic Respiratory Failure  --home O2 dependent on 5LNC  --duonebs  --monitor oxygenation close perioperatively  --follows with pulm/[address] outpatient     Hypothyroid  --continue home synthroid  --TSH wnl    DVT prophylaxis:  Medical DVT prophylaxis orders are present. lovenox           Disposition: I expect  the patient to be discharged when rehab bed.    CODE STATUS:   Code Status and Medical Interventions:   Ordered at: 02/07/22 1250     Level Of Support Discussed With:    Patient     Code Status (Patient has no pulse and is not breathing):    CPR (Attempt to Resuscitate)     Medical Interventions (Patient has pulse or is breathing):    Full Support       Darnell Beltran MD  02/08/22

## 2022-02-08 NOTE — PLAN OF CARE
Problem: Adult Inpatient Plan of Care  Goal: Plan of Care Review  Flowsheets (Taken 2/8/2022 1315)  Progress: improving  Plan of Care Reviewed With: patient  Outcome Summary: PT eval complete. Pt ambulated 5 feet using RW and mod Ax2 for balance and AD management. Gait limited by pt experieincing dizziness/LH, BP measured at 94/53. RN  notified. Symptoms subsiding with rest and when pt brought back to chair. Bed mobility and STS performed with mod Ax2. Reviewed HEP and posterior hip precautions. Recommend pt d/c SNF when appropriate.   Goal Outcome Evaluation:  Plan of Care Reviewed With: patient        Progress: improving  Outcome Summary: PT eval complete. Pt ambulated 5 feet using RW and mod Ax2 for balance and AD management. Gait limited by pt experieincing dizziness/LH, BP measured at 94/53. RN  notified. Symptoms subsiding with rest and when pt brought back to chair. Bed mobility and STS performed with mod Ax2. Reviewed HEP and posterior hip precautions. Recommend pt d/c SNF when appropriate.

## 2022-02-09 LAB
ANION GAP SERPL CALCULATED.3IONS-SCNC: 12 MMOL/L (ref 5–15)
BUN SERPL-MCNC: 24 MG/DL (ref 8–23)
BUN/CREAT SERPL: 21.1 (ref 7–25)
CALCIUM SPEC-SCNC: 8.8 MG/DL (ref 8.6–10.5)
CHLORIDE SERPL-SCNC: 96 MMOL/L (ref 98–107)
CO2 SERPL-SCNC: 23 MMOL/L (ref 22–29)
CREAT SERPL-MCNC: 1.14 MG/DL (ref 0.57–1)
DEPRECATED RDW RBC AUTO: 55.8 FL (ref 37–54)
ERYTHROCYTE [DISTWIDTH] IN BLOOD BY AUTOMATED COUNT: 15.4 % (ref 12.3–15.4)
GFR SERPL CREATININE-BSD FRML MDRD: 46 ML/MIN/1.73
GLUCOSE SERPL-MCNC: 132 MG/DL (ref 65–99)
HCT VFR BLD AUTO: 28.9 % (ref 34–46.6)
HGB BLD-MCNC: 9 G/DL (ref 12–15.9)
MCH RBC QN AUTO: 30.5 PG (ref 26.6–33)
MCHC RBC AUTO-ENTMCNC: 31.1 G/DL (ref 31.5–35.7)
MCV RBC AUTO: 98 FL (ref 79–97)
PLATELET # BLD AUTO: 151 10*3/MM3 (ref 140–450)
PMV BLD AUTO: 11.3 FL (ref 6–12)
POTASSIUM SERPL-SCNC: 3.7 MMOL/L (ref 3.5–5.2)
RBC # BLD AUTO: 2.95 10*6/MM3 (ref 3.77–5.28)
SODIUM SERPL-SCNC: 131 MMOL/L (ref 136–145)
WBC NRBC COR # BLD: 10.78 10*3/MM3 (ref 3.4–10.8)

## 2022-02-09 PROCEDURE — 97110 THERAPEUTIC EXERCISES: CPT

## 2022-02-09 PROCEDURE — 94799 UNLISTED PULMONARY SVC/PX: CPT

## 2022-02-09 PROCEDURE — 25010000002 ENOXAPARIN PER 10 MG: Performed by: ORTHOPAEDIC SURGERY

## 2022-02-09 PROCEDURE — 94761 N-INVAS EAR/PLS OXIMETRY MLT: CPT

## 2022-02-09 PROCEDURE — 25010000002 ROPIVACAINE PER 1 MG: Performed by: ORTHOPAEDIC SURGERY

## 2022-02-09 PROCEDURE — 97535 SELF CARE MNGMENT TRAINING: CPT

## 2022-02-09 PROCEDURE — 80048 BASIC METABOLIC PNL TOTAL CA: CPT | Performed by: INTERNAL MEDICINE

## 2022-02-09 PROCEDURE — 85027 COMPLETE CBC AUTOMATED: CPT | Performed by: INTERNAL MEDICINE

## 2022-02-09 PROCEDURE — 99232 SBSQ HOSP IP/OBS MODERATE 35: CPT | Performed by: INTERNAL MEDICINE

## 2022-02-09 PROCEDURE — 97116 GAIT TRAINING THERAPY: CPT

## 2022-02-09 RX ADMIN — PANTOPRAZOLE SODIUM 40 MG: 40 TABLET, DELAYED RELEASE ORAL at 17:45

## 2022-02-09 RX ADMIN — LEVOTHYROXINE SODIUM 75 MCG: 0.07 TABLET ORAL at 06:15

## 2022-02-09 RX ADMIN — ACETAMINOPHEN 650 MG: 325 TABLET, FILM COATED ORAL at 02:27

## 2022-02-09 RX ADMIN — BUDESONIDE AND FORMOTEROL FUMARATE DIHYDRATE 2 PUFF: 160; 4.5 AEROSOL RESPIRATORY (INHALATION) at 20:38

## 2022-02-09 RX ADMIN — ROPIVACAINE HYDROCHLORIDE 8 ML/HR: 2 INJECTION, SOLUTION EPIDURAL; INFILTRATION at 09:18

## 2022-02-09 RX ADMIN — HYDROCODONE BITARTRATE AND ACETAMINOPHEN 1 TABLET: 7.5; 325 TABLET ORAL at 08:56

## 2022-02-09 RX ADMIN — FERROUS SULFATE TAB 325 MG (65 MG ELEMENTAL FE) 325 MG: 325 (65 FE) TAB at 17:45

## 2022-02-09 RX ADMIN — SERTRALINE HYDROCHLORIDE 150 MG: 50 TABLET ORAL at 08:59

## 2022-02-09 RX ADMIN — ENOXAPARIN SODIUM 40 MG: 40 INJECTION SUBCUTANEOUS at 08:56

## 2022-02-09 RX ADMIN — BISOPROLOL FUMARATE 5 MG: 5 TABLET, FILM COATED ORAL at 08:56

## 2022-02-09 RX ADMIN — FERROUS SULFATE TAB 325 MG (65 MG ELEMENTAL FE) 325 MG: 325 (65 FE) TAB at 08:55

## 2022-02-09 RX ADMIN — PANTOPRAZOLE SODIUM 40 MG: 40 TABLET, DELAYED RELEASE ORAL at 09:06

## 2022-02-09 RX ADMIN — ROPIVACAINE HYDROCHLORIDE 8 ML/HR: 2 INJECTION, SOLUTION EPIDURAL; INFILTRATION at 21:52

## 2022-02-09 RX ADMIN — ALPRAZOLAM 0.5 MG: 0.5 TABLET ORAL at 20:08

## 2022-02-09 RX ADMIN — ACETAMINOPHEN 650 MG: 325 TABLET, FILM COATED ORAL at 06:15

## 2022-02-09 RX ADMIN — TORSEMIDE 40 MG: 20 TABLET ORAL at 08:56

## 2022-02-09 RX ADMIN — BUDESONIDE AND FORMOTEROL FUMARATE DIHYDRATE 2 PUFF: 160; 4.5 AEROSOL RESPIRATORY (INHALATION) at 13:03

## 2022-02-09 RX ADMIN — HYDROCODONE BITARTRATE AND ACETAMINOPHEN 1 TABLET: 7.5; 325 TABLET ORAL at 15:20

## 2022-02-09 NOTE — PROGRESS NOTES
[facility] Medicine Services  PROGRESS NOTE    Patient Name: Yin Law  : 1940  MRN: [id number]    Date of Admission: 2022  Primary Care Physician: Santiago Chaudhry MD    Subjective   Subjective     CC:  Hip fracture    HPI:  Pain reasonably controlled. Breathing at baseline for her    ROS:  Gen- No fevers, chills  CV- No chest pain, palpitations  Resp- No cough, dyspnea  GI- No N/V/D, abd pain        Objective   Objective     Vital Signs:   Temp:  [98 °F (36.7 °C)-99.3 °F (37.4 °C)] 98.9 °F (37.2 °C)  Heart Rate:  [68-89] 77  Resp:  [16-18] 16  BP: (115-164)/(52-65) 164/58  Flow (L/min):  [4-5] 5     Physical Exam:    Constitutional: No acute distress, awake, alert, sitting up in bed, normal work of breathing, comfortable appearing  HENT: NCAT, mucous membranes moist, nasal canula in place  Respiratory: Clear to auscultation bilaterally, respiratory effort normal    Cardiovascular: RRR, no murmurs, rubs, or gallops  Gastrointestinal: Positive bowel sounds, soft, nontender, nondistended  Musculoskeletal: No bilateral ankle edema  Psychiatric: Appropriate affect, cooperative  Neurologic: Oriented x 3, strength symmetric in all extremities, Cranial Nerves grossly intact to confrontation, speech clear  Skin: No rashes         Results Reviewed:  LAB RESULTS:      Lab 22  04222  0525 22  0644   WBC 10.78 10.04 6.47   HEMOGLOBIN 9.0* 9.0* 11.5*   HEMATOCRIT 28.9* 28.5* 36.3   PLATELETS 151 167 231   NEUTROS ABS  --   --  3.85   IMMATURE GRANS (ABS)  --   --  0.06*   LYMPHS ABS  --   --  1.70   MONOS ABS  --   --  0.55   EOS ABS  --   --  0.24   MCV 98.0* 96.6 97.8*   PROTIME  --   --  13.2         Lab 22  0425 22  0525 22  0644   SODIUM 131* 134* 138   POTASSIUM 3.7 4.5 4.1   CHLORIDE 96* 98 98   CO2 23.0 26.0 30.0*   ANION GAP 12.0 10.0 10.0   BUN 24* 14 12   CREATININE 1.14* 0.83 0.83   GLUCOSE 132* 94 94   CALCIUM 8.8 8.9 9.8   TSH   --  3.880  --          Lab 02/07/22  0644   TOTAL PROTEIN 6.9   ALBUMIN 4.00   GLOBULIN 2.9   ALT (SGPT) 15   AST (SGOT) 29   BILIRUBIN 0.4   ALK PHOS 130*         Lab 02/07/22  0644   PROTIME 13.2   INR 1.03             Lab 02/07/22  0728   ABO TYPING O   RH TYPING Positive   ANTIBODY SCREEN Negative         Brief Urine Lab Results  (Last result in the past 365 days)      Color   Clarity   Blood   Leuk Est   Nitrite   Protein   CREAT   Urine HCG        10/04/21 1116 Yellow   Clear   Negative   Negative   Negative   Negative                 Microbiology Results Abnormal     Procedure Component Value - Date/Time    COVID PRE-OP / PRE-PROCEDURE SCREENING ORDER (NO ISOLATION) - Swab, Nasopharynx [[id number]]  (Normal) Collected: 02/07/22 0943    Lab Status: Final result Specimen: Swab from Nasopharynx Updated: 02/07/22 1012    Narrative:      The following orders were created for panel order COVID PRE-OP / PRE-PROCEDURE SCREENING ORDER (NO ISOLATION) - Swab, Nasopharynx.  Procedure                               Abnormality         Status                     ---------                               -----------         ------                     COVID-19, ABBOTT IN-HOUS...[[id number]]  Normal              Final result                 Please view results for these tests on the individual orders.    COVID-19, ABBOTT IN-HOUSE,NASAL Swab (NO TRANSPORT MEDIA) 2 HR TAT - Swab, Nasopharynx [[id number]]  (Normal) Collected: 02/07/22 0943    Lab Status: Final result Specimen: Swab from Nasopharynx Updated: 02/07/22 1012     COVID19 Presumptive Negative    Narrative:      Fact sheet for providers: https://www.fda.gov/media/638104/download     Fact sheet for patients: https://www.fda.gov/media/787007/download    Test performed by PCR.  If inconsistent with clinical signs and symptoms patient should be tested with different authorized molecular test.          XR Hip With or Without Pelvis 2 - 3 View Left    Result Date:  2/7/2022  EXAMINATION: XR HIP W OR WO PELVIS 2-3 VIEW LEFT-  INDICATION: s/p L hip lizz; S72.145A-Nondisplaced intertrochanteric fracture of left femur, initial encounter for closed fracture; S50.11XA-Contusion of right forearm, initial encounter; T14.8XXA-Other injury of unspecified body region, initial encounter  COMPARISON: 2/7/2022  FINDINGS: Total left hip arthroplasty is now seen. No abnormal lucency is seen around the components. No fracture line or bony avulsion is identified. Soft tissue drain is noted in place.       Impression: Total left hip prosthesis in anatomic alignment.    This report was finalized on 2/7/2022 7:55 PM by Dr. Augie Leiva MD.        Results for orders placed during the hospital encounter of 07/19/21    Adult Transthoracic Echo Complete W/ Cont if Necessary Per Protocol    Interpretation Summary  · Estimated left ventricular EF = 65%  · Mild aortic valve stenosis is present.  · Aortic valve maximum pressure gradient is 32.9 mmHg. Aortic valve mean pressure gradient is 17.2 mmHg.  · Mild mitral valve regurgitation is present.  · Mild tricuspid valve regurgitation is present.  · Estimated right ventricular systolic pressure from tricuspid regurgitation is 37.0 mmHg.  · There is a large left pleural effusion. There is a moderate sized right pleural effusion.      I have reviewed the medications:  Scheduled Meds:bisoprolol, 5 mg, Oral, Q24H  budesonide-formoterol, 2 puff, Inhalation, BID - RT  enoxaparin, 40 mg, Subcutaneous, Q24H  ferrous sulfate, 325 mg, Oral, BID With Meals  levothyroxine, 75 mcg, Oral, Q AM  pantoprazole, 40 mg, Oral, BID AC  sertraline, 150 mg, Oral, Daily  sodium chloride, 10 mL, Intravenous, Q12H  sodium chloride, 3 mL, Intravenous, Q12H  torsemide, 40 mg, Oral, Daily      Continuous Infusions:lactated ringers, 9 mL/hr, Last Rate: 9 mL/hr (02/07/22 1640)  ropivacaine (NAROPIN) 0.2% peripheral nerve cath (moog), 8 mL/hr, Last Rate: 8 mL/hr (02/09/22 0918)      PRN  Meds:.•  acetaminophen  •  albuterol sulfate HFA  •  ALPRAZolam  •  HYDROcodone-acetaminophen  •  lactated ringers  •  Morphine **AND** naloxone  •  ondansetron **OR** ondansetron  •  sodium chloride  •  sodium chloride  •  sodium chloride  •  Tetanus-Diphth-Acell Pertussis    Assessment/Plan   Assessment & Plan     Active Hospital Problems    Diagnosis  POA   • Hip fracture (HCC) [S72.009A]  Yes      Resolved Hospital Problems   No resolved problems to display.        Brief Hospital Course to date:  Yin Law is a 81 y.o. female with h/o  Diastolic CHF, afib, COPD on home 5LNC, and RA,  found to have a let hip fracture after tripping over her oxygen tank.     This patient's problems and plans were partially entered by my partner and updated as appropriate by me 02/09/22. Today is my first day evaluating this patient's active medical problems. I Personally reviewed chart and adjusted note to reflect daily changes in management/clinical condition       Left femoral neck fracture  ---s/p open treatment of displaced femoral neck fracture on 2/7/22 per Dr. Powell  -postop care per Dr. Powell: wbat LLE; posterior hip precautions x 6 weeks;started sq lovenox 2/8/22 x 2 weeks,then asa 325mg daily x 4 more weeks for dvt prophylaxis    Post-op blood loss anemia, stable  -hgb stable at 9 today; monitor       Chronic DHF  --continue home meds inclusing torsemide  --monitor oxygenation/fluid status closely perioperatively  --follows with Dr. Montoya outpatient     Afib  --not on anticoagulation, as was taken off d/t anemia while previously taking  --cont bisoprolol with hold parameters     COPD (5Lnc dependent)  Chronic Respiratory Failure  --home O2 dependent on 5LNC,currently at baseline  --duonebs  --monitor oxygenation close perioperatively  --follows with pulm/[address] outpatient     Hypothyroid  --continue home synthroid  --TSH wnl      DVT prophylaxis:  Medical DVT prophylaxis orders are present. sq  lovenox      AM-PAC 6 Clicks Score (PT): 16 (02/09/22 1532)    Disposition: I expect the patient to be discharged to rehab at [facility] tomorrow 2/10/22, tranportation at 9am    CODE STATUS:   Code Status and Medical Interventions:   Ordered at: 02/07/22 1250     Level Of Support Discussed With:    Patient     Code Status (Patient has no pulse and is not breathing):    CPR (Attempt to Resuscitate)     Medical Interventions (Patient has pulse or is breathing):    Full Support       Junior Smalls MD  02/09/22

## 2022-02-09 NOTE — PROGRESS NOTES
"Orthopedic Daily Progress Note      CC: MELISSA overnight. Eating breakfast this morning.    Pain well controlled  General: no fevers, chills  Abdomen: no nausea, vomiting, or diarrhea    No other complaints    Physical Exam:  I have reviewed the vital signs.  Temp:  [98 °F (36.7 °C)-99.3 °F (37.4 °C)] 98.3 °F (36.8 °C)  Heart Rate:  [68-89] 87  Resp:  [17-18] 17  BP: ()/(48-65) 143/54    Objective:  Vital signs: (most recent): Blood pressure 143/54, pulse 87, temperature 98.3 °F (36.8 °C), temperature source Oral, resp. rate 17, height 167.6 cm (65.98\"), weight 55.8 kg (123 lb 0.3 oz), SpO2 93 %, not currently breastfeeding.            General Appearance:    Alert, cooperative, no distress  Extremities: No clubbing, cyanosis, or edema to lower extremities  Pulses:  2+ in distal surgical extremity  Skin: Incision Clean/dry/intact      Results Review:    I have reviewed the labs, radiology results and diagnostic studies: Hgb 9.0    Results from last 7 days   Lab Units 02/09/22  0425   WBC 10*3/mm3 10.78   HEMOGLOBIN g/dL 9.0*   PLATELETS 10*3/mm3 151     Results from last 7 days   Lab Units 02/09/22  0425   SODIUM mmol/L 131*   POTASSIUM mmol/L 3.7   CO2 mmol/L 23.0   CREATININE mg/dL 1.14*   GLUCOSE mg/dL 132*       I have reviewed the medications.    Assessment/Problem List  POD# 2 Day Post-Op   S/p L hip hemiarthroplasty    Plan  WBAT LLE  Posterior hip precautions x 6 weeks  PT/OT  Lovenox x 2 weeks for DVT prophylaxis followed by  mg x 4 more weeks      Discharge Planning: I expect patient to be discharged to TBD in TBD days.    Napoleon Powell Jr., MD  02/09/22  08:17 EST            "

## 2022-02-09 NOTE — THERAPY TREATMENT NOTE
Patient Name: Yin Law  : 1940    MRN: [id number]                              Today's Date: 2022       Admit Date: 2022    Visit Dx:     ICD-10-CM ICD-9-CM   1. Closed nondisplaced intertrochanteric fracture of left femur, initial encounter ([facility])  S72.145A 820.21   2. Contusion of right forearm, initial encounter  S50.11XA 923.10   3. Skin abrasion  T14.8XXA 919.0   4. Atrial fibrillation, persistent ([facility])  I48.19 427.31     Patient Active Problem List   Diagnosis   • Multifocal pneumonia   • Recurrent atrial fibrillation with RVR (CMS/[facility])   • COPD on home oxygen (CMS/[facility])   • Hypertension and diastolic dysfunction   • Rheumatoid arthritis ([facility])   • Anxiety and depression   • Wound of right leg   • Anemia   • Elevated troponin   • Sepsis ([facility])   • Pleural effusion, bilateral   • Acute diastolic CHF (CMS/[facility])   • Accelerated hypertension   • Pneumonia due to infectious organism   • Severe malnutrition (CMS/[facility])   • Hip fracture ([facility])     Past Medical History:   Diagnosis Date   • Anxiety and depression    • Arrhythmia     A-FIB   • Asthma    • Chicken pox    • COPD (chronic obstructive pulmonary disease) ([facility])    • Hyperlipidemia    • Hypertension    • Measles    • Menopause    • Osteoporosis    • Rheumatoid arthritis ([facility])    • Rheumatoid arthritis ([facility])    • Tobacco abuse      Past Surgical History:   Procedure Laterality Date   • APPENDECTOMY     • CARPAL TUNNEL RELEASE Left    • CATARACT EXTRACTION, BILATERAL     • COLON SURGERY     • COLONOSCOPY     • GALLBLADDER SURGERY     • HIP HEMIARTHROPLASTY Left 2022    Procedure: HIP HEMIARTHROPLASTY LEFT;  Surgeon: Napoleon Powell Jr., MD;  Location: [facility];  Service: Orthopedics;  Laterality: Left;   • HYSTERECTOMY     • TONSILLECTOMY        General Information     Row Name 22 1606          OT Time and Intention    Document Type therapy note (daily note)  -AN     Mode of Treatment occupational therapy  -AN     Row Name  02/09/22 1606          General Information    Patient Profile Reviewed yes  -AN     Existing Precautions/Restrictions fall; left; hip, posterior; brace worn when out of bed; other (see comments); oxygen therapy device and L/min  L fascia iliaca, KI donned when OOB, posterior hip precautions  -AN     Barriers to Rehab medically complex  -AN     Row Name 02/09/22 1606          Cognition    Orientation Status (Cognition) oriented x 3  -AN     Row Name 02/09/22 1606          Safety Issues, Functional Mobility    Safety Issues Affecting Function (Mobility) insight into deficits/self-awareness; safety precaution awareness; safety precautions follow-through/compliance; sequencing abilities  -AN     Impairments Affecting Function (Mobility) balance; endurance/activity tolerance; strength; range of motion (ROM); pain; shortness of breath; cognition  -AN     Cognitive Impairments, Mobility Safety/Performance attention; awareness, need for assistance; insight into deficits/self-awareness; judgment; problem-solving/reasoning; safety precaution awareness; safety precaution follow-through; sequencing abilities  -AN     Comment, Safety Issues/Impairments (Mobility) cues for sequencing of steps  -AN           User Key  (r) = Recorded By, (t) = Taken By, (c) = Cosigned By    Initials Name Provider Type    AN Court De Leon OT Occupational Therapist                 Mobility/ADL's     Row Name 02/09/22 1607          Bed Mobility    Comment (Bed Mobility) up in chair upon arrival  -AN     Row Name 02/09/22 1607          Transfers    Transfers sit-stand transfer  -AN     Comment (Transfers) verbal cues for hand placement and transfer technique using RW  -AN     Sit-Stand [place] (Transfers) minimum assist (75% patient effort); 2 person assist; verbal cues; nonverbal cues (demo/gesture)  -AN     Row Name 02/09/22 1607          Sit-Stand Transfer    Assistive Device (Sit-Stand Transfers) walker, front-wheeled  -AN     Row Name  02/09/22 1607          Functional Mobility    Functional Mobility- Ind. Level not tested  -AN     Row Name 02/09/22 1607          Activities of Daily Living    BADL Assessment/Intervention lower body dressing; grooming  -AN     Row Name 02/09/22 1607          Mobility    Extremity Weight-bearing Status left lower extremity  -AN     Left Lower Extremity (Weight-bearing Status) weight-bearing as tolerated (WBAT)  -AN     Row Name 02/09/22 1607          Lower Body Dressing Assessment/Training    [place] Level (Lower Body Dressing) don; doff; socks; shoes/slippers; maximum assist (25% patient effort)  -AN     Assistive Devices (Lower Body Dressing) leg ; long-handled shoe horn; reacher; sock-aid; other (see comments)  -AN     Position (Lower Body Dressing) supported sitting  -AN     Comment (Lower Body Dressing) Continued with adaptive equipment education for LB ADLs; pt doffed socks using reacher with Min A and donned sock using sock aid with Mod A and cues; Max A for donning of tennis shoes using shoe horn  -AN     Row Name 02/09/22 1607          Grooming Assessment/Training    [place] Level (Grooming) oral care regimen; set up  -AN     Position (Grooming) supported sitting  -AN           User Key  (r) = Recorded By, (t) = Taken By, (c) = Cosigned By    Initials Name Provider Type    Court Shoemaker OT Occupational Therapist               Obj/Interventions     Row Name 02/09/22 1610          Motor Skills    Motor Skills functional endurance  -AN     Functional Endurance requires rest breaks throughout due to fatigue, cues for breathing technique  -AN     Row Name 02/09/22 1610          Balance    Balance Assessment sitting static balance; sitting dynamic balance; sit to stand dynamic balance; standing static balance  -AN     Static Sitting Balance WFL; sitting in chair  -AN     Dynamic Sitting Balance WFL; sitting in chair  -AN     Sit to Stand Dynamic Balance supported; standing; moderate  impairment  -AN     Static Standing Balance mild impairment; supported; standing  -AN     Balance Interventions standing; sit to stand; supported; static; minimal challenge; occupation based/functional task  -AN           User Key  (r) = Recorded By, (t) = Taken By, (c) = Cosigned By    Initials Name Provider Type    Court Shoemaker OT Occupational Therapist               Goals/Plan    No documentation.                Clinical Impression     Row Name 02/09/22 1611          Pain Assessment    Additional Documentation Pain Scale: FACES Pre/Post-Treatment (Group)  -AN     Row Name 02/09/22 1611          Pain Scale: FACES Pre/Post-Treatment    Pain: FACES Scale, Pretreatment 2-->hurts little bit  -AN     Posttreatment Pain Rating 4-->hurts little more  -AN     Pain Location - Side Left  -AN     Pain Location - Orientation lower  -AN     Pain Location extremity  -AN     Pre/Posttreatment Pain Comment RN aware and managing  -AN     Row Name 02/09/22 1611          Plan of Care Review    Plan of Care Reviewed With patient  -AN     Progress improving  -AN     Outcome Summary Pt continues to require education on L hip precautions and was unable to recall 3/3 at end of session. Pt performed STS at chair with Min A x 2 using the RW with cues. Pt performed grooming sitting in chair and LB ADLs using adaptive equipment. Continue to educate for good carry over. OT recs IPR at [place].  -AN     [facility] Name 02/09/22 1611          Therapy Plan Review/Discharge Plan (OT)    Anticipated Discharge Disposition (OT) inpatient rehabilitation facility  -[facility] 02/09/22 1611          Vital Signs    Pre Systolic BP Rehab --  VSS  -AN     O2 Delivery Pre Treatment nasal cannula  -AN     O2 Delivery Intra Treatment nasal cannula  -AN     O2 Delivery Post Treatment nasal cannula  -AN     Pre Patient Position Sitting  -AN     Intra Patient Position Standing  -AN     Post Patient Position Sitting  -AN     Row Name 02/09/22 1611           Positioning and Restraints    Pre-Treatment Position sitting in chair/recliner  -AN     Post Treatment Position chair  -AN     In Chair notified nsg; reclined; call light within reach; encouraged to call for assist; exit alarm on; waffle cushion; on mechanical lift sling; legs elevated; pillow between legs; L knee immobilizer  -AN           User Key  (r) = Recorded By, (t) = Taken By, (c) = Cosigned By    Initials Name Provider Type    Court Shoemaker, DANIAL Occupational Therapist               Outcome Measures     Row Name 02/09/22 1614          How much help from another is currently needed...    Putting on and taking off regular lower body clothing? 2  -AN     Bathing (including washing, rinsing, and drying) 2  -AN     Toileting (which includes using toilet bed pan or urinal) 2  -AN     Putting on and taking off regular upper body clothing 3  -AN     Taking care of personal grooming (such as brushing teeth) 3  -AN     Eating meals 3  -AN     AM-PAC 6 Clicks Score (OT) 15  -     Row Name 02/09/22 1532          How much help from another person do you currently need...    Turning from your back to your side while in flat bed without using bedrails? 3  -LH     Moving from lying on back to sitting on the side of a flat bed without bedrails? 2  -LH     Moving to and from a bed to a chair (including a wheelchair)? 3  -LH     Standing up from a chair using your arms (e.g., wheelchair, bedside chair)? 3  -LH     Climbing 3-5 steps with a railing? 2  -LH     To walk in hospital room? 3  -     AM-PAC 6 Clicks Score (PT) 16  -     Row Name 02/09/22 1614 02/09/22 1532       Functional Assessment    Outcome Measure Options AM-PAC 6 Clicks Daily Activity (OT)  -AN AM-PAC 6 Clicks Basic Mobility (PT)  -          User Key  (r) = Recorded By, (t) = Taken By, (c) = Cosigned By    Initials Name Provider Type     Misha Espinosa, PT Physical Therapist    Court Shoemaker OT Occupational Therapist                 Occupational Therapy Education                 Title: PT OT SLP Therapies (In Progress)     Topic: Occupational Therapy (In Progress)     Point: ADL training (Done)     Description:   Instruct learner(s) on proper safety adaptation and remediation techniques during self care or transfers.   Instruct in proper use of assistive devices.              Learning Progress Summary           Patient Acceptance, E, VU,NR by AN at 2/9/2022 1614    Acceptance, E, VU,NR by AN at 2/8/2022 1544                   Point: Home exercise program (Not Started)     Description:   Instruct learner(s) on appropriate technique for monitoring, assisting and/or progressing therapeutic exercises/activities.              Learner Progress:  Not documented in this visit.          Point: Precautions (Done)     Description:   Instruct learner(s) on prescribed precautions during self-care and functional transfers.              Learning Progress Summary           Patient Acceptance, E, VU,NR by AN at 2/9/2022 1614    Acceptance, E, VU,NR by AN at 2/8/2022 1544                   Point: Body mechanics (Done)     Description:   Instruct learner(s) on proper positioning and spine alignment during self-care, functional mobility activities and/or exercises.              Learning Progress Summary           Patient Acceptance, E, VU,NR by AN at 2/9/2022 1614    Acceptance, E, VU,NR by AN at 2/8/2022 1544                               User Key     Initials Effective Dates Name Provider Type Discipline     09/21/21 -  Court De Leon OT Occupational Therapist OT              OT Recommendation and Plan  Planned Therapy Interventions (OT): activity tolerance training, adaptive equipment training, BADL retraining, functional balance retraining, occupation/activity based interventions, patient/caregiver education/training, transfer/mobility retraining  Therapy Frequency (OT): daily  Plan of Care Review  Plan of Care Reviewed With: patient  Progress:  improving  Outcome Summary: Pt continues to require education on L hip precautions and was unable to recall 3/3 at end of session. Pt performed STS at chair with Min A x 2 using the RW with cues. Pt performed grooming sitting in chair and LB ADLs using adaptive equipment. Continue to educate for good carry over. OT recs IPR at [place].     Time Calculation:    Time Calculation- OT     Row Name 02/09/22 1614 02/09/22 1314          Time Calculation- OT    OT Start Time 1311  -AN --     OT Received On 02/09/22  -AN --     OT Goal Re-Cert Due Date 02/18/22  -AN --            Timed Charges    03351 - Gait Training Minutes  -- 9  -LH     41944 - OT Self Care/Mgmt Minutes 23  -AN --            Total Minutes    Timed Charges Total Minutes 23  -AN 9  -LH      Total Minutes 23  -AN 9  -LH           User Key  (r) = Recorded By, (t) = Taken By, (c) = Cosigned By    Initials Name Provider Type     Misha Espinosa, PT Physical Therapist    Court Shoemaker, OT Occupational Therapist              Therapy Charges for Today     Code Description Service Date Service Provider Modifiers Qty    45943680456 HC OT SELF CARE/MGMT/TRAIN EA 15 MIN 2/8/2022 Garrison De Leonil, OT GO 1    76886384324 HC OT EVAL LOW COMPLEXITY 4 2/8/2022 NeGarrison pillaiil, OT GO 1    [id number]  OT THER SUPP EA 15 MIN 2/8/2022 Court De Leon, OT GO 2    [id number]  OT SELF CARE/MGMT/TRAIN EA 15 MIN 2/9/2022 NeCourt pillai, OT GO 2               Court Nedeljkrony, OT  2/9/2022

## 2022-02-09 NOTE — THERAPY TREATMENT NOTE
Patient Name: Yin Law  : 1940    MRN: [id number]                              Today's Date: 2022       Admit Date: 2022    Visit Dx:     ICD-10-CM ICD-9-CM   1. Closed nondisplaced intertrochanteric fracture of left femur, initial encounter ([facility])  S72.145A 820.21   2. Contusion of right forearm, initial encounter  S50.11XA 923.10   3. Skin abrasion  T14.8XXA 919.0   4. Atrial fibrillation, persistent ([facility])  I48.19 427.31     Patient Active Problem List   Diagnosis   • Multifocal pneumonia   • Recurrent atrial fibrillation with RVR (CMS/[facility])   • COPD on home oxygen (CMS/[facility])   • Hypertension and diastolic dysfunction   • Rheumatoid arthritis ([facility])   • Anxiety and depression   • Wound of right leg   • Anemia   • Elevated troponin   • Sepsis ([facility])   • Pleural effusion, bilateral   • Acute diastolic CHF (CMS/[facility])   • Accelerated hypertension   • Pneumonia due to infectious organism   • Severe malnutrition (CMS/[facility])   • Hip fracture ([facility])     Past Medical History:   Diagnosis Date   • Anxiety and depression    • Arrhythmia     A-FIB   • Asthma    • Chicken pox    • COPD (chronic obstructive pulmonary disease) ([facility])    • Hyperlipidemia    • Hypertension    • Measles    • Menopause    • Osteoporosis    • Rheumatoid arthritis ([facility])    • Rheumatoid arthritis ([facility])    • Tobacco abuse      Past Surgical History:   Procedure Laterality Date   • APPENDECTOMY     • CARPAL TUNNEL RELEASE Left    • CATARACT EXTRACTION, BILATERAL     • COLON SURGERY     • COLONOSCOPY     • GALLBLADDER SURGERY     • HIP HEMIARTHROPLASTY Left 2022    Procedure: HIP HEMIARTHROPLASTY LEFT;  Surgeon: Napoleon Powell Jr., MD;  Location: [facility];  Service: Orthopedics;  Laterality: Left;   • HYSTERECTOMY     • TONSILLECTOMY        General Information     Row Name 22 1522          Physical Therapy Time and Intention    Document Type therapy note (daily note)  -     Mode of Treatment physical therapy  -     Row  Name 02/09/22 1522          General Information    Existing Precautions/Restrictions fall; left; hip, posterior; brace worn when out of bed; other (see comments); oxygen therapy device and L/min  L fascia iliaca, KI donned when OOB, posterior hip precautions  -     Barriers to Rehab medically complex  -     Row Name 02/09/22 1522          Cognition    Orientation Status (Cognition) oriented x 4  -[facility] Name 02/09/22 1522          Safety Issues, Functional Mobility    Safety Issues Affecting Function (Mobility) safety precaution awareness; safety precautions follow-through/compliance; sequencing abilities; positioning of assistive device; judgment  -     Impairments Affecting Function (Mobility) balance; endurance/activity tolerance; strength; range of motion (ROM); pain; shortness of breath  -     Comment, Safety Issues/Impairments (Mobility) Alert and following commands  -           User Key  (r) = Recorded By, (t) = Taken By, (c) = Cosigned By    Initials Name Provider Type     Misha Espinosa, PT Physical Therapist               Mobility     Row Name 02/09/22 1522          Bed Mobility    Supine-Sit [place] (Bed Mobility) verbal cues; moderate assist (50% patient effort); 2 person assist  -     Assistive Device (Bed Mobility) head of bed elevated; draw sheet  -     Comment (Bed Mobility) Pt requires physical assist for positioning of LLE on EOB and for trunk control.  -[facility] Name 02/09/22 1522          Transfers    Comment (Transfers) Pt requires VCs for hand placement for proper push off on EOB as well as cues to advance LLE forward prior to transfers.  -     Row Name 02/09/22 1522          Sit-Stand Transfer    Sit-Stand [place] (Transfers) minimum assist (75% patient effort); 2 person assist; verbal cues; nonverbal cues (demo/gesture)  -     Assistive Device (Sit-Stand Transfers) walker, front-wheeled  -     Row Name 02/09/22 1522          Gait/Stairs (Locomotion)     [place] Level (Gait) minimum assist (75% patient effort); 1 person assist; 1 person to manage equipment; verbal cues; nonverbal cues (demo/gesture)  -     Assistive Device (Gait) walker, front-wheeled  -     Distance in Feet (Gait) 25  -     Deviations/Abnormal Patterns (Gait) bilateral deviations; antalgic; cindi decreased; gait speed decreased; stride length decreased  -     Bilateral Gait Deviations forward flexed posture  -     Left Sided Gait Deviations heel strike decreased; weight shift ability decreased  -     [place] Level (Stairs) not tested  -     Comment (Gait/Stairs) Pt demonstrated step to gait pattern with decreased speed and decreased LLE weight bearing. Pt required VCs to maintain upright posture and for positioning of RW. Pt limited this session by mild complaints of light headedness, fatigue, and weakness. BP remained stable throughout.  -     Row Name 02/09/22 1522          Mobility    Extremity Weight-bearing Status left lower extremity  -     Left Lower Extremity (Weight-bearing Status) weight-bearing as tolerated (WBAT)  -           User Key  (r) = Recorded By, (t) = Taken By, (c) = Cosigned By    Initials Name Provider Type     Misha Espinosa, PT Physical Therapist               Obj/Interventions     Row Name 02/09/22 1526          Motor Skills    Therapeutic Exercise hip; knee; ankle  -[facility] Name 02/09/22 1526          Hip (Therapeutic Exercise)    Hip Isometrics (Therapeutic Exercise) bilateral; gluteal sets; supine; 10 repetitions  -     Hip Strengthening (Therapeutic Exercise) bilateral; aBduction; aDduction; supine; 10 repetitions  -     Row Name 02/09/22 1526          Knee (Therapeutic Exercise)    Knee Isometrics (Therapeutic Exercise) bilateral; quad sets; supine; 10 repetitions  -     Knee Strengthening (Therapeutic Exercise) bilateral; SLR (straight leg raise); supine; 10 repetitions  -     Row Name 02/09/22 1526          Ankle  (Therapeutic Exercise)    Ankle AROM (Therapeutic Exercise) bilateral; dorsiflexion; plantarflexion; supine; 10 repetitions  -     Row Name 02/09/22 1526          Balance    Balance Assessment sitting static balance; standing static balance; standing dynamic balance; sitting dynamic balance  -     Static Sitting Balance WFL; supported; sitting, edge of bed  -     Dynamic Sitting Balance mild impairment; sitting, edge of bed  -     Static Standing Balance mild impairment; supported; standing  -     Dynamic Standing Balance mild impairment; supported; standing  -     Comment, Balance Pt requires [place] for support with RW during transfers and ambulation d/t mild instability, weakness, and LLE pain.  -           User Key  (r) = Recorded By, (t) = Taken By, (c) = Cosigned By    Initials Name Provider Type     Misha Espinosa PT Physical Therapist               Goals/Plan    No documentation.                Clinical Impression     [facility] Name 02/09/22 1528          Pain    Additional Documentation Pain Scale: Numbers Pre/Post-Treatment (Group); Pain Scale: FACES Pre/Post-Treatment (Group)  -[facility] Name 02/09/22 1528          Pain Scale: Numbers Pre/Post-Treatment    Pain Intervention(s) Repositioned; Ambulation/increased activity; Elevated; Rest  -[facility] Name 02/09/22 1528          Pain Scale: FACES Pre/Post-Treatment    Pain: FACES Scale, Pretreatment 2-->hurts little bit  -     Posttreatment Pain Rating 4-->hurts little more  -     Pain Location - Side Left  -     Pain Location - Orientation lower  -     Pain Location extremity  -     Row Name 02/09/22 1528          Plan of Care Review    Plan of Care Reviewed With patient  -     Progress improving  -     Outcome Summary Pt demosntrating good improvements in independence with functional mobility this session, progressing ambulation distance to 25ft with RW and MinAx1 +1 for equipment management limited by weakness, fatigue, and mild  complaints of light headedness. Pt required ModAx2 for supine to sit, and MinAx2 for STS transfer with RW. PT reviewed posterior hip precautions and HEP. PT plans to continue progressing PT POC as tolerated. Recommending inpatient rehab at [place].  -[facility] Name 02/09/22 1528          Vital Signs    Pre Systolic BP Rehab --  VSS  -     O2 Delivery Pre Treatment nasal cannula  -     O2 Delivery Intra Treatment nasal cannula  -     O2 Delivery Post Treatment nasal cannula  -LH     Pre Patient Position Supine  -     Intra Patient Position Standing  -     Row Name 02/09/22 1528          Positioning and Restraints    Pre-Treatment Position in bed  -LH     Post Treatment Position chair  -     In Chair notified nsg; sitting; call light within reach; encouraged to call for assist; exit alarm on; with OT  -           User Key  (r) = Recorded By, (t) = Taken By, (c) = Cosigned By    Initials Name Provider Type     Misha Espinosa, PT Physical Therapist               Outcome Measures     Row Name 02/09/22 1532          How much help from another person do you currently need...    Turning from your back to your side while in flat bed without using bedrails? 3  -LH     Moving from lying on back to sitting on the side of a flat bed without bedrails? 2  -LH     Moving to and from a bed to a chair (including a wheelchair)? 3  -LH     Standing up from a chair using your arms (e.g., wheelchair, bedside chair)? 3  -LH     Climbing 3-5 steps with a railing? 2  -LH     To walk in hospital room? 3  -     AM-PAC 6 Clicks Score (PT) 16  -     Row Name 02/09/22 1532          Functional Assessment    Outcome Measure Options AM-PAC 6 Clicks Basic Mobility (PT)  -           User Key  (r) = Recorded By, (t) = Taken By, (c) = Cosigned By    Initials Name Provider Type     Misha Espinosa PT Physical Therapist                             Physical Therapy Education                 Title: PT OT SLP Therapies (In Progress)      Topic: Physical Therapy (Done)     Point: Mobility training (Done)     Learning Progress Summary           Patient Acceptance, E, VU,NR by  at 2/9/2022 1314    Comment: Reviewed hip precautions, safety with mobility, and HEP.    Acceptance, E,D, VU by  at 2/8/2022 1315    Comment: Educated on safe sequencing with bed mobility, ambulatory transfers, and gait. Reviewed HEP and posterior hip precautions.                   Point: Home exercise program (Done)     Learning Progress Summary           Patient Acceptance, E, VU,NR by  at 2/9/2022 1314    Comment: Reviewed hip precautions, safety with mobility, and HEP.    Acceptance, E,D, VU by  at 2/8/2022 1315    Comment: Educated on safe sequencing with bed mobility, ambulatory transfers, and gait. Reviewed HEP and posterior hip precautions.                   Point: Body mechanics (Done)     Learning Progress Summary           Patient Acceptance, E, VU,NR by  at 2/9/2022 1314    Comment: Reviewed hip precautions, safety with mobility, and HEP.    Acceptance, E,D, VU by  at 2/8/2022 1315    Comment: Educated on safe sequencing with bed mobility, ambulatory transfers, and gait. Reviewed HEP and posterior hip precautions.                   Point: Precautions (Done)     Learning Progress Summary           Patient Acceptance, E, VU,NR by  at 2/9/2022 1314    Comment: Reviewed hip precautions, safety with mobility, and HEP.    Acceptance, E,D, VU by  at 2/8/2022 1315    Comment: Educated on safe sequencing with bed mobility, ambulatory transfers, and gait. Reviewed HEP and posterior hip precautions.                               User Key     Initials Effective Dates Name Provider Type Discipline     06/16/21 -  Elgin Palmer, PT Physical Therapist PT     09/21/21 -  Misha Espinosa, PT Physical Therapist PT              PT Recommendation and Plan     Plan of Care Reviewed With: patient  Progress: improving  Outcome Summary: Pt demosntrating good  improvements in independence with functional mobility this session, progressing ambulation distance to 25ft with RW and MinAx1 +1 for equipment management limited by weakness, fatigue, and mild complaints of light headedness. Pt required ModAx2 for supine to sit, and MinAx2 for STS transfer with RW. PT reviewed posterior hip precautions and HEP. PT plans to continue progressing PT POC as tolerated. Recommending inpatient rehab at [place].     Time Calculation:    PT Charges     Row Name 02/09/22 1314             Time Calculation    Start Time 1314  -      PT Received On 02/09/22  -      PT Goal Re-Cert Due Date 02/18/22  -              Time Calculation- PT    Total Timed Code Minutes- PT 23 minute(s)  -              Timed Charges    46943 - PT Therapeutic Exercise Minutes 10  -      35993 - Gait Training Minutes  9  -      36992 - PT Therapeutic Activity Minutes 4  -              Total Minutes    Timed Charges Total Minutes 23  -       Total Minutes 23  -            User Key  (r) = Recorded By, (t) = Taken By, (c) = Cosigned By    Initials Name Provider Type     Misha Espinosa, PT Physical Therapist              Therapy Charges for Today     Code Description Service Date Service Provider Modifiers Qty    59309354635 HC PT THER PROC EA 15 MIN 2/9/2022 Misha Espinosa, PT GP 1    14964406395 HC GAIT TRAINING EA 15 MIN 2/9/2022 Misha Espinosa, PT GP 1          PT G-Codes  Outcome Measure Options: AM-PAC 6 Clicks Basic Mobility (PT)  AM-PAC 6 Clicks Score (PT): 16  AM-PAC 6 Clicks Score (OT): 15    Misha Espinosa PT  2/9/2022

## 2022-02-09 NOTE — PLAN OF CARE
Goal Outcome Evaluation:  Plan of Care Reviewed With: patient        Progress: no change     Patient is alert and oriented with intermittent confusion. Patient is easily reoriented. Comlaints of pain, but refuses PRN NORCO (only wants Tylenol). V/S stable. Will continue to monitor.

## 2022-02-09 NOTE — PROGRESS NOTES
[facility]    Acute pain service Inpatient Progress Note    Patient Name: Yin Law  :  1940  MRN:  [id number]        Acute Pain  Service Inpatient Progress Note:    Analgesia:Excellent  Pain Score:0/10  LOC: alert and awake  Resp Status: room air  Cardiac: VS stable  Side Effects:None  Catheter Site:clean, dressing intact and dry  Cath type: peripheral nerve cath(MOOG pump)  Infusion rate: 8ml/hr  Catheter Plan:Catheter to remain Insitu and Continue catheter infusion rate unchanged

## 2022-02-09 NOTE — CASE MANAGEMENT/SOCIAL WORK
Case Management Discharge Note      Final Note: Patient has been accepted to go to skilled rehab on Thursday, 2/10, if medically ready per Rebecca @ [facility]. RN to call report to [phone]. [facility] will provide transportation at 9:00am on Thursday, 2/10. CM spoke with Enedina JULIAN and patient and both are agreeable to discharge plan.         Selected Continued Care - Admitted Since 2/7/2022     Destination Coordination complete.    Service Provider Selected Services Address Phone Fax Patient Preferred    [facility]  Skilled Nursing [address] [place], [place] [id number] [phone] --          Durable Medical Equipment    No services have been selected for the patient.              Dialysis/Infusion    No services have been selected for the patient.              Home Medical Care    No services have been selected for the patient.              Therapy    No services have been selected for the patient.              Community Resources    No services have been selected for the patient.              Community & DME    No services have been selected for the patient.                       Final Discharge Disposition Code: 03 - skilled nursing facility (SNF)

## 2022-02-09 NOTE — PLAN OF CARE
Goal Outcome Evaluation:  Plan of Care Reviewed With: patient        Progress: improving  Outcome Summary: Pt continues to require education on L hip precautions and was unable to recall 3/3 at end of session. Pt performed STS at chair with Min A x 2 using the RW with cues. Pt performed grooming sitting in chair and LB ADLs using adaptive equipment. Continue to educate for good carry over. OT recs IPR at [place].

## 2022-02-09 NOTE — PLAN OF CARE
Goal Outcome Evaluation:  Plan of Care Reviewed With: patient        Progress: improving  Outcome Summary: Pt demosntrating good improvements in independence with functional mobility this session, progressing ambulation distance to 25ft with RW and MinAx1 +1 for equipment management limited by weakness, fatigue, and mild complaints of light headedness. Pt required ModAx2 for supine to sit, and MinAx2 for STS transfer with RW. PT reviewed posterior hip precautions and HEP. PT plans to continue progressing PT POC as tolerated. Recommending inpatient rehab at [place].

## 2022-02-10 ENCOUNTER — APPOINTMENT (OUTPATIENT)
Dept: GENERAL RADIOLOGY | Facility: HOSPITAL | Age: 82
End: 2022-02-10

## 2022-02-10 ENCOUNTER — APPOINTMENT (OUTPATIENT)
Dept: CARDIOLOGY | Facility: HOSPITAL | Age: 82
End: 2022-02-10

## 2022-02-10 PROBLEM — I38 HEART VALVE DISEASE: Status: ACTIVE | Noted: 2022-02-10

## 2022-02-10 PROBLEM — I48.0 PAROXYSMAL ATRIAL FIBRILLATION (HCC): Status: ACTIVE | Noted: 2021-07-19

## 2022-02-10 PROBLEM — J90 PLEURAL EFFUSION, BILATERAL: Status: RESOLVED | Noted: 2021-08-05 | Resolved: 2022-02-10

## 2022-02-10 PROBLEM — E78.2 MIXED HYPERLIPIDEMIA: Status: ACTIVE | Noted: 2022-02-10

## 2022-02-10 PROBLEM — R77.8 ELEVATED TROPONIN: Status: RESOLVED | Noted: 2021-08-05 | Resolved: 2022-02-10

## 2022-02-10 PROBLEM — J18.9 MULTIFOCAL PNEUMONIA: Status: RESOLVED | Noted: 2021-07-19 | Resolved: 2022-02-10

## 2022-02-10 PROBLEM — I10 ACCELERATED HYPERTENSION: Status: RESOLVED | Noted: 2021-09-14 | Resolved: 2022-02-10

## 2022-02-10 PROBLEM — I50.31 DIASTOLIC CHF, ACUTE (HCC): Status: RESOLVED | Noted: 2021-08-06 | Resolved: 2022-02-10

## 2022-02-10 PROBLEM — A41.9 SEPSIS: Status: RESOLVED | Noted: 2021-08-05 | Resolved: 2022-02-10

## 2022-02-10 PROBLEM — J18.9 PNEUMONIA DUE TO INFECTIOUS ORGANISM: Status: RESOLVED | Noted: 2021-09-14 | Resolved: 2022-02-10

## 2022-02-10 LAB
ALBUMIN SERPL-MCNC: 3.4 G/DL (ref 3.5–5.2)
ALBUMIN/GLOB SERPL: 1.3 G/DL
ALP SERPL-CCNC: 105 U/L (ref 39–117)
ALT SERPL W P-5'-P-CCNC: <5 U/L (ref 1–33)
ANION GAP SERPL CALCULATED.3IONS-SCNC: 14 MMOL/L (ref 5–15)
AST SERPL-CCNC: 32 U/L (ref 1–32)
BACTERIA UR QL AUTO: ABNORMAL /HPF
BASOPHILS # BLD AUTO: 0.03 10*3/MM3 (ref 0–0.2)
BASOPHILS NFR BLD AUTO: 0.4 % (ref 0–1.5)
BH CV ECHO MEAS - AO MAX PG (FULL): 13.1 MMHG
BH CV ECHO MEAS - AO MAX PG: 16.7 MMHG
BH CV ECHO MEAS - AO MEAN PG (FULL): 7.8 MMHG
BH CV ECHO MEAS - AO MEAN PG: 9.6 MMHG
BH CV ECHO MEAS - AO ROOT AREA (BSA CORRECTED): 1.6
BH CV ECHO MEAS - AO ROOT AREA: 5.1 CM^2
BH CV ECHO MEAS - AO ROOT DIAM: 2.5 CM
BH CV ECHO MEAS - AO V2 MAX: 204.6 CM/SEC
BH CV ECHO MEAS - AO V2 MEAN: 141.8 CM/SEC
BH CV ECHO MEAS - AO V2 VTI: 44.9 CM
BH CV ECHO MEAS - ASC AORTA: 3.2 CM
BH CV ECHO MEAS - AVA(I,A): 1.4 CM^2
BH CV ECHO MEAS - AVA(I,D): 1.4 CM^2
BH CV ECHO MEAS - AVA(V,A): 1.5 CM^2
BH CV ECHO MEAS - AVA(V,D): 1.5 CM^2
BH CV ECHO MEAS - BSA(HAYCOCK): 1.6 M^2
BH CV ECHO MEAS - BSA: 1.6 M^2
BH CV ECHO MEAS - BZI_BMI: 20.5 KILOGRAMS/M^2
BH CV ECHO MEAS - BZI_METRIC_HEIGHT: 165.1 CM
BH CV ECHO MEAS - BZI_METRIC_WEIGHT: 55.8 KG
BH CV ECHO MEAS - EDV(CUBED): 58.5 ML
BH CV ECHO MEAS - EDV(MOD-SP2): 100 ML
BH CV ECHO MEAS - EDV(MOD-SP4): 96 ML
BH CV ECHO MEAS - EDV(TEICH): 65.2 ML
BH CV ECHO MEAS - EF(CUBED): 69.1 %
BH CV ECHO MEAS - EF(MOD-BP): 51 %
BH CV ECHO MEAS - EF(MOD-SP2): 55 %
BH CV ECHO MEAS - EF(MOD-SP4): 44.8 %
BH CV ECHO MEAS - EF(TEICH): 61.3 %
BH CV ECHO MEAS - ESV(CUBED): 18.1 ML
BH CV ECHO MEAS - ESV(MOD-SP2): 45 ML
BH CV ECHO MEAS - ESV(MOD-SP4): 53 ML
BH CV ECHO MEAS - ESV(TEICH): 25.2 ML
BH CV ECHO MEAS - FS: 32.4 %
BH CV ECHO MEAS - IVS/LVPW: 0.93
BH CV ECHO MEAS - IVSD: 1.2 CM
BH CV ECHO MEAS - LA DIMENSION: 5 CM
BH CV ECHO MEAS - LA/AO: 2
BH CV ECHO MEAS - LAD MAJOR: 6.3 CM
BH CV ECHO MEAS - LAT PEAK E' VEL: 9.8 CM/SEC
BH CV ECHO MEAS - LATERAL E/E' RATIO: 17.9
BH CV ECHO MEAS - LV DIASTOLIC VOL/BSA (35-75): 59.7 ML/M^2
BH CV ECHO MEAS - LV IVRT: 0.05 SEC
BH CV ECHO MEAS - LV MASS(C)D: 174.8 GRAMS
BH CV ECHO MEAS - LV MASS(C)DI: 108.7 GRAMS/M^2
BH CV ECHO MEAS - LV MAX PG: 3.7 MMHG
BH CV ECHO MEAS - LV MEAN PG: 1.9 MMHG
BH CV ECHO MEAS - LV SYSTOLIC VOL/BSA (12-30): 32.9 ML/M^2
BH CV ECHO MEAS - LV V1 MAX: 96 CM/SEC
BH CV ECHO MEAS - LV V1 MEAN: 61.5 CM/SEC
BH CV ECHO MEAS - LV V1 VTI: 20.1 CM
BH CV ECHO MEAS - LVIDD: 3.9 CM
BH CV ECHO MEAS - LVIDS: 2.6 CM
BH CV ECHO MEAS - LVLD AP2: 7 CM
BH CV ECHO MEAS - LVLD AP4: 7.3 CM
BH CV ECHO MEAS - LVLS AP2: 5.8 CM
BH CV ECHO MEAS - LVLS AP4: 6 CM
BH CV ECHO MEAS - LVOT AREA (M): 3.1 CM^2
BH CV ECHO MEAS - LVOT AREA: 3.2 CM^2
BH CV ECHO MEAS - LVOT DIAM: 2 CM
BH CV ECHO MEAS - LVPWD: 1.3 CM
BH CV ECHO MEAS - MED PEAK E' VEL: 5.2 CM/SEC
BH CV ECHO MEAS - MEDIAL E/E' RATIO: 33.7
BH CV ECHO MEAS - MR MAX PG: 130 MMHG
BH CV ECHO MEAS - MR MAX VEL: 567.4 CM/SEC
BH CV ECHO MEAS - MR MEAN PG: 91.7 MMHG
BH CV ECHO MEAS - MR MEAN VEL: 454.4 CM/SEC
BH CV ECHO MEAS - MR VTI: 164.8 CM
BH CV ECHO MEAS - MV A MAX VEL: 109.5 CM/SEC
BH CV ECHO MEAS - MV DEC SLOPE: 801.2 CM/SEC^2
BH CV ECHO MEAS - MV DEC TIME: 0.34 SEC
BH CV ECHO MEAS - MV E MAX VEL: 177.4 CM/SEC
BH CV ECHO MEAS - MV E/A: 1.6
BH CV ECHO MEAS - MV MAX PG: 18.4 MMHG
BH CV ECHO MEAS - MV MEAN PG: 6.1 MMHG
BH CV ECHO MEAS - MV P1/2T MAX VEL: 201.5 CM/SEC
BH CV ECHO MEAS - MV P1/2T: 73.7 MSEC
BH CV ECHO MEAS - MV V2 MAX: 214.8 CM/SEC
BH CV ECHO MEAS - MV V2 MEAN: 113.1 CM/SEC
BH CV ECHO MEAS - MV V2 VTI: 58.9 CM
BH CV ECHO MEAS - MVA P1/2T LCG: 1.1 CM^2
BH CV ECHO MEAS - MVA(P1/2T): 3 CM^2
BH CV ECHO MEAS - MVA(VTI): 1.1 CM^2
BH CV ECHO MEAS - PA ACC SLOPE: 335.1 CM/SEC^2
BH CV ECHO MEAS - PA ACC TIME: 0.14 SEC
BH CV ECHO MEAS - PA MAX PG: 4.6 MMHG
BH CV ECHO MEAS - PA PR(ACCEL): 14 MMHG
BH CV ECHO MEAS - PA V2 MAX: 107.1 CM/SEC
BH CV ECHO MEAS - RAP SYSTOLE: 8 MMHG
BH CV ECHO MEAS - RVSP: 68 MMHG
BH CV ECHO MEAS - SI(AO): 142.1 ML/M^2
BH CV ECHO MEAS - SI(CUBED): 25.1 ML/M^2
BH CV ECHO MEAS - SI(LVOT): 39.6 ML/M^2
BH CV ECHO MEAS - SI(MOD-SP2): 34.2 ML/M^2
BH CV ECHO MEAS - SI(MOD-SP4): 26.7 ML/M^2
BH CV ECHO MEAS - SI(TEICH): 24.8 ML/M^2
BH CV ECHO MEAS - SV(AO): 228.7 ML
BH CV ECHO MEAS - SV(CUBED): 40.4 ML
BH CV ECHO MEAS - SV(LVOT): 63.7 ML
BH CV ECHO MEAS - SV(MOD-SP2): 55 ML
BH CV ECHO MEAS - SV(MOD-SP4): 43 ML
BH CV ECHO MEAS - SV(TEICH): 40 ML
BH CV ECHO MEAS - TAPSE (>1.6): 1.8 CM
BH CV ECHO MEAS - TR MAX PG: 60 MMHG
BH CV ECHO MEAS - TR MAX VEL: 387 CM/SEC
BH CV ECHO MEASUREMENTS AVERAGE E/E' RATIO: 23.65
BH CV VAS BP LEFT ARM: NORMAL MMHG
BH CV XLRA - RV BASE: 3.2 CM
BH CV XLRA - RV LENGTH: 4.7 CM
BH CV XLRA - RV MID: 2.3 CM
BH CV XLRA - TDI S': 11.5 CM/SEC
BILIRUB SERPL-MCNC: 0.4 MG/DL (ref 0–1.2)
BILIRUB UR QL STRIP: NEGATIVE
BUN SERPL-MCNC: 17 MG/DL (ref 8–23)
BUN/CREAT SERPL: 21.3 (ref 7–25)
CALCIUM SPEC-SCNC: 9 MG/DL (ref 8.6–10.5)
CHLORIDE SERPL-SCNC: 100 MMOL/L (ref 98–107)
CLARITY UR: CLEAR
CO2 SERPL-SCNC: 25 MMOL/L (ref 22–29)
COLOR UR: YELLOW
CREAT SERPL-MCNC: 0.8 MG/DL (ref 0.57–1)
D-LACTATE SERPL-SCNC: 1.3 MMOL/L (ref 0.5–2)
DEPRECATED RDW RBC AUTO: 51.8 FL (ref 37–54)
DEPRECATED RDW RBC AUTO: 52.2 FL (ref 37–54)
EOSINOPHIL # BLD AUTO: 0.02 10*3/MM3 (ref 0–0.4)
EOSINOPHIL NFR BLD AUTO: 0.2 % (ref 0.3–6.2)
ERYTHROCYTE [DISTWIDTH] IN BLOOD BY AUTOMATED COUNT: 14.8 % (ref 12.3–15.4)
ERYTHROCYTE [DISTWIDTH] IN BLOOD BY AUTOMATED COUNT: 15 % (ref 12.3–15.4)
GFR SERPL CREATININE-BSD FRML MDRD: 69 ML/MIN/1.73
GLOBULIN UR ELPH-MCNC: 2.6 GM/DL
GLUCOSE SERPL-MCNC: 121 MG/DL (ref 65–99)
GLUCOSE UR STRIP-MCNC: NEGATIVE MG/DL
HCT VFR BLD AUTO: 27.2 % (ref 34–46.6)
HCT VFR BLD AUTO: 27.8 % (ref 34–46.6)
HGB BLD-MCNC: 8.8 G/DL (ref 12–15.9)
HGB BLD-MCNC: 8.8 G/DL (ref 12–15.9)
HGB UR QL STRIP.AUTO: NEGATIVE
HYALINE CASTS UR QL AUTO: ABNORMAL /LPF
IMM GRANULOCYTES # BLD AUTO: 0.04 10*3/MM3 (ref 0–0.05)
IMM GRANULOCYTES NFR BLD AUTO: 0.5 % (ref 0–0.5)
KETONES UR QL STRIP: NEGATIVE
LEFT ATRIUM VOLUME INDEX: 53.5 ML/M^2
LEFT ATRIUM VOLUME: 86 ML
LEUKOCYTE ESTERASE UR QL STRIP.AUTO: ABNORMAL
LV EF 2D ECHO EST: 60 %
LYMPHOCYTES # BLD AUTO: 0.9 10*3/MM3 (ref 0.7–3.1)
LYMPHOCYTES NFR BLD AUTO: 10.8 % (ref 19.6–45.3)
MAGNESIUM SERPL-MCNC: 1.8 MG/DL (ref 1.6–2.4)
MAXIMAL PREDICTED HEART RATE: 138 BPM
MCH RBC QN AUTO: 30.4 PG (ref 26.6–33)
MCH RBC QN AUTO: 30.6 PG (ref 26.6–33)
MCHC RBC AUTO-ENTMCNC: 31.7 G/DL (ref 31.5–35.7)
MCHC RBC AUTO-ENTMCNC: 32.4 G/DL (ref 31.5–35.7)
MCV RBC AUTO: 94.1 FL (ref 79–97)
MCV RBC AUTO: 96.5 FL (ref 79–97)
MONOCYTES # BLD AUTO: 0.76 10*3/MM3 (ref 0.1–0.9)
MONOCYTES NFR BLD AUTO: 9.1 % (ref 5–12)
NEUTROPHILS NFR BLD AUTO: 6.56 10*3/MM3 (ref 1.7–7)
NEUTROPHILS NFR BLD AUTO: 79 % (ref 42.7–76)
NITRITE UR QL STRIP: NEGATIVE
NRBC BLD AUTO-RTO: 0 /100 WBC (ref 0–0.2)
PH UR STRIP.AUTO: 6 [PH] (ref 5–8)
PLATELET # BLD AUTO: 140 10*3/MM3 (ref 140–450)
PLATELET # BLD AUTO: 148 10*3/MM3 (ref 140–450)
PMV BLD AUTO: 11.3 FL (ref 6–12)
PMV BLD AUTO: 11.4 FL (ref 6–12)
POTASSIUM SERPL-SCNC: 3.9 MMOL/L (ref 3.5–5.2)
PROCALCITONIN SERPL-MCNC: 0.14 NG/ML (ref 0–0.25)
PROT SERPL-MCNC: 6 G/DL (ref 6–8.5)
PROT UR QL STRIP: NEGATIVE
RBC # BLD AUTO: 2.88 10*6/MM3 (ref 3.77–5.28)
RBC # BLD AUTO: 2.89 10*6/MM3 (ref 3.77–5.28)
RBC # UR STRIP: ABNORMAL /HPF
REF LAB TEST METHOD: ABNORMAL
SODIUM SERPL-SCNC: 139 MMOL/L (ref 136–145)
SP GR UR STRIP: 1.01 (ref 1–1.03)
SQUAMOUS #/AREA URNS HPF: ABNORMAL /HPF
STRESS TARGET HR: 117 BPM
UROBILINOGEN UR QL STRIP: ABNORMAL
WBC # UR STRIP: ABNORMAL /HPF
WBC NRBC COR # BLD: 8.09 10*3/MM3 (ref 3.4–10.8)
WBC NRBC COR # BLD: 8.31 10*3/MM3 (ref 3.4–10.8)

## 2022-02-10 PROCEDURE — 94799 UNLISTED PULMONARY SVC/PX: CPT

## 2022-02-10 PROCEDURE — 93010 ELECTROCARDIOGRAM REPORT: CPT | Performed by: INTERNAL MEDICINE

## 2022-02-10 PROCEDURE — 25010000002 VANCOMYCIN 10 G RECONSTITUTED SOLUTION

## 2022-02-10 PROCEDURE — 83605 ASSAY OF LACTIC ACID: CPT | Performed by: PHYSICIAN ASSISTANT

## 2022-02-10 PROCEDURE — 97530 THERAPEUTIC ACTIVITIES: CPT

## 2022-02-10 PROCEDURE — 99222 1ST HOSP IP/OBS MODERATE 55: CPT | Performed by: INTERNAL MEDICINE

## 2022-02-10 PROCEDURE — 87040 BLOOD CULTURE FOR BACTERIA: CPT | Performed by: PHYSICIAN ASSISTANT

## 2022-02-10 PROCEDURE — 25010000002 DIGOXIN PER 500 MCG: Performed by: INTERNAL MEDICINE

## 2022-02-10 PROCEDURE — 71045 X-RAY EXAM CHEST 1 VIEW: CPT

## 2022-02-10 PROCEDURE — 25010000002 MEROPENEM PER 100 MG

## 2022-02-10 PROCEDURE — 80053 COMPREHEN METABOLIC PANEL: CPT | Performed by: PHYSICIAN ASSISTANT

## 2022-02-10 PROCEDURE — 25010000002 SULFUR HEXAFLUORIDE MICROSPH 60.7-25 MG RECONSTITUTED SUSPENSION: Performed by: PHYSICIAN ASSISTANT

## 2022-02-10 PROCEDURE — P9612 CATHETERIZE FOR URINE SPEC: HCPCS

## 2022-02-10 PROCEDURE — 99233 SBSQ HOSP IP/OBS HIGH 50: CPT | Performed by: INTERNAL MEDICINE

## 2022-02-10 PROCEDURE — 83735 ASSAY OF MAGNESIUM: CPT | Performed by: PHYSICIAN ASSISTANT

## 2022-02-10 PROCEDURE — 93306 TTE W/DOPPLER COMPLETE: CPT

## 2022-02-10 PROCEDURE — 84145 PROCALCITONIN (PCT): CPT | Performed by: PHYSICIAN ASSISTANT

## 2022-02-10 PROCEDURE — 85025 COMPLETE CBC W/AUTO DIFF WBC: CPT | Performed by: INTERNAL MEDICINE

## 2022-02-10 PROCEDURE — 93005 ELECTROCARDIOGRAM TRACING: CPT | Performed by: PHYSICIAN ASSISTANT

## 2022-02-10 PROCEDURE — 87086 URINE CULTURE/COLONY COUNT: CPT | Performed by: PHYSICIAN ASSISTANT

## 2022-02-10 PROCEDURE — 97110 THERAPEUTIC EXERCISES: CPT

## 2022-02-10 PROCEDURE — 85027 COMPLETE CBC AUTOMATED: CPT | Performed by: PHYSICIAN ASSISTANT

## 2022-02-10 PROCEDURE — 25010000002 ROPIVACAINE PER 1 MG: Performed by: ORTHOPAEDIC SURGERY

## 2022-02-10 PROCEDURE — 93306 TTE W/DOPPLER COMPLETE: CPT | Performed by: INTERNAL MEDICINE

## 2022-02-10 PROCEDURE — 25010000002 ENOXAPARIN PER 10 MG: Performed by: ORTHOPAEDIC SURGERY

## 2022-02-10 PROCEDURE — 81001 URINALYSIS AUTO W/SCOPE: CPT | Performed by: PHYSICIAN ASSISTANT

## 2022-02-10 RX ORDER — METOPROLOL TARTRATE 5 MG/5ML
2.5 INJECTION INTRAVENOUS ONCE
Status: COMPLETED | OUTPATIENT
Start: 2022-02-10 | End: 2022-02-10

## 2022-02-10 RX ORDER — DIGOXIN 0.25 MG/ML
250 INJECTION INTRAMUSCULAR; INTRAVENOUS ONCE
Status: COMPLETED | OUTPATIENT
Start: 2022-02-10 | End: 2022-02-10

## 2022-02-10 RX ORDER — BISOPROLOL FUMARATE 5 MG/1
10 TABLET, FILM COATED ORAL
Status: DISCONTINUED | OUTPATIENT
Start: 2022-02-10 | End: 2022-02-12 | Stop reason: HOSPADM

## 2022-02-10 RX ORDER — DILTIAZEM HCL IN NACL,ISO-OSM 125 MG/125
5-15 PLASTIC BAG, INJECTION (ML) INTRAVENOUS
Status: DISCONTINUED | OUTPATIENT
Start: 2022-02-10 | End: 2022-02-10

## 2022-02-10 RX ORDER — BISOPROLOL FUMARATE 5 MG/1
5 TABLET, FILM COATED ORAL ONCE
Status: DISCONTINUED | OUTPATIENT
Start: 2022-02-10 | End: 2022-02-10

## 2022-02-10 RX ORDER — VANCOMYCIN HYDROCHLORIDE 1 G/200ML
1000 INJECTION, SOLUTION INTRAVENOUS EVERY 24 HOURS
Status: DISCONTINUED | OUTPATIENT
Start: 2022-02-11 | End: 2022-02-10

## 2022-02-10 RX ORDER — DILTIAZEM HYDROCHLORIDE 180 MG/1
180 CAPSULE, COATED, EXTENDED RELEASE ORAL
Status: DISCONTINUED | OUTPATIENT
Start: 2022-02-10 | End: 2022-02-11

## 2022-02-10 RX ADMIN — ROPIVACAINE HYDROCHLORIDE 8 ML/HR: 2 INJECTION, SOLUTION EPIDURAL; INFILTRATION at 23:45

## 2022-02-10 RX ADMIN — MEROPENEM 1 G: 1 INJECTION, POWDER, FOR SOLUTION INTRAVENOUS at 04:35

## 2022-02-10 RX ADMIN — BUDESONIDE AND FORMOTEROL FUMARATE DIHYDRATE 2 PUFF: 160; 4.5 AEROSOL RESPIRATORY (INHALATION) at 19:41

## 2022-02-10 RX ADMIN — FERROUS SULFATE TAB 325 MG (65 MG ELEMENTAL FE) 325 MG: 325 (65 FE) TAB at 07:50

## 2022-02-10 RX ADMIN — PANTOPRAZOLE SODIUM 40 MG: 40 TABLET, DELAYED RELEASE ORAL at 17:24

## 2022-02-10 RX ADMIN — DILTIAZEM HYDROCHLORIDE 180 MG: 180 CAPSULE, COATED, EXTENDED RELEASE ORAL at 20:05

## 2022-02-10 RX ADMIN — HYDROCODONE BITARTRATE AND ACETAMINOPHEN 1 TABLET: 7.5; 325 TABLET ORAL at 00:00

## 2022-02-10 RX ADMIN — ALPRAZOLAM 0.5 MG: 0.5 TABLET ORAL at 23:44

## 2022-02-10 RX ADMIN — LEVOTHYROXINE SODIUM 75 MCG: 0.07 TABLET ORAL at 05:34

## 2022-02-10 RX ADMIN — TORSEMIDE 40 MG: 20 TABLET ORAL at 07:50

## 2022-02-10 RX ADMIN — SERTRALINE HYDROCHLORIDE 150 MG: 50 TABLET ORAL at 07:50

## 2022-02-10 RX ADMIN — BISOPROLOL FUMARATE 10 MG: 5 TABLET, FILM COATED ORAL at 12:13

## 2022-02-10 RX ADMIN — Medication 5 MG/HR: at 02:19

## 2022-02-10 RX ADMIN — SULFUR HEXAFLUORIDE 2 ML: KIT at 15:56

## 2022-02-10 RX ADMIN — METOPROLOL TARTRATE 2.5 MG: 5 INJECTION INTRAVENOUS at 01:12

## 2022-02-10 RX ADMIN — SODIUM CHLORIDE 500 ML: 9 INJECTION, SOLUTION INTRAVENOUS at 04:05

## 2022-02-10 RX ADMIN — DIGOXIN 250 MCG: 0.25 INJECTION INTRAMUSCULAR; INTRAVENOUS at 03:37

## 2022-02-10 RX ADMIN — FERROUS SULFATE TAB 325 MG (65 MG ELEMENTAL FE) 325 MG: 325 (65 FE) TAB at 17:24

## 2022-02-10 RX ADMIN — ROPIVACAINE HYDROCHLORIDE 8 ML/HR: 2 INJECTION, SOLUTION EPIDURAL; INFILTRATION at 11:21

## 2022-02-10 RX ADMIN — ENOXAPARIN SODIUM 40 MG: 40 INJECTION SUBCUTANEOUS at 07:50

## 2022-02-10 RX ADMIN — VANCOMYCIN HYDROCHLORIDE 1250 MG: 10 INJECTION, POWDER, LYOPHILIZED, FOR SOLUTION INTRAVENOUS at 05:36

## 2022-02-10 RX ADMIN — PANTOPRAZOLE SODIUM 40 MG: 40 TABLET, DELAYED RELEASE ORAL at 07:50

## 2022-02-10 NOTE — CONSULTS
[facility]         Date of Hospital Visit: 02/10/22      Place of Service: [facility]    Patient Name: Yin Law  :1940    Referral Provider: Hospitalist  Primary Care Provider: Santiago Chaudhry MD      Chief complaint/Reason for Consultation:  Atrial Fibrillation         Problem List:  Active Hospital Problems    Diagnosis    • **Hip fracture (HCC)    • Paroxysmal atrial fibrillation (HCC)      Class: Acute     · Diagnosed 3/2021  · CHADSVASC- 4, on Eliquis  · 21, s/p ECV but only maintained NSR for 10-15 seconds. Subsequently started on amiodarone.   · Returned on 21 for repeat ECV, in sinus rhythm.   ·  amiodarone discontinued after admission and concerns for possible pulmonary toxicity.  · Echo 21: LAE 4.4 cm     • Heart valve disease      · 2021 EF 66 to 70%.  Severe mitral calcification.  Moderate mitral regurgitation with multiple jets.  Moderate mitral valve stenosis.  RVSP greater than 55 mmHg.  Severe pulmonary hypertension.  Moderate calcification of aortic valve mean gradient 10 mmHg.  · Echo 21: Mild aortic valve stenosis is present. Aortic valve maximum pressure gradient is 32.9 mmHg. Aortic valve mean pressure gradient is 17.2 mmHg. Mild mitral valve regurgitation is present. Mild tricuspid valve regurgitation is present. Estimated right ventricular systolic pressure from tricuspid regurgitation is 37.0 mmHg.     • Primary hypertension    • Mixed hyperlipidemia    • Rheumatoid arthritis (Former[facility])    • Anemia    • COPD on home oxygen (CMS/HCC)        History of Present Illness:  This is an 81-year-old female with prior history of paroxysmal atrial fibrillation.  She is not currently anticoagulated due to significant anemia.  She was admitted to [facility] about 3 days ago after a trip and fall with hip fracture.  She underwent ORIF with hemiarthroplasty 3 days ago.  Sometime last night she went into atrial  "fibrillation with rapid ventricular response.  She was given a single dose of IV digoxin and a single dose of Lopressor 2.5 mg and started on a diltiazem drip and has subsequently converted to sinus rhythm.  She is chronically maintained with bisoprolol 5 mg daily and diltiazem 180 mg daily which was held on admission.  She states she was briefly aware of her rate and rhythm early this morning and felt a \"swoosh\" and had some mild dyspnea.  She denies associated chest pain.  Her blood pressure overall is well managed.  She has no current complaint of orthopnea, PND, lower extremity edema.          Past Surgical History:   Procedure Laterality Date   • APPENDECTOMY     • CARPAL TUNNEL RELEASE Left    • CATARACT EXTRACTION, BILATERAL     • COLON SURGERY     • COLONOSCOPY     • GALLBLADDER SURGERY     • HIP HEMIARTHROPLASTY Left 2/7/2022    Procedure: HIP HEMIARTHROPLASTY LEFT;  Surgeon: Napoleon Powell Jr., MD;  Location: [facility];  Service: Orthopedics;  Laterality: Left;   • HYSTERECTOMY  1971   • TONSILLECTOMY         Allergies   Allergen Reactions   • Dicloxacillin Shortness Of Breath   • Amiodarone Unknown - High Severity   • Propoxyphene Unknown - Low Severity       Current Outpatient Medications   Medication Instructions   • albuterol sulfate  (90 Base) MCG/ACT inhaler 2 puffs, Inhalation, Every 4 Hours PRN, Patient taking: Inhale 2 puffs by mouth every 4-6 hours    • ALPRAZolam (XANAX) 0.5 mg, Oral, Nightly PRN   • ascorbic acid (VITAMIN C) 1,000 mg, Oral, Daily   • bisoprolol (ZEBETA) 5 mg, Oral, Every 24 Hours Scheduled   • Budeson-Glycopyrrol-Formoterol (BREZTRI) 160-9-4.8 MCG/ACT aerosol inhaler 2 puffs, Inhalation, 2 Times Daily   • dilTIAZem CD (CARDIZEM CD) 180 mg, Oral, Daily   • ferrous sulfate 325 mg, Oral, 2 Times Daily With Meals   • fish oil 1,000 mg, Oral, Daily With Breakfast, OTC   • HYDROcodone-acetaminophen (NORCO) 7.5-325 MG per tablet 1 tablet, Oral, Every 6 Hours PRN, Patient " taking: once a day prn   • levothyroxine (SYNTHROID, LEVOTHROID) 75 mcg, Oral, Every Early Morning   • multivitamin with minerals (CENTRUM SILVER ADULT 50+ PO) 1 tablet, Oral, Daily, OTC   • pantoprazole (PROTONIX) 40 mg, Oral, 2 Times Daily Before Meals   • sertraline (ZOLOFT) 150 mg, Oral, Daily, 1.5 tab daily   • torsemide (DEMADEX) 40 mg, Oral, Daily   • Vitamin D, Ergocalciferol, 50 MCG ( UT) capsule 1 tablet, Oral, Daily, OTC          Scheduled Meds:  bisoprolol, 10 mg, Oral, Q24H  budesonide-formoterol, 2 puff, Inhalation, BID - RT  enoxaparin, 40 mg, Subcutaneous, Q24H  ferrous sulfate, 325 mg, Oral, BID With Meals  levothyroxine, 75 mcg, Oral, Q AM  pantoprazole, 40 mg, Oral, BID AC  sertraline, 150 mg, Oral, Daily  sodium chloride, 10 mL, Intravenous, Q12H  sodium chloride, 3 mL, Intravenous, Q12H  torsemide, 40 mg, Oral, Daily      Continuous Infusions:  dilTIAZem, 5-15 mg/hr, Last Rate: 5 mg/hr (02/10/22 0445)  lactated ringers, 9 mL/hr, Last Rate: 9 mL/hr (22 1640)  Pharmacy to dose vancomycin,   ropivacaine (NAROPIN) 0.2% peripheral nerve cath (moog), 8 mL/hr, Last Rate: 8 mL/hr (02/10/22 1121)            Social History     Socioeconomic History   • Marital status:    Tobacco Use   • Smoking status: Former Smoker     Packs/day: 0.25     Types: Cigarettes     Quit date: 2021     Years since quittin.8   • Smokeless tobacco: Never Used   • Tobacco comment: 1-2 cigs occas   Vaping Use   • Vaping Use: Never used   Substance and Sexual Activity   • Alcohol use: Never   • Drug use: Never   • Sexual activity: Defer       Family History   Problem Relation Age of Onset   • Alzheimer's disease Mother    • No Known Problems Father    • No Known Problems Sister    • Hypertension Brother    • Parkinsonism Brother    • No Known Problems Maternal Grandmother    • No Known Problems Maternal Grandfather    • No Known Problems Paternal Grandmother    • No Known Problems Paternal Grandfather   "      REVIEW OF SYSTEMS:   Review of Systems   Constitutional: Negative.   HENT: Negative.    Eyes: Negative.    Cardiovascular: Positive for palpitations.   Respiratory: Negative.    Endocrine: Negative.    Hematologic/Lymphatic: Negative.    Skin: Negative.    Musculoskeletal: Positive for falls and joint pain.   Gastrointestinal: Negative.    Genitourinary: Negative.    Neurological: Negative.    Psychiatric/Behavioral: Negative.    Allergic/Immunologic: Negative.    All other systems reviewed and are negative.           Objective:  Vitals:    02/10/22 0900 02/10/22 0945 02/10/22 1111 02/10/22 1213   BP: 114/82 107/82 135/55 124/69   BP Location:   Left arm    Patient Position:   Sitting    Pulse: 96 111 74 71   Resp:   18    Temp:   98.2 °F (36.8 °C)    TempSrc:   Oral    SpO2:  98% 96%    Weight:       Height:         Body mass index is 19.87 kg/m².  Flowsheet Rows      First Filed Value   Admission Height 167.6 cm (66\") Documented at 02/07/2022 0640   Admission Weight 59.9 kg (132 lb) Documented at 02/07/2022 0640          Intake/Output Summary (Last 24 hours) at 2/10/2022 1401  Last data filed at 2/10/2022 0900  Gross per 24 hour   Intake 1210 ml   Output 2025 ml   Net -815 ml       Vitals and nursing note reviewed.   Constitutional:       Appearance: Not in distress. Cachectic and frail.   Eyes:      Conjunctiva/sclera: Conjunctivae normal.      Pupils: Pupils are equal, round, and reactive to light.   Neck:      Vascular: JVD normal.   Pulmonary:      Effort: Pulmonary effort is normal.      Breath sounds: Normal breath sounds.   Chest:      Chest wall: Not tender to palpatation.   Cardiovascular:      Normal rate. Regular rhythm.      Murmurs: There is no murmur.      No gallop. No click. No rub.   Pulses:     Intact distal pulses.   Edema:     Peripheral edema absent.   Abdominal:      General: Bowel sounds are normal.      Palpations: Abdomen is soft.   Musculoskeletal:         General: No deformity.     "  Cervical back: Normal range of motion. Skin:     General: Skin is warm and dry.   Neurological:      General: No focal deficit present.      Mental Status: Alert.       I have examined the patient and agree with the above               Lab Review:                CBC:      Lab 02/10/22  0327 02/09/22 0425 02/09/22  0425 02/08/22  0525 02/08/22  0525 02/07/22  0644 02/07/22  0644   WBC 8.31  8.09   < > 10.78   < > 10.04   < > 6.47   HEMOGLOBIN 8.8*  8.8*   < > 9.0*   < > 9.0*   < > 11.5*   HEMATOCRIT 27.8*  27.2*   < > 28.9*   < > 28.5*   < > 36.3   PLATELETS 140  148  --  151  --  167  --  231   NEUTROS ABS 6.56  --   --   --   --   --  3.85   IMMATURE GRANS (ABS) 0.04  --   --   --   --   --  0.06*   LYMPHS ABS 0.90  --   --   --   --   --  1.70   MONOS ABS 0.76  --   --   --   --   --  0.55   EOS ABS 0.02  --   --   --   --   --  0.24   MCV 96.5  94.1  --  98.0*  --  96.6  --  97.8*    < > = values in this interval not displayed.     CMP:        Lab 02/10/22  0109 02/09/22  0425 02/08/22  0525 02/07/22  0644   SODIUM 139 131* 134* 138   POTASSIUM 3.9 3.7 4.5 4.1   CHLORIDE 100 96* 98 98   CO2 25.0 23.0 26.0 30.0*   ANION GAP 14.0 12.0 10.0 10.0   BUN 17 24* 14 12   CREATININE 0.80 1.14* 0.83 0.83   GLUCOSE 121* 132* 94 94   CALCIUM 9.0 8.8 8.9 9.8   MAGNESIUM 1.8  --   --   --    TOTAL PROTEIN 6.0  --   --  6.9   ALBUMIN 3.40*  --   --  4.00   GLOBULIN 2.6  --   --  2.9   ALT (SGPT) <5  --   --  15   AST (SGOT) 32  --   --  29   BILIRUBIN 0.4  --   --  0.4   ALK PHOS 105  --   --  130*     Results from last 7 days   Lab Units 02/10/22  0109   MAGNESIUM mg/dL 1.8     Lab Results   Component Value Date    HGBA1C 4.90 07/20/2021     Estimated Creatinine Clearance: 47.8 mL/min (by C-G formula based on SCr of 0.8 mg/dL).  No results found for: DDIMER        CARDIAC LABS:      Lab 02/07/22  0644   PROTIME 13.2   INR 1.03       Lab Results   Component Value Date    CHOL 138 05/21/2021    TRIG 59 05/21/2021    HDL  56 05/21/2021    LDL 70 05/21/2021           EKG:         Result Review:  I have personally reviewed the results from the time of admission and agree with these findings:  [x]  Laboratory  [x]  Radiology  [x]  EKG/Telemetry   []  Pathology  [x]  Old records  []  Other:        Assessment and Plan:       1. PAF   - now in SR   -transition IV diltiazem to PO home dose   -could be considered for LAAO as an out patient   -Brief A. fib at this admission, does not necessarily require   anticoagulation at this time    2. HTN   -overall well managed, should tolerate restarting PO diltiazem at home dose    3. Anemia   -currently on Lovenox   -could be considered for LAAO as an out patient    Echocardiogram pending, will review when it becomes available.         Electronically signed by PAVEL Saldivar, 02/10/22, 2:13 PM EST.      I have seen and examined the patient, I have reviewed the note, discussed the case with the advance practice clinician, made necessary changes and I agree with the final note.    He Montoya MD  02/10/22  18:56 EST

## 2022-02-10 NOTE — THERAPY TREATMENT NOTE
Patient Name: Yin Law  : 1940    MRN: [id number]                              Today's Date: 2/10/2022       Admit Date: 2022    Visit Dx:     ICD-10-CM ICD-9-CM   1. Closed nondisplaced intertrochanteric fracture of left femur, initial encounter ([facility])  S72.145A 820.21   2. Contusion of right forearm, initial encounter  S50.11XA 923.10   3. Skin abrasion  T14.8XXA 919.0   4. Atrial fibrillation, persistent ([facility])  I48.19 427.31     Patient Active Problem List   Diagnosis   • Multifocal pneumonia   • Recurrent atrial fibrillation with RVR (CMS/[facility])   • COPD on home oxygen (CMS/[facility])   • Hypertension and diastolic dysfunction   • Rheumatoid arthritis ([facility])   • Anxiety and depression   • Wound of right leg   • Anemia   • Elevated troponin   • Sepsis ([facility])   • Pleural effusion, bilateral   • Acute diastolic CHF (CMS/[facility])   • Accelerated hypertension   • Pneumonia due to infectious organism   • Severe malnutrition (CMS/[facility])   • Hip fracture ([facility])     Past Medical History:   Diagnosis Date   • Anxiety and depression    • Arrhythmia     A-FIB   • Asthma    • Chicken pox    • COPD (chronic obstructive pulmonary disease) ([facility])    • Hyperlipidemia    • Hypertension    • Measles    • Menopause    • Osteoporosis    • Rheumatoid arthritis ([facility])    • Rheumatoid arthritis ([facility])    • Tobacco abuse      Past Surgical History:   Procedure Laterality Date   • APPENDECTOMY     • CARPAL TUNNEL RELEASE Left    • CATARACT EXTRACTION, BILATERAL     • COLON SURGERY     • COLONOSCOPY     • GALLBLADDER SURGERY     • HIP HEMIARTHROPLASTY Left 2022    Procedure: HIP HEMIARTHROPLASTY LEFT;  Surgeon: Napoleon Powell Jr., MD;  Location: [facility];  Service: Orthopedics;  Laterality: Left;   • HYSTERECTOMY     • TONSILLECTOMY        General Information     Row Name 02/10/22 1134          Physical Therapy Time and Intention    Document Type therapy note (daily note)  -     Mode of Treatment physical therapy  -     Row  Name 02/10/22 1134          General Information    Existing Precautions/Restrictions fall; left; hip, posterior; brace worn when out of bed; oxygen therapy device and L/min; other (see comments)  L fascia iliaca, KI donned when OOB, posterior hip precautions  -     Barriers to Rehab medically complex  -     Row Name 02/10/22 1134          Cognition    Orientation Status (Cognition) oriented x 3  -[facility] Name 02/10/22 1134          Safety Issues, Functional Mobility    Safety Issues Affecting Function (Mobility) insight into deficits/self-awareness; positioning of assistive device; safety precaution awareness; safety precautions follow-through/compliance; sequencing abilities  -     Impairments Affecting Function (Mobility) balance; endurance/activity tolerance; strength; range of motion (ROM); pain; shortness of breath; cognition  -     Comment, Safety Issues/Impairments (Mobility) Alert and following commands. HR elevation to 150bpm upon sitting EOB.  -           User Key  (r) = Recorded By, (t) = Taken By, (c) = Cosigned By    Initials Name Provider Type     Misha Espinosa, PT Physical Therapist               Mobility     Row Name 02/10/22 1135          Bed Mobility    Supine-Sit [place] (Bed Mobility) minimum assist (75% patient effort); 1 person assist; verbal cues; nonverbal cues (demo/gesture)  -     Assistive Device (Bed Mobility) head of bed elevated; draw sheet  -     Comment (Bed Mobility) Requires VCs and increased time to perform task. Physical assist for trunk support.  -     Row Name 02/10/22 1135          Transfers    Comment (Transfers) Pt requires VCs for proper hand placement and advancement of LLE forward prior to transfers.  -     Row Name 02/10/22 1135          Bed-Chair Transfer    Bed-Chair [place] (Transfers) minimum assist (75% patient effort); 1 person assist; verbal cues; nonverbal cues (demo/gesture)  -     Assistive Device (Bed-Chair Transfers)  walker, front-wheeled  -     Row Name 02/10/22 1135          Sit-Stand Transfer    Sit-Stand [place] (Transfers) minimum assist (75% patient effort); 2 person assist; verbal cues; nonverbal cues (demo/gesture)  -     Assistive Device (Sit-Stand Transfers) walker, front-wheeled  -     Row Name 02/10/22 1135          Gait/Stairs (Locomotion)    [place] Level (Gait) not tested  -     [place] Level (Stairs) not tested  -     Comment (Gait/Stairs) Deferred this session d/t HR elevation to 150bpm upon sitting EOB.  -     Row Name 02/10/22 1135          Mobility    Extremity Weight-bearing Status left lower extremity  -     Left Lower Extremity (Weight-bearing Status) weight-bearing as tolerated (WBAT)  -           User Key  (r) = Recorded By, (t) = Taken By, (c) = Cosigned By    Initials Name Provider Type     Misha Espinosa, PT Physical Therapist               Obj/Interventions     [facility] Name 02/10/22 1137          Motor Skills    Therapeutic Exercise hip; knee; ankle  -[facility] Name 02/10/22 1137          Hip (Therapeutic Exercise)    Hip Isometrics (Therapeutic Exercise) bilateral; gluteal sets; supine; 10 repetitions  -     Hip Strengthening (Therapeutic Exercise) bilateral; aBduction; aDduction; heel slides; supine; 10 repetitions  -[facility] Name 02/10/22 1137          Knee (Therapeutic Exercise)    Knee Isometrics (Therapeutic Exercise) bilateral; quad sets; supine; 10 repetitions  -     Knee Strengthening (Therapeutic Exercise) bilateral; SLR (straight leg raise); supine; 10 repetitions  -[facility] Name 02/10/22 1137          Ankle (Therapeutic Exercise)    Ankle AROM (Therapeutic Exercise) bilateral; dorsiflexion; plantarflexion; supine; 10 repetitions  -[facility] Name 02/10/22 1137          Balance    Static Sitting Balance WFL; unsupported; sitting, edge of bed  -     Dynamic Sitting Balance WFL; unsupported; sitting, edge of bed  -     Static Standing Balance mild  impairment; supported; standing  -     Dynamic Standing Balance mild impairment; supported; standing  -     Comment, Balance Pt with mild instability during OOB mobility requiring RW and Hill for support  -           User Key  (r) = Recorded By, (t) = Taken By, (c) = Cosigned By    Initials Name Provider Type     Misha Espinosa, PT Physical Therapist               Goals/Plan    No documentation.                Clinical Impression     [facility] Name 02/10/22 1138          Pain    Additional Documentation Pain Scale: FACES Pre/Post-Treatment (Group)  -[facility]     Row Name 02/10/22 1138          Pain Scale: Numbers Pre/Post-Treatment    Pain Intervention(s) Repositioned; Ambulation/increased activity; Elevated; Rest  -[facility] Name 02/10/22 1138          Pain Scale: FACES Pre/Post-Treatment    Pain: FACES Scale, Pretreatment 2-->hurts little bit  -     Posttreatment Pain Rating 4-->hurts little more  -     Pain Location - Side Left  -     Pain Location - Orientation lower  -     Pain Location extremity  -[facility] Name 02/10/22 1138          Plan of Care Review    Plan of Care Reviewed With patient  -     Progress no change  -     Outcome Summary Pt limited by fatigue and HR elevation to 150bpm upon sitting EOB (RN aware). Pt required MinAx1 for bed mobility, STS with RW, and stand step transfer from bed to chair with RW. KI donned prior to OOB mobility d/t modA SLR. PT reviewed HEP and hip precautions with pt and family, pt unable to recall any of her precautions this date. PT plans to continue progressint PT POC as tolerated, recommending inpatient rehab at [place] when medically appropriate.  -[facility] Name 02/10/22 1138          Vital Signs    Pre Systolic BP Rehab 114  -LH     Pre Treatment Diastolic BP 82  -LH     Intra Systolic BP Rehab 131  -LH     Intra Treatment Diastolic BP 89  -LH     Post Systolic BP Rehab 107  -LH     Post Treatment Diastolic BP 82  -LH     Pretreatment Heart Rate (beats/min)  90  -     Intratreatment Heart Rate (beats/min) 150   -     Posttreatment Heart Rate (beats/min) 78  -LH     Pre SpO2 (%) 97  -LH     O2 Delivery Pre Treatment nasal cannula  -LH     Intra SpO2 (%) 94  -LH     O2 Delivery Intra Treatment nasal cannula  -LH     Post SpO2 (%) 95  -LH     O2 Delivery Post Treatment nasal cannula  -LH     Pre Patient Position Supine  -LH     Intra Patient Position Standing  -LH     Post Patient Position Sitting  -     Row Name 02/10/22 1138          Positioning and Restraints    Pre-Treatment Position in bed  -     Post Treatment Position chair  -     In Chair notified nsg; reclined; call light within reach; encouraged to call for assist; exit alarm on; with family/caregiver; waffle cushion; on mechanical lift sling; legs elevated; heels elevated; L knee immobilizer  -           User Key  (r) = Recorded By, (t) = Taken By, (c) = Cosigned By    Initials Name Provider Type     Misha Espinosa, PT Physical Therapist               Outcome Measures     Row Name 02/10/22 1144 02/10/22 0750       How much help from another person do you currently need...    Turning from your back to your side while in flat bed without using bedrails? 3  - 3  -GJ    Moving from lying on back to sitting on the side of a flat bed without bedrails? 3  - 2  -GJ    Moving to and from a bed to a chair (including a wheelchair)? 3  - 3  -GJ    Standing up from a chair using your arms (e.g., wheelchair, bedside chair)? 3  - 3  -GJ    Climbing 3-5 steps with a railing? 2  - 2  -GJ    To walk in hospital room? 3  - 3  -GJ    AM-PAC 6 Clicks Score (PT) 17  - 16  -GJ    Row Name 02/10/22 1144          Functional Assessment    Outcome Measure Options AM-PAC 6 Clicks Basic Mobility (PT)  -           User Key  (r) = Recorded By, (t) = Taken By, (c) = Cosigned By    Initials Name Provider Type     Amy Tracey, RN Registered Nurse     Misha Espinosa, PT Physical Therapist                              Physical Therapy Education                 Title: PT OT SLP Therapies (In Progress)     Topic: Physical Therapy (Done)     Point: Mobility training (Done)     Learning Progress Summary           Patient Acceptance, E, VU,NR by  at 2/10/2022 0907    Comment: Reviewed HEP, safety with mobility, and hip precautions.    Acceptance, E, VU,NR by  at 2/9/2022 1314    Comment: Reviewed hip precautions, safety with mobility, and HEP.    Acceptance, E,D, VU by ANA LILIA at 2/8/2022 1315    Comment: Educated on safe sequencing with bed mobility, ambulatory transfers, and gait. Reviewed HEP and posterior hip precautions.                   Point: Home exercise program (Done)     Learning Progress Summary           Patient Acceptance, E, VU,NR by  at 2/10/2022 0907    Comment: Reviewed HEP, safety with mobility, and hip precautions.    Acceptance, E, VU,NR by  at 2/9/2022 1314    Comment: Reviewed hip precautions, safety with mobility, and HEP.    Acceptance, E,D, VU by  at 2/8/2022 1315    Comment: Educated on safe sequencing with bed mobility, ambulatory transfers, and gait. Reviewed HEP and posterior hip precautions.                   Point: Body mechanics (Done)     Learning Progress Summary           Patient Acceptance, E, VU,NR by  at 2/10/2022 0907    Comment: Reviewed HEP, safety with mobility, and hip precautions.    Acceptance, E, VU,NR by  at 2/9/2022 1314    Comment: Reviewed hip precautions, safety with mobility, and HEP.    Acceptance, E,D, VU by  at 2/8/2022 1315    Comment: Educated on safe sequencing with bed mobility, ambulatory transfers, and gait. Reviewed HEP and posterior hip precautions.                   Point: Precautions (Done)     Learning Progress Summary           Patient Acceptance, E, VU,NR by  at 2/10/2022 0907    Comment: Reviewed HEP, safety with mobility, and hip precautions.    Acceptance, E, VU,NR by  at 2/9/2022 1314    Comment: Reviewed hip precautions, safety with  mobility, and HEP.    Acceptance, E,D, VU by  at 2/8/2022 1315    Comment: Educated on safe sequencing with bed mobility, ambulatory transfers, and gait. Reviewed HEP and posterior hip precautions.                               User Key     Initials Effective Dates Name Provider Type Discipline     06/16/21 -  Elgin Palmer, PT Physical Therapist PT     09/21/21 -  Misha Espinosa, PT Physical Therapist PT              PT Recommendation and Plan     Plan of Care Reviewed With: patient  Progress: no change  Outcome Summary: Pt limited by fatigue and HR elevation to 150bpm upon sitting EOB (RN aware). Pt required MinAx1 for bed mobility, STS with RW, and stand step transfer from bed to chair with RW. KI donned prior to OOB mobility d/t modA SLR. PT reviewed HEP and hip precautions with pt and family, pt unable to recall any of her precautions this date. PT plans to continue progressint PT POC as tolerated, recommending inpatient rehab at [place] when medically appropriate.     Time Calculation:    PT Charges     Row Name 02/10/22 1144             Time Calculation    Start Time 0907  -      PT Received On 02/10/22  -      PT Goal Re-Cert Due Date 02/18/22  -              Time Calculation- PT    Total Timed Code Minutes- PT 43 minute(s)  -              Timed Charges    49750 - PT Therapeutic Exercise Minutes 14  -      59257 - PT Therapeutic Activity Minutes 29  -              Total Minutes    Timed Charges Total Minutes 43  -       Total Minutes 43  -            User Key  (r) = Recorded By, (t) = Taken By, (c) = Cosigned By    Initials Name Provider Type     Misha Espinosa, PT Physical Therapist              Therapy Charges for Today     Code Description Service Date Service Provider Modifiers Qty    99546434329 HC PT THER PROC EA 15 MIN 2/9/2022 Misha Espinosa, PT GP 1    36828617260 HC GAIT TRAINING EA 15 MIN 2/9/2022 Misha Espinosa, PT GP 1    43615930006 HC PT THER PROC EA 15 MIN 2/10/2022  Misha Espinosa, PT GP 1    49378400538 HC PT THERAPEUTIC ACT EA 15 MIN 2/10/2022 Misha Espinosa, PT GP 2          PT G-Codes  Outcome Measure Options: AM-PAC 6 Clicks Basic Mobility (PT)  AM-PAC 6 Clicks Score (PT): 17  AM-PAC 6 Clicks Score (OT): 15    Misha Espinosa PT  2/10/2022

## 2022-02-10 NOTE — CONSULTS
Pharmacy Consult-Vancomycin Dosing  Yin Law is a  81 y.o. female receiving vancomycin therapy.     Indication: UTI  Consulting Provider: Hospitalist  ID Consult:     Goal AUC: 400 - 600 mg/L*hr    Current Antimicrobial Therapy  Anti-Infectives (From admission, onward)      Ordered     Dose/Rate Route Frequency Start Stop    02/10/22 0653  vancomycin (VANCOCIN) in iso-osmotic dextrose IVPB 1 g (premix) 200 mL        Ordering Provider: Lonnie Quintero IV, PharmD    1,000 mg  over 60 Minutes Intravenous Every 24 Hours 02/11/22 0600 02/17/22 0559    02/10/22 0355  meropenem (MERREM) 1 g/100 mL 0.9% NS (mbp)        Ordering Provider: Lonnie Quintero IV, PharmD    1 g  over 3 Hours Intravenous Every 12 Hours 02/10/22 1200 02/17/22 1159    02/10/22 0355  vancomycin 1250 mg/250 mL 0.9% NS IVPB ([facility])        Ordering Provider: Lonnie Quintero IV, PharmD    1,250 mg  over 90 Minutes Intravenous Once 02/10/22 0445      02/10/22 0355  meropenem (MERREM) 1 g/100 mL 0.9% NS (mbp)        Ordering Provider: Lonnie Quintero IV, PharmD    1 g  over 30 Minutes Intravenous Once 02/10/22 0445 02/10/22 0505    02/10/22 0351  Pharmacy to dose vancomycin        Ordering Provider: Matthew Raya PA-C     Does not apply Continuous PRN 02/10/22 0349 02/17/22 0348    02/07/22 2105  ceFAZolin in dextrose (ANCEF) IVPB solution 2 g        Ordering Provider: Napoleon Powell Jr., MD    2 g  over 30 Minutes Intravenous Every 8 Hours 02/08/22 0000 02/08/22 0920    02/07/22 1706  ceFAZolin in dextrose (ANCEF) IVPB solution 2 g        Ordering Provider: Napoleon Powell Jr., MD    2 g  over 30 Minutes Intravenous Once 02/07/22 1708 02/07/22 1714            Allergies  Allergies as of 02/07/2022 - Reviewed 02/07/2022   Allergen Reaction Noted    Dicloxacillin Shortness Of Breath 12/01/2021    Amiodarone Unknown - High Severity 09/14/2021    Propoxyphene Unknown - Low Severity 12/01/2021       Labs    Results from last 7 days  "  Lab Units 02/10/22  0109 02/09/22  0425 02/08/22  0525   BUN mg/dL 17 24* 14   CREATININE mg/dL 0.80 1.14* 0.83       Results from last 7 days   Lab Units 02/10/22  0327 02/09/22  0425 02/08/22  0525   WBC 10*3/mm3 8.31  8.09 10.78 10.04       Evaluation of Dosing     Last Dose Received in the ED/Outside Facility:   Is Patient on Dialysis or Renal Replacement:     Ht - 167.6 cm (65.98\")  Wt - 55.8 kg (123 lb 0.3 oz)    Estimated Creatinine Clearance: 47.8 mL/min (by C-G formula based on SCr of 0.8 mg/dL).    Intake & Output (last 3 days)         02/07 0701 02/08 0700 02/08 0701 02/09 0700 02/09 0701  02/10 0700    P.O.  360     I.V. (mL/kg) 300 (5.4)      IV Piggyback   850    Total Intake(mL/kg) 300 (5.4) 360 (6.5) 850 (15.2)    Urine (mL/kg/hr) 850 (0.6) 500 (0.4) 2525 (1.9)    Stool   0    Total Output     Net -550 -119 -3794           Stool Unmeasured Occurrence  1 x 1 x            Microbiology and Radiology  Microbiology Results (last 10 days)       Procedure Component Value - Date/Time    COVID PRE-OP / PRE-PROCEDURE SCREENING ORDER (NO ISOLATION) - Swab, Nasopharynx [[id number]]  (Normal) Collected: 02/07/22 0943    Lab Status: Final result Specimen: Swab from Nasopharynx Updated: 02/07/22 1012    Narrative:      The following orders were created for panel order COVID PRE-OP / PRE-PROCEDURE SCREENING ORDER (NO ISOLATION) - Swab, Nasopharynx.  Procedure                               Abnormality         Status                     ---------                               -----------         ------                     COVID-19, ABBOTT IN-HOUS...[[id number]]  Normal              Final result                 Please view results for these tests on the individual orders.    COVID-19, ABBOTT IN-HOUSE,NASAL Swab (NO TRANSPORT MEDIA) 2 HR TAT - Swab, Nasopharynx [[id number]]  (Normal) Collected: 02/07/22 0943    Lab Status: Final result Specimen: Swab from Nasopharynx Updated: 02/07/22 1012     COVID19 " Presumptive Negative    Narrative:      Fact sheet for providers: https://www.fda.gov/media/004876/download     Fact sheet for patients: https://www.fda.gov/media/609961/download    Test performed by PCR.  If inconsistent with clinical signs and symptoms patient should be tested with different authorized molecular test.            Reported Vancomycin Levels                         [facility] AUC Calculation:    Current AUC:    mg/L*hr    Predicted Steady State AUC on Current Dose:  mg/L*hr  _________________________________    Predicted Steady State AUC on New Dose:   463 mg/L*hr    Assessment/Plan:  Advanced age female with UTI, presented with TRACY (improved)  1250 mg IV vancomycin load  Start 1000 mg IV q24h tomorrow.  Random Saturday with AM labs.    Thank you for this consult.  Lonnie Quintero IV, PharmD, BCPS  2/10/2022  06:54 EST

## 2022-02-10 NOTE — PROGRESS NOTES
[facility] Medicine Services  PROGRESS NOTE    Patient Name: Yin Law  : 1940  MRN: [id number]    Date of Admission: 2022  Primary Care Physician: Santiago Chaudhry MD    Subjective   Subjective     CC:  Hip fracture    HPI:  Pt went into Afib with RVR overnight. Given IV Dig x 1 and started on Dilt gtt. Empirically started on ABX. Pt states that she felt her heart racing when she went into Afib with RVR.      ROS:  Gen- No fevers, chills  CV- No chest pain, palpitations  Resp- No cough, dyspnea  GI- No N/V/D, abd pain        Objective   Objective     Vital Signs:   Temp:  [98.4 °F (36.9 °C)-99.2 °F (37.3 °C)] 98.5 °F (36.9 °C)  Heart Rate:  [] 96  Resp:  [16-20] 18  BP: ()/() 114/82  Flow (L/min):  [3-5] 3     Physical Exam:    Constitutional: awake, alert, transferring from bedside commode to bedside chair when I saw her, slightly dyspneic and in mild distress due to pain  HENT: NCAT, mucous membranes moist, nasal canula in place  Respiratory: Clear to auscultation bilaterally, respiratory effort normal    Cardiovascular: RRR with rate in the low 90s, no murmurs, rubs, or gallops  Gastrointestinal: Positive bowel sounds, soft, nontender, nondistended  Musculoskeletal: No bilateral ankle edema, LLE in brace  Psychiatric: Appropriate affect, cooperative  Neurologic: Oriented x 3, strength symmetric in all extremities, Cranial Nerves grossly intact to confrontation, speech clear  Skin: No rashes         Results Reviewed:  LAB RESULTS:      Lab 02/10/22  0345 02/10/22  0327 02/10/22  0109 22  0425 22  0525 22  0644   WBC  --  8.31  8.09  --  10.78 10.04 6.47   HEMOGLOBIN  --  8.8*  8.8*  --  9.0* 9.0* 11.5*   HEMATOCRIT  --  27.8*  27.2*  --  28.9* 28.5* 36.3   PLATELETS  --  140  148  --  151 167 231   NEUTROS ABS  --  6.56  --   --   --  3.85   IMMATURE GRANS (ABS)  --  0.04  --   --   --  0.06*   LYMPHS ABS  --  0.90  --   --    --  1.70   MONOS ABS  --  0.76  --   --   --  0.55   EOS ABS  --  0.02  --   --   --  0.24   MCV  --  96.5  94.1  --  98.0* 96.6 97.8*   PROCALCITONIN  --   --  0.14  --   --   --    LACTATE 1.3  --   --   --   --   --    PROTIME  --   --   --   --   --  13.2         Lab 02/10/22  0109 02/09/22  0425 02/08/22  0525 02/07/22  0644   SODIUM 139 131* 134* 138   POTASSIUM 3.9 3.7 4.5 4.1   CHLORIDE 100 96* 98 98   CO2 25.0 23.0 26.0 30.0*   ANION GAP 14.0 12.0 10.0 10.0   BUN 17 24* 14 12   CREATININE 0.80 1.14* 0.83 0.83   GLUCOSE 121* 132* 94 94   CALCIUM 9.0 8.8 8.9 9.8   MAGNESIUM 1.8  --   --   --    TSH  --   --  3.880  --          Lab 02/10/22  0109 02/07/22  0644   TOTAL PROTEIN 6.0 6.9   ALBUMIN 3.40* 4.00   GLOBULIN 2.6 2.9   ALT (SGPT) <5 15   AST (SGOT) 32 29   BILIRUBIN 0.4 0.4   ALK PHOS 105 130*         Lab 02/07/22  0644   PROTIME 13.2   INR 1.03             Lab 02/07/22  0728   ABO TYPING O   RH TYPING Positive   ANTIBODY SCREEN Negative         Brief Urine Lab Results  (Last result in the past 365 days)      Color   Clarity   Blood   Leuk Est   Nitrite   Protein   CREAT   Urine HCG        02/10/22 0436 Yellow   Clear   Negative   Trace   Negative   Negative                 Microbiology Results Abnormal     Procedure Component Value - Date/Time    COVID PRE-OP / PRE-PROCEDURE SCREENING ORDER (NO ISOLATION) - Swab, Nasopharynx [[id number]]  (Normal) Collected: 02/07/22 0943    Lab Status: Final result Specimen: Swab from Nasopharynx Updated: 02/07/22 1012    Narrative:      The following orders were created for panel order COVID PRE-OP / PRE-PROCEDURE SCREENING ORDER (NO ISOLATION) - Swab, Nasopharynx.  Procedure                               Abnormality         Status                     ---------                               -----------         ------                     COVID-19, ABBOTT IN-HOUS...[[id number]]  Normal              Final result                 Please view results for these tests on  the individual orders.    COVID-19, ABBOTT IN-HOUSE,NASAL Swab (NO TRANSPORT MEDIA) 2 HR TAT - Swab, Nasopharynx [[id number]]  (Normal) Collected: 02/07/22 0943    Lab Status: Final result Specimen: Swab from Nasopharynx Updated: 02/07/22 1012     COVID19 Presumptive Negative    Narrative:      Fact sheet for providers: https://www.fda.gov/media/473504/download     Fact sheet for patients: https://www.fda.gov/media/277253/download    Test performed by PCR.  If inconsistent with clinical signs and symptoms patient should be tested with different authorized molecular test.          XR Chest 1 View    Result Date: 2/10/2022  Chest one view. DATE: 2/10/2022. COMPARISON: 2/7/2022. CLINICAL HISTORY: Fever.     Impression: Small right pleural effusion is unchanged. There is some scattered patchy interstitial changes throughout the upper lungs bilaterally which appears more prominent than the previous examination and could relate to edema versus infection. Cardiac silhouette does not appear enlarged. Electronically signed by:  Martin De La Torre D.O.  2/10/2022 2:40 AM Mountain Time      Results for orders placed during the hospital encounter of 07/19/21    Adult Transthoracic Echo Complete W/ Cont if Necessary Per Protocol    Interpretation Summary  · Estimated left ventricular EF = 65%  · Mild aortic valve stenosis is present.  · Aortic valve maximum pressure gradient is 32.9 mmHg. Aortic valve mean pressure gradient is 17.2 mmHg.  · Mild mitral valve regurgitation is present.  · Mild tricuspid valve regurgitation is present.  · Estimated right ventricular systolic pressure from tricuspid regurgitation is 37.0 mmHg.  · There is a large left pleural effusion. There is a moderate sized right pleural effusion.      I have reviewed the medications:  Scheduled Meds:bisoprolol, 5 mg, Oral, Q24H  budesonide-formoterol, 2 puff, Inhalation, BID - RT  enoxaparin, 40 mg, Subcutaneous, Q24H  ferrous sulfate, 325 mg, Oral, BID With  Meals  levothyroxine, 75 mcg, Oral, Q AM  meropenem, 1 g, Intravenous, Q12H  pantoprazole, 40 mg, Oral, BID AC  sertraline, 150 mg, Oral, Daily  sodium chloride, 10 mL, Intravenous, Q12H  sodium chloride, 3 mL, Intravenous, Q12H  torsemide, 40 mg, Oral, Daily  [START ON 2/11/2022] vancomycin, 1,000 mg, Intravenous, Q24H      Continuous Infusions:dilTIAZem, 5-15 mg/hr, Last Rate: 5 mg/hr (02/10/22 0445)  lactated ringers, 9 mL/hr, Last Rate: 9 mL/hr (02/07/22 1640)  Pharmacy to dose vancomycin,   ropivacaine (NAROPIN) 0.2% peripheral nerve cath (moog), 8 mL/hr, Last Rate: 8 mL/hr (02/09/22 2152)      PRN Meds:.•  acetaminophen  •  albuterol sulfate HFA  •  ALPRAZolam  •  HYDROcodone-acetaminophen  •  lactated ringers  •  Morphine **AND** naloxone  •  ondansetron **OR** ondansetron  •  Pharmacy to dose vancomycin  •  sodium chloride  •  sodium chloride  •  sodium chloride  •  Tetanus-Diphth-Acell Pertussis    Assessment/Plan   Assessment & Plan     Active Hospital Problems    Diagnosis  POA   • Hip fracture (HCC) [S72.009A]  Yes     Priority: High   • Recurrent atrial fibrillation with RVR (CMS/HCC) [I48.91]  Yes     Priority: High   • Hypertension and diastolic dysfunction [I10]  Yes     Priority: Low   • Rheumatoid arthritis (HCC) [M06.9]  Yes     Priority: Low      Resolved Hospital Problems   No resolved problems to display.        Brief Hospital Course to date:  Yin Law is a 81 y.o. female with h/o  Diastolic CHF, afib, COPD on home 5LNC, and RA,  found to have a let hip fracture after tripping over her oxygen tank. Pt was scheduled to go to rehab on 2/10, however, she went back into Afib with RVR despite having taken her home dose beta blocker- she was started on dilt gtt.     This patient's problems and plans were partially entered by my partner and updated as appropriate by me 02/10/22.        Left femoral neck fracture  ---s/p open treatment of displaced femoral neck fracture on 2/7/22 per   Sherry  -postop care per Dr. Powell: wbat LLE; posterior hip precautions x 6 weeks;started sq lovenox 2/8/22 x 2 weeks,then asa 325mg daily x 4 more weeks for dvt prophylaxis    Post-op blood loss anemia, stable  -hgb stable at 8.8; monitor       Chronic DHF  --continue home meds inclusing torsemide  --monitor oxygenation/fluid status closely perioperatively  --follows with Dr. Montoya outpatient  -- repeat CXR done overnight, I suspect bilateral opacities are more likely edema due to Afib w/ RVR (ie flash pulmonary edema) rather than infectious etiology.      Afib  --not on anticoagulation, as was taken off d/t anemia while previously taking  --cont bisoprolol with hold parameters, went back in Afib with RVR overnight. On Dilt gtt. Will try to adjust current meds in attempts to wean her off Dilt  -- doubt that her Afib with RVR overnight was related to infection as normal WBC, no fever, normal lactate and procal.  Although UA shows small LE and TNTC WBCs, pt denies any dysuria. Will stop broad spectrum ABX     COPD (5Lnc dependent)  Chronic Respiratory Failure  --home O2 dependent on 5LNC,currently at baseline  --duonebs  --monitor oxygenation close perioperatively  --follows with pulm/[facility] outpatient     Hypothyroid  --continue home synthroid  --TSH wnl    H/o RA  -- not on any DMARDs       DVT prophylaxis:  Medical DVT prophylaxis orders are present. sq lovenox      AM-PAC 6 Clicks Score (PT): 16 (02/10/22 0750)    Disposition: I expect the patient to be discharged to rehab at [facility] tomorrow 2/11/22 if her Afib is controlled    CODE STATUS:   Code Status and Medical Interventions:   Ordered at: 02/07/22 1250     Level Of Support Discussed With:    Patient     Code Status (Patient has no pulse and is not breathing):    CPR (Attempt to Resuscitate)     Medical Interventions (Patient has pulse or is breathing):    Full Support       Breann King MD  02/10/22

## 2022-02-10 NOTE — PLAN OF CARE
Goal Outcome Evaluation:  Plan of Care Reviewed With: patient        Progress: no change  Outcome Summary: Pt limited by fatigue and HR elevation to 150bpm upon sitting EOB (RN aware). Pt required MinAx1 for bed mobility, STS with RW, and stand step transfer from bed to chair with RW. KI donned prior to OOB mobility d/t modA SLR. PT reviewed HEP and hip precautions with pt and family, pt unable to recall any of her precautions this date. PT plans to continue progressint PT POC as tolerated, recommending inpatient rehab at [facility] when medically appropriate.

## 2022-02-10 NOTE — PLAN OF CARE
Goal Outcome Evaluation:  Plan of Care Reviewed With: patient        Progress: no change     Patient is alert and oriented with intermittent confusion. Complaints of moderate pain with interventions being effective. Heart rhythm went from NSR to A-fib with RVR. Physician was notified and orders were given and followed. Patient was bladder scanned and found to have over 700ml of urine in bladder. Straight cathter placed and UA was taken from that and bladder was emptied. HR continues to remain high. Cardizem drip is being titrated as ordered. Will continue to monitor.

## 2022-02-10 NOTE — PROGRESS NOTES
[facility]    Acute pain service Inpatient Progress Note    Patient Name: Yin Law  :  1940  MRN:  [id number]        Acute Pain  Service Inpatient Progress Note:    Analgesia:Excellent  Pain Score:0/10  LOC: alert and awake  Resp Status: room air  Cardiac: VS stable  Side Effects:None  Catheter Site:clean, dressing intact and dry  Cath type: peripheral nerve cath(MOOG pump)  Infusion rate: 8ml/hr  Catheter Plan:Catheter to remain Insitu and Continue catheter infusion rate unchanged

## 2022-02-10 NOTE — PROGRESS NOTES
"Orthopedic Daily Progress Note      CC: MELISSA overnight, resting comfortably     Pain well controlled  General: no fevers, chills  Abdomen: no nausea, vomiting, or diarrhea    No other complaints    Physical Exam:  I have reviewed the vital signs.  Temp:  [98.4 °F (36.9 °C)-99.2 °F (37.3 °C)] 98.4 °F (36.9 °C)  Heart Rate:  [] 79  Resp:  [16-20] 18  BP: ()/() 110/79    Objective:  Vital signs: (most recent): Blood pressure 110/79, pulse 79, temperature 98.4 °F (36.9 °C), temperature source Oral, resp. rate 18, height 167.6 cm (65.98\"), weight 55.8 kg (123 lb 0.3 oz), SpO2 99 %, not currently breastfeeding.            General Appearance:    Alert, cooperative, no distress  Extremities: No clubbing, cyanosis, or edema to lower extremities  Pulses:  2+ in distal surgical extremity  Skin: Incision Clean/dry/intact      Results Review:    I have reviewed the labs, radiology results and diagnostic studies: Hgb 8.8    Results from last 7 days   Lab Units 02/10/22  0327   WBC 10*3/mm3 8.31  8.09   HEMOGLOBIN g/dL 8.8*  8.8*   PLATELETS 10*3/mm3 140  148     Results from last 7 days   Lab Units 02/10/22  0109   SODIUM mmol/L 139   POTASSIUM mmol/L 3.9   CO2 mmol/L 25.0   CREATININE mg/dL 0.80   GLUCOSE mg/dL 121*       I have reviewed the medications.    Assessment/Problem List  POD# 3 Day Post-Op   S/p L hip hemiarthroplasty    Plan  WBAT LLE  Posterior hip precautions x 6 weeks  PT/OT  Lovenox x 2 weeks for DVT prophylaxis followed by  mg x 4 more weeks      Discharge Planning: I expect patient to be discharged to rehab today.      Zhane Kasper PA-C  02/10/22  07:30 EST            "

## 2022-02-11 LAB
ANION GAP SERPL CALCULATED.3IONS-SCNC: 10 MMOL/L (ref 5–15)
BACTERIA SPEC AEROBE CULT: NO GROWTH
BASOPHILS # BLD AUTO: 0.02 10*3/MM3 (ref 0–0.2)
BASOPHILS NFR BLD AUTO: 0.3 % (ref 0–1.5)
BUN SERPL-MCNC: 17 MG/DL (ref 8–23)
BUN/CREAT SERPL: 23.9 (ref 7–25)
CALCIUM SPEC-SCNC: 9.2 MG/DL (ref 8.6–10.5)
CHLORIDE SERPL-SCNC: 99 MMOL/L (ref 98–107)
CO2 SERPL-SCNC: 28 MMOL/L (ref 22–29)
CREAT SERPL-MCNC: 0.71 MG/DL (ref 0.57–1)
DEPRECATED RDW RBC AUTO: 53 FL (ref 37–54)
EOSINOPHIL # BLD AUTO: 0.08 10*3/MM3 (ref 0–0.4)
EOSINOPHIL NFR BLD AUTO: 1.1 % (ref 0.3–6.2)
ERYTHROCYTE [DISTWIDTH] IN BLOOD BY AUTOMATED COUNT: 14.6 % (ref 12.3–15.4)
GFR SERPL CREATININE-BSD FRML MDRD: 79 ML/MIN/1.73
GLUCOSE SERPL-MCNC: 150 MG/DL (ref 65–99)
HCT VFR BLD AUTO: 29.9 % (ref 34–46.6)
HGB BLD-MCNC: 9.2 G/DL (ref 12–15.9)
IMM GRANULOCYTES # BLD AUTO: 0.03 10*3/MM3 (ref 0–0.05)
IMM GRANULOCYTES NFR BLD AUTO: 0.4 % (ref 0–0.5)
LYMPHOCYTES # BLD AUTO: 0.71 10*3/MM3 (ref 0.7–3.1)
LYMPHOCYTES NFR BLD AUTO: 10.1 % (ref 19.6–45.3)
MCH RBC QN AUTO: 30.5 PG (ref 26.6–33)
MCHC RBC AUTO-ENTMCNC: 30.8 G/DL (ref 31.5–35.7)
MCV RBC AUTO: 99 FL (ref 79–97)
MONOCYTES # BLD AUTO: 0.45 10*3/MM3 (ref 0.1–0.9)
MONOCYTES NFR BLD AUTO: 6.4 % (ref 5–12)
NEUTROPHILS NFR BLD AUTO: 5.71 10*3/MM3 (ref 1.7–7)
NEUTROPHILS NFR BLD AUTO: 81.7 % (ref 42.7–76)
NRBC BLD AUTO-RTO: 0 /100 WBC (ref 0–0.2)
PLATELET # BLD AUTO: 167 10*3/MM3 (ref 140–450)
PMV BLD AUTO: 11.4 FL (ref 6–12)
POTASSIUM SERPL-SCNC: 3 MMOL/L (ref 3.5–5.2)
RBC # BLD AUTO: 3.02 10*6/MM3 (ref 3.77–5.28)
SARS-COV-2 RDRP RESP QL NAA+PROBE: NORMAL
SODIUM SERPL-SCNC: 137 MMOL/L (ref 136–145)
WBC NRBC COR # BLD: 7 10*3/MM3 (ref 3.4–10.8)

## 2022-02-11 PROCEDURE — 99232 SBSQ HOSP IP/OBS MODERATE 35: CPT | Performed by: INTERNAL MEDICINE

## 2022-02-11 PROCEDURE — 97535 SELF CARE MNGMENT TRAINING: CPT

## 2022-02-11 PROCEDURE — 25010000002 ENOXAPARIN PER 10 MG: Performed by: ORTHOPAEDIC SURGERY

## 2022-02-11 PROCEDURE — 85025 COMPLETE CBC W/AUTO DIFF WBC: CPT | Performed by: INTERNAL MEDICINE

## 2022-02-11 PROCEDURE — 25010000002 ROPIVACAINE PER 1 MG: Performed by: ORTHOPAEDIC SURGERY

## 2022-02-11 PROCEDURE — 97116 GAIT TRAINING THERAPY: CPT

## 2022-02-11 PROCEDURE — 97110 THERAPEUTIC EXERCISES: CPT

## 2022-02-11 PROCEDURE — 94799 UNLISTED PULMONARY SVC/PX: CPT

## 2022-02-11 PROCEDURE — 94762 N-INVAS EAR/PLS OXIMTRY CONT: CPT

## 2022-02-11 PROCEDURE — 87635 SARS-COV-2 COVID-19 AMP PRB: CPT | Performed by: INTERNAL MEDICINE

## 2022-02-11 PROCEDURE — 94761 N-INVAS EAR/PLS OXIMETRY MLT: CPT

## 2022-02-11 PROCEDURE — 99233 SBSQ HOSP IP/OBS HIGH 50: CPT | Performed by: INTERNAL MEDICINE

## 2022-02-11 PROCEDURE — 80048 BASIC METABOLIC PNL TOTAL CA: CPT | Performed by: INTERNAL MEDICINE

## 2022-02-11 RX ORDER — DILTIAZEM HYDROCHLORIDE 180 MG/1
180 CAPSULE, COATED, EXTENDED RELEASE ORAL DAILY
Status: DISCONTINUED | OUTPATIENT
Start: 2022-02-12 | End: 2022-02-12 | Stop reason: HOSPADM

## 2022-02-11 RX ORDER — POTASSIUM CHLORIDE 750 MG/1
40 CAPSULE, EXTENDED RELEASE ORAL AS NEEDED
Status: DISCONTINUED | OUTPATIENT
Start: 2022-02-11 | End: 2022-02-12 | Stop reason: HOSPADM

## 2022-02-11 RX ORDER — DIGOXIN 0.25 MG/ML
250 INJECTION INTRAMUSCULAR; INTRAVENOUS ONCE
Status: DISCONTINUED | OUTPATIENT
Start: 2022-02-11 | End: 2022-02-11

## 2022-02-11 RX ORDER — POTASSIUM CHLORIDE 7.45 MG/ML
10 INJECTION INTRAVENOUS
Status: DISCONTINUED | OUTPATIENT
Start: 2022-02-11 | End: 2022-02-12 | Stop reason: HOSPADM

## 2022-02-11 RX ORDER — POTASSIUM CHLORIDE 1.5 G/1.77G
40 POWDER, FOR SOLUTION ORAL AS NEEDED
Status: DISCONTINUED | OUTPATIENT
Start: 2022-02-11 | End: 2022-02-12 | Stop reason: HOSPADM

## 2022-02-11 RX ORDER — DIGOXIN 0.25 MG/ML
500 INJECTION INTRAMUSCULAR; INTRAVENOUS ONCE
Status: DISCONTINUED | OUTPATIENT
Start: 2022-02-11 | End: 2022-02-11

## 2022-02-11 RX ORDER — DILTIAZEM HYDROCHLORIDE 180 MG/1
180 CAPSULE, COATED, EXTENDED RELEASE ORAL EVERY 12 HOURS
Status: DISCONTINUED | OUTPATIENT
Start: 2022-02-11 | End: 2022-02-11

## 2022-02-11 RX ADMIN — PANTOPRAZOLE SODIUM 40 MG: 40 TABLET, DELAYED RELEASE ORAL at 17:41

## 2022-02-11 RX ADMIN — HYDROCODONE BITARTRATE AND ACETAMINOPHEN 1 TABLET: 7.5; 325 TABLET ORAL at 19:37

## 2022-02-11 RX ADMIN — BISOPROLOL FUMARATE 10 MG: 5 TABLET, FILM COATED ORAL at 09:04

## 2022-02-11 RX ADMIN — APIXABAN 2.5 MG: 2.5 TABLET, FILM COATED ORAL at 12:18

## 2022-02-11 RX ADMIN — ROPIVACAINE HYDROCHLORIDE 8 ML/HR: 2 INJECTION, SOLUTION EPIDURAL; INFILTRATION at 12:18

## 2022-02-11 RX ADMIN — SODIUM CHLORIDE, PRESERVATIVE FREE 10 ML: 5 INJECTION INTRAVENOUS at 09:05

## 2022-02-11 RX ADMIN — BUDESONIDE AND FORMOTEROL FUMARATE DIHYDRATE 2 PUFF: 160; 4.5 AEROSOL RESPIRATORY (INHALATION) at 20:57

## 2022-02-11 RX ADMIN — DILTIAZEM HYDROCHLORIDE 180 MG: 180 CAPSULE, COATED, EXTENDED RELEASE ORAL at 09:04

## 2022-02-11 RX ADMIN — PANTOPRAZOLE SODIUM 40 MG: 40 TABLET, DELAYED RELEASE ORAL at 09:04

## 2022-02-11 RX ADMIN — HYDROCODONE BITARTRATE AND ACETAMINOPHEN 1 TABLET: 7.5; 325 TABLET ORAL at 09:05

## 2022-02-11 RX ADMIN — LEVOTHYROXINE SODIUM 75 MCG: 0.07 TABLET ORAL at 06:15

## 2022-02-11 RX ADMIN — ENOXAPARIN SODIUM 40 MG: 40 INJECTION SUBCUTANEOUS at 09:04

## 2022-02-11 RX ADMIN — POTASSIUM CHLORIDE 40 MEQ: 750 CAPSULE, EXTENDED RELEASE ORAL at 20:02

## 2022-02-11 RX ADMIN — POTASSIUM CHLORIDE 40 MEQ: 750 CAPSULE, EXTENDED RELEASE ORAL at 15:43

## 2022-02-11 RX ADMIN — BUDESONIDE AND FORMOTEROL FUMARATE DIHYDRATE 2 PUFF: 160; 4.5 AEROSOL RESPIRATORY (INHALATION) at 06:52

## 2022-02-11 RX ADMIN — APIXABAN 2.5 MG: 2.5 TABLET, FILM COATED ORAL at 20:02

## 2022-02-11 RX ADMIN — HYDROCODONE BITARTRATE AND ACETAMINOPHEN 1 TABLET: 7.5; 325 TABLET ORAL at 00:55

## 2022-02-11 RX ADMIN — ALPRAZOLAM 0.5 MG: 0.5 TABLET ORAL at 20:02

## 2022-02-11 RX ADMIN — TORSEMIDE 40 MG: 20 TABLET ORAL at 18:42

## 2022-02-11 RX ADMIN — FERROUS SULFATE TAB 325 MG (65 MG ELEMENTAL FE) 325 MG: 325 (65 FE) TAB at 09:04

## 2022-02-11 RX ADMIN — SERTRALINE HYDROCHLORIDE 150 MG: 50 TABLET ORAL at 09:04

## 2022-02-11 RX ADMIN — FERROUS SULFATE TAB 325 MG (65 MG ELEMENTAL FE) 325 MG: 325 (65 FE) TAB at 17:41

## 2022-02-11 NOTE — PLAN OF CARE
Problem: Adult Inpatient Plan of Care  Goal: Plan of Care Review  Recent Flowsheet Documentation  Taken 2/11/2022 1147 by Kelli Moeller OT  Plan of Care Reviewed With: patient  Outcome Summary: Pt is A&Ox3 and participates in dynamic OOB activity with encouragement and increased time. Pt up with RW support and Min Ax1 +1 for equipment and chair follow. Pt fatigues quickly. HR 68-82 this session. Education reinforced for PHP and ADL retraining. Pt encouraged to take AE to rehab for continued practice. OT will continue to follow IP. Plan is rehab when pt is medically ready.

## 2022-02-11 NOTE — THERAPY TREATMENT NOTE
Patient Name: Yin Law  : 1940    MRN: [id number]                              Today's Date: 2022       Admit Date: 2022    Visit Dx:     ICD-10-CM ICD-9-CM   1. Closed nondisplaced intertrochanteric fracture of left femur, initial encounter ([facility])  S72.145A 820.21   2. Contusion of right forearm, initial encounter  S50.11XA 923.10   3. Skin abrasion  T14.8XXA 919.0   4. Atrial fibrillation, persistent ([facility])  I48.19 427.31     Patient Active Problem List   Diagnosis   • Paroxysmal atrial fibrillation ([facility])   • COPD on home oxygen (CMS/[facility])   • Primary hypertension   • Rheumatoid arthritis ([facility])   • Anxiety and depression   • Wound of right leg   • Anemia   • Severe malnutrition (CMS/[facility])   • Hip fracture ([facility])   • Mixed hyperlipidemia   • Heart valve disease     Past Medical History:   Diagnosis Date   • Accelerated hypertension 2021   • Acute diastolic CHF (CMS/[facility]) 2021   • Anxiety and depression    • Arrhythmia     A-FIB   • Asthma    • Chicken pox    • COPD (chronic obstructive pulmonary disease) ([facility])    • Elevated troponin 2021   • Hyperlipidemia    • Hypertension    • Measles    • Menopause    • Multifocal pneumonia 2021   • Osteoporosis    • Pleural effusion, bilateral 2021   • Pneumonia due to infectious organism 2021   • Rheumatoid arthritis ([facility])    • Rheumatoid arthritis ([facility])    • Sepsis ([facility]) 2021   • Tobacco abuse      Past Surgical History:   Procedure Laterality Date   • APPENDECTOMY     • CARPAL TUNNEL RELEASE Left    • CATARACT EXTRACTION, BILATERAL     • COLON SURGERY     • COLONOSCOPY     • GALLBLADDER SURGERY     • HIP HEMIARTHROPLASTY Left 2022    Procedure: HIP HEMIARTHROPLASTY LEFT;  Surgeon: Napoleon Powell Jr., MD;  Location: [facility];  Service: Orthopedics;  Laterality: Left;   • HYSTERECTOMY     • TONSILLECTOMY        General Information     Row Name 22 1055          Physical Therapy Time and Intention    Document  Type therapy note (daily note)  -MB     Mode of Treatment physical therapy  -MB     Row Name 02/11/22 1055          General Information    Patient Profile Reviewed yes  -MB     Existing Precautions/Restrictions fall; left; hip, posterior; brace worn when out of bed; oxygen therapy device and L/min; other (see comments)  fascia iliaca nerve catheter L LE, posterior hip precautions, KI  -MB     Barriers to Rehab medically complex; previous functional deficit  -MB     Row Name 02/11/22 1055          Cognition    Orientation Status (Cognition) oriented x 3  -MB     Row Name 02/11/22 1055          Safety Issues, Functional Mobility    Safety Issues Affecting Function (Mobility) awareness of need for assistance; insight into deficits/self-awareness; positioning of assistive device; safety precaution awareness; safety precautions follow-through/compliance  -MB     Impairments Affecting Function (Mobility) balance; endurance/activity tolerance; pain; strength; postural/trunk control; range of motion (ROM); shortness of breath  -MB           User Key  (r) = Recorded By, (t) = Taken By, (c) = Cosigned By    Initials Name Provider Type    MB Medina Greer, PT Physical Therapist               Mobility     Row Name 02/11/22 1055          Bed Mobility    Supine-Sit [place] (Bed Mobility) moderate assist (50% patient effort); verbal cues; nonverbal cues (demo/gesture)  -MB     Sit-Supine [place] (Bed Mobility) not tested  -MB     Assistive Device (Bed Mobility) head of bed elevated; draw sheet; bed rails  -MB     Comment (Bed Mobility) Donned L KI prior to OOB mobility.  Pt. required assist to move LLE towards EOB, raise trunk/shoulders, and scoot hips forward to EOB.  Increased time/effort to complete.  -MB     Row Name 02/11/22 1055          Transfers    Comment (Transfers) VCs/tactile cues for safe hand placement and upright posture in standing.  Pt. w/ retrograde posture upon standing, required assist to  recover.  -MB     Row Name 02/11/22 1055          Sit-Stand Transfer    Sit-Stand [place] (Transfers) minimum assist (75% patient effort); 2 person assist; verbal cues  -MB     Assistive Device (Sit-Stand Transfers) walker, front-wheeled  -MB     Row Name 02/11/22 1055          Gait/Stairs (Locomotion)    [place] Level (Gait) minimum assist (75% patient effort); verbal cues; nonverbal cues (demo/gesture); 1 person to manage equipment  -MB     Assistive Device (Gait) walker, front-wheeled  -MB     Distance in Feet (Gait) 20  -MB     Deviations/Abnormal Patterns (Gait) antalgic; cindi decreased; gait speed decreased; stride length decreased; base of support, narrow  -MB     Bilateral Gait Deviations forward flexed posture; heel strike decreased  -MB     Left Sided Gait Deviations heel strike decreased; weight shift ability decreased  -MB     [place] Level (Stairs) not tested  -MB     Comment (Gait/Stairs) Pt. ambulated w/ step to gait pattern w/ decreased B step length, very slow pace, and increased forward flexion.  She required VCs/tactile cues for upright posture/forward gaze, increased B step length, and assist to manage RW.  Followed closely w/ chair.  -MB     Row Name 02/11/22 1055          Mobility    Extremity Weight-bearing Status left lower extremity  -MB     Left Lower Extremity (Weight-bearing Status) weight-bearing as tolerated (WBAT)  -MB           User Key  (r) = Recorded By, (t) = Taken By, (c) = Cosigned By    Initials Name Provider Type    MB Medina Greer, PT Physical Therapist               Obj/Interventions     Row Name 02/11/22 1055          Motor Skills    Therapeutic Exercise hip; knee; ankle  -MB     Row Name 02/11/22 1055          Hip (Therapeutic Exercise)    Hip (Therapeutic Exercise) isometric exercises  -MB     Hip Isometrics (Therapeutic Exercise) bilateral; gluteal sets; 10 repetitions  -MB     Hip Strengthening (Therapeutic Exercise) bilateral; heel slides;  aBduction; 10 repetitions  -MB     Row Name 02/11/22 1055          Knee (Therapeutic Exercise)    Knee (Therapeutic Exercise) isometric exercises  -MB     Knee Isometrics (Therapeutic Exercise) bilateral; quad sets; 10 repetitions  -MB     Knee Strengthening (Therapeutic Exercise) left; SLR (straight leg raise)  min A L SLR  -MB     Row Name 02/11/22 1055          Ankle (Therapeutic Exercise)    Ankle AROM (Therapeutic Exercise) bilateral; dorsiflexion; plantarflexion; 10 repetitions  -MB     Row Name 02/11/22 1055          Balance    Static Standing Balance mild impairment; supported  -MB     Dynamic Standing Balance mild impairment; supported  -MB     Balance Interventions standing; sit to stand; weight shifting activity  -MB           User Key  (r) = Recorded By, (t) = Taken By, (c) = Cosigned By    Initials Name Provider Type    Medina Weeks, PT Physical Therapist               Goals/Plan    No documentation.                Clinical Impression     Row Name 02/11/22 1055          Pain    Additional Documentation Pain Scale: FACES Pre/Post-Treatment (Group)  -MB     Row Name 02/11/22 1055          Pain Scale: FACES Pre/Post-Treatment    Pain: FACES Scale, Pretreatment 4-->hurts little more  -MB     Posttreatment Pain Rating 4-->hurts little more  -MB     Pain Location - Side Left  -MB     Pain Location hip  -MB     Row Name 02/11/22 1055          Plan of Care Review    Plan of Care Reviewed With patient  -MB     Progress improving  -MB     Outcome Summary Patient demonstrates improving independence w/ mobility, although continues to readily fatigue w/ min exertion.  (HR stable this visit, max 82.)  Pt. completed bed mobility w/ mod A, transfers w/ min A x 2, and ambulated 20ft w/ RW and min A + 1 following closely w/ chair.  PT continues to recommend SNF rehab at D/C.  -MB     Row Name 02/11/22 1057          Vital Signs    Intratreatment Heart Rate (beats/min) 82  -MB     Pre Patient Position Supine  -MB      Intra Patient Position Standing  -MB     Post Patient Position Sitting  -MB     Row Name 02/11/22 1055          Positioning and Restraints    Pre-Treatment Position in bed  -MB     Post Treatment Position chair  -MB     In Chair notified nsg; reclined; call light within reach; encouraged to call for assist; exit alarm on; waffle cushion; legs elevated  -MB           User Key  (r) = Recorded By, (t) = Taken By, (c) = Cosigned By    Initials Name Provider Type    Medina Weeks, PT Physical Therapist               Outcome Measures     Row Name 02/11/22 1055          How much help from another person do you currently need...    Turning from your back to your side while in flat bed without using bedrails? 3  -MB     Moving from lying on back to sitting on the side of a flat bed without bedrails? 3  -MB     Moving to and from a bed to a chair (including a wheelchair)? 3  -MB     Standing up from a chair using your arms (e.g., wheelchair, bedside chair)? 3  -MB     Climbing 3-5 steps with a railing? 2  -MB     To walk in hospital room? 3  -MB     AM-PAC 6 Clicks Score (PT) 17  -MB     Row Name 02/11/22 1151 02/11/22 1055       Functional Assessment    Outcome Measure Options AM-PAC 6 Clicks Daily Activity (OT)  -TB AM-PAC 6 Clicks Basic Mobility (PT)  -MB          User Key  (r) = Recorded By, (t) = Taken By, (c) = Cosigned By    Initials Name Provider Type    Kelli Dennis, OT Occupational Therapist    Medina Weeks, PT Physical Therapist                             Physical Therapy Education                 Title: PT OT SLP Therapies (In Progress)     Topic: Physical Therapy (Done)     Point: Mobility training (Done)     Learning Progress Summary           Patient Acceptance, E, VU,NR by  at 2/10/2022 0907    Comment: Reviewed HEP, safety with mobility, and hip precautions.    Acceptance, E, VU,NR by  at 2/9/2022 1314    Comment: Reviewed hip precautions, safety with mobility, and  HEP.    Acceptance, E,D, VU by  at 2/8/2022 1315    Comment: Educated on safe sequencing with bed mobility, ambulatory transfers, and gait. Reviewed HEP and posterior hip precautions.                   Point: Home exercise program (Done)     Learning Progress Summary           Patient Acceptance, E, VU,NR by  at 2/10/2022 0907    Comment: Reviewed HEP, safety with mobility, and hip precautions.    Acceptance, E, VU,NR by  at 2/9/2022 1314    Comment: Reviewed hip precautions, safety with mobility, and HEP.    Acceptance, E,D, VU by  at 2/8/2022 1315    Comment: Educated on safe sequencing with bed mobility, ambulatory transfers, and gait. Reviewed HEP and posterior hip precautions.                   Point: Body mechanics (Done)     Learning Progress Summary           Patient Acceptance, E, VU,NR by  at 2/10/2022 0907    Comment: Reviewed HEP, safety with mobility, and hip precautions.    Acceptance, E, VU,NR by  at 2/9/2022 1314    Comment: Reviewed hip precautions, safety with mobility, and HEP.    Acceptance, E,D, VU by  at 2/8/2022 1315    Comment: Educated on safe sequencing with bed mobility, ambulatory transfers, and gait. Reviewed HEP and posterior hip precautions.                   Point: Precautions (Done)     Learning Progress Summary           Patient Acceptance, E, VU,NR by  at 2/10/2022 0907    Comment: Reviewed HEP, safety with mobility, and hip precautions.    Acceptance, E, VU,NR by  at 2/9/2022 1314    Comment: Reviewed hip precautions, safety with mobility, and HEP.    Acceptance, E,D, VU by  at 2/8/2022 1315    Comment: Educated on safe sequencing with bed mobility, ambulatory transfers, and gait. Reviewed HEP and posterior hip precautions.                               User Key     Initials Effective Dates Name Provider Type Discipline     06/16/21 -  Elgin Palmer, PT Physical Therapist PT     09/21/21 -  Misha Espinosa, PT Physical Therapist PT              PT  Recommendation and Plan     Plan of Care Reviewed With: patient  Progress: improving  Outcome Summary: Patient demonstrates improving independence w/ mobility, although continues to readily fatigue w/ min exertion.  (HR stable this visit, max 82.)  Pt. completed bed mobility w/ mod A, transfers w/ min A x 2, and ambulated 20ft w/ RW and min A + 1 following closely w/ chair.  PT continues to recommend SNF rehab at D/C.     Time Calculation:    PT Charges     Row Name 02/11/22 1433             Time Calculation    Start Time 1055  -MB      PT Received On 02/11/22  -MB      PT Goal Re-Cert Due Date 02/18/22  -MB              Time Calculation- PT    Total Timed Code Minutes- PT 37 minute(s)  -MB              Timed Charges    92760 - PT Therapeutic Exercise Minutes 17  -MB      48689 - Gait Training Minutes  20  -MB              Total Minutes    Timed Charges Total Minutes 37  -MB       Total Minutes 37  -MB            User Key  (r) = Recorded By, (t) = Taken By, (c) = Cosigned By    Initials Name Provider Type    Medina Weeks, PT Physical Therapist              Therapy Charges for Today     Code Description Service Date Service Provider Modifiers Qty    26016902233 HC PT THER PROC EA 15 MIN 2/11/2022 Medina Greer, PT GP 1    29689066903 HC GAIT TRAINING EA 15 MIN 2/11/2022 Medina Greer, PT GP 1          PT G-Codes  Outcome Measure Options: AM-PAC 6 Clicks Daily Activity (OT)  AM-PAC 6 Clicks Score (PT): 17  AM-PAC 6 Clicks Score (OT): 15    Medina Greer PT  2/11/2022

## 2022-02-11 NOTE — PLAN OF CARE
Goal Outcome Evaluation:  Plan of Care Reviewed With: patient        Progress: improving  Outcome Summary: Patient demonstrates improving independence w/ mobility, although continues to readily fatigue w/ min exertion.  (HR stable this visit, max 82.)  Pt. completed bed mobility w/ mod A, transfers w/ min A x 2, and ambulated 20ft w/ RW and min A + 1 following closely w/ chair.  PT continues to recommend SNF rehab at D/C.

## 2022-02-11 NOTE — THERAPY TREATMENT NOTE
Patient Name: Yin Law  : 1940    MRN: [id number]                              Today's Date: 2022       Admit Date: 2022    Visit Dx:     ICD-10-CM ICD-9-CM   1. Closed nondisplaced intertrochanteric fracture of left femur, initial encounter ([facility])  S72.145A 820.21   2. Contusion of right forearm, initial encounter  S50.11XA 923.10   3. Skin abrasion  T14.8XXA 919.0   4. Atrial fibrillation, persistent ([facility])  I48.19 427.31     Patient Active Problem List   Diagnosis   • Paroxysmal atrial fibrillation ([facility])   • COPD on home oxygen (CMS/[facility])   • Primary hypertension   • Rheumatoid arthritis ([facility])   • Anxiety and depression   • Wound of right leg   • Anemia   • Severe malnutrition (CMS/[facility])   • Hip fracture ([facility])   • Mixed hyperlipidemia   • Heart valve disease     Past Medical History:   Diagnosis Date   • Accelerated hypertension 2021   • Acute diastolic CHF (CMS/[facility]) 2021   • Anxiety and depression    • Arrhythmia     A-FIB   • Asthma    • Chicken pox    • COPD (chronic obstructive pulmonary disease) ([facility])    • Elevated troponin 2021   • Hyperlipidemia    • Hypertension    • Measles    • Menopause    • Multifocal pneumonia 2021   • Osteoporosis    • Pleural effusion, bilateral 2021   • Pneumonia due to infectious organism 2021   • Rheumatoid arthritis ([facility])    • Rheumatoid arthritis ([facility])    • Sepsis ([facility]) 2021   • Tobacco abuse      Past Surgical History:   Procedure Laterality Date   • APPENDECTOMY     • CARPAL TUNNEL RELEASE Left    • CATARACT EXTRACTION, BILATERAL     • COLON SURGERY     • COLONOSCOPY     • GALLBLADDER SURGERY     • HIP HEMIARTHROPLASTY Left 2022    Procedure: HIP HEMIARTHROPLASTY LEFT;  Surgeon: Napoleon Powell Jr., MD;  [facility];  Service: Orthopedics;  Laterality: Left;   • HYSTERECTOMY     • TONSILLECTOMY        General Information     Row Name 22 1139          OT Time and Intention    Document Type therapy  note (daily note)  -TB     Mode of Treatment occupational therapy  -TB     Row Name 02/11/22 1139          General Information    Patient Profile Reviewed yes  -TB     Existing Precautions/Restrictions fall; left; hip, posterior; brace worn when out of bed; oxygen therapy device and L/min; other (see comments)  L fascia iliaca, WBAT, KI when up, PHP  -TB     Barriers to Rehab medically complex  -TB     Row Name 02/11/22 1139          Occupational Profile    Reason for Services/Referral (Occupational Profile) Occupational decline  -TB     Environmental Supports and Barriers (Occupational Profile) Supportive family  -TB     Row Name 02/11/22 1139          Cognition    Orientation Status (Cognition) oriented x 3  -TB     Row Name 02/11/22 1139          Safety Issues, Functional Mobility    Safety Issues Affecting Function (Mobility) insight into deficits/self-awareness; safety precaution awareness; safety precautions follow-through/compliance; sequencing abilities; positioning of assistive device  -TB     Impairments Affecting Function (Mobility) balance; endurance/activity tolerance; pain; strength; postural/trunk control; range of motion (ROM); shortness of breath  -TB     Cognitive Impairments, Mobility Safety/Performance insight into deficits/self-awareness; safety precaution awareness; safety precaution follow-through; sequencing abilities  -TB     Comment, Safety Issues/Impairments (Mobility) Pt up with RW support and Min Ax1 +1 for equipment and chair follow. Pt fatigues quickly. HR 68-82 this session.  -TB           User Key  (r) = Recorded By, (t) = Taken By, (c) = Cosigned By    Initials Name Provider Type    TB Kelli Moeller OT Occupational Therapist                 Mobility/ADL's     Row Name 02/11/22 1142          Bed Mobility    Bed Mobility supine-sit; scooting/bridging  -TB     Scooting/Bridging [place] (Bed Mobility) moderate assist (50% patient effort); verbal cues; 1 person assist; 1  person to manage equipment  -TB     Supine-Sit [place] (Bed Mobility) moderate assist (50% patient effort); 1 person assist; 1 person to manage equipment  -TB     Bed Mobility, Safety Issues decreased use of legs for bridging/pushing; decreased use of arms for pushing/pulling; impaired trunk control for bed mobility  -TB     Assistive Device (Bed Mobility) head of bed elevated; draw sheet; bed rails  -TB     Comment (Bed Mobility) Cues and assist to sequence transition to EOB.  -     Row Name 02/11/22 1142          Transfers    Transfers sit-stand transfer  -TB     Comment (Transfers) Cues and assist for hand placement, sequencing, and safety.  -TB     Sit-Stand [place] (Transfers) minimum assist (75% patient effort); 2 person assist; verbal cues  -     Row Name 02/11/22 1142          Sit-Stand Transfer    Assistive Device (Sit-Stand Transfers) walker, front-wheeled  -     Row Name 02/11/22 1142          Functional Mobility    Functional Mobility- Ind. Level minimum assist (75% patient effort); 1 person + 1 person to manage equipment  -TB     Functional Mobility- Device rolling walker  -TB     Functional Mobility- Safety Issues sequencing ability decreased; step length decreased; weight-shifting ability decreased  -TB     Functional Mobility- Comment Pt up with RW support and Min Ax1 +1 for equipment and chair follow. Pt fatigues quickly. HR 68-82 this session.  -     Row Name 02/11/22 1142          Activities of Daily Living    BADL Assessment/Intervention bathing; lower body dressing; grooming; feeding; toileting  -     Row Name 02/11/22 1142          Mobility    Extremity Weight-bearing Status left lower extremity  -TB     Left Lower Extremity (Weight-bearing Status) weight-bearing as tolerated (WBAT)  -     Row Name 02/11/22 1142          Lower Body Dressing Assessment/Training    [place] Level (Lower Body Dressing) lower body dressing skills; maximum assist (25% patient effort)  -      Comment (Lower Body Dressing) Education reinforced for PHP and ADL retraining. Pt encouraged to take all AE to rehab for continued teaching and trial.  -TB     Row Name 02/11/22 1142          Toileting Assessment/Training    [place] Level (Toileting) dependent (less than 25% patient effort)  -TB     Comment (Toileting) Incontinent/purwick  -TB     Row Name 02/11/22 1142          Grooming Assessment/Training    [place] Level (Grooming) set up; hair care, combing/brushing  -TB     Position (Grooming) supported sitting  -TB     Comment (Grooming) OT assisted pt to wash/dry hair  -TB     Row Name 02/11/22 1142          Self-Feeding Assessment/Training    [place] Level (Feeding) independent; liquids to mouth  -TB     Position (Self-Feeding) supported sitting  -TB           User Key  (r) = Recorded By, (t) = Taken By, (c) = Cosigned By    Initials Name Provider Type    TB Kelli Moeller OT Occupational Therapist               Obj/Interventions     Row Name 02/11/22 1146          Balance    Balance Assessment sitting dynamic balance; sit to stand dynamic balance; standing dynamic balance  -TB     Dynamic Sitting Balance WFL; unsupported; sitting in chair; sitting, edge of bed  -TB     Sit to Stand Dynamic Balance mild impairment; supported; standing  -TB     Dynamic Standing Balance mild impairment; supported; standing  -TB     Balance Interventions sitting; standing; sit to stand; supported; static; dynamic; occupation based/functional task; UE activity with balance activity; weight shifting activity  -TB     Comment, Balance Pt up with RW support and Min Ax1 +1 for equipment and chair follow. Pt fatigues quickly. HR 68-82 this session.  -TB           User Key  (r) = Recorded By, (t) = Taken By, (c) = Cosigned By    Initials Name Provider Type    TB Kelli Moeller OT Occupational Therapist               Goals/Plan    No documentation.                Clinical Impression     Row Name  02/11/22 1147          Pain Assessment    Additional Documentation Pain Scale: FACES Pre/Post-Treatment (Group)  -TB     Row Name 02/11/22 1147          Pain Scale: Numbers Pre/Post-Treatment    Pain Intervention(s) Ambulation/increased activity; Repositioned  -TB     Row Name 02/11/22 1147          Pain Scale: FACES Pre/Post-Treatment    Pain: FACES Scale, Pretreatment 2-->hurts little bit  -TB     Posttreatment Pain Rating 4-->hurts little more  -TB     Pain Location - Side Left  -TB     Pain Location hip  -TB     Pre/Posttreatment Pain Comment Pt tolerates dynamic OOB activity with encouragement and time.  -TB     Row Name 02/11/22 1147          Plan of Care Review    Plan of Care Reviewed With patient  -TB     Outcome Summary Pt is A&Ox3 and participates in dynamic OOB activity with encouragement and increased time. Pt up with RW support and Min Ax1 +1 for equipment and chair follow. Pt fatigues quickly. HR 68-82 this session. Education reinforced for PHP and ADL retraining. Pt encouraged to take AE to rehab for continued practice. OT will continue to follow IP. Plan is rehab when pt is medically ready.  -TB     Row Name 02/11/22 1147          Therapy Plan Review/Discharge Plan (OT)    Anticipated Discharge Disposition (OT) inpatient rehabilitation facility  -TB     Row Name 02/11/22 1147          Vital Signs    Pre Systolic BP Rehab --  RN cleared OT  -TB     O2 Delivery Pre Treatment supplemental O2  -TB     O2 Delivery Intra Treatment supplemental O2  -TB     O2 Delivery Post Treatment supplemental O2  -TB     Pre Patient Position Supine  -TB     Intra Patient Position Standing  -TB     Post Patient Position Sitting  -TB     Row Name 02/11/22 1147          Positioning and Restraints    Pre-Treatment Position in bed  -TB     Post Treatment Position chair  -TB     In Chair notified nsg; reclined; call light within reach; encouraged to call for assist; exit alarm on; with family/caregiver; on mechanical lift  sling; waffle cushion; legs elevated; heels elevated  -TB           User Key  (r) = Recorded By, (t) = Taken By, (c) = Cosigned By    Initials Name Provider Type    Kelli Dennis OT Occupational Therapist               Outcome Measures     Row Name 02/11/22 1151          How much help from another is currently needed...    Putting on and taking off regular lower body clothing? 2  -TB     Bathing (including washing, rinsing, and drying) 2  -TB     Toileting (which includes using toilet bed pan or urinal) 2  -TB     Putting on and taking off regular upper body clothing 3  -TB     Taking care of personal grooming (such as brushing teeth) 3  -TB     Eating meals 3  -TB     AM-PAC 6 Clicks Score (OT) 15  -TB     Row Name 02/11/22 1151          Functional Assessment    Outcome Measure Options AM-PAC 6 Clicks Daily Activity (OT)  -TB           User Key  (r) = Recorded By, (t) = Taken By, (c) = Cosigned By    Initials Name Provider Type    Kelli Dennis OT Occupational Therapist                Occupational Therapy Education                 Title: PT OT SLP Therapies (In Progress)     Topic: Occupational Therapy (In Progress)     Point: ADL training (Done)     Description:   Instruct learner(s) on proper safety adaptation and remediation techniques during self care or transfers.   Instruct in proper use of assistive devices.              Learning Progress Summary           Patient Acceptance, E,D, VU,NR by TB at 2/11/2022 1151    Acceptance, E, VU,NR by AN at 2/9/2022 1614    Acceptance, E, VU,NR by AN at 2/8/2022 1544   Family Acceptance, E,D, VU,NR by TB at 2/11/2022 1151                   Point: Home exercise program (Not Started)     Description:   Instruct learner(s) on appropriate technique for monitoring, assisting and/or progressing therapeutic exercises/activities.              Learner Progress:  Not documented in this visit.          Point: Precautions (Done)     Description:   Instruct  learner(s) on prescribed precautions during self-care and functional transfers.              Learning Progress Summary           Patient Acceptance, E,D, VU,NR by TB at 2/11/2022 1151    Acceptance, E, VU,NR by AN at 2/9/2022 1614    Acceptance, E, VU,NR by AN at 2/8/2022 1544   Family Acceptance, E,D, VU,NR by TB at 2/11/2022 1151                   Point: Body mechanics (Done)     Description:   Instruct learner(s) on proper positioning and spine alignment during self-care, functional mobility activities and/or exercises.              Learning Progress Summary           Patient Acceptance, E, VU,NR by AN at 2/9/2022 1614    Acceptance, E, VU,NR by AN at 2/8/2022 1544                               User Key     Initials Effective Dates Name Provider Type Discipline     06/16/21 -  Kelli Moeller OT Occupational Therapist OT    [place] 09/21/21 -  Court De Leon OT Occupational Therapist OT              OT Recommendation and Plan     Plan of Care Review  Plan of Care Reviewed With: patient  Outcome Summary: Pt is A&Ox3 and participates in dynamic OOB activity with encouragement and increased time. Pt up with RW support and Min Ax1 +1 for equipment and chair follow. Pt fatigues quickly. HR 68-82 this session. Education reinforced for PHP and ADL retraining. Pt encouraged to take AE to rehab for continued practice. OT will continue to follow IP. Plan is rehab when pt is medically ready.     Time Calculation:    Time Calculation- OT     Row Name 02/11/22 1049             Time Calculation- OT    OT Start Time 1049  -TB      OT Received On 02/11/22  -TB      OT Goal Re-Cert Due Date 02/18/22  -TB              Timed Charges    85768 - OT Self Care/Mgmt Minutes 23  -TB              Total Minutes    Timed Charges Total Minutes 23  -TB       Total Minutes 23  -TB            User Key  (r) = Recorded By, (t) = Taken By, (c) = Cosigned By    Initials Name Provider Type    TB Kelli Moeller, OT Occupational  Therapist              Therapy Charges for Today     Code Description Service Date Service Provider Modifiers Qty    [id number] HC OT SELF CARE/MGMT/TRAIN EA 15 MIN 2/11/2022 Kelli Moeller OT GO 2               Kelli Moeller OT  2/11/2022

## 2022-02-11 NOTE — CASE MANAGEMENT/SOCIAL WORK
Case Management Discharge Note      Final Note: Ms. Law has a skilled bed at [facility] tomorrow, Saturday, 2/12/22, if medically ready.  Confirmed bed with Rebecca at facility.  Notified the patient at the bedside and she is agreeable to DC plan.  Attempted to contact Ms. Law's sister, Enedina, by phone, to update, and no answer.  Will call Ms. Law's niece.  [facility] transport has been arranged with [facility] for tomorrow at 12noon, confirmation #[id number], ph [phone].  Covid test has been ordered and results are pending.  Facility pharmacy noted in [employer] for e-scribing.  Please call report to [facility] at ph [phone], and fax DC summary to fax [phone].  Have a copy of the DC summary and AVS in the DC packet.  Thank you.   Oxygen will be provided by [facility] for transport.         Selected Continued Care - Admitted Since 2/7/2022     Destination Coordination complete.    Service Provider Selected Services Address Phone Fax Patient Preferred    [facility]  Skilled Nursing [id number] [phone] --                      Transportation Services  Ambulance: [facility]    Final Discharge Disposition Code: 03 - skilled nursing facility (SNF)

## 2022-02-11 NOTE — PROGRESS NOTES
"Orthopedic Daily Progress Note      CC: Patient was scheduled to go to rehab yesterday; however, went into A fib with RVR and has been kept overnight. Cardiology consulted. Appreciate recs.    Pain well controlled  General: no fevers, chills  Abdomen: no nausea, vomiting, or diarrhea    No other complaints    Physical Exam:  I have reviewed the vital signs.  Temp:  [97.9 °F (36.6 °C)-99.2 °F (37.3 °C)] 97.9 °F (36.6 °C)  Heart Rate:  [] 79  Resp:  [16-18] 18  BP: (107-158)/() 147/60    Objective:  Vital signs: (most recent): Blood pressure 147/60, pulse 79, temperature 97.9 °F (36.6 °C), temperature source Axillary, resp. rate 18, height 167.6 cm (65.98\"), weight 55.8 kg (123 lb 0.3 oz), SpO2 100 %, not currently breastfeeding.            General Appearance:    Alert, cooperative, no distress  Extremities: No clubbing, cyanosis, or edema to lower extremities  Pulses:  2+ in distal surgical extremity  Skin: Incision Clean/dry/intact      Results Review:    I have reviewed the labs, radiology results and diagnostic studies: no new labs this AM    Results from last 7 days   Lab Units 02/10/22  0327   WBC 10*3/mm3 8.31  8.09   HEMOGLOBIN g/dL 8.8*  8.8*   PLATELETS 10*3/mm3 140  148     Results from last 7 days   Lab Units 02/10/22  0109   SODIUM mmol/L 139   POTASSIUM mmol/L 3.9   CO2 mmol/L 25.0   CREATININE mg/dL 0.80   GLUCOSE mg/dL 121*       I have reviewed the medications.    Assessment/Problem List  POD# 4 Day Post-Op   S/p L hip hemiarthroplasty    Plan  WBAT LLE  Posterior hip precautions x 6 weeks  PT/OT  Lovenox x 2 weeks for DVT prophylaxis followed by  mg x 4 more weeks      Discharge Planning: I expect patient to be discharged to rehab today if cleared by cardiology/medicine.      Napoleon Powell Jr., MD  02/11/22  07:51 EST            "

## 2022-02-11 NOTE — PROGRESS NOTES
[facility] Medicine Services  PROGRESS NOTE    Patient Name: Yin Lwa  : 1940  MRN: [id number]    Date of Admission: 2022  Primary Care Physician: Santiago Chaudhry MD    Subjective   Subjective     CC:  Hip fracture    HPI:  Pt doing well today. No new issues overnight.      ROS:  Gen- No fevers, chills  CV- No chest pain, palpitations  Resp- No cough, dyspnea  GI- No N/V/D, abd pain        Objective   Objective     Vital Signs:   Temp:  [97.9 °F (36.6 °C)-99.2 °F (37.3 °C)] 97.9 °F (36.6 °C)  Heart Rate:  [] 110  Resp:  [16-18] 18  BP: (107-158)/(51-82) 112/81  Flow (L/min):  [3-5] 4     Physical Exam:    Constitutional: awake, alert, transferring from bedside commode to bedside chair when I saw her, slightly dyspneic and in mild distress due to pain  HENT: NCAT, mucous membranes moist, nasal canula in place  Respiratory: Clear to auscultation bilaterally, respiratory effort normal    Cardiovascular: irregularly irregular rate in the mid 90s to low 100s, no murmurs, rubs, or gallops  Gastrointestinal: Positive bowel sounds, soft, nontender, nondistended  Musculoskeletal: No bilateral ankle edema, LLE in brace  Psychiatric: Appropriate affect, cooperative  Neurologic: Oriented x 3, strength symmetric in all extremities, Cranial Nerves grossly intact to confrontation, speech clear  Skin: No rashes         Results Reviewed:  LAB RESULTS:      Lab 02/10/22  0345 02/10/22  0327 02/10/22  0109 22  0425 22  0525 22  0644   WBC  --  8.31  8.09  --  10.78 10.04 6.47   HEMOGLOBIN  --  8.8*  8.8*  --  9.0* 9.0* 11.5*   HEMATOCRIT  --  27.8*  27.2*  --  28.9* 28.5* 36.3   PLATELETS  --  140  148  --  151 167 231   NEUTROS ABS  --  6.56  --   --   --  3.85   IMMATURE GRANS (ABS)  --  0.04  --   --   --  0.06*   LYMPHS ABS  --  0.90  --   --   --  1.70   MONOS ABS  --  0.76  --   --   --  0.55   EOS ABS  --  0.02  --   --   --  0.24   MCV  --  96.5   94.1  --  98.0* 96.6 97.8*   PROCALCITONIN  --   --  0.14  --   --   --    LACTATE 1.3  --   --   --   --   --    PROTIME  --   --   --   --   --  13.2         Lab 02/10/22  0109 02/09/22  0425 02/08/22  0525 02/07/22  0644   SODIUM 139 131* 134* 138   POTASSIUM 3.9 3.7 4.5 4.1   CHLORIDE 100 96* 98 98   CO2 25.0 23.0 26.0 30.0*   ANION GAP 14.0 12.0 10.0 10.0   BUN 17 24* 14 12   CREATININE 0.80 1.14* 0.83 0.83   GLUCOSE 121* 132* 94 94   CALCIUM 9.0 8.8 8.9 9.8   MAGNESIUM 1.8  --   --   --    TSH  --   --  3.880  --          Lab 02/10/22  0109 02/07/22  0644   TOTAL PROTEIN 6.0 6.9   ALBUMIN 3.40* 4.00   GLOBULIN 2.6 2.9   ALT (SGPT) <5 15   AST (SGOT) 32 29   BILIRUBIN 0.4 0.4   ALK PHOS 105 130*         Lab 02/07/22  0644   PROTIME 13.2   INR 1.03             Lab 02/07/22  0728   ABO TYPING O   RH TYPING Positive   ANTIBODY SCREEN Negative         Brief Urine Lab Results  (Last result in the past 365 days)      Color   Clarity   Blood   Leuk Est   Nitrite   Protein   CREAT   Urine HCG        02/10/22 0436 Yellow   Clear   Negative   Trace   Negative   Negative                 Microbiology Results Abnormal     Procedure Component Value - Date/Time    Blood Culture - Blood, Hand, Left [[id number]]  (Normal) Collected: 02/10/22 0340    Lab Status: Preliminary result Specimen: Blood from Hand, Left Updated: 02/11/22 0415     Blood Culture No growth at 24 hours    Narrative:      Aerobic bottle only      Blood Culture - Blood, Arm, Right [[id number]]  (Normal) Collected: 02/10/22 0335    Lab Status: Preliminary result Specimen: Blood from Arm, Right Updated: 02/11/22 0415     Blood Culture No growth at 24 hours    Narrative:      Aerobic bottle only      COVID PRE-OP / PRE-PROCEDURE SCREENING ORDER (NO ISOLATION) - Swab, Nasopharynx [[id number]]  (Normal) Collected: 02/07/22 0943    Lab Status: Final result Specimen: Swab from Nasopharynx Updated: 02/07/22 1012    Narrative:      The following orders were created for  panel order COVID PRE-OP / PRE-PROCEDURE SCREENING ORDER (NO ISOLATION) - Swab, Nasopharynx.  Procedure                               Abnormality         Status                     ---------                               -----------         ------                     COVID-19, ABBOTT IN-HOUS...[[id number]]  Normal              Final result                 Please view results for these tests on the individual orders.    COVID-19, ABBOTT IN-HOUSE,NASAL Swab (NO TRANSPORT MEDIA) 2 HR TAT - Swab, Nasopharynx [[id number]]  (Normal) Collected: 02/07/22 0943    Lab Status: Final result Specimen: Swab from Nasopharynx Updated: 02/07/22 1012     COVID19 Presumptive Negative    Narrative:      Fact sheet for providers: https://www.fda.gov/media/402994/download     Fact sheet for patients: https://www.fda.gov/media/076330/download    Test performed by PCR.  If inconsistent with clinical signs and symptoms patient should be tested with different authorized molecular test.          Adult Transthoracic Echo Complete W/ Cont if Necessary Per Protocol    Result Date: 2/10/2022  · Estimated left ventricular EF = 60% · Left ventricular wall thickness is consistent with moderate concentric hypertrophy. · Mild aortic valve stenosis is present. · Moderate to severe mitral valve regurgitation is present. · Moderate tricuspid valve regurgitation is present. · Estimated right ventricular systolic pressure from tricuspid regurgitation is markedly elevated (>55 mmHg).      XR Chest 1 View    Result Date: 2/10/2022  Chest one view. DATE: 2/10/2022. COMPARISON: 2/7/2022. CLINICAL HISTORY: Fever.     Impression: Small right pleural effusion is unchanged. There is some scattered patchy interstitial changes throughout the upper lungs bilaterally which appears more prominent than the previous examination and could relate to edema versus infection. Cardiac silhouette does not appear enlarged. Electronically signed by:  Martin De La Torre D.O.  2/10/2022  2:40 AM Mountain Time      Results for orders placed during the hospital encounter of 02/07/22    Adult Transthoracic Echo Complete W/ Cont if Necessary Per Protocol    Interpretation Summary  · Estimated left ventricular EF = 60%  · Left ventricular wall thickness is consistent with moderate concentric hypertrophy.  · Mild aortic valve stenosis is present.  · Moderate to severe mitral valve regurgitation is present.  · Moderate tricuspid valve regurgitation is present.  · Estimated right ventricular systolic pressure from tricuspid regurgitation is markedly elevated (>55 mmHg).      I have reviewed the medications:  Scheduled Meds:bisoprolol, 10 mg, Oral, Q24H  budesonide-formoterol, 2 puff, Inhalation, BID - RT  dilTIAZem CD, 180 mg, Oral, Q24H  enoxaparin, 40 mg, Subcutaneous, Q24H  ferrous sulfate, 325 mg, Oral, BID With Meals  levothyroxine, 75 mcg, Oral, Q AM  pantoprazole, 40 mg, Oral, BID AC  sertraline, 150 mg, Oral, Daily  sodium chloride, 10 mL, Intravenous, Q12H  sodium chloride, 3 mL, Intravenous, Q12H  torsemide, 40 mg, Oral, Daily      Continuous Infusions:lactated ringers, 9 mL/hr, Last Rate: 9 mL/hr (02/07/22 1640)  ropivacaine (NAROPIN) 0.2% peripheral nerve cath (moog), 8 mL/hr, Last Rate: 8 mL/hr (02/10/22 2345)      PRN Meds:.•  acetaminophen  •  albuterol sulfate HFA  •  ALPRAZolam  •  HYDROcodone-acetaminophen  •  lactated ringers  •  Morphine **AND** naloxone  •  ondansetron **OR** ondansetron  •  sodium chloride  •  sodium chloride  •  Tetanus-Diphth-Acell Pertussis    Assessment/Plan   Assessment & Plan     Active Hospital Problems    Diagnosis  POA   • **Hip fracture (HCC) [S72.009A]  Yes     Priority: High   • Paroxysmal atrial fibrillation (HCC) [I48.0]  Yes     Priority: High     Class: Acute   • Primary hypertension [I10]  Yes     Priority: Low   • Rheumatoid arthritis (HCC) [M06.9]  Yes     Priority: Low   • Mixed hyperlipidemia [E78.2]  Yes   • Heart valve disease [I38]  Yes   •  Anemia [D64.9]  Yes   • COPD on home oxygen (CMS/[facility]) [J44.9]  Yes      Resolved Hospital Problems   No resolved problems to display.        Brief Hospital Course to date:  Yin Law is a 81 y.o. female with h/o  Diastolic CHF, afib, COPD on home 5LNC, and RA,  found to have a let hip fracture after tripping over her oxygen tank. Pt was scheduled to go to rehab on 2/10, however, she went back into Afib with RVR despite having taken her home dose beta blocker- she was started on dilt gtt.     This patient's problems and plans were partially entered by my partner and updated as appropriate by me 02/11/22.        Left femoral neck fracture  ---s/p open treatment of displaced femoral neck fracture on 2/7/22 per Dr. Powell  -postop care per Dr. Powell: wbat LLE; posterior hip precautions x 6 weeks;started sq lovenox 2/8/22 x 2 weeks,then asa 325mg daily x 4 more weeks for dvt prophylaxis    Post-op blood loss anemia, stable  -hgb stable at 8.8; monitor       Chronic DHF  --continue home meds including torsemide  --monitor oxygenation/fluid status closely perioperatively  --follows with Dr. Montoya outpatient  -- repeat CXR done overnight 2/9, I suspect bilateral opacities are more likely edema due to Afib w/ RVR (ie flash pulmonary edema) rather than infectious etiology.      Afib  --not on anticoagulation, as was taken off d/t anemia while previously taking  --cont bisoprolol with hold parameters, went back in Afib with RVR overnight 2/0. Started on Dilt gtt, restarted her home PO dose Dilt per Cards and now off dilt gtt.  -- increased her home dose Bisoprolol as well   -- doubt that her Afib with RVR overnight was related to infection as normal WBC, no fever, normal lactate and procal.  Although UA shows small LE and TNTC WBCs, pt denies any dysuria. Stopped broad spectrum ABX     COPD (5Lnc dependent)  Chronic Respiratory Failure  --home O2 dependent on 5LNC,currently at baseline  --duonebs  --monitor  oxygenation close perioperatively  --follows with pulm/[facility] outpatient     Hypothyroid  --continue home synthroid  --TSH wnl    H/o RA  -- not on any DMARDs       DVT prophylaxis:  Medical DVT prophylaxis orders are present. sq lovenox      AM-PAC 6 Clicks Score (PT): 17 (02/10/22 1144)    Disposition: I expect the patient to be discharged to rehab at [facility] when okay with Cards and when bed/transport available    CODE STATUS:   Code Status and Medical Interventions:   Ordered at: 02/07/22 1250     Level Of Support Discussed With:    Patient     Code Status (Patient has no pulse and is not breathing):    CPR (Attempt to Resuscitate)     Medical Interventions (Patient has pulse or is breathing):    Full Support       Breann King MD  02/11/22

## 2022-02-11 NOTE — PROGRESS NOTES
" [facility] - Progress Note    Yin Law  1940  [id number]    02/11/22, 09:25 EST      Chief Complaint: Following for PAF, AFib episode noted post operatively.    Subjective:   Resting in bed.  Pain is controlled with medications.  Sometimes aware of AFib as it cause SOA -mildly SOA this a.m.  Family at bedside - still hope to transfer to rehab today if medically stable.    Review of Systems:  Pertinent positives are listed above and in physical exam.  All others have been reviewed and are negative.    bisoprolol, 10 mg, Oral, Q24H  budesonide-formoterol, 2 puff, Inhalation, BID - RT  dilTIAZem CD, 180 mg, Oral, Q24H  enoxaparin, 40 mg, Subcutaneous, Q24H  ferrous sulfate, 325 mg, Oral, BID With Meals  levothyroxine, 75 mcg, Oral, Q AM  pantoprazole, 40 mg, Oral, BID AC  sertraline, 150 mg, Oral, Daily  sodium chloride, 10 mL, Intravenous, Q12H  sodium chloride, 3 mL, Intravenous, Q12H  torsemide, 40 mg, Oral, Daily        Objective:  Vitals:   height is 167.6 cm (65.98\") and weight is 55.8 kg (123 lb 0.3 oz). Her axillary temperature is 97.9 °F (36.6 °C). Her blood pressure is 112/81 and her pulse is 110. Her respiration is 18 and oxygen saturation is 92%.       Intake/Output Summary (Last 24 hours) at 2/11/2022 0925  Last data filed at 2/10/2022 2005  Gross per 24 hour   Intake 840 ml   Output 1000 ml   Net -160 ml       Physical Exam:  General:  WN, NAD, A and O x3.  CV:  Irregular Irregular, tachycardic. No murmur, rub, or gallop.  Resp:  CTA Cem, equal, nonlabored.  Abd:  Soft, + BS, no organomegaly. Nontender to palpation.  Extrem:  No edema BLE, 2+ pedal/PT pulses.   I have examined the patient and agree with the above           Results from last 7 days   Lab Units 02/10/22  0327   WBC 10*3/mm3 8.31  8.09   HEMOGLOBIN g/dL 8.8*  8.8*   HEMATOCRIT % 27.8*  27.2*   PLATELETS 10*3/mm3 140  148     Results from last 7 days   Lab Units 02/10/22  0109   SODIUM mmol/L 139 "   POTASSIUM mmol/L 3.9   CHLORIDE mmol/L 100   CO2 mmol/L 25.0   BUN mg/dL 17   CREATININE mg/dL 0.80   CALCIUM mg/dL 9.0   BILIRUBIN mg/dL 0.4   ALK PHOS U/L 105   ALT (SGPT) U/L <5   AST (SGOT) U/L 32   GLUCOSE mg/dL 121*     Results from last 7 days   Lab Units 02/07/22  0644   INR  1.03     Results from last 7 days   Lab Units 02/08/22  0525   TSH uIU/mL 3.880               Tele:  Atrial Fibrillaiton    Assessment and Plan:  1. PAF              - Converted to  SR yesterday.  Now back in AFib with RVR.              -  transitioned IV diltiazem to PO home dose, 180mg daily   -  Will now give 0.5IV Digoxin x1now and in 1 hour, give 0.25IV for a 2nd dose.                -  could be considered for LAAO as an out patient as she is not an anticoagulation candidate              -  Brief A. fib at this admission, does not necessarily require anticoagulation at this time   -  Possibly transfer to rehab later today if we can control rates.  Will have patient follow up with [facility] in 1 month with EKG - order placed in epic.     2. HTN              -  overall well managed   -  Tolerating PO diltiazem at home dose     3. Anemia              -  currently on Lovenox              -We will transition back to her Eliquis (she was on as an outpatient.    4.  Moderate severe mitral vegetation with severe pulmonary hypertension.  Chronic finding.  Not surgical candidate for MVR.    I discussed the patient's findings and my recommendations with the patient, any present family members, and the nursing staff.  He Montoya MD saw and examined patient, verified hx and PE, read all radiographic studies, reviewed labs and micro data, and formulated dx, plan for treatment and all medical decision making.      Rand Ireland PA-C  02/11/22, 09:25 EST    I have seen and examined the patient, I have reviewed the note, discussed the case with the advance practice clinician, made necessary changes and I agree with the final  note.    He Montoya MD  02/11/22  10:23 EST

## 2022-02-11 NOTE — PROGRESS NOTES
[facility]    Acute pain service Inpatient Progress Note    Patient Name: Yin Law  :  1940  MRN:  [id number]        Acute Pain  Service Inpatient Progress Note:    Analgesia:Good  LOC: alert and awake  Resp Status: room air  Cardiac: VS stable  Side Effects:None  Catheter Site:clean, dressing intact and dry  Cath type: peripheral nerve cath with ON Q  Infusion rate: 8ml/hr  Catheter Plan:Catheter to remain Insitu and Continue catheter infusion rate unchanged

## 2022-02-12 VITALS
RESPIRATION RATE: 16 BRPM | HEIGHT: 66 IN | HEART RATE: 63 BPM | WEIGHT: 123.02 LBS | TEMPERATURE: 98.1 F | DIASTOLIC BLOOD PRESSURE: 64 MMHG | OXYGEN SATURATION: 97 % | BODY MASS INDEX: 19.77 KG/M2 | SYSTOLIC BLOOD PRESSURE: 137 MMHG

## 2022-02-12 PROCEDURE — 94799 UNLISTED PULMONARY SVC/PX: CPT

## 2022-02-12 PROCEDURE — 25010000002 ROPIVACAINE PER 1 MG: Performed by: ORTHOPAEDIC SURGERY

## 2022-02-12 PROCEDURE — 94761 N-INVAS EAR/PLS OXIMETRY MLT: CPT

## 2022-02-12 PROCEDURE — 99024 POSTOP FOLLOW-UP VISIT: CPT | Performed by: ORTHOPAEDIC SURGERY

## 2022-02-12 PROCEDURE — 99239 HOSP IP/OBS DSCHRG MGMT >30: CPT | Performed by: INTERNAL MEDICINE

## 2022-02-12 RX ORDER — HYDROCODONE BITARTRATE AND ACETAMINOPHEN 7.5; 325 MG/1; MG/1
1 TABLET ORAL EVERY 6 HOURS PRN
Qty: 12 TABLET | Refills: 0 | Status: SHIPPED | OUTPATIENT
Start: 2022-02-12

## 2022-02-12 RX ORDER — BISOPROLOL FUMARATE 10 MG/1
10 TABLET, FILM COATED ORAL
Start: 2022-02-13

## 2022-02-12 RX ORDER — ALPRAZOLAM 0.5 MG/1
0.5 TABLET ORAL NIGHTLY PRN
Qty: 3 TABLET | Refills: 0 | Status: SHIPPED | OUTPATIENT
Start: 2022-02-12

## 2022-02-12 RX ORDER — ALPRAZOLAM 0.5 MG/1
0.5 TABLET ORAL NIGHTLY PRN
Qty: 3 TABLET | Refills: 0 | Status: SHIPPED | OUTPATIENT
Start: 2022-02-12 | End: 2022-02-12 | Stop reason: SDUPTHER

## 2022-02-12 RX ADMIN — SERTRALINE HYDROCHLORIDE 150 MG: 50 TABLET ORAL at 08:32

## 2022-02-12 RX ADMIN — BUDESONIDE AND FORMOTEROL FUMARATE DIHYDRATE 2 PUFF: 160; 4.5 AEROSOL RESPIRATORY (INHALATION) at 09:02

## 2022-02-12 RX ADMIN — PANTOPRAZOLE SODIUM 40 MG: 40 TABLET, DELAYED RELEASE ORAL at 08:31

## 2022-02-12 RX ADMIN — LEVOTHYROXINE SODIUM 75 MCG: 0.07 TABLET ORAL at 05:58

## 2022-02-12 RX ADMIN — POTASSIUM CHLORIDE 40 MEQ: 750 CAPSULE, EXTENDED RELEASE ORAL at 00:33

## 2022-02-12 RX ADMIN — DILTIAZEM HYDROCHLORIDE 180 MG: 180 CAPSULE, COATED, EXTENDED RELEASE ORAL at 08:31

## 2022-02-12 RX ADMIN — TORSEMIDE 40 MG: 20 TABLET ORAL at 08:31

## 2022-02-12 RX ADMIN — APIXABAN 2.5 MG: 2.5 TABLET, FILM COATED ORAL at 08:32

## 2022-02-12 RX ADMIN — FERROUS SULFATE TAB 325 MG (65 MG ELEMENTAL FE) 325 MG: 325 (65 FE) TAB at 08:32

## 2022-02-12 RX ADMIN — HYDROCODONE BITARTRATE AND ACETAMINOPHEN 1 TABLET: 7.5; 325 TABLET ORAL at 11:28

## 2022-02-12 RX ADMIN — ROPIVACAINE HYDROCHLORIDE 8 ML/HR: 2 INJECTION, SOLUTION EPIDURAL; INFILTRATION at 00:40

## 2022-02-12 RX ADMIN — BISOPROLOL FUMARATE 10 MG: 5 TABLET, FILM COATED ORAL at 08:31

## 2022-02-12 NOTE — DISCHARGE SUMMARY
[facility] Medicine Services  DISCHARGE SUMMARY    Patient Name: Yin Law  : 1940  MRN: [id number]    Date of Admission: 2022  6:44 AM  Date of Discharge:  2022  Primary Care Physician: Santiago Chaudhry MD    Consults     Date and Time Order Name Status Description    2/10/2022 11:33 AM Inpatient Cardiology Consult Completed     2022  4:42 PM Inpatient Orthopedic Surgery Consult Completed           Hospital Course     Presenting Problem:   Hip fracture (HCC) [S72.009A]    Active Hospital Problems    Diagnosis  POA   • **Hip fracture (HCC) [S72.009A]  Yes     Priority: High   • Paroxysmal atrial fibrillation (HCC) [I48.0]  Yes     Priority: High     Class: Acute   • Primary hypertension [I10]  Yes     Priority: Low   • Rheumatoid arthritis (HCC) [M06.9]  Yes     Priority: Low   • Mixed hyperlipidemia [E78.2]  Yes   • Heart valve disease [I38]  Yes   • Anemia [D64.9]  Yes   • COPD on home oxygen (CMS/HCC) [J44.9]  Yes      Resolved Hospital Problems   No resolved problems to display.          Hospital Course:  Yin Law is a 81 y.o. female with h/o  Diastolic CHF, afib, COPD on home 5LNC, and RA,  found to have a let hip fracture after tripping over her oxygen tank. Pt was scheduled to go to rehab on 2/10, however, she went back into Afib with RVR despite having taken her home dose beta blocker- she was started on dilt gtt and Cardiology was consulted.  She was eventually weaned off dilt gtt and, with some medication adjustments, she has returned to normal sinus rhythm       Left femoral neck fracture  ---s/p open treatment of displaced femoral neck fracture on 22 per Dr. Powell  -postop care per Dr. Powell: wbat LLE; posterior hip precautions x 6 weeks; now on low dose Eliquis for DVT ppx as well as for Afib     Post-op blood loss anemia, stable  -hgb stable at 8.8; monitor        Chronic DHF  --continue home meds including torsemide  --follows  with Dr. Montoya outpatient  -- repeat CXR done overnight 2/9, I suspect bilateral opacities are more likely edema due to Afib w/ RVR (ie flash pulmonary edema) rather than infectious etiology.      Afib  --not on anticoagulation on admission, as was taken off d/t anemia while previously taking  --cont bisoprolol with hold parameters, went back in Afib with RVR overnight 2/10. Started on Dilt gtt, restarted her home PO dose Dilt per Cards and now off dilt gtt.  -- increased her home dose Bisoprolol as well   -- low dose Eliquis started per Cards  -- follow up outpatient with Roslyn Melendez     COPD (5Lnc dependent)  Chronic Respiratory Failure  --home O2 dependent on 5LNC,currently at baseline  --duonebs  --monitor oxygenation close perioperatively  --follows with pulm/[facility] outpatient     Hypothyroid  --continue home synthroid  --TSH wnl     H/o RA  -- not on any DMARDs       Discharge Follow Up Recommendations for outpatient labs/diagnostics:   Follow up with Dr. Powell as per his instructions  Follow up with Cardiology as per their instructions  Follow up with PCP within one week of d/c from rehab    Day of Discharge     HPI:   Pt doing well this am, no issues overnight. Ready to go to rehab.     Review of Systems  Gen- No fevers, chills  CV- No chest pain, palpitations  Resp- No cough, dyspnea  GI- No N/V/D, abd pain        Vital Signs:   Temp:  [97.9 °F (36.6 °C)-98.2 °F (36.8 °C)] 98.1 °F (36.7 °C)  Heart Rate:  [52-89] 63  Resp:  [16-20] 16  BP: (132-170)/(52-78) 137/64  Flow (L/min):  [4] 4      Physical Exam:     Constitutional: awake, alert, transferring from bedside commode to bedside chair when I saw her, slightly dyspneic and in mild distress due to pain  HENT: NCAT, mucous membranes moist, nasal canula in place  Respiratory: Clear to auscultation bilaterally, respiratory effort normal    Cardiovascular: irregularly irregular rate in the mid 90s to low 100s, no murmurs, rubs, or  gallops  Gastrointestinal: Positive bowel sounds, soft, nontender, nondistended  Musculoskeletal: No bilateral ankle edema, LLE in brace  Psychiatric: Appropriate affect, cooperative  Neurologic: Oriented x 3, strength symmetric in all extremities, Cranial Nerves grossly intact to confrontation, speech clear  Skin: No rashes    Pertinent  and/or Most Recent Results     LAB RESULTS:      Lab 02/11/22  0955 02/10/22  0345 02/10/22  0327 02/10/22  0109 02/09/22 0425 02/08/22  0525 02/07/22  0644   WBC 7.00  --  8.31  8.09  --  10.78 10.04 6.47   HEMOGLOBIN 9.2*  --  8.8*  8.8*  --  9.0* 9.0* 11.5*   HEMATOCRIT 29.9*  --  27.8*  27.2*  --  28.9* 28.5* 36.3   PLATELETS 167  --  140  148  --  151 167 231   NEUTROS ABS 5.71  --  6.56  --   --   --  3.85   IMMATURE GRANS (ABS) 0.03  --  0.04  --   --   --  0.06*   LYMPHS ABS 0.71  --  0.90  --   --   --  1.70   MONOS ABS 0.45  --  0.76  --   --   --  0.55   EOS ABS 0.08  --  0.02  --   --   --  0.24   MCV 99.0*  --  96.5  94.1  --  98.0* 96.6 97.8*   PROCALCITONIN  --   --   --  0.14  --   --   --    LACTATE  --  1.3  --   --   --   --   --    PROTIME  --   --   --   --   --   --  13.2         Lab 02/11/22  0954 02/10/22  0109 02/09/22  0425 02/08/22  0525 02/07/22  0644   SODIUM 137 139 131* 134* 138   POTASSIUM 3.0* 3.9 3.7 4.5 4.1   CHLORIDE 99 100 96* 98 98   CO2 28.0 25.0 23.0 26.0 30.0*   ANION GAP 10.0 14.0 12.0 10.0 10.0   BUN 17 17 24* 14 12   CREATININE 0.71 0.80 1.14* 0.83 0.83   GLUCOSE 150* 121* 132* 94 94   CALCIUM 9.2 9.0 8.8 8.9 9.8   MAGNESIUM  --  1.8  --   --   --    TSH  --   --   --  3.880  --          Lab 02/10/22  0109 02/07/22  0644   TOTAL PROTEIN 6.0 6.9   ALBUMIN 3.40* 4.00   GLOBULIN 2.6 2.9   ALT (SGPT) <5 15   AST (SGOT) 32 29   BILIRUBIN 0.4 0.4   ALK PHOS 105 130*         Lab 02/07/22  0644   PROTIME 13.2   INR 1.03             Lab 02/07/22  0728   ABO TYPING O   RH TYPING Positive   ANTIBODY SCREEN Negative         Brief Urine Lab  Results  (Last result in the past 365 days)      Color   Clarity   Blood   Leuk Est   Nitrite   Protein   CREAT   Urine HCG        02/10/22 0436 Yellow   Clear   Negative   Trace   Negative   Negative               Microbiology Results (last 10 days)     Procedure Component Value - Date/Time    COVID PRE-OP / PRE-PROCEDURE SCREENING ORDER (NO ISOLATION) - Swab, Nasopharynx [[id number]]  (Normal) Collected: 02/11/22 1945    Lab Status: Final result Specimen: Swab from Nasopharynx Updated: 02/11/22 2017    Narrative:      The following orders were created for panel order COVID PRE-OP / PRE-PROCEDURE SCREENING ORDER (NO ISOLATION) - Swab, Nasopharynx.  Procedure                               Abnormality         Status                     ---------                               -----------         ------                     COVID-19, ABBOTT IN-HOUS...[[id number]]  Normal              Final result                 Please view results for these tests on the individual orders.    COVID-19, ABBOTT IN-HOUSE,NASAL Swab (NO TRANSPORT MEDIA) 2 HR TAT - Swab, Nasopharynx [[id number]]  (Normal) Collected: 02/11/22 1945    Lab Status: Final result Specimen: Swab from Nasopharynx Updated: 02/11/22 2017     COVID19 Presumptive Negative    Narrative:      Fact sheet for providers: https://www.fda.gov/media/390016/download     Fact sheet for patients: https://www.fda.gov/media/523882/download    Test performed by PCR.  Fact sheet for providers: https://www.fda.gov/media/108322/download     Fact sheet for patients: https://www.fda.gov/media/551329/download    Test performed by PCR.  If inconsistent with clinical signs and symptoms patient should be tested with different authorized molecular test.    Urine Culture - Urine, Urine, Catheter In/Out [[id number]]  (Normal) Collected: 02/10/22 0436    Lab Status: Final result Specimen: Urine, Catheter In/Out Updated: 02/11/22 1344     Urine Culture No growth    Blood Culture - Blood, Hand, Left  [[id number]]  (Normal) Collected: 02/10/22 0340    Lab Status: Preliminary result Specimen: Blood from Hand, Left Updated: 02/12/22 0415     Blood Culture No growth at 2 days    Narrative:      Aerobic bottle only      Blood Culture - Blood, Arm, Right [[id number]]  (Normal) Collected: 02/10/22 0335    Lab Status: Preliminary result Specimen: Blood from Arm, Right Updated: 02/12/22 0415     Blood Culture No growth at 2 days    Narrative:      Aerobic bottle only      COVID PRE-OP / PRE-PROCEDURE SCREENING ORDER (NO ISOLATION) - Swab, Nasopharynx [[id number]]  (Normal) Collected: 02/07/22 0943    Lab Status: Final result Specimen: Swab from Nasopharynx Updated: 02/07/22 1012    Narrative:      The following orders were created for panel order COVID PRE-OP / PRE-PROCEDURE SCREENING ORDER (NO ISOLATION) - Swab, Nasopharynx.  Procedure                               Abnormality         Status                     ---------                               -----------         ------                     COVID-19, ABBOTT IN-HOUS...[[id number]]  Normal              Final result                 Please view results for these tests on the individual orders.    COVID-19, ABBOTT IN-HOUSE,NASAL Swab (NO TRANSPORT MEDIA) 2 HR TAT - Swab, Nasopharynx [[id number]]  (Normal) Collected: 02/07/22 0943    Lab Status: Final result Specimen: Swab from Nasopharynx Updated: 02/07/22 1012     COVID19 Presumptive Negative    Narrative:      Fact sheet for providers: https://www.fda.gov/media/034446/download     Fact sheet for patients: https://www.fda.gov/media/794923/download    Test performed by PCR.  If inconsistent with clinical signs and symptoms patient should be tested with different authorized molecular test.          Adult Transthoracic Echo Complete W/ Cont if Necessary Per Protocol    Result Date: 2/10/2022  · Estimated left ventricular EF = 60% · Left ventricular wall thickness is consistent with moderate concentric hypertrophy. · Mild  aortic valve stenosis is present. · Moderate to severe mitral valve regurgitation is present. · Moderate tricuspid valve regurgitation is present. · Estimated right ventricular systolic pressure from tricuspid regurgitation is markedly elevated (>55 mmHg).      XR Forearm 2 View Right    Result Date: 2/7/2022  DATE OF EXAM: 2/7/2022 7:10 AM  PROCEDURE: XR FOREARM 2 VW RIGHT-  INDICATIONS: fall, R forearm injury  COMPARISON: No Comparisons Available  TECHNIQUE: A minimum of two routine standard radiographic views were obtained of the right forearm.   FINDINGS: There are multiple small bony fragments adjacent to the triquetral bone consistent with an avulsion fracture fragments.  Other carpal bones appear intact.  There are degenerative changes of the triscaphe joint and first metacarpal trapezium joint.       1.  Small bony fragments adjacent to the triquetral bone consistent with avulsion fracture fragments. 2.  No acute fracture or dislocation of the radius or ulna.  This report was finalized on 2/7/2022 7:32 AM by Martin Dorsey MD.      XR Femur 2 View Left    Result Date: 2/7/2022  XR FEMUR 2 VW LEFT-  Date of Exam: 2/7/2022 7:10 AM  Indication: fall, LLE injury.  Comparison Exams: None available.  FINDINGS: Study is limited secondary to diffuse osteopenia  There is a fracture of the neck of the left femur. No evidence of dislocation of the femoral head at the hip joint.  Evaluation of the pelvic structures is limited by severe osteopenia. Advanced degenerative changes are noted at the knee. Femoral shaft appears intact. Relative disuse atrophy noted of the soft tissues      Acute fracture femoral neck. No evidence of dislocation.  No other definite acute osseous abnormality noted given limitations from severe osteopenia  This report was finalized on 2/7/2022 7:31 AM by Jorge Luis Dale.      XR Chest 1 View    Result Date: 2/10/2022  Chest one view. DATE: 2/10/2022. COMPARISON: 2/7/2022. CLINICAL HISTORY:  Fever.     Small right pleural effusion is unchanged. There is some scattered patchy interstitial changes throughout the upper lungs bilaterally which appears more prominent than the previous examination and could relate to edema versus infection. Cardiac silhouette does not appear enlarged. Electronically signed by:  Martin De La Torre D.O.  2/10/2022 2:40 AM Mountain Time    XR Chest 1 View    Result Date: 2/7/2022  XR CHEST 1 VW-  Date of Exam: 2/7/2022 7:10 AM  Indication: hip fx.  Comparison:?12/1/2021  Technique:?A single view of the chest was obtained.  FINDINGS:  ?Heart size and pulmonary vessels are within normal limits.  Small right pleural effusion is unchanged.  There is minimal right basilar airspace disease likely due to atelectasis.  Left lung is clear.  No left pleural effusion or pneumothorax.  Bony structures appear within normal limits.          1.  Small right pleural effusion and right basilar airspace disease likely due to atelectasis.   This report was finalized on 2/7/2022 7:33 AM by Martin Dorsey MD.      XR Pelvis 1 or 2 View    Result Date: 2/7/2022  DATE OF EXAM: 2/7/2022 7:10 AM  PROCEDURE: XR PELVIS 1 OR 2 VW-  INDICATIONS: L hip fx  COMPARISON: 12/09/2021  TECHNIQUE: An AP radiologic view of the pelvis was obtained.   FINDINGS: Study limited by severe osteopenia.  Acute fracture left femoral neck noted no dislocation femoral head.  Visualized bony pelvis appears to be grossly intact. Evaluation of the sacrum and coccyx limited by severe osteopenia and overlying bowel gas and fecal material. Limited imaging of the right hip is grossly intact  Advanced degenerative changes noted of the visualized lumbar spine.      Fracture proximal left femur.  No definite acute fracture or dislocation noted given limitations from severe osteopenia.  Additional imaging can be obtained as clinically indicated  This report was finalized on 2/7/2022 7:33 AM by Jorge Luis Dale.      Peripheral  Block    Result Date: 2/7/2022  Santiago Joe CRNA     2/7/2022 11:19 AM Peripheral Block Patient reassessed immediately prior to procedure Patient location during procedure: pre-op Start time: 2/7/2022 11:05 AM Stop time: 2/7/2022 11:19 AM Reason for block: procedure for pain and at surgeon's request Performed by CRNA: Santiago Joe, CRNA Assisted by: Anne Gonzalez RN Preanesthetic Checklist Completed: patient identified, IV checked, site marked, risks and benefits discussed, surgical consent, monitors and equipment checked, pre-op evaluation and timeout performed Prep: Pt Position: supine Sterile barriers:cap, gloves and mask Prep: ChloraPrep Patient monitoring: blood pressure monitoring, continuous pulse oximetry and EKG Procedure Sedation: no Performed under: local infiltration Guidance:ultrasound guided Images:still images obtained, printed/placed on chart Laterality:left Block Type:fascia iliaca compartment Injection Technique:catheter Needle Type:echogenic Needle Gauge:18 G Resistance on Injection: none Catheter Size:20 G (20g) Cath Depth at skin: 12 cm Medications Used: ropivacaine (NAROPIN) 0.5 % injection, 20 mL Medications Preservative Free Saline:20ml Post Assessment Injection Assessment: negative aspiration for heme, no paresthesia on injection and incremental injection Patient Tolerance:comfortable throughout block Complications:no Additional Notes Procedure:         Pt placed in supine position.   The insertion site was prepped in sterile fashion with Chlorapreop and clear plastic drapes.  Analgesia was provided by skin infiltration at insertion site with Lidocaine 1% 3mls.  A B-Flynn 18 g , 4 inch echogenic Touhy needle was advance In-plane under ultrasound guidance. The   Anterior superior Iliac crest was initially visualized and the probe was directed slightly medially and slightly towards the umbilicus.  The course of the needle was tracked over the sartorius muscle through the fascia  Iliacus and into the anterior portion of the Iliacus muscle.  Major vessels where identified and avoided as where structures of the peritoneal cavity.  LA injection was made incrementally in 1-5ml amounts spread was visualized superiorly below fascia iliacus.  Injection was completed with negative aspiration of blood and negative intravascular injection.  Injection pressures where normal or minimal resistance.  A 20 g B-Flynn wire styleted catheter was then advance thru the needle and very easily placed in a superior or cephalad direction.  The catheter was secured at insertion site with exofin tissue adhesive, benzoin, and steristreps.  A CHG tegaderm dressing was placed over the insertion site and the nerve catheter labeled and capped.  Thank You.     Peripheral Block    Result Date: 2/7/2022  Santiago Mace CRNA     2/7/2022 10:45 AM Peripheral Block Patient reassessed immediately prior to procedure Patient location during procedure: pre-op Reason for block: procedure for pain and at surgeon's request Performed by SHAYLEE: Santiago Mace CRNA Preanesthetic Checklist Completed: patient identified, IV checked, site marked, risks and benefits discussed, surgical consent, monitors and equipment checked, pre-op evaluation and timeout performed Prep: Sterile barriers:cap, gloves and mask Prep: ChloraPrep Patient monitoring: blood pressure monitoring, continuous pulse oximetry and EKG Procedure Performed under: local infiltration Guidance:ultrasound guided Images:still images obtained, printed/placed on chart Block Type:fascia iliaca compartment Injection Technique:catheter Needle Type:echogenic Needle Gauge:18 G Resistance on Injection: none Catheter Size:20 G (20g) Medications Preservative Free Saline:10ml Post Assessment Injection Assessment: negative aspiration for heme, no paresthesia on injection and incremental injection Patient Tolerance:comfortable throughout block Complications:no Additional Notes Procedure:          Pt placed in supine position.   The insertion site was prepped in sterile fashion with Chlorapreop and clear plastic drapes.  Analgesia was provided by skin infiltration at insertion site with Lidocaine 1% 3mls.  A B-Flynn 18 g , 4 inch echogenic Touhy needle was advance In-plane under ultrasound guidance. The   Anterior superior Iliac crest was initially visualized and the probe was directed slightly medially and slightly towards the umbilicus.  The course of the needle was tracked over the sartorius muscle through the fascia Iliacus and into the anterior portion of the Iliacus muscle.  Major vessels where identified and avoided as where structures of the peritoneal cavity.  LA injection was made incrementally in 1-5ml amounts spread was visualized superiorly below fascia iliacus.  Injection was completed with negative aspiration of blood and negative intravascular injection.  Injection pressures where normal or minimal resistance.  A 20 g B-Flynn wire styleted catheter was then advance thru the needle and very easily placed in a superior or cephalad direction.  The catheter was secured at insertion site with exofin tissue adhesive, benzoin, and steristreps.  A CHG tegaderm dressing was placed over the insertion site and the nerve catheter labeled and capped.  Thank You.     XR Hip With or Without Pelvis 2 - 3 View Left    Result Date: 2/7/2022  EXAMINATION: XR HIP W OR WO PELVIS 2-3 VIEW LEFT-  INDICATION: s/p L hip lizz; S72.145A-Nondisplaced intertrochanteric fracture of left femur, initial encounter for closed fracture; S50.11XA-Contusion of right forearm, initial encounter; T14.8XXA-Other injury of unspecified body region, initial encounter  COMPARISON: 2/7/2022  FINDINGS: Total left hip arthroplasty is now seen. No abnormal lucency is seen around the components. No fracture line or bony avulsion is identified. Soft tissue drain is noted in place.       Total left hip prosthesis in anatomic alignment.     This report was finalized on 2/7/2022 7:55 PM by Dr. Augie Leiva MD.                Results for orders placed during the hospital encounter of 02/07/22    Adult Transthoracic Echo Complete W/ Cont if Necessary Per Protocol    Interpretation Summary  · Estimated left ventricular EF = 60%  · Left ventricular wall thickness is consistent with moderate concentric hypertrophy.  · Mild aortic valve stenosis is present.  · Moderate to severe mitral valve regurgitation is present.  · Moderate tricuspid valve regurgitation is present.  · Estimated right ventricular systolic pressure from tricuspid regurgitation is markedly elevated (>55 mmHg).      Plan for Follow-up of Pending Labs/Results:   Pending Labs     Order Current Status    Blood Culture - Blood, Arm, Right Preliminary result    Blood Culture - Blood, Hand, Left Preliminary result        Discharge Details        Discharge Medications      New Medications      Instructions Start Date   apixaban 2.5 MG tablet tablet  Commonly known as: ELIQUIS   2.5 mg, Oral, Every 12 Hours Scheduled         Changes to Medications      Instructions Start Date   ascorbic acid 1000 MG tablet  Commonly known as: VITAMIN C  What changed: additional instructions   1,000 mg, Oral, Daily      bisoprolol 10 MG tablet  Commonly known as: ZEBeta  What changed:   · medication strength  · how much to take   10 mg, Oral, Every 24 Hours Scheduled   Start Date: February 13, 2022     HYDROcodone-acetaminophen 7.5-325 MG per tablet  Commonly known as: NORCO  What changed: reasons to take this   1 tablet, Oral, Every 6 Hours PRN, Patient taking: once a day prn         Continue These Medications      Instructions Start Date   albuterol sulfate  (90 Base) MCG/ACT inhaler  Commonly known as: PROVENTIL HFA;VENTOLIN HFA;PROAIR HFA   2 puffs, Inhalation, Every 4 Hours PRN, Patient taking: Inhale 2 puffs by mouth every 4-6 hours       ALPRAZolam 0.5 MG tablet  Commonly known as: XANAX   0.5 mg, Oral,  Nightly PRN      Budeson-Glycopyrrol-Formoterol 160-9-4.8 MCG/ACT aerosol inhaler  Commonly known as: BREZTRI   2 puffs, Inhalation, 2 Times Daily      dilTIAZem  MG 24 hr capsule  Commonly known as: CARDIZEM CD   180 mg, Oral, Daily      ferrous sulfate 325 (65 FE) MG tablet   325 mg, Oral, 2 Times Daily With Meals      fish oil 1000 MG capsule capsule   1,000 mg, Oral, Daily With Breakfast, OTC      levothyroxine 75 MCG tablet  Commonly known as: SYNTHROID, LEVOTHROID   75 mcg, Oral, Every Early Morning      multivitamin with minerals tablet tablet   1 tablet, Oral, Daily, OTC      pantoprazole 40 MG EC tablet  Commonly known as: PROTONIX   40 mg, Oral, 2 Times Daily Before Meals      sertraline 100 MG tablet  Commonly known as: ZOLOFT   150 mg, Oral, Daily, 1.5 tab daily      torsemide 20 MG tablet  Commonly known as: DEMADEX   40 mg, Oral, Daily      Vitamin D (Ergocalciferol) 50 MCG (2000 UT) capsule   1 tablet, Oral, Daily, OTC             Allergies   Allergen Reactions   • Dicloxacillin Shortness Of Breath   • Amiodarone Unknown - High Severity   • Propoxyphene Unknown - Low Severity         Discharge Disposition:  Rehab Facility or Unit (DC - External)    Diet:  Hospital:  Diet Order   Procedures   • Diet Regular       Activity:      Restrictions or Other Recommendations:         CODE STATUS:    Code Status and Medical Interventions:   Ordered at: 02/07/22 1250     Level Of Support Discussed With:    Patient     Code Status (Patient has no pulse and is not breathing):    CPR (Attempt to Resuscitate)     Medical Interventions (Patient has pulse or is breathing):    Full Support       Future Appointments   Date Time Provider Department Center   3/2/2022  2:15 PM RAD TECH [facility]   3/2/2022  2:30 PM Xochitl Hawley APRN [facility]       Additional Instructions for the Follow-ups that You Need to Schedule     Discharge Follow-up with PCP   As directed       Currently  Documented PCP:    Santiago Chaudhry MD    PCP Phone Number:    [phone]     Follow Up Details: in one week with PCP         Discharge Follow-up with Specified Provider: Cards; 2 Weeks   As directed      To: Cards    Follow Up: 2 Weeks         Discharge Follow-up with Specified Provider: Ortho; 2 Weeks   As directed      To: Ortho    Follow Up: 2 Weeks                     Breann King MD  02/12/22      Time Spent on Discharge:  I spent  35  minutes on this discharge activity which included: face-to-face encounter with the patient, reviewing the data in the system, coordination of the care with the nursing staff as well as consultants, documentation, and entering orders.

## 2022-02-12 NOTE — PLAN OF CARE
Goal Outcome Evaluation:  Plan of Care Reviewed With: patient        Progress: improving  Outcome Summary: A/O x4 , remains on 4L. afib RVR this am, after diltiazem and beta blocker went into controlled rate, and then converted to sinus rhythm, remained sinus le rest of the day, K Beka ZHAO notified around noon and digoxin that was ordered earlier held. voiding well, 2 BMs today, covid test done, possible d/c at noon on sat. Vss

## 2022-02-12 NOTE — PROGRESS NOTES
"Orthopedic Daily Progress Note      CC: Awake alert comfortable this morning  Pain well controlled  General: no fevers, chills  No other complaints    Physical Exam:  I have reviewed the vital signs.  Temp:  [97.9 °F (36.6 °C)-98.2 °F (36.8 °C)] 98.1 °F (36.7 °C)  Heart Rate:  [] 52  Resp:  [16-20] 16  BP: (112-170)/(52-81) 137/64    Objective:  Vital signs: (most recent): Blood pressure 137/64, pulse 52, temperature 98.1 °F (36.7 °C), temperature source Oral, resp. rate 16, height 167.6 cm (65.98\"), weight 55.8 kg (123 lb 0.3 oz), SpO2 100 %, not currently breastfeeding.            General Appearance:    Alert, cooperative, no distress, comfortable this a.m.  Extremities: Well perfused   Skin: Dressing clean/dry/intact      Results Review:        Results from last 7 days   Lab Units 02/11/22  0955   WBC 10*3/mm3 7.00   HEMOGLOBIN g/dL 9.2*   PLATELETS 10*3/mm3 167     Results from last 7 days   Lab Units 02/11/22  0954   SODIUM mmol/L 137   POTASSIUM mmol/L 3.0*   CO2 mmol/L 28.0   CREATININE mg/dL 0.71   GLUCOSE mg/dL 150*       •  acetaminophen  •  albuterol sulfate HFA  •  ALPRAZolam  •  apixaban  •  bisoprolol  •  budesonide-formoterol  •  dilTIAZem CD  •  ferrous sulfate  •  HYDROcodone-acetaminophen  •  lactated ringers  •  levothyroxine  •  Morphine **AND** naloxone  •  ondansetron **OR** ondansetron  •  pantoprazole  •  potassium chloride **OR** potassium chloride **OR** potassium chloride  •  ropivacaine (NAROPIN) 0.2% peripheral nerve cath (moog)  •  sertraline  •  sodium chloride  •  sodium chloride  •  Tetanus-Diphth-Acell Pertussis  •  torsemide      Assessment/Problem List  POD# 5 Day Post-Op   S/p L hip hemiarthroplasty    Plan  WBAT LLE  Posterior hip precautions x 6 weeks  PT/OT  Patient is now on Apixaban/Eliquis    Discharge Planning:patient to be discharged to rehab today if cleared by cardiology/medicine.    Gamal - rounding for Dr. Powell over the weekend      Duane Yeung, " MD  02/12/22  08:12 EST

## 2022-02-13 LAB
QT INTERVAL: 336 MS
QTC INTERVAL: 497 MS

## 2022-02-15 LAB
BACTERIA SPEC AEROBE CULT: NORMAL
BACTERIA SPEC AEROBE CULT: NORMAL

## 2022-02-28 ENCOUNTER — APPOINTMENT (OUTPATIENT)
Dept: GENERAL RADIOLOGY | Facility: HOSPITAL | Age: 82
End: 2022-02-28

## 2022-02-28 ENCOUNTER — HOSPITAL ENCOUNTER (INPATIENT)
Facility: HOSPITAL | Age: 82
LOS: 6 days | Discharge: HOME HEALTH | End: 2022-03-07
Attending: EMERGENCY MEDICINE | Admitting: INTERNAL MEDICINE

## 2022-02-28 ENCOUNTER — APPOINTMENT (OUTPATIENT)
Dept: CT IMAGING | Facility: HOSPITAL | Age: 82
End: 2022-02-28

## 2022-02-28 DIAGNOSIS — D50.8 OTHER IRON DEFICIENCY ANEMIA: ICD-10-CM

## 2022-02-28 DIAGNOSIS — J81.0 FLASH PULMONARY EDEMA: Primary | ICD-10-CM

## 2022-02-28 DIAGNOSIS — J96.01 ACUTE RESPIRATORY FAILURE WITH HYPOXIA AND HYPERCAPNIA: ICD-10-CM

## 2022-02-28 DIAGNOSIS — J96.02 ACUTE RESPIRATORY FAILURE WITH HYPOXIA AND HYPERCAPNIA: ICD-10-CM

## 2022-02-28 DIAGNOSIS — I50.9 ACUTE ON CHRONIC CONGESTIVE HEART FAILURE, UNSPECIFIED HEART FAILURE TYPE: ICD-10-CM

## 2022-02-28 LAB
ALBUMIN SERPL-MCNC: 3.9 G/DL (ref 3.5–5.2)
ALBUMIN/GLOB SERPL: 1.3 G/DL
ALP SERPL-CCNC: 130 U/L (ref 39–117)
ALT SERPL W P-5'-P-CCNC: 14 U/L (ref 1–33)
ANION GAP SERPL CALCULATED.3IONS-SCNC: 12 MMOL/L (ref 5–15)
ARTERIAL PATENCY WRIST A: ABNORMAL
ARTERIAL PATENCY WRIST A: ABNORMAL
AST SERPL-CCNC: 27 U/L (ref 1–32)
ATMOSPHERIC PRESS: ABNORMAL MM[HG]
ATMOSPHERIC PRESS: ABNORMAL MM[HG]
BASE EXCESS BLDA CALC-SCNC: -1.5 MMOL/L (ref 0–2)
BASE EXCESS BLDA CALC-SCNC: 4.1 MMOL/L (ref 0–2)
BASOPHILS # BLD AUTO: 0.13 10*3/MM3 (ref 0–0.2)
BASOPHILS NFR BLD AUTO: 0.7 % (ref 0–1.5)
BDY SITE: ABNORMAL
BDY SITE: ABNORMAL
BILIRUB SERPL-MCNC: 0.3 MG/DL (ref 0–1.2)
BODY TEMPERATURE: 37 C
BODY TEMPERATURE: 37 C
BUN SERPL-MCNC: 22 MG/DL (ref 8–23)
BUN/CREAT SERPL: 24.2 (ref 7–25)
CALCIUM SPEC-SCNC: 9 MG/DL (ref 8.6–10.5)
CHLORIDE SERPL-SCNC: 99 MMOL/L (ref 98–107)
CO2 BLDA-SCNC: 29.7 MMOL/L (ref 22–33)
CO2 BLDA-SCNC: 30.8 MMOL/L (ref 22–33)
CO2 SERPL-SCNC: 28 MMOL/L (ref 22–29)
COHGB MFR BLD: 1.9 % (ref 0–2)
COHGB MFR BLD: 2.4 % (ref 0–2)
CREAT BLDA-MCNC: 1 MG/DL (ref 0.6–1.3)
CREAT SERPL-MCNC: 0.91 MG/DL (ref 0.57–1)
DEPRECATED RDW RBC AUTO: 69.9 FL (ref 37–54)
EGFRCR SERPLBLD CKD-EPI 2021: 63.1 ML/MIN/1.73
EOSINOPHIL # BLD AUTO: 0.46 10*3/MM3 (ref 0–0.4)
EOSINOPHIL NFR BLD AUTO: 2.6 % (ref 0.3–6.2)
EPAP: 6
EPAP: 8
ERYTHROCYTE [DISTWIDTH] IN BLOOD BY AUTOMATED COUNT: 17.5 % (ref 12.3–15.4)
GLOBULIN UR ELPH-MCNC: 3.1 GM/DL
GLUCOSE SERPL-MCNC: 269 MG/DL (ref 65–99)
HCO3 BLDA-SCNC: 27.4 MMOL/L (ref 20–26)
HCO3 BLDA-SCNC: 29.4 MMOL/L (ref 20–26)
HCT VFR BLD AUTO: 28 % (ref 34–46.6)
HCT VFR BLD CALC: 22.2 % (ref 38–51)
HCT VFR BLD CALC: 25.1 % (ref 38–51)
HGB BLD-MCNC: 8.1 G/DL (ref 12–15.9)
HGB BLDA-MCNC: 7.3 G/DL (ref 14–18)
HGB BLDA-MCNC: 8.2 G/DL (ref 14–18)
HOLD SPECIMEN: NORMAL
HOLD SPECIMEN: NORMAL
IMM GRANULOCYTES # BLD AUTO: 0.09 10*3/MM3 (ref 0–0.05)
IMM GRANULOCYTES NFR BLD AUTO: 0.5 % (ref 0–0.5)
INHALED O2 CONCENTRATION: 100 %
INHALED O2 CONCENTRATION: 40 %
IPAP: 12
IPAP: 16
LYMPHOCYTES # BLD AUTO: 5.17 10*3/MM3 (ref 0.7–3.1)
LYMPHOCYTES NFR BLD AUTO: 29.1 % (ref 19.6–45.3)
Lab: ABNORMAL
MCH RBC QN AUTO: 31.5 PG (ref 26.6–33)
MCHC RBC AUTO-ENTMCNC: 28.9 G/DL (ref 31.5–35.7)
MCV RBC AUTO: 108.9 FL (ref 79–97)
METHGB BLD QL: 0 % (ref 0–1.5)
METHGB BLD QL: 0 % (ref 0–1.5)
MODALITY: ABNORMAL
MODALITY: ABNORMAL
MONOCYTES # BLD AUTO: 1.45 10*3/MM3 (ref 0.1–0.9)
MONOCYTES NFR BLD AUTO: 8.2 % (ref 5–12)
NEUTROPHILS NFR BLD AUTO: 10.48 10*3/MM3 (ref 1.7–7)
NEUTROPHILS NFR BLD AUTO: 58.9 % (ref 42.7–76)
NOTE: ABNORMAL
NOTE: ABNORMAL
NOTIFIED BY: ABNORMAL
NOTIFIED WHO: ABNORMAL
NRBC BLD AUTO-RTO: 0 /100 WBC (ref 0–0.2)
NT-PROBNP SERPL-MCNC: 7631 PG/ML (ref 0–1800)
OXYHGB MFR BLDV: 96.4 % (ref 94–99)
OXYHGB MFR BLDV: 99.1 % (ref 94–99)
PAW @ PEAK INSP FLOW SETTING VENT: 0 CMH2O
PAW @ PEAK INSP FLOW SETTING VENT: 0 CMH2O
PCO2 BLDA: 47.4 MM HG (ref 35–45)
PCO2 BLDA: 74.2 MM HG (ref 35–45)
PCO2 TEMP ADJ BLD: 47.4 MM HG (ref 35–45)
PCO2 TEMP ADJ BLD: 74.2 MM HG (ref 35–45)
PH BLDA: 7.18 PH UNITS (ref 7.35–7.45)
PH BLDA: 7.4 PH UNITS (ref 7.35–7.45)
PH, TEMP CORRECTED: 7.18 PH UNITS
PH, TEMP CORRECTED: 7.4 PH UNITS
PLATELET # BLD AUTO: 476 10*3/MM3 (ref 140–450)
PMV BLD AUTO: 10.7 FL (ref 6–12)
PO2 BLDA: 438 MM HG (ref 83–108)
PO2 BLDA: 94.4 MM HG (ref 83–108)
PO2 TEMP ADJ BLD: 438 MM HG (ref 83–108)
PO2 TEMP ADJ BLD: 94.4 MM HG (ref 83–108)
POTASSIUM SERPL-SCNC: 4.2 MMOL/L (ref 3.5–5.2)
PROT SERPL-MCNC: 7 G/DL (ref 6–8.5)
QT INTERVAL: 302 MS
QTC INTERVAL: 485 MS
RBC # BLD AUTO: 2.57 10*6/MM3 (ref 3.77–5.28)
SODIUM SERPL-SCNC: 139 MMOL/L (ref 136–145)
TOTAL RATE: 0 BREATHS/MINUTE
TOTAL RATE: 0 BREATHS/MINUTE
TROPONIN T SERPL-MCNC: 0.05 NG/ML (ref 0–0.03)
WBC NRBC COR # BLD: 17.78 10*3/MM3 (ref 3.4–10.8)
WHOLE BLOOD HOLD SPECIMEN: NORMAL
WHOLE BLOOD HOLD SPECIMEN: NORMAL

## 2022-02-28 PROCEDURE — 93005 ELECTROCARDIOGRAM TRACING: CPT | Performed by: EMERGENCY MEDICINE

## 2022-02-28 PROCEDURE — 85045 AUTOMATED RETICULOCYTE COUNT: CPT | Performed by: NURSE PRACTITIONER

## 2022-02-28 PROCEDURE — 85025 COMPLETE CBC W/AUTO DIFF WBC: CPT | Performed by: EMERGENCY MEDICINE

## 2022-02-28 PROCEDURE — 25010000002 FUROSEMIDE PER 20 MG: Performed by: EMERGENCY MEDICINE

## 2022-02-28 PROCEDURE — 80053 COMPREHEN METABOLIC PANEL: CPT | Performed by: EMERGENCY MEDICINE

## 2022-02-28 PROCEDURE — 82805 BLOOD GASES W/O2 SATURATION: CPT

## 2022-02-28 PROCEDURE — 84145 PROCALCITONIN (PCT): CPT | Performed by: INTERNAL MEDICINE

## 2022-02-28 PROCEDURE — 94660 CPAP INITIATION&MGMT: CPT

## 2022-02-28 PROCEDURE — 99285 EMERGENCY DEPT VISIT HI MDM: CPT

## 2022-02-28 PROCEDURE — 94799 UNLISTED PULMONARY SVC/PX: CPT

## 2022-02-28 PROCEDURE — 71045 X-RAY EXAM CHEST 1 VIEW: CPT

## 2022-02-28 PROCEDURE — 82375 ASSAY CARBOXYHB QUANT: CPT

## 2022-02-28 PROCEDURE — 84484 ASSAY OF TROPONIN QUANT: CPT | Performed by: EMERGENCY MEDICINE

## 2022-02-28 PROCEDURE — 83050 HGB METHEMOGLOBIN QUAN: CPT

## 2022-02-28 PROCEDURE — 82565 ASSAY OF CREATININE: CPT

## 2022-02-28 PROCEDURE — 36600 WITHDRAWAL OF ARTERIAL BLOOD: CPT

## 2022-02-28 PROCEDURE — 83880 ASSAY OF NATRIURETIC PEPTIDE: CPT | Performed by: EMERGENCY MEDICINE

## 2022-02-28 RX ORDER — DILTIAZEM HCL IN NACL,ISO-OSM 125 MG/125
5-15 PLASTIC BAG, INJECTION (ML) INTRAVENOUS
Status: DISCONTINUED | OUTPATIENT
Start: 2022-02-28 | End: 2022-03-03

## 2022-02-28 RX ORDER — FUROSEMIDE 10 MG/ML
80 INJECTION INTRAMUSCULAR; INTRAVENOUS ONCE
Status: COMPLETED | OUTPATIENT
Start: 2022-02-28 | End: 2022-02-28

## 2022-02-28 RX ORDER — SODIUM CHLORIDE 0.9 % (FLUSH) 0.9 %
10 SYRINGE (ML) INJECTION AS NEEDED
Status: DISCONTINUED | OUTPATIENT
Start: 2022-02-28 | End: 2022-03-07 | Stop reason: HOSPADM

## 2022-02-28 RX ORDER — DILTIAZEM HYDROCHLORIDE 5 MG/ML
10 INJECTION INTRAVENOUS ONCE
Status: COMPLETED | OUTPATIENT
Start: 2022-02-28 | End: 2022-02-28

## 2022-02-28 RX ORDER — NITROGLYCERIN 20 MG/100ML
10-50 INJECTION INTRAVENOUS
Status: DISCONTINUED | OUTPATIENT
Start: 2022-02-28 | End: 2022-03-01

## 2022-02-28 RX ADMIN — NITROGLYCERIN 5 MCG/MIN: 20 INJECTION INTRAVENOUS at 21:57

## 2022-02-28 RX ADMIN — FUROSEMIDE 80 MG: 10 INJECTION, SOLUTION INTRAMUSCULAR; INTRAVENOUS at 21:55

## 2022-02-28 RX ADMIN — DILTIAZEM HYDROCHLORIDE 10 MG: 5 INJECTION INTRAVENOUS at 22:42

## 2022-02-28 RX ADMIN — Medication 5 MG/HR: at 22:45

## 2022-03-01 ENCOUNTER — APPOINTMENT (OUTPATIENT)
Dept: CT IMAGING | Facility: HOSPITAL | Age: 82
End: 2022-03-01

## 2022-03-01 ENCOUNTER — APPOINTMENT (OUTPATIENT)
Dept: CARDIOLOGY | Facility: HOSPITAL | Age: 82
End: 2022-03-01

## 2022-03-01 PROBLEM — R77.8 ELEVATED TROPONIN: Status: ACTIVE | Noted: 2022-03-01

## 2022-03-01 PROBLEM — J96.21 ACUTE ON CHRONIC RESPIRATORY FAILURE WITH HYPOXIA AND HYPERCAPNIA (HCC): Status: ACTIVE | Noted: 2022-03-01

## 2022-03-01 PROBLEM — J96.02 ACUTE RESPIRATORY FAILURE WITH HYPOXIA AND HYPERCAPNIA (HCC): Status: ACTIVE | Noted: 2022-03-01

## 2022-03-01 PROBLEM — J96.22 ACUTE ON CHRONIC RESPIRATORY FAILURE WITH HYPOXIA AND HYPERCAPNIA (HCC): Status: ACTIVE | Noted: 2022-03-01

## 2022-03-01 PROBLEM — J96.01 ACUTE RESPIRATORY FAILURE WITH HYPOXIA AND HYPERCAPNIA (HCC): Status: ACTIVE | Noted: 2022-03-01

## 2022-03-01 PROBLEM — J90 BILATERAL PLEURAL EFFUSION: Status: ACTIVE | Noted: 2022-03-01

## 2022-03-01 PROBLEM — J81.0 FLASH PULMONARY EDEMA: Status: ACTIVE | Noted: 2022-03-01

## 2022-03-01 PROBLEM — I48.91 ATRIAL FIBRILLATION WITH RAPID VENTRICULAR RESPONSE (HCC): Status: ACTIVE | Noted: 2022-03-01

## 2022-03-01 PROBLEM — E03.9 HYPOTHYROIDISM (ACQUIRED): Status: ACTIVE | Noted: 2022-03-01

## 2022-03-01 PROBLEM — D72.829 LEUKOCYTOSIS: Status: ACTIVE | Noted: 2022-03-01

## 2022-03-01 LAB
ABO GROUP BLD: NORMAL
ANION GAP SERPL CALCULATED.3IONS-SCNC: 10 MMOL/L (ref 5–15)
B PARAPERT DNA SPEC QL NAA+PROBE: NOT DETECTED
B PERT DNA SPEC QL NAA+PROBE: NOT DETECTED
BASOPHILS # BLD AUTO: 0.04 10*3/MM3 (ref 0–0.2)
BASOPHILS NFR BLD AUTO: 0.5 % (ref 0–1.5)
BH CV ECHO MEAS - AO MAX PG (FULL): 15.7 MMHG
BH CV ECHO MEAS - AO MAX PG: 20 MMHG
BH CV ECHO MEAS - AO MEAN PG (FULL): 8.4 MMHG
BH CV ECHO MEAS - AO MEAN PG: 10.4 MMHG
BH CV ECHO MEAS - AO ROOT AREA (BSA CORRECTED): 1.7
BH CV ECHO MEAS - AO ROOT AREA: 6.6 CM^2
BH CV ECHO MEAS - AO ROOT DIAM: 2.9 CM
BH CV ECHO MEAS - AO V2 MAX: 225.8 CM/SEC
BH CV ECHO MEAS - AO V2 MEAN: 150.2 CM/SEC
BH CV ECHO MEAS - AO V2 VTI: 50 CM
BH CV ECHO MEAS - ASC AORTA: 2.8 CM
BH CV ECHO MEAS - AVA(I,A): 0.87 CM^2
BH CV ECHO MEAS - AVA(I,D): 0.87 CM^2
BH CV ECHO MEAS - AVA(V,A): 0.81 CM^2
BH CV ECHO MEAS - AVA(V,D): 0.81 CM^2
BH CV ECHO MEAS - BSA(HAYCOCK): 1.7 M^2
BH CV ECHO MEAS - BSA: 1.7 M^2
BH CV ECHO MEAS - BZI_BMI: 22 KILOGRAMS/M^2
BH CV ECHO MEAS - BZI_METRIC_HEIGHT: 167 CM
BH CV ECHO MEAS - BZI_METRIC_WEIGHT: 61.2 KG
BH CV ECHO MEAS - EDV(CUBED): 68.9 ML
BH CV ECHO MEAS - EDV(MOD-SP2): 94.1 ML
BH CV ECHO MEAS - EDV(MOD-SP4): 123 ML
BH CV ECHO MEAS - EDV(TEICH): 74.2 ML
BH CV ECHO MEAS - EF(CUBED): 60.8 %
BH CV ECHO MEAS - EF(MOD-BP): 53.5 %
BH CV ECHO MEAS - EF(MOD-SP2): 56.4 %
BH CV ECHO MEAS - EF(MOD-SP4): 52.4 %
BH CV ECHO MEAS - EF(TEICH): 52.8 %
BH CV ECHO MEAS - ESV(CUBED): 27 ML
BH CV ECHO MEAS - ESV(MOD-SP2): 41 ML
BH CV ECHO MEAS - ESV(MOD-SP4): 58.5 ML
BH CV ECHO MEAS - ESV(TEICH): 35 ML
BH CV ECHO MEAS - FS: 26.8 %
BH CV ECHO MEAS - IVS/LVPW: 0.92
BH CV ECHO MEAS - IVSD: 1.1 CM
BH CV ECHO MEAS - LA DIMENSION: 4.8 CM
BH CV ECHO MEAS - LA/AO: 1.7
BH CV ECHO MEAS - LAD MAJOR: 5.4 CM
BH CV ECHO MEAS - LAT PEAK E' VEL: 5.2 CM/SEC
BH CV ECHO MEAS - LATERAL E/E' RATIO: 29.7
BH CV ECHO MEAS - LV DIASTOLIC VOL/BSA (35-75): 72.9 ML/M^2
BH CV ECHO MEAS - LV MASS(C)D: 161.4 GRAMS
BH CV ECHO MEAS - LV MASS(C)DI: 95.6 GRAMS/M^2
BH CV ECHO MEAS - LV MAX PG: 4.3 MMHG
BH CV ECHO MEAS - LV MEAN PG: 2 MMHG
BH CV ECHO MEAS - LV SYSTOLIC VOL/BSA (12-30): 34.7 ML/M^2
BH CV ECHO MEAS - LV V1 MAX: 104 CM/SEC
BH CV ECHO MEAS - LV V1 MEAN: 70.9 CM/SEC
BH CV ECHO MEAS - LV V1 VTI: 24.7 CM
BH CV ECHO MEAS - LVIDD: 4.1 CM
BH CV ECHO MEAS - LVIDS: 3 CM
BH CV ECHO MEAS - LVLD AP2: 8.4 CM
BH CV ECHO MEAS - LVLD AP4: 8.2 CM
BH CV ECHO MEAS - LVLS AP2: 6.6 CM
BH CV ECHO MEAS - LVLS AP4: 7.1 CM
BH CV ECHO MEAS - LVOT AREA (M): 1.8 CM^2
BH CV ECHO MEAS - LVOT AREA: 1.8 CM^2
BH CV ECHO MEAS - LVOT DIAM: 1.5 CM
BH CV ECHO MEAS - LVPWD: 1.2 CM
BH CV ECHO MEAS - MED PEAK E' VEL: 4.6 CM/SEC
BH CV ECHO MEAS - MEDIAL E/E' RATIO: 33.9
BH CV ECHO MEAS - MR MAX PG: 114 MMHG
BH CV ECHO MEAS - MR MAX VEL: 533.5 CM/SEC
BH CV ECHO MEAS - MR MEAN PG: 74.5 MMHG
BH CV ECHO MEAS - MR MEAN VEL: 407.5 CM/SEC
BH CV ECHO MEAS - MR VTI: 153.5 CM
BH CV ECHO MEAS - MV A MAX VEL: 149 CM/SEC
BH CV ECHO MEAS - MV DEC SLOPE: 752 CM/SEC^2
BH CV ECHO MEAS - MV DEC TIME: 0.22 SEC
BH CV ECHO MEAS - MV E MAX VEL: 155 CM/SEC
BH CV ECHO MEAS - MV E/A: 1
BH CV ECHO MEAS - MV MAX PG: 15.8 MMHG
BH CV ECHO MEAS - MV MEAN PG: 6.5 MMHG
BH CV ECHO MEAS - MV P1/2T MAX VEL: 209 CM/SEC
BH CV ECHO MEAS - MV P1/2T: 81.4 MSEC
BH CV ECHO MEAS - MV V2 MAX: 199 CM/SEC
BH CV ECHO MEAS - MV V2 MEAN: 113 CM/SEC
BH CV ECHO MEAS - MV V2 VTI: 52.6 CM
BH CV ECHO MEAS - MVA P1/2T LCG: 1.1 CM^2
BH CV ECHO MEAS - MVA(P1/2T): 2.7 CM^2
BH CV ECHO MEAS - MVA(VTI): 0.83 CM^2
BH CV ECHO MEAS - PA ACC TIME: 0.15 SEC
BH CV ECHO MEAS - PA PR(ACCEL): 12.4 MMHG
BH CV ECHO MEAS - RAP SYSTOLE: 3 MMHG
BH CV ECHO MEAS - RVSP: 50 MMHG
BH CV ECHO MEAS - SI(AO): 195.5 ML/M^2
BH CV ECHO MEAS - SI(CUBED): 24.8 ML/M^2
BH CV ECHO MEAS - SI(LVOT): 25.9 ML/M^2
BH CV ECHO MEAS - SI(MOD-SP2): 31.5 ML/M^2
BH CV ECHO MEAS - SI(MOD-SP4): 38.2 ML/M^2
BH CV ECHO MEAS - SI(TEICH): 23.2 ML/M^2
BH CV ECHO MEAS - SV(AO): 330 ML
BH CV ECHO MEAS - SV(CUBED): 41.9 ML
BH CV ECHO MEAS - SV(LVOT): 43.6 ML
BH CV ECHO MEAS - SV(MOD-SP2): 53.1 ML
BH CV ECHO MEAS - SV(MOD-SP4): 64.5 ML
BH CV ECHO MEAS - SV(TEICH): 39.2 ML
BH CV ECHO MEAS - TAPSE (>1.6): 1.8 CM
BH CV ECHO MEAS - TR MAX PG: 47 MMHG
BH CV ECHO MEAS - TR MAX VEL: 343 CM/SEC
BH CV ECHO MEASUREMENTS AVERAGE E/E' RATIO: 31.63
BH CV LOWER VASCULAR LEFT COMMON FEMORAL AUGMENT: NORMAL
BH CV LOWER VASCULAR LEFT COMMON FEMORAL COMPRESS: NORMAL
BH CV LOWER VASCULAR LEFT COMMON FEMORAL PHASIC: NORMAL
BH CV LOWER VASCULAR LEFT COMMON FEMORAL SPONT: NORMAL
BH CV LOWER VASCULAR LEFT DISTAL FEMORAL COMPRESS: NORMAL
BH CV LOWER VASCULAR LEFT GASTRONEMIUS COMPRESS: NORMAL
BH CV LOWER VASCULAR LEFT GREATER SAPH AK COMPRESS: NORMAL
BH CV LOWER VASCULAR LEFT GREATER SAPH BK COMPRESS: NORMAL
BH CV LOWER VASCULAR LEFT LESSER SAPH COMPRESS: NORMAL
BH CV LOWER VASCULAR LEFT MID FEMORAL AUGMENT: NORMAL
BH CV LOWER VASCULAR LEFT MID FEMORAL COMPRESS: NORMAL
BH CV LOWER VASCULAR LEFT MID FEMORAL PHASIC: NORMAL
BH CV LOWER VASCULAR LEFT MID FEMORAL SPONT: NORMAL
BH CV LOWER VASCULAR LEFT PERONEAL COMPRESS: NORMAL
BH CV LOWER VASCULAR LEFT POPLITEAL AUGMENT: NORMAL
BH CV LOWER VASCULAR LEFT POPLITEAL COMPRESS: NORMAL
BH CV LOWER VASCULAR LEFT POPLITEAL PHASIC: NORMAL
BH CV LOWER VASCULAR LEFT POPLITEAL SPONT: NORMAL
BH CV LOWER VASCULAR LEFT POSTERIOR TIBIAL COMPRESS: NORMAL
BH CV LOWER VASCULAR LEFT PROFUNDA FEMORAL COMPRESS: NORMAL
BH CV LOWER VASCULAR LEFT PROXIMAL FEMORAL COMPRESS: NORMAL
BH CV LOWER VASCULAR LEFT SAPHENOFEMORAL JUNCTION COMPRESS: NORMAL
BH CV LOWER VASCULAR RIGHT COMMON FEMORAL AUGMENT: NORMAL
BH CV LOWER VASCULAR RIGHT COMMON FEMORAL COMPRESS: NORMAL
BH CV LOWER VASCULAR RIGHT COMMON FEMORAL PHASIC: NORMAL
BH CV LOWER VASCULAR RIGHT COMMON FEMORAL SPONT: NORMAL
BH CV LOWER VASCULAR RIGHT DISTAL FEMORAL COMPRESS: NORMAL
BH CV LOWER VASCULAR RIGHT GASTRONEMIUS COMPRESS: NORMAL
BH CV LOWER VASCULAR RIGHT GREATER SAPH AK COMPRESS: NORMAL
BH CV LOWER VASCULAR RIGHT GREATER SAPH BK COMPRESS: NORMAL
BH CV LOWER VASCULAR RIGHT LESSER SAPH COMPRESS: NORMAL
BH CV LOWER VASCULAR RIGHT MID FEMORAL AUGMENT: NORMAL
BH CV LOWER VASCULAR RIGHT MID FEMORAL COMPRESS: NORMAL
BH CV LOWER VASCULAR RIGHT MID FEMORAL PHASIC: NORMAL
BH CV LOWER VASCULAR RIGHT MID FEMORAL SPONT: NORMAL
BH CV LOWER VASCULAR RIGHT PERONEAL COMPRESS: NORMAL
BH CV LOWER VASCULAR RIGHT POPLITEAL AUGMENT: NORMAL
BH CV LOWER VASCULAR RIGHT POPLITEAL COMPRESS: NORMAL
BH CV LOWER VASCULAR RIGHT POPLITEAL PHASIC: NORMAL
BH CV LOWER VASCULAR RIGHT POPLITEAL SPONT: NORMAL
BH CV LOWER VASCULAR RIGHT POSTERIOR TIBIAL COMPRESS: NORMAL
BH CV LOWER VASCULAR RIGHT PROFUNDA FEMORAL COMPRESS: NORMAL
BH CV LOWER VASCULAR RIGHT PROXIMAL FEMORAL COMPRESS: NORMAL
BH CV LOWER VASCULAR RIGHT SAPHENOFEMORAL JUNCTION COMPRESS: NORMAL
BH CV VAS BP LEFT ARM: NORMAL MMHG
BH CV XLRA - RV BASE: 3.4 CM
BH CV XLRA - RV LENGTH: 4.9 CM
BH CV XLRA - RV MID: 2.2 CM
BH CV XLRA - TDI S': 13.2 CM/SEC
BILIRUB UR QL STRIP: NEGATIVE
BLD GP AB SCN SERPL QL: NEGATIVE
BUN SERPL-MCNC: 23 MG/DL (ref 8–23)
BUN/CREAT SERPL: 29.5 (ref 7–25)
C PNEUM DNA NPH QL NAA+NON-PROBE: NOT DETECTED
CALCIUM SPEC-SCNC: 8.8 MG/DL (ref 8.6–10.5)
CHLORIDE SERPL-SCNC: 102 MMOL/L (ref 98–107)
CLARITY UR: CLEAR
CO2 SERPL-SCNC: 31 MMOL/L (ref 22–29)
COLOR UR: YELLOW
CREAT SERPL-MCNC: 0.78 MG/DL (ref 0.57–1)
D-LACTATE SERPL-SCNC: 1.4 MMOL/L (ref 0.5–2)
DEPRECATED RDW RBC AUTO: 68.6 FL (ref 37–54)
EGFRCR SERPLBLD CKD-EPI 2021: 75.9 ML/MIN/1.73
EOSINOPHIL # BLD AUTO: 0.05 10*3/MM3 (ref 0–0.4)
EOSINOPHIL NFR BLD AUTO: 0.6 % (ref 0.3–6.2)
ERYTHROCYTE [DISTWIDTH] IN BLOOD BY AUTOMATED COUNT: 17.7 % (ref 12.3–15.4)
FERRITIN SERPL-MCNC: 264.6 NG/ML (ref 13–150)
FLUAV SUBTYP SPEC NAA+PROBE: NOT DETECTED
FLUBV RNA ISLT QL NAA+PROBE: NOT DETECTED
FOLATE SERPL-MCNC: >20 NG/ML (ref 4.78–24.2)
GLUCOSE SERPL-MCNC: 112 MG/DL (ref 65–99)
GLUCOSE UR STRIP-MCNC: ABNORMAL MG/DL
HADV DNA SPEC NAA+PROBE: NOT DETECTED
HCOV 229E RNA SPEC QL NAA+PROBE: NOT DETECTED
HCOV HKU1 RNA SPEC QL NAA+PROBE: NOT DETECTED
HCOV NL63 RNA SPEC QL NAA+PROBE: NOT DETECTED
HCOV OC43 RNA SPEC QL NAA+PROBE: NOT DETECTED
HCT VFR BLD AUTO: 23.7 % (ref 34–46.6)
HEMOCCULT STL QL: POSITIVE
HGB BLD-MCNC: 7 G/DL (ref 12–15.9)
HGB UR QL STRIP.AUTO: NEGATIVE
HMPV RNA NPH QL NAA+NON-PROBE: NOT DETECTED
HOLD SPECIMEN: NORMAL
HPIV1 RNA ISLT QL NAA+PROBE: NOT DETECTED
HPIV2 RNA SPEC QL NAA+PROBE: NOT DETECTED
HPIV3 RNA NPH QL NAA+PROBE: NOT DETECTED
HPIV4 P GENE NPH QL NAA+PROBE: NOT DETECTED
IMM GRANULOCYTES # BLD AUTO: 0.03 10*3/MM3 (ref 0–0.05)
IMM GRANULOCYTES NFR BLD AUTO: 0.3 % (ref 0–0.5)
IRON 24H UR-MRATE: 26 MCG/DL (ref 37–145)
IRON 24H UR-MRATE: 26 MCG/DL (ref 37–145)
IRON SATN MFR SERPL: 8 % (ref 20–50)
KETONES UR QL STRIP: NEGATIVE
LEFT ATRIUM VOLUME INDEX: 50.2 ML/M^2
LEFT ATRIUM VOLUME: 84.8 ML
LEUKOCYTE ESTERASE UR QL STRIP.AUTO: NEGATIVE
LV EF 2D ECHO EST: 60 %
LYMPHOCYTES # BLD AUTO: 1.31 10*3/MM3 (ref 0.7–3.1)
LYMPHOCYTES NFR BLD AUTO: 14.9 % (ref 19.6–45.3)
M PNEUMO IGG SER IA-ACNC: NOT DETECTED
MAGNESIUM SERPL-MCNC: 2.1 MG/DL (ref 1.6–2.4)
MAXIMAL PREDICTED HEART RATE: 138 BPM
MCH RBC QN AUTO: 31.4 PG (ref 26.6–33)
MCHC RBC AUTO-ENTMCNC: 29.5 G/DL (ref 31.5–35.7)
MCV RBC AUTO: 106.3 FL (ref 79–97)
MONOCYTES # BLD AUTO: 0.63 10*3/MM3 (ref 0.1–0.9)
MONOCYTES NFR BLD AUTO: 7.2 % (ref 5–12)
NEUTROPHILS NFR BLD AUTO: 6.71 10*3/MM3 (ref 1.7–7)
NEUTROPHILS NFR BLD AUTO: 76.5 % (ref 42.7–76)
NITRITE UR QL STRIP: NEGATIVE
NRBC BLD AUTO-RTO: 0 /100 WBC (ref 0–0.2)
NT-PROBNP SERPL-MCNC: 8352 PG/ML (ref 0–1800)
NT-PROBNP SERPL-MCNC: ABNORMAL PG/ML (ref 0–1800)
PH UR STRIP.AUTO: 6 [PH] (ref 5–8)
PLATELET # BLD AUTO: 246 10*3/MM3 (ref 140–450)
PMV BLD AUTO: 10.8 FL (ref 6–12)
POTASSIUM SERPL-SCNC: 3.8 MMOL/L (ref 3.5–5.2)
PROCALCITONIN SERPL-MCNC: 0.06 NG/ML (ref 0–0.25)
PROT UR QL STRIP: NEGATIVE
QT INTERVAL: 348 MS
QTC INTERVAL: 505 MS
RBC # BLD AUTO: 2.23 10*6/MM3 (ref 3.77–5.28)
RETICS # AUTO: 0.26 10*6/MM3 (ref 0.02–0.13)
RETICS/RBC NFR AUTO: 10.24 % (ref 0.7–1.9)
RH BLD: POSITIVE
RHINOVIRUS RNA SPEC NAA+PROBE: NOT DETECTED
RSV RNA NPH QL NAA+NON-PROBE: NOT DETECTED
SARS-COV-2 RNA NPH QL NAA+NON-PROBE: NOT DETECTED
SODIUM SERPL-SCNC: 143 MMOL/L (ref 136–145)
SP GR UR STRIP: 1.01 (ref 1–1.03)
STRESS TARGET HR: 117 BPM
T&S EXPIRATION DATE: NORMAL
TIBC SERPL-MCNC: 341 MCG/DL (ref 298–536)
TRANSFERRIN SERPL-MCNC: 229 MG/DL (ref 200–360)
TROPONIN T SERPL-MCNC: 0.01 NG/ML (ref 0–0.03)
TROPONIN T SERPL-MCNC: 0.03 NG/ML (ref 0–0.03)
UROBILINOGEN UR QL STRIP: ABNORMAL
VIT B12 BLD-MCNC: 1009 PG/ML (ref 211–946)
WBC NRBC COR # BLD: 8.77 10*3/MM3 (ref 3.4–10.8)

## 2022-03-01 PROCEDURE — 82607 VITAMIN B-12: CPT | Performed by: NURSE PRACTITIONER

## 2022-03-01 PROCEDURE — 82746 ASSAY OF FOLIC ACID SERUM: CPT | Performed by: NURSE PRACTITIONER

## 2022-03-01 PROCEDURE — 83880 ASSAY OF NATRIURETIC PEPTIDE: CPT | Performed by: EMERGENCY MEDICINE

## 2022-03-01 PROCEDURE — 82728 ASSAY OF FERRITIN: CPT | Performed by: NURSE PRACTITIONER

## 2022-03-01 PROCEDURE — 93010 ELECTROCARDIOGRAM REPORT: CPT | Performed by: INTERNAL MEDICINE

## 2022-03-01 PROCEDURE — 99222 1ST HOSP IP/OBS MODERATE 55: CPT | Performed by: PHYSICIAN ASSISTANT

## 2022-03-01 PROCEDURE — 85014 HEMATOCRIT: CPT | Performed by: NURSE PRACTITIONER

## 2022-03-01 PROCEDURE — 93306 TTE W/DOPPLER COMPLETE: CPT | Performed by: INTERNAL MEDICINE

## 2022-03-01 PROCEDURE — 36430 TRANSFUSION BLD/BLD COMPNT: CPT

## 2022-03-01 PROCEDURE — 83540 ASSAY OF IRON: CPT | Performed by: NURSE PRACTITIONER

## 2022-03-01 PROCEDURE — 86850 RBC ANTIBODY SCREEN: CPT | Performed by: NURSE PRACTITIONER

## 2022-03-01 PROCEDURE — 94799 UNLISTED PULMONARY SVC/PX: CPT

## 2022-03-01 PROCEDURE — 93970 EXTREMITY STUDY: CPT

## 2022-03-01 PROCEDURE — 99222 1ST HOSP IP/OBS MODERATE 55: CPT | Performed by: INTERNAL MEDICINE

## 2022-03-01 PROCEDURE — 0202U NFCT DS 22 TRGT SARS-COV-2: CPT | Performed by: INTERNAL MEDICINE

## 2022-03-01 PROCEDURE — 97166 OT EVAL MOD COMPLEX 45 MIN: CPT

## 2022-03-01 PROCEDURE — 83880 ASSAY OF NATRIURETIC PEPTIDE: CPT | Performed by: NURSE PRACTITIONER

## 2022-03-01 PROCEDURE — 86923 COMPATIBILITY TEST ELECTRIC: CPT

## 2022-03-01 PROCEDURE — 82272 OCCULT BLD FECES 1-3 TESTS: CPT | Performed by: NURSE PRACTITIONER

## 2022-03-01 PROCEDURE — 86900 BLOOD TYPING SEROLOGIC ABO: CPT

## 2022-03-01 PROCEDURE — 25010000002 DIGOXIN PER 500 MCG: Performed by: HOSPITALIST

## 2022-03-01 PROCEDURE — 93005 ELECTROCARDIOGRAM TRACING: CPT | Performed by: NURSE PRACTITIONER

## 2022-03-01 PROCEDURE — 97162 PT EVAL MOD COMPLEX 30 MIN: CPT

## 2022-03-01 PROCEDURE — 93005 ELECTROCARDIOGRAM TRACING: CPT | Performed by: EMERGENCY MEDICINE

## 2022-03-01 PROCEDURE — 83605 ASSAY OF LACTIC ACID: CPT | Performed by: INTERNAL MEDICINE

## 2022-03-01 PROCEDURE — 81003 URINALYSIS AUTO W/O SCOPE: CPT | Performed by: NURSE PRACTITIONER

## 2022-03-01 PROCEDURE — 84484 ASSAY OF TROPONIN QUANT: CPT | Performed by: NURSE PRACTITIONER

## 2022-03-01 PROCEDURE — P9016 RBC LEUKOCYTES REDUCED: HCPCS

## 2022-03-01 PROCEDURE — 86900 BLOOD TYPING SEROLOGIC ABO: CPT | Performed by: NURSE PRACTITIONER

## 2022-03-01 PROCEDURE — 97530 THERAPEUTIC ACTIVITIES: CPT

## 2022-03-01 PROCEDURE — 93970 EXTREMITY STUDY: CPT | Performed by: INTERNAL MEDICINE

## 2022-03-01 PROCEDURE — 93306 TTE W/DOPPLER COMPLETE: CPT

## 2022-03-01 PROCEDURE — 85025 COMPLETE CBC W/AUTO DIFF WBC: CPT | Performed by: NURSE PRACTITIONER

## 2022-03-01 PROCEDURE — 80048 BASIC METABOLIC PNL TOTAL CA: CPT | Performed by: NURSE PRACTITIONER

## 2022-03-01 PROCEDURE — 83735 ASSAY OF MAGNESIUM: CPT | Performed by: NURSE PRACTITIONER

## 2022-03-01 PROCEDURE — 97110 THERAPEUTIC EXERCISES: CPT

## 2022-03-01 PROCEDURE — 84466 ASSAY OF TRANSFERRIN: CPT | Performed by: NURSE PRACTITIONER

## 2022-03-01 PROCEDURE — 71275 CT ANGIOGRAPHY CHEST: CPT

## 2022-03-01 PROCEDURE — 99223 1ST HOSP IP/OBS HIGH 75: CPT | Performed by: INTERNAL MEDICINE

## 2022-03-01 PROCEDURE — 25010000002 FUROSEMIDE PER 20 MG: Performed by: HOSPITALIST

## 2022-03-01 PROCEDURE — 0 IOPAMIDOL PER 1 ML: Performed by: EMERGENCY MEDICINE

## 2022-03-01 PROCEDURE — 94640 AIRWAY INHALATION TREATMENT: CPT

## 2022-03-01 PROCEDURE — 87040 BLOOD CULTURE FOR BACTERIA: CPT | Performed by: NURSE PRACTITIONER

## 2022-03-01 PROCEDURE — 82747 ASSAY OF FOLIC ACID RBC: CPT | Performed by: NURSE PRACTITIONER

## 2022-03-01 PROCEDURE — 86901 BLOOD TYPING SEROLOGIC RH(D): CPT | Performed by: NURSE PRACTITIONER

## 2022-03-01 PROCEDURE — 84484 ASSAY OF TROPONIN QUANT: CPT | Performed by: EMERGENCY MEDICINE

## 2022-03-01 RX ORDER — FERROUS SULFATE 325(65) MG
325 TABLET ORAL 2 TIMES DAILY WITH MEALS
Status: DISCONTINUED | OUTPATIENT
Start: 2022-03-01 | End: 2022-03-04

## 2022-03-01 RX ORDER — IPRATROPIUM BROMIDE AND ALBUTEROL SULFATE 2.5; .5 MG/3ML; MG/3ML
3 SOLUTION RESPIRATORY (INHALATION) EVERY 4 HOURS PRN
Status: DISCONTINUED | OUTPATIENT
Start: 2022-03-01 | End: 2022-03-07 | Stop reason: HOSPADM

## 2022-03-01 RX ORDER — BISOPROLOL FUMARATE 5 MG/1
10 TABLET, FILM COATED ORAL
Status: DISCONTINUED | OUTPATIENT
Start: 2022-03-01 | End: 2022-03-07 | Stop reason: HOSPADM

## 2022-03-01 RX ORDER — DILTIAZEM HYDROCHLORIDE 180 MG/1
180 CAPSULE, COATED, EXTENDED RELEASE ORAL DAILY
Status: DISCONTINUED | OUTPATIENT
Start: 2022-03-01 | End: 2022-03-07 | Stop reason: HOSPADM

## 2022-03-01 RX ORDER — PANTOPRAZOLE SODIUM 40 MG/10ML
40 INJECTION, POWDER, LYOPHILIZED, FOR SOLUTION INTRAVENOUS EVERY 12 HOURS SCHEDULED
Status: DISCONTINUED | OUTPATIENT
Start: 2022-03-01 | End: 2022-03-04

## 2022-03-01 RX ORDER — FUROSEMIDE 10 MG/ML
20 INJECTION INTRAMUSCULAR; INTRAVENOUS ONCE
Status: DISCONTINUED | OUTPATIENT
Start: 2022-03-01 | End: 2022-03-01

## 2022-03-01 RX ORDER — BUMETANIDE 0.25 MG/ML
0.5 INJECTION INTRAMUSCULAR; INTRAVENOUS DAILY
Status: DISCONTINUED | OUTPATIENT
Start: 2022-03-01 | End: 2022-03-04

## 2022-03-01 RX ORDER — LEVOTHYROXINE SODIUM 0.07 MG/1
75 TABLET ORAL
Status: DISCONTINUED | OUTPATIENT
Start: 2022-03-01 | End: 2022-03-07 | Stop reason: HOSPADM

## 2022-03-01 RX ORDER — DIGOXIN 0.25 MG/ML
250 INJECTION INTRAMUSCULAR; INTRAVENOUS ONCE
Status: COMPLETED | OUTPATIENT
Start: 2022-03-01 | End: 2022-03-01

## 2022-03-01 RX ORDER — HYDROCODONE BITARTRATE AND ACETAMINOPHEN 5; 325 MG/1; MG/1
1 TABLET ORAL EVERY 6 HOURS PRN
Status: DISCONTINUED | OUTPATIENT
Start: 2022-03-01 | End: 2022-03-07 | Stop reason: HOSPADM

## 2022-03-01 RX ORDER — PANTOPRAZOLE SODIUM 40 MG/1
40 TABLET, DELAYED RELEASE ORAL
Status: DISCONTINUED | OUTPATIENT
Start: 2022-03-01 | End: 2022-03-01

## 2022-03-01 RX ORDER — METOLAZONE 2.5 MG/1
2.5 TABLET ORAL ONCE
Status: COMPLETED | OUTPATIENT
Start: 2022-03-01 | End: 2022-03-01

## 2022-03-01 RX ORDER — ALPRAZOLAM 0.5 MG/1
0.5 TABLET ORAL NIGHTLY PRN
Status: DISCONTINUED | OUTPATIENT
Start: 2022-03-01 | End: 2022-03-07 | Stop reason: HOSPADM

## 2022-03-01 RX ORDER — SODIUM CHLORIDE 0.9 % (FLUSH) 0.9 %
10 SYRINGE (ML) INJECTION EVERY 12 HOURS SCHEDULED
Status: DISCONTINUED | OUTPATIENT
Start: 2022-03-01 | End: 2022-03-07 | Stop reason: HOSPADM

## 2022-03-01 RX ORDER — FUROSEMIDE 10 MG/ML
20 INJECTION INTRAMUSCULAR; INTRAVENOUS ONCE
Status: COMPLETED | OUTPATIENT
Start: 2022-03-01 | End: 2022-03-01

## 2022-03-01 RX ORDER — SODIUM CHLORIDE 0.9 % (FLUSH) 0.9 %
10 SYRINGE (ML) INJECTION AS NEEDED
Status: DISCONTINUED | OUTPATIENT
Start: 2022-03-01 | End: 2022-03-07 | Stop reason: HOSPADM

## 2022-03-01 RX ORDER — FUROSEMIDE 10 MG/ML
80 INJECTION INTRAMUSCULAR; INTRAVENOUS ONCE
Status: DISCONTINUED | OUTPATIENT
Start: 2022-03-01 | End: 2022-03-01

## 2022-03-01 RX ORDER — METOLAZONE 5 MG/1
5 TABLET ORAL ONCE
Status: DISCONTINUED | OUTPATIENT
Start: 2022-03-01 | End: 2022-03-01

## 2022-03-01 RX ORDER — FUROSEMIDE 10 MG/ML
40 INJECTION INTRAMUSCULAR; INTRAVENOUS ONCE
Status: DISCONTINUED | OUTPATIENT
Start: 2022-03-01 | End: 2022-03-01

## 2022-03-01 RX ORDER — ACETAMINOPHEN 325 MG/1
650 TABLET ORAL EVERY 4 HOURS PRN
Status: DISCONTINUED | OUTPATIENT
Start: 2022-03-01 | End: 2022-03-07 | Stop reason: HOSPADM

## 2022-03-01 RX ADMIN — IOPAMIDOL 75 ML: 755 INJECTION, SOLUTION INTRAVENOUS at 00:20

## 2022-03-01 RX ADMIN — HYDROCODONE BITARTRATE AND ACETAMINOPHEN 1 TABLET: 5; 325 TABLET ORAL at 15:07

## 2022-03-01 RX ADMIN — Medication 10 ML: at 20:39

## 2022-03-01 RX ADMIN — IPRATROPIUM BROMIDE AND ALBUTEROL SULFATE 3 ML: 2.5; .5 SOLUTION RESPIRATORY (INHALATION) at 18:47

## 2022-03-01 RX ADMIN — DIGOXIN 250 MCG: 0.25 INJECTION INTRAMUSCULAR; INTRAVENOUS at 09:14

## 2022-03-01 RX ADMIN — HYDROCODONE BITARTRATE AND ACETAMINOPHEN 1 TABLET: 5; 325 TABLET ORAL at 20:57

## 2022-03-01 RX ADMIN — LEVOTHYROXINE SODIUM 75 MCG: 0.07 TABLET ORAL at 06:35

## 2022-03-01 RX ADMIN — PANTOPRAZOLE SODIUM 40 MG: 40 TABLET, DELAYED RELEASE ORAL at 09:02

## 2022-03-01 RX ADMIN — SERTRALINE 150 MG: 50 TABLET, FILM COATED ORAL at 09:01

## 2022-03-01 RX ADMIN — METOLAZONE 2.5 MG: 2.5 TABLET ORAL at 12:35

## 2022-03-01 RX ADMIN — PANTOPRAZOLE SODIUM 40 MG: 40 INJECTION, POWDER, FOR SOLUTION INTRAVENOUS at 20:57

## 2022-03-01 RX ADMIN — FERROUS SULFATE TAB 325 MG (65 MG ELEMENTAL FE) 325 MG: 325 (65 FE) TAB at 17:19

## 2022-03-01 RX ADMIN — Medication 10 ML: at 09:04

## 2022-03-01 RX ADMIN — BUMETANIDE 0.5 MG: 0.25 INJECTION INTRAMUSCULAR; INTRAVENOUS at 18:24

## 2022-03-01 RX ADMIN — FERROUS SULFATE TAB 325 MG (65 MG ELEMENTAL FE) 325 MG: 325 (65 FE) TAB at 09:02

## 2022-03-01 RX ADMIN — FUROSEMIDE 20 MG: 10 INJECTION INTRAMUSCULAR; INTRAVENOUS at 19:07

## 2022-03-01 NOTE — CASE MANAGEMENT/SOCIAL WORK
Discharge Planning Assessment  [facility]     Patient Name: Yin Law  MRN: [id number]  Today's Date: 3/1/2022    Admit Date: 2/28/2022     Discharge Needs Assessment     Row Name 03/01/22 1211       Living Environment    Lives With alone    Current Living Arrangements home/apartment/condo    Primary Care Provided by self    Provides Primary Care For no one    Family Caregiver if Needed other (see comments)  niece    Able to Return to Prior Arrangements yes       Resource/Environmental Concerns    Resource/Environmental Concerns none    Transportation Concerns car, none       Transition Planning    Patient/Family Anticipates Transition to home with family    Patient/Family Anticipated Services at Transition     Transportation Anticipated family or friend will provide       Discharge Needs Assessment    Equipment Currently Used at Home oxygen; walker, rolling; cane, quad; grab bar    Concerns to be Addressed denies needs/concerns at this time    Discharge Coordination/Progress Spoke with patient at bedside.  Patient resides in a house in [facility] by herself, but her niece has been helping her out as needed.  Patient has a walker, and oxygen from [employer].  She normally wears 2L O2 per NC.  Patient is current with home health with [facility] for skilled nursing, PT and OT.  Patient does have a living will/power of  on file.  Patient's PCP is Dr. Santiago Chaudhry, and she gets her prescriptions filled at [facility] off of [facility].  Patient denied any issues affording her medication, and states that family can help transport her home at discharge.  Noticed that OT has recommended IPR for patient.  Did bring this up to the patient today, however she did not want to make a decision at this time.  CM will continue to follow.               Discharge Plan     Row Name 03/01/22 1216       Plan    Plan Home    Patient/Family in Agreement with Plan yes    Plan Comments Spoke with patient at  bedside.  Patient resides in a house in [facility] by herself, but her niece has been helping her out as needed.  Patient has a walker, and oxygen from [employer].  She normally wears 2L O2 per NC.  Patient is current with home health with [facility] for skilled nursing, PT and OT.  Patient does have a living will/power of  on file.  Patient's PCP is Dr. Santiago Chaudhry, and she gets her prescriptions filled at [facility] off of [facility].  Patient denied any issues affording her medication, and states that family can help transport her home at discharge.  Noticed that OT has recommended IPR for patient.  Did bring this up to the patient today, however she did not want to make a decision at this time.  CM will continue to follow.    Final Discharge Disposition Code 06 - home with home health care              Continued Care and Services - Admitted Since 2/28/2022     Durable Medical Equipment     Service Provider Request Status Selected Services Address Phone Fax Patient Preferred    [facility]  Pending - No Request Sent N/A [id number] [id number] [phone] --          Home Medical Care     Service Provider Request Status Selected Services Address Phone Fax Patient Preferred    [facility]  Pending - No Request Sent N/A [address], [address] [id number] [phone] -- --            Selected Continued Care - Prior Encounters Includes selections from prior encounters from 11/30/2021 to 3/1/2022    Discharged on 2/12/2022 Admission date: 2/7/2022 - Discharge disposition: Skilled Nursing Facility (DC - External)    Destination     Service Provider Selected Services Address Phone Fax Patient Preferred    [facility]  Skilled Nursing [address] [facility] [address] [phone] --                    Expected Discharge Date and Time     Expected Discharge Date Expected Discharge Time    Mar 4, 2022           Demographic Summary    No documentation.                Functional Status     Row Name 03/01/22 1211       Functional Status    Usual Activity Tolerance fair    Current Activity Tolerance fair       Functional Status, IADL    Medications assistive equipment    Meal Preparation assistive equipment    Housekeeping assistive equipment    Laundry assistive equipment    Shopping assistive equipment       Mental Status    General Appearance WDL WDL               Psychosocial    No documentation.                Abuse/Neglect    No documentation.                Legal    No documentation.                Substance Abuse    No documentation.                Patient Forms    No documentation.                   Rajwinder Sanchez RN

## 2022-03-01 NOTE — CONSULTS
[facility]    Referring Provider: Laura Shepherd MD   PCP: Santiago Chaudhry MD    Reason for Consultation: Anemia, positive fecal occult     Chief complaint: Shortness of breath     History of present illness:    Yin Law is a 81 y.o. female who is admitted with shortness of breath secondary to diastolic heart failure exacerbation in the setting of pulmonary hypertension, pulmonary edema and paroxysmal atrial fibrillation with RVR.   She is noted to have worsening anemia.   She is known to our service for previous inpatient consultations for anemia in August and October of 2021.   She has history of remote right colectomy with Dr. 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   She has had a repeat colonoscopy since that time with Dr. Tripp in 2017.   She was scheduled to undergo repeat bidirectional endoscopy outpatient on 9/14/21 but this was cancelled due to a readmission.       She currently is undergoing a bedside TTE during exam.   She appears in mild distress due to complaints of significant left hip pain.  She is mildly tachypneic.   She had a left femoral neck fracture earlier this month and underwent repair on 2/7/22 with Dr. Powell.   She has been on Eliquis for DVT prophylaxis as well as atrial fibrillation.      She denies any change in her stools.   Her stool was however hemoccult positive earlier today.       Allergies:  Dicloxacillin, Amiodarone, and Propoxyphene    Scheduled Meds:  bisoprolol, 10 mg, Oral, Q24H  bumetanide, 0.5 mg, Intravenous, Daily  dilTIAZem CD, 180 mg, Oral, Daily  ferrous sulfate, 325 mg, Oral, BID With Meals  furosemide, 20 mg, Intravenous, Once  levothyroxine, 75 mcg, Oral, Q AM  pantoprazole, 40 mg, Oral, BID AC  sertraline, 150 mg, Oral, Daily  sodium chloride, 10 mL, Intravenous, Q12H         Infusions:  dilTIAZem, 5-15 mg/hr, Last Rate: Stopped (03/01/22 0150)        PRN Meds:  •  acetaminophen  •  sodium  chloride  •  sodium chloride    Home Meds:  Medications Prior to Admission   Medication Sig Dispense Refill Last Dose   • albuterol sulfate  (90 Base) MCG/ACT inhaler Inhale 2 puffs Every 4 (Four) Hours As Needed for Wheezing. Patient taking: Inhale 2 puffs by mouth every 4-6 hours      • ALPRAZolam (XANAX) 0.5 MG tablet Take 1 tablet by mouth At Night As Needed for Anxiety or Sleep. 3 tablet 0    • apixaban (ELIQUIS) 2.5 MG tablet tablet Take 1 tablet by mouth Every 12 (Twelve) Hours. Indications: Atrial Fibrillation 60 tablet     • ascorbic acid (VITAMIN C) 1000 MG tablet Take 1 tablet by mouth Daily. (Patient taking differently: Take 1,000 mg by mouth Daily. OTC)      • bisoprolol (ZEBeta) 10 MG tablet Take 1 tablet by mouth Daily.      • Budeson-Glycopyrrol-Formoterol (BREZTRI) 160-9-4.8 MCG/ACT aerosol inhaler Inhale 2 puffs 2 (Two) Times a Day.      • dilTIAZem CD (CARDIZEM CD) 180 MG 24 hr capsule Take 180 mg by mouth Daily.      • ferrous sulfate 325 (65 FE) MG tablet Take 1 tablet by mouth 2 (Two) Times a Day With Meals.      • HYDROcodone-acetaminophen (NORCO) 7.5-325 MG per tablet Take 1 tablet by mouth Every 6 (Six) Hours As Needed for Moderate Pain . Patient taking: once a day prn 12 tablet 0    • levothyroxine (SYNTHROID, LEVOTHROID) 75 MCG tablet Take 1 tablet by mouth Every Morning. 30 tablet 5    • multivitamin with minerals (CENTRUM SILVER ADULT 50+ PO) Take 1 tablet by mouth Daily. OTC      • Omega-3 Fatty Acids (fish oil) 1000 MG capsule capsule Take 1,000 mg by mouth Daily With Breakfast. OTC      • pantoprazole (PROTONIX) 40 MG EC tablet Take 1 tablet by mouth 2 (Two) Times a Day Before Meals.      • sertraline (ZOLOFT) 100 MG tablet Take 150 mg by mouth Daily. 1.5 tab daily      • torsemide (DEMADEX) 20 MG tablet Take 2 tablets by mouth Daily. 30 tablet 5    • Vitamin D, Ergocalciferol, 50 MCG (2000 UT) capsule Take 1 tablet by mouth Daily. OTC          ROS: Review of Systems    Constitutional: Positive for fatigue.   HENT: Negative.    Eyes: Negative.    Respiratory: Positive for shortness of breath.    Cardiovascular: Positive for leg swelling.   Gastrointestinal: Negative.    Endocrine: Negative.    Genitourinary: Negative.    Musculoskeletal:        Left lower extremity pain   Skin: Positive for pallor.   Allergic/Immunologic: Negative.    Neurological: Positive for weakness.   Hematological: Negative.    Psychiatric/Behavioral: Negative.        PAST MED HX:  Past Medical History:   Diagnosis Date   • Accelerated hypertension 9/14/2021   • Acute diastolic CHF (CMS/HCC) 8/6/2021   • Anxiety and depression    • Arrhythmia     A-FIB   • Asthma    • Chicken pox    • COPD (chronic obstructive pulmonary disease) ([facility])    • Elevated troponin 8/5/2021   • Hyperlipidemia    • Hypertension    • Measles    • Menopause    • Multifocal pneumonia 7/19/2021   • Osteoporosis    • Pleural effusion, bilateral 8/5/2021   • Pneumonia due to infectious organism 9/14/2021   • Rheumatoid arthritis (HCC)    • Rheumatoid arthritis (HCC)    • Sepsis ([facility]) 8/5/2021   • Tobacco abuse        PAST SURG HX:  Past Surgical History:   Procedure Laterality Date   • APPENDECTOMY     • CARPAL TUNNEL RELEASE Left    • CATARACT EXTRACTION, BILATERAL     • COLON SURGERY     • COLONOSCOPY     • GALLBLADDER SURGERY     • HIP HEMIARTHROPLASTY Left 2/7/2022    Procedure: HIP HEMIARTHROPLASTY LEFT;  Surgeon: Napoleon Powell Jr., MD;  Location: [facility];  Service: Orthopedics;  Laterality: Left;   • HYSTERECTOMY  1971   • TONSILLECTOMY         FAM HX:  Family History   Problem Relation Age of Onset   • Alzheimer's disease Mother    • No Known Problems Father    • No Known Problems Sister    • Hypertension Brother    • Parkinsonism Brother    • No Known Problems Maternal Grandmother    • No Known Problems Maternal Grandfather    • No Known Problems Paternal Grandmother    • No Known Problems Paternal Grandfather        SOC  "HX:  Social History     Socioeconomic History   • Marital status:    Tobacco Use   • Smoking status: Former Smoker     Packs/day: 0.25     Types: Cigarettes     Quit date: 2021     Years since quittin.8   • Smokeless tobacco: Never Used   • Tobacco comment: 1-2 cigs occas   Vaping Use   • Vaping Use: Never used   Substance and Sexual Activity   • Alcohol use: Never   • Drug use: Never   • Sexual activity: Defer       PHYSICAL EXAM  /61   Pulse 90   Temp 97.2 °F (36.2 °C) (Axillary)   Resp 22   Ht 167.6 cm (66\")   Wt 61.6 kg (135 lb 12.8 oz)   LMP  (LMP Unknown)   SpO2 95%   BMI 21.92 kg/m²   Wt Readings from Last 3 Encounters:   22 61.6 kg (135 lb 12.8 oz)   22 55.8 kg (123 lb 0.3 oz)   21 59.9 kg (132 lb)   ,body mass index is 21.92 kg/m².  Physical Exam  Constitutional:       Comments: Elderly female in mild distress.  Appears uncomfortable in bed secondary to left hip pain   HENT:      Head: Normocephalic and atraumatic.      Mouth/Throat:      Mouth: Mucous membranes are moist.   Eyes:      General: No scleral icterus.  Cardiovascular:      Rate and Rhythm: Tachycardia present. Rhythm irregular.   Pulmonary:      Comments: On 3 L O2 via nasal cannula   Mild tachypnea   Decreased breath sounds at bases bilaterally   Abdominal:      General: Bowel sounds are normal.      Palpations: Abdomen is soft.      Tenderness: There is no abdominal tenderness.   Musculoskeletal:      Comments: Trace edema    Skin:     Coloration: Skin is pale.   Neurological:      Mental Status: She is alert and oriented to person, place, and time.   Psychiatric:      Comments: Anxious mood        Results Review:   I reviewed the patient's new clinical results.    Lab Results   Component Value Date    WBC 8.77 2022    HGB 7.0 (L) 2022    HGB 8.1 (L) 2022    HGB 9.2 (L) 2022    HCT 23.7 (L) 2022    .3 (H) 2022     2022       Lab Results "   Component Value Date    INR 1.03 02/07/2022    INR 1.08 08/23/2021     Lab Results   Component Value Date    GLUCOSE 112 (H) 03/01/2022    BUN 23 03/01/2022    CREATININE 0.78 03/01/2022    EGFRIFNONA 79 02/11/2022    BCR 29.5 (H) 03/01/2022     03/01/2022    K 3.8 03/01/2022    CO2 31.0 (H) 03/01/2022    CALCIUM 8.8 03/01/2022    ALBUMIN 3.90 02/28/2022    ALKPHOS 130 (H) 02/28/2022    BILITOT 0.3 02/28/2022    ALT 14 02/28/2022    AST 27 02/28/2022      Ref. Range 3/1/2022 00:58   Iron Latest Ref Range: 37 - 145 mcg/dL 26 (L)   Ferritin Latest Ref Range: 13.00 - 150.00 ng/mL 264.60 (H)   Iron Saturation Latest Ref Range: 20 - 50 % 8 (L)   Transferrin Latest Ref Range: 200 - 360 mg/dL 229   TIBC Latest Ref Range: 298 - 536 mcg/dL 341      Ref. Range 3/1/2022 04:48   Folate Latest Ref Range: 4.78 - 24.20 ng/mL >20.00      Ref. Range 3/1/2022 04:48   Vitamin B-12 Latest Ref Range: 211 - 946 pg/mL 1,009 (H)     ASSESSMENTS/PLANS    1. Acute on chronic blood loss anemia  2. Iron deficiency  3. Anticoagulation use, on Eliquis  4. Recent hip fracture s/p open treatment of displaced femoral neck fracture with hemiarthroplasty on 2/7/22.  5. Acute on chronic diastolic heart failure   6. History of right colectomy, 2014     Possible upper GI source of bleeding.       >>> Recommend EGD tomorrow if hemodynamically stable.    >>> NPO at midnight  >>> IV Protonix 40 mg BID   >>> Agree with transfusion of pRBCs   >>> Follow up TTE, cardiology is following   >>> Apixiban is held.    >>> Left hip pain communicated to primary RN and hospitalist team for further management     I discussed the patient's findings and my recommendations with patient    PAVEL Valadez  03/01/22  14:33 EST

## 2022-03-01 NOTE — PLAN OF CARE
Problem: Adult Inpatient Plan of Care  Goal: Plan of Care Review  Recent Flowsheet Documentation  Taken 3/1/2022 1102 by Leena Sahu, PT  Progress: improving  Plan of Care Reviewed With: patient  Outcome Summary: Patient ambulated 4 feet from bed to chair with RW, min assist, limited by persistent SOA and elevated HR with activity. O2 improved sitting UIC versus supine in bed. Good participation with LE ther ex. Recommend SNF at d/c. Will continue to progress as able.   Goal Outcome Evaluation:  Plan of Care Reviewed With: patient        Progress: improving  Outcome Summary: Patient ambulated 4 feet from bed to chair with RW, min assist, limited by persistent SOA and elevated HR with activity. O2 improved sitting UIC versus supine in bed. Good participation with LE ther ex. Recommend SNF at d/c. Will continue to progress as able.

## 2022-03-01 NOTE — PROGRESS NOTES
[facility] Medicine Services  ADMISSION FOLLOW-UP NOTE          Patient admitted after midnight, H&P by my partner performed earlier on today's date reviewed.  Interim findings, labs, and charting also reviewed.        [facility] Hospital Problem List has been managed and updated to include any new diagnoses:  Active Hospital Problems    Diagnosis  POA   • **Flash pulmonary edema (HCC) [J81.0]  Yes   • Bilateral pleural effusion [J90]  Yes   • Elevated troponin [R77.8]  Yes   • Leukocytosis [D72.829]  Yes   • Atrial fibrillation with rapid ventricular response (HCC) [I48.91]  Yes   • Acute on chronic respiratory failure with hypoxia and hypercapnia (HCC) [J96.21, J96.22]  Yes   • Hypothyroidism (acquired) [E03.9]  Yes   • Mixed hyperlipidemia [E78.2]  Yes   • Paroxysmal atrial fibrillation (HCC) [I48.0]  Yes     Class: Acute   • Rheumatoid arthritis (HCC) [M06.9]  Yes   • Primary hypertension [I10]  Yes   • COPD on home oxygen (CMS/HCC) [J44.9]  Yes   • Anxiety and depression [F41.9, F32.A]  Yes   • Anemia [D64.9]  Yes      Resolved Hospital Problems   No resolved problems to display.     82 yo F with hx of HTN, HLD, chronic diastolic CHF, PAF, COPD on 5 liters home oxygen, rheumatoid arthritis, hypothyroidism, and anxiety/depression who presents due to shortness of breath. Recent admission 2/7-2/12/22 for left femoral neck fracture (repaired 2/7/22 by Dr. Powell). EMS transported patient to [facility] ER on high flow oxygen. Found to have significantly elevated proBNP (7631). CTA chest showed no PE but moderate bilateral pleural effusions. Given 80 mg IV Lasix and placed on BiPAP. Also noted to be in Afib with RVR. Admitted for further management.    ADDITIONAL PLAN:  --Continue Cardizem drip for AFib with RVR as BP tolerates. Gave a dose of IV Digoxin this morning as well due to uncontrolled rate with low BP. Of note, patient was discharged on low dose Eliquis both for DVT PPx following her  hip repair as well as anticoagulation for PAF per Cardiology. She has not been taking this and states she does not want to. Consulted Cardiology for further evaluation. Avoid Amiodarone (was stopped last year due to concern for toxicity).   --Weaned off BiPAP currently. Monitor.  --Continue IV diuresis.  --Follow H&H closely, lower than at time of discharge. Her FOBT is also positive. Discussed briefly with her PCP Dr. Chaudhry this morning, he has been trying to get endoscopy done as outpatient--will trend H&H for now, could consider inpatient GI consult when more stable from respiratory and cardiac standpoint.  --PT/OT evaluation.    Laura Shepherd MD  03/01/22    ADDENDUM 03/01/22 14:19 EST  Hb down to 7.0. Will transfuse 1 unit PRBC given her cardiac issues, give IV Lasix afterward. Consult GI for further evaluation.  Electronically signed by Laura Shepherd MD, 03/01/22, 2:20 PM EST.

## 2022-03-01 NOTE — THERAPY EVALUATION
Patient Name: Yin Law  : 1940    MRN: [id number]                              Today's Date: 3/1/2022       Admit Date: 2022    Visit Dx:     ICD-10-CM ICD-9-CM   1. Flash pulmonary edema (HCC)  J81.0 518.4   2. Acute respiratory failure with hypoxia and hypercapnia (HCC)  J96.01 518.81    J96.02    3. Acute on chronic congestive heart failure, unspecified heart failure type (HCC)  I50.9 428.0     Patient Active Problem List   Diagnosis   • Paroxysmal atrial fibrillation (HCC)   • COPD on home oxygen (CMS/HCC)   • Primary hypertension   • Rheumatoid arthritis (HCC)   • Anxiety and depression   • Wound of right leg   • Anemia   • Severe malnutrition (CMS/HCC)   • Hip fracture (HCC)   • Mixed hyperlipidemia   • Heart valve disease   • Flash pulmonary edema (HCC)   • Bilateral pleural effusion   • Elevated troponin   • Leukocytosis   • Atrial fibrillation with rapid ventricular response (HCC)   • Acute on chronic respiratory failure with hypoxia and hypercapnia (HCC)   • Hypothyroidism (acquired)     Past Medical History:   Diagnosis Date   • Accelerated hypertension 2021   • Acute diastolic CHF (CMS/HCC) 2021   • Anxiety and depression    • Arrhythmia     A-FIB   • Asthma    • Chicken pox    • COPD (chronic obstructive pulmonary disease) ([facility])    • Elevated troponin 2021   • Hyperlipidemia    • Hypertension    • Measles    • Menopause    • Multifocal pneumonia 2021   • Osteoporosis    • Pleural effusion, bilateral 2021   • Pneumonia due to infectious organism 2021   • Rheumatoid arthritis (HCC)    • Rheumatoid arthritis (HCC)    • Sepsis ([facility]) 2021   • Tobacco abuse      Past Surgical History:   Procedure Laterality Date   • APPENDECTOMY     • CARPAL TUNNEL RELEASE Left    • CATARACT EXTRACTION, BILATERAL     • COLON SURGERY     • COLONOSCOPY     • GALLBLADDER SURGERY     • HIP HEMIARTHROPLASTY Left 2022    Procedure: HIP HEMIARTHROPLASTY LEFT;  Surgeon: Sherry  Napoleon CORTES Jr., MD;  Location: [facility];  Service: Orthopedics;  Laterality: Left;   • HYSTERECTOMY  1971   • TONSILLECTOMY        General Information     Row Name 03/01/22 1102          Physical Therapy Time and Intention    Document Type evaluation  -LR     Mode of Treatment physical therapy; individual therapy  -LR     Row Name 03/01/22 1102          General Information    Patient Profile Reviewed yes  -LR     Prior Level of Function min assist:; all household mobility; gait; transfer; bed mobility  uses RW for mobility  -LR     Existing Precautions/Restrictions fall; cardiac; left; hip, posterior; oxygen therapy device and L/min; other (see comments)  s/p L hip hemiarthroplasty s/p fem neck fx 2/7/2022  -LR     Barriers to Rehab medically complex; previous functional deficit  -LR     Row Name 03/01/22 1102          Living Environment    Lives With alone  niece recently assisting PRN  -LR     Row Name 03/01/22 1102          Home Main Entrance    Number of Stairs, Main Entrance two  -LR     Stair Railings, Main Entrance railing on right side (ascending)  -LR     Row Name 03/01/22 1102          Stairs Within Home, Primary    Number of Stairs, Within Home, Primary none  -LR     Row Name 03/01/22 1102          Cognition    Orientation Status (Cognition) oriented x 3  -LR     Row Name 03/01/22 1102          Safety Issues, Functional Mobility    Safety Issues Affecting Function (Mobility) insight into deficits/self-awareness; safety precautions follow-through/compliance; safety precaution awareness  -LR     Impairments Affecting Function (Mobility) strength; balance; pain; endurance/activity tolerance; range of motion (ROM); shortness of breath  -LR           User Key  (r) = Recorded By, (t) = Taken By, (c) = Cosigned By    Initials Name Provider Type    LR Leena Sahu, PT Physical Therapist               Mobility     Row Name 03/01/22 1102          Bed Mobility    Bed Mobility supine-sit  -LR     Supine-Sit  [place] (Bed Mobility) verbal cues; contact guard  -LR     Sit-Supine [place] (Bed Mobility) not tested  -LR     Assistive Device (Bed Mobility) head of bed elevated; bed rails  -LR     Comment (Bed Mobility) Reinforced posterior hip precautions prior to OOB mobility. Verbal cues to move LEs towards EOB and to push up from bed to raise trunk into sitting and to scoot hips out to get feet on floor. Required rest breaks and increased time to perform d/t SOA. Denied dizziness upon sitting up.  -LR     Row Name 03/01/22 1102          Transfers    Comment (Transfers) Verbal cues to push up from bed to stand and to reach back for chair to lower into sitting. Verbal cues to step L LE out before t/f for comfort and to avoid flexing past 90 degrees at hips during t/f. Cues to pull R foot under her prior to t/f.  -LR     Row Name 03/01/22 1102          Bed-Chair Transfer    Bed-Chair [place] (Transfers) verbal cues; minimum assist (75% patient effort); 1 person to manage equipment  -LR     Assistive Device (Bed-Chair Transfers) walker, front-wheeled  -LR     Row Name 03/01/22 1102          Sit-Stand Transfer    Sit-Stand [place] (Transfers) verbal cues; minimum assist (75% patient effort); 1 person to manage equipment  -LR     Assistive Device (Sit-Stand Transfers) walker, front-wheeled  -LR     Row Name 03/01/22 1102          Gait/Stairs (Locomotion)    [place] Level (Gait) verbal cues; minimum assist (75% patient effort); 1 person to manage equipment  -LR     Assistive Device (Gait) walker, front-wheeled  -LR     Distance in Feet (Gait) 4  -LR     Deviations/Abnormal Patterns (Gait) bilateral deviations; cindi decreased; gait speed decreased; stride length decreased; left sided deviations; antalgic  -LR     Bilateral Gait Deviations forward flexed posture; heel strike decreased  -LR     Left Sided Gait Deviations weight shift ability decreased  -LR     [place] Level (Stairs) not tested  -LR      Comment (Gait/Stairs) Patient ambulated with step to gait pattern at slow pace with forward flexed posture. Verbal cues for increased L LE weight bearing/stance phase, decreased UE weight bearing, and correct sequencing of steps. Slight improvement with cues for correction. Cues to keep RW close. Gait limited by elevated HR and SOA with activity.  -     Row Name 03/01/22 1102          Mobility    Extremity Weight-bearing Status left lower extremity  -     Left Lower Extremity (Weight-bearing Status) weight-bearing as tolerated (WBAT)  -           User Key  (r) = Recorded By, (t) = Taken By, (c) = Cosigned By    Initials Name Provider Type    LR Leena Sahu, PT Physical Therapist               Obj/Interventions     Row Name 03/01/22 1102          Range of Motion Comprehensive    General Range of Motion lower extremity range of motion deficits identified  -     Row Name 03/01/22 1102          Strength Comprehensive (MMT)    General Manual Muscle Testing (MMT) Assessment lower extremity strength deficits identified  -     Row Name 03/01/22 1102          Motor Skills    Therapeutic Exercise ankle; knee; hip; other (see comments)  cues for technique; min assist bilaterally  -     Row Name 03/01/22 1102          Hip (Therapeutic Exercise)    Hip (Therapeutic Exercise) strengthening exercise; isometric exercises  -     Hip Isometrics (Therapeutic Exercise) bilateral; gluteal sets; sitting; 10 repetitions; 5 repetitions  -     Hip Strengthening (Therapeutic Exercise) bilateral; aBduction; sitting; 10 repetitions; 5 repetitions; heel slides  -     Row Name 03/01/22 1102          Knee (Therapeutic Exercise)    Knee (Therapeutic Exercise) strengthening exercise; isometric exercises  -     Knee Isometrics (Therapeutic Exercise) bilateral; quad sets; sitting; 10 repetitions; 5 repetitions  -     Knee Strengthening (Therapeutic Exercise) bilateral; SLR (straight leg raise); sitting; 10  repetitions; 5 repetitions  -LR     Row Name 03/01/22 1102          Ankle (Therapeutic Exercise)    Ankle (Therapeutic Exercise) AROM (active range of motion)  -LR     Ankle AROM (Therapeutic Exercise) bilateral; dorsiflexion; plantarflexion; sitting; 10 repetitions; 5 repetitions  -LR     Row Name 03/01/22 1102          Balance    Balance Assessment sitting static balance; sitting dynamic balance; standing static balance; standing dynamic balance  -LR     Static Sitting Balance WFL; unsupported; sitting, edge of bed  -LR     Dynamic Sitting Balance WFL; unsupported; sitting, edge of bed  -LR     Static Standing Balance mild impairment; supported; standing  -LR     Dynamic Standing Balance mild impairment; supported; standing  -LR     Row Name 03/01/22 1102          Sensory Assessment (Somatosensory)    Sensory Assessment (Somatosensory) LE sensation intact; other (see comments)  denies numbness/tingling light touch equal and intact; reports chronic intermittent numbness/tingling in R hand  -LR     Row Name 03/01/22 1102          General Lower Extremity Assessment (Range of Motion)    Lower Extremity: Range of Motion RLE ROM WFL; hip, left: LE ROM  -LR     Comment: Lower Extremity ROM L hip AROM impaired 25%  -LR     Row Name 03/01/22 1102          Lower Extremity (Manual Muscle Testing)    Comment, MMT: Lower Extremity L hip functionally 4-/5, independent with L SLR, no knee buckling with weight bearing, deferred formal MMT of L LE d/t recent hip sx; R knee flexors: 4/5, R knee extensors: 4+/5, R ankle DFs: 5/5, R hip flexors: 3-/5  -LR           User Key  (r) = Recorded By, (t) = Taken By, (c) = Cosigned By    Initials Name Provider Type    LR Leena Sahu, PT Physical Therapist               Goals/Plan     Row Name 03/01/22 1102          Bed Mobility Goal 1 (PT)    Activity/Assistive Device (Bed Mobility Goal 1, PT) sit to supine/supine to sit  -LR     [place] Level/Cues Needed (Bed Mobility Goal  1, PT) modified independence  -LR     Time Frame (Bed Mobility Goal 1, PT) long term goal (LTG); 5 days  -LR     Progress/Outcomes (Bed Mobility Goal 1, PT) goal ongoing  -LR     Row Name 03/01/22 1102          Transfer Goal 1 (PT)    Activity/Assistive Device (Transfer Goal 1, PT) sit-to-stand/stand-to-sit; walker, rolling; bed-to-chair/chair-to-bed  -LR     [place] Level/Cues Needed (Transfer Goal 1, PT) standby assist  -LR     Time Frame (Transfer Goal 1, PT) long term goal (LTG); 5 days  -LR     Progress/Outcome (Transfer Goal 1, PT) goal ongoing  -LR     Row Name 03/01/22 1102          Gait Training Goal 1 (PT)    Activity/Assistive Device (Gait Training Goal 1, PT) gait (walking locomotion); walker, rolling  -LR     [place] Level (Gait Training Goal 1, PT) standby assist  -LR     Distance (Gait Training Goal 1, PT) 50 feet  -LR     Time Frame (Gait Training Goal 1, PT) long term goal (LTG); 5 days  -LR     Progress/Outcome (Gait Training Goal 1, PT) goal ongoing  -LR           User Key  (r) = Recorded By, (t) = Taken By, (c) = Cosigned By    Initials Name Provider Type    LR Leena Sahu, PT Physical Therapist               Clinical Impression     Row Name 03/01/22 1102          Pain    Additional Documentation Pain Scale: Numbers Pre/Post-Treatment (Group)  -LR     Row Name 03/01/22 1102          Pain Scale: Numbers Pre/Post-Treatment    Pretreatment Pain Rating 0/10 - no pain  -LR     Posttreatment Pain Rating 0/10 - no pain  -LR     Pain Intervention(s) Ambulation/increased activity; Repositioned  -LR     Row Name 03/01/22 1102          Plan of Care Review    Plan of Care Reviewed With patient  -LR     Progress improving  -LR     Outcome Summary Patient ambulated 4 feet from bed to chair with RW, min assist, limited by persistent SOA and elevated HR with activity. O2 improved sitting UIC versus supine in bed. Good participation with LE ther ex. Recommend SNF at d/c. Will continue to  progress as able.  -LR     Row Name 03/01/22 1102          Therapy Assessment/Plan (PT)    Patient/Family Therapy Goals Statement (PT) get better  -LR     Rehab Potential (PT) fair, will monitor progress closely  -LR     Criteria for Skilled Interventions Met (PT) yes; meets criteria; skilled treatment is necessary  -LR     Row Name 03/01/22 1102          Vital Signs    Pre Systolic BP Rehab 107  -LR     Pre Treatment Diastolic BP 56  -LR     Post Systolic BP Rehab 118  -LR     Post Treatment Diastolic BP 82  -LR     Pretreatment Heart Rate (beats/min) 107  -LR     Intratreatment Heart Rate (beats/min) 159  -LR     Posttreatment Heart Rate (beats/min) 109  -LR     Pre SpO2 (%) 89  -LR     O2 Delivery Pre Treatment supplemental O2  -LR     Intra SpO2 (%) 88  -LR     O2 Delivery Intra Treatment supplemental O2  -LR     Post SpO2 (%) 91  -LR     O2 Delivery Post Treatment supplemental O2  -LR     Pre Patient Position Supine  -LR     Intra Patient Position Sitting  -LR     Post Patient Position Sitting  -LR     Row Name 03/01/22 1102          Positioning and Restraints    Pre-Treatment Position in bed  -LR     Post Treatment Position chair  -LR     In Chair notified nsg; reclined; sitting; call light within reach; encouraged to call for assist; exit alarm on; legs elevated; waffle cushion  -LR           User Key  (r) = Recorded By, (t) = Taken By, (c) = Cosigned By    Initials Name Provider Type    LR Leena Sahu, PT Physical Therapist               Outcome Measures     Row Name 03/01/22 1102 03/01/22 0800       How much help from another person do you currently need...    Turning from your back to your side while in flat bed without using bedrails? 3  -LR 4  -AM    Moving from lying on back to sitting on the side of a flat bed without bedrails? 3  -LR 3  -AM    Moving to and from a bed to a chair (including a wheelchair)? 3  -LR 3  -AM    Standing up from a chair using your arms (e.g., wheelchair, bedside  chair)? 3  -LR 3  -AM    Climbing 3-5 steps with a railing? 1  -LR 2  -AM    To walk in hospital room? 3  -LR 3  -AM    AM-PAC 6 Clicks Score (PT) 16  -LR 18  -AM    Row Name 03/01/22 1102 03/01/22 0834       Functional Assessment    Outcome Measure Options AM-PAC 6 Clicks Basic Mobility (PT)  -LR AM-PAC 6 Clicks Daily Activity (OT)  -KF          User Key  (r) = Recorded By, (t) = Taken By, (c) = Cosigned By    Initials Name Provider Type    LR Leena Sahu, PT Physical Therapist    Radha Delgado, RN Registered Nurse    Salud Greer, OT Occupational Therapist                             Physical Therapy Education                 Title: PT OT SLP Therapies (In Progress)     Topic: Physical Therapy (Done)     Point: Mobility training (Done)     Learning Progress Summary           Patient Acceptance, E,D, VU,NR by LR at 3/1/2022 1102    Comment: Educated on posterior hip precautions, weight bearing status, benefits of mobility and being OOB, LE HEP, correct supine to sit t/f technique, correct sit<->stand t/f technique, correct gait mechanics, and progression of POC                   Point: Home exercise program (Done)     Learning Progress Summary           Patient Acceptance, E,D, VU,NR by LR at 3/1/2022 1102    Comment: Educated on posterior hip precautions, weight bearing status, benefits of mobility and being OOB, LE HEP, correct supine to sit t/f technique, correct sit<->stand t/f technique, correct gait mechanics, and progression of POC                   Point: Body mechanics (Done)     Learning Progress Summary           Patient Acceptance, E,D, VU,NR by LR at 3/1/2022 1102    Comment: Educated on posterior hip precautions, weight bearing status, benefits of mobility and being OOB, LE HEP, correct supine to sit t/f technique, correct sit<->stand t/f technique, correct gait mechanics, and progression of POC                   Point: Precautions (Done)     Learning Progress Summary            Patient Acceptance, E,D, VU,NR by LR at 3/1/2022 1102    Comment: Educated on posterior hip precautions, weight bearing status, benefits of mobility and being OOB, LE HEP, correct supine to sit t/f technique, correct sit<->stand t/f technique, correct gait mechanics, and progression of POC                               User Key     Initials Effective Dates Name Provider Type Discipline    LR 06/16/21 -  Leena Sahu, PT Physical Therapist PT              PT Recommendation and Plan  Planned Therapy Interventions (PT): balance training, bed mobility training, gait training, home exercise program, patient/family education, ROM (range of motion), strengthening, transfer training  Plan of Care Reviewed With: patient  Progress: improving  Outcome Summary: Patient ambulated 4 feet from bed to chair with RW, min assist, limited by persistent SOA and elevated HR with activity. O2 improved sitting UIC versus supine in bed. Good participation with LE ther ex. Recommend SNF at d/c. Will continue to progress as able.     Time Calculation:    PT Charges     Row Name 03/01/22 1102             Time Calculation    Start Time 1102  -LR      PT Received On 03/01/22  -LR      PT Goal Re-Cert Due Date 03/11/22  -LR              Timed Charges    48479 - PT Therapeutic Exercise Minutes 10  -LR      64580 - PT Therapeutic Activity Minutes 13  -LR              Untimed Charges    PT Eval/Re-eval Minutes 40  -LR              Total Minutes    Timed Charges Total Minutes 23  -LR      Untimed Charges Total Minutes 40  -LR       Total Minutes 63  -LR            User Key  (r) = Recorded By, (t) = Taken By, (c) = Cosigned By    Initials Name Provider Type    LR Leena Sahu, PT Physical Therapist              Therapy Charges for Today     Code Description Service Date Service Provider Modifiers Qty    37256949290  PT THER PROC EA 15 MIN 3/1/2022 Leena Sahu, PT GP 1    50685635047 HC PT THERAPEUTIC ACT EA 15  MIN 3/1/2022 Leena Sahu, PT GP 1    12349579870 HC PT THER SUPP EA 15 MIN 3/1/2022 Leena Sahu, PT GP 2    77678238829 HC PT EVAL MOD COMPLEXITY 3 3/1/2022 Leena Sahu, PT GP 1          PT G-Codes  Outcome Measure Options: AM-PAC 6 Clicks Basic Mobility (PT)  AM-PAC 6 Clicks Score (PT): 16  AM-PAC 6 Clicks Score (OT): 14    Leena Sahu, PT  3/1/2022

## 2022-03-01 NOTE — CONSULTS
Referring Provider: MD Joao  Reason for Consultation: AoCRF    Subjective .   Education:  NN spoke with pt and niece at .  Pt alert and able to answer questions appropriately.  Pt O2 sat 97 % on  3 L currently, home O2 use 3-4 L.  Pt reports the ability to ambulate ~40-45 feet at baseline before experiencing SOB.  Pt states use of rescue inhaler 14 times weekly, relief of SOB within ~2 mins.  Patient is up to date on COVID, flu and PNA vaccines.  Former smoker, quit date 2021.  Pt reports no issues at this time with medications or transportation for appointments.  Patient states that she is not planning on attending rehab at discharge.  NN informed patient and niece that documents on file do not state any medical wishes and niece states that she will bring the document with her tomorrow to be scanned into medical record.  Pt has previous hx of formal COPD teaching, no understanding of action plan, or IL.  Exacerbation triggers reviewed.  COPD action plan reviewed.  No new concerns or questions voiced at this time.  NN will continue to follow as needed.     Age: 81 y.o.  Sex: female  Smoker Status: former, pack years unclear  Pulmonologist: KATHY  FEV1 (PFT): NA  Home O2: 3-4L    Objective     SpO2 SpO2: 100 % (03/01/22 1100)  Device Device (Oxygen Therapy): nasal cannula (03/01/22 1000)  Flow Flow (L/min): 4 (03/01/22 1000)  Incentive Spirometer    IS Predicted Level (mL)     Number of Repetitions     Level Incentive Spirometer (mL)    Patient Tolerance     Inhaler Treatment Status    Treatment Route        Home Medications:  Medications Prior to Admission   Medication Sig Dispense Refill Last Dose   • albuterol sulfate  (90 Base) MCG/ACT inhaler Inhale 2 puffs Every 4 (Four) Hours As Needed for Wheezing. Patient taking: Inhale 2 puffs by mouth every 4-6 hours      • ALPRAZolam (XANAX) 0.5 MG tablet Take 1 tablet by mouth At Night As Needed for Anxiety or Sleep. 3 tablet 0    • apixaban (ELIQUIS) 2.5 MG  tablet tablet Take 1 tablet by mouth Every 12 (Twelve) Hours. Indications: Atrial Fibrillation 60 tablet     • ascorbic acid (VITAMIN C) 1000 MG tablet Take 1 tablet by mouth Daily. (Patient taking differently: Take 1,000 mg by mouth Daily. OTC)      • bisoprolol (ZEBeta) 10 MG tablet Take 1 tablet by mouth Daily.      • Budeson-Glycopyrrol-Formoterol (BREZTRI) 160-9-4.8 MCG/ACT aerosol inhaler Inhale 2 puffs 2 (Two) Times a Day.      • dilTIAZem CD (CARDIZEM CD) 180 MG 24 hr capsule Take 180 mg by mouth Daily.      • ferrous sulfate 325 (65 FE) MG tablet Take 1 tablet by mouth 2 (Two) Times a Day With Meals.      • HYDROcodone-acetaminophen (NORCO) 7.5-325 MG per tablet Take 1 tablet by mouth Every 6 (Six) Hours As Needed for Moderate Pain . Patient taking: once a day prn 12 tablet 0    • levothyroxine (SYNTHROID, LEVOTHROID) 75 MCG tablet Take 1 tablet by mouth Every Morning. 30 tablet 5    • multivitamin with minerals (CENTRUM SILVER ADULT 50+ PO) Take 1 tablet by mouth Daily. OTC      • Omega-3 Fatty Acids (fish oil) 1000 MG capsule capsule Take 1,000 mg by mouth Daily With Breakfast. OTC      • pantoprazole (PROTONIX) 40 MG EC tablet Take 1 tablet by mouth 2 (Two) Times a Day Before Meals.      • sertraline (ZOLOFT) 100 MG tablet Take 150 mg by mouth Daily. 1.5 tab daily      • torsemide (DEMADEX) 20 MG tablet Take 2 tablets by mouth Daily. 30 tablet 5    • Vitamin D, Ergocalciferol, 50 MCG (2000 UT) capsule Take 1 tablet by mouth Daily. OTC          Discussion: Per current GOLD Standards, please consider: No LAMA/LABA/ICS in place, Outpatient PFT, Rehab as appropriate, Palliative care consult      Patient has been scheduled for a hospital follow up with [facility] for 3/16/2022 @ 1:30 pm with AMA Hayes.          Elin Cline RN

## 2022-03-01 NOTE — ED PROVIDER NOTES
Subjective   History of Present Illness    Review of Systems    Past Medical History:   Diagnosis Date   • Accelerated hypertension 2021   • Acute diastolic CHF (CMS/HCC) 2021   • Anxiety and depression    • Arrhythmia     A-FIB   • Asthma    • Chicken pox    • COPD (chronic obstructive pulmonary disease) ([facility])    • Elevated troponin 2021   • Hyperlipidemia    • Hypertension    • Measles    • Menopause    • Multifocal pneumonia 2021   • Osteoporosis    • Pleural effusion, bilateral 2021   • Pneumonia due to infectious organism 2021   • Rheumatoid arthritis (HCC)    • Rheumatoid arthritis (HCC)    • Sepsis (HCC) 2021   • Tobacco abuse        Allergies   Allergen Reactions   • Dicloxacillin Shortness Of Breath   • Amiodarone Unknown - High Severity   • Propoxyphene Unknown - Low Severity       Past Surgical History:   Procedure Laterality Date   • APPENDECTOMY     • CARPAL TUNNEL RELEASE Left    • CATARACT EXTRACTION, BILATERAL     • COLON SURGERY     • COLONOSCOPY     • GALLBLADDER SURGERY     • HIP HEMIARTHROPLASTY Left 2022    Procedure: HIP HEMIARTHROPLASTY LEFT;  Surgeon: Napoleon Powell Jr., MD;  Location: [facility];  Service: Orthopedics;  Laterality: Left;   • HYSTERECTOMY     • TONSILLECTOMY         Family History   Problem Relation Age of Onset   • Alzheimer's disease Mother    • No Known Problems Father    • No Known Problems Sister    • Hypertension Brother    • Parkinsonism Brother    • No Known Problems Maternal Grandmother    • No Known Problems Maternal Grandfather    • No Known Problems Paternal Grandmother    • No Known Problems Paternal Grandfather        Social History     Socioeconomic History   • Marital status:    Tobacco Use   • Smoking status: Former Smoker     Packs/day: 0.25     Types: Cigarettes     Quit date: 2021     Years since quittin.8   • Smokeless tobacco: Never Used   • Tobacco comment: 1-2 cigs occas   Vaping Use   • Vaping  Use: Never used   Substance and Sexual Activity   • Alcohol use: Never   • Drug use: Never   • Sexual activity: Defer           Objective   Physical Exam    Procedures           ED Course                                                 MDM    Final diagnoses:   None       ED Disposition  ED Disposition     None          No follow-up provider specified.       Medication List      No changes were made to your prescriptions during this visit.

## 2022-03-01 NOTE — THERAPY EVALUATION
Patient Name: Yin Law  : 1940    MRN: [id number]                              Today's Date: 3/1/2022       Admit Date: 2022    Visit Dx:     ICD-10-CM ICD-9-CM   1. Flash pulmonary edema (HCC)  J81.0 518.4   2. Acute respiratory failure with hypoxia and hypercapnia (HCC)  J96.01 518.81    J96.02    3. Acute on chronic congestive heart failure, unspecified heart failure type (HCC)  I50.9 428.0     Patient Active Problem List   Diagnosis   • Paroxysmal atrial fibrillation (HCC)   • COPD on home oxygen (CMS/HCC)   • Primary hypertension   • Rheumatoid arthritis (HCC)   • Anxiety and depression   • Wound of right leg   • Anemia   • Severe malnutrition (CMS/HCC)   • Hip fracture (HCC)   • Mixed hyperlipidemia   • Heart valve disease   • Flash pulmonary edema (HCC)   • Bilateral pleural effusion   • Elevated troponin   • Leukocytosis   • Atrial fibrillation with rapid ventricular response (HCC)   • Acute on chronic respiratory failure with hypoxia and hypercapnia (HCC)   • Hypothyroidism (acquired)     Past Medical History:   Diagnosis Date   • Accelerated hypertension 2021   • Acute diastolic CHF (CMS/HCC) 2021   • Anxiety and depression    • Arrhythmia     A-FIB   • Asthma    • Chicken pox    • COPD (chronic obstructive pulmonary disease) ([facility])    • Elevated troponin 2021   • Hyperlipidemia    • Hypertension    • Measles    • Menopause    • Multifocal pneumonia 2021   • Osteoporosis    • Pleural effusion, bilateral 2021   • Pneumonia due to infectious organism 2021   • Rheumatoid arthritis (HCC)    • Rheumatoid arthritis (HCC)    • Sepsis ([facility]) 2021   • Tobacco abuse      Past Surgical History:   Procedure Laterality Date   • APPENDECTOMY     • CARPAL TUNNEL RELEASE Left    • CATARACT EXTRACTION, BILATERAL     • COLON SURGERY     • COLONOSCOPY     • GALLBLADDER SURGERY     • HIP HEMIARTHROPLASTY Left 2022    Procedure: HIP HEMIARTHROPLASTY LEFT;  Surgeon: Sherry  Napoleon CORTES Jr., MD;  Location: [facility];  Service: Orthopedics;  Laterality: Left;   • HYSTERECTOMY  1971   • TONSILLECTOMY        General Information     Row Name 03/01/22 0819          OT Time and Intention    Document Type evaluation  -KF     Mode of Treatment occupational therapy  -KF     Row Name 03/01/22 0819          General Information    Patient Profile Reviewed yes  -KF     Prior Level of Function independent:; transfer; bed mobility; ADL's; min assist:; home management  niece assists PRN; with FWW  -KF     Existing Precautions/Restrictions fall; left; hip, posterior; oxygen therapy device and L/min; cardiac  LLE WBAT, posterior hip s/p 2/7/22  -KF     Barriers to Rehab previous functional deficit  -KF     Row Name 03/01/22 0819          Living Environment    Lives With alone  -KF     Row Name 03/01/22 0819          Home Main Entrance    Number of Stairs, Main Entrance two  -KF     Stair Railings, Main Entrance railing on right side (ascending)  -KF     Row Name 03/01/22 0819          Stairs Within Home, Primary    Stairs, Within Home, Primary WI shower with shower chair, uses FWW within home  -KF     Number of Stairs, Within Home, Primary none  -KF     Row Name 03/01/22 0819          Cognition    Orientation Status (Cognition) oriented x 3  -KF     Row Name 03/01/22 0819          Safety Issues, Functional Mobility    Safety Issues Affecting Function (Mobility) awareness of need for assistance; insight into deficits/self-awareness; safety precaution awareness  -KF     Impairments Affecting Function (Mobility) endurance/activity tolerance; strength; shortness of breath; balance  -KF           User Key  (r) = Recorded By, (t) = Taken By, (c) = Cosigned By    Initials Name Provider Type    KF Salud Felton OT Occupational Therapist                 Mobility/ADL's     Row Name 03/01/22 0826          Bed Mobility    Bed Mobility scooting/bridging; supine-sit; sit-supine  -KF     Scooting/Bridging  [place] (Bed Mobility) contact guard; verbal cues  -KF     Supine-Sit [place] (Bed Mobility) contact guard; verbal cues  -KF     Sit-Supine [place] (Bed Mobility) contact guard; verbal cues  -KF     Bed Mobility, Safety Issues decreased use of legs for bridging/pushing  -     Assistive Device (Bed Mobility) bed rails; draw sheet; head of bed elevated  -     Comment (Bed Mobility) uses UEs to assist in moving LLE to EOB  -     Row Name 03/01/22 0826          Transfers    Transfers sit-stand transfer  -     Comment (Transfers) Pt declined chair at this time, cues for seq use of BUE support for close support and protection during transfer at this time, no dizziness  -KF     Sit-Stand [place] (Transfers) minimum assist (75% patient effort); 2 person assist  -     Row Name 03/01/22 0826          Sit-Stand Transfer    Assistive Device (Sit-Stand Transfers) --  BUE support  -     Row Name 03/01/22 0826          Functional Mobility    Functional Mobility- Comment able to take 2 sidesteps towards HOB, due to elevated HR and activity tolerance deferred further to PT  -     Row Name 03/01/22 0826          Activities of Daily Living    BADL Assessment/Intervention upper body dressing; lower body dressing  -     Row Name 03/01/22 0826          Upper Body Dressing Assessment/Training    [place] Level (Upper Body Dressing) doff; don; front opening garment; minimum assist (75% patient effort)  -KF     Position (Upper Body Dressing) edge of bed sitting  -     Row Name 03/01/22 0826          Lower Body Dressing Assessment/Training    [place] Level (Lower Body Dressing) don; doff; socks; dependent (less than 25% patient effort)  -KF     Position (Lower Body Dressing) sitting up in bed  -           User Key  (r) = Recorded By, (t) = Taken By, (c) = Cosigned By    Initials Name Provider Type    Salud Greer, OT Occupational Therapist               Obj/Interventions     Row Name  03/01/22 0827          Sensory Assessment (Somatosensory)    Sensory Assessment (Somatosensory) right UE; left UE  -KF     Left UE Sensory Assessment general sensation; intact  -KF     Right UE Sensory Assessment general sensation; impaired  -KF     Row Name 03/01/22 0827          Vision Assessment/Intervention    Visual Impairment/Limitations WFL  -KF     Row Name 03/01/22 0827          Range of Motion Comprehensive    General Range of Motion bilateral upper extremity ROM WNL  -KF     Row Name 03/01/22 0827          Strength Comprehensive (MMT)    General Manual Muscle Testing (MMT) Assessment upper extremity strength deficits identified  -KF     Comment, General Manual Muscle Testing (MMT) Assessment BUE grossly 4+/5  -KF     Row Name 03/01/22 0827          Motor Skills    Motor Skills coordination  -KF     Coordination WFL; bilateral; upper extremity; finger to nose  -KF     Row Name 03/01/22 0827          Balance    Balance Assessment sitting static balance; sitting dynamic balance; standing static balance; standing dynamic balance  -KF     Static Sitting Balance WFL; unsupported  -KF     Dynamic Sitting Balance WFL; unsupported  -KF     Static Standing Balance mild impairment; supported  -KF     Dynamic Standing Balance mild impairment; supported  -KF     Balance Interventions standing; sit to stand; supported; weight shifting activity  -KF     Comment, Balance [place] overall with transition into standing progressed to CGA with BUE support  -KF           User Key  (r) = Recorded By, (t) = Taken By, (c) = Cosigned By    Initials Name Provider Type    KF Salud Felton, OT Occupational Therapist               Goals/Plan     Row Name 03/01/22 0831          Transfer Goal 1 (OT)    Activity/Assistive Device (Transfer Goal 1, OT) bed-to-chair/chair-to-bed; sit-to-stand/stand-to-sit; walker, rolling  -KF     [place] Level/Cues Needed (Transfer Goal 1, OT) contact guard assist; verbal cues required  -KF     Time  Frame (Transfer Goal 1, OT) long term goal (LTG); 10 days  -KF     Progress/Outcome (Transfer Goal 1, OT) goal ongoing  -     Row Name 03/01/22 0831          Dressing Goal 1 (OT)    Activity/Device (Dressing Goal 1, OT) lower body dressing  AE PRN socks  -KF     [place]/Cues Needed (Dressing Goal 1, OT) minimum assist (75% or more patient effort)  -KF     Time Frame (Dressing Goal 1, OT) long term goal (LTG); 10 days  -KF     Progress/Outcome (Dressing Goal 1, OT) goal ongoing  -     Row Name 03/01/22 0831          Grooming Goal 1 (OT)    Activity/Device (Grooming Goal 1, OT) hair care; oral care; wash face, hands  -KF     [place] (Grooming Goal 1, OT) contact guard assist; verbal cues required  -KF     Time Frame (Grooming Goal 1, OT) long term goal (LTG); 10 days  -KF     Progress/Outcome (Grooming Goal 1, OT) goal ongoing  -     Row Name 03/01/22 0831          Therapy Assessment/Plan (OT)    Planned Therapy Interventions (OT) activity tolerance training; adaptive equipment training; BADL retraining; occupation/activity based interventions; strengthening exercise; transfer/mobility retraining; functional balance retraining; ROM/therapeutic exercise; patient/caregiver education/training  -           User Key  (r) = Recorded By, (t) = Taken By, (c) = Cosigned By    Initials Name Provider Type    KF Salud Felton, OT Occupational Therapist               Clinical Impression     Row Name 03/01/22 0829          Pain Assessment    Additional Documentation Pain Scale: Numbers Pre/Post-Treatment (Group)  -[facility] Name 03/01/22 0829          Pain Scale: Numbers Pre/Post-Treatment    Pretreatment Pain Rating 0/10 - no pain  -KF     Posttreatment Pain Rating 0/10 - no pain  -KF     Pre/Posttreatment Pain Comment tolerated  -     Pain Intervention(s) Repositioned; Ambulation/increased activity  -     Row Name 03/01/22 0829          Plan of Care Review    Plan of Care Reviewed With patient  -KF      Progress no change  -KF     Outcome Summary OT eval completed Pt presents with deficits in activity tolerance, strength, and balance compared to baseline ADL completion, HR elevated throughout and desaturation with mild exertion to 86% requiring extended time, [place] x2 STS and sidesteps, CGA overall bed mob, depA LBD, recom IPOT d/c IRF pending progress  -     Row Name 03/01/22 0829          Therapy Assessment/Plan (OT)    Rehab Potential (OT) fair, will monitor progress closely  -     Criteria for Skilled Therapeutic Interventions Met (OT) yes; skilled treatment is necessary; meets criteria  -KF     Therapy Frequency (OT) daily  -     Row Name 03/01/22 0829          Therapy Plan Review/Discharge Plan (OT)    Equipment Needs Upon Discharge (OT) --  TBD  -     Anticipated Discharge Disposition (OT) inpatient rehabilitation facility  -     Row Name 03/01/22 0829          Vital Signs    Pre Systolic BP Rehab 126  -KF     Pre Treatment Diastolic BP 63  -KF     Post Systolic BP Rehab 117  -KF     Post Treatment Diastolic BP 45  -KF     Pre SpO2 (%) 93  -KF     O2 Delivery Pre Treatment supplemental O2  -KF     Intra SpO2 (%) 86  -KF     O2 Delivery Intra Treatment supplemental O2  -KF     Post SpO2 (%) 90  -KF     O2 Delivery Post Treatment supplemental O2  -KF     Pre Patient Position Supine  -KF     Intra Patient Position Standing  -KF     Post Patient Position Supine  -KF     Rest Breaks  1  -     Row Name 03/01/22 0829          Positioning and Restraints    Pre-Treatment Position in bed  -KF     Post Treatment Position bed  -KF     In Bed notified nsg; supine; fowlers; call light within reach; encouraged to call for assist; exit alarm on; side rails up x2; legs elevated; SCD pump applied; patient within staff view  -KF           User Key  (r) = Recorded By, (t) = Taken By, (c) = Cosigned By    Initials Name Provider Type    Salud Greer, OT Occupational Therapist               Outcome Measures      Row Name 03/01/22 0834          How much help from another is currently needed...    Putting on and taking off regular lower body clothing? 1  -KF     Bathing (including washing, rinsing, and drying) 2  -KF     Toileting (which includes using toilet bed pan or urinal) 2  -KF     Putting on and taking off regular upper body clothing 3  -KF     Taking care of personal grooming (such as brushing teeth) 3  -KF     Eating meals 3  -KF     AM-PAC 6 Clicks Score (OT) 14  -KF     Row Name 03/01/22 0834          Functional Assessment    Outcome Measure Options AM-PAC 6 Clicks Daily Activity (OT)  -KF           User Key  (r) = Recorded By, (t) = Taken By, (c) = Cosigned By    Initials Name Provider Type    KF Salud Felton OT Occupational Therapist                Occupational Therapy Education                 Title: PT OT SLP Therapies (In Progress)     Topic: Occupational Therapy (In Progress)     Point: ADL training (Done)     Description:   Instruct learner(s) on proper safety adaptation and remediation techniques during self care or transfers.   Instruct in proper use of assistive devices.              Learning Progress Summary           Patient Acceptance, E,TB,D, VU,NR by  at 3/1/2022 0834                   Point: Home exercise program (Not Started)     Description:   Instruct learner(s) on appropriate technique for monitoring, assisting and/or progressing therapeutic exercises/activities.              Learner Progress:  Not documented in this visit.          Point: Precautions (Done)     Description:   Instruct learner(s) on prescribed precautions during self-care and functional transfers.              Learning Progress Summary           Patient Acceptance, E,TB,D, VU,NR by  at 3/1/2022 0834                   Point: Body mechanics (Done)     Description:   Instruct learner(s) on proper positioning and spine alignment during self-care, functional mobility activities and/or exercises.              Learning  Progress Summary           Patient Acceptance, E,TB,D, VU,NR by  at 3/1/2022 0834                               User Key     Initials Effective Dates Name Provider Type Discipline     06/16/21 -  Salud Felton OT Occupational Therapist OT              OT Recommendation and Plan  Planned Therapy Interventions (OT): activity tolerance training, adaptive equipment training, BADL retraining, occupation/activity based interventions, strengthening exercise, transfer/mobility retraining, functional balance retraining, ROM/therapeutic exercise, patient/caregiver education/training  Therapy Frequency (OT): daily  Plan of Care Review  Plan of Care Reviewed With: patient  Progress: no change  Outcome Summary: OT eval completed Pt presents with deficits in activity tolerance, strength, and balance compared to baseline ADL completion, HR elevated throughout and desaturation with mild exertion to 86% requiring extended time, [place] x2 STS and sidesteps, CGA overall bed mob, depA LBD, recom IPOT d/c IRF pending progress     Time Calculation:    Time Calculation- OT     Row Name 03/01/22 0750             Time Calculation- OT    OT Start Time 0750  -KF      OT Received On 03/01/22  -      OT Goal Re-Cert Due Date 03/11/22  -              Untimed Charges    OT Eval/Re-eval Minutes 47  -KF              Total Minutes    Untimed Charges Total Minutes 47  -KF       Total Minutes 47  -KF            User Key  (r) = Recorded By, (t) = Taken By, (c) = Cosigned By    Initials Name Provider Type     Salud Felton OT Occupational Therapist              Therapy Charges for Today     Code Description Service Date Service Provider Modifiers Qty    49163860774 HC OT EVAL MOD COMPLEXITY 4 3/1/2022 Salud Felton OT GO 1               Salud Felton OT  3/1/2022

## 2022-03-01 NOTE — CONSULTS
Yin Law  [id number]  1940   LOS: 0 days   Patient Care Team:  PHYSICIAN: Santiago Chaudhry MD, FACP  CARDIOLOGIST: He Montoya MD  ORTHOPAEDIC SURGEON: Napoleon Powell MD   PULMONOLOGY PROVIDER: AMA Way    Ms. Law is an 81-year-old  white female from [place], [place] retired  and  at a [employer].    Chief Complaint:  PAF, pulmonary edema    Problem List:  1. Persistent atrial fibrillation  a. Diagnosed 3/2021  b. CHADSVASC- 4  c. Echocardiogram 4/23/2021 EF 66 to 70%.  Severe mitral calcification.  Moderate mitral regurgitation with multiple jets.  Moderate mitral valve stenosis.  RVSP greater than 55 mmHg.  Severe pulmonary hypertension.  Moderate calcification of aortic valve mean gradient 10 mmHg.  d. Unsuccessful external cardioversion 5/21/2021 with 2 biphasic 200 J shocks, patient returned to atrial fibrillation after 10-15 seconds, started on amiodarone   e. Echocardiogram 7/20/2021: LVEF 65%, mild aortic stenosis, aortic valve maximum pressure gradient 32.9 mmHg.  Aortic valve mean pressure gradient is 17.2 mmHg.  Mild MR, mild TR, RVSP 37 mmHg, large left pleural effusion, moderate sized right pleural effusion  f. Amiodarone discontinued August 2021 due to concerns for lung toxicity  g. Echocardiogram 2/10/2022: LVEF 60%, LV wall thickness consistent with moderate concentric hypertrophy, mild aortic stenosis, moderate to severe MR, moderate TR, RVSP greater than 55 mmHg  h. Atrial fibrillation with RVR in the setting of acute diastolic heart failure exacerbation/pulmonary edema, 28 February 2022  2. Hypertension with myocardial perfusion study 1/8/2020 showing EF 68%, normal perfusion, no ischemia.  3. Dyslipidemia; on statin therapy  4. Valvular heart disease/moderate to severe MR February 2022  5. Anxiety/depression  6. Asthma  7. Anemia  8. COPD/emphysema  9. Tobacco abuse, 1-2 cigarettes occasionally  10. Rheumatoid  arthritis  11. Osteoporosis  12. OSH Hospitalization for respiratory failure with hypoxia/pneumonia/bilateral pleural effusions, atrial fibrillation with RVR with conversion to sinus rhythm on diltiazem gtt.  13. Surgeries:  a. Right colectomy  b. Hysterectomy Total  c. Cholecystectomy  d. Appendectomy  e. Bilateral cataract surgery  f. Left carpal tunnel surgery  g. Tonsillectomy  h. Left hip replacement February 2022      Allergies   Allergen Reactions   • Dicloxacillin Shortness Of Breath   • Amiodarone Unknown - High Severity   • Propoxyphene Unknown - Low Severity     Medications Prior to Admission   Medication Sig Dispense Refill Last Dose   • albuterol sulfate  (90 Base) MCG/ACT inhaler Inhale 2 puffs Every 4 (Four) Hours As Needed for Wheezing. Patient taking: Inhale 2 puffs by mouth every 4-6 hours      • ALPRAZolam (XANAX) 0.5 MG tablet Take 1 tablet by mouth At Night As Needed for Anxiety or Sleep. 3 tablet 0    • apixaban (ELIQUIS) 2.5 MG tablet tablet Take 1 tablet by mouth Every 12 (Twelve) Hours. Indications: Atrial Fibrillation 60 tablet     • ascorbic acid (VITAMIN C) 1000 MG tablet Take 1 tablet by mouth Daily. (Patient taking differently: Take 1,000 mg by mouth Daily. OTC)      • bisoprolol (ZEBeta) 10 MG tablet Take 1 tablet by mouth Daily.      • Budeson-Glycopyrrol-Formoterol (BREZTRI) 160-9-4.8 MCG/ACT aerosol inhaler Inhale 2 puffs 2 (Two) Times a Day.      • dilTIAZem CD (CARDIZEM CD) 180 MG 24 hr capsule Take 180 mg by mouth Daily.      • ferrous sulfate 325 (65 FE) MG tablet Take 1 tablet by mouth 2 (Two) Times a Day With Meals.      • HYDROcodone-acetaminophen (NORCO) 7.5-325 MG per tablet Take 1 tablet by mouth Every 6 (Six) Hours As Needed for Moderate Pain . Patient taking: once a day prn 12 tablet 0    • levothyroxine (SYNTHROID, LEVOTHROID) 75 MCG tablet Take 1 tablet by mouth Every Morning. 30 tablet 5    • multivitamin with minerals (CENTRUM SILVER ADULT 50+ PO) Take 1  tablet by mouth Daily. OTC      • Omega-3 Fatty Acids (fish oil) 1000 MG capsule capsule Take 1,000 mg by mouth Daily With Breakfast. OTC      • pantoprazole (PROTONIX) 40 MG EC tablet Take 1 tablet by mouth 2 (Two) Times a Day Before Meals.      • sertraline (ZOLOFT) 100 MG tablet Take 150 mg by mouth Daily. 1.5 tab daily      • torsemide (DEMADEX) 20 MG tablet Take 2 tablets by mouth Daily. 30 tablet 5    • Vitamin D, Ergocalciferol, 50 MCG (2000 UT) capsule Take 1 tablet by mouth Daily. OTC        Scheduled Meds:bisoprolol, 10 mg, Oral, Q24H  dilTIAZem CD, 180 mg, Oral, Daily  ferrous sulfate, 325 mg, Oral, BID With Meals  furosemide, 80 mg, Intravenous, Once  levothyroxine, 75 mcg, Oral, Q AM  pantoprazole, 40 mg, Oral, BID AC  sertraline, 150 mg, Oral, Daily  sodium chloride, 10 mL, Intravenous, Q12H      Continuous Infusions:dilTIAZem, 5-15 mg/hr, Last Rate: Stopped (03/01/22 0150)      PRN Meds:.•  acetaminophen  •  sodium chloride  •  sodium chloride       History of Present Illness:   This is an 81-year-old white female who presented to [facility] 2/28/2022 via EMS for sudden onset shortness of breath.  She is chronically on 3-4 L O2 NC and was transported to [facility] on high flow oxygen.  She denied any chest pain, palpitations, nausea, fever, chills, vomiting, abdominal discomfort, dysuria, but did endorse slight lower extremity edema.  She says that she was compliant with her home medications.  She states that she has been very thirsty lately and has been drinking a lot of fluid.  She has had her COVID vaccinations and booster.  On admission she was in atrial fibrillation with RVR and started on a diltiazem gtt.  She was also given 80 mg of IV Lasix in the ED and put on BiPAP briefly but is now back down to a nasal cannula.  The patient was given 1 dose of IV digoxin 250 mcg as well as 10 mg of IV diltiazem.  Admission labs showed WBC 17.78, hemoglobin 8.1, platelets 476, glucose 269, proBNP 7631, troponin 0.051, pH  7.18, PCO2 74.2, PO2 438, bicarb 27.4 UA with greater than 500 glucose, pending blood cultures. Of note, she had an unsuccessful cardioversion May 2021 was started on amiodarone.  She then returned in 2021 for a cardioversion but was in sinus rhythm and the procedure was canceled.  Amiodarone was discontinued 2021 with concerns for amiodarone toxicity.  The patient denies any melena and normally takes iron every day.  The patient states that she is not overly active but she does do some of her housework on her own but does not use a vacuum  anymore.  She had a recent left hip replacement 2022 and CTA chest was negative for PE but showed bilateral pleural effusions.  The patient takes torsemide 40 mg daily at home.    Cardiac risk factors: advanced age (older than 55 for men, 65 for women), dyslipidemia, hypertension and sedentary lifestyle.    Social History     Socioeconomic History   • Marital status:    Tobacco Use   • Smoking status: Former Smoker     Packs/day: 0.25     Types: Cigarettes     Quit date: 2021     Years since quittin.8   • Smokeless tobacco: Never Used   • Tobacco comment: 1-2 cigs occas   Vaping Use   • Vaping Use: Never used   Substance and Sexual Activity   • Alcohol use: Never   • Drug use: Never   • Sexual activity: Defer     Family History   Problem Relation Age of Onset   • Alzheimer's disease Mother    • No Known Problems Father    • No Known Problems Sister    • Hypertension Brother    • Parkinsonism Brother    • No Known Problems Maternal Grandmother    • No Known Problems Maternal Grandfather    • No Known Problems Paternal Grandmother    • No Known Problems Paternal Grandfather        Review of Systems  10 point review of systems was completed, positives outlined in the HPI, and otherwise all other systems are negative.      Objective:       Physical Exam  BP (!) 78/56 (BP Location: Left arm, Patient Position: Lying)   Pulse 114   Temp 97.9  "°F (36.6 °C) (Oral)   Resp 18   Ht 167.6 cm (66\")   Wt 61.6 kg (135 lb 12.8 oz)   LMP  (LMP Unknown)   SpO2 99%   BMI 21.92 kg/m²       02/28/22  2144 03/01/22  0328   Weight: 54.4 kg (120 lb) 61.6 kg (135 lb 12.8 oz)     Body mass index is 21.92 kg/m².    Intake/Output Summary (Last 24 hours) at 3/1/2022 0923  Last data filed at 3/1/2022 0838  Gross per 24 hour   Intake 240 ml   Output 1500 ml   Net -1260 ml       General Appearance:  Alert, cooperative, no distress, appears stated age   Head:  Normocephalic, without obvious abnormality, atraumatic   Neck: Supple, symmetrical, trachea midline, no adenopathy, thyroid: not enlarged, symmetric, no tenderness/mass/nodules, no carotid bruit or JVD   Lungs:    Bibasilar Rales to auscultation bilaterally, respirations unlabored, 4 L O2 NC   Heart:   Atrial fib with RVR, S1, S2 normal, grade 2/6 murmur, no rub or gallop   Abdomen:   Soft, nontender, no masses, no organomegaly, bowel sounds audible x4   Extremities: Trace edema, normal range of motion   Pulses: 2+ and symmetric   Skin: Skin color, texture, turgor normal, no rashes or lesions   Neurologic: Normal     · Cardiographics:    · EKG 3/1/2022:   Atrial fibrillation with rapid ventricular response  ST & T wave abnormality, consider lateral ischemia  Abnormal ECG  When compared with ECG of 01-MAR-2022 00:55,  T wave inversion more evident in Lateral leads ( ms)     · Echocardiogram 2/10/2022:  · Estimated left ventricular EF = 60%  · Left ventricular wall thickness is consistent with moderate concentric hypertrophy.  · Mild aortic valve stenosis is present.  · Moderate to severe mitral valve regurgitation is present.  · Moderate tricuspid valve regurgitation is present.  · Estimated right ventricular systolic pressure from tricuspid regurgitation is markedly elevated (>55 mmHg).     · Imaging:    · Chest x-ray 2/28/2022:Congestive heart failure with bilateral pleural effusions.    · CTA chest " 2/28/2022:  1. No evidence of pulmonary embolism.  2. No evidence of thoracic aortic aneurysm or dissection.  3. Moderate bilateral pleural effusions.  4. Emphysema    Lab Review:     Results from last 7 days   Lab Units 03/01/22 0448 02/28/22 2245 02/28/22 2240 02/28/22 2240   SODIUM mmol/L 143  --   --  139   POTASSIUM mmol/L 3.8  --   --  4.2   CHLORIDE mmol/L 102  --   --  99   CO2 mmol/L 31.0*  --   --  28.0   BUN mg/dL 23  --   --  22   CREATININE mg/dL 0.78 1.00  --  0.91   GLUCOSE mg/dL 112*  --    < > 269*   CALCIUM mg/dL 8.8  --   --  9.0    < > = values in this interval not displayed.     Results from last 7 days   Lab Units 02/28/22 2152   WBC 10*3/mm3 17.78*   HEMOGLOBIN g/dL 8.1*   HEMATOCRIT % 28.0*   PLATELETS 10*3/mm3 476*     Results from last 7 days   Lab Units 03/01/22 0448 03/01/22 0058 02/28/22 2240   TROPONIN T ng/mL 0.029 0.011 0.051*     · Magnesium 2.1 on 3/1/2022.      Assessment:   Patient with diastolic heart failure exacerbation in the setting of pulmonary hypertension,  anemia, pulmonary edema, and PAF with RVR.  Patient was on amiodarone fall 2021 but this was discontinued due to concern for amiodarone toxicity.  She is chronically on 3-4 L O2 NC for COPD/emphysema.  Troponins trending downward and likely type II due to demand ischemia.  Would defer MPS/LHC.  We feel that the patient's heart failure exacerbation is multifactorial in view of her pulmonary hypertension, anemia, moderate to severe MR, and volume overload from drinking a lot of fluids at home prior to admission.  We will continue efforts to decongest her in view of significant pulmonary edema and volume overload and follow ventricular response to persistent atrial fibrillation as well as GFR and hemodynamics carefully.  Prognosis guarded.  Doubt she would be a candidate for consideration of Mitral clip but will defer to Dr. Sage.     Plan:   1.  IV Bumex 0.5 mg twice daily (Hold for systolic bp<100mmHg)  2.   Metolazone 2.5 mg times once for increased shortness of breath  3.  Strict I's/O, daily weights  4.  BMP, mag, ECG in morning  5.  Continue diltiazem gtt. as needed for rapid atrial fibrillation with rates greater than 130 bpm (hold for systolic blood pressure less than 100 mmHg)  6.  Review echocardiogram results when available and review with study from two weeks ago  7.  Defer amiodarone at this time as well as other QTC prolonging agents  8.  Defer MPS/LHC/DONI at this time  9.  Anemia needs work-up and treatment; may be contributing to heart failure  10. DuoNebs as needed    Discussed with patient and daughter in room.    Scribed for Santiago Don MD by Meghana Rush, APRN. 3/1/2022  10:46 EST     I have seen and examined the patient, case was discussed with the physician extender, reviewed the above note, necessary changes were made and I agree with the final note.     Santiago Don MD [facility]

## 2022-03-01 NOTE — H&P
[facility] Medicine Services  HISTORY AND PHYSICAL    Patient Name: Yin Law  : 1940  MRN: [id number]  Primary Care Physician: Santiago Chaudhry MD  Date of admission: 2022      Subjective   Subjective     Chief Complaint:   shortness of breath    HPI:  Yin Law is a 81 y.o. female  With hx of diastolic CHF, COPD, HTN, bilateral pleural effusions s/p thoracentesis per pt, hyperlipidemia who is on 3-4 L O2 chronically presents tonight after calling 911 for shortness of breath.  Pt was reportedly very dyspneic and was transported here on high flow oxygen.  Pt states sx started today and were fairly sudden.  No chest pain.  No n/v/d/c.  No abd pain.  Normal uop.  Does have edema in legs.      Pt was just here -22 for left femoral neck fx.  Pt also had afib, chronic DHF, post op anemia.      Pt received 80 mg iv lasix in ER as well as BIPAP and feels much better and back on home O2.     COVID Details:    Symptoms:    [] NONE [] Fever []  Cough [] Shortness of breath [] Change in taste/smell      Review of Systems      All other systems reviewed and are negative.     Personal History     Past Medical History:   Diagnosis Date   • Accelerated hypertension 2021   • Acute diastolic CHF (CMS/HCC) 2021   • Anxiety and depression    • Arrhythmia     A-FIB   • Asthma    • Chicken pox    • COPD (chronic obstructive pulmonary disease) (HCC)    • Elevated troponin 2021   • Hyperlipidemia    • Hypertension    • Measles    • Menopause    • Multifocal pneumonia 2021   • Osteoporosis    • Pleural effusion, bilateral 2021   • Pneumonia due to infectious organism 2021   • Rheumatoid arthritis (HCC)    • Rheumatoid arthritis (HCC)    • Sepsis (HCC) 2021   • Tobacco abuse        Past Surgical History:   Procedure Laterality Date   • APPENDECTOMY     • CARPAL TUNNEL RELEASE Left    • CATARACT EXTRACTION, BILATERAL     • COLON SURGERY     •  COLONOSCOPY     • GALLBLADDER SURGERY     • HIP HEMIARTHROPLASTY Left 2/7/2022    Procedure: HIP HEMIARTHROPLASTY LEFT;  Surgeon: Napoleon Powell Jr., MD;  Location: [facility];  Service: Orthopedics;  Laterality: Left;   • HYSTERECTOMY  1971   • TONSILLECTOMY         Family History:   family history includes Alzheimer's disease in her mother; Hypertension in her brother; No Known Problems in her father, maternal grandfather, maternal grandmother, paternal grandfather, paternal grandmother, and sister; Parkinsonism in her brother. Otherwise pertinent FHx was reviewed and unremarkable.     Social History:  reports that she quit smoking about 10 months ago. Her smoking use included cigarettes. She smoked 0.25 packs per day. She has never used smokeless tobacco. She reports that she does not drink alcohol and does not use drugs.  Social History     Social History Narrative    Caffeine: 8 oz        Medications:  Available home medication information reviewed.  Medications Prior to Admission   Medication Sig Dispense Refill Last Dose   • albuterol sulfate  (90 Base) MCG/ACT inhaler Inhale 2 puffs Every 4 (Four) Hours As Needed for Wheezing. Patient taking: Inhale 2 puffs by mouth every 4-6 hours      • ALPRAZolam (XANAX) 0.5 MG tablet Take 1 tablet by mouth At Night As Needed for Anxiety or Sleep. 3 tablet 0    • apixaban (ELIQUIS) 2.5 MG tablet tablet Take 1 tablet by mouth Every 12 (Twelve) Hours. Indications: Atrial Fibrillation 60 tablet     • ascorbic acid (VITAMIN C) 1000 MG tablet Take 1 tablet by mouth Daily. (Patient taking differently: Take 1,000 mg by mouth Daily. OTC)      • bisoprolol (ZEBeta) 10 MG tablet Take 1 tablet by mouth Daily.      • Budeson-Glycopyrrol-Formoterol (BREZTRI) 160-9-4.8 MCG/ACT aerosol inhaler Inhale 2 puffs 2 (Two) Times a Day.      • dilTIAZem CD (CARDIZEM CD) 180 MG 24 hr capsule Take 180 mg by mouth Daily.      • ferrous sulfate 325 (65 FE) MG tablet Take 1 tablet by mouth  2 (Two) Times a Day With Meals.      • HYDROcodone-acetaminophen (NORCO) 7.5-325 MG per tablet Take 1 tablet by mouth Every 6 (Six) Hours As Needed for Moderate Pain . Patient taking: once a day prn 12 tablet 0    • levothyroxine (SYNTHROID, LEVOTHROID) 75 MCG tablet Take 1 tablet by mouth Every Morning. 30 tablet 5    • multivitamin with minerals (CENTRUM SILVER ADULT 50+ PO) Take 1 tablet by mouth Daily. OTC      • Omega-3 Fatty Acids (fish oil) 1000 MG capsule capsule Take 1,000 mg by mouth Daily With Breakfast. OTC      • pantoprazole (PROTONIX) 40 MG EC tablet Take 1 tablet by mouth 2 (Two) Times a Day Before Meals.      • sertraline (ZOLOFT) 100 MG tablet Take 150 mg by mouth Daily. 1.5 tab daily      • torsemide (DEMADEX) 20 MG tablet Take 2 tablets by mouth Daily. 30 tablet 5    • Vitamin D, Ergocalciferol, 50 MCG (2000 UT) capsule Take 1 tablet by mouth Daily. OTC          Allergies   Allergen Reactions   • Dicloxacillin Shortness Of Breath   • Amiodarone Unknown - High Severity   • Propoxyphene Unknown - Low Severity       Objective   Objective     Vital Signs:   Temp:  [98 °F (36.7 °C)] 98 °F (36.7 °C)  Heart Rate:  [108-172] 115  Resp:  [24] 24  BP: (102-185)/(61-79) 102/61       Physical Exam   Gen; alert, oriented, sitting up in bed, nad, speaks easily in full sentences  Heent; perrla, eomi, mmm  Cv; irr, no mrg  L; dec bs's bll, fair air exchange, faint crackles, no tachypnea  Abd; soft, +bs, ntnd, no r/g  Ext; 2+pitting edema, no cc  Skin; cdi, warm  Neuro; grossly intact  Psych; mood and affect appropriate      Result Review:  I have personally reviewed the results from the time of this admission to 3/1/2022 03:35 EST and agree with these findings:  [x]  Laboratory  []  Microbiology  [x]  Radiology  [x]  EKG/Telemetry   []  Cardiology/Vascular   []  Pathology  []  Old records  []  Other:  Most notable findings include:    Trop 0.051-->0.011  bnp 7631  glc 269  Ph 7.176-->7.4  hgb 8.1 (previously  9.2)  Wbc 17.78    LAB RESULTS:      Lab 03/01/22  0058 02/28/22  2240 02/28/22  2152   WBC  --   --  17.78*   HEMOGLOBIN  --   --  8.1*   HEMATOCRIT  --   --  28.0*   PLATELETS  --   --  476*   NEUTROS ABS  --   --  10.48*   IMMATURE GRANS (ABS)  --   --  0.09*   LYMPHS ABS  --   --  5.17*   MONOS ABS  --   --  1.45*   EOS ABS  --   --  0.46*   MCV  --   --  108.9*   PROCALCITONIN  --  0.06  --    LACTATE 1.4  --   --          Lab 02/28/22 2245 02/28/22 2240   SODIUM  --  139   POTASSIUM  --  4.2   CHLORIDE  --  99   CO2  --  28.0   ANION GAP  --  12.0   BUN  --  22   CREATININE 1.00 0.91   EGFR  --  63.1   GLUCOSE  --  269*   CALCIUM  --  9.0         Lab 02/28/22 2240   TOTAL PROTEIN 7.0   ALBUMIN 3.90   GLOBULIN 3.1   ALT (SGPT) 14   AST (SGOT) 27   BILIRUBIN 0.3   ALK PHOS 130*         Lab 03/01/22  0058 02/28/22  2240   PROBNP 8,352.0* 7,631.0*   TROPONIN T 0.011 0.051*                 Lab 02/28/22  2342 02/28/22  2153   PH, ARTERIAL 7.400 7.176*   PCO2, ARTERIAL 47.4* 74.2*   PO2 ART 94.4 438.0*   FIO2 40 100   HCO3 ART 29.4* 27.4*   BASE EXCESS ART 4.1* -1.5*   CARBOXYHEMOGLOBIN 2.4* 1.9     UA    Urinalysis 10/4/21 2/10/22 2/10/22 3/1/22     0436 0436    Squamous Epithelial Cells, UA   None Seen    Specific [place], UA 1.011 1.009  1.013   Ketones, UA Negative Negative  Negative   Blood, UA Negative Negative  Negative   Leukocytes, UA Negative Trace (A)  Negative   Nitrite, UA Negative Negative  Negative   RBC, UA   0-2    WBC, UA   Too Numerous to Count (A)    Bacteria, UA   None Seen    (A) Abnormal value              Microbiology Results (last 10 days)     Procedure Component Value - Date/Time    Respiratory Panel PCR w/COVID-19(SARS-CoV-2) CAS/SHALINI/KRISTY/PAD/COR/MAD/HAJA In-House, NP Swab in UTM/VTM, 3-4 HR TAT - Swab, Nasopharynx [[id number]]  (Normal) Collected: 03/01/22 0049    Lab Status: Final result Specimen: Swab from Nasopharynx Updated: 03/01/22 0203     ADENOVIRUS, PCR Not Detected      Coronavirus 229E Not Detected     Coronavirus HKU1 Not Detected     Coronavirus NL63 Not Detected     Coronavirus OC43 Not Detected     COVID19 Not Detected     Human Metapneumovirus Not Detected     Human Rhinovirus/Enterovirus Not Detected     Influenza A PCR Not Detected     Influenza B PCR Not Detected     Parainfluenza Virus 1 Not Detected     Parainfluenza Virus 2 Not Detected     Parainfluenza Virus 3 Not Detected     Parainfluenza Virus 4 Not Detected     RSV, PCR Not Detected     Bordetella pertussis pcr Not Detected     Bordetella parapertussis PCR Not Detected     Chlamydophila pneumoniae PCR Not Detected     Mycoplasma pneumo by PCR Not Detected    Narrative:      In the setting of a positive respiratory panel with a viral infection PLUS a negative procalcitonin without other underlying concern for bacterial infection, consider observing off antibiotics or discontinuation of antibiotics and continue supportive care. If the respiratory panel is positive for atypical bacterial infection (Bordetella pertussis, Chlamydophila pneumoniae, or Mycoplasma pneumoniae), consider antibiotic de-escalation to target atypical bacterial infection.          XR Chest 1 View    Result Date: 2/28/2022  EXAMINATION: XR CHEST 1 VW-  INDICATION: SOA triage protocol  COMPARISON: 2/10/2022  FINDINGS: Heart is upper normal size. Vasculature is cephalized and there is a diffuse pulmonary edema pattern present. Effusions are increased from the prior study, small on the left, small to moderate on the right. No pneumothorax is seen.       Impression: Congestive heart failure with bilateral pleural effusions.    This report was finalized on 2/28/2022 10:51 PM by Dr. Augie Leiva MD.      CT Angiogram Chest    Result Date: 3/1/2022  Exam: CT Angiography of the Chest. Date: 3/1/2022. Comparison: None History: Respiratory failure. Technique: CT examination of the chest was performed following the intravenous administration of 75 mL of  Isovue-370. Sagittal, coronal and 3-D reformatted images were provided. CT dose lowering techniques were used, to include: automated exposure control, adjustment for patient size, and/or use of iterative reconstruction. FINDINGS: Mediastinum and Vicky: There is no axillary, mediastinal or hilar lymphadenopathy. Pleural and Pericardial spaces: There are are moderate to large bilateral pleural effusions. Upper Abdomen: The visualized upper abdomen is unremarkable. Cardiovascular: There is moderate vascular calcification throughout the thoracic aorta without evidence of aneurysmal dilation or dissection. Significant mitral valvular calcification is seen. Moderate patchy coronary artery calcifications. Pulmonary Artery: There are no filling defects in the pulmonary arteries. Lung Parenchyma and Airways: There is mild to moderate diffuse centrilobular emphysema. Compressive atelectasis of the lower lobes is also noted. Bones: Multilevel degenerative disc changes are seen throughout the spine. Mild compression deformity of the T10 and T7 vertebral bodies are likely subacute chronic. Bones are diffusely demineralized.     Impression: 1. No evidence of pulmonary embolism. 2. No evidence of thoracic aortic aneurysm or dissection. 3. Moderate bilateral pleural effusions. 4. Emphysema. Electronically signed by:  Martin De La Torre D.O.  2/28/2022 10:48 PM Mountain Time      Results for orders placed during the hospital encounter of 02/07/22    Adult Transthoracic Echo Complete W/ Cont if Necessary Per Protocol    Interpretation Summary  · Estimated left ventricular EF = 60%  · Left ventricular wall thickness is consistent with moderate concentric hypertrophy.  · Mild aortic valve stenosis is present.  · Moderate to severe mitral valve regurgitation is present.  · Moderate tricuspid valve regurgitation is present.  · Estimated right ventricular systolic pressure from tricuspid regurgitation is markedly elevated (>55  mmHg).      Assessment/Plan   Assessment & Plan     Active Hospital Problems    Diagnosis  POA   • **Flash pulmonary edema (HCC) [J81.0]  Yes   • Bilateral pleural effusion [J90]  Yes   • Elevated troponin [R77.8]  Yes   • Leukocytosis [D72.829]  Yes   • Atrial fibrillation with rapid ventricular response (HCC) [I48.91]  Yes   • Acute on chronic respiratory failure with hypoxia and hypercapnia (HCC) [J96.21, J96.22]  Yes   • Hypothyroidism (acquired) [E03.9]  Yes   • Mixed hyperlipidemia [E78.2]  Yes   • Paroxysmal atrial fibrillation (HCC) [I48.0]  Yes     Class: Acute     · Diagnosed 3/2021  · CHADSVASC- 4, on Eliquis  · 5/21/21, s/p ECV but only maintained NSR for 10-15 seconds. Subsequently started on amiodarone.   · Returned on 6/18/21 for repeat ECV, in sinus rhythm.   · 7/21 amiodarone discontinued after admission and concerns for possible pulmonary toxicity.  · Echo 7-20-21: LAE 4.4 cm     • Rheumatoid arthritis (HCC) [M06.9]  Yes   • Primary hypertension [I10]  Yes   • COPD on home oxygen (CMS/HCC) [J44.9]  Yes   • Anxiety and depression [F41.9, F32.A]  Yes   • Anemia [D64.9]  Yes     82 y/o female here w/   1. Acute hypoxic respiratory failure w/ probable flash pulmonary edema, bilateral pleural effusions;  -pt has responded very well to BIPAP and lasix and is now back on home O2  -can follow imaging to assess need for repeat thoracentesis  -repeat lasix dose in a.m.  2. Afib w/ rvr  -likely contributing to above  -on cardizem gtt however BP not tolerating well  -pt was discharged home on low dose eliquis per cardiology for afib and for dvt ppx following femoral neck fx however pt is not taking; she is also anemic so will check hemoccult however pt states she does not want to be on anticoagulation again  3. Anemia, acute on chronic  -pt w/ post op anemia which is somewhat worse this admission but ? Dilutional  -does not appear that pt ever restarted eliquis and states she does not want to go back on  it  -check stools; type/screen  4. Elevated troponin;  -has resolved now and likely a secondary event due to above  5. COPD, oxygen dependent  -now at baseline    DVT prophylaxis:   Pending hemoccult; does not want long term anticoagulation      CODE STATUS:    Code Status and Medical Interventions:   Ordered at: 03/01/22 0134     Code Status (Patient has no pulse and is not breathing):    CPR (Attempt to Resuscitate)     Medical Interventions (Patient has pulse or is breathing):    Full Support     Electronically signed by Hyacinth Vega MD, 03/01/22, 4:11 AM EST.  INPATIENT status due to acute hypoxic resp failure, elev trop.  I feel patient’s risk for adverse outcomes and need for care warrant INPATIENT evaluation and I predict the patient’s care encounter to likely last beyond 2 midnights.      Hyacinth Vega MD  03/01/22

## 2022-03-01 NOTE — ED PROVIDER NOTES
EMERGENCY DEPARTMENT ENCOUNTER    Pt Name: Yin Law  MRN: [id number]  Pt :   1940  Room Number:    Date of encounter:  2022  PCP: Santiago Chaudhry MD  ED Provider: Sammy Colby MD    Historian: Paramedics and patient to a much lesser extent      HPI:  Chief Complaint: Shortness of breath        Context: Yin Law is a 81 y.o. female who presents to the ED c/o sudden onset of shortness of breath 45 minutes prior to calling 911.  Paramedics found the patient quite dyspneic.  They transported the patient with high flow oxygen.  The patient is incredibly dyspneic and unable to give any history about onset or symptoms prior to onset.      PAST MEDICAL HISTORY  Past Medical History:   Diagnosis Date   • Accelerated hypertension 2021   • Acute diastolic CHF (CMS/HCC) 2021   • Anxiety and depression    • Arrhythmia     A-FIB   • Asthma    • Chicken pox    • COPD (chronic obstructive pulmonary disease) (HCC)    • Elevated troponin 2021   • Hyperlipidemia    • Hypertension    • Measles    • Menopause    • Multifocal pneumonia 2021   • Osteoporosis    • Pleural effusion, bilateral 2021   • Pneumonia due to infectious organism 2021   • Rheumatoid arthritis (HCC)    • Rheumatoid arthritis (HCC)    • Sepsis (HCC) 2021   • Tobacco abuse          PAST SURGICAL HISTORY  Past Surgical History:   Procedure Laterality Date   • APPENDECTOMY     • CARPAL TUNNEL RELEASE Left    • CATARACT EXTRACTION, BILATERAL     • COLON SURGERY     • COLONOSCOPY     • GALLBLADDER SURGERY     • HIP HEMIARTHROPLASTY Left 2022    Procedure: HIP HEMIARTHROPLASTY LEFT;  Surgeon: Napoleon Powell Jr., MD;  Location: [facility];  Service: Orthopedics;  Laterality: Left;   • HYSTERECTOMY     • TONSILLECTOMY           FAMILY HISTORY  Family History   Problem Relation Age of Onset   • Alzheimer's disease Mother    • No Known Problems Father    • No Known Problems Sister    •  Hypertension Brother    • Parkinsonism Brother    • No Known Problems Maternal Grandmother    • No Known Problems Maternal Grandfather    • No Known Problems Paternal Grandmother    • No Known Problems Paternal Grandfather          SOCIAL HISTORY  Social History     Socioeconomic History   • Marital status:    Tobacco Use   • Smoking status: Former Smoker     Packs/day: 0.25     Types: Cigarettes     Quit date: 2021     Years since quittin.8   • Smokeless tobacco: Never Used   • Tobacco comment: 1-2 cigs occas   Vaping Use   • Vaping Use: Never used   Substance and Sexual Activity   • Alcohol use: Never   • Drug use: Never   • Sexual activity: Defer         ALLERGIES  Dicloxacillin, Amiodarone, and Propoxyphene        REVIEW OF SYSTEMS  Review of Systems       Unable secondary to extreme dyspnea.      PHYSICAL EXAM    I have reviewed the triage vital signs and nursing notes.    ED Triage Vitals   Temp Heart Rate Resp BP SpO2   22   98 °F (36.7 °C) 108 24 (!) 185/78 94 %      Temp src Heart Rate Source Patient Position BP Location FiO2 (%)   22 -- 22 --   Oral  Lying Right arm        Physical Exam  GENERAL:   Appears extremely short of breath with oxygen saturations in the 60s and 70s on high flow O2 via nonrebreather mask.  Applied tape to close off the exhaust valve but oxygen saturations remained low.  HENT: Nares patent.  EYES: No scleral icterus.  CV: Regular rhythm, tachycardic rate.  RESPIRATORY: Accessory muscle use with tachypneic respirations at 30 breaths/min.  Patient moves relatively little air and coarse Rales are noted bilaterally.  ABDOMEN: Soft, nontender  MUSCULOSKELETAL: No deformities.   NEURO: Awake and following commands but anxious and not able to answer questions adequately..  SKIN: Warm, dry, no rash visualized.        LAB RESULTS  Recent Results (from the past 24 hour(s))    ECG 12 Lead    Collection Time: 02/28/22  9:51 PM   Result Value Ref Range    QT Interval 302 ms    QTC Interval 485 ms   Lavender Top    Collection Time: 02/28/22  9:52 PM   Result Value Ref Range    Extra Tube hold for add-on    Gold Top - SST    Collection Time: 02/28/22  9:52 PM   Result Value Ref Range    Extra Tube Hold for add-ons.    Light Blue Top    Collection Time: 02/28/22  9:52 PM   Result Value Ref Range    Extra Tube hold for add-on    CBC Auto Differential    Collection Time: 02/28/22  9:52 PM    Specimen: Blood   Result Value Ref Range    WBC 17.78 (H) 3.40 - 10.80 10*3/mm3    RBC 2.57 (L) 3.77 - 5.28 10*6/mm3    Hemoglobin 8.1 (L) 12.0 - 15.9 g/dL    Hematocrit 28.0 (L) 34.0 - 46.6 %    .9 (H) 79.0 - 97.0 fL    MCH 31.5 26.6 - 33.0 pg    MCHC 28.9 (L) 31.5 - 35.7 g/dL    RDW 17.5 (H) 12.3 - 15.4 %    RDW-SD 69.9 (H) 37.0 - 54.0 fl    MPV 10.7 6.0 - 12.0 fL    Platelets 476 (H) 140 - 450 10*3/mm3    Neutrophil % 58.9 42.7 - 76.0 %    Lymphocyte % 29.1 19.6 - 45.3 %    Monocyte % 8.2 5.0 - 12.0 %    Eosinophil % 2.6 0.3 - 6.2 %    Basophil % 0.7 0.0 - 1.5 %    Immature Grans % 0.5 0.0 - 0.5 %    Neutrophils, Absolute 10.48 (H) 1.70 - 7.00 10*3/mm3    Lymphocytes, Absolute 5.17 (H) 0.70 - 3.10 10*3/mm3    Monocytes, Absolute 1.45 (H) 0.10 - 0.90 10*3/mm3    Eosinophils, Absolute 0.46 (H) 0.00 - 0.40 10*3/mm3    Basophils, Absolute 0.13 0.00 - 0.20 10*3/mm3    Immature Grans, Absolute 0.09 (H) 0.00 - 0.05 10*3/mm3    nRBC 0.0 0.0 - 0.2 /100 WBC   Blood Gas, Arterial With Co-Ox    Collection Time: 02/28/22  9:53 PM    Specimen: Arterial Blood   Result Value Ref Range    Site Right Radial     Darrell's Test N/A     pH, Arterial 7.176 (C) 7.350 - 7.450 pH units    pCO2, Arterial 74.2 (C) 35.0 - 45.0 mm Hg    pO2, Arterial 438.0 (H) 83.0 - 108.0 mm Hg    HCO3, Arterial 27.4 (H) 20.0 - 26.0 mmol/L    Base Excess, Arterial -1.5 (L) 0.0 - 2.0 mmol/L    Hemoglobin, Blood Gas 8.2 (L) 14 - 18 g/dL     Hematocrit, Blood Gas 25.1 (L) 38.0 - 51.0 %    Oxyhemoglobin 99.1 (H) 94 - 99 %    Methemoglobin 0.00 0.00 - 1.50 %    Carboxyhemoglobin 1.9 0 - 2 %    CO2 Content 29.7 22 - 33 mmol/L    Temperature 37.0 C    Barometric Pressure for Blood Gas      Modality BiPap     FIO2 100 %    Rate 0 Breaths/minute    PIP 0 cmH2O    IPAP 12     EPAP 8     Note      Notified Who JANETH ACUNA MD     Notified By [id number]     Notified Time 02/28/2022 21:57     pH, Temp Corrected 7.176 pH Units    pCO2, Temperature Corrected 74.2 (H) 35 - 45 mm Hg    pO2, Temperature Corrected 438 (H) 83 - 108 mm Hg   Comprehensive Metabolic Panel    Collection Time: 02/28/22 10:40 PM    Specimen: Blood   Result Value Ref Range    Glucose 269 (H) 65 - 99 mg/dL    BUN 22 8 - 23 mg/dL    Creatinine 0.91 0.57 - 1.00 mg/dL    Sodium 139 136 - 145 mmol/L    Potassium 4.2 3.5 - 5.2 mmol/L    Chloride 99 98 - 107 mmol/L    CO2 28.0 22.0 - 29.0 mmol/L    Calcium 9.0 8.6 - 10.5 mg/dL    Total Protein 7.0 6.0 - 8.5 g/dL    Albumin 3.90 3.50 - 5.20 g/dL    ALT (SGPT) 14 1 - 33 U/L    AST (SGOT) 27 1 - 32 U/L    Alkaline Phosphatase 130 (H) 39 - 117 U/L    Total Bilirubin 0.3 0.0 - 1.2 mg/dL    Globulin 3.1 gm/dL    A/G Ratio 1.3 g/dL    BUN/Creatinine Ratio 24.2 7.0 - 25.0    Anion Gap 12.0 5.0 - 15.0 mmol/L    eGFR 63.1 >60.0 mL/min/1.73   BNP    Collection Time: 02/28/22 10:40 PM    Specimen: Blood   Result Value Ref Range    proBNP 7,631.0 (H) 0.0-1,800.0 pg/mL   Troponin    Collection Time: 02/28/22 10:40 PM    Specimen: Blood   Result Value Ref Range    Troponin T 0.051 (C) 0.000 - 0.030 ng/mL   Green Top (Gel)    Collection Time: 02/28/22 10:40 PM   Result Value Ref Range    Extra Tube Hold for add-ons.    POC Creatinine    Collection Time: 02/28/22 10:45 PM    Specimen: Blood   Result Value Ref Range    Creatinine 1.00 0.60 - 1.30 mg/dL   Blood Gas, Arterial With Co-Ox    Collection Time: 02/28/22 11:42 PM    Specimen: Arterial Blood   Result Value Ref Range     Site Right Radial     Darrell's Test N/A     pH, Arterial 7.400 7.350 - 7.450 pH units    pCO2, Arterial 47.4 (H) 35.0 - 45.0 mm Hg    pO2, Arterial 94.4 83.0 - 108.0 mm Hg    HCO3, Arterial 29.4 (H) 20.0 - 26.0 mmol/L    Base Excess, Arterial 4.1 (H) 0.0 - 2.0 mmol/L    Hemoglobin, Blood Gas 7.3 (L) 14 - 18 g/dL    Hematocrit, Blood Gas 22.2 (L) 38.0 - 51.0 %    Oxyhemoglobin 96.4 94 - 99 %    Methemoglobin 0.00 0.00 - 1.50 %    Carboxyhemoglobin 2.4 (H) 0 - 2 %    CO2 Content 30.8 22 - 33 mmol/L    Temperature 37.0 C    Barometric Pressure for Blood Gas      Modality BiPap     FIO2 40 %    Rate 0 Breaths/minute    PIP 0 cmH2O    IPAP 16     EPAP 6     Note      pH, Temp Corrected 7.400 pH Units    pCO2, Temperature Corrected 47.4 (H) 35 - 45 mm Hg    pO2, Temperature Corrected 94.4 83 - 108 mm Hg       If labs were ordered, I independently reviewed the results.        RADIOLOGY  XR Chest 1 View    Result Date: 2/28/2022  EXAMINATION: XR CHEST 1 VW-  INDICATION: SOA triage protocol  COMPARISON: 2/10/2022  FINDINGS: Heart is upper normal size. Vasculature is cephalized and there is a diffuse pulmonary edema pattern present. Effusions are increased from the prior study, small on the left, small to moderate on the right. No pneumothorax is seen.       Congestive heart failure with bilateral pleural effusions.    This report was finalized on 2/28/2022 10:51 PM by Dr. Augie Leiva MD.      CT Angiogram Chest    Result Date: 3/1/2022  Exam: CT Angiography of the Chest. Date: 3/1/2022. Comparison: None History: Respiratory failure. Technique: CT examination of the chest was performed following the intravenous administration of 75 mL of Isovue-370. Sagittal, coronal and 3-D reformatted images were provided. CT dose lowering techniques were used, to include: automated exposure control, adjustment for patient size, and/or use of iterative reconstruction. FINDINGS: Mediastinum and Vicky: There is no axillary, mediastinal or  hilar lymphadenopathy. Pleural and Pericardial spaces: There are are moderate to large bilateral pleural effusions. Upper Abdomen: The visualized upper abdomen is unremarkable. Cardiovascular: There is moderate vascular calcification throughout the thoracic aorta without evidence of aneurysmal dilation or dissection. Significant mitral valvular calcification is seen. Moderate patchy coronary artery calcifications. Pulmonary Artery: There are no filling defects in the pulmonary arteries. Lung Parenchyma and Airways: There is mild to moderate diffuse centrilobular emphysema. Compressive atelectasis of the lower lobes is also noted. Bones: Multilevel degenerative disc changes are seen throughout the spine. Mild compression deformity of the T10 and T7 vertebral bodies are likely subacute chronic. Bones are diffusely demineralized.     1. No evidence of pulmonary embolism. 2. No evidence of thoracic aortic aneurysm or dissection. 3. Moderate bilateral pleural effusions. 4. Emphysema. Electronically signed by:  Martin De La Torre D.O.  2/28/2022 10:48 PM Mountain Time      I ordered and reviewed the above noted radiographic studies.      I viewed images of chest x-ray which showed pulmonary edema with pleural effusions per my independent interpretation.    See radiologist's dictation for official interpretation.        PROCEDURES    Critical Care  Performed by: Sammy Colby MD  Authorized by: Sammy Colby MD     Critical care provider statement:     Critical care time (minutes):  35    Critical care time was exclusive of:  Separately billable procedures and treating other patients    Critical care was necessary to treat or prevent imminent or life-threatening deterioration of the following conditions:  Respiratory failure and circulatory failure    Critical care was time spent personally by me on the following activities:  Ordering and performing treatments and interventions, ordering and review of laboratory  studies, ordering and review of radiographic studies, pulse oximetry, re-evaluation of patient's condition, review of old charts, obtaining history from patient or surrogate, examination of patient, evaluation of patient's response to treatment, discussions with consultants and development of treatment plan with patient or surrogate        ECG 12 Lead         ECG 12 Lead   Final Result   Test Reason : SOB   Blood Pressure :   */*   mmHG   Vent. Rate : 155 BPM     Atrial Rate : 153 BPM      P-R Int :   * ms          QRS Dur :  90 ms       QT Int : 302 ms       P-R-T Axes :   * 104 -72 degrees      QTc Int : 485 ms      Atrial fibrillation with rapid ventricular response   Rightward axis   ST & T wave abnormality, consider inferior ischemia   Abnormal ECG   Confirmed by GLADYS WEST MD ([id number]) on 2/28/2022 10:01:35 PM      Referred By: EDMD           Confirmed By: GLADYS WEST MD          MEDICATIONS GIVEN IN ER    Medications   nitroglycerin (TRIDIL) 200 mcg/ml infusion (0 mcg/min Intravenous Stopped 2/28/22 2248)   sodium chloride 0.9 % flush 10 mL (has no administration in time range)   dilTIAZem (CARDIZEM) 125 mg in 125 mL 0.7% sodium chloride  infusion (5 mg/hr Intravenous Restarted 3/1/22 0033)   furosemide (LASIX) injection 80 mg (80 mg Intravenous Given 2/28/22 2155)   dilTIAZem (CARDIZEM) injection 10 mg (10 mg Intravenous Given 2/28/22 2242)   iopamidol (ISOVUE-370) 76 % injection 100 mL (75 mL Intravenous Given 3/1/22 0020)         PROGRESS, DATA ANALYSIS, CONSULTS, AND MEDICAL DECISION MAKING    All labs have been independently reviewed by me.  All radiology studies have been reviewed by me and the radiologist dictating the report.   EKG's have been independently viewed and interpreted by me.      Differential diagnoses: Flash pulmonary edema versus pneumonia versus ACS, etc.      ED Course as of 03/01/22 0103   Mon Feb 28, 2022 2157 I performed bag-valve-mask ventilations on the patient given her  extreme dyspnea, hypoventilation.  After hyperventilation I had respiratory therapy applied BiPAP.  ABG shows that we have corrected her hypoxia but her PCO2 is still quite elevated and she is somewhat acidotic.  We'll continue to utilize BiPAP to correct gas.  I have ordered Lasix 80 mg IV along with nitroglycerin drip for possible flash pulmonary edema.  Chest x-ray is pending. [MS]   2337 Current vitals include blood pressure 110/63, heart rate 130 with atrial fibrillation on monitor, oxygen saturation 95% on BiPAP with respiratory rate 22 breaths/min.  Awaiting repeat ABG prior to disposition. [MS]   2358 Repeat ABG markedly improved.  Patient appears much improved.  She is trying to talk through the BiPAP mask.  CTA chest pending.  We will admit.  ICU versus telemetry. [MS]   Tue Mar 01, 2022   0036 The patient was sent for CTA chest and was able to tolerate lying supine while on nasal cannula oxygen.  Her oxygen saturations are remaining in the upper 90s and she is remaining alert.  We will hold off on returning her to BiPAP.  I believe that her acute decompensation was secondary to flash pulmonary edema and that is much improved at this point.  We are titrating Cardizem after a Cardizem bolus.  We are diuresing and I had a Lopez placed so to monitor urine output.  I had initially started nitroglycerin drip to help with the pulmonary edema but her blood pressure has not tolerated that well and it seems to have done its job early in the course of her care.  We have discontinued the nitroglycerin drip. [MS]   0040 I spoke with Dr. Douglas who will admit to the hospitalist service.  I have ordered a second EKG and second troponin. [MS]      ED Course User Index  [MS] Sammy Colby MD             AS OF 01:03 EST VITALS:    BP - 110/79  HR - (!) 144  TEMP - 98 °F (36.7 °C) (Oral)  O2 SATS - 94%                  DIAGNOSIS  Final diagnoses:   Flash pulmonary edema (HCC)   Acute respiratory failure with hypoxia and  hypercapnia (HCC)   Acute on chronic congestive heart failure, unspecified heart failure type (HCC)         DISPOSITION  Admission           Sammy Colby MD  03/01/22 0106

## 2022-03-01 NOTE — PLAN OF CARE
Goal Outcome Evaluation:  Plan of Care Reviewed With: patient        Progress: no change  Outcome Summary: OT eval completed Pt presents with deficits in activity tolerance, strength, and balance compared to baseline ADL completion, HR elevated throughout and desaturation with mild exertion to 86% requiring extended time, Jayson x2 STS and sidesteps, CGA overall bed mob, depA LBD, recom IPOT d/c IRF pending progress

## 2022-03-02 ENCOUNTER — APPOINTMENT (OUTPATIENT)
Dept: GENERAL RADIOLOGY | Facility: HOSPITAL | Age: 82
End: 2022-03-02

## 2022-03-02 ENCOUNTER — ANESTHESIA EVENT (OUTPATIENT)
Dept: GASTROENTEROLOGY | Facility: HOSPITAL | Age: 82
End: 2022-03-02

## 2022-03-02 ENCOUNTER — ANESTHESIA (OUTPATIENT)
Dept: GASTROENTEROLOGY | Facility: HOSPITAL | Age: 82
End: 2022-03-02

## 2022-03-02 LAB
ANION GAP SERPL CALCULATED.3IONS-SCNC: 10 MMOL/L (ref 5–15)
ARTERIAL PATENCY WRIST A: ABNORMAL
ATMOSPHERIC PRESS: ABNORMAL MM[HG]
BASE EXCESS BLDA CALC-SCNC: 3.8 MMOL/L (ref 0–2)
BASOPHILS # BLD AUTO: 0.1 10*3/MM3 (ref 0–0.2)
BASOPHILS NFR BLD AUTO: 0.6 % (ref 0–1.5)
BDY SITE: ABNORMAL
BH BB BLOOD EXPIRATION DATE: NORMAL
BH BB BLOOD TYPE BARCODE: 9500
BH BB DISPENSE STATUS: NORMAL
BH BB PRODUCT CODE: NORMAL
BH BB UNIT NUMBER: NORMAL
BODY TEMPERATURE: 37 C
BUN SERPL-MCNC: 16 MG/DL (ref 8–23)
BUN/CREAT SERPL: 21.6 (ref 7–25)
CALCIUM SPEC-SCNC: 8.9 MG/DL (ref 8.6–10.5)
CHLORIDE SERPL-SCNC: 99 MMOL/L (ref 98–107)
CO2 BLDA-SCNC: 33.5 MMOL/L (ref 22–33)
CO2 SERPL-SCNC: 30 MMOL/L (ref 22–29)
COHGB MFR BLD: 1.5 % (ref 0–2)
CREAT SERPL-MCNC: 0.74 MG/DL (ref 0.57–1)
CROSSMATCH INTERPRETATION: NORMAL
D-LACTATE SERPL-SCNC: 1.8 MMOL/L (ref 0.5–2)
DEPRECATED RDW RBC AUTO: 60 FL (ref 37–54)
DEPRECATED RDW RBC AUTO: 61.6 FL (ref 37–54)
EGFRCR SERPLBLD CKD-EPI 2021: 80.9 ML/MIN/1.73
EOSINOPHIL # BLD AUTO: 0.22 10*3/MM3 (ref 0–0.4)
EOSINOPHIL NFR BLD AUTO: 1.4 % (ref 0.3–6.2)
EPAP: 0
ERYTHROCYTE [DISTWIDTH] IN BLOOD BY AUTOMATED COUNT: 16.5 % (ref 12.3–15.4)
ERYTHROCYTE [DISTWIDTH] IN BLOOD BY AUTOMATED COUNT: 17 % (ref 12.3–15.4)
FOLATE BLD-MCNC: 615 NG/ML
FOLATE RBC-MCNC: 2770 NG/ML
GLUCOSE BLDC GLUCOMTR-MCNC: 157 MG/DL (ref 70–130)
GLUCOSE SERPL-MCNC: 97 MG/DL (ref 65–99)
HCO3 BLDA-SCNC: 31.5 MMOL/L (ref 20–26)
HCT VFR BLD AUTO: 22.2 % (ref 34–46.6)
HCT VFR BLD AUTO: 29.7 % (ref 34–46.6)
HCT VFR BLD AUTO: 34.4 % (ref 34–46.6)
HCT VFR BLD CALC: 33.3 % (ref 38–51)
HGB BLD-MCNC: 10.7 G/DL (ref 12–15.9)
HGB BLD-MCNC: 9.3 G/DL (ref 12–15.9)
HGB BLDA-MCNC: 10.9 G/DL (ref 14–18)
IMM GRANULOCYTES # BLD AUTO: 0.07 10*3/MM3 (ref 0–0.05)
IMM GRANULOCYTES NFR BLD AUTO: 0.5 % (ref 0–0.5)
INHALED O2 CONCENTRATION: 50 %
IPAP: 0
LYMPHOCYTES # BLD AUTO: 4.46 10*3/MM3 (ref 0.7–3.1)
LYMPHOCYTES NFR BLD AUTO: 28.7 % (ref 19.6–45.3)
MAGNESIUM SERPL-MCNC: 2.2 MG/DL (ref 1.6–2.4)
MCH RBC QN AUTO: 31 PG (ref 26.6–33)
MCH RBC QN AUTO: 31.2 PG (ref 26.6–33)
MCHC RBC AUTO-ENTMCNC: 31.1 G/DL (ref 31.5–35.7)
MCHC RBC AUTO-ENTMCNC: 31.3 G/DL (ref 31.5–35.7)
MCV RBC AUTO: 99.7 FL (ref 79–97)
MCV RBC AUTO: 99.7 FL (ref 79–97)
METHGB BLD QL: 0.5 % (ref 0–1.5)
MODALITY: ABNORMAL
MONOCYTES # BLD AUTO: 1.57 10*3/MM3 (ref 0.1–0.9)
MONOCYTES NFR BLD AUTO: 10.1 % (ref 5–12)
NEUTROPHILS NFR BLD AUTO: 58.7 % (ref 42.7–76)
NEUTROPHILS NFR BLD AUTO: 9.11 10*3/MM3 (ref 1.7–7)
NOTE: ABNORMAL
NRBC BLD AUTO-RTO: 0 /100 WBC (ref 0–0.2)
NT-PROBNP SERPL-MCNC: ABNORMAL PG/ML (ref 0–1800)
OXYHGB MFR BLDV: 97.6 % (ref 94–99)
PAW @ PEAK INSP FLOW SETTING VENT: 0 CMH2O
PCO2 BLDA: 63.6 MM HG (ref 35–45)
PCO2 TEMP ADJ BLD: 63.6 MM HG (ref 35–45)
PH BLDA: 7.3 PH UNITS (ref 7.35–7.45)
PH, TEMP CORRECTED: 7.3 PH UNITS
PLATELET # BLD AUTO: 243 10*3/MM3 (ref 140–450)
PLATELET # BLD AUTO: 416 10*3/MM3 (ref 140–450)
PMV BLD AUTO: 10.2 FL (ref 6–12)
PMV BLD AUTO: 10.7 FL (ref 6–12)
PO2 BLDA: 148 MM HG (ref 83–108)
PO2 TEMP ADJ BLD: 148 MM HG (ref 83–108)
POTASSIUM SERPL-SCNC: 3.5 MMOL/L (ref 3.5–5.2)
QT INTERVAL: 352 MS
QT INTERVAL: 372 MS
QTC INTERVAL: 429 MS
QTC INTERVAL: 519 MS
RBC # BLD AUTO: 2.98 10*6/MM3 (ref 3.77–5.28)
RBC # BLD AUTO: 3.45 10*6/MM3 (ref 3.77–5.28)
SODIUM SERPL-SCNC: 139 MMOL/L (ref 136–145)
TOTAL RATE: 0 BREATHS/MINUTE
UNIT  ABO: NORMAL
UNIT  RH: NORMAL
WBC NRBC COR # BLD: 15.53 10*3/MM3 (ref 3.4–10.8)
WBC NRBC COR # BLD: 9.6 10*3/MM3 (ref 3.4–10.8)

## 2022-03-02 PROCEDURE — 94660 CPAP INITIATION&MGMT: CPT

## 2022-03-02 PROCEDURE — 94799 UNLISTED PULMONARY SVC/PX: CPT

## 2022-03-02 PROCEDURE — 83735 ASSAY OF MAGNESIUM: CPT | Performed by: NURSE PRACTITIONER

## 2022-03-02 PROCEDURE — 83880 ASSAY OF NATRIURETIC PEPTIDE: CPT | Performed by: PHYSICIAN ASSISTANT

## 2022-03-02 PROCEDURE — 82962 GLUCOSE BLOOD TEST: CPT

## 2022-03-02 PROCEDURE — 93005 ELECTROCARDIOGRAM TRACING: CPT | Performed by: INTERNAL MEDICINE

## 2022-03-02 PROCEDURE — 83605 ASSAY OF LACTIC ACID: CPT | Performed by: PHYSICIAN ASSISTANT

## 2022-03-02 PROCEDURE — 99233 SBSQ HOSP IP/OBS HIGH 50: CPT | Performed by: INTERNAL MEDICINE

## 2022-03-02 PROCEDURE — 93010 ELECTROCARDIOGRAM REPORT: CPT | Performed by: INTERNAL MEDICINE

## 2022-03-02 PROCEDURE — 84484 ASSAY OF TROPONIN QUANT: CPT | Performed by: PHYSICIAN ASSISTANT

## 2022-03-02 PROCEDURE — 0 LIDOCAINE 1 % SOLUTION: Performed by: NURSE ANESTHETIST, CERTIFIED REGISTERED

## 2022-03-02 PROCEDURE — 0W3P8ZZ CONTROL BLEEDING IN GASTROINTESTINAL TRACT, VIA NATURAL OR ARTIFICIAL OPENING ENDOSCOPIC: ICD-10-PCS | Performed by: INTERNAL MEDICINE

## 2022-03-02 PROCEDURE — 82375 ASSAY CARBOXYHB QUANT: CPT

## 2022-03-02 PROCEDURE — 43255 EGD CONTROL BLEEDING ANY: CPT | Performed by: INTERNAL MEDICINE

## 2022-03-02 PROCEDURE — 36600 WITHDRAWAL OF ARTERIAL BLOOD: CPT

## 2022-03-02 PROCEDURE — 99232 SBSQ HOSP IP/OBS MODERATE 35: CPT | Performed by: INTERNAL MEDICINE

## 2022-03-02 PROCEDURE — 71045 X-RAY EXAM CHEST 1 VIEW: CPT

## 2022-03-02 PROCEDURE — 82805 BLOOD GASES W/O2 SATURATION: CPT

## 2022-03-02 PROCEDURE — 97530 THERAPEUTIC ACTIVITIES: CPT

## 2022-03-02 PROCEDURE — 84145 PROCALCITONIN (PCT): CPT | Performed by: PHYSICIAN ASSISTANT

## 2022-03-02 PROCEDURE — 85025 COMPLETE CBC W/AUTO DIFF WBC: CPT | Performed by: PHYSICIAN ASSISTANT

## 2022-03-02 PROCEDURE — 85027 COMPLETE CBC AUTOMATED: CPT | Performed by: PHYSICIAN ASSISTANT

## 2022-03-02 PROCEDURE — 80053 COMPREHEN METABOLIC PANEL: CPT | Performed by: NURSE PRACTITIONER

## 2022-03-02 PROCEDURE — 97110 THERAPEUTIC EXERCISES: CPT

## 2022-03-02 PROCEDURE — C1889 IMPLANT/INSERT DEVICE, NOC: HCPCS | Performed by: INTERNAL MEDICINE

## 2022-03-02 PROCEDURE — 83050 HGB METHEMOGLOBIN QUAN: CPT

## 2022-03-02 PROCEDURE — 25010000002 PROPOFOL 10 MG/ML EMULSION: Performed by: NURSE ANESTHETIST, CERTIFIED REGISTERED

## 2022-03-02 PROCEDURE — 93005 ELECTROCARDIOGRAM TRACING: CPT | Performed by: NURSE PRACTITIONER

## 2022-03-02 DEVICE — DEV CLIP ENDO RESOLUTION360 CONTRL ROT 235CM: Type: IMPLANTABLE DEVICE | Site: PYLORUS | Status: FUNCTIONAL

## 2022-03-02 RX ORDER — PROPOFOL 10 MG/ML
VIAL (ML) INTRAVENOUS AS NEEDED
Status: DISCONTINUED | OUTPATIENT
Start: 2022-03-02 | End: 2022-03-02 | Stop reason: SURG

## 2022-03-02 RX ORDER — BUMETANIDE 0.25 MG/ML
0.5 INJECTION INTRAMUSCULAR; INTRAVENOUS ONCE
Status: COMPLETED | OUTPATIENT
Start: 2022-03-03 | End: 2022-03-02

## 2022-03-02 RX ORDER — FUROSEMIDE 10 MG/ML
40 INJECTION INTRAMUSCULAR; INTRAVENOUS ONCE
Status: CANCELLED | OUTPATIENT
Start: 2022-03-02

## 2022-03-02 RX ORDER — SODIUM CHLORIDE, SODIUM LACTATE, POTASSIUM CHLORIDE, AND CALCIUM CHLORIDE .6; .31; .03; .02 G/100ML; G/100ML; G/100ML; G/100ML
9 INJECTION, SOLUTION INTRAVENOUS ONCE
Status: COMPLETED | OUTPATIENT
Start: 2022-03-02 | End: 2022-03-02

## 2022-03-02 RX ORDER — NITROGLYCERIN 20 MG/100ML
2.5-2 INJECTION INTRAVENOUS
Status: DISCONTINUED | OUTPATIENT
Start: 2022-03-03 | End: 2022-03-03

## 2022-03-02 RX ORDER — LORAZEPAM 2 MG/ML
0.25 INJECTION INTRAMUSCULAR ONCE
Status: DISCONTINUED | OUTPATIENT
Start: 2022-03-03 | End: 2022-03-02

## 2022-03-02 RX ORDER — SODIUM CHLORIDE, SODIUM LACTATE, POTASSIUM CHLORIDE, CALCIUM CHLORIDE 600; 310; 30; 20 MG/100ML; MG/100ML; MG/100ML; MG/100ML
INJECTION, SOLUTION INTRAVENOUS CONTINUOUS PRN
Status: DISCONTINUED | OUTPATIENT
Start: 2022-03-02 | End: 2022-03-02 | Stop reason: SURG

## 2022-03-02 RX ORDER — LORAZEPAM 2 MG/ML
0.25 INJECTION INTRAMUSCULAR ONCE
Status: DISCONTINUED | OUTPATIENT
Start: 2022-03-03 | End: 2022-03-07 | Stop reason: HOSPADM

## 2022-03-02 RX ORDER — LIDOCAINE HYDROCHLORIDE 10 MG/ML
INJECTION, SOLUTION INFILTRATION; PERINEURAL AS NEEDED
Status: DISCONTINUED | OUTPATIENT
Start: 2022-03-02 | End: 2022-03-02 | Stop reason: SURG

## 2022-03-02 RX ORDER — NITROGLYCERIN 20 MG/100ML
5-200 INJECTION INTRAVENOUS
Status: DISCONTINUED | OUTPATIENT
Start: 2022-03-03 | End: 2022-03-02

## 2022-03-02 RX ADMIN — PANTOPRAZOLE SODIUM 40 MG: 40 INJECTION, POWDER, FOR SOLUTION INTRAVENOUS at 10:42

## 2022-03-02 RX ADMIN — BUMETANIDE 0.5 MG: 0.25 INJECTION INTRAMUSCULAR; INTRAVENOUS at 10:42

## 2022-03-02 RX ADMIN — IPRATROPIUM BROMIDE AND ALBUTEROL SULFATE 3 ML: 2.5; .5 SOLUTION RESPIRATORY (INHALATION) at 04:18

## 2022-03-02 RX ADMIN — PROPOFOL 30 MG: 10 INJECTION, EMULSION INTRAVENOUS at 13:36

## 2022-03-02 RX ADMIN — LIDOCAINE HYDROCHLORIDE 80 MG: 10 INJECTION, SOLUTION INFILTRATION; PERINEURAL at 13:36

## 2022-03-02 RX ADMIN — BISOPROLOL FUMARATE 10 MG: 5 TABLET, FILM COATED ORAL at 10:42

## 2022-03-02 RX ADMIN — IPRATROPIUM BROMIDE AND ALBUTEROL SULFATE 3 ML: 2.5; .5 SOLUTION RESPIRATORY (INHALATION) at 23:32

## 2022-03-02 RX ADMIN — ALPRAZOLAM 0.5 MG: 0.5 TABLET ORAL at 22:32

## 2022-03-02 RX ADMIN — PANTOPRAZOLE SODIUM 40 MG: 40 INJECTION, POWDER, FOR SOLUTION INTRAVENOUS at 20:11

## 2022-03-02 RX ADMIN — SERTRALINE 150 MG: 50 TABLET, FILM COATED ORAL at 10:42

## 2022-03-02 RX ADMIN — DILTIAZEM HYDROCHLORIDE 180 MG: 180 CAPSULE, COATED, EXTENDED RELEASE ORAL at 10:42

## 2022-03-02 RX ADMIN — HYDROCODONE BITARTRATE AND ACETAMINOPHEN 1 TABLET: 5; 325 TABLET ORAL at 20:10

## 2022-03-02 RX ADMIN — SODIUM CHLORIDE, POTASSIUM CHLORIDE, SODIUM LACTATE AND CALCIUM CHLORIDE: 600; 310; 30; 20 INJECTION, SOLUTION INTRAVENOUS at 13:47

## 2022-03-02 RX ADMIN — PROPOFOL 30 MG: 10 INJECTION, EMULSION INTRAVENOUS at 13:41

## 2022-03-02 RX ADMIN — BUMETANIDE 0.5 MG: 0.25 INJECTION INTRAMUSCULAR; INTRAVENOUS at 23:29

## 2022-03-02 RX ADMIN — SODIUM CHLORIDE, POTASSIUM CHLORIDE, SODIUM LACTATE AND CALCIUM CHLORIDE 9 ML/HR: 600; 310; 30; 20 INJECTION, SOLUTION INTRAVENOUS at 13:13

## 2022-03-02 RX ADMIN — FERROUS SULFATE TAB 325 MG (65 MG ELEMENTAL FE) 325 MG: 325 (65 FE) TAB at 16:58

## 2022-03-02 RX ADMIN — Medication 10 ML: at 10:12

## 2022-03-02 RX ADMIN — Medication 10 ML: at 20:11

## 2022-03-02 NOTE — NURSING NOTE
Patient Name:  Yin Law  :  1940  DOS:  22    Active Hospital Problems    Diagnosis    • **Flash pulmonary edema (HCC)    • Bilateral pleural effusion    • Elevated troponin    • Leukocytosis    • Atrial fibrillation with rapid ventricular response (HCC)    • Acute on chronic respiratory failure with hypoxia and hypercapnia (HCC)    • Hypothyroidism (acquired)    • Mixed hyperlipidemia    • Paroxysmal atrial fibrillation (HCC)      Class: Acute     · Diagnosed 3/2021  · CHADSVASC- 4, on Eliquis  · 21, s/p ECV but only maintained NSR for 10-15 seconds. Subsequently started on amiodarone.   · Returned on 21 for repeat ECV, in sinus rhythm.   ·  amiodarone discontinued after admission and concerns for possible pulmonary toxicity.  · Echo 21: LAE 4.4 cm     • Rheumatoid arthritis (HCC)    • Primary hypertension    • COPD on home oxygen (CMS/HCC)    • Anxiety and depression    • Anemia        Consult has been received.  Medical records have been reviewed.  Education provided.  Education time 5 minutes   Patient to be scheduled follow up 3-5 days  post discharge.    Echo Results:   · Estimated left ventricular EF = 60%  · Left ventricular wall thickness is consistent with mild concentric hypertrophy.  · Mild aortic valve stenosis is present.  · Aortic valve maximum pressure gradient is 20 mmHg. Aortic valve mean pressure gradient is 10.4 mmHg.  · Moderate mitral valve regurgitation is present.  · Mild to moderate mitral valve stenosis is present. The mitral valve peak gradient is 15.8 mmHg. The mitral valve mean gradient is 6.5 mmHg.  · Estimated right ventricular systolic pressure from tricuspid regurgitation is 50 mmHg.  · Moderate tricuspid valve regurgitation is present.  · There is a large left pleural effusion.       NYHA Class: III    Heart Failure Quality Measures    ACE I, ARB, ARNI (if EF <40%)     N/A      Evidence-based Beta Blocker (EF<40%)    (Bisoprolol, Carvedilol,  Metoprolol Succinate)  Yes      Aldosterone Antagonist (EF <40%)  N/A      Heart Failure Education    Medications management and adherance, Signs / symptoms, Daily weight monitoring and Role of Heart and Valve Center and when to call          Atrial fibrillation / Atrial flutter:  Quality Measure    CHADS-VASc Score:  CHADS-VASc Risk Assessment            5 Total Score    1 CHF    1 Hypertension    2 Age >/= 75    1 Sex: Female        Criteria that do not apply:    DM    PRIOR STROKE/TIA/THROMBO    Vascular Disease    Age 65-74            Anticoagulation for CHADS-VASc >/=2    On hold for upcoming egd due to worsening anemia   Statin Therapy (for patients with a history of CAD, CVA/TIA, PVD, DM)  N/A

## 2022-03-02 NOTE — PROGRESS NOTES
NN spoke with pt at BS.  Pt alert and able to answer questions appropriately. Pt O2 sat  90% on  5 L currently, home O2 use 4 L. COPD action plan reviewed. Deep breathing exercises encouraged. Patient informed of upcoming pulmonary clinic appointment date and time. No new concerns or questions voiced at this time.  NN will continue to follow as needed.       Per current GOLD Standards, please consider: No LAMA/LABA/ICS in place, Outpatient PFT, Rehab as appropriate, Palliative care consult        Patient has been scheduled for a hospital follow up with [facility] for 3/16/2022 @ 1:30 pm with AMA Hayes.   No

## 2022-03-02 NOTE — PROGRESS NOTES
[facility] Medicine Services  PROGRESS NOTE    Patient Name: Yin Law  : 1940  MRN: [id number]    Date of Admission: 2022  Primary Care Physician: Santiago Chaudhry MD    Subjective   Subjective     CC:  Afib RVR/ anemia    HPI:  Patient resting in bed. Hungry. Niece at bedside. Denies bleeding. Converted to NSR overnight. EGD planned this afternoon    ROS:  Gen- No fevers, chills  CV- No chest pain, palpitations  Resp- No cough, dyspnea  GI- No N/V/D, abd pain         Objective   Objective     Vital Signs:   Temp:  [97.2 °F (36.2 °C)-99 °F (37.2 °C)] 99 °F (37.2 °C)  Heart Rate:  [] 77  Resp:  [18-24] 18  BP: (107-164)/(49-93) 164/64  Flow (L/min):  [3-4] 4     Physical Exam:  GEN- appears chronically ill  HEENT- atraumatic, normocephalic, eomi  NECK- supple, trachea midline, no masses  RESP: on 4L, effort dim adriana in bases  CV: [id number], NSR  MSK: LE edema noted, spontaneous movement of all extremities  NEURO: alert, oriented, no focal deficits  SKIN: no rashes  PSYCH: appropriate mood and affect       Results Reviewed:  LAB RESULTS:      Lab 22  0716 22  1222 22  0058 22  2240 22  2152   WBC 9.60 8.77  --   --  17.78*   HEMOGLOBIN 9.3* 7.0*  --   --  8.1*   HEMATOCRIT 29.7* 23.7*  --   --  28.0*   PLATELETS 243 246  --   --  476*   NEUTROS ABS  --  6.71  --   --  10.48*   IMMATURE GRANS (ABS)  --  0.03  --   --  0.09*   LYMPHS ABS  --  1.31  --   --  5.17*   MONOS ABS  --  0.63  --   --  1.45*   EOS ABS  --  0.05  --   --  0.46*   MCV 99.7* 106.3*  --   --  108.9*   PROCALCITONIN  --   --   --  0.06  --    LACTATE  --   --  1.4  --   --          Lab 22  0338 22  0448 22  2245 220   SODIUM 139 143  --  139   POTASSIUM 3.5 3.8  --  4.2   CHLORIDE 99 102  --  99   CO2 30.0* 31.0*  --  28.0   ANION GAP 10.0 10.0  --  12.0   BUN 16 23  --  22   CREATININE 0.74 0.78 1.00 0.91   EGFR 80.9 75.9  --  63.1    GLUCOSE 97 112*  --  269*   CALCIUM 8.9 8.8  --  9.0   MAGNESIUM 2.2 2.1  --   --          Lab 02/28/22  2240   TOTAL PROTEIN 7.0   ALBUMIN 3.90   GLOBULIN 3.1   ALT (SGPT) 14   AST (SGOT) 27   BILIRUBIN 0.3   ALK PHOS 130*         Lab 03/01/22  0448 03/01/22  0058 02/28/22  2240   PROBNP 12,729.0* 8,352.0* 7,631.0*   TROPONIN T 0.029 0.011 0.051*             Lab 03/01/22  0448 03/01/22  0058   IRON  --  26*  26*   IRON SATURATION  --  8*   TIBC  --  341   TRANSFERRIN  --  229   FERRITIN  --  264.60*   FOLATE >20.00  --    VITAMIN B 12 1,009*  --    ABO TYPING O  --    RH TYPING Positive  --    ANTIBODY SCREEN Negative  --          Lab 02/28/22  2342 02/28/22  2153   PH, ARTERIAL 7.400 7.176*   PCO2, ARTERIAL 47.4* 74.2*   PO2 ART 94.4 438.0*   FIO2 40 100   HCO3 ART 29.4* 27.4*   BASE EXCESS ART 4.1* -1.5*   CARBOXYHEMOGLOBIN 2.4* 1.9     Brief Urine Lab Results  (Last result in the past 365 days)      Color   Clarity   Blood   Leuk Est   Nitrite   Protein   CREAT   Urine HCG        03/01/22 0115 Yellow   Clear   Negative   Negative   Negative   Negative                 Microbiology Results Abnormal     Procedure Component Value - Date/Time    Blood Culture - Blood, Arm, Left [[id number]]  (Normal) Collected: 03/01/22 0114    Lab Status: Preliminary result Specimen: Blood from Arm, Left Updated: 03/02/22 0130     Blood Culture No growth at 24 hours    Blood Culture - Blood, Arm, Left [[id number]]  (Normal) Collected: 03/01/22 0114    Lab Status: Preliminary result Specimen: Blood from Arm, Left Updated: 03/02/22 0130     Blood Culture No growth at 24 hours    Respiratory Panel PCR w/COVID-19(SARS-CoV-2) CAS/SHALINI/KRISTY/PAD/COR/MAD/HAJA In-House, NP Swab in UTM/VTM, 3-4 HR TAT - Swab, Nasopharynx [[id number]]  (Normal) Collected: 03/01/22 0049    Lab Status: Final result Specimen: Swab from Nasopharynx Updated: 03/01/22 0203     ADENOVIRUS, PCR Not Detected     Coronavirus 229E Not Detected     Coronavirus HKU1 Not  Detected     Coronavirus NL63 Not Detected     Coronavirus OC43 Not Detected     COVID19 Not Detected     Human Metapneumovirus Not Detected     Human Rhinovirus/Enterovirus Not Detected     Influenza A PCR Not Detected     Influenza B PCR Not Detected     Parainfluenza Virus 1 Not Detected     Parainfluenza Virus 2 Not Detected     Parainfluenza Virus 3 Not Detected     Parainfluenza Virus 4 Not Detected     RSV, PCR Not Detected     Bordetella pertussis pcr Not Detected     Bordetella parapertussis PCR Not Detected     Chlamydophila pneumoniae PCR Not Detected     Mycoplasma pneumo by PCR Not Detected    Narrative:      In the setting of a positive respiratory panel with a viral infection PLUS a negative procalcitonin without other underlying concern for bacterial infection, consider observing off antibiotics or discontinuation of antibiotics and continue supportive care. If the respiratory panel is positive for atypical bacterial infection (Bordetella pertussis, Chlamydophila pneumoniae, or Mycoplasma pneumoniae), consider antibiotic de-escalation to target atypical bacterial infection.          Adult Transthoracic Echo Complete W/ Cont if Necessary Per Protocol    Result Date: 3/1/2022  · Estimated left ventricular EF = 60% · Left ventricular wall thickness is consistent with mild concentric hypertrophy. · Mild aortic valve stenosis is present. · Aortic valve maximum pressure gradient is 20 mmHg. Aortic valve mean pressure gradient is 10.4 mmHg. · Moderate mitral valve regurgitation is present. · Mild to moderate mitral valve stenosis is present. The mitral valve peak gradient is 15.8 mmHg. The mitral valve mean gradient is 6.5 mmHg. · Estimated right ventricular systolic pressure from tricuspid regurgitation is 50 mmHg. · Moderate tricuspid valve regurgitation is present. · There is a large left pleural effusion.      XR Chest 1 View    Result Date: 2/28/2022  EXAMINATION: XR CHEST 1 VW-  INDICATION: SOA triage  protocol  COMPARISON: 2/10/2022  FINDINGS: Heart is upper normal size. Vasculature is cephalized and there is a diffuse pulmonary edema pattern present. Effusions are increased from the prior study, small on the left, small to moderate on the right. No pneumothorax is seen.       Impression: Congestive heart failure with bilateral pleural effusions.    This report was finalized on 2/28/2022 10:51 PM by Dr. Augie Leiva MD.      Duplex Venous Lower Extremity - Bilateral CAR    Result Date: 3/1/2022  · Normal bilateral lower extremity venous duplex scan.      CT Angiogram Chest    Result Date: 3/1/2022  Exam: CT Angiography of the Chest. Date: 3/1/2022. Comparison: None History: Respiratory failure. Technique: CT examination of the chest was performed following the intravenous administration of 75 mL of Isovue-370. Sagittal, coronal and 3-D reformatted images were provided. CT dose lowering techniques were used, to include: automated exposure control, adjustment for patient size, and/or use of iterative reconstruction. FINDINGS: Mediastinum and Vicky: There is no axillary, mediastinal or hilar lymphadenopathy. Pleural and Pericardial spaces: There are are moderate to large bilateral pleural effusions. Upper Abdomen: The visualized upper abdomen is unremarkable. Cardiovascular: There is moderate vascular calcification throughout the thoracic aorta without evidence of aneurysmal dilation or dissection. Significant mitral valvular calcification is seen. Moderate patchy coronary artery calcifications. Pulmonary Artery: There are no filling defects in the pulmonary arteries. Lung Parenchyma and Airways: There is mild to moderate diffuse centrilobular emphysema. Compressive atelectasis of the lower lobes is also noted. Bones: Multilevel degenerative disc changes are seen throughout the spine. Mild compression deformity of the T10 and T7 vertebral bodies are likely subacute chronic. Bones are diffusely demineralized.      Impression: 1. No evidence of pulmonary embolism. 2. No evidence of thoracic aortic aneurysm or dissection. 3. Moderate bilateral pleural effusions. 4. Emphysema. Electronically signed by:  Martin De La Torre D.O.  2/28/2022 10:48 PM Mountain Time      Results for orders placed during the hospital encounter of 02/28/22    Adult Transthoracic Echo Complete W/ Cont if Necessary Per Protocol    Interpretation Summary  · Estimated left ventricular EF = 60%  · Left ventricular wall thickness is consistent with mild concentric hypertrophy.  · Mild aortic valve stenosis is present.  · Aortic valve maximum pressure gradient is 20 mmHg. Aortic valve mean pressure gradient is 10.4 mmHg.  · Moderate mitral valve regurgitation is present.  · Mild to moderate mitral valve stenosis is present. The mitral valve peak gradient is 15.8 mmHg. The mitral valve mean gradient is 6.5 mmHg.  · Estimated right ventricular systolic pressure from tricuspid regurgitation is 50 mmHg.  · Moderate tricuspid valve regurgitation is present.  · There is a large left pleural effusion.      I have reviewed the medications:  Scheduled Meds:bisoprolol, 10 mg, Oral, Q24H  bumetanide, 0.5 mg, Intravenous, Daily  dilTIAZem CD, 180 mg, Oral, Daily  ferrous sulfate, 325 mg, Oral, BID With Meals  levothyroxine, 75 mcg, Oral, Q AM  pantoprazole, 40 mg, Intravenous, Q12H  sertraline, 150 mg, Oral, Daily  sodium chloride, 10 mL, Intravenous, Q12H      Continuous Infusions:dilTIAZem, 5-15 mg/hr, Last Rate: Stopped (03/01/22 0150)      PRN Meds:.•  acetaminophen  •  ALPRAZolam  •  HYDROcodone-acetaminophen  •  ipratropium-albuterol  •  sodium chloride  •  sodium chloride    Assessment/Plan   Assessment & Plan     Active Hospital Problems    Diagnosis  POA   • **Flash pulmonary edema (HCC) [J81.0]  Yes   • Bilateral pleural effusion [J90]  Yes   • Elevated troponin [R77.8]  Yes   • Leukocytosis [D72.829]  Yes   • Atrial fibrillation with rapid ventricular response  (HCC) [I48.91]  Yes   • Acute on chronic respiratory failure with hypoxia and hypercapnia (HCC) [J96.21, J96.22]  Yes   • Hypothyroidism (acquired) [E03.9]  Yes   • Mixed hyperlipidemia [E78.2]  Yes   • Paroxysmal atrial fibrillation (HCC) [I48.0]  Yes     Class: Acute   • Rheumatoid arthritis (HCC) [M06.9]  Yes   • Primary hypertension [I10]  Yes   • COPD on home oxygen (CMS/HCC) [J44.9]  Yes   • Anxiety and depression [F41.9, F32.A]  Yes   • Anemia [D64.9]  Yes      Resolved Hospital Problems   No resolved problems to display.        Brief Hospital Course to date:  Yin Law is a 81 y.o. female  With hx of diastolic CHF, COPD, HTN, bilateral pleural effusions s/p thoracentesis per pt, hyperlipidemia who is on 3-4 L O2 chronically presents tonight after calling 911 for shortness of breath.  Pt was reportedly very dyspneic and was transported here on high flow oxygen.  Pt states sx started today and were fairly sudden.     This patient's problems and plans were partially entered by my partner and updated as appropriate by me 03/02/22.          Afib with RVR  --cardiology follows, Patient sponatenously converted to NSR overnight.   --awaiting results of EGD to see if patient will need AC going forward  --Of note, patient was discharged on low dose Eliquis both for DVT PPx following her hip repair as well as anticoagulation for PAF per Cardiology. She has not been taking this and states she does not want to per my partners d/w her. Will await results of EGD   --Weaned off BiPAP currently. Monitor.    Acute hypoxic RF  CHF exacerbation  --continue diuresis. Noted significant pleural effusions on imaging.   --ECHO EF with 60%, mild/mod VHD    Anemia with +FOBT   --my partner d/w patient's PCP Dr Chaudhry regarding plan to get EGD done as outpatient, with ongoing drop in H/H and FOBT +, GI consulted and planning EGD today. NPO  --H/H improved to 9.3 today     HTN  --monitor, continue   Depression  --continue  sertraline  Hypothyroidism  --continue synthroid, will add on TSH for AM      DVT prophylaxis:  Mechanical DVT prophylaxis orders are present.       AM-PAC 6 Clicks Score (PT): 16 (03/01/22 1102)    Disposition: I expect the patient to be discharged pending above    CODE STATUS:   Code Status and Medical Interventions:   Ordered at: 03/01/22 0134     Code Status (Patient has no pulse and is not breathing):    CPR (Attempt to Resuscitate)     Medical Interventions (Patient has pulse or is breathing):    Full Support       Rebecca Berry MD  03/02/22

## 2022-03-02 NOTE — POST-PROCEDURE NOTE
EGD  Mild gastropathy consistent with watermelon stomach. One small ulcer in prepyloric area oozing blood.  Clip applied      REC Observe.  If further bleeding will need RFA vx APC

## 2022-03-02 NOTE — PLAN OF CARE
Goal Outcome Evaluation:  Plan of Care Reviewed With: patient        Progress: improving  Outcome Summary: Pt. performed bed mobility and sit to stand transfer with contact guard assist. She performed bed to chair transfer w/min assist. She ambulated 12' w/front wheeled walker, min assist. Gait limited by SOA, fatigue. Pt. tolerated ther-ex well. Reviewed home exercise program, posterior hip precautions. Will continue to progress as tolerated. Recommend SNF upon discharge.

## 2022-03-02 NOTE — ANESTHESIA PREPROCEDURE EVALUATION
Anesthesia Evaluation     Patient summary reviewed and Nursing notes reviewed   NPO Solid Status: > 8 hours  NPO Liquid Status: > 2 hours           Airway   Mallampati: I  TM distance: >3 FB  Neck ROM: full  No difficulty expected  Dental    (+) edentulous    Pulmonary    (+) pneumonia stable , pleural effusion, a smoker Former, COPD moderate, asthma,home oxygen,     ROS comment: Flash pulmonary edema Bilateral pleural effusion     Elevated troponin      Acute on chronic respiratory failure with hypoxia and hypercapnia           Cardiovascular     ECG reviewed    (+) hypertension, valvular problems/murmurs, dysrhythmias Atrial Fib, CHF Diastolic >=55%, hyperlipidemia,     ROS comment: ECG NSR LAE poss Inferior infarct   ST & TW abn consider anterolateral ischemia    ECHO EF = 60%  mild concentric LVH  Mild AS max grdt is 20 mmHg mean 10.4 mmHg.  Moderate MVR peak grdnt 15.8 mmHg.  mean  6.5 mmHg.  RVSP (TR) 50 mmHg.Moderate TVR     Large left pleural effusion.    EKG stress test 1/20: normal EF, no ischemia, wnl    Neuro/Psych    ROS Comment: Non verbal   GI/Hepatic/Renal/Endo    (+)   thyroid problem hypothyroidism    Musculoskeletal     Abdominal    Substance History      OB/GYN          Other   arthritis (Rheumatoid ),      ROS/Med Hx Other: Admitted in Pulm edema Afib RVR and anemia HCT 27 /29   Recent hip sx last month Sherry       Phys Exam Other: Mac 3  grade IIa view               Anesthesia Plan    ASA 4     general and MAC   (PFL   aim for MAP >65)  intravenous induction     Anesthetic plan, all risks, benefits, and alternatives have been provided, discussed and informed consent has been obtained with: patient.    Plan discussed with CRNA.        CODE STATUS:    Code Status (Patient has no pulse and is not breathing): CPR (Attempt to Resuscitate)  Medical Interventions (Patient has pulse or is breathing): Full Support

## 2022-03-02 NOTE — THERAPY TREATMENT NOTE
Patient Name: Yin Law  : 1940    MRN: [id number]                              Today's Date: 3/2/2022       Admit Date: 2022    Visit Dx:     ICD-10-CM ICD-9-CM   1. Flash pulmonary edema (HCC)  J81.0 518.4   2. Acute respiratory failure with hypoxia and hypercapnia (HCC)  J96.01 518.81    J96.02    3. Acute on chronic congestive heart failure, unspecified heart failure type (HCC)  I50.9 428.0   4. Other iron deficiency anemia  D50.8 280.8     Patient Active Problem List   Diagnosis   • Paroxysmal atrial fibrillation (HCC)   • COPD on home oxygen (CMS/HCC)   • Primary hypertension   • Rheumatoid arthritis (HCC)   • Anxiety and depression   • Wound of right leg   • Anemia   • Severe malnutrition (CMS/HCC)   • Hip fracture (HCC)   • Mixed hyperlipidemia   • Heart valve disease   • Flash pulmonary edema (HCC)   • Bilateral pleural effusion   • Elevated troponin   • Leukocytosis   • Atrial fibrillation with rapid ventricular response (HCC)   • Acute on chronic respiratory failure with hypoxia and hypercapnia (HCC)   • Hypothyroidism (acquired)     Past Medical History:   Diagnosis Date   • Accelerated hypertension 2021   • Acute diastolic CHF (CMS/HCC) 2021   • Anxiety and depression    • Arrhythmia     A-FIB   • Asthma    • Chicken pox    • COPD (chronic obstructive pulmonary disease) (HCC)    • Elevated troponin 2021   • Hyperlipidemia    • Hypertension    • Measles    • Menopause    • Multifocal pneumonia 2021   • Osteoporosis    • Pleural effusion, bilateral 2021   • Pneumonia due to infectious organism 2021   • Rheumatoid arthritis (HCC)    • Rheumatoid arthritis (HCC)    • Sepsis (HCC) 2021   • Tobacco abuse      Past Surgical History:   Procedure Laterality Date   • APPENDECTOMY     • CARPAL TUNNEL RELEASE Left    • CATARACT EXTRACTION, BILATERAL     • COLON SURGERY     • COLONOSCOPY     • GALLBLADDER SURGERY     • HIP HEMIARTHROPLASTY Left 2022     Procedure: HIP HEMIARTHROPLASTY LEFT;  Surgeon: Napoleon Powell Jr., MD;  Location: [facility];  Service: Orthopedics;  Laterality: Left;   • HYSTERECTOMY  1971   • TONSILLECTOMY        General Information     Row Name 03/02/22 1410          Physical Therapy Time and Intention    Document Type therapy note (daily note)  -     Mode of Treatment physical therapy  -     Row Name 03/02/22 1410          General Information    Patient Profile Reviewed yes  -SS     Existing Precautions/Restrictions fall; cardiac; left; hip, posterior; oxygen therapy device and L/min; other (see comments)  s/p L hip hemiarthroplasty s/p fem neck fx 2/7/2022  -     Barriers to Rehab medically complex; previous functional deficit  -SS     Row Name 03/02/22 1410          Cognition    Orientation Status (Cognition) oriented x 3  -SS     Row Name 03/02/22 1410          Safety Issues, Functional Mobility    Safety Issues Affecting Function (Mobility) insight into deficits/self-awareness; safety precaution awareness; safety precautions follow-through/compliance; sequencing abilities; judgment; problem-solving  -     Impairments Affecting Function (Mobility) strength; balance; pain; endurance/activity tolerance; range of motion (ROM); shortness of breath  -           User Key  (r) = Recorded By, (t) = Taken By, (c) = Cosigned By    Initials Name Provider Type     Rebecca Zamudio PT Physical Therapist               Mobility     Row Name 03/02/22 1412          Bed Mobility    Bed Mobility supine-sit  -SS     Scooting/Bridging [place] (Bed Mobility) contact guard; verbal cues  -     Supine-Sit [place] (Bed Mobility) verbal cues; contact guard  -     Sit-Supine [place] (Bed Mobility) verbal cues; contact guard  -     Assistive Device (Bed Mobility) head of bed elevated; bed rails  -     Comment (Bed Mobility) VC for sequencing, maintaining posterior hip precautions  -     Row Name 03/02/22 1412          Transfers     Comment (Transfers) VC for hand placement, maintaining hip precautions, lowering with eccentric control  -     Row Name 03/02/22 1412          Bed-Chair Transfer    Bed-Chair [place] (Transfers) verbal cues; minimum assist (75% patient effort)  -     Assistive Device (Bed-Chair Transfers) walker, front-wheeled  -SS     Row Name 03/02/22 1412          Sit-Stand Transfer    Sit-Stand [place] (Transfers) verbal cues; contact guard  -     Assistive Device (Sit-Stand Transfers) walker, front-wheeled  -SS     Row Name 03/02/22 1412          Gait/Stairs (Locomotion)    [place] Level (Gait) verbal cues; minimum assist (75% patient effort)  -     Assistive Device (Gait) walker, front-wheeled  -     Distance in Feet (Gait) 12  -SS     Deviations/Abnormal Patterns (Gait) bilateral deviations; cindi decreased; gait speed decreased; stride length decreased; left sided deviations  -     Bilateral Gait Deviations forward flexed posture; heel strike decreased  -     Left Sided Gait Deviations weight shift ability decreased  -     Comment (Gait/Stairs) Pt. ambulated with a step through gait pattern. VC for controlled breathing, upright posture. Gait limited by fatigue, SOA. O2 sats 93%.  -     Row Name 03/02/22 1412          Mobility    Extremity Weight-bearing Status left lower extremity  -     Left Lower Extremity (Weight-bearing Status) weight-bearing as tolerated (WBAT)  -           User Key  (r) = Recorded By, (t) = Taken By, (c) = Cosigned By    Initials Name Provider Type     Rebecca Zamudio PT Physical Therapist               Obj/Interventions     Row Name 03/02/22 1414          Motor Skills    Therapeutic Exercise hip; knee; ankle  -     Row Name 03/02/22 1414          Hip (Therapeutic Exercise)    Hip (Therapeutic Exercise) isometric exercises  -     Hip Isometrics (Therapeutic Exercise) bilateral; gluteal sets; 10 repetitions  -     Hip Strengthening (Therapeutic Exercise)  bilateral; heel slides; 10 repetitions  -     Row Name 03/02/22 1414          Knee (Therapeutic Exercise)    Knee (Therapeutic Exercise) strengthening exercise; isometric exercises  -     Knee Isometrics (Therapeutic Exercise) bilateral; quad sets; 10 repetitions  -     Knee Strengthening (Therapeutic Exercise) bilateral; LAQ (long arc quad); 10 repetitions  -     Row Name 03/02/22 1414          Ankle (Therapeutic Exercise)    Ankle (Therapeutic Exercise) AROM (active range of motion)  -     Ankle AROM (Therapeutic Exercise) bilateral; dorsiflexion; plantarflexion; 10 repetitions  -     Row Name 03/02/22 1414          Balance    Balance Assessment sitting static balance; standing static balance; sitting dynamic balance; standing dynamic balance  -     Static Sitting Balance WFL; unsupported; sitting, edge of bed  -     Dynamic Sitting Balance WFL; unsupported; sitting, edge of bed  -     Static Standing Balance WFL; supported  -     Dynamic Standing Balance mild impairment; supported  -     Balance Interventions sitting; standing; sit to stand; supported; static; dynamic  -           User Key  (r) = Recorded By, (t) = Taken By, (c) = Cosigned By    Initials Name Provider Type     Rebecca Zamudio, PT Physical Therapist               Goals/Plan    No documentation.                Clinical Impression     Row Name 03/02/22 1415          Pain    Additional Documentation Pain Scale: Numbers Pre/Post-Treatment (Group)  -[facility] Name 03/02/22 1415          Pain Scale: Numbers Pre/Post-Treatment    Pretreatment Pain Rating 0/10 - no pain  -     Posttreatment Pain Rating 0/10 - no pain  -     Pain Intervention(s) Repositioned; Ambulation/increased activity  -     Row Name 03/02/22 1415          Plan of Care Review    Plan of Care Reviewed With patient  -     Progress improving  -     Outcome Summary Pt. performed bed mobility and sit to stand transfer with contact guard assist. She  performed bed to chair transfer w/min assist. She ambulated 12' w/front wheeled walker, min assist. Gait limited by SOA, fatigue. Pt. tolerated ther-ex well. Reviewed home exercise program, posterior hip precautions. Will continue to progress as tolerated. Recommend SNF upon discharge.  -[facility] Name 03/02/22 1415          Therapy Assessment/Plan (PT)    Rehab Potential (PT) fair, will monitor progress closely  -     Criteria for Skilled Interventions Met (PT) yes; meets criteria; skilled treatment is necessary  -[facility] Name 03/02/22 1415          Vital Signs    Pre Systolic BP Rehab 128  -SS     Pre Treatment Diastolic BP 95  -SS     Post Systolic BP Rehab 167  -SS     Post Treatment Diastolic BP 67  -SS     Pretreatment Heart Rate (beats/min) 69  -SS     Posttreatment Heart Rate (beats/min) 76  -SS     Pre SpO2 (%) 100  -SS     O2 Delivery Pre Treatment nasal cannula  5L  -SS     Intra SpO2 (%) 93  -SS     O2 Delivery Intra Treatment nasal cannula  5L  -SS     Post SpO2 (%) 95  -SS     O2 Delivery Post Treatment nasal cannula  5L  -SS     Pre Patient Position Supine  -SS     Intra Patient Position Standing  -SS     Post Patient Position Supine  -SS     [facility] Name 03/02/22 1415          Positioning and Restraints    Pre-Treatment Position in bed  -SS     Post Treatment Position bed  -SS     In Bed notified nsg; fowlers; call light within reach; encouraged to call for assist; exit alarm on; with family/caregiver; with nsg  -SS           User Key  (r) = Recorded By, (t) = Taken By, (c) = Cosigned By    Initials Name Provider Type     Rebecca Zamudio, PT Physical Therapist               Outcome Measures     [facility] Name 03/02/22 1419 03/02/22 0816       How much help from another person do you currently need...    Turning from your back to your side while in flat bed without using bedrails? 3  -SS 3  -HG    Moving from lying on back to sitting on the side of a flat bed without bedrails? 3  -SS 3  -HG    Moving to  and from a bed to a chair (including a wheelchair)? 3  -SS 3  -HG    Standing up from a chair using your arms (e.g., wheelchair, bedside chair)? 3  -SS 3  -HG    Climbing 3-5 steps with a railing? 2  -SS 1  -HG    To walk in hospital room? 3  -SS 3  -HG    AM-PAC 6 Clicks Score (PT) 17  -SS 16  -HG    Row Name 03/02/22 1419          Functional Assessment    Outcome Measure Options AM-PAC 6 Clicks Basic Mobility (PT)  -           User Key  (r) = Recorded By, (t) = Taken By, (c) = Cosigned By    Initials Name Provider Type    Mellisa Mendoza, RN Registered Nurse    SS Rebecca Zamudio, PT Physical Therapist                             Physical Therapy Education                 Title: PT OT SLP Therapies (In Progress)     Topic: Physical Therapy (Done)     Point: Mobility training (Done)     Learning Progress Summary           Patient Eager, E, VU,NR by  at 3/2/2022 1419    Comment: Reviewed safety/technique with bed mobility, transfers, ambulation, HEP, PT POC, posterior hip precautions    Acceptance, E,D, VU,NR by LR at 3/1/2022 1102    Comment: Educated on posterior hip precautions, weight bearing status, benefits of mobility and being OOB, LE HEP, correct supine to sit t/f technique, correct sit<->stand t/f technique, correct gait mechanics, and progression of POC                   Point: Home exercise program (Done)     Learning Progress Summary           Patient Eager, E, VU,NR by SS at 3/2/2022 1419    Comment: Reviewed safety/technique with bed mobility, transfers, ambulation, HEP, PT POC, posterior hip precautions    Acceptance, E,D, VU,NR by LR at 3/1/2022 1102    Comment: Educated on posterior hip precautions, weight bearing status, benefits of mobility and being OOB, LE HEP, correct supine to sit t/f technique, correct sit<->stand t/f technique, correct gait mechanics, and progression of POC                   Point: Body mechanics (Done)     Learning Progress Summary           Patient Eager, E, VU,NR by  SS at 3/2/2022 1419    Comment: Reviewed safety/technique with bed mobility, transfers, ambulation, HEP, PT POC, posterior hip precautions    Acceptance, E,D, VU,NR by LR at 3/1/2022 1102    Comment: Educated on posterior hip precautions, weight bearing status, benefits of mobility and being OOB, LE HEP, correct supine to sit t/f technique, correct sit<->stand t/f technique, correct gait mechanics, and progression of POC                   Point: Precautions (Done)     Learning Progress Summary           Patient Eager, E, VU,NR by SS at 3/2/2022 1419    Comment: Reviewed safety/technique with bed mobility, transfers, ambulation, HEP, PT POC, posterior hip precautions    Acceptance, E,D, VU,NR by LR at 3/1/2022 1102    Comment: Educated on posterior hip precautions, weight bearing status, benefits of mobility and being OOB, LE HEP, correct supine to sit t/f technique, correct sit<->stand t/f technique, correct gait mechanics, and progression of POC                               User Key     Initials Effective Dates Name Provider Type Discipline    LR 06/16/21 -  Leena Sahu, PT Physical Therapist PT     06/01/21 -  Rebecca Zamudio, PT Physical Therapist PT              PT Recommendation and Plan     Plan of Care Reviewed With: patient  Progress: improving  Outcome Summary: Pt. performed bed mobility and sit to stand transfer with contact guard assist. She performed bed to chair transfer w/min assist. She ambulated 12' w/front wheeled walker, min assist. Gait limited by SOA, fatigue. Pt. tolerated ther-ex well. Reviewed home exercise program, posterior hip precautions. Will continue to progress as tolerated. Recommend SNF upon discharge.     Time Calculation:    PT Charges     Row Name 03/02/22 1420             Time Calculation    Start Time 1137  -      Stop Time 1200  -      Time Calculation (min) 23 min  -      PT Received On 03/02/22  -              Time Calculation- PT    Total Timed Code  Minutes- PT 23 minute(s)  -SS              Timed Charges    05088 - PT Therapeutic Exercise Minutes 10  -SS      41864 - Gait Training Minutes  5  -SS      58233 - PT Therapeutic Activity Minutes 8  -SS              Total Minutes    Timed Charges Total Minutes 23  -SS       Total Minutes 23  -SS            User Key  (r) = Recorded By, (t) = Taken By, (c) = Cosigned By    Initials Name Provider Type     Rebecca Zamudio, PT Physical Therapist              Therapy Charges for Today     Code Description Service Date Service Provider Modifiers Qty    61772845089 HC PT THER PROC EA 15 MIN 3/2/2022 Rebecca Zamudio, PT GP 1    19762087055 HC PT THERAPEUTIC ACT EA 15 MIN 3/2/2022 Rebecca Zamudio, PT GP 1          PT G-Codes  Outcome Measure Options: AM-PAC 6 Clicks Basic Mobility (PT)  AM-PAC 6 Clicks Score (PT): 17  AM-PAC 6 Clicks Score (OT): 14    Rebecca Zamudio PT  3/2/2022

## 2022-03-02 NOTE — PROGRESS NOTES
[facility]  IP Progress Note      Chief Complaint/Reason for service: #1 acute diastolic heart failure #2 A. fib RVR #3 GI bleeding    Subjective   Subjective: The patient is awake and alert.  She states she is feeling better.  She wants to know what her EGD is because she says she cannot go long without eating.  She states her breathing is better    Past medical, surgical, social and family history reviewed in the patient's electronic medical record.    Objective     Vital Sign Min/Max for last 24 hours  Temp  Min: 97.2 °F (36.2 °C)  Max: 98.8 °F (37.1 °C)   BP  Min: 103/57  Max: 160/57   Pulse  Min: 59  Max: 140   Resp  Min: 18  Max: 24   SpO2  Min: 83 %  Max: 100 %   Flow (L/min)  Min: 3  Max: 4      Intake/Output Summary (Last 24 hours) at 3/2/2022 0808  Last data filed at 3/2/2022 0400  Gross per 24 hour   Intake 600 ml   Output 2050 ml   Net -1450 ml             Current Facility-Administered Medications:   •  acetaminophen (TYLENOL) tablet 650 mg, 650 mg, Oral, Q4H PRN, Jacquleine Guzman APRN  •  ALPRAZolam (XANAX) tablet 0.5 mg, 0.5 mg, Oral, Nightly PRN, Ann Marie Sage APRN  •  bisoprolol (ZEBeta) tablet 10 mg, 10 mg, Oral, Q24H, Jacqueline Guzman APRN  •  bumetanide (BUMEX) injection 0.5 mg, 0.5 mg, Intravenous, Daily, Meghana Rush APRN, 0.5 mg at 03/01/22 1824  •  dilTIAZem (CARDIZEM) 125 mg in 125 mL 0.7% sodium chloride  infusion, 5-15 mg/hr, Intravenous, Titrated, Sammy Colby MD, Held at 03/01/22 0150  •  dilTIAZem CD (CARDIZEM CD) 24 hr capsule 180 mg, 180 mg, Oral, Daily, Jacqueline Guzman APRN  •  ferrous sulfate tablet 325 mg, 325 mg, Oral, BID With Meals, Jacqueline Guzman APRN, 325 mg at 03/01/22 1719  •  HYDROcodone-acetaminophen (NORCO) 5-325 MG per tablet 1 tablet, 1 tablet, Oral, Q6H PRN, Laura Shepherd MD, 1 tablet at 03/01/22 2057  •  ipratropium-albuterol (DUO-NEB) nebulizer solution 3 mL, 3 mL, Nebulization, Q4H PRN, Laura Shepherd MD, 3 mL at  03/02/22 0418  •  levothyroxine (SYNTHROID, LEVOTHROID) tablet 75 mcg, 75 mcg, Oral, Q AM, Jacqueline Guzman APRN, 75 mcg at 03/01/22 0635  •  pantoprazole (PROTONIX) injection 40 mg, 40 mg, Intravenous, Q12H, Christina Luo PA, 40 mg at 03/01/22 2057  •  sertraline (ZOLOFT) tablet 150 mg, 150 mg, Oral, Daily, Jacqueline Guzman APRN, 150 mg at 03/01/22 0901  •  sodium chloride 0.9 % flush 10 mL, 10 mL, Intravenous, PRN, Sammy Colby MD  •  sodium chloride 0.9 % flush 10 mL, 10 mL, Intravenous, Q12H, Jacqueline Guzman APRN, 10 mL at 03/01/22 2039  •  sodium chloride 0.9 % flush 10 mL, 10 mL, Intravenous, PRN, Jacqueline Guzman APRN    Physical Exam: General Pleasant thin elderly female with minimal resting tachypnea no overt dyspnea current heart rate 80        HEENT: Positive JVP, nasal O2 present.  No icterus       Respiratory: Equal bilateral symmetrical expansion with reduced breath sounds bilaterally and E to a changes consistent with effusion and there are crackles       Cardiovascular: Regular rate and rhythm with a grade 4-5 over systolic ejection murmur and no edema to palpation       GI: Soft flat nontender       Lower Extremities: No lesions       Neuro: Facial expressions are symmetrical moves all 4 extremities       Skin: Warm and dry no edema to palpation       Psych: Pleasant affect    Results Review: The patient is a net 2.9 L out since admission.  Heart rates 59-84.  Blood pressure is 1 32-1 60.  Potassium is 3.5.  Magnesium 2.2.  Hemoglobin was 7.0 yesterday.    Radiology Results:  Imaging Results (Last 72 Hours)     Procedure Component Value Units Date/Time    CT Angiogram Chest [[id number]] Collected: 03/01/22 0045     Updated: 03/01/22 0049    Narrative:      Exam: CT Angiography of the Chest.    Date: 3/1/2022.     Comparison: None    History: Respiratory failure.    Technique: CT examination of the chest was performed following the intravenous administration of 75 mL of Isovue-370. Sagittal,  coronal and 3-D reformatted images were provided. CT dose lowering techniques were used, to include: automated exposure   control, adjustment for patient size, and/or use of iterative reconstruction.    FINDINGS:    Mediastinum and Vicky: There is no axillary, mediastinal or hilar lymphadenopathy.    Pleural and Pericardial spaces: There are are moderate to large bilateral pleural effusions.    Upper Abdomen: The visualized upper abdomen is unremarkable.    Cardiovascular: There is moderate vascular calcification throughout the thoracic aorta without evidence of aneurysmal dilation or dissection. Significant mitral valvular calcification is seen. Moderate patchy coronary artery calcifications.    Pulmonary Artery: There are no filling defects in the pulmonary arteries.    Lung Parenchyma and Airways: There is mild to moderate diffuse centrilobular emphysema. Compressive atelectasis of the lower lobes is also noted.    Bones: Multilevel degenerative disc changes are seen throughout the spine. Mild compression deformity of the T10 and T7 vertebral bodies are likely subacute chronic. Bones are diffusely demineralized.      Impression:        1. No evidence of pulmonary embolism.  2. No evidence of thoracic aortic aneurysm or dissection.  3. Moderate bilateral pleural effusions.  4. Emphysema.      Electronically signed by:  Martin De La Torre D.O.    2/28/2022 10:48 PM Mountain Time    XR Chest 1 View [[id number]] Collected: 02/28/22 2250     Updated: 02/28/22 2254    Narrative:      EXAMINATION: XR CHEST 1 VW-     INDICATION: SOA triage protocol     COMPARISON: 2/10/2022     FINDINGS: Heart is upper normal size. Vasculature is cephalized and  there is a diffuse pulmonary edema pattern present. Effusions are  increased from the prior study, small on the left, small to moderate on  the right. No pneumothorax is seen.          Impression:      Congestive heart failure with bilateral pleural effusions.         This report was  finalized on 2/28/2022 10:51 PM by Dr. Augie Leiva MD.             EKG: Sinus rhythm with diffuse T wave abnormalities    ECHO: Normal EF normal sinus rhythm    Tele: Sinus rhythm    Assessment   Assessment/Plan: 1 diastolic heart failure-continue diuresis today.  The patient also has significant pleural effusions  2 A. fib RVR-the patient spontaneously converted to sinus rhythm-we will see whether not the patient to be anticoagulated again pending results of EGD.  The patient has at least moderate mitral stenosis and could be worse to her risk of recurrent atrial fibrillation as high  3 severe anemia-patient was transfused yesterday await EGD  4 pleural effusions may need thoracentesis    Gomez Nolen MD  03/02/22  08:08 EST

## 2022-03-02 NOTE — ANESTHESIA POSTPROCEDURE EVALUATION
Patient: Yin Law    Procedure Summary     Date: 03/02/22 Room / Location:  [facility]    Anesthesia Start: 1333 Anesthesia Stop:     Procedure: ESOPHAGOGASTRODUODENOSCOPY (N/A ) Diagnosis:       Other iron deficiency anemia      (Other iron deficiency anemia [D50.8])    Surgeons: Sammy Lyles MD Provider: Eulalio Washington MD    Anesthesia Type: general, MAC ASA Status: 4          Anesthesia Type: general, MAC    Vitals  No vitals data found for the desired time range.          Post Anesthesia Care and Evaluation    Patient location during evaluation: PACU  Patient participation: complete - patient participated  Level of consciousness: awake and alert  Pain management: adequate  Airway patency: patent  Anesthetic complications: No anesthetic complications  PONV Status: none  Cardiovascular status: hemodynamically stable and acceptable  Respiratory status: nonlabored ventilation, acceptable and nasal cannula  Hydration status: acceptable

## 2022-03-03 LAB
ALBUMIN SERPL-MCNC: 3.2 G/DL (ref 3.5–5.2)
ALBUMIN SERPL-MCNC: 3.9 G/DL (ref 3.5–5.2)
ALBUMIN/GLOB SERPL: 1.2 G/DL
ALBUMIN/GLOB SERPL: 1.2 G/DL
ALP SERPL-CCNC: 103 U/L (ref 39–117)
ALP SERPL-CCNC: 137 U/L (ref 39–117)
ALT SERPL W P-5'-P-CCNC: 19 U/L (ref 1–33)
ALT SERPL W P-5'-P-CCNC: 27 U/L (ref 1–33)
ANION GAP SERPL CALCULATED.3IONS-SCNC: 11 MMOL/L (ref 5–15)
ANION GAP SERPL CALCULATED.3IONS-SCNC: 14 MMOL/L (ref 5–15)
AST SERPL-CCNC: 28 U/L (ref 1–32)
AST SERPL-CCNC: 43 U/L (ref 1–32)
BASOPHILS # BLD AUTO: 0.02 10*3/MM3 (ref 0–0.2)
BASOPHILS NFR BLD AUTO: 0.2 % (ref 0–1.5)
BILIRUB SERPL-MCNC: 0.5 MG/DL (ref 0–1.2)
BILIRUB SERPL-MCNC: 0.5 MG/DL (ref 0–1.2)
BUN SERPL-MCNC: 14 MG/DL (ref 8–23)
BUN SERPL-MCNC: 16 MG/DL (ref 8–23)
BUN/CREAT SERPL: 16.3 (ref 7–25)
BUN/CREAT SERPL: 20 (ref 7–25)
CALCIUM SPEC-SCNC: 8.7 MG/DL (ref 8.6–10.5)
CALCIUM SPEC-SCNC: 9.2 MG/DL (ref 8.6–10.5)
CHLORIDE SERPL-SCNC: 89 MMOL/L (ref 98–107)
CHLORIDE SERPL-SCNC: 92 MMOL/L (ref 98–107)
CO2 SERPL-SCNC: 26 MMOL/L (ref 22–29)
CO2 SERPL-SCNC: 29 MMOL/L (ref 22–29)
CREAT SERPL-MCNC: 0.8 MG/DL (ref 0.57–1)
CREAT SERPL-MCNC: 0.86 MG/DL (ref 0.57–1)
DEPRECATED RDW RBC AUTO: 57.4 FL (ref 37–54)
EGFRCR SERPLBLD CKD-EPI 2021: 67.5 ML/MIN/1.73
EGFRCR SERPLBLD CKD-EPI 2021: 73.7 ML/MIN/1.73
EOSINOPHIL # BLD AUTO: 0.03 10*3/MM3 (ref 0–0.4)
EOSINOPHIL NFR BLD AUTO: 0.4 % (ref 0.3–6.2)
ERYTHROCYTE [DISTWIDTH] IN BLOOD BY AUTOMATED COUNT: 15.9 % (ref 12.3–15.4)
GLOBULIN UR ELPH-MCNC: 2.6 GM/DL
GLOBULIN UR ELPH-MCNC: 3.2 GM/DL
GLUCOSE SERPL-MCNC: 110 MG/DL (ref 65–99)
GLUCOSE SERPL-MCNC: 218 MG/DL (ref 65–99)
HCT VFR BLD AUTO: 27.3 % (ref 34–46.6)
HGB BLD-MCNC: 8.7 G/DL (ref 12–15.9)
IMM GRANULOCYTES # BLD AUTO: 0.02 10*3/MM3 (ref 0–0.05)
IMM GRANULOCYTES NFR BLD AUTO: 0.2 % (ref 0–0.5)
LYMPHOCYTES # BLD AUTO: 0.82 10*3/MM3 (ref 0.7–3.1)
LYMPHOCYTES NFR BLD AUTO: 9.6 % (ref 19.6–45.3)
MCH RBC QN AUTO: 31.2 PG (ref 26.6–33)
MCHC RBC AUTO-ENTMCNC: 31.9 G/DL (ref 31.5–35.7)
MCV RBC AUTO: 97.8 FL (ref 79–97)
MONOCYTES # BLD AUTO: 0.64 10*3/MM3 (ref 0.1–0.9)
MONOCYTES NFR BLD AUTO: 7.5 % (ref 5–12)
NEUTROPHILS NFR BLD AUTO: 6.99 10*3/MM3 (ref 1.7–7)
NEUTROPHILS NFR BLD AUTO: 82.1 % (ref 42.7–76)
NRBC BLD AUTO-RTO: 0 /100 WBC (ref 0–0.2)
PLATELET # BLD AUTO: 224 10*3/MM3 (ref 140–450)
PMV BLD AUTO: 10.2 FL (ref 6–12)
POTASSIUM SERPL-SCNC: 3.1 MMOL/L (ref 3.5–5.2)
POTASSIUM SERPL-SCNC: 3.5 MMOL/L (ref 3.5–5.2)
POTASSIUM SERPL-SCNC: 4.3 MMOL/L (ref 3.5–5.2)
PROCALCITONIN SERPL-MCNC: 0.28 NG/ML (ref 0–0.25)
PROT SERPL-MCNC: 5.8 G/DL (ref 6–8.5)
PROT SERPL-MCNC: 7.1 G/DL (ref 6–8.5)
RBC # BLD AUTO: 2.79 10*6/MM3 (ref 3.77–5.28)
SODIUM SERPL-SCNC: 129 MMOL/L (ref 136–145)
SODIUM SERPL-SCNC: 132 MMOL/L (ref 136–145)
TROPONIN T SERPL-MCNC: 0.08 NG/ML (ref 0–0.03)
TROPONIN T SERPL-MCNC: 0.08 NG/ML (ref 0–0.03)
WBC NRBC COR # BLD: 8.52 10*3/MM3 (ref 3.4–10.8)

## 2022-03-03 PROCEDURE — 94799 UNLISTED PULMONARY SVC/PX: CPT

## 2022-03-03 PROCEDURE — 84484 ASSAY OF TROPONIN QUANT: CPT | Performed by: PHYSICIAN ASSISTANT

## 2022-03-03 PROCEDURE — 99232 SBSQ HOSP IP/OBS MODERATE 35: CPT | Performed by: INTERNAL MEDICINE

## 2022-03-03 PROCEDURE — 85025 COMPLETE CBC W/AUTO DIFF WBC: CPT | Performed by: INTERNAL MEDICINE

## 2022-03-03 PROCEDURE — 99232 SBSQ HOSP IP/OBS MODERATE 35: CPT | Performed by: NURSE PRACTITIONER

## 2022-03-03 PROCEDURE — 97110 THERAPEUTIC EXERCISES: CPT

## 2022-03-03 PROCEDURE — 97116 GAIT TRAINING THERAPY: CPT

## 2022-03-03 PROCEDURE — 84132 ASSAY OF SERUM POTASSIUM: CPT | Performed by: INTERNAL MEDICINE

## 2022-03-03 PROCEDURE — 80053 COMPREHEN METABOLIC PANEL: CPT | Performed by: INTERNAL MEDICINE

## 2022-03-03 RX ORDER — BUMETANIDE 0.25 MG/ML
1 INJECTION INTRAMUSCULAR; INTRAVENOUS ONCE
Status: COMPLETED | OUTPATIENT
Start: 2022-03-03 | End: 2022-03-03

## 2022-03-03 RX ORDER — POTASSIUM CHLORIDE 7.45 MG/ML
10 INJECTION INTRAVENOUS
Status: DISCONTINUED | OUTPATIENT
Start: 2022-03-03 | End: 2022-03-07 | Stop reason: HOSPADM

## 2022-03-03 RX ORDER — POTASSIUM CHLORIDE 1.5 G/1.77G
40 POWDER, FOR SOLUTION ORAL AS NEEDED
Status: DISCONTINUED | OUTPATIENT
Start: 2022-03-03 | End: 2022-03-07 | Stop reason: HOSPADM

## 2022-03-03 RX ORDER — POTASSIUM CHLORIDE 750 MG/1
40 CAPSULE, EXTENDED RELEASE ORAL AS NEEDED
Status: DISCONTINUED | OUTPATIENT
Start: 2022-03-03 | End: 2022-03-07 | Stop reason: HOSPADM

## 2022-03-03 RX ADMIN — Medication 10 ML: at 20:42

## 2022-03-03 RX ADMIN — BUMETANIDE 1 MG: 0.25 INJECTION INTRAMUSCULAR; INTRAVENOUS at 18:36

## 2022-03-03 RX ADMIN — BISOPROLOL FUMARATE 10 MG: 5 TABLET, FILM COATED ORAL at 08:30

## 2022-03-03 RX ADMIN — FERROUS SULFATE TAB 325 MG (65 MG ELEMENTAL FE) 325 MG: 325 (65 FE) TAB at 08:30

## 2022-03-03 RX ADMIN — BUMETANIDE 0.5 MG: 0.25 INJECTION INTRAMUSCULAR; INTRAVENOUS at 08:30

## 2022-03-03 RX ADMIN — ALPRAZOLAM 0.5 MG: 0.5 TABLET ORAL at 20:43

## 2022-03-03 RX ADMIN — PANTOPRAZOLE SODIUM 40 MG: 40 INJECTION, POWDER, FOR SOLUTION INTRAVENOUS at 09:38

## 2022-03-03 RX ADMIN — FERROUS SULFATE TAB 325 MG (65 MG ELEMENTAL FE) 325 MG: 325 (65 FE) TAB at 17:17

## 2022-03-03 RX ADMIN — HYDROCODONE BITARTRATE AND ACETAMINOPHEN 1 TABLET: 5; 325 TABLET ORAL at 20:43

## 2022-03-03 RX ADMIN — Medication 10 ML: at 08:31

## 2022-03-03 RX ADMIN — LEVOTHYROXINE SODIUM 75 MCG: 0.07 TABLET ORAL at 06:00

## 2022-03-03 RX ADMIN — POTASSIUM CHLORIDE 40 MEQ: 750 CAPSULE, EXTENDED RELEASE ORAL at 18:34

## 2022-03-03 RX ADMIN — POTASSIUM CHLORIDE 40 MEQ: 750 CAPSULE, EXTENDED RELEASE ORAL at 09:38

## 2022-03-03 RX ADMIN — DILTIAZEM HYDROCHLORIDE 180 MG: 180 CAPSULE, COATED, EXTENDED RELEASE ORAL at 08:30

## 2022-03-03 RX ADMIN — SERTRALINE 150 MG: 50 TABLET, FILM COATED ORAL at 08:30

## 2022-03-03 RX ADMIN — POTASSIUM CHLORIDE 40 MEQ: 750 CAPSULE, EXTENDED RELEASE ORAL at 15:13

## 2022-03-03 RX ADMIN — PANTOPRAZOLE SODIUM 40 MG: 40 INJECTION, POWDER, FOR SOLUTION INTRAVENOUS at 20:42

## 2022-03-03 NOTE — PLAN OF CARE
Goal Outcome Evaluation:  Plan of Care Reviewed With: patient        Progress: no change  Outcome Summary: Pt limited by increased fatigue this date, unable to sleep well last night. Pt declined OOB activity, performed face/hand washing with CHURCH and gave good effort for UE TE at bed level. OT encouraged incentive spirometer use.Continue per POC.

## 2022-03-03 NOTE — PLAN OF CARE
Goal Outcome Evaluation:  Plan of Care Reviewed With: patient        Progress: improving  Outcome Summary: Pt. performed bed mobility and sit to stand transfer with contact guard assist. She ambulated 50' w/front wheeled walker, contact guard assist. Gait limited by fatigue, SOA. Pt. tolerated ther-ex well. Reviewed posterior hip precautions. Will continue to progress as tolerated. Recommend SNF upon discharge.

## 2022-03-03 NOTE — SIGNIFICANT NOTE
- RRT called for respiratory distress.  - CXR shows worsened pulmonary edema  - administered extra dose of bumex, started low dose NTG gtt, added BiPAP, EKG, troponin, and ABG  - was anxious, now improved after bumex and BiPAP

## 2022-03-03 NOTE — PROGRESS NOTES
"GI Daily Progress Note  Subjective:    Chief Complaint: Follow-up anemia, FOBT positive stool    Patient resting up to chair eating lunch at time of exam.  Significant event overnight reviewed; noted patient developed pulmonary edema requiring aggressive diuresis and supplemental O2.  At time of exam, patient resting comfortably in no respiratory distress.  Reports she is voiding frequently.  Had a single dark stool this morning.  Hemoglobin has decreased somewhat throughout the day down to 8.7.    Objective:    /58 (BP Location: Left leg, Patient Position: Sitting)   Pulse 55   Temp 98.8 °F (37.1 °C) (Oral)   Resp 18   Ht 167.6 cm (66\")   Wt 61.2 kg (135 lb)   LMP  (LMP Unknown)   SpO2 98%   BMI 21.79 kg/m²     Physical Exam  Vitals and nursing note reviewed.   Constitutional:       General: She is not in acute distress.     Appearance: Normal appearance. She is normal weight. She is not ill-appearing or toxic-appearing.   HENT:      Head: Normocephalic and atraumatic.   Eyes:      General: No scleral icterus.     Extraocular Movements: Extraocular movements intact.      Conjunctiva/sclera: Conjunctivae normal.      Pupils: Pupils are equal, round, and reactive to light.   Cardiovascular:      Rate and Rhythm: Normal rate and regular rhythm.      Pulses: Normal pulses.      Heart sounds: Normal heart sounds.   Pulmonary:      Effort: Pulmonary effort is normal. No respiratory distress.      Breath sounds: Normal breath sounds.   Abdominal:      General: Abdomen is flat. Bowel sounds are normal. There is no distension.      Palpations: Abdomen is soft. There is no mass.      Tenderness: There is no abdominal tenderness. There is no guarding or rebound.      Hernia: No hernia is present.   Skin:     General: Skin is warm and dry.      Capillary Refill: Capillary refill takes less than 2 seconds.      Coloration: Skin is pale. Skin is not jaundiced.   Neurological:      General: No focal deficit present. "      Mental Status: She is alert and oriented to person, place, and time.   Psychiatric:         Mood and Affect: Mood normal.         Behavior: Behavior normal.         Thought Content: Thought content normal.         Judgment: Judgment normal.         Lab  I have personally reviewed most recent cardiac tracings, lab results and radiology images and interpretations and agree with findings.    Lab Results   Component Value Date    WBC 8.52 03/03/2022    HGB 8.7 (L) 03/03/2022    HGB 10.7 (L) 03/02/2022    HGB 9.3 (L) 03/02/2022    MCV 97.8 (H) 03/03/2022     03/03/2022    INR 1.03 02/07/2022    INR 1.08 08/23/2021       Lab Results   Component Value Date    GLUCOSE 110 (H) 03/03/2022    BUN 16 03/03/2022    CREATININE 0.80 03/03/2022    EGFRIFNONA 79 02/11/2022    BCR 20.0 03/03/2022     (L) 03/03/2022    K 3.1 (L) 03/03/2022    CO2 29.0 03/03/2022    CALCIUM 8.7 03/03/2022    ALBUMIN 3.20 (L) 03/03/2022    ALKPHOS 103 03/03/2022    BILITOT 0.5 03/03/2022    ALT 19 03/03/2022    AST 28 03/03/2022     Esophagogastroduodenoscopy on 3/2/2020  >>> 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  Was placed in prepyloric area.  Assessment:  1. Acute on chronic blood loss anemia, mild gastropathy noted on EGD  2. Iron deficiency  3. Anticoagulation use, on Eliquis  4. Recent hip fracture s/p open treatment of displaced femoral neck fracture with hemiarthroplasty on 2/7/22.  5. Acute on chronic diastolic heart failure   6. History of right colectomy, 2014   7.  Flash pulmonary edema overnight    Plan:  Doing well; events overnight with flash pulmonary edema noted.  Tolerating diet.  Hemoglobin remains stable.  >>> Continue to monitor H&H and transfuse for hemoglobins less than 7 or symptomatic anemia per protocol.  >>> GI prophylaxis with PPI  >>> Should significant bleeding occur, will need repeat EGD with RFA versus APC ablation.    Griffin Bianchi APRN  03/03/22  14:29 EST

## 2022-03-03 NOTE — PROGRESS NOTES
COPD Nurse Navigator apoke with patient at BS.  Patient alert and able to answer questions appropriately.  She states that she is just tired and sleepy.  She reports that the bipap machine did help her breathe better.  O2 sat 97% on 4L NC, home baseline is 4L.  No other questions or concerns att his time.  NN continues to follow.      Per current GOLD Standards, please consider: No LAMA/LABA/ICS in place, Outpatient PFT, Rehab as appropriate, Palliative care consult        Patient has been scheduled for a hospital follow up with [facility] for 3/16/2022 @ 1:30 pm with AMA Hayes.

## 2022-03-03 NOTE — CASE MANAGEMENT/SOCIAL WORK
Continued Stay Note  [facility]     Patient Name: Yin Law  MRN: [id number]  Today's Date: 3/3/2022    Admit Date: 2/28/2022     Discharge Plan     Row Name 03/03/22 1316       Plan    Plan TBD    Plan Comments Patient had a rapid response last night, and was placed on a diltiazem and nitroglycerin drip, and was placed back on bipap.  Patient is now off of all drips and bipap, on 4L O2 per NC.  PT/OT recommend either IRF or SNF. Patient was reluctant to answer on Tues.  CM will continue to follow.    Addendum - Spoke with Rebceca, [facility].  Rebecca confirmed that patient did recently stay with her, however patient left earlier than she should. Rebecca did not say that patient left AMA, instead that patient left prior to medical readiness and the doctors discharged her.  Rebecca stated that before she will accept patient back, patient will need to state that she plans to stay in [facility] until she is medically ready. Otherwise [facility] will not accept her back.     Final Discharge Disposition Code 30 - still a patient               Discharge Codes    No documentation.               Expected Discharge Date and Time     Expected Discharge Date Expected Discharge Time    Mar 7, 2022             Rajwinder Sanchez RN

## 2022-03-03 NOTE — PROGRESS NOTES
Psychiatric Medicine Services  PROGRESS NOTE    Patient Name: Yin Law  : 1940  MRN: [id number]    Date of Admission: 2022  Primary Care Physician: Santiago Chaudhry MD    Subjective   Subjective     CC:  Afib RVR/ anemia    HPI:  Patient resting in bed. Doing better this AM but had a rough night with dyspnea- RRT called due to pulm edema. She reports peeing significantly with bumex. Now doing well on 3-4L. No family present this AM    ROS:  Gen- No fevers, chills  CV- No chest pain, palpitations  Resp- No cough, dyspnea  GI- No N/V/D, abd pain         Objective   Objective     Vital Signs:   Temp:  [97.2 °F (36.2 °C)-98.7 °F (37.1 °C)] 98.4 °F (36.9 °C)  Heart Rate:  [] 57  Resp:  [16-34] 18  BP: ()/() 140/58  Flow (L/min):  [3-6] 4     Physical Exam:  GEN- appears chronically ill  HEENT- atraumatic, normocephalic, eomi  NECK- supple, trachea midline, no masses  RESP: on 4L, better effort today  CV: eyty40l, NSR  MSK: LE edema noted, spontaneous movement of all extremities  NEURO: alert, oriented, no focal deficits  SKIN: no rashes  PSYCH: appropriate mood and affect       Results Reviewed:  LAB RESULTS:      Lab 22  0358 22  2313 22  0716 22  1222 22  0448 22  0058 22  2240 22  2152   WBC 8.52 15.53* 9.60 8.77  --   --   --  17.78*   HEMOGLOBIN 8.7* 10.7* 9.3* 7.0*  --   --   --  8.1*   HEMATOCRIT 27.3* 34.4 29.7* 23.7* 22.2*  --   --  28.0*   PLATELETS 224 416 243 246  --   --   --  476*   NEUTROS ABS 6.99 9.11*  --  6.71  --   --   --  10.48*   IMMATURE GRANS (ABS) 0.02 0.07*  --  0.03  --   --   --  0.09*   LYMPHS ABS 0.82 4.46*  --  1.31  --   --   --  5.17*   MONOS ABS 0.64 1.57*  --  0.63  --   --   --  1.45*   EOS ABS 0.03 0.22  --  0.05  --   --   --  0.46*   MCV 97.8* 99.7* 99.7* 106.3*  --   --   --  108.9*   PROCALCITONIN  --  0.28*  --   --   --   --  0.06  --    LACTATE  --  1.8  --    --   --  1.4  --   --          Lab 03/03/22 0358 03/02/22 2313 03/02/22 0338 03/01/22 0448 02/28/22 2245 02/28/22  2240   SODIUM 132* 129* 139 143  --  139   POTASSIUM 3.1* 3.5 3.5 3.8  --  4.2   CHLORIDE 92* 89* 99 102  --  99   CO2 29.0 26.0 30.0* 31.0*  --  28.0   ANION GAP 11.0 14.0 10.0 10.0  --  12.0   BUN 16 14 16 23  --  22   CREATININE 0.80 0.86 0.74 0.78 1.00 0.91   EGFR 73.7 67.5 80.9 75.9  --  63.1   GLUCOSE 110* 218* 97 112*  --  269*   CALCIUM 8.7 9.2 8.9 8.8  --  9.0   MAGNESIUM  --   --  2.2 2.1  --   --          Lab 03/03/22 0358 03/02/22 2313 02/28/22  2240   TOTAL PROTEIN 5.8* 7.1 7.0   ALBUMIN 3.20* 3.90 3.90   GLOBULIN 2.6 3.2 3.1   ALT (SGPT) 19 27 14   AST (SGOT) 28 43* 27   BILIRUBIN 0.5 0.5 0.3   ALK PHOS 103 137* 130*         Lab 03/03/22 0358 03/02/22 2313 03/01/22 0448 03/01/22 0058 02/28/22  2240   PROBNP  --  14,378.0* 12,729.0* 8,352.0* 7,631.0*   TROPONIN T 0.083* 0.081* 0.029 0.011 0.051*             Lab 03/01/22 0448 03/01/22  0058   IRON  --  26*  26*   IRON SATURATION  --  8*   TIBC  --  341   TRANSFERRIN  --  229   FERRITIN  --  264.60*   FOLATE >20.00  --    VITAMIN B 12 1,009*  --    ABO TYPING O  --    RH TYPING Positive  --    ANTIBODY SCREEN Negative  --          Lab 03/02/22 2327 02/28/22 2342 02/28/22  2153   PH, ARTERIAL 7.303* 7.400 7.176*   PCO2, ARTERIAL 63.6* 47.4* 74.2*   PO2 .0* 94.4 438.0*   FIO2 50 40 100   HCO3 ART 31.5* 29.4* 27.4*   BASE EXCESS ART 3.8* 4.1* -1.5*   CARBOXYHEMOGLOBIN 1.5 2.4* 1.9     Brief Urine Lab Results  (Last result in the past 365 days)      Color   Clarity   Blood   Leuk Est   Nitrite   Protein   CREAT   Urine HCG        03/01/22 0115 Yellow   Clear   Negative   Negative   Negative   Negative                 Microbiology Results Abnormal     Procedure Component Value - Date/Time    Blood Culture - Blood, Arm, Left [[id number]]  (Normal) Collected: 03/01/22 0114    Lab Status: Preliminary result Specimen: Blood from  Arm, Left Updated: 03/03/22 0130     Blood Culture No growth at 2 days    Blood Culture - Blood, Arm, Left [[id number]]  (Normal) Collected: 03/01/22 0114    Lab Status: Preliminary result Specimen: Blood from Arm, Left Updated: 03/03/22 0130     Blood Culture No growth at 2 days    Respiratory Panel PCR w/COVID-19(SARS-CoV-2) CAS/SHALINI/KRISTY/PAD/COR/MAD/HAJA In-House, NP Swab in UTM/VTM, 3-4 HR TAT - Swab, Nasopharynx [[id number]]  (Normal) Collected: 03/01/22 0049    Lab Status: Final result Specimen: Swab from Nasopharynx Updated: 03/01/22 0203     ADENOVIRUS, PCR Not Detected     Coronavirus 229E Not Detected     Coronavirus HKU1 Not Detected     Coronavirus NL63 Not Detected     Coronavirus OC43 Not Detected     COVID19 Not Detected     Human Metapneumovirus Not Detected     Human Rhinovirus/Enterovirus Not Detected     Influenza A PCR Not Detected     Influenza B PCR Not Detected     Parainfluenza Virus 1 Not Detected     Parainfluenza Virus 2 Not Detected     Parainfluenza Virus 3 Not Detected     Parainfluenza Virus 4 Not Detected     RSV, PCR Not Detected     Bordetella pertussis pcr Not Detected     Bordetella parapertussis PCR Not Detected     Chlamydophila pneumoniae PCR Not Detected     Mycoplasma pneumo by PCR Not Detected    Narrative:      In the setting of a positive respiratory panel with a viral infection PLUS a negative procalcitonin without other underlying concern for bacterial infection, consider observing off antibiotics or discontinuation of antibiotics and continue supportive care. If the respiratory panel is positive for atypical bacterial infection (Bordetella pertussis, Chlamydophila pneumoniae, or Mycoplasma pneumoniae), consider antibiotic de-escalation to target atypical bacterial infection.          Adult Transthoracic Echo Complete W/ Cont if Necessary Per Protocol    Result Date: 3/1/2022  · Estimated left ventricular EF = 60% · Left ventricular wall thickness is consistent with mild  concentric hypertrophy. · Mild aortic valve stenosis is present. · Aortic valve maximum pressure gradient is 20 mmHg. Aortic valve mean pressure gradient is 10.4 mmHg. · Moderate mitral valve regurgitation is present. · Mild to moderate mitral valve stenosis is present. The mitral valve peak gradient is 15.8 mmHg. The mitral valve mean gradient is 6.5 mmHg. · Estimated right ventricular systolic pressure from tricuspid regurgitation is 50 mmHg. · Moderate tricuspid valve regurgitation is present. · There is a large left pleural effusion.      XR Chest 1 View    Result Date: 3/2/2022  Examination: AP view of the chest Indication: Shortness of breath Comparison: 2/28/2022 Findings: The cardiomediastinal silhouette is within normal size limits. Thoracic aorta calcifications are present. There is prominence of the central pulmonary vasculature with increased interstitial markings and small bilateral pleural effusions. No pneumothorax is identified. No acute osseous abnormality is identified.     Impression: Impression: 1. Increased interstitial markings in both lungs and small pleural effusions. Findings are compatible with interstitial pulmonary edema. 2. Central pulmonary vascular congestion. Electronically signed by:  Bryce Casarez M.D.  3/2/2022 9:39 PM Mountain Time    XR Chest 1 View    Result Date: 3/2/2022   DATE OF EXAM: 3/2/2022 11:35 AM  PROCEDURE: XR CHEST 1 VW-  INDICATIONS: soa; J81.0-Acute pulmonary edema; J96.01-Acute respiratory failure with hypoxia; J96.02-Acute respiratory failure with hypercapnia; I50.9-Heart failure, unspecified; D50.8-Other iron deficiency anemias  COMPARISON: 02/28/2022  TECHNIQUE: Portable chest radiograph.  FINDINGS:  Heart mildly enlarged, exaggerated by technique. There is no pneumothorax. There are moderate bilateral pleural effusions with bibasilar airspace disease likely atelectasis. There is central pulmonary vascular congestion stable from prior exam. No pneumothorax.  Calcified AP window lymph nodes related to granulomatous disease. Moderate emphysema.      Impression: 1. Moderate bilateral pleural effusions with bibasilar airspace disease likely compressive atelectasis, similar to recent chest CT. 2. Central pulmonary vascular congestion. 3. No pneumothorax.  This report was finalized on 3/2/2022 11:59 AM by Misha Mcdowell MD.      Duplex Venous Lower Extremity - Bilateral CAR    Result Date: 3/1/2022  · Normal bilateral lower extremity venous duplex scan.        Results for orders placed during the hospital encounter of 02/28/22    Adult Transthoracic Echo Complete W/ Cont if Necessary Per Protocol    Interpretation Summary  · Estimated left ventricular EF = 60%  · Left ventricular wall thickness is consistent with mild concentric hypertrophy.  · Mild aortic valve stenosis is present.  · Aortic valve maximum pressure gradient is 20 mmHg. Aortic valve mean pressure gradient is 10.4 mmHg.  · Moderate mitral valve regurgitation is present.  · Mild to moderate mitral valve stenosis is present. The mitral valve peak gradient is 15.8 mmHg. The mitral valve mean gradient is 6.5 mmHg.  · Estimated right ventricular systolic pressure from tricuspid regurgitation is 50 mmHg.  · Moderate tricuspid valve regurgitation is present.  · There is a large left pleural effusion.      I have reviewed the medications:  Scheduled Meds:bisoprolol, 10 mg, Oral, Q24H  bumetanide, 0.5 mg, Intravenous, Daily  dilTIAZem CD, 180 mg, Oral, Daily  ferrous sulfate, 325 mg, Oral, BID With Meals  levothyroxine, 75 mcg, Oral, Q AM  LORazepam, 0.25 mg, Intravenous, Once  pantoprazole, 40 mg, Intravenous, Q12H  sertraline, 150 mg, Oral, Daily  sodium chloride, 10 mL, Intravenous, Q12H      Continuous Infusions:nitroglycerin, 2.5-200 mcg/min      PRN Meds:.•  acetaminophen  •  ALPRAZolam  •  HYDROcodone-acetaminophen  •  ipratropium-albuterol  •  potassium chloride **OR** potassium chloride **OR** potassium  chloride  •  sodium chloride  •  sodium chloride    Assessment/Plan   Assessment & Plan     Active Hospital Problems    Diagnosis  POA   • **Flash pulmonary edema (HCC) [J81.0]  Yes   • Bilateral pleural effusion [J90]  Yes   • Elevated troponin [R77.8]  Yes   • Leukocytosis [D72.829]  Yes   • Atrial fibrillation with rapid ventricular response (HCC) [I48.91]  Yes   • Acute on chronic respiratory failure with hypoxia and hypercapnia (HCC) [J96.21, J96.22]  Yes   • Hypothyroidism (acquired) [E03.9]  Yes   • Mixed hyperlipidemia [E78.2]  Yes   • Paroxysmal atrial fibrillation (HCC) [I48.0]  Yes     Class: Acute   • Rheumatoid arthritis (HCC) [M06.9]  Yes   • Primary hypertension [I10]  Yes   • COPD on home oxygen (CMS/HCC) [J44.9]  Yes   • Anxiety and depression [F41.9, F32.A]  Yes   • Anemia [D64.9]  Yes      Resolved Hospital Problems   No resolved problems to display.        Brief Hospital Course to date:  Yin Law is a 81 y.o. female  With hx of diastolic CHF, COPD, HTN, bilateral pleural effusions s/p thoracentesis per pt, hyperlipidemia who is on 3-4 L O2 chronically presents tonight after calling 911 for shortness of breath.  Pt was reportedly very dyspneic and was transported here on high flow oxygen.  Pt states sx started today and were fairly sudden.     This patient's problems and plans were partially entered by my partner and updated as appropriate by me 03/03/22.          Afib with RVR  --cardiology follows, Patient sponatenously converted to NSR overnight. Continue oral dilt  --Of note, patient was discharged on low dose Eliquis both for DVT PPx following her hip repair as well as anticoagulation for PAF per Cardiology. She has not been taking this and states she does not want to per my partners d/w her. Given results of EGD will not resume, patient also does not want to resume any anticoagulation  --Weaned off BiPAP currently. Monitor.    Acute hypoxic RF  CHF exacerbation  Flash pulm  edema  --continue diuresis. Noted significant pleural effusions on imaging.   --RRT overnight 3/3 with extra dosing bumex, nitro gtt, BIPAP-- now improved, monitor. Off drip  --ECHO EF with 60%, mild/mod VHD    Anemia with +FOBT   --my partner d/w patient's PCP Dr Chaudhry regarding plan to get EGD done as outpatient, with ongoing drop in H/H and FOBT +, GI consulted and s/p EGD Mild gastropathy consistent with watermelon stomach. One small ulcer in prepyloric area oozing blood.  Clip applied. Recommended observation  --H/H down to 8.7 today- no s/s of bleeding per patient but will monitor closely    HTN  --monitor, continue   Depression  --continue sertraline  Hypothyroidism  --continue synthroid, TSH normal      DVT prophylaxis:  Mechanical DVT prophylaxis orders are present.       AM-PAC 6 Clicks Score (PT): 17 (03/03/22 0800)    Disposition: I expect the patient to be discharged pending above    CODE STATUS:   Code Status and Medical Interventions:   Ordered at: 03/01/22 0134     Code Status (Patient has no pulse and is not breathing):    CPR (Attempt to Resuscitate)     Medical Interventions (Patient has pulse or is breathing):    Full Support       Rebecca Berry MD  03/03/22

## 2022-03-03 NOTE — PROGRESS NOTES
"  [facility]   Inpatient Progress Note       LOS: 2 days   Patient Care Team:  Santiago Chaudhry MD as PCP - General  Santiago Chaudhry MD as PCP - Family Medicine    Chief Complaint: Pulmonary edema    Subjective     Interval History:   Patient in bed. Had worsened dyspnea overnight with worsened heart failure by chest xray. Responded well to diuresis. Denies chest pain.       Review of Systems:   Pertinent positives noted in history, exam, and assessment. Otherwise reviewed and negative.      Objective     Vitals:  Blood pressure 140/58, pulse 59, temperature 97.7 °F (36.5 °C), temperature source Oral, resp. rate 26, height 167.6 cm (66\"), weight 61.2 kg (135 lb), SpO2 100 %, not currently breastfeeding.     Intake/Output Summary (Last 24 hours) at 3/3/2022 0901  Last data filed at 3/3/2022 0500  Gross per 24 hour   Intake 200 ml   Output 2850 ml   Net -2650 ml     Vitals reviewed.   Constitutional:       Appearance: Well-developed. Frail. Chronically ill-appearing.   Neck:      Vascular: No JVD.      Trachea: No tracheal deviation.   Pulmonary:      Effort: Pulmonary effort is normal.      Comments: Decreased bases bilaterally  Cardiovascular:      Normal rate. Regular rhythm.   Pulses:     Intact distal pulses.   Edema:     Ankle: bilateral trace edema of the ankle.  Abdominal:      General: Bowel sounds are normal.      Palpations: Abdomen is soft.      Tenderness: There is no abdominal tenderness.   Musculoskeletal:         General: No deformity. Neurological:      Mental Status: Alert and oriented to person, place, and time.       I have examined the patient and agree with the above       Results Review:     I reviewed the patient's new clinical results.    Results from last 7 days   Lab Units 03/03/22  0358   WBC 10*3/mm3 8.52   HEMOGLOBIN g/dL 8.7*   HEMATOCRIT % 27.3*   PLATELETS 10*3/mm3 224     Results from last 7 days   Lab Units 03/03/22  0358   SODIUM mmol/L 132* "   POTASSIUM mmol/L 3.1*   CHLORIDE mmol/L 92*   CO2 mmol/L 29.0   BUN mg/dL 16   CREATININE mg/dL 0.80   CALCIUM mg/dL 8.7   BILIRUBIN mg/dL 0.5   ALK PHOS U/L 103   ALT (SGPT) U/L 19   AST (SGOT) U/L 28   GLUCOSE mg/dL 110*     Results from last 7 days   Lab Units 03/03/22  0358   SODIUM mmol/L 132*   POTASSIUM mmol/L 3.1*   CHLORIDE mmol/L 92*   CO2 mmol/L 29.0   BUN mg/dL 16   CREATININE mg/dL 0.80   GLUCOSE mg/dL 110*   CALCIUM mg/dL 8.7         No results found for: TROPONINI          Results from last 7 days   Lab Units 03/02/22  2313   PROBNP pg/mL 14,378.0*     Echo:  · Estimated left ventricular EF = 60%  · Left ventricular wall thickness is consistent with mild concentric hypertrophy.  · Mild aortic valve stenosis is present.  · Aortic valve maximum pressure gradient is 20 mmHg. Aortic valve mean pressure gradient is 10.4 mmHg.  · Moderate mitral valve regurgitation is present.  · Mild to moderate mitral valve stenosis is present. The mitral valve peak gradient is 15.8 mmHg. The mitral valve mean gradient is 6.5 mmHg.  · Estimated right ventricular systolic pressure from tricuspid regurgitation is 50 mmHg.  · Moderate tricuspid valve regurgitation is present.  · There is a large left pleural effusion.      Tele:  SB    Assessment/Plan       Flash pulmonary edema (HCC)    Paroxysmal atrial fibrillation (HCC)    COPD on home oxygen (CMS/HCC)    Primary hypertension    Rheumatoid arthritis (HCC)    Anxiety and depression    Anemia    Mixed hyperlipidemia    Bilateral pleural effusion    Elevated troponin    Leukocytosis    Atrial fibrillation with rapid ventricular response (HCC)    Acute on chronic respiratory failure with hypoxia and hypercapnia (HCC)    Hypothyroidism (acquired)      1. pulmonary edema  1. Worsened edema last evening treated with diuretics.  2. Normal LVEF, moderate valvular heart disease.  2. Paroxysmal atrial fibrillation  1. Currently in SR.  2. Concern of previous amiodarone  toxicity.  3. Patient feels she is asymptomatic with AF episodes.  3. Hypertension  4. COPD  5. Hypokalemia      Plan:  · Unclear at this time if volume overload related to possible ischemia versus recurrent AF with RVR, however valvular heart disease is not enough to explain her symptoms.  Possible diastolic as well.  · Needs more aggressive diuresis, Will give an extra 1mg of Bumex IV today.  · No clear correlation to A. fib and heart failure (no clear worsening of heart failure when in A. fib, but her heart failure may be causing her intermittent A. fib).  Therefore simply rate control for now.  If A. fib clearly becomes an issue with rate control, will need AV node ablation/pacemaker.  (Failed antiarrhythmics)  · Replace K+ (replacement protocol is already ordered).    AMA Arora    Dictated utilizing Dragon dictation  I have seen and examined the patient, I have reviewed the note, discussed the case with the advance practice clinician, made necessary changes and I agree with the final note.    He Montoya MD  03/03/22  15:15 EST

## 2022-03-03 NOTE — THERAPY TREATMENT NOTE
Patient Name: Yin Law  : 1940    MRN: [id number]                              Today's Date: 3/3/2022       Admit Date: 2022    Visit Dx:     ICD-10-CM ICD-9-CM   1. Flash pulmonary edema (HCC)  J81.0 518.4   2. Acute respiratory failure with hypoxia and hypercapnia (HCC)  J96.01 518.81    J96.02    3. Acute on chronic congestive heart failure, unspecified heart failure type (HCC)  I50.9 428.0   4. Other iron deficiency anemia  D50.8 280.8     Patient Active Problem List   Diagnosis   • Paroxysmal atrial fibrillation (HCC)   • COPD on home oxygen (CMS/HCC)   • Primary hypertension   • Rheumatoid arthritis (HCC)   • Anxiety and depression   • Wound of right leg   • Anemia   • Severe malnutrition (CMS/HCC)   • Hip fracture (HCC)   • Mixed hyperlipidemia   • Heart valve disease   • Flash pulmonary edema (HCC)   • Bilateral pleural effusion   • Elevated troponin   • Leukocytosis   • Atrial fibrillation with rapid ventricular response (HCC)   • Acute on chronic respiratory failure with hypoxia and hypercapnia (HCC)   • Hypothyroidism (acquired)     Past Medical History:   Diagnosis Date   • Accelerated hypertension 2021   • Acute diastolic CHF (CMS/HCC) 2021   • Anxiety and depression    • Arrhythmia     A-FIB   • Asthma    • Chicken pox    • COPD (chronic obstructive pulmonary disease) (HCC)    • Elevated troponin 2021   • Hyperlipidemia    • Hypertension    • Measles    • Menopause    • Multifocal pneumonia 2021   • Osteoporosis    • Pleural effusion, bilateral 2021   • Pneumonia due to infectious organism 2021   • Rheumatoid arthritis (HCC)    • Rheumatoid arthritis (HCC)    • Sepsis (HCC) 2021   • Tobacco abuse      Past Surgical History:   Procedure Laterality Date   • APPENDECTOMY     • CARPAL TUNNEL RELEASE Left    • CATARACT EXTRACTION, BILATERAL     • COLON SURGERY     • COLONOSCOPY     • ENDOSCOPY N/A 3/2/2022    Procedure: ESOPHAGOGASTRODUODENOSCOPY;   Surgeon: Sammy Lyles MD;  Location: [facility] ENDOSCOPY;  Service: Gastroenterology;  Laterality: N/A;   • GALLBLADDER SURGERY     • HIP HEMIARTHROPLASTY Left 2/7/2022    Procedure: HIP HEMIARTHROPLASTY LEFT;  Surgeon: Napoleon Powell Jr., MD;  Location: [facility] OR;  Service: Orthopedics;  Laterality: Left;   • HYSTERECTOMY  1971   • TONSILLECTOMY        General Information     Row Name 03/03/22 1457          Physical Therapy Time and Intention    Document Type therapy note (daily note)  -     Mode of Treatment physical therapy  -     Row Name 03/03/22 1457          General Information    Patient Profile Reviewed yes  -SS     Existing Precautions/Restrictions fall; cardiac; left; hip, posterior; oxygen therapy device and L/min; other (see comments)  LLE WBAT, posterior hip precautions  -     Barriers to Rehab medically complex; previous functional deficit  -     Row Name 03/03/22 1457          Cognition    Orientation Status (Cognition) oriented x 3  -     Row Name 03/03/22 1457          Safety Issues, Functional Mobility    Safety Issues Affecting Function (Mobility) insight into deficits/self-awareness; awareness of need for assistance; positioning of assistive device; problem-solving; safety precaution awareness; safety precautions follow-through/compliance; sequencing abilities  -     Impairments Affecting Function (Mobility) balance; endurance/activity tolerance; postural/trunk control; range of motion (ROM); shortness of breath; strength  -SS           User Key  (r) = Recorded By, (t) = Taken By, (c) = Cosigned By    Initials Name Provider Type     Rebecca Zamudio PT Physical Therapist               Mobility     Row Name 03/03/22 1458          Bed Mobility    Bed Mobility supine-sit; scooting/bridging  -     Scooting/Bridging [place] (Bed Mobility) contact guard; verbal cues  -     Supine-Sit [place] (Bed Mobility) verbal cues; contact guard  -     Assistive Device (Bed  Mobility) head of bed elevated; bed rails  -     Comment (Bed Mobility) VC for hand placement, sequencing, maintaining posterior hip precautions  -     Row Name 03/03/22 1458          Transfers    Comment (Transfers) VC for hand placement, appropriate alignment, stepping out LLE, maintaining posterior hip precautions  -     Row Name 03/03/22 1458          Sit-Stand Transfer    Sit-Stand [place] (Transfers) verbal cues; contact guard  -     Assistive Device (Sit-Stand Transfers) walker, front-wheeled  -SS     Row Name 03/03/22 1458          Gait/Stairs (Locomotion)    [place] Level (Gait) verbal cues; contact guard  -SS     Assistive Device (Gait) walker, front-wheeled  -     Distance in Feet (Gait) 50  -SS     Deviations/Abnormal Patterns (Gait) bilateral deviations; cindi decreased; gait speed decreased; stride length decreased; left sided deviations  -     Bilateral Gait Deviations forward flexed posture; heel strike decreased  -     Left Sided Gait Deviations weight shift ability decreased  -     Comment (Gait/Stairs) Pt. ambulated with a step through gait pattern. VC for upright posture, controlled breathing, safety recommendations. Gait limited by fatigue, SOA. O2 desat to 87% on 4L NC  -     Row Name 03/03/22 1458          Mobility    Extremity Weight-bearing Status left lower extremity  -     Left Lower Extremity (Weight-bearing Status) weight-bearing as tolerated (WBAT)  -           User Key  (r) = Recorded By, (t) = Taken By, (c) = Cosigned By    Initials Name Provider Type     Rebecca Zamudio PT Physical Therapist               Obj/Interventions     Row Name 03/03/22 1502          Motor Skills    Therapeutic Exercise hip; knee; ankle  -     Row Name 03/03/22 1502          Hip (Therapeutic Exercise)    Hip (Therapeutic Exercise) strengthening exercise; isometric exercises  -     Hip Isometrics (Therapeutic Exercise) bilateral; gluteal sets; 10 repetitions  -     Hip  Strengthening (Therapeutic Exercise) bilateral; aBduction; aDduction; 10 repetitions  -     Row Name 03/03/22 1502          Knee (Therapeutic Exercise)    Knee (Therapeutic Exercise) strengthening exercise; isometric exercises  -     Knee Isometrics (Therapeutic Exercise) bilateral; quad sets; 10 repetitions  -     Knee Strengthening (Therapeutic Exercise) heel slides; bilateral; 10 repetitions  -     Row Name 03/03/22 1502          Ankle (Therapeutic Exercise)    Ankle (Therapeutic Exercise) AROM (active range of motion)  -     Ankle AROM (Therapeutic Exercise) bilateral; dorsiflexion; plantarflexion; 10 repetitions  -     Row Name 03/03/22 1502          Balance    Balance Assessment sitting static balance; sitting dynamic balance; standing static balance; standing dynamic balance  -     Static Sitting Balance WFL; unsupported; sitting in chair  -     Dynamic Sitting Balance WFL; unsupported; sitting in chair  -     Static Standing Balance WFL; supported  -     Dynamic Standing Balance mild impairment; supported  -     Balance Interventions sitting; standing; sit to stand; supported; static; dynamic  -           User Key  (r) = Recorded By, (t) = Taken By, (c) = Cosigned By    Initials Name Provider Type     Rebecca Zamudio, PT Physical Therapist               Goals/Plan    No documentation.                Clinical Impression     Row Name 03/03/22 1504          Pain    Additional Documentation Pain Scale: Numbers Pre/Post-Treatment (Group)  -[facility] Name 03/03/22 1504          Pain Scale: Numbers Pre/Post-Treatment    Pretreatment Pain Rating 0/10 - no pain  -     Posttreatment Pain Rating 0/10 - no pain  -     Pain Intervention(s) Repositioned; Ambulation/increased activity  -     Row Name 03/03/22 1508          Plan of Care Review    Plan of Care Reviewed With patient  -     Progress improving  -     Outcome Summary Pt. performed bed mobility and sit to stand transfer with  contact guard assist. She ambulated 50' w/front wheeled walker, contact guard assist. Gait limited by fatigue, SOA. Pt. tolerated ther-ex well. Reviewed posterior hip precautions. Will continue to progress as tolerated. Recommend SNF upon discharge.  -[facility] Name 03/03/22 1504          Therapy Assessment/Plan (PT)    Rehab Potential (PT) fair, will monitor progress closely  -     Criteria for Skilled Interventions Met (PT) yes; meets criteria; skilled treatment is necessary  -[facility] Name 03/03/22 1504          Vital Signs    Pre Systolic BP Rehab 136  -SS     Pre Treatment Diastolic BP 56  -SS     Post Systolic BP Rehab 152  -SS     Post Treatment Diastolic BP 55  -SS     Pretreatment Heart Rate (beats/min) 60  -SS     Posttreatment Heart Rate (beats/min) 60  -SS     Pre SpO2 (%) 98  -SS     O2 Delivery Pre Treatment nasal cannula  4L  -SS     Intra SpO2 (%) 87  -SS     O2 Delivery Intra Treatment nasal cannula  4L  -SS     Post SpO2 (%) 95  -SS     O2 Delivery Post Treatment nasal cannula  4L  -SS     Pre Patient Position Supine  -     Intra Patient Position Standing  -SS     Post Patient Position Sitting  -[facility] Name 03/03/22 1504          Positioning and Restraints    Pre-Treatment Position in bed  -SS     Post Treatment Position chair  -SS     In Chair notified nsg; reclined; call light within reach; encouraged to call for assist; exit alarm on; waffle cushion; legs elevated; heels elevated  -           User Key  (r) = Recorded By, (t) = Taken By, (c) = Cosigned By    Initials Name Provider Type     Rebecca Zamudio, PT Physical Therapist               Outcome Measures     [facility] Name 03/03/22 1507 03/03/22 0800       How much help from another person do you currently need...    Turning from your back to your side while in flat bed without using bedrails? 3  -SS 3  -HG    Moving from lying on back to sitting on the side of a flat bed without bedrails? 3  -SS 3  -HG    Moving to and from a bed to  a chair (including a wheelchair)? 3  -SS 3  -HG    Standing up from a chair using your arms (e.g., wheelchair, bedside chair)? 3  -SS 3  -HG    Climbing 3-5 steps with a railing? 3  -SS 2  -HG    To walk in hospital room? 3  -SS 3  -HG    AM-PAC 6 Clicks Score (PT) 18  - 17  -HG    Row Name 03/03/22 1507 03/03/22 1435       Functional Assessment    Outcome Measure Options AM-PAC 6 Clicks Basic Mobility (PT)  -SS AM-PAC 6 Clicks Daily Activity (OT)  -SHIV[facility]          User Key  (r) = Recorded By, (t) = Taken By, (c) = Cosigned By    Initials Name Provider Type     Mellisa Fontana, RN Registered Nurse    Katelynn Park, OT Occupational Therapist    Rebecca Flannery, PT Physical Therapist                             Physical Therapy Education                 Title: PT OT SLP Therapies (Done)     Topic: Physical Therapy (Done)     Point: Mobility training (Done)     Learning Progress Summary           Patient Eager, E, VU,NR by  at 3/3/2022 1507    Comment: Reviewed safety/technique with bed mobility, transfers, ambulation, HEP, PT POC, posterior hip precautions    Eager, E, VU,NR by  at 3/2/2022 1419    Comment: Reviewed safety/technique with bed mobility, transfers, ambulation, HEP, PT POC, posterior hip precautions    Acceptance, E,D, VU,NR by  at 3/1/2022 1102    Comment: Educated on posterior hip precautions, weight bearing status, benefits of mobility and being OOB, LE HEP, correct supine to sit t/f technique, correct sit<->stand t/f technique, correct gait mechanics, and progression of POC                   Point: Home exercise program (Done)     Learning Progress Summary           Patient Eager, E, VU,NR by  at 3/3/2022 1507    Comment: Reviewed safety/technique with bed mobility, transfers, ambulation, HEP, PT POC, posterior hip precautions    Eager, E, VU,NR by  at 3/2/2022 1419    Comment: Reviewed safety/technique with bed mobility, transfers, ambulation, HEP, PT POC, posterior hip precautions     Acceptance, E,D, VU,NR by LR at 3/1/2022 1102    Comment: Educated on posterior hip precautions, weight bearing status, benefits of mobility and being OOB, LE HEP, correct supine to sit t/f technique, correct sit<->stand t/f technique, correct gait mechanics, and progression of POC                   Point: Body mechanics (Done)     Learning Progress Summary           Patient Eager, E, VU,NR by SS at 3/3/2022 1507    Comment: Reviewed safety/technique with bed mobility, transfers, ambulation, HEP, PT POC, posterior hip precautions    Eager, E, VU,NR by SS at 3/2/2022 1419    Comment: Reviewed safety/technique with bed mobility, transfers, ambulation, HEP, PT POC, posterior hip precautions    Acceptance, E,D, VU,NR by LR at 3/1/2022 1102    Comment: Educated on posterior hip precautions, weight bearing status, benefits of mobility and being OOB, LE HEP, correct supine to sit t/f technique, correct sit<->stand t/f technique, correct gait mechanics, and progression of POC                   Point: Precautions (Done)     Learning Progress Summary           Patient Eager, E, VU,NR by SS at 3/3/2022 1507    Comment: Reviewed safety/technique with bed mobility, transfers, ambulation, HEP, PT POC, posterior hip precautions    Eager, E, VU,NR by SS at 3/2/2022 1419    Comment: Reviewed safety/technique with bed mobility, transfers, ambulation, HEP, PT POC, posterior hip precautions    Acceptance, E,D, VU,NR by LR at 3/1/2022 1102    Comment: Educated on posterior hip precautions, weight bearing status, benefits of mobility and being OOB, LE HEP, correct supine to sit t/f technique, correct sit<->stand t/f technique, correct gait mechanics, and progression of POC                               User Key     Initials Effective Dates Name Provider Type Discipline    LR 06/16/21 -  Leena Sahu, PT Physical Therapist PT     06/01/21 -  Rebecca Zamudio PT Physical Therapist PT              PT Recommendation and Plan      Plan of Care Reviewed With: patient  Progress: improving  Outcome Summary: Pt. performed bed mobility and sit to stand transfer with contact guard assist. She ambulated 50' w/front wheeled walker, contact guard assist. Gait limited by fatigue, SOA. Pt. tolerated ther-ex well. Reviewed posterior hip precautions. Will continue to progress as tolerated. Recommend SNF upon discharge.     Time Calculation:    PT Charges     Row Name 03/03/22 1508             Time Calculation    Start Time 1433  -SS      Stop Time 1459  -SS      Time Calculation (min) 26 min  -SS      PT Received On 03/03/22  -SS              Time Calculation- PT    Total Timed Code Minutes- PT 26 minute(s)  -SS              Timed Charges    12711 - PT Therapeutic Exercise Minutes 10  -SS      76814 - Gait Training Minutes  10  -SS      41965 - PT Therapeutic Activity Minutes 6  -SS              Total Minutes    Timed Charges Total Minutes 26  -SS       Total Minutes 26  -SS            User Key  (r) = Recorded By, (t) = Taken By, (c) = Cosigned By    Initials Name Provider Type    SS Rebecca Zamudio, PT Physical Therapist              Therapy Charges for Today     Code Description Service Date Service Provider Modifiers Qty    92737178490 HC PT THER PROC EA 15 MIN 3/2/2022 Rebecca Zamudio, PT GP 1    76881123312 HC PT THERAPEUTIC ACT EA 15 MIN 3/2/2022 Rebecca Zamudio, PT GP 1    69291901464 HC PT THER PROC EA 15 MIN 3/3/2022 Rebecca Zamudio, PT GP 1    50317385529 HC GAIT TRAINING EA 15 MIN 3/3/2022 Rebecca Zamudio, PT GP 1          PT G-Codes  Outcome Measure Options: AM-PAC 6 Clicks Basic Mobility (PT)  AM-PAC 6 Clicks Score (PT): 18  AM-PAC 6 Clicks Score (OT): 14    Rebecca Zamudio PT  3/3/2022

## 2022-03-04 LAB
ALBUMIN SERPL-MCNC: 3 G/DL (ref 3.5–5.2)
ALBUMIN/GLOB SERPL: 1.2 G/DL
ALP SERPL-CCNC: 95 U/L (ref 39–117)
ALT SERPL W P-5'-P-CCNC: 14 U/L (ref 1–33)
ANION GAP SERPL CALCULATED.3IONS-SCNC: 8 MMOL/L (ref 5–15)
AST SERPL-CCNC: 19 U/L (ref 1–32)
BASOPHILS # BLD AUTO: 0.03 10*3/MM3 (ref 0–0.2)
BASOPHILS NFR BLD AUTO: 0.5 % (ref 0–1.5)
BILIRUB SERPL-MCNC: 0.4 MG/DL (ref 0–1.2)
BUN SERPL-MCNC: 18 MG/DL (ref 8–23)
BUN/CREAT SERPL: 23.7 (ref 7–25)
CALCIUM SPEC-SCNC: 8.8 MG/DL (ref 8.6–10.5)
CHLORIDE SERPL-SCNC: 100 MMOL/L (ref 98–107)
CO2 SERPL-SCNC: 29 MMOL/L (ref 22–29)
CREAT SERPL-MCNC: 0.76 MG/DL (ref 0.57–1)
DEPRECATED RDW RBC AUTO: 57.5 FL (ref 37–54)
EGFRCR SERPLBLD CKD-EPI 2021: 78.3 ML/MIN/1.73
EOSINOPHIL # BLD AUTO: 0.24 10*3/MM3 (ref 0–0.4)
EOSINOPHIL NFR BLD AUTO: 4.1 % (ref 0.3–6.2)
ERYTHROCYTE [DISTWIDTH] IN BLOOD BY AUTOMATED COUNT: 15.8 % (ref 12.3–15.4)
GLOBULIN UR ELPH-MCNC: 2.6 GM/DL
GLUCOSE SERPL-MCNC: 84 MG/DL (ref 65–99)
HCT VFR BLD AUTO: 27.4 % (ref 34–46.6)
HGB BLD-MCNC: 8.5 G/DL (ref 12–15.9)
IMM GRANULOCYTES # BLD AUTO: 0.02 10*3/MM3 (ref 0–0.05)
IMM GRANULOCYTES NFR BLD AUTO: 0.3 % (ref 0–0.5)
LYMPHOCYTES # BLD AUTO: 1.5 10*3/MM3 (ref 0.7–3.1)
LYMPHOCYTES NFR BLD AUTO: 25.3 % (ref 19.6–45.3)
MCH RBC QN AUTO: 30.9 PG (ref 26.6–33)
MCHC RBC AUTO-ENTMCNC: 31 G/DL (ref 31.5–35.7)
MCV RBC AUTO: 99.6 FL (ref 79–97)
MONOCYTES # BLD AUTO: 0.77 10*3/MM3 (ref 0.1–0.9)
MONOCYTES NFR BLD AUTO: 13 % (ref 5–12)
NEUTROPHILS NFR BLD AUTO: 3.36 10*3/MM3 (ref 1.7–7)
NEUTROPHILS NFR BLD AUTO: 56.8 % (ref 42.7–76)
NRBC BLD AUTO-RTO: 0 /100 WBC (ref 0–0.2)
PLATELET # BLD AUTO: 204 10*3/MM3 (ref 140–450)
PMV BLD AUTO: 10.6 FL (ref 6–12)
POTASSIUM SERPL-SCNC: 4.2 MMOL/L (ref 3.5–5.2)
PROT SERPL-MCNC: 5.6 G/DL (ref 6–8.5)
RBC # BLD AUTO: 2.75 10*6/MM3 (ref 3.77–5.28)
SODIUM SERPL-SCNC: 137 MMOL/L (ref 136–145)
WBC NRBC COR # BLD: 5.92 10*3/MM3 (ref 3.4–10.8)

## 2022-03-04 PROCEDURE — 80053 COMPREHEN METABOLIC PANEL: CPT | Performed by: INTERNAL MEDICINE

## 2022-03-04 PROCEDURE — 97530 THERAPEUTIC ACTIVITIES: CPT

## 2022-03-04 PROCEDURE — 97116 GAIT TRAINING THERAPY: CPT

## 2022-03-04 PROCEDURE — 99232 SBSQ HOSP IP/OBS MODERATE 35: CPT | Performed by: INTERNAL MEDICINE

## 2022-03-04 PROCEDURE — 85025 COMPLETE CBC W/AUTO DIFF WBC: CPT | Performed by: INTERNAL MEDICINE

## 2022-03-04 PROCEDURE — 99232 SBSQ HOSP IP/OBS MODERATE 35: CPT | Performed by: NURSE PRACTITIONER

## 2022-03-04 PROCEDURE — 99232 SBSQ HOSP IP/OBS MODERATE 35: CPT | Performed by: PHYSICIAN ASSISTANT

## 2022-03-04 RX ORDER — BUMETANIDE 0.25 MG/ML
1 INJECTION INTRAMUSCULAR; INTRAVENOUS DAILY
Status: DISCONTINUED | OUTPATIENT
Start: 2022-03-05 | End: 2022-03-06

## 2022-03-04 RX ORDER — PANTOPRAZOLE SODIUM 40 MG/1
40 TABLET, DELAYED RELEASE ORAL
Status: DISCONTINUED | OUTPATIENT
Start: 2022-03-04 | End: 2022-03-07 | Stop reason: HOSPADM

## 2022-03-04 RX ORDER — FERROUS SULFATE 325(65) MG
325 TABLET ORAL EVERY OTHER DAY
Status: DISCONTINUED | OUTPATIENT
Start: 2022-03-05 | End: 2022-03-07 | Stop reason: HOSPADM

## 2022-03-04 RX ADMIN — HYDROCODONE BITARTRATE AND ACETAMINOPHEN 1 TABLET: 5; 325 TABLET ORAL at 20:17

## 2022-03-04 RX ADMIN — Medication 10 ML: at 08:41

## 2022-03-04 RX ADMIN — BUMETANIDE 0.5 MG: 0.25 INJECTION INTRAMUSCULAR; INTRAVENOUS at 08:41

## 2022-03-04 RX ADMIN — BISOPROLOL FUMARATE 10 MG: 5 TABLET, FILM COATED ORAL at 08:40

## 2022-03-04 RX ADMIN — SERTRALINE 150 MG: 50 TABLET, FILM COATED ORAL at 08:40

## 2022-03-04 RX ADMIN — PANTOPRAZOLE SODIUM 40 MG: 40 INJECTION, POWDER, FOR SOLUTION INTRAVENOUS at 08:39

## 2022-03-04 RX ADMIN — LEVOTHYROXINE SODIUM 75 MCG: 0.07 TABLET ORAL at 05:30

## 2022-03-04 RX ADMIN — DILTIAZEM HYDROCHLORIDE 180 MG: 180 CAPSULE, COATED, EXTENDED RELEASE ORAL at 08:40

## 2022-03-04 RX ADMIN — ALPRAZOLAM 0.5 MG: 0.5 TABLET ORAL at 20:17

## 2022-03-04 RX ADMIN — Medication 10 ML: at 20:14

## 2022-03-04 RX ADMIN — FERROUS SULFATE TAB 325 MG (65 MG ELEMENTAL FE) 325 MG: 325 (65 FE) TAB at 08:40

## 2022-03-04 RX ADMIN — PANTOPRAZOLE SODIUM 40 MG: 40 TABLET, DELAYED RELEASE ORAL at 17:38

## 2022-03-04 NOTE — THERAPY TREATMENT NOTE
Patient Name: Yin Law  : 1940    MRN: [id number]                              Today's Date: 3/4/2022       Admit Date: 2022    Visit Dx:     ICD-10-CM ICD-9-CM   1. Flash pulmonary edema (HCC)  J81.0 518.4   2. Acute respiratory failure with hypoxia and hypercapnia (HCC)  J96.01 518.81    J96.02    3. Acute on chronic congestive heart failure, unspecified heart failure type (HCC)  I50.9 428.0   4. Other iron deficiency anemia  D50.8 280.8     Patient Active Problem List   Diagnosis   • Paroxysmal atrial fibrillation (HCC)   • COPD on home oxygen (CMS/HCC)   • Primary hypertension   • Rheumatoid arthritis (HCC)   • Anxiety and depression   • Wound of right leg   • Anemia   • Severe malnutrition (CMS/HCC)   • Hip fracture (HCC)   • Mixed hyperlipidemia   • Heart valve disease   • Flash pulmonary edema (HCC)   • Bilateral pleural effusion   • Elevated troponin   • Leukocytosis   • Atrial fibrillation with rapid ventricular response (HCC)   • Acute on chronic respiratory failure with hypoxia and hypercapnia (HCC)   • Hypothyroidism (acquired)     Past Medical History:   Diagnosis Date   • Accelerated hypertension 2021   • Acute diastolic CHF (CMS/HCC) 2021   • Anxiety and depression    • Arrhythmia     A-FIB   • Asthma    • Chicken pox    • COPD (chronic obstructive pulmonary disease) (HCC)    • Elevated troponin 2021   • Hyperlipidemia    • Hypertension    • Measles    • Menopause    • Multifocal pneumonia 2021   • Osteoporosis    • Pleural effusion, bilateral 2021   • Pneumonia due to infectious organism 2021   • Rheumatoid arthritis (HCC)    • Rheumatoid arthritis (HCC)    • Sepsis (HCC) 2021   • Tobacco abuse      Past Surgical History:   Procedure Laterality Date   • APPENDECTOMY     • CARPAL TUNNEL RELEASE Left    • CATARACT EXTRACTION, BILATERAL     • COLON SURGERY     • COLONOSCOPY     • ENDOSCOPY N/A 3/2/2022    Procedure: ESOPHAGOGASTRODUODENOSCOPY;   Surgeon: Sammy Lyles MD;  Location: [facility] ENDOSCOPY;  Service: Gastroenterology;  Laterality: N/A;   • GALLBLADDER SURGERY     • HIP HEMIARTHROPLASTY Left 2/7/2022    Procedure: HIP HEMIARTHROPLASTY LEFT;  Surgeon: Napoleon Powell Jr., MD;  Location: [facility] OR;  Service: Orthopedics;  Laterality: Left;   • HYSTERECTOMY  1971   • TONSILLECTOMY        General Information     Row Name 03/04/22 1026          Physical Therapy Time and Intention    Document Type therapy note (daily note)  -     Mode of Treatment physical therapy  -     Row Name 03/04/22 1026          General Information    Patient Profile Reviewed yes  -LO     Existing Precautions/Restrictions fall; cardiac; left; hip, posterior; oxygen therapy device and L/min; other (see comments)  -     Row Name 03/04/22 1026          Cognition    Orientation Status (Cognition) oriented x 3  -LO     Row Name 03/04/22 1026          Safety Issues, Functional Mobility    Impairments Affecting Function (Mobility) balance; endurance/activity tolerance; postural/trunk control; range of motion (ROM); shortness of breath; strength  -LO           User Key  (r) = Recorded By, (t) = Taken By, (c) = Cosigned By    Initials Name Provider Type    LO Jacqueline Vidal PT Physical Therapist               Mobility     Row Name 03/04/22 1028          Bed Mobility    Bed Mobility supine-sit; scooting/bridging  -LO     Scooting/Bridging Rich (Bed Mobility) contact guard; verbal cues  -LO     Supine-Sit Rich (Bed Mobility) verbal cues; contact guard  -LO     Sit-Supine Rich (Bed Mobility) verbal cues; contact guard  -LO     Assistive Device (Bed Mobility) head of bed elevated; bed rails  -LO     Comment (Bed Mobility) vc for sequencing, follows all hip precautions without cuing  -LO     Row Name 03/04/22 1028          Transfers    Comment (Transfers) Patient requires vc for sequencing, follows hip precautions well without cuing. EOB>FWW>BSC>recliner  -LO      Row Name 03/04/22 1028          Bed-Chair Transfer    Bed-Chair [place] (Transfers) verbal cues; minimum assist (75% patient effort)  -LO     Assistive Device (Bed-Chair Transfers) walker, front-wheeled  -LO     Row Name 03/04/22 1028          Sit-Stand Transfer    Sit-Stand [place] (Transfers) verbal cues; contact guard  -LO     Assistive Device (Sit-Stand Transfers) walker, front-wheeled  -LO     Row Name 03/04/22 1028          Gait/Stairs (Locomotion)    [place] Level (Gait) verbal cues; contact guard  -LO     Assistive Device (Gait) walker, front-wheeled  -LO     Distance in Feet (Gait) 50  -LO     Deviations/Abnormal Patterns (Gait) bilateral deviations; cindi decreased; gait speed decreased; stride length decreased; left sided deviations  -LO     Bilateral Gait Deviations forward flexed posture; heel strike decreased  -LO     Left Sided Gait Deviations weight shift ability decreased  -LO     [place] Level (Stairs) not tested  -LO     Comment (Gait/Stairs) Ambulates in room with FWW CGA with vc for postural alignment and breathing technique. Patient desats to 85% on 3 liters of O2 but recovers to > 90% with 1-2 min of rest.  -LO     Row Name 03/04/22 1028          Mobility    Extremity Weight-bearing Status left lower extremity  -LO     Left Lower Extremity (Weight-bearing Status) weight-bearing as tolerated (WBAT)  -LO           User Key  (r) = Recorded By, (t) = Taken By, (c) = Cosigned By    Initials Name Provider Type    Jacqueline Blue PT Physical Therapist               Obj/Interventions     Row Name 03/04/22 1033          Balance    Balance Assessment sitting static balance; sitting dynamic balance; standing static balance; standing dynamic balance  -LO     Static Sitting Balance WFL; supported; sitting, edge of bed  -LO     Dynamic Sitting Balance WFL; unsupported; sitting, edge of bed  -LO     Static Standing Balance WFL; supported; standing  -LO     Dynamic Standing Balance  mild impairment; supported; standing  -LO     Comment, Balance Jayson in standing with FWW  -LO           User Key  (r) = Recorded By, (t) = Taken By, (c) = Cosigned By    Initials Name Provider Type    Jacqueline Blue, PT Physical Therapist               Goals/Plan    No documentation.                Clinical Impression     Row Name 03/04/22 1035          Pain Scale: Numbers Pre/Post-Treatment    Pretreatment Pain Rating 0/10 - no pain  -LO     Posttreatment Pain Rating 0/10 - no pain  -LO     Row Name 03/04/22 1035          Plan of Care Review    Plan of Care Reviewed With patient  -LO     Progress no change  -LO     Outcome Summary Patient ambulates x 50' with FWW CGA. SpO2 desats to 85% on 3 liters of O2, recovers to > 90% with 1-2 min of rest. Continues to require Jayson for transfers and CGA for bed mobility. Demonstrating consistent compliance with hip precautions. Cont IP PT POC.  -     Row Name 03/04/22 1035          Therapy Assessment/Plan (PT)    Rehab Potential (PT) good, to achieve stated therapy goals  -LO     Criteria for Skilled Interventions Met (PT) yes; meets criteria; skilled treatment is necessary  -     Row Name 03/04/22 1035          Vital Signs    Pre Systolic BP Rehab 148  -LO     Pre Treatment Diastolic BP 55  -LO     Post Systolic BP Rehab 153  -LO     Post Treatment Diastolic BP 52  -LO     Pretreatment Heart Rate (beats/min) 66  -LO     Posttreatment Heart Rate (beats/min) 61  -LO     Pre SpO2 (%) 95  -LO     O2 Delivery Pre Treatment nasal cannula  -LO     Intra SpO2 (%) 85  -LO     O2 Delivery Intra Treatment nasal cannula  -LO     Post SpO2 (%) 90  -LO     O2 Delivery Post Treatment nasal cannula  -LO     Pre Patient Position Supine  -LO     Intra Patient Position Standing  -LO     Post Patient Position Sitting  -LO     Rest Breaks  2  -LO     Row Name 03/04/22 1035          Positioning and Restraints    Pre-Treatment Position in bed  -LO     Post Treatment Position chair  -LO     In  Chair notified nsg; reclined; call light within reach; encouraged to call for assist; exit alarm on; with nsg; legs elevated  -LO           User Key  (r) = Recorded By, (t) = Taken By, (c) = Cosigned By    Initials Name Provider Type    Jacqueline Blue PT Physical Therapist               Outcome Measures     Row Name 03/04/22 1038          How much help from another person do you currently need...    Turning from your back to your side while in flat bed without using bedrails? 3  -LO     Moving from lying on back to sitting on the side of a flat bed without bedrails? 3  -LO     Moving to and from a bed to a chair (including a wheelchair)? 3  -LO     Standing up from a chair using your arms (e.g., wheelchair, bedside chair)? 3  -LO     Climbing 3-5 steps with a railing? 3  -LO     To walk in hospital room? 3  -LO     AM-PAC 6 Clicks Score (PT) 18  -LO     Row Name 03/04/22 1038          Functional Assessment    Outcome Measure Options AM-PAC 6 Clicks Basic Mobility (PT)  -LO           User Key  (r) = Recorded By, (t) = Taken By, (c) = Cosigned By    Initials Name Provider Type    Jacqueline Blue PT Physical Therapist                             Physical Therapy Education                 Title: PT OT SLP Therapies (Done)     Topic: Physical Therapy (Done)     Point: Mobility training (Done)     Learning Progress Summary           Patient Acceptance, E, VU,NR by LO at 3/4/2022 0838    Comment: Patient education regarding sequencing for transfers and breathing techniques during gait    Matt, E, VU,NR by SS at 3/3/2022 1507    Comment: Reviewed safety/technique with bed mobility, transfers, ambulation, HEP, PT POC, posterior hip precautions    Eager, E, VU,NR by SS at 3/2/2022 1419    Comment: Reviewed safety/technique with bed mobility, transfers, ambulation, HEP, PT POC, posterior hip precautions    Acceptance, E,D, VU,NR by LR at 3/1/2022 1102    Comment: Educated on posterior hip precautions, weight bearing  status, benefits of mobility and being OOB, LE HEP, correct supine to sit t/f technique, correct sit<->stand t/f technique, correct gait mechanics, and progression of POC                   Point: Home exercise program (Done)     Learning Progress Summary           Patient Acceptance, E, VU,NR by LO at 3/4/2022 0838    Comment: Patient education regarding sequencing for transfers and breathing techniques during gait    Eager, E, VU,NR by SS at 3/3/2022 1507    Comment: Reviewed safety/technique with bed mobility, transfers, ambulation, HEP, PT POC, posterior hip precautions    Eager, E, VU,NR by SS at 3/2/2022 1419    Comment: Reviewed safety/technique with bed mobility, transfers, ambulation, HEP, PT POC, posterior hip precautions    Acceptance, E,D, VU,NR by LR at 3/1/2022 1102    Comment: Educated on posterior hip precautions, weight bearing status, benefits of mobility and being OOB, LE HEP, correct supine to sit t/f technique, correct sit<->stand t/f technique, correct gait mechanics, and progression of POC                   Point: Body mechanics (Done)     Learning Progress Summary           Patient Acceptance, E, VU,NR by LO at 3/4/2022 0838    Comment: Patient education regarding sequencing for transfers and breathing techniques during gait    Eager, E, VU,NR by SS at 3/3/2022 1507    Comment: Reviewed safety/technique with bed mobility, transfers, ambulation, HEP, PT POC, posterior hip precautions    Eager, E, VU,NR by SS at 3/2/2022 1419    Comment: Reviewed safety/technique with bed mobility, transfers, ambulation, HEP, PT POC, posterior hip precautions    Acceptance, E,D, VU,NR by LR at 3/1/2022 1102    Comment: Educated on posterior hip precautions, weight bearing status, benefits of mobility and being OOB, LE HEP, correct supine to sit t/f technique, correct sit<->stand t/f technique, correct gait mechanics, and progression of POC                   Point: Precautions (Done)     Learning Progress Summary            Patient Acceptance, E, VU,NR by  at 3/4/2022 0838    Comment: Patient education regarding sequencing for transfers and breathing techniques during gait    Eager, E, VU,NR by  at 3/3/2022 1507    Comment: Reviewed safety/technique with bed mobility, transfers, ambulation, HEP, PT POC, posterior hip precautions    Eager, E, VU,NR by SS at 3/2/2022 1419    Comment: Reviewed safety/technique with bed mobility, transfers, ambulation, HEP, PT POC, posterior hip precautions    Acceptance, E,D, VU,NR by LR at 3/1/2022 1102    Comment: Educated on posterior hip precautions, weight bearing status, benefits of mobility and being OOB, LE HEP, correct supine to sit t/f technique, correct sit<->stand t/f technique, correct gait mechanics, and progression of POC                               User Key     Initials Effective Dates Name Provider Type Discipline     06/16/21 -  Leena Sahu, PT Physical Therapist PT     06/16/21 -  Jacqueline Vidal, PT Physical Therapist PT     06/01/21 -  Rebecca Zamudio, PT Physical Therapist PT              PT Recommendation and Plan     Plan of Care Reviewed With: patient  Progress: no change  Outcome Summary: Patient ambulates x 50' with FWW CGA. SpO2 desats to 85% on 3 liters of O2, recovers to > 90% with 1-2 min of rest. Continues to require Jayson for transfers and CGA for bed mobility. Demonstrating consistent compliance with hip precautions. Cont IP PT POC.     Time Calculation:    PT Charges     Row Name 03/04/22 0838             Time Calculation    Start Time 0838  -LO      PT Received On 03/04/22  -      PT Goal Re-Cert Due Date 03/11/22  -              Timed Charges    12772 - Gait Training Minutes  16  -LO      17063 - PT Therapeutic Activity Minutes 17  -LO              Total Minutes    Timed Charges Total Minutes 33  -LO       Total Minutes 33  -LO            User Key  (r) = Recorded By, (t) = Taken By, (c) = Cosigned By    Initials Name Provider Type    LO  Jacqueline Vidal, PT Physical Therapist              Therapy Charges for Today     Code Description Service Date Service Provider Modifiers Qty    22091255464 HC GAIT TRAINING EA 15 MIN 3/4/2022 Jacqueline Vidal, PT GP 1    91954876314 HC PT THERAPEUTIC ACT EA 15 MIN 3/4/2022 Jacqueline Vidal, PT GP 1          PT G-Codes  Outcome Measure Options: AM-PAC 6 Clicks Basic Mobility (PT)  AM-PAC 6 Clicks Score (PT): 18  AM-PAC 6 Clicks Score (OT): 14    Jacqueline Vidal, PT  3/4/2022

## 2022-03-04 NOTE — PROGRESS NOTES
"GI Daily Progress Note  Subjective:    Chief Complaint: Follow up anemia     Ms. Law is weak and short of breath but states this is some better than yesterday.   She denies abdominal pain or melena.        Objective:    /55 (BP Location: Left arm, Patient Position: Lying)   Pulse 68   Temp 97.9 °F (36.6 °C) (Oral)   Resp 16   Ht 167.6 cm (66\")   Wt 61.2 kg (135 lb)   LMP  (LMP Unknown)   SpO2 93%   BMI 21.79 kg/m²     Physical Exam  Constitutional:       Appearance: She is ill-appearing.      Comments: Frail elderly female resting in bed.   She is alert, oriented x 3 and pleasant to speak with    Cardiovascular:      Rate and Rhythm: Normal rate and regular rhythm.   Pulmonary:      Comments: Mild tachypnea in conversation  Abdominal:      General: Bowel sounds are normal.      Palpations: Abdomen is soft.      Tenderness: There is no abdominal tenderness.   Musculoskeletal:      Right lower leg: Edema present.      Left lower leg: Edema present.   Neurological:      Mental Status: She is oriented to person, place, and time.         Lab  Lab Results   Component Value Date    WBC 5.92 03/04/2022    HGB 8.5 (L) 03/04/2022    HGB 8.7 (L) 03/03/2022    HGB 10.7 (L) 03/02/2022    MCV 99.6 (H) 03/04/2022     03/04/2022    INR 1.03 02/07/2022    INR 1.08 08/23/2021       Lab Results   Component Value Date    GLUCOSE 84 03/04/2022    BUN 18 03/04/2022    CREATININE 0.76 03/04/2022    EGFRIFNONA 79 02/11/2022    BCR 23.7 03/04/2022     03/04/2022    K 4.2 03/04/2022    CO2 29.0 03/04/2022    CALCIUM 8.8 03/04/2022    ALBUMIN 3.00 (L) 03/04/2022    ALKPHOS 95 03/04/2022    BILITOT 0.4 03/04/2022    ALT 14 03/04/2022    AST 19 03/04/2022       Assessment:    Acute on chronic blood loss anemia s/p transfusion of 1 unit of PRBCs on 3/1.     Iron deficiency   Gastric ulcer with oozing s/p endo clip   Watermelon stomach   Anticoagulation use, on Eliquis   Recent hip fracture s/p open treatment of " displaced femoral neck fracture with hemiarthroplasty on 2/7/22.  Acute on chronic diastolic heart failure   History of right colectomy, 2014     Plan:    Patient received 1 unit of pRBCs on 3/1/22 for Hgb of 7.0.   Suspect the rise to 10 was spurious.    Hgb today is 8.5.       >>> Change Ferrous Sulfate dosing to every other day as this increases absorption.   >>> Anticoagulation has been discontinued by primary team.     >>> Change IV Protonix to PO BID for 1 month, then q daily.      Will continue to follow peripherally.      PAVEL Valadez  03/04/22  09:07 EST

## 2022-03-04 NOTE — PROGRESS NOTES
"  [facility]   Inpatient Progress Note       LOS: 3 days   Patient Care Team:  Santiago Chaudhry MD as PCP - General  Santiago Chaudhry MD as PCP - Family Medicine    Chief Complaint: Pulmonary edema    Subjective     Interval History:   Patient just finished using bedside commode.  States that she feels \"weak\".  Breathing overall improved.  Typically she is on 3 to 4 L of supplemental oxygen on a 24/7 basis.  No chest pain.      Review of Systems:   Pertinent positives noted in history, exam, and assessment. Otherwise reviewed and negative.      Objective     Vitals:  Blood pressure 148/55, pulse 68, temperature 97.9 °F (36.6 °C), temperature source Oral, resp. rate 16, height 167.6 cm (66\"), weight 61.2 kg (135 lb), SpO2 93 %, not currently breastfeeding.     Intake/Output Summary (Last 24 hours) at 3/4/2022 0816  Last data filed at 3/4/2022 0300  Gross per 24 hour   Intake 840 ml   Output 1950 ml   Net -1110 ml     Vitals reviewed.   Constitutional:       Appearance: Well-developed. Frail. Chronically ill-appearing.   Neck:      Vascular: No JVD.      Trachea: No tracheal deviation.   Pulmonary:      Effort: Pulmonary effort is normal.      Comments: Decreased bases bilaterally  Cardiovascular:      Normal rate. Regular rhythm.   Pulses:     Intact distal pulses.   Edema:     Ankle: bilateral trace edema of the ankle.  Abdominal:      General: Bowel sounds are normal.      Palpations: Abdomen is soft.      Tenderness: There is no abdominal tenderness.   Musculoskeletal:         General: No deformity. Neurological:      Mental Status: Alert and oriented to person, place, and time.              Results Review:     I reviewed the patient's new clinical results.    Results from last 7 days   Lab Units 03/04/22  0443   WBC 10*3/mm3 5.92   HEMOGLOBIN g/dL 8.5*   HEMATOCRIT % 27.4*   PLATELETS 10*3/mm3 204     Results from last 7 days   Lab Units 03/04/22  0443   SODIUM mmol/L 137 "   POTASSIUM mmol/L 4.2   CHLORIDE mmol/L 100   CO2 mmol/L 29.0   BUN mg/dL 18   CREATININE mg/dL 0.76   CALCIUM mg/dL 8.8   BILIRUBIN mg/dL 0.4   ALK PHOS U/L 95   ALT (SGPT) U/L 14   AST (SGOT) U/L 19   GLUCOSE mg/dL 84     Results from last 7 days   Lab Units 03/04/22  0443   SODIUM mmol/L 137   POTASSIUM mmol/L 4.2   CHLORIDE mmol/L 100   CO2 mmol/L 29.0   BUN mg/dL 18   CREATININE mg/dL 0.76   GLUCOSE mg/dL 84   CALCIUM mg/dL 8.8         No results found for: TROPONINI          Results from last 7 days   Lab Units 03/02/22  2313   PROBNP pg/mL 14,378.0*     Echo:  · Estimated left ventricular EF = 60%  · Left ventricular wall thickness is consistent with mild concentric hypertrophy.  · Mild aortic valve stenosis is present.  · Aortic valve maximum pressure gradient is 20 mmHg. Aortic valve mean pressure gradient is 10.4 mmHg.  · Moderate mitral valve regurgitation is present.  · Mild to moderate mitral valve stenosis is present. The mitral valve peak gradient is 15.8 mmHg. The mitral valve mean gradient is 6.5 mmHg.  · Estimated right ventricular systolic pressure from tricuspid regurgitation is 50 mmHg.  · Moderate tricuspid valve regurgitation is present.  · There is a large left pleural effusion.      Tele:  SB    Assessment/Plan       Flash pulmonary edema (HCC)    Paroxysmal atrial fibrillation (HCC)    COPD on home oxygen (CMS/HCC)    Primary hypertension    Rheumatoid arthritis (HCC)    Anxiety and depression    Anemia    Mixed hyperlipidemia    Bilateral pleural effusion    Elevated troponin    Leukocytosis    Atrial fibrillation with rapid ventricular response (HCC)    Acute on chronic respiratory failure with hypoxia and hypercapnia (HCC)    Hypothyroidism (acquired)      1. pulmonary edema  1. Worsened edema last evening treated with diuretics.  2. Normal LVEF, moderate valvular heart disease.  3. Improving with diuresis.  2. Paroxysmal atrial fibrillation  1. Currently in SR.  2. 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  3. Patient feels she is asymptomatic with AF episodes.  3. Hypertension  4. COPD  5. Hypokalemia      Plan:  · Increase Bumex to 1 mg IV daily.  · Otherwise currently CV stable.    AMA Arora    Dictated utilizing Dragon dictation

## 2022-03-04 NOTE — CASE MANAGEMENT/SOCIAL WORK
Continued Stay Note  [facility]     Patient Name: Yin Law  MRN: [id number]  Today's Date: 3/4/2022    Admit Date: 2/28/2022     Discharge Plan     Row Name 03/04/22 1525       Plan    Plan Home with     Patient/Family in Agreement with Plan yes    Plan Comments Case mgt f/u. I met with Ms Law today ro discuss d/c plan. She wants to go home, she does not want to consider SNF, she plans to have her neice or sister in law stay with her. I confirmed with blanca by phone ( Yin Elkins) that she or sister in law would be with her at all times. She is followed by [facility], new order faxed to them at [phone]. [facility] notified her that she may be discharged home in 1-2 days. She already has home O2. Case shruti will con't to follow    Final Discharge Disposition Code 06 - home with home health care    Row Name 03/04/22 5569       Plan    Final Discharge Disposition Code 06 - home with home health care               Discharge Codes    No documentation.               Expected Discharge Date and Time     Expected Discharge Date Expected Discharge Time    Mar 7, 2022             Sonja C Kellerman, RN

## 2022-03-04 NOTE — PLAN OF CARE
Goal Outcome Evaluation:  Plan of Care Reviewed With: patient        Progress: no change  Outcome Summary: Patient ambulates x 50' with FWW CGA. SpO2 desats to 85% on 3 liters of O2, recovers to > 90% with 1-2 min of rest. Continues to require Jayson for transfers and CGA for bed mobility. Demonstrating consistent compliance with hip precautions. Cont IP PT POC.

## 2022-03-04 NOTE — PROGRESS NOTES
[facility] Medicine Services  PROGRESS NOTE    Patient Name: Yin Law  : 1940  MRN: [id number]    Date of Admission: 2022  Primary Care Physician: Santiago Chaudhry MD    Subjective   Subjective     CC:  Afib RVR/ anemia    HPI:  Patient resting in bed. Reports she feels slightly labored with breathing but doing ok. Plans to go back to living with her niece when she is discharged, has no interest in going to SNF care. D/w her getting out arthur today, she is agreeable    ROS:  Gen- No fevers, chills  CV- No chest pain, palpitations  Resp- No cough, dyspnea  GI- No N/V/D, abd pain         Objective   Objective     Vital Signs:   Temp:  [97.8 °F (36.6 °C)-98.8 °F (37.1 °C)] 97.9 °F (36.6 °C)  Heart Rate:  [50-69] 69  Resp:  [16-18] 16  BP: (129-152)/(55-61) 148/55  Flow (L/min):  [3-4] 3     Physical Exam:  GEN- appears chronically ill  HEENT- atraumatic, normocephalic, eomi  NECK- supple, trachea midline, no masses  RESP: on 3L, slight crackles  CV: on tele, NSR  MSK: LE edema noted, spontaneous movement of all extremities  NEURO: alert, oriented, no focal deficits  SKIN: no rashes  PSYCH: appropriate mood and affect       Results Reviewed:  LAB RESULTS:      Lab 22  0443 22  0358 22  2313 22  0716 22  1222 22  0448 22  0058 22  2240 22  2152   0000   WBC 5.92 8.52 15.53* 9.60 8.77  --   --   --  17.78*   < >   HEMOGLOBIN 8.5* 8.7* 10.7* 9.3* 7.0*  --   --   --  8.1*   < >   HEMATOCRIT 27.4* 27.3* 34.4 29.7* 23.7*   < >  --   --  28.0*   < >   PLATELETS 204 224 416 243 246  --   --   --  476*   < >   NEUTROS ABS 3.36 6.99 9.11*  --  6.71  --   --   --  10.48*  --    IMMATURE GRANS (ABS) 0.02 0.02 0.07*  --  0.03  --   --   --  0.09*  --    LYMPHS ABS 1.50 0.82 4.46*  --  1.31  --   --   --  5.17*  --    MONOS ABS 0.77 0.64 1.57*  --  0.63  --   --   --  1.45*  --    EOS ABS 0.24 0.03 0.22  --  0.05  --   --   --   0.46*  --    MCV 99.6* 97.8* 99.7* 99.7* 106.3*  --   --   --  108.9*   < >   PROCALCITONIN  --   --  0.28*  --   --   --   --  0.06  --   --    LACTATE  --   --  1.8  --   --   --  1.4  --   --   --     < > = values in this interval not displayed.         Lab 03/04/22 0443 03/03/22 2243 03/03/22 0358 03/02/22 2313 03/02/22 0338 03/01/22 0448 03/01/22 0448   SODIUM 137  --  132* 129* 139  --  143   POTASSIUM 4.2 4.3 3.1* 3.5 3.5   < > 3.8   CHLORIDE 100  --  92* 89* 99  --  102   CO2 29.0  --  29.0 26.0 30.0*  --  31.0*   ANION GAP 8.0  --  11.0 14.0 10.0  --  10.0   BUN 18  --  16 14 16  --  23   CREATININE 0.76  --  0.80 0.86 0.74  --  0.78   EGFR 78.3  --  73.7 67.5 80.9  --  75.9   GLUCOSE 84  --  110* 218* 97  --  112*   CALCIUM 8.8  --  8.7 9.2 8.9  --  8.8   MAGNESIUM  --   --   --   --  2.2  --  2.1    < > = values in this interval not displayed.         Lab 03/04/22 0443 03/03/22 0358 03/02/22 2313 02/28/22  2240   TOTAL PROTEIN 5.6* 5.8* 7.1 7.0   ALBUMIN 3.00* 3.20* 3.90 3.90   GLOBULIN 2.6 2.6 3.2 3.1   ALT (SGPT) 14 19 27 14   AST (SGOT) 19 28 43* 27   BILIRUBIN 0.4 0.5 0.5 0.3   ALK PHOS 95 103 137* 130*         Lab 03/03/22 0358 03/02/22 2313 03/01/22 0448 03/01/22 0058 02/28/22  2240   PROBNP  --  14,378.0* 12,729.0* 8,352.0* 7,631.0*   TROPONIN T 0.083* 0.081* 0.029 0.011 0.051*             Lab 03/01/22  0448 03/01/22  0058   IRON  --  26*  26*   IRON SATURATION  --  8*   TIBC  --  341   TRANSFERRIN  --  229   FERRITIN  --  264.60*   FOLATE >20.00  --    VITAMIN B 12 1,009*  --    ABO TYPING O  --    RH TYPING Positive  --    ANTIBODY SCREEN Negative  --          Lab 03/02/22  2327 02/28/22  2342 02/28/22  2153   PH, ARTERIAL 7.303* 7.400 7.176*   PCO2, ARTERIAL 63.6* 47.4* 74.2*   PO2 .0* 94.4 438.0*   FIO2 50 40 100   HCO3 ART 31.5* 29.4* 27.4*   BASE EXCESS ART 3.8* 4.1* -1.5*   CARBOXYHEMOGLOBIN 1.5 2.4* 1.9     Brief Urine Lab Results  (Last result in the past 365 days)       Color   Clarity   Blood   Leuk Est   Nitrite   Protein   CREAT   Urine HCG        03/01/22 0115 Yellow   Clear   Negative   Negative   Negative   Negative                 Microbiology Results Abnormal     Procedure Component Value - Date/Time    Blood Culture - Blood, Arm, Left [[id number]]  (Normal) Collected: 03/01/22 0114    Lab Status: Preliminary result Specimen: Blood from Arm, Left Updated: 03/04/22 0130     Blood Culture No growth at 3 days    Blood Culture - Blood, Arm, Left [[id number]]  (Normal) Collected: 03/01/22 0114    Lab Status: Preliminary result Specimen: Blood from Arm, Left Updated: 03/04/22 0130     Blood Culture No growth at 3 days    Respiratory Panel PCR w/COVID-19(SARS-CoV-2) CAS/SHALINI/KRISTY/PAD/COR/MAD/HAJA In-House, NP Swab in UTM/VTM, 3-4 HR TAT - Swab, Nasopharynx [[id number]]  (Normal) Collected: 03/01/22 0049    Lab Status: Final result Specimen: Swab from Nasopharynx Updated: 03/01/22 0203     ADENOVIRUS, PCR Not Detected     Coronavirus 229E Not Detected     Coronavirus HKU1 Not Detected     Coronavirus NL63 Not Detected     Coronavirus OC43 Not Detected     COVID19 Not Detected     Human Metapneumovirus Not Detected     Human Rhinovirus/Enterovirus Not Detected     Influenza A PCR Not Detected     Influenza B PCR Not Detected     Parainfluenza Virus 1 Not Detected     Parainfluenza Virus 2 Not Detected     Parainfluenza Virus 3 Not Detected     Parainfluenza Virus 4 Not Detected     RSV, PCR Not Detected     Bordetella pertussis pcr Not Detected     Bordetella parapertussis PCR Not Detected     Chlamydophila pneumoniae PCR Not Detected     Mycoplasma pneumo by PCR Not Detected    Narrative:      In the setting of a positive respiratory panel with a viral infection PLUS a negative procalcitonin without other underlying concern for bacterial infection, consider observing off antibiotics or discontinuation of antibiotics and continue supportive care. If the respiratory panel is positive for  atypical bacterial infection (Bordetella pertussis, Chlamydophila pneumoniae, or Mycoplasma pneumoniae), consider antibiotic de-escalation to target atypical bacterial infection.          XR Chest 1 View    Result Date: 3/2/2022  Examination: AP view of the chest Indication: Shortness of breath Comparison: 2/28/2022 Findings: The cardiomediastinal silhouette is within normal size limits. Thoracic aorta calcifications are present. There is prominence of the central pulmonary vasculature with increased interstitial markings and small bilateral pleural effusions. No pneumothorax is identified. No acute osseous abnormality is identified.     Impression: Impression: 1. Increased interstitial markings in both lungs and small pleural effusions. Findings are compatible with interstitial pulmonary edema. 2. Central pulmonary vascular congestion. Electronically signed by:  Bryce Casarez M.D.  3/2/2022 9:39 PM Mountain Time    XR Chest 1 View    Result Date: 3/2/2022   DATE OF EXAM: 3/2/2022 11:35 AM  PROCEDURE: XR CHEST 1 VW-  INDICATIONS: soa; J81.0-Acute pulmonary edema; J96.01-Acute respiratory failure with hypoxia; J96.02-Acute respiratory failure with hypercapnia; I50.9-Heart failure, unspecified; D50.8-Other iron deficiency anemias  COMPARISON: 02/28/2022  TECHNIQUE: Portable chest radiograph.  FINDINGS:  Heart mildly enlarged, exaggerated by technique. There is no pneumothorax. There are moderate bilateral pleural effusions with bibasilar airspace disease likely atelectasis. There is central pulmonary vascular congestion stable from prior exam. No pneumothorax. Calcified AP window lymph nodes related to granulomatous disease. Moderate emphysema.      Impression: 1. Moderate bilateral pleural effusions with bibasilar airspace disease likely compressive atelectasis, similar to recent chest CT. 2. Central pulmonary vascular congestion. 3. No pneumothorax.  This report was finalized on 3/2/2022 11:59 AM by Misha Mcdowell  MD.        Results for orders placed during the hospital encounter of 02/28/22    Adult Transthoracic Echo Complete W/ Cont if Necessary Per Protocol    Interpretation Summary  · Estimated left ventricular EF = 60%  · Left ventricular wall thickness is consistent with mild concentric hypertrophy.  · Mild aortic valve stenosis is present.  · Aortic valve maximum pressure gradient is 20 mmHg. Aortic valve mean pressure gradient is 10.4 mmHg.  · Moderate mitral valve regurgitation is present.  · Mild to moderate mitral valve stenosis is present. The mitral valve peak gradient is 15.8 mmHg. The mitral valve mean gradient is 6.5 mmHg.  · Estimated right ventricular systolic pressure from tricuspid regurgitation is 50 mmHg.  · Moderate tricuspid valve regurgitation is present.  · There is a large left pleural effusion.      I have reviewed the medications:  Scheduled Meds:bisoprolol, 10 mg, Oral, Q24H  [START ON 3/5/2022] bumetanide, 1 mg, Intravenous, Daily  dilTIAZem CD, 180 mg, Oral, Daily  [START ON 3/5/2022] ferrous sulfate, 325 mg, Oral, Every Other Day  levothyroxine, 75 mcg, Oral, Q AM  LORazepam, 0.25 mg, Intravenous, Once  pantoprazole, 40 mg, Oral, BID AC  sertraline, 150 mg, Oral, Daily  sodium chloride, 10 mL, Intravenous, Q12H      Continuous Infusions:   PRN Meds:.•  acetaminophen  •  ALPRAZolam  •  HYDROcodone-acetaminophen  •  ipratropium-albuterol  •  potassium chloride **OR** potassium chloride **OR** potassium chloride  •  sodium chloride  •  sodium chloride    Assessment/Plan   Assessment & Plan     Active Hospital Problems    Diagnosis  POA   • **Flash pulmonary edema (HCC) [J81.0]  Yes   • Bilateral pleural effusion [J90]  Yes   • Elevated troponin [R77.8]  Yes   • Leukocytosis [D72.829]  Yes   • Atrial fibrillation with rapid ventricular response (HCC) [I48.91]  Yes   • Acute on chronic respiratory failure with hypoxia and hypercapnia (HCC) [J96.21, J96.22]  Yes   • Hypothyroidism (acquired)  [E03.9]  Yes   • Mixed hyperlipidemia [E78.2]  Yes   • Paroxysmal atrial fibrillation (HCC) [I48.0]  Yes     Class: Acute   • Rheumatoid arthritis (HCC) [M06.9]  Yes   • Primary hypertension [I10]  Yes   • COPD on home oxygen (CMS/HCC) [J44.9]  Yes   • Anxiety and depression [F41.9, F32.A]  Yes   • Anemia [D64.9]  Yes      Resolved Hospital Problems   No resolved problems to display.        Brief Hospital Course to date:  Yin Law is a 81 y.o. female  With hx of diastolic CHF, COPD, HTN, bilateral pleural effusions s/p thoracentesis per pt, hyperlipidemia who is on 3-4 L O2 chronically presents tonight after calling 911 for shortness of breath.  Pt was reportedly very dyspneic and was transported here on high flow oxygen.  Pt states sx started today and were fairly sudden.     This patient's problems and plans were partially entered by my partner and updated as appropriate by me 03/04/22.          Afib with RVR--improved  --cardiology follows, Patient sponatenously converted to NSR first night of admission. Continue oral dilt  --Of note, patient was discharged on low dose Eliquis both for DVT PPx following her hip repair as well as anticoagulation for PAF per Cardiology. She has not been taking this and states she does not want to per my partners d/w her. Given results of EGD will not resume, patient also does not want to resume any anticoagulation  --Weaned off BiPAP currently. Monitor. She wears 3L NC at baseline    Acute hypoxic RF  CHF exacerbation  Flash pulm edema  --continue diuresis. Noted significant pleural effusions on imaging.   --cardiology increasing bumex to 1mg IV daily- watch I/Os, daily weights  --RRT overnight 3/3 with extra dosing bumex, nitro gtt, BIPAP-- now improved, monitor. Off drip  --ECHO EF with 60%, mild/mod VHD  --arthur currently in place (since admit) for strict I/os, plan to remove when off IV diuresis    Anemia with +FOBT   --my partner d/w patient's PCP Dr Chaudhry regarding  plan to get EGD done as outpatient, with ongoing drop in H/H and FOBT +, GI consulted and s/p EGD Mild gastropathy consistent with watermelon stomach. One small ulcer in prepyloric area oozing blood.  Clip applied. Recommended observation  --H/H stable at 8.5 today- no s/s of bleeding per patient but will monitor closely    HTN  --monitor, continue   Depression  --continue sertraline  Hypothyroidism  --continue synthroid, TSH normal    Dispo: patient reports she intends to go back home with isaías Esqueda at time of discharge. No interest in SNF      DVT prophylaxis:  Mechanical DVT prophylaxis orders are present.       AM-PAC 6 Clicks Score (PT): 18 (03/03/22 3588)    Disposition: I expect the patient to be discharged pending above    CODE STATUS:   Code Status and Medical Interventions:   Ordered at: 03/01/22 0134     Code Status (Patient has no pulse and is not breathing):    CPR (Attempt to Resuscitate)     Medical Interventions (Patient has pulse or is breathing):    Full Support       Rebecca Berry MD  03/04/22

## 2022-03-05 LAB
ALBUMIN SERPL-MCNC: 3.2 G/DL (ref 3.5–5.2)
ALBUMIN/GLOB SERPL: 1.1 G/DL
ALP SERPL-CCNC: 101 U/L (ref 39–117)
ALT SERPL W P-5'-P-CCNC: 13 U/L (ref 1–33)
ANION GAP SERPL CALCULATED.3IONS-SCNC: 9 MMOL/L (ref 5–15)
AST SERPL-CCNC: 22 U/L (ref 1–32)
BASOPHILS # BLD AUTO: 0.06 10*3/MM3 (ref 0–0.2)
BASOPHILS NFR BLD AUTO: 0.8 % (ref 0–1.5)
BILIRUB SERPL-MCNC: 0.5 MG/DL (ref 0–1.2)
BUN SERPL-MCNC: 16 MG/DL (ref 8–23)
BUN/CREAT SERPL: 18.8 (ref 7–25)
CALCIUM SPEC-SCNC: 9.5 MG/DL (ref 8.6–10.5)
CHLORIDE SERPL-SCNC: 101 MMOL/L (ref 98–107)
CO2 SERPL-SCNC: 27 MMOL/L (ref 22–29)
CREAT SERPL-MCNC: 0.85 MG/DL (ref 0.57–1)
DEPRECATED RDW RBC AUTO: 56.5 FL (ref 37–54)
EGFRCR SERPLBLD CKD-EPI 2021: 68.5 ML/MIN/1.73
EOSINOPHIL # BLD AUTO: 0.32 10*3/MM3 (ref 0–0.4)
EOSINOPHIL NFR BLD AUTO: 4.5 % (ref 0.3–6.2)
ERYTHROCYTE [DISTWIDTH] IN BLOOD BY AUTOMATED COUNT: 15.2 % (ref 12.3–15.4)
GLOBULIN UR ELPH-MCNC: 2.9 GM/DL
GLUCOSE SERPL-MCNC: 88 MG/DL (ref 65–99)
HCT VFR BLD AUTO: 32.4 % (ref 34–46.6)
HGB BLD-MCNC: 10 G/DL (ref 12–15.9)
IMM GRANULOCYTES # BLD AUTO: 0.02 10*3/MM3 (ref 0–0.05)
IMM GRANULOCYTES NFR BLD AUTO: 0.3 % (ref 0–0.5)
LYMPHOCYTES # BLD AUTO: 1.59 10*3/MM3 (ref 0.7–3.1)
LYMPHOCYTES NFR BLD AUTO: 22.5 % (ref 19.6–45.3)
MCH RBC QN AUTO: 31 PG (ref 26.6–33)
MCHC RBC AUTO-ENTMCNC: 30.9 G/DL (ref 31.5–35.7)
MCV RBC AUTO: 100.3 FL (ref 79–97)
MONOCYTES # BLD AUTO: 0.76 10*3/MM3 (ref 0.1–0.9)
MONOCYTES NFR BLD AUTO: 10.7 % (ref 5–12)
NEUTROPHILS NFR BLD AUTO: 4.33 10*3/MM3 (ref 1.7–7)
NEUTROPHILS NFR BLD AUTO: 61.2 % (ref 42.7–76)
NRBC BLD AUTO-RTO: 0 /100 WBC (ref 0–0.2)
PLATELET # BLD AUTO: 234 10*3/MM3 (ref 140–450)
PMV BLD AUTO: 10.7 FL (ref 6–12)
POTASSIUM SERPL-SCNC: 4.1 MMOL/L (ref 3.5–5.2)
PROT SERPL-MCNC: 6.1 G/DL (ref 6–8.5)
RBC # BLD AUTO: 3.23 10*6/MM3 (ref 3.77–5.28)
SODIUM SERPL-SCNC: 137 MMOL/L (ref 136–145)
WBC NRBC COR # BLD: 7.08 10*3/MM3 (ref 3.4–10.8)

## 2022-03-05 PROCEDURE — 97530 THERAPEUTIC ACTIVITIES: CPT

## 2022-03-05 PROCEDURE — 99233 SBSQ HOSP IP/OBS HIGH 50: CPT | Performed by: INTERNAL MEDICINE

## 2022-03-05 PROCEDURE — 85025 COMPLETE CBC W/AUTO DIFF WBC: CPT | Performed by: INTERNAL MEDICINE

## 2022-03-05 PROCEDURE — 80053 COMPREHEN METABOLIC PANEL: CPT | Performed by: INTERNAL MEDICINE

## 2022-03-05 PROCEDURE — 99232 SBSQ HOSP IP/OBS MODERATE 35: CPT | Performed by: NURSE PRACTITIONER

## 2022-03-05 PROCEDURE — 99232 SBSQ HOSP IP/OBS MODERATE 35: CPT | Performed by: INTERNAL MEDICINE

## 2022-03-05 PROCEDURE — 97116 GAIT TRAINING THERAPY: CPT

## 2022-03-05 RX ADMIN — PANTOPRAZOLE SODIUM 40 MG: 40 TABLET, DELAYED RELEASE ORAL at 16:57

## 2022-03-05 RX ADMIN — PANTOPRAZOLE SODIUM 40 MG: 40 TABLET, DELAYED RELEASE ORAL at 05:33

## 2022-03-05 RX ADMIN — Medication 10 ML: at 09:56

## 2022-03-05 RX ADMIN — HYDROCODONE BITARTRATE AND ACETAMINOPHEN 1 TABLET: 5; 325 TABLET ORAL at 21:46

## 2022-03-05 RX ADMIN — BUMETANIDE 1 MG: 0.25 INJECTION INTRAMUSCULAR; INTRAVENOUS at 09:58

## 2022-03-05 RX ADMIN — LEVOTHYROXINE SODIUM 75 MCG: 0.07 TABLET ORAL at 05:32

## 2022-03-05 RX ADMIN — ALPRAZOLAM 0.5 MG: 0.5 TABLET ORAL at 21:46

## 2022-03-05 RX ADMIN — SERTRALINE 150 MG: 50 TABLET, FILM COATED ORAL at 09:57

## 2022-03-05 RX ADMIN — Medication 325 MG: at 09:57

## 2022-03-05 RX ADMIN — Medication 10 ML: at 21:47

## 2022-03-05 NOTE — PROGRESS NOTES
"  [facility]   Inpatient Progress Note       LOS: 4 days   Patient Care Team:  Santiago Chaudhry MD as PCP - General  Santiago Chaudhry MD as PCP - Family Medicine    Chief Complaint: Pulmonary edema    Subjective     Interval History:   Patient feels stronger today.  Ambulated in the room and feels she could get up the hallways later today.  No chest pain.      Review of Systems:   Pertinent positives noted in history, exam, and assessment. Otherwise reviewed and negative.      Objective     Vitals:  Blood pressure 150/72, pulse 62, temperature 98.4 °F (36.9 °C), temperature source Oral, resp. rate 18, height 167.6 cm (66\"), weight 60.6 kg (133 lb 9.6 oz), SpO2 99 %, not currently breastfeeding.     Intake/Output Summary (Last 24 hours) at 3/5/2022 1315  Last data filed at 3/5/2022 1251  Gross per 24 hour   Intake --   Output 550 ml   Net -550 ml     Vitals reviewed.   Constitutional:       Appearance: Well-developed. Frail. Chronically ill-appearing.   Neck:      Vascular: No JVD.      Trachea: No tracheal deviation.   Pulmonary:      Effort: Pulmonary effort is normal.      Comments: Decreased bases bilaterally  Cardiovascular:      Normal rate. Regular rhythm.   Pulses:     Intact distal pulses.   Edema:     Ankle: bilateral trace edema of the ankle.  Abdominal:      General: Bowel sounds are normal.      Palpations: Abdomen is soft.      Tenderness: There is no abdominal tenderness.   Musculoskeletal:         General: No deformity. Neurological:      Mental Status: Alert and oriented to person, place, and time.              Results Review:     I reviewed the patient's new clinical results.    Results from last 7 days   Lab Units 03/05/22  0541   WBC 10*3/mm3 7.08   HEMOGLOBIN g/dL 10.0*   HEMATOCRIT % 32.4*   PLATELETS 10*3/mm3 234     Results from last 7 days   Lab Units 03/05/22  0541   SODIUM mmol/L 137   POTASSIUM mmol/L 4.1   CHLORIDE mmol/L 101   CO2 mmol/L 27.0   BUN " mg/dL 16   CREATININE mg/dL 0.85   CALCIUM mg/dL 9.5   BILIRUBIN mg/dL 0.5   ALK PHOS U/L 101   ALT (SGPT) U/L 13   AST (SGOT) U/L 22   GLUCOSE mg/dL 88     Results from last 7 days   Lab Units 03/05/22  0541   SODIUM mmol/L 137   POTASSIUM mmol/L 4.1   CHLORIDE mmol/L 101   CO2 mmol/L 27.0   BUN mg/dL 16   CREATININE mg/dL 0.85   GLUCOSE mg/dL 88   CALCIUM mg/dL 9.5         No results found for: TROPONINI          Results from last 7 days   Lab Units 03/02/22  2313   PROBNP pg/mL 14,378.0*     Echo:  · Estimated left ventricular EF = 60%  · Left ventricular wall thickness is consistent with mild concentric hypertrophy.  · Mild aortic valve stenosis is present.  · Aortic valve maximum pressure gradient is 20 mmHg. Aortic valve mean pressure gradient is 10.4 mmHg.  · Moderate mitral valve regurgitation is present.  · Mild to moderate mitral valve stenosis is present. The mitral valve peak gradient is 15.8 mmHg. The mitral valve mean gradient is 6.5 mmHg.  · Estimated right ventricular systolic pressure from tricuspid regurgitation is 50 mmHg.  · Moderate tricuspid valve regurgitation is present.  · There is a large left pleural effusion.      Tele:  SB    Assessment/Plan       Flash pulmonary edema (HCC)    Paroxysmal atrial fibrillation (HCC)    COPD on home oxygen (CMS/HCC)    Primary hypertension    Rheumatoid arthritis (HCC)    Anxiety and depression    Anemia    Mixed hyperlipidemia    Bilateral pleural effusion    Elevated troponin    Leukocytosis    Atrial fibrillation with rapid ventricular response (HCC)    Acute on chronic respiratory failure with hypoxia and hypercapnia (HCC)    Hypothyroidism (acquired)      1. pulmonary edema  1. Worsened edema last evening treated with diuretics.  2. Normal LVEF, moderate valvular heart disease.  3. Improving with diuresis.  2. Paroxysmal atrial fibrillation  1. Currently in SR.  2. 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  3. Patient feels she is asymptomatic with AF  episodes.  3. Hypertension  4. COPD  5. Hypokalemia      Plan:  · Hypoxia is a combination of diastolic heart failure, and emphysema.    · Currently near euvolemic we will give IV diuretics 1 further day.  · Otherwise currently CV stable.  · Continue physical therapy and hopefully home 24 to 48 hours    AMA Arora  I have seen and examined the patient, I have reviewed the note, discussed the case with the advance practice clinician, made necessary changes and I agree with the final note.    He Montoya MD  03/05/22  13:17 EST        Dictated utilizing Dragon dictation

## 2022-03-05 NOTE — THERAPY TREATMENT NOTE
Patient Name: Yin Law  : 1940    MRN: [id number]                              Today's Date: 3/5/2022       Admit Date: 2022    Visit Dx:     ICD-10-CM ICD-9-CM   1. Flash pulmonary edema (HCC)  J81.0 518.4   2. Acute respiratory failure with hypoxia and hypercapnia (HCC)  J96.01 518.81    J96.02    3. Acute on chronic congestive heart failure, unspecified heart failure type (HCC)  I50.9 428.0   4. Other iron deficiency anemia  D50.8 280.8     Patient Active Problem List   Diagnosis   • Paroxysmal atrial fibrillation (HCC)   • COPD on home oxygen (CMS/HCC)   • Primary hypertension   • Rheumatoid arthritis (HCC)   • Anxiety and depression   • Wound of right leg   • Anemia   • Severe malnutrition (CMS/HCC)   • Hip fracture (HCC)   • Mixed hyperlipidemia   • Heart valve disease   • Flash pulmonary edema (HCC)   • Bilateral pleural effusion   • Elevated troponin   • Leukocytosis   • Atrial fibrillation with rapid ventricular response (HCC)   • Acute on chronic respiratory failure with hypoxia and hypercapnia (HCC)   • Hypothyroidism (acquired)     Past Medical History:   Diagnosis Date   • Accelerated hypertension 2021   • Acute diastolic CHF (CMS/HCC) 2021   • Anxiety and depression    • Arrhythmia     A-FIB   • Asthma    • Chicken pox    • COPD (chronic obstructive pulmonary disease) (HCC)    • Elevated troponin 2021   • Hyperlipidemia    • Hypertension    • Measles    • Menopause    • Multifocal pneumonia 2021   • Osteoporosis    • Pleural effusion, bilateral 2021   • Pneumonia due to infectious organism 2021   • Rheumatoid arthritis (HCC)    • Rheumatoid arthritis (HCC)    • Sepsis (HCC) 2021   • Tobacco abuse      Past Surgical History:   Procedure Laterality Date   • APPENDECTOMY     • CARPAL TUNNEL RELEASE Left    • CATARACT EXTRACTION, BILATERAL     • COLON SURGERY     • COLONOSCOPY     • ENDOSCOPY N/A 3/2/2022    Procedure: ESOPHAGOGASTRODUODENOSCOPY;   Surgeon: Sammy Lyles MD;  Location: [facility] ENDOSCOPY;  Service: Gastroenterology;  Laterality: N/A;   • GALLBLADDER SURGERY     • HIP HEMIARTHROPLASTY Left 2/7/2022    Procedure: HIP HEMIARTHROPLASTY LEFT;  Surgeon: Napoleon Powell Jr., MD;  Location: [facility] OR;  Service: Orthopedics;  Laterality: Left;   • HYSTERECTOMY  1971   • TONSILLECTOMY        General Information     Row Name 03/05/22 0854          Physical Therapy Time and Intention    Document Type therapy note (daily note)  -     Mode of Treatment physical therapy  -     Row Name 03/05/22 0854          General Information    Patient Profile Reviewed yes  -LO     Existing Precautions/Restrictions fall;cardiac;left;hip, posterior;oxygen therapy device and L/min;other (see comments)  -LO     Row Name 03/05/22 0854          Cognition    Orientation Status (Cognition) oriented x 3  -LO     Row Name 03/05/22 0854          Safety Issues, Functional Mobility    Impairments Affecting Function (Mobility) balance;endurance/activity tolerance;postural/trunk control;range of motion (ROM);shortness of breath;strength  -LO           User Key  (r) = Recorded By, (t) = Taken By, (c) = Cosigned By    Initials Name Provider Type    LO Jacqueline Vidal, PT Physical Therapist               Mobility     Row Name 03/05/22 0854          Bed Mobility    Comment, (Bed Mobility) UIC  -LO     Row Name 03/05/22 0854          Transfers    Comment, (Transfers) CGA for transfers ; recliner>FWW>BSC>FWW>recliner  -LO     Row Name 03/05/22 0854          Sit-Stand Transfer    Sit-Stand [place] (Transfers) verbal cues;contact guard  -LO     Assistive Device (Sit-Stand Transfers) walker, front-wheeled  -LO     Row Name 03/05/22 0854          Gait/Stairs (Locomotion)    [place] Level (Gait) verbal cues;contact guard  -LO     Assistive Device (Gait) walker, front-wheeled  -LO     Distance in Feet (Gait) 50  -LO     Deviations/Abnormal Patterns (Gait) bilateral deviations;cindi  decreased;gait speed decreased;stride length decreased;left sided deviations  -LO     Bilateral Gait Deviations forward flexed posture;heel strike decreased  -LO     Left Sided Gait Deviations weight shift ability decreased  -LO     Comment, (Gait/Stairs) Ambulates in room with FWW, SOA throughout but SpO2 levels maintain > 90% throughout on 3 liters of O2. Ambulation distance limited by urgency to use BSC.  -LO     Row Name 03/05/22 0854          Mobility    Extremity Weight-bearing Status left lower extremity  -LO     Left Lower Extremity (Weight-bearing Status) weight-bearing as tolerated (WBAT)  -LO           User Key  (r) = Recorded By, (t) = Taken By, (c) = Cosigned By    Initials Name Provider Type    Jacqueline Blue, DELIA Physical Therapist               Obj/Interventions     Row Name 03/05/22 0856          Motor Skills    Therapeutic Exercise other (see comments)  sitting LAQ x10 each, heel raises, toe raises x10 each  -LO     Row Name 03/05/22 0856          Balance    Balance Assessment sitting static balance;sitting dynamic balance;sit to stand dynamic balance;standing static balance;standing dynamic balance  -LO     Static Sitting Balance independent  -LO     Dynamic Sitting Balance independent  -LO     Position, Sitting Balance supported;sitting in chair  -LO     Sit to Stand Dynamic Balance contact guard;verbal cues  -LO     Static Standing Balance contact guard;verbal cues  -LO     Dynamic Standing Balance contact guard;verbal cues  -LO     Position/Device Used, Standing Balance supported;walker, rolling  -LO     Comment, Balance CGA in standing with FWW  -LO           User Key  (r) = Recorded By, (t) = Taken By, (c) = Cosigned By    Initials Name Provider Type    Jacqueline Blue, DELIA Physical Therapist               Goals/Plan    No documentation.                Clinical Impression     Row Name 03/05/22 0858          Pain    Pretreatment Pain Rating 0/10 - no pain  -LO     Posttreatment Pain Rating 0/10  - no pain  -LO     Row Name 03/05/22 0858          Plan of Care Review    Plan of Care Reviewed With patient  -LO     Progress improving  -LO     Outcome Evaluation Patient ambulates x 50' with FWW CGA with SOA throughout, SpO2 levels maintain > 90% on 3 liters of O2 this date. Ambulation distance limited by urgency to use BSC. Good effort with TE. Cont IP PT POC.  -LO     Row Name 03/05/22 0858          Therapy Assessment/Plan (PT)    Rehab Potential (PT) good, to achieve stated therapy goals  -LO     Criteria for Skilled Interventions Met (PT) yes;meets criteria;skilled treatment is necessary  -LO     Row Name 03/05/22 0858          Vital Signs    Pre Systolic BP Rehab 147  -LO     Pre Treatment Diastolic BP 63  -LO     Pretreatment Heart Rate (beats/min) 60  -LO     Pre SpO2 (%) 95  -LO     O2 Delivery Pre Treatment nasal cannula  -LO     Intra SpO2 (%) 90  -LO     O2 Delivery Intra Treatment nasal cannula  -LO     Post SpO2 (%) 95  -LO     O2 Delivery Post Treatment nasal cannula  -LO     Pre Patient Position Sitting  -LO     Intra Patient Position Standing  -LO     Post Patient Position Sitting  -LO     Rest Breaks  2  -LO     Row Name 03/05/22 0858          Positioning and Restraints    Pre-Treatment Position sitting in chair/recliner  -LO     Post Treatment Position chair  -LO     In Chair notified nsg;reclined;call light within reach;encouraged to call for assist;exit alarm on;waffle cushion;heels elevated;legs elevated  -LO           User Key  (r) = Recorded By, (t) = Taken By, (c) = Cosigned By    Initials Name Provider Type    Jacqueline Blue, PT Physical Therapist               Outcome Measures     Row Name 03/05/22 0902          How much help from another person do you currently need...    Turning from your back to your side while in flat bed without using bedrails? 3  -LO     Moving from lying on back to sitting on the side of a flat bed without bedrails? 3  -LO     Moving to and from a bed to a chair  (including a wheelchair)? 3  -LO     Standing up from a chair using your arms (e.g., wheelchair, bedside chair)? 4  -LO     Climbing 3-5 steps with a railing? 3  -LO     To walk in hospital room? 3  -LO     AM-PAC 6 Clicks Score (PT) 19  -LO     Row Name 03/05/22 0902          Functional Assessment    Outcome Measure Options AM-PAC 6 Clicks Basic Mobility (PT)  -           User Key  (r) = Recorded By, (t) = Taken By, (c) = Cosigned By    Initials Name Provider Type    Jacqueline Blue, PT Physical Therapist                             Physical Therapy Education                 Title: PT OT SLP Therapies (Done)     Topic: Physical Therapy (Done)     Point: Mobility training (Done)     Learning Progress Summary           Patient Acceptance, E, VU,NR by  at 3/5/2022 0824    Comment: Patient education regarding sequencing for transfers.    Acceptance, E,TB, VU by SHER at 3/5/2022 0817    Acceptance, E, VU,NR by  at 3/4/2022 0838    Comment: Patient education regarding sequencing for transfers and breathing techniques during gait    Eager, E, VU,NR by  at 3/3/2022 1507    Comment: Reviewed safety/technique with bed mobility, transfers, ambulation, HEP, PT POC, posterior hip precautions    Eager, E, VU,NR by  at 3/2/2022 1419    Comment: Reviewed safety/technique with bed mobility, transfers, ambulation, HEP, PT POC, posterior hip precautions    Acceptance, E,D, VU,NR by  at 3/1/2022 1102    Comment: Educated on posterior hip precautions, weight bearing status, benefits of mobility and being OOB, LE HEP, correct supine to sit t/f technique, correct sit<->stand t/f technique, correct gait mechanics, and progression of POC                   Point: Home exercise program (Done)     Learning Progress Summary           Patient Acceptance, E, VU,NR by  at 3/5/2022 0824    Comment: Patient education regarding sequencing for transfers.    Acceptance, E,TB, VU by SHER at 3/5/2022 0817    Acceptance, E, VU,NR by  at  3/4/2022 0838    Comment: Patient education regarding sequencing for transfers and breathing techniques during gait    Eager, E, VU,NR by SS at 3/3/2022 1507    Comment: Reviewed safety/technique with bed mobility, transfers, ambulation, HEP, PT POC, posterior hip precautions    Eager, E, VU,NR by SS at 3/2/2022 1419    Comment: Reviewed safety/technique with bed mobility, transfers, ambulation, HEP, PT POC, posterior hip precautions    Acceptance, E,D, VU,NR by LR at 3/1/2022 1102    Comment: Educated on posterior hip precautions, weight bearing status, benefits of mobility and being OOB, LE HEP, correct supine to sit t/f technique, correct sit<->stand t/f technique, correct gait mechanics, and progression of POC                   Point: Body mechanics (Done)     Learning Progress Summary           Patient Acceptance, E, VU,NR by LO at 3/5/2022 0824    Comment: Patient education regarding sequencing for transfers.    Acceptance, E,TB, VU by SHER at 3/5/2022 0817    Acceptance, E, VU,NR by LO at 3/4/2022 0838    Comment: Patient education regarding sequencing for transfers and breathing techniques during gait    Eager, E, VU,NR by SS at 3/3/2022 1507    Comment: Reviewed safety/technique with bed mobility, transfers, ambulation, HEP, PT POC, posterior hip precautions    Eager, E, VU,NR by SS at 3/2/2022 1419    Comment: Reviewed safety/technique with bed mobility, transfers, ambulation, HEP, PT POC, posterior hip precautions    Acceptance, E,D, VU,NR by LR at 3/1/2022 1102    Comment: Educated on posterior hip precautions, weight bearing status, benefits of mobility and being OOB, LE HEP, correct supine to sit t/f technique, correct sit<->stand t/f technique, correct gait mechanics, and progression of POC                   Point: Precautions (Done)     Learning Progress Summary           Patient Acceptance, E, VU,NR by LO at 3/5/2022 0824    Comment: Patient education regarding sequencing for transfers.    Acceptance,  E,TB, VU by  at 3/5/2022 0817    Acceptance, E, VU,NR by  at 3/4/2022 0838    Comment: Patient education regarding sequencing for transfers and breathing techniques during gait    Eager, E, VU,NR by  at 3/3/2022 1507    Comment: Reviewed safety/technique with bed mobility, transfers, ambulation, HEP, PT POC, posterior hip precautions    Eager, E, VU,NR by  at 3/2/2022 1419    Comment: Reviewed safety/technique with bed mobility, transfers, ambulation, HEP, PT POC, posterior hip precautions    Acceptance, E,D, VU,NR by LR at 3/1/2022 1102    Comment: Educated on posterior hip precautions, weight bearing status, benefits of mobility and being OOB, LE HEP, correct supine to sit t/f technique, correct sit<->stand t/f technique, correct gait mechanics, and progression of POC                               User Key     Initials Effective Dates Name Provider Type Discipline     06/16/21 -  Leena Sahu, PT Physical Therapist PT     06/16/21 -  Jacqueline Vidal, PT Physical Therapist PT     06/01/21 -  Rebecca Zamudio, PT Physical Therapist PT     12/29/21 -  Cordelia Luo, JEAN MARIE Registered Nurse Nurse              PT Recommendation and Plan     Plan of Care Reviewed With: patient  Progress: improving  Outcome Evaluation: Patient ambulates x 50' with FWW CGA with SOA throughout, SpO2 levels maintain > 90% on 3 liters of O2 this date. Ambulation distance limited by urgency to use BSC. Good effort with TE. Cont IP PT POC.     Time Calculation:    PT Charges     Row Name 03/05/22 0824             Time Calculation    Start Time 0824  -LO      PT Received On 03/05/22  -LO      PT Goal Re-Cert Due Date 03/11/22  -LO              Timed Charges    69599 - PT Therapeutic Exercise Minutes 6  -LO      14949 - Gait Training Minutes  11  -LO      42154 - PT Therapeutic Activity Minutes 8  -LO              Total Minutes    Timed Charges Total Minutes 25  -LO       Total Minutes 25  -LO            User Key  (r) = Recorded  By, (t) = Taken By, (c) = Cosigned By    Initials Name Provider Type    Jacqueline Blue, PT Physical Therapist              Therapy Charges for Today     Code Description Service Date Service Provider Modifiers Qty    83892910326 HC GAIT TRAINING EA 15 MIN 3/4/2022 Jacqueline Vidal, PT GP 1    54506961931 HC PT THERAPEUTIC ACT EA 15 MIN 3/4/2022 Jacqueline Vidal, PT GP 1    13902857634 HC GAIT TRAINING EA 15 MIN 3/5/2022 Jacqueline Vidal, PT GP 1    94331374227 HC PT THERAPEUTIC ACT EA 15 MIN 3/5/2022 Jacqueline Vidal, PT GP 1          PT G-Codes  Outcome Measure Options: AM-PAC 6 Clicks Basic Mobility (PT)  AM-PAC 6 Clicks Score (PT): 19  AM-PAC 6 Clicks Score (OT): 14    Jacqueline Vidal PT  3/5/2022

## 2022-03-05 NOTE — PROGRESS NOTES
"GI Daily Progress Note  Subjective:    Chief Complaint: Follow-up anemia    Patient resting up to chair no acute distress, remains dyspneic similar to prior days. No further reports of dark stools. Denies any new or worsening GI symptoms. Denies pain.    Objective:    /72 (BP Location: Left arm, Patient Position: Lying)   Pulse 56   Temp 98.4 °F (36.9 °C) (Oral)   Resp 18   Ht 167.6 cm (66\")   Wt 60.6 kg (133 lb 9.6 oz)   LMP  (LMP Unknown)   SpO2 95%   BMI 21.56 kg/m²     Physical Exam  Vitals and nursing note reviewed.   Constitutional:       Appearance: Normal appearance. She is ill-appearing.      Comments: Thin elderly and frail-appearing female patient.   HENT:      Head: Normocephalic and atraumatic.   Eyes:      General: No scleral icterus.     Extraocular Movements: Extraocular movements intact.      Conjunctiva/sclera: Conjunctivae normal.      Pupils: Pupils are equal, round, and reactive to light.   Cardiovascular:      Rate and Rhythm: Normal rate and regular rhythm.      Pulses: Normal pulses.      Heart sounds: Normal heart sounds.   Pulmonary:      Effort: Pulmonary effort is normal. Tachypnea present.      Breath sounds: Wheezing present.   Abdominal:      General: Abdomen is flat. Bowel sounds are normal. There is no distension.      Palpations: Abdomen is soft. There is no mass.      Tenderness: There is no abdominal tenderness. There is no guarding.      Hernia: No hernia is present.   Skin:     General: Skin is warm and dry.      Capillary Refill: Capillary refill takes less than 2 seconds.      Coloration: Skin is pale.   Neurological:      General: No focal deficit present.      Mental Status: She is alert and oriented to person, place, and time.   Psychiatric:         Mood and Affect: Mood normal.         Behavior: Behavior normal.         Thought Content: Thought content normal.         Judgment: Judgment normal.         Lab  Lab Results   Component Value Date    WBC 7.08 " 03/05/2022    HGB 10.0 (L) 03/05/2022    HGB 8.5 (L) 03/04/2022    HGB 8.7 (L) 03/03/2022    .3 (H) 03/05/2022     03/05/2022    INR 1.03 02/07/2022    INR 1.08 08/23/2021       Lab Results   Component Value Date    GLUCOSE 88 03/05/2022    BUN 16 03/05/2022    CREATININE 0.85 03/05/2022    EGFRIFNONA 79 02/11/2022    BCR 18.8 03/05/2022     03/05/2022    K 4.1 03/05/2022    CO2 27.0 03/05/2022    CALCIUM 9.5 03/05/2022    ALBUMIN 3.20 (L) 03/05/2022    ALKPHOS 101 03/05/2022    BILITOT 0.5 03/05/2022    ALT 13 03/05/2022    AST 22 03/05/2022     Assessment:  Acute on chronic blood loss anemia s/p transfusion of 1 unit of PRBCs on 3/1.     Iron deficiency   Gastric ulcer with oozing s/p endo clip   Watermelon stomach   Anticoagulation use, on Eliquis   Recent hip fracture s/p open treatment of displaced femoral neck fracture with hemiarthroplasty on 2/7/22.  Acute on chronic diastolic heart failure   History of right colectomy, 2014     Plan:  Patient doing well from GI standpoint. Hemoglobin remained stable.  >>> Given dyspnea, continue diuresing per primary service.  >>> May use iron supplement every other day for increased absorption   -Has history of GI upset with iron supplement, recommend Feosol with Bifera  >>> Continue twice daily PPI for 30 days and then daily thereafter indefinitely.  >>> Should patient experience rebleed, will require repeat EGD with RFA/APC fulguration of watermelon stomach.    At present time, as hemoglobin has remained stable and no further reports of gastrointestinal bleeding, gastroenterology will sign off. Please feel free to call with questions or concerns.    Griffin Bianchi, AMA  03/05/22  11:44 EST

## 2022-03-05 NOTE — PROGRESS NOTES
[facility] Medicine Services  PROGRESS NOTE    Patient Name: Yin Law  : 1940  MRN: [id number]    Date of Admission: 2022  Primary Care Physician: Santiago Chaudhry MD    Subjective   Subjective     CC:  Afib RVR/ anemia    HPI:  Patient resting in bed. Having a little difficulty with her oxygen as it got tangled. On RA she is satting low to mid 80s. Reports she is doing ok. Has diuresed 7L total since admission, IV diuresis ongoing. No family present, still would like to go home with family at discharge    ROS:  Gen- No fevers, chills  CV- No chest pain, palpitations  Resp- No cough, +dyspnea  GI- No N/V/D, abd pain         Objective   Objective     Vital Signs:   Temp:  [98.1 °F (36.7 °C)-98.6 °F (37 °C)] 98.4 °F (36.9 °C)  Heart Rate:  [48-72] 48  Resp:  [16-18] 18  BP: (148-166)/(60-84) 150/72  Flow (L/min):  [3] 3     Physical Exam:  GEN- appears chronically ill, oxygen tangled and off  HEENT- atraumatic, normocephalic, eomi  NECK- supple, trachea midline, no masses  RESP: 84% on RA but oxygen tangled, effort diminished but clearer  CV: on tele, NSR  MSK: LE edema noted, spontaneous movement of all extremities  NEURO: alert, oriented, no focal deficits  SKIN: no rashes  PSYCH: appropriate mood and affect       Results Reviewed:  LAB RESULTS:      Lab 22  0541 22  0443 22  0358 22  2313 22  0716 22  1222 22  0448 22  0058 22  2240   WBC 7.08 5.92 8.52 15.53* 9.60 8.77  --   --   --    HEMOGLOBIN 10.0* 8.5* 8.7* 10.7* 9.3* 7.0*  --   --   --    HEMATOCRIT 32.4* 27.4* 27.3* 34.4 29.7* 23.7*   < >  --   --    PLATELETS 234 204 224 416 243 246  --   --   --    NEUTROS ABS 4.33 3.36 6.99 9.11*  --  6.71  --   --   --    IMMATURE GRANS (ABS) 0.02 0.02 0.02 0.07*  --  0.03  --   --   --    LYMPHS ABS 1.59 1.50 0.82 4.46*  --  1.31  --   --   --    MONOS ABS 0.76 0.77 0.64 1.57*  --  0.63  --   --   --    EOS ABS  0.32 0.24 0.03 0.22  --  0.05  --   --   --    .3* 99.6* 97.8* 99.7* 99.7* 106.3*  --   --   --    PROCALCITONIN  --   --   --  0.28*  --   --   --   --  0.06   LACTATE  --   --   --  1.8  --   --   --  1.4  --     < > = values in this interval not displayed.         Lab 03/05/22 0541 03/04/22 0443 03/03/22 2243 03/03/22 0358 03/02/22 2313 03/02/22 0338 03/01/22 0448   SODIUM 137 137  --  132* 129* 139 143   POTASSIUM 4.1 4.2 4.3 3.1* 3.5 3.5 3.8   CHLORIDE 101 100  --  92* 89* 99 102   CO2 27.0 29.0  --  29.0 26.0 30.0* 31.0*   ANION GAP 9.0 8.0  --  11.0 14.0 10.0 10.0   BUN 16 18  --  16 14 16 23   CREATININE 0.85 0.76  --  0.80 0.86 0.74 0.78   EGFR 68.5 78.3  --  73.7 67.5 80.9 75.9   GLUCOSE 88 84  --  110* 218* 97 112*   CALCIUM 9.5 8.8  --  8.7 9.2 8.9 8.8   MAGNESIUM  --   --   --   --   --  2.2 2.1         Lab 03/05/22 0541 03/04/22 0443 03/03/22 0358 03/02/22 2313 02/28/22  2240   TOTAL PROTEIN 6.1 5.6* 5.8* 7.1 7.0   ALBUMIN 3.20* 3.00* 3.20* 3.90 3.90   GLOBULIN 2.9 2.6 2.6 3.2 3.1   ALT (SGPT) 13 14 19 27 14   AST (SGOT) 22 19 28 43* 27   BILIRUBIN 0.5 0.4 0.5 0.5 0.3   ALK PHOS 101 95 103 137* 130*         Lab 03/03/22 0358 03/02/22 2313 03/01/22 0448 03/01/22  0058 02/28/22  2240   PROBNP  --  14,378.0* 12,729.0* 8,352.0* 7,631.0*   TROPONIN T 0.083* 0.081* 0.029 0.011 0.051*             Lab 03/01/22 0448 03/01/22  0058   IRON  --  26*  26*   IRON SATURATION  --  8*   TIBC  --  341   TRANSFERRIN  --  229   FERRITIN  --  264.60*   FOLATE >20.00  --    VITAMIN B 12 1,009*  --    ABO TYPING O  --    RH TYPING Positive  --    ANTIBODY SCREEN Negative  --          Lab 03/02/22 2327 02/28/22  2342 02/28/22  2153   PH, ARTERIAL 7.303* 7.400 7.176*   PCO2, ARTERIAL 63.6* 47.4* 74.2*   PO2 .0* 94.4 438.0*   FIO2 50 40 100   HCO3 ART 31.5* 29.4* 27.4*   BASE EXCESS ART 3.8* 4.1* -1.5*   CARBOXYHEMOGLOBIN 1.5 2.4* 1.9     Brief Urine Lab Results  (Last result in the past 365 days)       Color   Clarity   Blood   Leuk Est   Nitrite   Protein   CREAT   Urine HCG        03/01/22 0115 Yellow   Clear   Negative   Negative   Negative   Negative                 Microbiology Results Abnormal     Procedure Component Value - Date/Time    Blood Culture - Blood, Arm, Left [[id number]]  (Normal) Collected: 03/01/22 0114    Lab Status: Preliminary result Specimen: Blood from Arm, Left Updated: 03/05/22 0132     Blood Culture No growth at 4 days    Blood Culture - Blood, Arm, Left [[id number]]  (Normal) Collected: 03/01/22 0114    Lab Status: Preliminary result Specimen: Blood from Arm, Left Updated: 03/05/22 0132     Blood Culture No growth at 4 days    Respiratory Panel PCR w/COVID-19(SARS-CoV-2) CAS/SHALINI/KRISTY/PAD/COR/MAD/HAJA In-House, NP Swab in UTM/VTM, 3-4 HR TAT - Swab, Nasopharynx [[id number]]  (Normal) Collected: 03/01/22 0049    Lab Status: Final result Specimen: Swab from Nasopharynx Updated: 03/01/22 0203     ADENOVIRUS, PCR Not Detected     Coronavirus 229E Not Detected     Coronavirus HKU1 Not Detected     Coronavirus NL63 Not Detected     Coronavirus OC43 Not Detected     COVID19 Not Detected     Human Metapneumovirus Not Detected     Human Rhinovirus/Enterovirus Not Detected     Influenza A PCR Not Detected     Influenza B PCR Not Detected     Parainfluenza Virus 1 Not Detected     Parainfluenza Virus 2 Not Detected     Parainfluenza Virus 3 Not Detected     Parainfluenza Virus 4 Not Detected     RSV, PCR Not Detected     Bordetella pertussis pcr Not Detected     Bordetella parapertussis PCR Not Detected     Chlamydophila pneumoniae PCR Not Detected     Mycoplasma pneumo by PCR Not Detected    Narrative:      In the setting of a positive respiratory panel with a viral infection PLUS a negative procalcitonin without other underlying concern for bacterial infection, consider observing off antibiotics or discontinuation of antibiotics and continue supportive care. If the respiratory panel is positive  for atypical bacterial infection (Bordetella pertussis, Chlamydophila pneumoniae, or Mycoplasma pneumoniae), consider antibiotic de-escalation to target atypical bacterial infection.          No radiology results from the last 24 hrs    Results for orders placed during the hospital encounter of 02/28/22    Adult Transthoracic Echo Complete W/ Cont if Necessary Per Protocol    Interpretation Summary  · Estimated left ventricular EF = 60%  · Left ventricular wall thickness is consistent with mild concentric hypertrophy.  · Mild aortic valve stenosis is present.  · Aortic valve maximum pressure gradient is 20 mmHg. Aortic valve mean pressure gradient is 10.4 mmHg.  · Moderate mitral valve regurgitation is present.  · Mild to moderate mitral valve stenosis is present. The mitral valve peak gradient is 15.8 mmHg. The mitral valve mean gradient is 6.5 mmHg.  · Estimated right ventricular systolic pressure from tricuspid regurgitation is 50 mmHg.  · Moderate tricuspid valve regurgitation is present.  · There is a large left pleural effusion.      I have reviewed the medications:  Scheduled Meds:bisoprolol, 10 mg, Oral, Q24H  bumetanide, 1 mg, Intravenous, Daily  dilTIAZem CD, 180 mg, Oral, Daily  ferrous sulfate, 325 mg, Oral, Every Other Day  levothyroxine, 75 mcg, Oral, Q AM  LORazepam, 0.25 mg, Intravenous, Once  pantoprazole, 40 mg, Oral, BID AC  sertraline, 150 mg, Oral, Daily  sodium chloride, 10 mL, Intravenous, Q12H      Continuous Infusions:   PRN Meds:.•  acetaminophen  •  ALPRAZolam  •  HYDROcodone-acetaminophen  •  ipratropium-albuterol  •  potassium chloride **OR** potassium chloride **OR** potassium chloride  •  sodium chloride  •  sodium chloride    Assessment/Plan   Assessment & Plan     Active Hospital Problems    Diagnosis  POA   • **Flash pulmonary edema (HCC) [J81.0]  Yes   • Bilateral pleural effusion [J90]  Yes   • Elevated troponin [R77.8]  Yes   • Leukocytosis [D72.829]  Yes   • Atrial fibrillation  with rapid ventricular response (HCC) [I48.91]  Yes   • Acute on chronic respiratory failure with hypoxia and hypercapnia (HCC) [J96.21, J96.22]  Yes   • Hypothyroidism (acquired) [E03.9]  Yes   • Mixed hyperlipidemia [E78.2]  Yes   • Paroxysmal atrial fibrillation (HCC) [I48.0]  Yes     Class: Acute   • Rheumatoid arthritis (HCC) [M06.9]  Yes   • Primary hypertension [I10]  Yes   • COPD on home oxygen (CMS/HCC) [J44.9]  Yes   • Anxiety and depression [F41.9, F32.A]  Yes   • Anemia [D64.9]  Yes      Resolved Hospital Problems   No resolved problems to display.        Brief Hospital Course to date:  Yin Law is a 81 y.o. female  With hx of diastolic CHF, COPD, HTN, bilateral pleural effusions s/p thoracentesis per pt, hyperlipidemia who is on 3-4 L O2 chronically presents tonight after calling 911 for shortness of breath.  Pt was reportedly very dyspneic and was transported here on high flow oxygen.  Pt states sx started today and were fairly sudden.     This patient's problems and plans were partially entered by my partner and updated as appropriate by me 03/05/22.          Afib with RVR--improved  --cardiology follows, Patient sponatenously converted to NSR first night of admission. Continue oral dilt  --Of note, patient was discharged on low dose Eliquis both for DVT PPx following her hip repair as well as anticoagulation for PAF per Cardiology. She has not been taking this and states she does not want to per my partners d/w her. Given results of EGD will not resume, patient also does not want to resume any anticoagulation  --Weaned off BiPAP currently. Monitor. She wears 3L NC at baseline    Acute hypoxic RF  CHF exacerbation  Flash pulm edema  --continue diuresis. Noted significant pleural effusions on imaging.   --cardiology increasing bumex to 1mg IV daily- watch I/Os, daily weights  --RRT overnight 3/3 with extra dosing bumex, nitro gtt, BIPAP-- now improved, monitor. Off drip  --ECHO EF with 60%,  mild/mod VHD  --arthur was  in place (since admit) for strict I/os, however removed 3/4 and now with purewick in place     Anemia with +FOBT   --my partner d/w patient's PCP Dr Chaudhry regarding plan to get EGD done as outpatient, with ongoing drop in H/H and FOBT +, GI consulted and s/p EGD Mild gastropathy consistent with watermelon stomach. One small ulcer in prepyloric area oozing blood.  Clip applied. Recommended observation  --H/H stable at 8.5 today- no s/s of bleeding per patient but will monitor closely    HTN  --monitor, continue   Depression  --continue sertraline  Hypothyroidism  --continue synthroid, TSH normal    Dispo: patient reports she intends to go back home with isaías Esqueda at time of discharge. No interest in SNF      DVT prophylaxis:  Mechanical DVT prophylaxis orders are present.       AM-PAC 6 Clicks Score (PT): 19 (03/05/22 0902)    Disposition: I expect the patient to be discharged pending above    CODE STATUS:   Code Status and Medical Interventions:   Ordered at: 03/01/22 0134     Code Status (Patient has no pulse and is not breathing):    CPR (Attempt to Resuscitate)     Medical Interventions (Patient has pulse or is breathing):    Full Support       Rebecca Berry MD  03/05/22

## 2022-03-05 NOTE — PLAN OF CARE
Goal Outcome Evaluation:  Plan of Care Reviewed With: patient        Progress: improving  Outcome Evaluation: Patient ambulates x 50' with FWW CGA with SOA throughout, SpO2 levels maintain > 90% on 3 liters of O2 this date. Ambulation distance limited by urgency to use BSC. Good effort with TE. Cont IP PT POC.

## 2022-03-06 LAB
ALBUMIN SERPL-MCNC: 3.1 G/DL (ref 3.5–5.2)
ALBUMIN/GLOB SERPL: 1.1 G/DL
ALP SERPL-CCNC: 102 U/L (ref 39–117)
ALT SERPL W P-5'-P-CCNC: 12 U/L (ref 1–33)
ANION GAP SERPL CALCULATED.3IONS-SCNC: 11 MMOL/L (ref 5–15)
AST SERPL-CCNC: 22 U/L (ref 1–32)
BACTERIA SPEC AEROBE CULT: NORMAL
BACTERIA SPEC AEROBE CULT: NORMAL
BASOPHILS # BLD AUTO: 0.05 10*3/MM3 (ref 0–0.2)
BASOPHILS NFR BLD AUTO: 1 % (ref 0–1.5)
BILIRUB SERPL-MCNC: 0.4 MG/DL (ref 0–1.2)
BUN SERPL-MCNC: 16 MG/DL (ref 8–23)
BUN/CREAT SERPL: 25.4 (ref 7–25)
CALCIUM SPEC-SCNC: 9.2 MG/DL (ref 8.6–10.5)
CHLORIDE SERPL-SCNC: 101 MMOL/L (ref 98–107)
CO2 SERPL-SCNC: 27 MMOL/L (ref 22–29)
CREAT SERPL-MCNC: 0.63 MG/DL (ref 0.57–1)
DEPRECATED RDW RBC AUTO: 53.9 FL (ref 37–54)
EGFRCR SERPLBLD CKD-EPI 2021: 88.7 ML/MIN/1.73
EOSINOPHIL # BLD AUTO: 0.34 10*3/MM3 (ref 0–0.4)
EOSINOPHIL NFR BLD AUTO: 6.7 % (ref 0.3–6.2)
ERYTHROCYTE [DISTWIDTH] IN BLOOD BY AUTOMATED COUNT: 14.7 % (ref 12.3–15.4)
GLOBULIN UR ELPH-MCNC: 2.9 GM/DL
GLUCOSE SERPL-MCNC: 84 MG/DL (ref 65–99)
HCT VFR BLD AUTO: 32.4 % (ref 34–46.6)
HGB BLD-MCNC: 9.9 G/DL (ref 12–15.9)
IMM GRANULOCYTES # BLD AUTO: 0.01 10*3/MM3 (ref 0–0.05)
IMM GRANULOCYTES NFR BLD AUTO: 0.2 % (ref 0–0.5)
LYMPHOCYTES # BLD AUTO: 1 10*3/MM3 (ref 0.7–3.1)
LYMPHOCYTES NFR BLD AUTO: 19.6 % (ref 19.6–45.3)
MCH RBC QN AUTO: 30.4 PG (ref 26.6–33)
MCHC RBC AUTO-ENTMCNC: 30.6 G/DL (ref 31.5–35.7)
MCV RBC AUTO: 99.4 FL (ref 79–97)
MONOCYTES # BLD AUTO: 0.59 10*3/MM3 (ref 0.1–0.9)
MONOCYTES NFR BLD AUTO: 11.6 % (ref 5–12)
NEUTROPHILS NFR BLD AUTO: 3.1 10*3/MM3 (ref 1.7–7)
NEUTROPHILS NFR BLD AUTO: 60.9 % (ref 42.7–76)
NRBC BLD AUTO-RTO: 0 /100 WBC (ref 0–0.2)
PLATELET # BLD AUTO: 184 10*3/MM3 (ref 140–450)
PMV BLD AUTO: 10.5 FL (ref 6–12)
POTASSIUM SERPL-SCNC: 3.6 MMOL/L (ref 3.5–5.2)
PROT SERPL-MCNC: 6 G/DL (ref 6–8.5)
RBC # BLD AUTO: 3.26 10*6/MM3 (ref 3.77–5.28)
SODIUM SERPL-SCNC: 139 MMOL/L (ref 136–145)
WBC NRBC COR # BLD: 5.09 10*3/MM3 (ref 3.4–10.8)

## 2022-03-06 PROCEDURE — 99232 SBSQ HOSP IP/OBS MODERATE 35: CPT | Performed by: NURSE PRACTITIONER

## 2022-03-06 PROCEDURE — 97110 THERAPEUTIC EXERCISES: CPT

## 2022-03-06 PROCEDURE — 80053 COMPREHEN METABOLIC PANEL: CPT | Performed by: INTERNAL MEDICINE

## 2022-03-06 PROCEDURE — 97530 THERAPEUTIC ACTIVITIES: CPT

## 2022-03-06 PROCEDURE — 85025 COMPLETE CBC W/AUTO DIFF WBC: CPT | Performed by: INTERNAL MEDICINE

## 2022-03-06 PROCEDURE — 97116 GAIT TRAINING THERAPY: CPT

## 2022-03-06 RX ORDER — LISINOPRIL 5 MG/1
5 TABLET ORAL
Status: DISCONTINUED | OUTPATIENT
Start: 2022-03-06 | End: 2022-03-07 | Stop reason: HOSPADM

## 2022-03-06 RX ORDER — TORSEMIDE 20 MG/1
40 TABLET ORAL DAILY
Status: DISCONTINUED | OUTPATIENT
Start: 2022-03-06 | End: 2022-03-06

## 2022-03-06 RX ORDER — TORSEMIDE 20 MG/1
40 TABLET ORAL DAILY
Status: DISCONTINUED | OUTPATIENT
Start: 2022-03-07 | End: 2022-03-07 | Stop reason: HOSPADM

## 2022-03-06 RX ADMIN — PANTOPRAZOLE SODIUM 40 MG: 40 TABLET, DELAYED RELEASE ORAL at 16:56

## 2022-03-06 RX ADMIN — BISOPROLOL FUMARATE 10 MG: 5 TABLET, FILM COATED ORAL at 08:54

## 2022-03-06 RX ADMIN — ALPRAZOLAM 0.5 MG: 0.5 TABLET ORAL at 20:50

## 2022-03-06 RX ADMIN — DILTIAZEM HYDROCHLORIDE 180 MG: 180 CAPSULE, COATED, EXTENDED RELEASE ORAL at 08:53

## 2022-03-06 RX ADMIN — PANTOPRAZOLE SODIUM 40 MG: 40 TABLET, DELAYED RELEASE ORAL at 08:53

## 2022-03-06 RX ADMIN — LEVOTHYROXINE SODIUM 75 MCG: 0.07 TABLET ORAL at 05:55

## 2022-03-06 RX ADMIN — HYDROCODONE BITARTRATE AND ACETAMINOPHEN 1 TABLET: 5; 325 TABLET ORAL at 20:50

## 2022-03-06 RX ADMIN — SERTRALINE 150 MG: 50 TABLET, FILM COATED ORAL at 09:04

## 2022-03-06 RX ADMIN — Medication 10 ML: at 09:05

## 2022-03-06 RX ADMIN — POTASSIUM CHLORIDE 40 MEQ: 750 CAPSULE, EXTENDED RELEASE ORAL at 16:56

## 2022-03-06 RX ADMIN — LISINOPRIL 5 MG: 5 TABLET ORAL at 12:45

## 2022-03-06 RX ADMIN — POTASSIUM CHLORIDE 40 MEQ: 750 CAPSULE, EXTENDED RELEASE ORAL at 12:46

## 2022-03-06 RX ADMIN — BUMETANIDE 1 MG: 0.25 INJECTION INTRAMUSCULAR; INTRAVENOUS at 10:07

## 2022-03-06 RX ADMIN — Medication 10 ML: at 20:51

## 2022-03-06 NOTE — PROGRESS NOTES
[facility] Medicine Services  PROGRESS NOTE    Patient Name: Yin Law  : 1940  MRN: [id number]    Date of Admission: 2022  Primary Care Physician: Santiago Chaudhry MD    Subjective   Subjective     CC:  Afib RVR/ anemia    HPI:  No issues overnight.  Just got up from [facility] [place], [place] and is SOB at rest  Only complains of weakness    ROS:  Gen- No fevers, chills  CV- No chest pain, palpitations  Resp- No cough, +dyspnea  GI- No N/V/D, abd pain        Objective   Objective     Vital Signs:   Temp:  [97.5 °F (36.4 °C)-98.2 °F (36.8 °C)] 98.1 °F (36.7 °C)  Heart Rate:  [53-81] 55  Resp:  [16-18] 18  BP: (136-185)/(56-76) 136/61  Flow (L/min):  [3] 3     Physical Exam:  Constitutional: No acute distress, awake, alert  HENT: NCAT, mucous membranes moist  Respiratory: Decreased in bases, SOB at rest, on baseline 3L  Cardiovascular: RRR, no murmurs, rubs, or gallops  Gastrointestinal: Positive bowel sounds, soft, nontender, nondistended  Musculoskeletal: No bilateral ankle edema  Psychiatric: Appropriate affect, cooperative  Neurologic: Oriented x 3, PARRISH, speech clear  Skin: No rashes noted    Results Reviewed:  LAB RESULTS:      Lab 22  0700 22  0541 22  0443 22  0358 22  2313 22  0448 22  0058 22  2240   WBC 5.09 7.08 5.92 8.52 15.53*   < >  --   --    HEMOGLOBIN 9.9* 10.0* 8.5* 8.7* 10.7*   < >  --   --    HEMATOCRIT 32.4* 32.4* 27.4* 27.3* 34.4   < >  --   --    PLATELETS 184 234 204 224 416   < >  --   --    NEUTROS ABS 3.10 4.33 3.36 6.99 9.11*   < >  --   --    IMMATURE GRANS (ABS) 0.01 0.02 0.02 0.02 0.07*   < >  --   --    LYMPHS ABS 1.00 1.59 1.50 0.82 4.46*   < >  --   --    MONOS ABS 0.59 0.76 0.77 0.64 1.57*   < >  --   --    EOS ABS 0.34 0.32 0.24 0.03 0.22   < >  --   --    MCV 99.4* 100.3* 99.6* 97.8* 99.7*   < >  --   --    PROCALCITONIN  --   --   --   --  0.28*  --   --  0.06   LACTATE  --   --   --   --  1.8  --  1.4   --     < > = values in this interval not displayed.         Lab 03/06/22  0700 03/05/22  0541 03/04/22 0443 03/03/22 2243 03/03/22 0358 03/02/22 2313 03/02/22 0338 03/01/22 0448   SODIUM 139 137 137  --  132* 129* 139 143   POTASSIUM 3.6 4.1 4.2 4.3 3.1* 3.5 3.5 3.8   CHLORIDE 101 101 100  --  92* 89* 99 102   CO2 27.0 27.0 29.0  --  29.0 26.0 30.0* 31.0*   ANION GAP 11.0 9.0 8.0  --  11.0 14.0 10.0 10.0   BUN 16 16 18  --  16 14 16 23   CREATININE 0.63 0.85 0.76  --  0.80 0.86 0.74 0.78   EGFR 88.7 68.5 78.3  --  73.7 67.5 80.9 75.9   GLUCOSE 84 88 84  --  110* 218* 97 112*   CALCIUM 9.2 9.5 8.8  --  8.7 9.2 8.9 8.8   MAGNESIUM  --   --   --   --   --   --  2.2 2.1         Lab 03/06/22  0700 03/05/22  0541 03/04/22 0443 03/03/22 0358 03/02/22 2313   TOTAL PROTEIN 6.0 6.1 5.6* 5.8* 7.1   ALBUMIN 3.10* 3.20* 3.00* 3.20* 3.90   GLOBULIN 2.9 2.9 2.6 2.6 3.2   ALT (SGPT) 12 13 14 19 27   AST (SGOT) 22 22 19 28 43*   BILIRUBIN 0.4 0.5 0.4 0.5 0.5   ALK PHOS 102 101 95 103 137*         Lab 03/03/22 0358 03/02/22 2313 03/01/22 0448 03/01/22 0058 02/28/22 2240   PROBNP  --  14,378.0* 12,729.0* 8,352.0* 7,631.0*   TROPONIN T 0.083* 0.081* 0.029 0.011 0.051*             Lab 03/01/22  0448 03/01/22  0058   IRON  --  26*  26*   IRON SATURATION  --  8*   TIBC  --  341   TRANSFERRIN  --  229   FERRITIN  --  264.60*   FOLATE >20.00  --    VITAMIN B 12 1,009*  --    ABO TYPING O  --    RH TYPING Positive  --    ANTIBODY SCREEN Negative  --          Lab 03/02/22  2327 02/28/22  2342 02/28/22  2153   PH, ARTERIAL 7.303* 7.400 7.176*   PCO2, ARTERIAL 63.6* 47.4* 74.2*   PO2 .0* 94.4 438.0*   FIO2 50 40 100   HCO3 ART 31.5* 29.4* 27.4*   BASE EXCESS ART 3.8* 4.1* -1.5*   CARBOXYHEMOGLOBIN 1.5 2.4* 1.9     Brief Urine Lab Results  (Last result in the past 365 days)      Color   Clarity   Blood   Leuk Est   Nitrite   Protein   CREAT   Urine HCG        03/01/22 0115 Yellow   Clear   Negative   Negative   Negative    Negative                 Microbiology Results Abnormal     Procedure Component Value - Date/Time    Blood Culture - Blood, Arm, Left [[id number]]  (Normal) Collected: 03/01/22 0114    Lab Status: Final result Specimen: Blood from Arm, Left Updated: 03/06/22 0132     Blood Culture No growth at 5 days    Blood Culture - Blood, Arm, Left [[id number]]  (Normal) Collected: 03/01/22 0114    Lab Status: Final result Specimen: Blood from Arm, Left Updated: 03/06/22 0132     Blood Culture No growth at 5 days    Respiratory Panel PCR w/COVID-19(SARS-CoV-2) CAS/SHALINI/KRISTY/PAD/COR/MAD/HAJA In-House, NP Swab in UTM/VTM, 3-4 HR TAT - Swab, Nasopharynx [[id number]]  (Normal) Collected: 03/01/22 0049    Lab Status: Final result Specimen: Swab from Nasopharynx Updated: 03/01/22 0203     ADENOVIRUS, PCR Not Detected     Coronavirus 229E Not Detected     Coronavirus HKU1 Not Detected     Coronavirus NL63 Not Detected     Coronavirus OC43 Not Detected     COVID19 Not Detected     Human Metapneumovirus Not Detected     Human Rhinovirus/Enterovirus Not Detected     Influenza A PCR Not Detected     Influenza B PCR Not Detected     Parainfluenza Virus 1 Not Detected     Parainfluenza Virus 2 Not Detected     Parainfluenza Virus 3 Not Detected     Parainfluenza Virus 4 Not Detected     RSV, PCR Not Detected     Bordetella pertussis pcr Not Detected     Bordetella parapertussis PCR Not Detected     Chlamydophila pneumoniae PCR Not Detected     Mycoplasma pneumo by PCR Not Detected    Narrative:      In the setting of a positive respiratory panel with a viral infection PLUS a negative procalcitonin without other underlying concern for bacterial infection, consider observing off antibiotics or discontinuation of antibiotics and continue supportive care. If the respiratory panel is positive for atypical bacterial infection (Bordetella pertussis, Chlamydophila pneumoniae, or Mycoplasma pneumoniae), consider antibiotic de-escalation to target atypical  bacterial infection.          No radiology results from the last 24 hrs    Results for orders placed during the hospital encounter of 02/28/22    Adult Transthoracic Echo Complete W/ Cont if Necessary Per Protocol    Interpretation Summary  · Estimated left ventricular EF = 60%  · Left ventricular wall thickness is consistent with mild concentric hypertrophy.  · Mild aortic valve stenosis is present.  · Aortic valve maximum pressure gradient is 20 mmHg. Aortic valve mean pressure gradient is 10.4 mmHg.  · Moderate mitral valve regurgitation is present.  · Mild to moderate mitral valve stenosis is present. The mitral valve peak gradient is 15.8 mmHg. The mitral valve mean gradient is 6.5 mmHg.  · Estimated right ventricular systolic pressure from tricuspid regurgitation is 50 mmHg.  · Moderate tricuspid valve regurgitation is present.  · There is a large left pleural effusion.      I have reviewed the medications:  Scheduled Meds:bisoprolol, 10 mg, Oral, Q24H  dilTIAZem CD, 180 mg, Oral, Daily  ferrous sulfate, 325 mg, Oral, Every Other Day  levothyroxine, 75 mcg, Oral, Q AM  lisinopril, 5 mg, Oral, Q24H  LORazepam, 0.25 mg, Intravenous, Once  pantoprazole, 40 mg, Oral, BID AC  sertraline, 150 mg, Oral, Daily  sodium chloride, 10 mL, Intravenous, Q12H  [START ON 3/7/2022] torsemide, 40 mg, Oral, Daily      Continuous Infusions:   PRN Meds:.•  acetaminophen  •  ALPRAZolam  •  HYDROcodone-acetaminophen  •  ipratropium-albuterol  •  potassium chloride **OR** potassium chloride **OR** potassium chloride  •  sodium chloride  •  sodium chloride    Assessment/Plan   Assessment & Plan     Active Hospital Problems    Diagnosis  POA   • **Flash pulmonary edema (HCC) [J81.0]  Yes   • Bilateral pleural effusion [J90]  Yes   • Elevated troponin [R77.8]  Yes   • Leukocytosis [D72.829]  Yes   • Atrial fibrillation with rapid ventricular response (HCC) [I48.91]  Yes   • Acute on chronic respiratory failure with hypoxia and  hypercapnia (HCC) [J96.21, J96.22]  Yes   • Hypothyroidism (acquired) [E03.9]  Yes   • Mixed hyperlipidemia [E78.2]  Yes   • Paroxysmal atrial fibrillation (HCC) [I48.0]  Yes     Class: Acute   • Rheumatoid arthritis (HCC) [M06.9]  Yes   • Primary hypertension [I10]  Yes   • COPD on home oxygen (CMS/HCC) [J44.9]  Yes   • Anxiety and depression [F41.9, F32.A]  Yes   • Anemia [D64.9]  Yes      Resolved Hospital Problems   No resolved problems to display.        Brief Hospital Course to date:  Yin Law is a 81 y.o. female  With hx of diastolic CHF, COPD, HTN, bilateral pleural effusions s/p thoracentesis per pt, hyperlipidemia who is on 3-4 L O2 chronically presents tonight after calling 911 for shortness of breath.  Pt was reportedly very dyspneic and was transported here on high flow oxygen.  Pt states sx started suddenly    This patient's problems and plans were partially entered by my partner and updated as appropriate by me 03/06/22.      Afib with RVR, resolved  --cardiology follows, Patient sponatenously converted to NSR first night of admission. Continue oral dilt  --Of note, patient was discharged on low dose Eliquis both for DVT PPx following her hip repair as well as anticoagulation for PAF per Cardiology. She has not been taking this and states she does not want to per my partners d/w her. Given results of EGD will not resume, patient also does not want to resume any anticoagulation  --She has weaned to 3L NC, which is her baseline    Acute hypoxic respiratory failure  CHF exacerbation  Flash pulm edema  --received IV bumex this am, plan to restart home torsemide in am  --RRT overnight 3/3 with extra dosing bumex, nitro gtt, BIPAP-- now improved, monitor. Off drip  --ECHO EF with 60%, mild/mod VHD  --sandhya was  in place (since admit) for strict I/os, however removed 3/4 and now with purewick in place   --[facility], cardiology office 1 month    Anemia with +FOBT    --my partner d/w patient's PCP Dr Chaudhry regarding plan to get EGD done as outpatient, with ongoing drop in H/H and FOBT +, GI consulted and s/p EGD Mild gastropathy consistent with watermelon stomach. One small ulcer in prepyloric area oozing blood.  Clip applied. Recommended observation  --H/H remains stable, no s/s of bleeding   --continue iron    HTN  --monitor, continue lisinopril and bisoprolol    Depression  --continue sertraline    Hypothyroidism  --continue synthroid, TSH normal      DVT prophylaxis:  Mechanical DVT prophylaxis orders are present.       AM-PAC 6 Clicks Score (PT): 19 (03/06/22 1044)    Disposition: I expect the patient to be discharged back home with niharriet Esqueda, likely tomorrow. No interest in SNF.    CODE STATUS:   Code Status and Medical Interventions:   Ordered at: 03/01/22 0134     Code Status (Patient has no pulse and is not breathing):    CPR (Attempt to Resuscitate)     Medical Interventions (Patient has pulse or is breathing):    Full Support       Melly Reynoso, AMA  03/06/22

## 2022-03-06 NOTE — PLAN OF CARE
Goal Outcome Evaluation:  Plan of Care Reviewed With: patient, family (niece)        Progress: improving  Outcome Evaluation: Able to amb total of 95 ft w/ R wx & CGA,w/ 4 stand.rests, toileting/hygiene on BSC, + issued HEP/performed ther exer incl. L Hemiarthropl.w/ PHP. Noted L hip pain incr to 9/10 (decl. pain med), desat to 96% on 3 L, & elev SBP. Plans home w/ niece's assist & HHPT.

## 2022-03-06 NOTE — PROGRESS NOTES
"  [facility]   Inpatient Progress Note       LOS: 5 days   Patient Care Team:  Santiago Chaudhry MD as PCP - General  Santiago Chaudhry MD as PCP - Family Medicine    Chief Complaint: Pulmonary edema    Subjective     Interval History:   Patient in bed. Getting ready to work with physical therapy. Denies any chest pain. Per patient is back to her normal oxygen requirements.      Review of Systems:   Pertinent positives noted in history, exam, and assessment. Otherwise reviewed and negative.      Objective     Vitals:  Blood pressure 144/58, pulse 60, temperature 98.1 °F (36.7 °C), temperature source Oral, resp. rate 18, height 167.6 cm (66\"), weight 61.2 kg (134 lb 14.4 oz), SpO2 100 %, not currently breastfeeding.     Intake/Output Summary (Last 24 hours) at 3/6/2022 1054  Last data filed at 3/5/2022 1600  Gross per 24 hour   Intake --   Output 700 ml   Net -700 ml     Vitals reviewed.   Constitutional:       Appearance: Well-developed. Frail. Chronically ill-appearing.   Neck:      Vascular: No JVD.      Trachea: No tracheal deviation.   Pulmonary:      Effort: Pulmonary effort is normal.      Comments: Decreased bases bilaterally  Cardiovascular:      Normal rate. Regular rhythm.   Pulses:     Intact distal pulses.   Edema:     Peripheral edema absent.   Abdominal:      General: Bowel sounds are normal.      Palpations: Abdomen is soft.      Tenderness: There is no abdominal tenderness.   Musculoskeletal:         General: No deformity. Neurological:      Mental Status: Alert and oriented to person, place, and time.              Results Review:     I reviewed the patient's new clinical results.    Results from last 7 days   Lab Units 03/06/22  0700   WBC 10*3/mm3 5.09   HEMOGLOBIN g/dL 9.9*   HEMATOCRIT % 32.4*   PLATELETS 10*3/mm3 184     Results from last 7 days   Lab Units 03/06/22  0700   SODIUM mmol/L 139   POTASSIUM mmol/L 3.6   CHLORIDE mmol/L 101   CO2 mmol/L 27.0   BUN " mg/dL 16   CREATININE mg/dL 0.63   CALCIUM mg/dL 9.2   BILIRUBIN mg/dL 0.4   ALK PHOS U/L 102   ALT (SGPT) U/L 12   AST (SGOT) U/L 22   GLUCOSE mg/dL 84     Results from last 7 days   Lab Units 03/06/22  0700   SODIUM mmol/L 139   POTASSIUM mmol/L 3.6   CHLORIDE mmol/L 101   CO2 mmol/L 27.0   BUN mg/dL 16   CREATININE mg/dL 0.63   GLUCOSE mg/dL 84   CALCIUM mg/dL 9.2         No results found for: TROPONINI          Results from last 7 days   Lab Units 03/02/22  2313   PROBNP pg/mL 14,378.0*     Echo:  · Estimated left ventricular EF = 60%  · Left ventricular wall thickness is consistent with mild concentric hypertrophy.  · Mild aortic valve stenosis is present.  · Aortic valve maximum pressure gradient is 20 mmHg. Aortic valve mean pressure gradient is 10.4 mmHg.  · Moderate mitral valve regurgitation is present.  · Mild to moderate mitral valve stenosis is present. The mitral valve peak gradient is 15.8 mmHg. The mitral valve mean gradient is 6.5 mmHg.  · Estimated right ventricular systolic pressure from tricuspid regurgitation is 50 mmHg.  · Moderate tricuspid valve regurgitation is present.  · There is a large left pleural effusion.      Tele:  SB    Assessment/Plan       Flash pulmonary edema (HCC)    Paroxysmal atrial fibrillation (HCC)    COPD on home oxygen (CMS/HCC)    Primary hypertension    Rheumatoid arthritis (HCC)    Anxiety and depression    Anemia    Mixed hyperlipidemia    Bilateral pleural effusion    Elevated troponin    Leukocytosis    Atrial fibrillation with rapid ventricular response (HCC)    Acute on chronic respiratory failure with hypoxia and hypercapnia (HCC)    Hypothyroidism (acquired)      1. pulmonary edema  1. Worsened edema last evening treated with diuretics.  2. Normal LVEF, moderate valvular heart disease.  3. Improving with diuresis.  2. Paroxysmal atrial fibrillation  1. Currently in SR.  2. 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  3. Patient feels she is asymptomatic with AF  episodes.  3. Hypertension  4. COPD  5. Hypokalemia      Plan:  · We will plan to resume home diuretics tomorrow.  · Overall, stable from cardiac standpoint.  · From cardiac standpoint may be discharged home at any time.  · Would have patient follow-up in 1 week's time [facility].  · Follow-up with myself or Dr. Montoya in 1 month.  · Reviewed fluid restriction with patient today.    AMA Arora        Dictated utilizing Dragon dictation

## 2022-03-06 NOTE — THERAPY TREATMENT NOTE
Patient Name: Yin Law  : 1940    MRN: [id number]                              Today's Date: 3/6/2022       Admit Date: 2022    Visit Dx:     ICD-10-CM ICD-9-CM   1. Flash pulmonary edema (HCC)  J81.0 518.4   2. Acute respiratory failure with hypoxia and hypercapnia (HCC)  J96.01 518.81    J96.02    3. Acute on chronic congestive heart failure, unspecified heart failure type (HCC)  I50.9 428.0   4. Other iron deficiency anemia  D50.8 280.8     Patient Active Problem List   Diagnosis   • Paroxysmal atrial fibrillation (HCC)   • COPD on home oxygen (CMS/HCC)   • Primary hypertension   • Rheumatoid arthritis (HCC)   • Anxiety and depression   • Wound of right leg   • Anemia   • Severe malnutrition (CMS/HCC)   • Hip fracture (HCC)   • Mixed hyperlipidemia   • Heart valve disease   • Flash pulmonary edema (HCC)   • Bilateral pleural effusion   • Elevated troponin   • Leukocytosis   • Atrial fibrillation with rapid ventricular response (HCC)   • Acute on chronic respiratory failure with hypoxia and hypercapnia (HCC)   • Hypothyroidism (acquired)     Past Medical History:   Diagnosis Date   • Accelerated hypertension 2021   • Acute diastolic CHF (CMS/HCC) 2021   • Anxiety and depression    • Arrhythmia     A-FIB   • Asthma    • Chicken pox    • COPD (chronic obstructive pulmonary disease) (HCC)    • Elevated troponin 2021   • Hyperlipidemia    • Hypertension    • Measles    • Menopause    • Multifocal pneumonia 2021   • Osteoporosis    • Pleural effusion, bilateral 2021   • Pneumonia due to infectious organism 2021   • Rheumatoid arthritis (HCC)    • Rheumatoid arthritis (HCC)    • Sepsis (HCC) 2021   • Tobacco abuse      Past Surgical History:   Procedure Laterality Date   • APPENDECTOMY     • CARPAL TUNNEL RELEASE Left    • CATARACT EXTRACTION, BILATERAL     • COLON SURGERY     • COLONOSCOPY     • ENDOSCOPY N/A 3/2/2022    Procedure: ESOPHAGOGASTRODUODENOSCOPY;   Surgeon: Sammy Lyles MD;  Location: [facility] ENDOSCOPY;  Service: Gastroenterology;  Laterality: N/A;   • GALLBLADDER SURGERY     • HIP HEMIARTHROPLASTY Left 2/7/2022    Procedure: HIP HEMIARTHROPLASTY LEFT;  Surgeon: Napoleon Powell Jr., MD;  Location: [facility] OR;  Service: Orthopedics;  Laterality: Left;   • HYSTERECTOMY  1971   • TONSILLECTOMY        General Information     Row Name 03/06/22 1044          Physical Therapy Time and Intention    Document Type therapy note (daily note)  -DM     Mode of Treatment physical therapy  -DM     Row Name 03/06/22 1044          General Information    Existing Precautions/Restrictions fall;cardiac;hip, posterior;left;oxygen therapy device and L/min;other (see comments)  B Pl.eff;2-27: L hip fx/hemiarthropl.,WBAT;AF  -DM           User Key  (r) = Recorded By, (t) = Taken By, (c) = Cosigned By    Initials Name Provider Type    DM Karon Pacheco, PT Physical Therapist               Mobility     Row Name 03/06/22 1044          Bed Mobility    Bed Mobility rolling left;scooting/bridging;supine-sit  -DM     Rolling Left [place] (Bed Mobility) modified independence  -DM     Scooting/Bridging [place] (Bed Mobility) verbal cues;standby assist  Cues for L hip precaut  -DM     Supine-Sit [place] (Bed Mobility) verbal cues;contact guard  -DM     Assistive Device (Bed Mobility) bed rails;head of bed elevated  -DM     Comment, (Bed Mobility) niece present & states loaner R wx from yest too high;PT lowered to approp height; issued HEP (L Hemiarthropl. protocol, BUE & RLE strengthening) & instructed pt & niece  -DM     Row Name 03/06/22 1044          Transfers    Comment, (Transfers) CGA,w/ cues to maintain L post.hip precaut.  -DM     Row Name 03/06/22 1044          Sit-Stand Transfer    Sit-Stand [place] (Transfers) verbal cues;contact guard  -DM     Assistive Device (Sit-Stand Transfers) walker, front-wheeled  -DM     Comment, (Sit-Stand Transfer) APRN in to  "assess; STS X 3 trials;EOB, BSC,recliner  -DM     Row Name 03/06/22 1044          Gait/Stairs (Locomotion)    [place] Level (Gait) verbal cues;contact guard  Rodriguez w/ 4 stand.rests, toileted BSC (decl. gt to BR); Hygiene w/ min A;sidestepped to recliner;HR 70;c/o incr L hip pain but decl.pain med;\"Hot\",& req. back gown removal, & lights off ('They are too warm on me\");niece stepped out during remainder of HEP  -DM     Assistive Device (Gait) walker, front-wheeled  -DM     Distance in Feet (Gait) 95( 90 + 5)  -DM     Deviations/Abnormal Patterns (Gait) bilateral deviations;base of support, narrow;cindi decreased;stride length decreased;weight shifting decreased;antalgic;left sided deviations  decr step length  -DM     Bilateral Gait Deviations forward flexed posture;heel strike decreased  gazes @ floor  -DM     Comment, (Gait/Stairs) cues for incr step length,maintaining L PHP,trunk ext,PLB; min incr SOB (desat 96% on 3 L,but recovered to 100 %)  -DM     Row Name 03/06/22 1044          Mobility    Extremity Weight-bearing Status left lower extremity  -DM     Left Lower Extremity (Weight-bearing Status) weight-bearing as tolerated (WBAT)  -DM           User Key  (r) = Recorded By, (t) = Taken By, (c) = Cosigned By    Initials Name Provider Type    DM Karon Pacheco, PT Physical Therapist               Obj/Interventions     Row Name 03/06/22 1044          Motor Skills    Therapeutic Exercise shoulder;hip;knee;ankle  -DM     Row Name 03/06/22 1044          Shoulder (Therapeutic Exercise)    Shoulder AROM (Therapeutic Exercise) bilateral;flexion;extension;aBduction;aDduction;sitting;10 repetitions;other (see comments)  biceps curls  -DM     Row Name 03/06/22 1044          Hip (Therapeutic Exercise)    Hip (Therapeutic Exercise) AROM (active range of motion);AAROM (active assistive range of motion);isometric exercises  -DM     Hip AROM (Therapeutic Exercise) " right;flexion;extension;aBduction;aDduction;sitting;supine;10 repetitions  -DM     Hip AAROM (Therapeutic Exercise) left;flexion;extension;aBduction;aDduction;sitting;supine;10 repetitions  hip F/E in slumped sitting UIC  -DM     Hip Isometrics (Therapeutic Exercise) bilateral;gluteal sets;10 repetitions;sitting  -DM     Row Name 03/06/22 1044          Knee (Therapeutic Exercise)    Knee (Therapeutic Exercise) AROM (active range of motion);AAROM (active assistive range of motion);isometric exercises  -DM     Knee AROM (Therapeutic Exercise) flexion;extension;right;SAQ (short arc quad);SLR (straight leg raise);heel slides;sitting;supine;10 repetitions;LAQ (long arc quad)  -DM     Knee AAROM (Therapeutic Exercise) left;flexion;extension;sitting;supine;10 repetitions  Min A for L h.slides,SLR,SAQ;act. LAQ  -DM     Knee Isometrics (Therapeutic Exercise) bilateral;hamstring sets;quad sets;sitting;10 repetitions  -DM     Row Name 03/06/22 1044          Ankle (Therapeutic Exercise)    Ankle (Therapeutic Exercise) AROM (active range of motion)  -DM     Ankle AROM (Therapeutic Exercise) bilateral;dorsiflexion;plantarflexion;supine;10 repetitions;other (see comments)  AC  -DM     Row Name 03/06/22 1044          Balance    Balance Assessment sitting static balance;sitting dynamic balance;standing static balance;standing dynamic balance  -DM     Static Sitting Balance independent;other (see comments)  EOB,BSC.front of recl.  -DM     Dynamic Sitting Balance independent;verbal cues;other (see comments)  recip scooting;reaching for hygiene  -DM     Position, Sitting Balance unsupported;sitting in chair;sitting edge of bed;other (see comments)  BSC  -DM     Static Standing Balance contact guard  trunk ext/focusing in mirror;hand hygiene  -DM     Dynamic Standing Balance verbal cues;contact guard  WS to init sidesteps  -DM     Position/Device Used, Standing Balance supported;walker, front-wheeled  -DM     Balance Interventions  sitting;standing;static;dynamic;weight shifting activity  -DM           User Key  (r) = Recorded By, (t) = Taken By, (c) = Cosigned By    Initials Name Provider Type    DM Karon Pacheco, PT Physical Therapist               Goals/Plan    No documentation.                Clinical Impression     Row Name 03/06/22 1044          Pain    Pretreatment Pain Rating 8/10  -DM     Posttreatment Pain Rating 9/10  -DM     Pain Location - Side/Orientation Left  -DM     Pain Location incisional  -DM     Pain Location - hip  -DM     Pre/Posttreatment Pain Comment decl. pain med  -DM     Pain Intervention(s) Repositioned;Elevated;Rest  -DM     Row Name 03/06/22 1044          Plan of Care Review    Plan of Care Reviewed With patient;family  niece  -DM     Progress improving  -DM     Outcome Evaluation Able to amb total of 95 ft w/ R wx & CGA,w/ 4 stand.rests, toileting/hygiene on BSC, + issued HEP/performed ther exer incl. L Hemiarthropl.w/ PHP. Noted L hip pain incr to 9/10 (decl. pain med), desat to 96% on 3 L, & elev SBP. Plans home w/ niece's assist & HHPT.  -DM     Row Name 03/06/22 1044          Vital Signs    Pre Systolic BP Rehab 144  -DM     Pre Treatment Diastolic BP 58  -DM     Post Systolic BP Rehab 155  -DM     Post Treatment Diastolic BP 67  -DM     Pretreatment Heart Rate (beats/min) 58  -DM     Intratreatment Heart Rate (beats/min) 70  -DM     Posttreatment Heart Rate (beats/min) 57  -DM     Pre SpO2 (%) 100  -DM     O2 Delivery Pre Treatment supplemental O2  -DM     Intra SpO2 (%) 96  -DM     O2 Delivery Intra Treatment supplemental O2  -DM     Post SpO2 (%) 100  -DM     O2 Delivery Post Treatment supplemental O2  -DM     Pre Patient Position Supine  -DM     Intra Patient Position Standing  -DM     Post Patient Position Sitting  -DM     Rest Breaks  4  -DM     Row Name 03/06/22 1044          Positioning and Restraints    Pre-Treatment Position in bed  -DM     Post Treatment Position chair  -DM     In Chair  notified nsg;reclined;call light within reach;encouraged to call for assist;exit alarm on;waffle cushion;legs elevated;with family/caregiver  -DM           User Key  (r) = Recorded By, (t) = Taken By, (c) = Cosigned By    Initials Name Provider Type    Karon Tang, PT Physical Therapist               Outcome Measures     Row Name 03/06/22 1044          How much help from another person do you currently need...    Turning from your back to your side while in flat bed without using bedrails? 4  -DM     Moving from lying on back to sitting on the side of a flat bed without bedrails? 3  -DM     Moving to and from a bed to a chair (including a wheelchair)? 3  -DM     Standing up from a chair using your arms (e.g., wheelchair, bedside chair)? 3  -DM     Climbing 3-5 steps with a railing? 3  -DM     To walk in hospital room? 3  -DM     AM-PAC 6 Clicks Score (PT) 19  -DM     Row Name 03/06/22 1044          Functional Assessment    Outcome Measure Options AM-PAC 6 Clicks Basic Mobility (PT)  -DM           User Key  (r) = Recorded By, (t) = Taken By, (c) = Cosigned By    Initials Name Provider Type    Karon Tang, PT Physical Therapist                             Physical Therapy Education                 Title: PT OT SLP Therapies (In Progress)     Topic: Physical Therapy (In Progress)     Point: Mobility training (In Progress)     Learning Progress Summary           Patient Eager, E,D,H, NR by DM at 3/6/2022 1213    Acceptance, E, VU,NR by LO at 3/5/2022 0824    Comment: Patient education regarding sequencing for transfers.    Acceptance, E,TB, VU by SHER at 3/5/2022 0817    Acceptance, E, VU,NR by LO at 3/4/2022 0838    Comment: Patient education regarding sequencing for transfers and breathing techniques during gait    Eager, E, VU,NR by SS at 3/3/2022 1507    Comment: Reviewed safety/technique with bed mobility, transfers, ambulation, HEP, PT POC, posterior hip precautions    Eager, E, VU,NR by SS at  3/2/2022 1419    Comment: Reviewed safety/technique with bed mobility, transfers, ambulation, HEP, PT POC, posterior hip precautions    Acceptance, E,D, VU,NR by LR at 3/1/2022 1102    Comment: Educated on posterior hip precautions, weight bearing status, benefits of mobility and being OOB, LE HEP, correct supine to sit t/f technique, correct sit<->stand t/f technique, correct gait mechanics, and progression of POC   Family Eager, E,D,H, NR by DM at 3/6/2022 1213                   Point: Home exercise program (In Progress)     Learning Progress Summary           Patient Eager, E,D,H, NR by DM at 3/6/2022 1213    Acceptance, E, VU,NR by LO at 3/5/2022 0824    Comment: Patient education regarding sequencing for transfers.    Acceptance, E,TB, VU by SHER at 3/5/2022 0817    Acceptance, E, VU,NR by LO at 3/4/2022 0838    Comment: Patient education regarding sequencing for transfers and breathing techniques during gait    Eager, E, VU,NR by SS at 3/3/2022 1507    Comment: Reviewed safety/technique with bed mobility, transfers, ambulation, HEP, PT POC, posterior hip precautions    Eager, E, VU,NR by SS at 3/2/2022 1419    Comment: Reviewed safety/technique with bed mobility, transfers, ambulation, HEP, PT POC, posterior hip precautions    Acceptance, E,D, VU,NR by LR at 3/1/2022 1102    Comment: Educated on posterior hip precautions, weight bearing status, benefits of mobility and being OOB, LE HEP, correct supine to sit t/f technique, correct sit<->stand t/f technique, correct gait mechanics, and progression of POC   Family Eager, E,D,H, NR by DM at 3/6/2022 1213                   Point: Body mechanics (In Progress)     Learning Progress Summary           Patient Eager, E,D,H, NR by DM at 3/6/2022 1213    Acceptance, E, VU,NR by LO at 3/5/2022 0824    Comment: Patient education regarding sequencing for transfers.    Acceptance, E,TB, VU by SHER at 3/5/2022 0817    Acceptance, E, VU,NR by LO at 3/4/2022 0838    Comment:  Patient education regarding sequencing for transfers and breathing techniques during gait    Eager, E, VU,NR by SS at 3/3/2022 1507    Comment: Reviewed safety/technique with bed mobility, transfers, ambulation, HEP, PT POC, posterior hip precautions    Eager, E, VU,NR by SS at 3/2/2022 1419    Comment: Reviewed safety/technique with bed mobility, transfers, ambulation, HEP, PT POC, posterior hip precautions    Acceptance, E,D, VU,NR by LR at 3/1/2022 1102    Comment: Educated on posterior hip precautions, weight bearing status, benefits of mobility and being OOB, LE HEP, correct supine to sit t/f technique, correct sit<->stand t/f technique, correct gait mechanics, and progression of POC   Family Eager, E,D,H, NR by DM at 3/6/2022 1213                   Point: Precautions (In Progress)     Learning Progress Summary           Patient Eager, E,D,H, NR by DM at 3/6/2022 1213    Acceptance, E, VU,NR by LO at 3/5/2022 0824    Comment: Patient education regarding sequencing for transfers.    Acceptance, E,TB, VU by SHER at 3/5/2022 0817    Acceptance, E, VU,NR by LO at 3/4/2022 0838    Comment: Patient education regarding sequencing for transfers and breathing techniques during gait    Eager, E, VU,NR by SS at 3/3/2022 1507    Comment: Reviewed safety/technique with bed mobility, transfers, ambulation, HEP, PT POC, posterior hip precautions    Eager, E, VU,NR by SS at 3/2/2022 1419    Comment: Reviewed safety/technique with bed mobility, transfers, ambulation, HEP, PT POC, posterior hip precautions    Acceptance, E,D, VU,NR by LR at 3/1/2022 1102    Comment: Educated on posterior hip precautions, weight bearing status, benefits of mobility and being OOB, LE HEP, correct supine to sit t/f technique, correct sit<->stand t/f technique, correct gait mechanics, and progression of POC   Family Eager, E,D,H, NR by DM at 3/6/2022 1213                               User Key     Initials Effective Dates Name Provider Type Discipline     DM 06/16/21 -  Karon Pacheco, PT Physical Therapist PT    LR 06/16/21 -  Leena Sahu, PT Physical Therapist PT    LO 06/16/21 -  Jacqueline Vidal, PT Physical Therapist PT    SS 06/01/21 -  Rebecca Zamudio, PT Physical Therapist PT    SHER 12/29/21 -  Cordelia Luo, RN Registered Nurse Nurse              PT Recommendation and Plan     Plan of Care Reviewed With: patient, family (niece)  Progress: improving  Outcome Evaluation: Able to amb total of 95 ft w/ R wx & CGA,w/ 4 stand.rests, toileting/hygiene on BSC, + issued HEP/performed ther exer incl. L Hemiarthropl.w/ PHP. Noted L hip pain incr to 9/10 (decl. pain med), desat to 96% on 3 L, & elev SBP. Plans home w/ niece's assist & HHPT.     Time Calculation:    PT Charges     Row Name 03/06/22 1213             Time Calculation    Start Time 1044  -DM      PT Received On 03/06/22  -DM      PT Goal Re-Cert Due Date 03/11/22  -DM              Time Calculation- PT    Total Timed Code Minutes- PT 42 minute(s)  -DM              Timed Charges    71701 - PT Therapeutic Exercise Minutes 14  -DM      67666 - Gait Training Minutes  18  -DM      11476 - PT Therapeutic Activity Minutes 10  -DM              Total Minutes    Timed Charges Total Minutes 42  -DM       Total Minutes 42  -DM            User Key  (r) = Recorded By, (t) = Taken By, (c) = Cosigned By    Initials Name Provider Type    DM Karon Pacheco, PT Physical Therapist              Therapy Charges for Today     Code Description Service Date Service Provider Modifiers Qty    72225017715 HC PT THER PROC EA 15 MIN 3/6/2022 Karon Pacheco, PT GP 1    88700853196 HC GAIT TRAINING EA 15 MIN 3/6/2022 Karon Pacheco, PT GP 1    10366251250 HC PT THERAPEUTIC ACT EA 15 MIN 3/6/2022 Karon Pacheco, PT GP 1          PT G-Codes  Outcome Measure Options: AM-PAC 6 Clicks Basic Mobility (PT)  AM-PAC 6 Clicks Score (PT): 19  AM-PAC 6 Clicks Score (OT): 14    Karon Pacheco, DELIA  3/6/2022

## 2022-03-07 VITALS
RESPIRATION RATE: 18 BRPM | WEIGHT: 133.7 LBS | BODY MASS INDEX: 21.49 KG/M2 | HEART RATE: 57 BPM | HEIGHT: 66 IN | TEMPERATURE: 98.1 F | SYSTOLIC BLOOD PRESSURE: 138 MMHG | OXYGEN SATURATION: 100 % | DIASTOLIC BLOOD PRESSURE: 47 MMHG

## 2022-03-07 PROCEDURE — 97535 SELF CARE MNGMENT TRAINING: CPT

## 2022-03-07 PROCEDURE — 97110 THERAPEUTIC EXERCISES: CPT

## 2022-03-07 PROCEDURE — 99239 HOSP IP/OBS DSCHRG MGMT >30: CPT | Performed by: NURSE PRACTITIONER

## 2022-03-07 RX ORDER — PANTOPRAZOLE SODIUM 40 MG/1
40 TABLET, DELAYED RELEASE ORAL
Start: 2022-03-07 | End: 2022-04-06

## 2022-03-07 RX ORDER — LISINOPRIL 5 MG/1
5 TABLET ORAL
Qty: 30 TABLET | Refills: 0 | Status: SHIPPED | OUTPATIENT
Start: 2022-03-08 | End: 2022-04-29 | Stop reason: HOSPADM

## 2022-03-07 RX ORDER — FERROUS SULFATE 325(65) MG
325 TABLET ORAL EVERY OTHER DAY
Start: 2022-03-07

## 2022-03-07 RX ADMIN — BISOPROLOL FUMARATE 10 MG: 5 TABLET, FILM COATED ORAL at 08:23

## 2022-03-07 RX ADMIN — LEVOTHYROXINE SODIUM 75 MCG: 0.07 TABLET ORAL at 05:44

## 2022-03-07 RX ADMIN — TORSEMIDE 40 MG: 20 TABLET ORAL at 08:23

## 2022-03-07 RX ADMIN — PANTOPRAZOLE SODIUM 40 MG: 40 TABLET, DELAYED RELEASE ORAL at 08:24

## 2022-03-07 RX ADMIN — LISINOPRIL 5 MG: 5 TABLET ORAL at 08:24

## 2022-03-07 RX ADMIN — Medication 325 MG: at 08:24

## 2022-03-07 RX ADMIN — SERTRALINE 150 MG: 50 TABLET, FILM COATED ORAL at 08:23

## 2022-03-07 RX ADMIN — DILTIAZEM HYDROCHLORIDE 180 MG: 180 CAPSULE, COATED, EXTENDED RELEASE ORAL at 08:24

## 2022-03-07 NOTE — CASE MANAGEMENT/SOCIAL WORK
Case Management Discharge Note      Final Note: Spoke with patient and blanca at bedside.  Advised them that patient is up for discharge.  There were no needs that were voiced by either party.  Spoke with [facility].  Sho states that they will send someone out to see the patient either tomorrow or Wednesday.         Selected Continued Care - Admitted Since 2/28/2022     Destination    No services have been selected for the patient.              Durable Medical Equipment    No services have been selected for the patient.              Dialysis/Infusion    No services have been selected for the patient.              Home Medical Care Coordination complete.    Service Provider Selected Services Address Phone Fax Patient Preferred    formerly [facility]  Home Nursing ,  Home Rehabilitation [address], [place] [address] [id number] [phone] -- --          Therapy    No services have been selected for the patient.              Community Resources    No services have been selected for the patient.              Community & DME    No services have been selected for the patient.                Selected Continued Care - Prior Encounters Includes selections from prior encounters from 11/30/2021 to 3/7/2022    Discharged on 2/12/2022 Admission date: 2/7/2022 - Discharge disposition: Skilled Nursing Facility (DC - External)    Destination     Service Provider Selected Services Address Phone Fax Patient Preferred    [facility]  Skilled Nursing [address] [id number] [phone] --                         Final Discharge Disposition Code: 06 - home with home health care

## 2022-03-07 NOTE — EXTERNAL PATIENT INSTRUCTIONS
Patient Education   Table of Contents       Atrial Fibrillation       Lisinopril Tablets       Pulmonary Edema     To view videos and all your education online visit,   [email or web address]   or scan this QR code with your smartphone.                  Atrial Fibrillation        Atrial fibrillation is a type of irregular or rapid heartbeat (arrhythmia). In atrial fibrillation, the top part of the heart (atria) beats in an irregular pattern. This makes the heart unable to pump blood normally and effectively.   The goal of treatment is to prevent blood clots from forming, control your heart rate, or restore your heartbeat to a normal rhythm. If this condition is not treated, it can cause serious problems, such as a weakened heart muscle (cardiomyopathy) or a stroke.     What are the causes?    This condition is often caused by medical conditions that damage the heart's electrical system. These include:       High blood pressure (hypertension). This is the most common cause.       Certain heart problems or conditions, such as heart failure, coronary artery disease, heart valve problems, or heart surgery.       Diabetes.       Overactive thyroid (hyperthyroidism).       Obesity.       Chronic kidney disease.     In some cases, the cause of this condition is not known.   What increases the risk?    This condition is more likely to develop in:       Older people.       People who smoke.       Athletes who do endurance exercise.       People who have a family history of atrial fibrillation.       Men.       People who use drugs.       People who drink a lot of alcohol.       People who have lung conditions, such as emphysema, pneumonia, or COPD.       People who have obstructive sleep apnea.     What are the signs or symptoms?    Symptoms of this condition include:       A feeling that your heart is racing or beating irregularly.       Discomfort or pain in your chest.       Shortness of breath.       Sudden  light-headedness or weakness.       Tiring easily during exercise or activity.       Fatigue.       Syncope (fainting).       Sweating.     In some cases, there are no symptoms.   How is this diagnosed?    Your health care provider may detect atrial fibrillation when taking your pulse. If detected, this condition may be diagnosed with:       An electrocardiogram (ECG) to check electrical signals of the heart.       An ambulatory cardiac monitor to record your heart's activity for a few days.       A transthoracic echocardiogram (TTE) to create pictures of your heart.       A transesophageal echocardiogram (DONI) to create even closer pictures of your heart.       A stress test to check your blood supply while you exercise.       Imaging tests, such as a CT scan or chest X-ray.       Blood tests.     How is this treated?    Treatment depends on underlying conditions and how you feel when you experience atrial fibrillation. This condition may be treated with:       Medicines to prevent blood clots or to treat heart rate or heart rhythm problems.       Electrical cardioversion to reset the heart's rhythm.       A pacemaker to correct abnormal heart rhythm.       Ablation to remove the heart tissue that sends abnormal signals.       Left atrial appendage closure to seal the area where blood clots can form.     In some cases, underlying conditions will be treated.   Follow these instructions at home:   Medicines         Take over-the counter and prescription medicines only as told by your health care provider.      Do not  take any new medicines without talking to your health care provider.      If you are taking blood thinners:       Talk with your health care provider before you take any medicines that contain aspirin or NSAIDs, such as ibuprofen. These medicines increase your risk for dangerous bleeding.       Take your medicine exactly as told, at the same time every day.       Avoid activities that could cause injury  "or bruising, and follow instructions about how to prevent falls.       Wear a medical alert bracelet or carry a card that lists what medicines you take.     Lifestyle              Do not  use any products that contain nicotine or tobacco, such as cigarettes, e-cigarettes, and chewing tobacco. If you need help quitting, ask your health care provider.       Eat heart-healthy foods. Talk with a dietitian to make an eating plan that is right for you.       Exercise regularly as told by your health care provider.      Do not  drink alcohol.       Lose weight if you are overweight.      Do not  use drugs, including cannabis.       General instructions         If you have obstructive sleep apnea, manage your condition as told by your health care provider.      Do not  use diet pills unless your health care provider approves. Diet pills can make heart problems worse.       Keep all follow-up visits as told by your health care provider. This is important.       Contact a health care provider if you:         Notice a change in the rate, rhythm, or strength of your heartbeat.       Are taking a blood thinner and you notice more bruising.       Tire more easily when you exercise or do heavy work.       Have a sudden change in weight.     Get help right away if you have:            Chest pain, abdominal pain, sweating, or weakness.       Trouble breathing.       Side effects of blood thinners, such as blood in your vomit, stool, or urine, or bleeding that cannot stop.      Any symptoms of a stroke. \"BE FAST\"  is an easy way to remember the main warning signs of a stroke:      B - Balance.  Signs are dizziness, sudden trouble walking, or loss of balance.      E - Eyes.  Signs are trouble seeing or a sudden change in vision.      F - Face.  Signs are sudden weakness or numbness of the face, or the face or eyelid drooping on one side.      A - Arms.  Signs are weakness or numbness in an arm. This happens suddenly and usually on one " side of the body.      S - Speech.  Signs are sudden trouble speaking, slurred speech, or trouble understanding what people say.      T - Time.  Time to call emergency services. Write down what time symptoms started.      Other signs of a stroke, such as:       A sudden, severe headache with no known cause.       Nausea or vomiting.       Seizure.     These symptoms may represent a serious problem that is an emergency. Do not wait to see if the symptoms will go away. Get medical help right away. Call your local emergency services (911 in the [place] Do not drive yourself to the hospital.     Summary         Atrial fibrillation is a type of irregular or rapid heartbeat (arrhythmia).       Symptoms include a feeling that your heart is beating fast or irregularly.       You may be given medicines to prevent blood clots or to treat heart rate or heart rhythm problems.       Get help right away if you have signs or symptoms of a stroke.       Get help right away if you cannot catch your breath or have chest pain or pressure.     This information is not intended to replace advice given to you by your health care provider. Make sure you discuss any questions you have with your health care provider.     Document Released: 12/18/2006Document Revised: 06/10/2020Document Reviewed: 06/10/2020     [employer] Patient Education ? 2021 [employer]         Lisinopril Tablets     What is this medicine?   LISINOPRIL (lyse [place] oh pril) is an ACE inhibitor. It treats high blood pressure and heart failure. It can treat heart damage after a heart attack.   This medicine may be used for other purposes; ask your health care provider or pharmacist if you have questions.   COMMON BRAND NAME(S): Prinivil, Zestril   What should I tell my health care provider before I take this medicine?   They need to know if you have any of these conditions:         diabetes       heart or blood vessel disease       kidney disease       low blood pressure        previous swelling of the tongue, face, or lips with difficulty breathing, difficulty swallowing, hoarseness, or tightening of the throat       an unusual or allergic reaction to lisinopril, other ACE inhibitors, insect venom, foods, dyes, or preservatives       pregnant or trying to get pregnant       breast-feeding     How should I use this medicine?   Take this medicine by mouth. Take it as directed on the prescription label at the same time every day. You can take it with or without food. If it upsets your stomach, take it with food. Keep taking it unless your health care provider tells you to stop.   Talk to your health care provider about the use of this medicine in children. While it may be prescribed for children as young as 6 for selected conditions, precautions do apply.   Overdosage: If you think you have taken too much of this medicine contact a poison control center or emergency room at once.   NOTE: This medicine is only for you. Do not share this medicine with others.   What if I miss a dose?   If you miss a dose, take it as soon as you can. If it is almost time for your next dose, take only that dose. Do not take double or extra doses.   What may interact with this medicine?   Do not take this medicine with any of the following medications:         hymenoptera venom       sacubitril; valsartan     This medicines may also interact with the following medications:         aliskiren       angiotensin receptor blockers, like losartan or valsartan       certain medicines for diabetes       diuretics       everolimus       gold compounds       lithium       NSAIDs, medicines for pain and inflammation, like ibuprofen or naproxen       potassium salts or supplements       salt substitutes       sirolimus       temsirolimus     This list may not describe all possible interactions. Give your health care provider a list of all the medicines, herbs, non-prescription drugs, or dietary supplements you use. Also tell  them if you smoke, drink alcohol, or use illegal drugs. Some items may interact with your medicine.   What should I watch for while using this medicine?   Visit your health care provider for regular check ups. Check your blood pressure as directed. Ask your health care provider what your blood pressure should be. Also, find out when you should contact him or her.   Do not treat yourself for coughs, colds, or pain while you are using this medicine without asking your health care provider for advice. Some medicines may increase your blood pressure.   Inform your health care provider if you wish to become pregnant or think you might be pregnant. There is a potential for serious side effects to an unborn child. Talk to your health care provider for more information.   You may get drowsy or dizzy. Do not drive, use machinery, or do anything that needs mental alertness until you know how this medicine affects you. Do not stand or sit up quickly, especially if you are an older patient. This reduces the risk of dizzy or fainting spells. Alcohol can make you more drowsy and dizzy. Avoid alcoholic drinks.   Avoid salt substitutes unless you are told otherwise by your health care provider.   What side effects may I notice from receiving this medicine?   Side effects that you should report to your doctor or health care professional as soon as possible:         allergic reactions (skin rash, itching or hives, swelling of the hands, feet, face, lips, throat, or tongue)       breathing problems       high potassium levels (chest pain; or fast, irregular heartbeat; muscle weakness)       kidney injury (trouble passing urine or change in the amount of urine)       liver injury (dark yellow or brown urine; general ill feeling or flu-like symptoms; light-colored stools; loss of appetite; right upper belly pain; unusually weak or tired; yellowing of the eyes or skin)       low blood pressure (dizziness; feeling faint or lightheaded,  falls; unusually weak or tired)     Side effects that usually do not require medical attention (report to your doctor or health care professional if they continue or are bothersome):         changes in taste       cough       dizziness       headache     This list may not describe all possible side effects. Call your doctor for medical advice about side effects. You may report side effects to FDA at 0-868-JLX-1729.   Where should I keep my medicine?   Keep out of the reach of children and pets.   Store at room temperature between 20 and 25 degrees C (68 and 77 degrees F). Protect from moisture. Keep the container tightly closed. Do not freeze. Avoid exposure to extreme heat. Get rid of any unused medicine after the expiration date.   To get rid of medicines that are no longer needed or have :         Take the medicine to a medicine take-back program. Check with your pharmacy or law enforcement to find a location.       If you cannot return the medicine, check the label or package insert to see if the medicine should be thrown out in the garbage or flushed down the toilet. If you are not sure, ask your health care provider. If it is safe to put in the trash, empty the medicine out of the container. Mix the medicine with cat litter, dirt, coffee grounds, or other unwanted substance. Seal the mixture in a bag or container. Put it in the trash.     NOTE: This sheet is a summary. It may not cover all possible information. If you have questions about this medicine, talk to your doctor, pharmacist, or health care provider.     ?  Elsevier/Gold Standard (2021 14:57:12)         Pulmonary Edema     Pulmonary edema is a condition in which fluid collects in the air sacs of the lung. This makes it hard for the lungs to fill with air. It also prevents the lungs from moving oxygen into the bloodstream, which can affect other organs, such as the brain and kidneys. Pulmonary edema is an emergency and should be  treated immediately.    There are two main types of pulmonary edema:       Cardiogenic. This means the pulmonary edema was caused by a problem with the heart.       Noncardiogenic. This means the pulmonary edema was caused by something other than the heart, such as an injury to the lung.     What are the causes?    This condition is commonly caused by heart failure. When this happens, the heart is not able to properly pump blood through the body. This can lead to increased pressure in the heart and blood building up in the veins around the lungs. When blood builds up in these veins, fluid gets pushed into the air sacs of the lung. Heart failure may be caused by:       Coronary artery disease.       High blood pressure.       Viral infection of the heart (myocarditis).       Leaky or stiff heart valves.       Irregular heartbeat (arrhythmia).       Fluid buildup caused by kidney problems.      Other causes include:       Infection in the lungs (pneumonia), blood (sepsis), or other part of the body.       Severe injury to the chest.       Lung injury from heat or toxins, such as breathing in smoke or poisonous gas.       Inhaling vomit or water (pulmonaryaspiration).       Certain medicines.       High altitude.     What are the signs or symptoms?    Symptoms of this condition include:       Shortness of breath.       Coughing with frothy or bloody mucus.       Wheezing.       Feeling like you cannot get enough air.       Shallow and fast breathing.       Skin that is cool and damp, and has a pale or bluish color.     How is this diagnosed?    This condition is diagnosed based on:       Your medical history.       A physical exam.       Your symptoms.      You may also have other tests, including:       Chest X-ray.       Chest CT scan.       Blood tests, including checking the amount of oxygen in the blood.       Sputum culture. This test checks for infection in the mucus that you cough up from your lungs.        Electrocardiogram. This measures the electrical signals of the heart.       Echocardiogram. This uses an ultrasound to evaluate the health of the heart.     How is this treated?    Initial treatment for this condition focuses on relieving your symptoms. Treatment depends on the underlying cause of the condition. This may include:       Oxygen therapy. The oxygen may be given through tubes in your nose or through a face mask. In severe cases, a breathing tube is inserted into the windpipe and hooked up to a breathing machine (ventilator).      Medicines. These may include medicines to:       Help the body get rid of extra water (diuretics).       Help the heart pump blood properly.       Prevent or destroy blood clots.      If poor heart function is the cause, treatment may also include:       Procedures to open blocked arteries, repair damaged heart valves, or remove some of the damaged heart muscle.       A pacemaker to help with heart function.       A procedure that uses electric shocks to regulate heart rate (cardioversion).     If an infection is the cause, treatment may include antibiotic medicines.   Follow these instructions at home:   Medicines         Take over-the-counter and prescription medicines only as told by your health care provider.       If you were prescribed an antibiotic, take it as told by your health care provider. Do not  stop taking the antibiotic even if you start to feel better.      Have a plan with information about each medicine you take. This should include:       Why you take the medicine.       Possible side effects.       Best time of day to take it.       Foods to take with it, or foods to avoid when taking it.       When to call your health care provider.       Make a list of each medicine, vitamin, or herbal supplement you take. Keep the list with you at all times. Show it to your health care provider at each visit and before starting a new medicine. Update the list as you add  or stop medicines.     Lifestyle           Exercise regularly as told by your health care provider. It is important to do it safely. You can do this by:       Pacing your activities to avoid shortness of breath or chest pain.       Resting for at least 1 hour before and after meals.       Asking about cardiac rehabilitation programs. These may include education, exercise plans, and counseling.       Eat a heart-healthy diet that is low in salt (sodium), saturated fat, and cholesterol. Your health care provider may recommend foods that are high in fiber, such as fresh fruits and vegetables, whole grains, and beans.      Do not  use any products that contain nicotine or tobacco, such as cigarettes and e-cigarettes. If you need help quitting, ask your health care provider.     General instructions         Maintain a healthy weight.      Keep a record of your weight:       Record your hospital or clinic weight. When you get home, compare it to your scale and record your weight.       Weigh yourself first thing each morning after you urinate and before you eat breakfast. Wear the same amount of clothing each time. Record the weights.       Share your weight record with your health care provider. Daily weights are important in detecting the body's retention of excess fluid.       Tell your health care provider right away if you gain weight quickly. Your medicines may need to be adjusted.       Check and record your blood pressure as often as told by your health care provider. Bring the records with you to clinic visits.       Consider therapy or joining a support group. This may help with any stress, fear, or anxiety.       Keep all follow-up visits as told by your health care provider. This is important.       Get help right away if:         You gain weight quickly.       You have severe chest pain, especially if the pain is crushing or pressure-like and spreads to the arms, back, neck, or jaw.       You have more  swelling in your hands, feet, ankles, or abdomen.       You have nausea.       You have unusual sweating or your skin turns blue or pale.       Your shortness of breath gets worse.       You have dizziness, blurred vision, a headache, or unsteadiness.       Your blood pressure is higher than 180/120.       You cough up bloody mucus (sputum).       You cannot sleep because it is hard to breathe.       You feel a racing heart beat (palpitations).       You have anxiety or a feeling that you cannot get enough air.     These symptoms may represent a serious problem that is an emergency. Do not wait to see if the symptoms will go away. Get medical help right away. Call your local emergency services (911 in the [place] Do not drive yourself to the hospital.   Summary         Pulmonary edema is a condition in which fluid collects in the air sacs of your lungs. If left untreated, it can lead to a medical emergency.       This condition is most commonly caused by heart failure. Other causes can include infections or injury to the lungs.       Take over-the-counter and prescription medicines only as told by your health care provider.     This information is not intended to replace advice given to you by your health care provider. Make sure you discuss any questions you have with your health care provider.     Document Released: 03/09/2004Document Revised: 11/30/2018Document Reviewed: 02/28/2018     [employer] Patient Education ? 2021 [employer]

## 2022-03-07 NOTE — THERAPY TREATMENT NOTE
Patient Name: Yin Law  : 1940    MRN: [id number]                              Today's Date: 3/7/2022       Admit Date: 2022    Visit Dx:     ICD-10-CM ICD-9-CM   1. Flash pulmonary edema (HCC)  J81.0 518.4   2. Acute respiratory failure with hypoxia and hypercapnia (HCC)  J96.01 518.81    J96.02    3. Acute on chronic congestive heart failure, unspecified heart failure type (HCC)  I50.9 428.0   4. Other iron deficiency anemia  D50.8 280.8     Patient Active Problem List   Diagnosis   • Paroxysmal atrial fibrillation (HCC)   • COPD on home oxygen (CMS/HCC)   • Primary hypertension   • Rheumatoid arthritis (HCC)   • Anxiety and depression   • Wound of right leg   • Anemia   • Severe malnutrition (CMS/HCC)   • Hip fracture (HCC)   • Mixed hyperlipidemia   • Heart valve disease   • Flash pulmonary edema (HCC)   • Bilateral pleural effusion   • Elevated troponin   • Leukocytosis   • Atrial fibrillation with rapid ventricular response (HCC)   • Acute on chronic respiratory failure with hypoxia and hypercapnia (HCC)   • Hypothyroidism (acquired)     Past Medical History:   Diagnosis Date   • Accelerated hypertension 2021   • Acute diastolic CHF (CMS/HCC) 2021   • Anxiety and depression    • Arrhythmia     A-FIB   • Asthma    • Chicken pox    • COPD (chronic obstructive pulmonary disease) (HCC)    • Elevated troponin 2021   • Hyperlipidemia    • Hypertension    • Measles    • Menopause    • Multifocal pneumonia 2021   • Osteoporosis    • Pleural effusion, bilateral 2021   • Pneumonia due to infectious organism 2021   • Rheumatoid arthritis (HCC)    • Rheumatoid arthritis (HCC)    • Sepsis (HCC) 2021   • Tobacco abuse      Past Surgical History:   Procedure Laterality Date   • APPENDECTOMY     • CARPAL TUNNEL RELEASE Left    • CATARACT EXTRACTION, BILATERAL     • COLON SURGERY     • COLONOSCOPY     • ENDOSCOPY N/A 3/2/2022    Procedure: ESOPHAGOGASTRODUODENOSCOPY;   Surgeon: Sammy Lyles MD;  Location: [facility] ENDOSCOPY;  Service: Gastroenterology;  Laterality: N/A;   • GALLBLADDER SURGERY     • HIP HEMIARTHROPLASTY Left 2/7/2022    Procedure: HIP HEMIARTHROPLASTY LEFT;  Surgeon: Napoleon Powell Jr., MD;  Location: [facility] OR;  Service: Orthopedics;  Laterality: Left;   • HYSTERECTOMY  1971   • TONSILLECTOMY        General Information     Row Name 03/07/22 1149          OT Time and Intention    Document Type therapy note (daily note)  -AN     Mode of Treatment occupational therapy  -AN     Row Name 03/07/22 1149          General Information    Patient Profile Reviewed yes  -AN     Existing Precautions/Restrictions fall;cardiac;hip, posterior;left;oxygen therapy device and L/min;other (see comments)  -AN     Barriers to Rehab medically complex;previous functional deficit  -AN     Row Name 03/07/22 1149          Cognition    Orientation Status (Cognition) oriented x 3  -AN     Row Name 03/07/22 1149          Safety Issues, Functional Mobility    Safety Issues Affecting Function (Mobility) insight into deficits/self-awareness;safety precaution awareness;safety precautions follow-through/compliance  -AN     Impairments Affecting Function (Mobility) balance;endurance/activity tolerance;postural/trunk control;range of motion (ROM);shortness of breath;strength  -AN           User Key  (r) = Recorded By, (t) = Taken By, (c) = Cosigned By    Initials Name Provider Type    AN Court De Leon OT Occupational Therapist                 Mobility/ADL's     Row Name 03/07/22 1150          Bed Mobility    Bed Mobility supine-sit  -AN     Supine-Sit [place] (Bed Mobility) supervision;verbal cues  -AN     Bed Mobility, Safety Issues decreased use of legs for bridging/pushing  -AN     Assistive Device (Bed Mobility) bed rails;head of bed elevated  -AN     Comment, (Bed Mobility) cues to maintain L hip precautions throughout  -AN     Row Name 03/07/22 1150          Transfers     Transfers sit-stand transfer;stand-sit transfer;toilet transfer  -AN     Comment, (Transfers) CGA with cues to maintain L hip precautions  -AN     Sit-Stand [place] (Transfers) verbal cues;contact guard  -AN     Stand-Sit [place] (Transfers) contact guard  -AN     [place] Level (Toilet Transfer) contact guard  -AN     Assistive Device (Toilet Transfer) commode, bedside without drop arms  -AN     Row Name 03/07/22 1150          Sit-Stand Transfer    Assistive Device (Sit-Stand Transfers) walker, front-wheeled  -AN     Row Name 03/07/22 1150          Toilet Transfer    Type (Toilet Transfer) sit-stand;stand-sit  -AN     Comment, (Toilet Transfer) cues to maintain L hip precautions  -AN     Row Name 03/07/22 1150          Functional Mobility    Functional Mobility- Ind. Level contact guard assist  -AN     Functional Mobility- Device rolling walker  -AN     Functional Mobility-Distance (Feet) --  household distance  -AN     Row Name 03/07/22 1150          Mobility    Extremity Weight-bearing Status left lower extremity  -AN     Left Lower Extremity (Weight-bearing Status) weight-bearing as tolerated (WBAT)  -AN     Row Name 03/07/22 1150          Stand-Sit Transfer    Assistive Device (Stand-Sit Transfers) walker, front-wheeled  -AN           User Key  (r) = Recorded By, (t) = Taken By, (c) = Cosigned By    Initials Name Provider Type    AN Court De Leon OT Occupational Therapist               Obj/Interventions     Row Name 03/07/22 1153          Shoulder (Therapeutic Exercise)    Shoulder (Therapeutic Exercise) AROM (active range of motion)  -AN     Shoulder AROM (Therapeutic Exercise) bilateral;flexion;extension;aBduction;aDduction;scapular retraction;sitting;10 repetitions  -AN     Row Name 03/07/22 1153          Elbow/Forearm (Therapeutic Exercise)    Elbow/Forearm (Therapeutic Exercise) AROM (active range of motion)  -AN     Elbow/Forearm AROM (Therapeutic Exercise)  bilateral;flexion;extension;sitting;10 repetitions  -AN     Row Name 03/07/22 1153          Motor Skills    Motor Skills functional endurance  -AN     Therapeutic Exercise shoulder;elbow/forearm  STS performed 5 reps using RW with Min A and cues for technique in order to increase strength and endurance for ADLs  -AN     Row Name 03/07/22 1153          Balance    Balance Assessment sitting static balance;sitting dynamic balance;sit to stand dynamic balance;standing static balance;standing dynamic balance  -AN     Static Sitting Balance independent  -AN     Dynamic Sitting Balance supervision  -AN     Position, Sitting Balance unsupported;sitting edge of bed  -AN     Sit to Stand Dynamic Balance contact guard  -AN     Static Standing Balance contact guard  -AN     Dynamic Standing Balance contact guard  -AN     Position/Device Used, Standing Balance walker, rolling  -AN     Balance Interventions standing;sit to stand;supported;static;dynamic;minimal challenge;occupation based/functional task;narrowed base of support  -AN     Comment, Balance no LOB throughout  -AN           User Key  (r) = Recorded By, (t) = Taken By, (c) = Cosigned By    Initials Name Provider Type    AN Court De Leon OT Occupational Therapist               Goals/Plan    No documentation.                Clinical Impression     Row Name 03/07/22 1156          Pain Assessment    Additional Documentation Pain Scale: FACES Pre/Post-Treatment (Group)  -AN     [facility] Name 03/07/22 1156          Pain Scale: FACES Pre/Post-Treatment    Pain: FACES Scale, Pretreatment 2-->hurts little bit  -AN     Posttreatment Pain Rating 2-->hurts little bit  -AN     Pain Location - Side/Orientation Bilateral  -AN     Pain Location lower  -AN     Pain Location - knee  -AN     Pre/Posttreatment Pain Comment RN aware and managing  -AN     Row Name 03/07/22 1156          Plan of Care Review    Plan of Care Reviewed With patient;family  -AN     Progress improving  -AN      Outcome Evaluation Pt continues to progress with all OT goals. Pt performed all functional transfers, STS, and ambulation using RW with CGA for balance and safety. Tolerated UE/LE HEP. OT rec home with 24/7 assist and home health OT. Gait belt administered this session.  -AN     Row Name 03/07/22 1156          Therapy Plan Review/Discharge Plan (OT)    Anticipated Discharge Disposition (OT) home with 24/7 care;home with home health  -AN     Row Name 03/07/22 1156          Vital Signs    Pre Systolic BP Rehab --  VSS  -AN     O2 Delivery Pre Treatment nasal cannula  -AN     O2 Delivery Intra Treatment nasal cannula  -AN     O2 Delivery Post Treatment nasal cannula  -AN     Pre Patient Position Supine  -AN     Intra Patient Position Standing  -AN     Post Patient Position Sitting  -AN     Row Name 03/07/22 1156          Positioning and Restraints    Pre-Treatment Position in bed  -AN     Post Treatment Position bed  -AN     In Bed notified nsg;sitting EOB;with family/caregiver;encouraged to call for assist;call light within reach  -AN           User Key  (r) = Recorded By, (t) = Taken By, (c) = Cosigned By    Initials Name Provider Type    AN Court De Leon, DANIAL Occupational Therapist               Outcome Measures     Row Name 03/07/22 1159          How much help from another is currently needed...    Putting on and taking off regular lower body clothing? 2  -AN     Bathing (including washing, rinsing, and drying) 3  -AN     Toileting (which includes using toilet bed pan or urinal) 3  -AN     Putting on and taking off regular upper body clothing 3  -AN     Taking care of personal grooming (such as brushing teeth) 3  -AN     Eating meals 4  -AN     AM-PAC 6 Clicks Score (OT) 18  -AN     Row Name 03/07/22 0800          How much help from another person do you currently need...    Turning from your back to your side while in flat bed without using bedrails? 4  -MH     Moving from lying on back to sitting on the side  of a flat bed without bedrails? 3  -MH     Moving to and from a bed to a chair (including a wheelchair)? 3  -MH     Standing up from a chair using your arms (e.g., wheelchair, bedside chair)? 3  -MH     Climbing 3-5 steps with a railing? 3  -MH     To walk in hospital room? 3  -MH     AM-PAC 6 Clicks Score (PT) 19  -MH     Row Name 03/07/22 1159          Functional Assessment    Outcome Measure Options AM-PAC 6 Clicks Daily Activity (OT)  -AN           User Key  (r) = Recorded By, (t) = Taken By, (c) = Cosigned By    Initials Name Provider Type    Chin Lee, RN Registered Nurse    Court Shoemaker OT Occupational Therapist                Occupational Therapy Education                 Title: PT OT SLP Therapies (In Progress)     Topic: Occupational Therapy (Done)     Point: ADL training (Done)     Description:   Instruct learner(s) on proper safety adaptation and remediation techniques during self care or transfers.   Instruct in proper use of assistive devices.              Learning Progress Summary           Patient Acceptance, E, VU by AN at 3/7/2022 1159    Eager, E,D,H, NR by DM at 3/6/2022 1213    Acceptance, E,TB, VU by SHER at 3/5/2022 0817    Acceptance, E,TB,D, VU,NR by ZOHRA at 3/1/2022 0834   Family Acceptance, E, VU by AN at 3/7/2022 1159    Eager, E,D,H, NR by DM at 3/6/2022 1213                   Point: Home exercise program (Done)     Description:   Instruct learner(s) on appropriate technique for monitoring, assisting and/or progressing therapeutic exercises/activities.              Learning Progress Summary           Patient Acceptance, E, VU by AN at 3/7/2022 1159    Eager, E,D,H, NR by JESSICA at 3/6/2022 1213    Acceptance, E,TB, VU by SHER at 3/5/2022 0817    Acceptance, E,D, DU by SHIVAM at 3/3/2022 1436    Comment: UE HEP; incentive spirometer use/goal setting   Family Acceptance, E, VU by AN at 3/7/2022 1159    Eager, E,D,H, NR by DM at 3/6/2022 1213                   Point: Precautions (Done)      Description:   Instruct learner(s) on prescribed precautions during self-care and functional transfers.              Learning Progress Summary           Patient Acceptance, E, VU by AN at 3/7/2022 1159    Eager, E,D,H, NR by DM at 3/6/2022 1213    Acceptance, E,TB, VU by SHER at 3/5/2022 0817    Acceptance, E,TB,D, VU,NR by KF at 3/1/2022 0834   Family Acceptance, E, VU by AN at 3/7/2022 1159    Eager, E,D,H, NR by DM at 3/6/2022 1213                   Point: Body mechanics (Done)     Description:   Instruct learner(s) on proper positioning and spine alignment during self-care, functional mobility activities and/or exercises.              Learning Progress Summary           Patient Acceptance, E, VU by AN at 3/7/2022 1159    Eager, E,D,H, NR by DM at 3/6/2022 1213    Acceptance, E,TB, VU by SHER at 3/5/2022 0817    Acceptance, E,TB,D, VU,NR by KF at 3/1/2022 0834   Family Acceptance, E, VU by AN at 3/7/2022 1159    Eager, E,D,H, NR by DM at 3/6/2022 1213                               User Key     Initials Effective Dates Name Provider Type Discipline    DM 06/16/21 -  Karon Pacheco, PT Physical Therapist PT    KF 06/16/21 -  Salud Felton, OT Occupational Therapist OT    SHIVAM 06/16/21 -  Katelynn Moreno OT Occupational Therapist OT    AN 09/21/21 -  Court De Leon, OT Occupational Therapist OT    SHER 12/29/21 -  Cordelia Luo RN Registered Nurse Nurse              OT Recommendation and Plan     Plan of Care Review  Plan of Care Reviewed With: patient, family  Progress: improving  Outcome Evaluation: Pt continues to progress with all OT goals. Pt performed all functional transfers, STS, and ambulation using RW with CGA for balance and safety. Tolerated UE/LE HEP. OT rec home with 24/7 assist and home health OT. Gait belt administered this session.     Time Calculation:    Time Calculation- OT     Row Name 03/07/22 1159             Time Calculation- OT    OT Start Time 1050  -AN      OT Received On 03/07/22  -AN       OT Goal Re-Cert Due Date 03/11/22  -AN              Timed Charges    36203 - OT Therapeutic Exercise Minutes 10  -AN      24446 - OT Self Care/Mgmt Minutes 15  -AN              Total Minutes    Timed Charges Total Minutes 25  -AN       Total Minutes 25  -AN            User Key  (r) = Recorded By, (t) = Taken By, (c) = Cosigned By    Initials Name Provider Type    AN Court De Leon, OT Occupational Therapist              Therapy Charges for Today     Code Description Service Date Service Provider Modifiers Qty    29913524343 HC OT THER PROC EA 15 MIN 3/7/2022 Court De Leon OT GO 1    [id number] HC OT SELF CARE/MGMT/TRAIN EA 15 MIN 3/7/2022 Court De Leon OT GO 1               Court De Leon OT  3/7/2022

## 2022-03-07 NOTE — PLAN OF CARE
Goal Outcome Evaluation:  Plan of Care Reviewed With: patient, family        Progress: improving  Outcome Evaluation: Pt continues to progress with all OT goals. Pt performed all functional transfers, STS, and ambulation using RW with CGA for balance and safety. Tolerated UE/LE HEP. OT rec home with 24/7 assist and home health OT. Gait belt administered this session.

## 2022-03-07 NOTE — PROGRESS NOTES
NN spoke with pt at BS.  Pt alert and able to answer questions appropriately. Pt O2 sat 100% on 3  L currently, home O2 use  4L. COPD action plan reviewed. Deep breathing exercises encouraged. Upcoming appointment reviewed.  Patient expected to be discharged home with caregivers today.  No new concerns or questions voiced at this time.  NN will continue to follow as needed.         Per current [employer] Standards, please consider: No LAMA/LABA/ICS in place, Outpatient PFT, Rehab as appropriate, Palliative care consult        Patient has been scheduled for a hospital follow up with [facility] for 3/16/2022 @ 1:30 pm with AMA Hayes.

## 2022-03-07 NOTE — DISCHARGE SUMMARY
[facility] Medicine Services  DISCHARGE SUMMARY    Patient Name: Yin Law  : 1940  MRN: [id number]    Date of Admission: 2022  9:32 PM  Date of Discharge:  3/7/22  Primary Care Physician: Santiago Chaudhry MD    Consults     Date and Time Order Name Status Description    3/1/2022  2:18 PM Inpatient Gastroenterology Consult Completed     3/1/2022  3:17 AM Inpatient Cardiology Consult Completed     2/10/2022 11:33 AM Inpatient Cardiology Consult Completed     2022  4:42 PM Inpatient Orthopedic Surgery Consult Completed           Hospital Course     Presenting Problem:   Flash pulmonary edema (HCC) [J81.0]    Active Hospital Problems    Diagnosis  POA   • **Flash pulmonary edema (HCC) [J81.0]  Yes   • Bilateral pleural effusion [J90]  Yes   • Elevated troponin [R77.8]  Yes   • Leukocytosis [D72.829]  Yes   • Atrial fibrillation with rapid ventricular response (HCC) [I48.91]  Yes   • Acute on chronic respiratory failure with hypoxia and hypercapnia (HCC) [J96.21, J96.22]  Yes   • Hypothyroidism (acquired) [E03.9]  Yes   • Mixed hyperlipidemia [E78.2]  Yes   • Paroxysmal atrial fibrillation (HCC) [I48.0]  Yes     Class: Acute   • Rheumatoid arthritis (HCC) [M06.9]  Yes   • Primary hypertension [I10]  Yes   • COPD on home oxygen (CMS/HCC) [J44.9]  Yes   • Anxiety and depression [F41.9, F32.A]  Yes   • Anemia [D64.9]  Yes      Resolved Hospital Problems   No resolved problems to display.          Hospital Course:  Yin Law is a 81 y.o. female With hx of diastolic CHF, COPD, HTN, bilateral pleural effusions s/p thoracentesis per pt, hyperlipidemia who is on 3-4 L O2 chronically presented to ED with complaints of shortness of breath.  Patient was admitted to hospital medicine for acute on chronic hypoxic respiratory failure with flash pulmonary edema, bilateral pleural effusions, and atrial fibrillation with RVR.  Treated with IV diuretics and responded well in  "addition to placement of BiPAP.  Has been able to wean down to her baseline home oxygen.  Echo with EF of 60% with mild to moderate VHD.  Cardiology consultation, patient spontaneously converted to normal sinus rhythm and has been continued on her oral diltiazem.  Patient was previously prescribed Eliquis low-dose, but has not been taking at home, and will not be resumed at this time due to anemia/ + FOBT, with findings of \"watermelon stomach \"on EGD.  One small ulcer in the prepyloric area with oozing, clipped.  Labs remained stable no transfusion needed.  We will continue oral iron supplements every other day for improved absorption.  Continue PPI twice daily x30 days then daily.  Patient is medically stable will be discharged home today with family.    Discharge Follow Up Recommendations for outpatient labs/diagnostics:   PCP 1 week   [facility] 1 week   Dr Montoya 1 month     Day of Discharge     HPI:   Sitting up on side of bed working with OT. Family in room. Patient states she feels ready to go home. Currently on her baseline oxygen at 3LNC. Tolerating diet. Denies f/c, n/v/d, soa or cp.     Review of Systems  All other systems negative     Vital Signs:   Temp:  [97.6 °F (36.4 °C)-98.1 °F (36.7 °C)] 98.1 °F (36.7 °C)  Heart Rate:  [48-61] 57  Resp:  [18] 18  BP: (120-146)/(46-57) 138/47  Flow (L/min):  [3] 3      Physical Exam:  Constitutional: No acute distress, awake, alert up on side of bed   HENT: NCAT, mucous membranes moist  Respiratory: Decreased bases with upper rhonchi, no wheezing or rales, respiratory effort normal   Cardiovascular: Bradycardia, + murmur   Gastrointestinal: Positive bowel sounds, soft, nontender, nondistended  Musculoskeletal: No bilateral ankle edema  Psychiatric: Appropriate affect, cooperative  Neurologic: Oriented x 3, strength symmetric in all extremities, Cranial Nerves grossly intact to confrontation, speech clear  Skin: No rashes     Pertinent  and/or Most Recent " Results     LAB RESULTS:      Lab 03/06/22  0700 03/05/22  0541 03/04/22 0443 03/03/22 0358 03/02/22 2313 03/01/22 0448 03/01/22 0058 02/28/22  2240   WBC 5.09 7.08 5.92 8.52 15.53*   < >  --   --    HEMOGLOBIN 9.9* 10.0* 8.5* 8.7* 10.7*   < >  --   --    HEMATOCRIT 32.4* 32.4* 27.4* 27.3* 34.4   < >  --   --    PLATELETS 184 234 204 224 416   < >  --   --    NEUTROS ABS 3.10 4.33 3.36 6.99 9.11*   < >  --   --    IMMATURE GRANS (ABS) 0.01 0.02 0.02 0.02 0.07*   < >  --   --    LYMPHS ABS 1.00 1.59 1.50 0.82 4.46*   < >  --   --    MONOS ABS 0.59 0.76 0.77 0.64 1.57*   < >  --   --    EOS ABS 0.34 0.32 0.24 0.03 0.22   < >  --   --    MCV 99.4* 100.3* 99.6* 97.8* 99.7*   < >  --   --    PROCALCITONIN  --   --   --   --  0.28*  --   --  0.06   LACTATE  --   --   --   --  1.8  --  1.4  --     < > = values in this interval not displayed.         Lab 03/06/22  0700 03/05/22  0541 03/04/22 0443 03/03/22 2243 03/03/22 0358 03/02/22 2313 03/02/22 0338 03/01/22 0448   SODIUM 139 137 137  --  132* 129* 139 143   POTASSIUM 3.6 4.1 4.2 4.3 3.1* 3.5 3.5 3.8   CHLORIDE 101 101 100  --  92* 89* 99 102   CO2 27.0 27.0 29.0  --  29.0 26.0 30.0* 31.0*   ANION GAP 11.0 9.0 8.0  --  11.0 14.0 10.0 10.0   BUN 16 16 18  --  16 14 16 23   CREATININE 0.63 0.85 0.76  --  0.80 0.86 0.74 0.78   EGFR 88.7 68.5 78.3  --  73.7 67.5 80.9 75.9   GLUCOSE 84 88 84  --  110* 218* 97 112*   CALCIUM 9.2 9.5 8.8  --  8.7 9.2 8.9 8.8   MAGNESIUM  --   --   --   --   --   --  2.2 2.1         Lab 03/06/22  0700 03/05/22  0541 03/04/22  0443 03/03/22  0358 03/02/22  2313   TOTAL PROTEIN 6.0 6.1 5.6* 5.8* 7.1   ALBUMIN 3.10* 3.20* 3.00* 3.20* 3.90   GLOBULIN 2.9 2.9 2.6 2.6 3.2   ALT (SGPT) 12 13 14 19 27   AST (SGOT) 22 22 19 28 43*   BILIRUBIN 0.4 0.5 0.4 0.5 0.5   ALK PHOS 102 101 95 103 137*         Lab 03/03/22  0358 03/02/22  2313 03/01/22  0448 03/01/22  0058 02/28/22  2240   PROBNP  --  14,378.0* 12,729.0* 8,352.0* 7,631.0*   TROPONIN T  0.083* 0.081* 0.029 0.011 0.051*             Lab 03/01/22  0448 03/01/22  0058   IRON  --  26*  26*   IRON SATURATION  --  8*   TIBC  --  341   TRANSFERRIN  --  229   FERRITIN  --  264.60*   FOLATE >20.00  --    VITAMIN B 12 1,009*  --    ABO TYPING O  --    RH TYPING Positive  --    ANTIBODY SCREEN Negative  --          Lab 03/02/22  2327 02/28/22  2342 02/28/22  2153   PH, ARTERIAL 7.303* 7.400 7.176*   PCO2, ARTERIAL 63.6* 47.4* 74.2*   PO2 .0* 94.4 438.0*   FIO2 50 40 100   HCO3 ART 31.5* 29.4* 27.4*   BASE EXCESS ART 3.8* 4.1* -1.5*   CARBOXYHEMOGLOBIN 1.5 2.4* 1.9     Brief Urine Lab Results  (Last result in the past 365 days)      Color   Clarity   Blood   Leuk Est   Nitrite   Protein   CREAT   Urine HCG        03/01/22 0115 Yellow   Clear   Negative   Negative   Negative   Negative               Microbiology Results (last 10 days)     Procedure Component Value - Date/Time    Blood Culture - Blood, Arm, Left [[id number]]  (Normal) Collected: 03/01/22 0114    Lab Status: Final result Specimen: Blood from Arm, Left Updated: 03/06/22 0132     Blood Culture No growth at 5 days    Blood Culture - Blood, Arm, Left [[id number]]  (Normal) Collected: 03/01/22 0114    Lab Status: Final result Specimen: Blood from Arm, Left Updated: 03/06/22 0132     Blood Culture No growth at 5 days    Respiratory Panel PCR w/COVID-19(SARS-CoV-2) CAS/SHALINI/KRISTY/PAD/COR/MAD/HAJA In-House, NP Swab in UTM/VTM, 3-4 HR TAT - Swab, Nasopharynx [[id number]]  (Normal) Collected: 03/01/22 0049    Lab Status: Final result Specimen: Swab from Nasopharynx Updated: 03/01/22 0203     ADENOVIRUS, PCR Not Detected     Coronavirus 229E Not Detected     Coronavirus HKU1 Not Detected     Coronavirus NL63 Not Detected     Coronavirus OC43 Not Detected     COVID19 Not Detected     Human Metapneumovirus Not Detected     Human Rhinovirus/Enterovirus Not Detected     Influenza A PCR Not Detected     Influenza B PCR Not Detected     Parainfluenza Virus 1 Not  Detected     Parainfluenza Virus 2 Not Detected     Parainfluenza Virus 3 Not Detected     Parainfluenza Virus 4 Not Detected     RSV, PCR Not Detected     Bordetella pertussis pcr Not Detected     Bordetella parapertussis PCR Not Detected     Chlamydophila pneumoniae PCR Not Detected     Mycoplasma pneumo by PCR Not Detected    Narrative:      In the setting of a positive respiratory panel with a viral infection PLUS a negative procalcitonin without other underlying concern for bacterial infection, consider observing off antibiotics or discontinuation of antibiotics and continue supportive care. If the respiratory panel is positive for atypical bacterial infection (Bordetella pertussis, Chlamydophila pneumoniae, or Mycoplasma pneumoniae), consider antibiotic de-escalation to target atypical bacterial infection.          Adult Transthoracic Echo Complete W/ Cont if Necessary Per Protocol    Result Date: 3/1/2022  · Estimated left ventricular EF = 60% · Left ventricular wall thickness is consistent with mild concentric hypertrophy. · Mild aortic valve stenosis is present. · Aortic valve maximum pressure gradient is 20 mmHg. Aortic valve mean pressure gradient is 10.4 mmHg. · Moderate mitral valve regurgitation is present. · Mild to moderate mitral valve stenosis is present. The mitral valve peak gradient is 15.8 mmHg. The mitral valve mean gradient is 6.5 mmHg. · Estimated right ventricular systolic pressure from tricuspid regurgitation is 50 mmHg. · Moderate tricuspid valve regurgitation is present. · There is a large left pleural effusion.      XR Chest 1 View    Result Date: 3/2/2022  Examination: AP view of the chest Indication: Shortness of breath Comparison: 2/28/2022 Findings: The cardiomediastinal silhouette is within normal size limits. Thoracic aorta calcifications are present. There is prominence of the central pulmonary vasculature with increased interstitial markings and small bilateral pleural  effusions. No pneumothorax is identified. No acute osseous abnormality is identified.     Impression: 1. Increased interstitial markings in both lungs and small pleural effusions. Findings are compatible with interstitial pulmonary edema. 2. Central pulmonary vascular congestion. Electronically signed by:  Bryce Casarez M.D.  3/2/2022 9:39 PM Mountain Time    XR Chest 1 View    Result Date: 3/2/2022   DATE OF EXAM: 3/2/2022 11:35 AM  PROCEDURE: XR CHEST 1 VW-  INDICATIONS: soa; J81.0-Acute pulmonary edema; J96.01-Acute respiratory failure with hypoxia; J96.02-Acute respiratory failure with hypercapnia; I50.9-Heart failure, unspecified; D50.8-Other iron deficiency anemias  COMPARISON: 02/28/2022  TECHNIQUE: Portable chest radiograph.  FINDINGS:  Heart mildly enlarged, exaggerated by technique. There is no pneumothorax. There are moderate bilateral pleural effusions with bibasilar airspace disease likely atelectasis. There is central pulmonary vascular congestion stable from prior exam. No pneumothorax. Calcified AP window lymph nodes related to granulomatous disease. Moderate emphysema.      1. Moderate bilateral pleural effusions with bibasilar airspace disease likely compressive atelectasis, similar to recent chest CT. 2. Central pulmonary vascular congestion. 3. No pneumothorax.  This report was finalized on 3/2/2022 11:59 AM by Misha Mcdowell MD.      XR Chest 1 View    Result Date: 2/28/2022  EXAMINATION: XR CHEST 1 VW-  INDICATION: SOA triage protocol  COMPARISON: 2/10/2022  FINDINGS: Heart is upper normal size. Vasculature is cephalized and there is a diffuse pulmonary edema pattern present. Effusions are increased from the prior study, small on the left, small to moderate on the right. No pneumothorax is seen.       Congestive heart failure with bilateral pleural effusions.    This report was finalized on 2/28/2022 10:51 PM by Dr. Augie Leiva MD.      Duplex Venous Lower Extremity - Bilateral CAR    Result  Date: 3/1/2022  · Normal bilateral lower extremity venous duplex scan.      CT Angiogram Chest    Result Date: 3/1/2022  Exam: CT Angiography of the Chest. Date: 3/1/2022. Comparison: None History: Respiratory failure. Technique: CT examination of the chest was performed following the intravenous administration of 75 mL of Isovue-370. Sagittal, coronal and 3-D reformatted images were provided. CT dose lowering techniques were used, to include: automated exposure control, adjustment for patient size, and/or use of iterative reconstruction. FINDINGS: Mediastinum and Vicky: There is no axillary, mediastinal or hilar lymphadenopathy. Pleural and Pericardial spaces: There are are moderate to large bilateral pleural effusions. Upper Abdomen: The visualized upper abdomen is unremarkable. Cardiovascular: There is moderate vascular calcification throughout the thoracic aorta without evidence of aneurysmal dilation or dissection. Significant mitral valvular calcification is seen. Moderate patchy coronary artery calcifications. Pulmonary Artery: There are no filling defects in the pulmonary arteries. Lung Parenchyma and Airways: There is mild to moderate diffuse centrilobular emphysema. Compressive atelectasis of the lower lobes is also noted. Bones: Multilevel degenerative disc changes are seen throughout the spine. Mild compression deformity of the T10 and T7 vertebral bodies are likely subacute chronic. Bones are diffusely demineralized.     1. No evidence of pulmonary embolism. 2. No evidence of thoracic aortic aneurysm or dissection. 3. Moderate bilateral pleural effusions. 4. Emphysema. Electronically signed by:  Martin De La Torre D.O.  2/28/2022 10:48 PM Mountain Time      Results for orders placed during the hospital encounter of 02/28/22    Duplex Venous Lower Extremity - Bilateral CAR    Interpretation Summary  · Normal bilateral lower extremity venous duplex scan.      Results for orders placed during the hospital  encounter of 02/28/22    Duplex Venous Lower Extremity - Bilateral CAR    Interpretation Summary  · Normal bilateral lower extremity venous duplex scan.      Results for orders placed during the hospital encounter of 02/28/22    Adult Transthoracic Echo Complete W/ Cont if Necessary Per Protocol    Interpretation Summary  · Estimated left ventricular EF = 60%  · Left ventricular wall thickness is consistent with mild concentric hypertrophy.  · Mild aortic valve stenosis is present.  · Aortic valve maximum pressure gradient is 20 mmHg. Aortic valve mean pressure gradient is 10.4 mmHg.  · Moderate mitral valve regurgitation is present.  · Mild to moderate mitral valve stenosis is present. The mitral valve peak gradient is 15.8 mmHg. The mitral valve mean gradient is 6.5 mmHg.  · Estimated right ventricular systolic pressure from tricuspid regurgitation is 50 mmHg.  · Moderate tricuspid valve regurgitation is present.  · There is a large left pleural effusion.        Discharge Details        Discharge Medications      New Medications      Instructions Start Date   lisinopril 5 MG tablet  Commonly known as: PRINIVIL,ZESTRIL   5 mg, Oral, Every 24 Hours Scheduled   Start Date: March 8, 2022        Changes to Medications      Instructions Start Date   ascorbic acid 1000 MG tablet  Commonly known as: VITAMIN C  What changed: additional instructions   1,000 mg, Oral, Daily      ferrous sulfate 325 (65 FE) MG tablet  What changed: when to take this   325 mg, Oral, Every Other Day      pantoprazole 40 MG EC tablet  Commonly known as: PROTONIX  What changed: additional instructions   40 mg, Oral, 2 Times Daily Before Meals, Then take once daily starting 4/7/22         Continue These Medications      Instructions Start Date   albuterol sulfate  (90 Base) MCG/ACT inhaler  Commonly known as: PROVENTIL HFA;VENTOLIN HFA;PROAIR HFA   2 puffs, Inhalation, Every 4 Hours PRN, Patient taking: Inhale 2 puffs by mouth every 4-6  hours       ALPRAZolam 0.5 MG tablet  Commonly known as: XANAX   0.5 mg, Oral, Nightly PRN      bisoprolol 10 MG tablet  Commonly known as: ZEBeta   10 mg, Oral, Every 24 Hours Scheduled      dilTIAZem  MG 24 hr capsule  Commonly known as: CARDIZEM CD   180 mg, Oral, Daily      fish oil 1000 MG capsule capsule   1,000 mg, Oral, Daily With Breakfast, OTC      HYDROcodone-acetaminophen 7.5-325 MG per tablet  Commonly known as: NORCO   1 tablet, Oral, Every 6 Hours PRN, Patient taking: once a day prn      levothyroxine 75 MCG tablet  Commonly known as: SYNTHROID, LEVOTHROID   75 mcg, Oral, Every Early Morning      multivitamin with minerals tablet tablet   1 tablet, Oral, Daily, OTC      sertraline 100 MG tablet  Commonly known as: ZOLOFT   150 mg, Oral, Daily, 1.5 tab daily      torsemide 20 MG tablet  Commonly known as: DEMADEX   40 mg, Oral, Daily      Vitamin D (Ergocalciferol) 50 MCG (2000 UT) capsule   1 tablet, Oral, Daily, OTC         Stop These Medications    apixaban 2.5 MG tablet tablet  Commonly known as: ELIQUIS     Budeson-Glycopyrrol-Formoterol 160-9-4.8 MCG/ACT aerosol inhaler  Commonly known as: BREZTRI            Allergies   Allergen Reactions   • Dicloxacillin Shortness Of Breath   • Amiodarone Unknown - High Severity   • Propoxyphene Unknown - Low Severity         Discharge Disposition:  Home or Self Care    Diet:  Hospital:  Diet Order   Procedures   • Diet Regular; Cardiac       Activity:  Activity Instructions     Activity as Tolerated                 CODE STATUS:    Code Status and Medical Interventions:   Ordered at: 03/01/22 0134     Code Status (Patient has no pulse and is not breathing):    CPR (Attempt to Resuscitate)     Medical Interventions (Patient has pulse or is breathing):    Full Support       Future Appointments   Date Time Provider Department Center   3/16/2022  1:30 PM Xochitl Hawley APRN [facility]   3/21/2022 11:30 AM Roslyn Melendez APRN [facility]        Additional Instructions for the Follow-ups that You Need to Schedule     Ambulatory Referral to Home Health   As directed      Face to Face Visit Date: 3/4/2022    Follow-up provider for Plan of Care?: I treated the patient in an acute care facility and will not continue treatment after discharge.    Follow-up provider: LARISSA CHAUDHRY [[id number]]    Reason/Clinical Findings: recent hospitalization for pulmonary edema    Describe mobility limitations that make leaving home difficult: impaired mobility and ADLs, O2 dependent    Nursing/Therapeutic Services Requested: Skilled Nursing Physical Therapy Occupational Therapy    Skilled nursing orders: Cardiopulmonary assessments    PT orders: Gait Training Therapeutic exercise    Weight Bearing Status: As Tolerated    Occupational orders: Activities of daily living Energy conservation Home safety assessment    Frequency: 1 Week 1         Discharge Follow-up with PCP   As directed       Currently Documented PCP:    Larissa Chaudhry MD    PCP Phone Number:    [phone]     Follow Up Details: 1 week         Discharge Follow-up with Specified Provider[facility]; 1 Week   As directed      To[facility]    Follow Up: 1 Week         Discharge Follow-up with Specified Provider: Dr Montoya; 1 Month   As directed      To: Dr Montoya    Follow Up: 1 Month                     AMA Randall  03/07/22      Time Spent on Discharge:  I spent  45  minutes on this discharge activity which included: face-to-face encounter with the patient, reviewing the data in the system, coordination of the care with the nursing staff as well as consultants, documentation, and entering orders.

## 2022-03-08 ENCOUNTER — READMISSION MANAGEMENT (OUTPATIENT)
Dept: CALL CENTER | Facility: HOSPITAL | Age: 82
End: 2022-03-08

## 2022-03-08 NOTE — OUTREACH NOTE
Prep Survey    Flowsheet Row Responses   [facility] facility patient discharged from? [place]   Is LACE score < 7 ? No   Emergency Room discharge w/ pulse ox? No   Eligibility Readm Mgmt   Discharge diagnosis Flash pulmonary edema   Does the patient have one of the following disease processes/diagnoses(primary or secondary)? Other   Does the patient have Home health ordered? Yes   What is the Home health agency?  [facility]   Is there a DME ordered? No   Comments regarding appointments AMA [place] on 03/11/22 @ 8148   Medication alerts for this patient Lisinopril,  STOP - Eliquis   Prep survey completed? Yes          ALEJANDRINA BRANTLEY - Registered Nurse

## 2022-03-09 LAB
QT INTERVAL: 352 MS
QTC INTERVAL: 454 MS

## 2022-03-14 ENCOUNTER — READMISSION MANAGEMENT (OUTPATIENT)
Dept: CALL CENTER | Facility: HOSPITAL | Age: 82
End: 2022-03-14

## 2022-03-14 NOTE — OUTREACH NOTE
Medical Week 1 Survey    Flowsheet Row Responses   [facility] patient discharged from? [place]   Does the patient have one of the following disease processes/diagnoses(primary or secondary)? Other   Week 1 attempt successful? Yes   Call start time 1108   Call end time 1111   Discharge diagnosis Flash pulmonary edema   Person spoke with today (if not patient) and relationship Olivier Peraltas reviewed with patient/caregiver? Yes   Is the patient having any side effects they believe may be caused by any medication additions or changes? No   Does the patient have all medications ordered at discharge? Yes   Is the patient taking all medications as directed (includes completed medication regime)? Yes   Comments regarding appointments Appt with pulm is on 3/16/22,  Appt with Lanie Jarquin is on 3/21/22   Does the patient have a primary care provider?  Yes   Does the patient have an appointment with their PCP within 7 days of discharge? Yes   Comments regarding PCP 3/14/22   Has the patient kept scheduled appointments due by today? N/A   What is the Home health agency?  [facility]   Has home health visited the patient within 72 hours of discharge? Unsure   Psychosocial issues? No   Did the patient receive a copy of their discharge instructions? Yes   Nursing interventions Reviewed instructions with patient   What is the patient's perception of their health status since discharge? Improving   Is the patient/caregiver able to teach back signs and symptoms related to disease process for when to call PCP? Yes   Is the patient/caregiver able to teach back signs and symptoms related to disease process for when to call 911? Yes   Is the patient/caregiver able to teach back the hierarchy of who to call/visit for symptoms/problems? PCP, Specialist, Home health nurse, Urgent Care, ED, 911 Yes   Week 1 call completed? Yes          SHANIKA DILLON - Registered Nurse

## 2022-03-24 ENCOUNTER — READMISSION MANAGEMENT (OUTPATIENT)
Dept: CALL CENTER | Facility: HOSPITAL | Age: 82
End: 2022-03-24

## 2022-03-24 NOTE — OUTREACH NOTE
Medical Week 2 Survey    Flowsheet Row Responses   [facility] patient discharged from? [place]   Does the patient have one of the following disease processes/diagnoses(primary or secondary)? Other   Week 2 attempt successful? Yes   Call start time 1323   Discharge diagnosis Flash pulmonary edema   Call end time 1326   Meds reviewed with patient/caregiver? Yes   Is the patient having any side effects they believe may be caused by any medication additions or changes? No   Does the patient have all medications ordered at discharge? Yes   Is the patient taking all medications as directed (includes completed medication regime)? Yes   Does the patient have a primary care provider?  Yes   Does the patient have an appointment with their PCP within 7 days of discharge? Greater than 7 days   What is preventing the patient from scheduling follow up appointments within 7 days of discharge? Earlier appointment not available   Nursing Interventions Verified appointment date/time/provider   Has the patient kept scheduled appointments due by today? N/A   What is the Home health agency?  [facility]   Psychosocial issues? No   Did the patient receive a copy of their discharge instructions? Yes   Nursing interventions Reviewed instructions with patient   What is the patient's perception of their health status since discharge? Improving   Is the patient/caregiver able to teach back signs and symptoms related to disease process for when to call PCP? Yes   Is the patient/caregiver able to teach back signs and symptoms related to disease process for when to call 911? Yes   Is the patient/caregiver able to teach back the hierarchy of who to call/visit for symptoms/problems? PCP, Specialist, Home health nurse, Urgent Care, ED, 911 Yes   If the patient is a current smoker, are they able to teach back resources for cessation? Not a smoker   Week 2 Call Completed? Yes          TI MEJIA - Licensed Nurse

## 2022-03-28 ENCOUNTER — OFFICE VISIT (OUTPATIENT)
Dept: PULMONOLOGY | Facility: CLINIC | Age: 82
End: 2022-03-28

## 2022-03-28 VITALS
RESPIRATION RATE: 16 BRPM | TEMPERATURE: 96 F | SYSTOLIC BLOOD PRESSURE: 122 MMHG | HEART RATE: 52 BPM | WEIGHT: 108.38 LBS | DIASTOLIC BLOOD PRESSURE: 58 MMHG | OXYGEN SATURATION: 100 % | BODY MASS INDEX: 17.42 KG/M2 | HEIGHT: 66 IN

## 2022-03-28 DIAGNOSIS — J90 BILATERAL PLEURAL EFFUSION: ICD-10-CM

## 2022-03-28 DIAGNOSIS — J44.9 CHRONIC OBSTRUCTIVE PULMONARY DISEASE, UNSPECIFIED COPD TYPE: Primary | ICD-10-CM

## 2022-03-28 DIAGNOSIS — J96.22 ACUTE ON CHRONIC RESPIRATORY FAILURE WITH HYPOXIA AND HYPERCAPNIA: ICD-10-CM

## 2022-03-28 DIAGNOSIS — J96.21 ACUTE ON CHRONIC RESPIRATORY FAILURE WITH HYPOXIA AND HYPERCAPNIA: ICD-10-CM

## 2022-03-28 PROCEDURE — 99214 OFFICE O/P EST MOD 30 MIN: CPT | Performed by: NURSE PRACTITIONER

## 2022-03-28 RX ORDER — LEVOTHYROXINE SODIUM 0.1 MG/1
TABLET ORAL
COMMUNITY
Start: 2022-03-15 | End: 2022-04-29 | Stop reason: HOSPADM

## 2022-03-28 NOTE — PROGRESS NOTES
"[facility] Pulmonary Follow up      Chief Complaint  Flash Pulmonar Edema (F/u)    Subjective          Yin Law presents to [facility] PULMONARY [facility]IC[facility] for follow-up on recent hospital stay.  She is in the hospital February 7 through February 12 and then again February 20 through March 7.  Her first hospital stay was related to a hip fracture, with some issues with A. Fib.  She was readmitted in March with flash pulmonary edema and bilateral pleural effusions.    She was last in the office in December of last year.  She had a hospitalization last year as well for pleural effusions and pulmonary edema.    Currently she says she is doing pretty well.  She has completed home physical therapy.  She is still weak and with general deconditioning but overall feels like she is doing better.  Her shortness of breath is at baseline.  She does continue to wear her oxygen at 2 L with activity and at night.    She denies any cough or sputum production.  No chest tightness or wheezing.  She has an albuterol she uses as needed but only rarely.  She does not currently use any maintenance inhalers.        Objective     Vital Signs:   /58 (BP Location: Left arm, Patient Position: Sitting, Cuff Size: Adult)   Pulse 52   Temp 96 °F (35.6 °C)   Resp 16   Ht 167.6 cm (66\")   Wt 49.2 kg (108 lb 6 oz)   SpO2 100%   PF (!) 2 L/min Comment: Continious at resting  BMI 17.49 kg/m²       Immunization History   Administered Date(s) Administered   • COVID-19 (PFIZER) PURPLE CAP 03/17/2021, 04/14/2021   • Influenza, Unspecified 12/21/2011, 12/26/2012, 10/03/2013, 09/29/2014, 10/20/2016, 10/19/2017, 09/11/2018, 10/16/2019, 11/02/2020, 09/27/2021   • Pneumococcal Conjugate 13-Valent (PCV13) 09/29/2014, 07/21/2015   • Pneumococcal Polysaccharide (PPSV23) 06/12/2009, 12/08/2009   • Tdap 07/21/2015       Physical Exam  Vitals reviewed.   Constitutional:       General: She is not in acute " distress.     Appearance: She is well-developed. She is not ill-appearing.   HENT:      Head: Normocephalic and atraumatic.   Eyes:      Pupils: Pupils are equal, round, and reactive to light.   Cardiovascular:      Rate and Rhythm: Normal rate and regular rhythm.      Heart sounds: No murmur heard.  Pulmonary:      Effort: Pulmonary effort is normal. No respiratory distress.      Breath sounds: Normal breath sounds. No wheezing or rales.   Abdominal:      General: Bowel sounds are normal. There is no distension.      Palpations: Abdomen is soft.   Musculoskeletal:         General: Normal range of motion.      Cervical back: Normal range of motion and neck supple.   Skin:     General: Skin is warm and dry.      Findings: No erythema.   Neurological:      Mental Status: She is alert and oriented to person, place, and time.   Psychiatric:         Behavior: Behavior normal.          Result Review :       Data reviewed: Recent hospitalization notes From February and March                    Assessment and Plan    Problem List Items Addressed This Visit        Pulmonary and Pneumonias    COPD on home oxygen (CMS/HCC) - Primary    Bilateral pleural effusion    Acute on chronic respiratory failure with hypoxia and hypercapnia (HCC)        We went over her records from her hospital stay.  She did have flash pulmonary edema with some pleural effusions.  She has done better with her diuresis.  She has no significant edema or fluid retention at this time.    Her shortness of breath has significantly improved.  She will continue on her 2 L of oxygen with activity and at night.  She is fairly clear today with only scattered rales.    Follow-up in the office in 3 months or sooner with any issues.      Follow Up     No follow-ups on file.  Patient was given instructions and counseling regarding her condition or for health maintenance advice. Please see specific information pulled into the AVS if appropriate.     I spent 35 minutes  caring for Yin on this date of service. This time includes time spent by me in the following activities:preparing for the visit, reviewing tests, obtaining and/or reviewing a separately obtained history, performing a medically appropriate examination and/or evaluation , counseling and educating the patient/family/caregiver, ordering medications, tests, or procedures, referring and communicating with other health care professionals , documenting information in the medical record and independently interpreting results and communicating that information with the patient/family/caregiver      AMA Way, ACNP  [facility]  Electronically signed

## 2022-04-01 ENCOUNTER — READMISSION MANAGEMENT (OUTPATIENT)
Dept: CALL CENTER | Facility: HOSPITAL | Age: 82
End: 2022-04-01

## 2022-04-01 NOTE — OUTREACH NOTE
Medical Week 3 Survey    Flowsheet Row Responses   [facility] patient discharged from? [place]   Does the patient have one of the following disease processes/diagnoses(primary or secondary)? Other   Week 3 attempt successful? Yes   Call start time 1214   Call end time 1221   Discharge diagnosis Flash pulmonary edema, COPD, afib   Is patient permission given to speak with other caregiver? Yes   Person spoke with today (if not patient) and relationship isaías Esqueda   Meds reviewed with patient/caregiver? Yes   Does the patient have all medications ordered at discharge? Yes   Is the patient taking all medications as directed (includes completed medication regime)? Yes   Does the patient have a primary care provider?  Yes   Comments regarding PCP Patient has seen PCP since discharge.    Has the patient kept scheduled appointments due by today? Yes   What is the Home health agency?  Reports that patient has been dc'd from .    DME comments Wearing home O2 4L all the time per family. Reports O2 sats %. Advise to discuss with Dr how much O2 patient should be on.    Psychosocial issues? No   Did the patient receive a copy of their discharge instructions? Yes   Nursing interventions Reviewed instructions with patient   What is the patient's perception of their health status since discharge? Improving   Is the patient/caregiver able to teach back signs and symptoms related to disease process for when to call PCP? Yes   Is the patient/caregiver able to teach back signs and symptoms related to disease process for when to call 911? Yes   Is the patient/caregiver able to teach back the hierarchy of who to call/visit for symptoms/problems? PCP, Specialist, Home health nurse, Urgent Care, ED, 911 Yes   If the patient is a current smoker, are they able to teach back resources for cessation? Not a smoker   Week 3 Call Completed? Yes          SOLIS MCQUEEN - Registered Nurse

## 2022-04-12 ENCOUNTER — READMISSION MANAGEMENT (OUTPATIENT)
Dept: CALL CENTER | Facility: HOSPITAL | Age: 82
End: 2022-04-12

## 2022-04-12 NOTE — OUTREACH NOTE
Medical Week 4 Survey    Flowsheet Row Responses   [facility] patient discharged from? [place]   Does the patient have one of the following disease processes/diagnoses(primary or secondary)? Other   Week 4 attempt successful? Yes   Call start time 1555   Call end time 1558   Discharge diagnosis Flash pulmonary edema, COPD, afib   Meds reviewed with patient/caregiver? Yes   Is the patient having any side effects they believe may be caused by any medication additions or changes? No   Is the patient taking all medications as directed (includes completed medication regime)? Yes   Has the patient kept scheduled appointments due by today? Yes   Is the patient still receiving Home Health Services? Yes   Psychosocial issues? No   Comments wears O2 at night, and uses intermittently during the day   What is the patient's perception of their health status since discharge? Improving   Is the patient/caregiver able to teach back signs and symptoms related to disease process for when to call PCP? Yes   Is the patient/caregiver able to teach back signs and symptoms related to disease process for when to call 911? Yes   Is the patient/caregiver able to teach back the hierarchy of who to call/visit for symptoms/problems? PCP, Specialist, Home health nurse, Urgent Care, ED, 911 Yes   Additional teach back comments states free of SOB, states is comfortable in her daily routine, uses O2 as needed and at night   Week 4 Call Completed? Yes   Would the patient like one additional call? No   Graduated Yes   Is the patient interested in additional calls from an ambulatory ?  NOTE:  applies to high risk patients requiring additional follow-up. No   Did the patient feel the follow up calls were helpful during their recovery period? Yes   Was the number of calls appropriate? Yes          LAURY MEJIA - Registered Nurse

## 2022-04-22 ENCOUNTER — APPOINTMENT (OUTPATIENT)
Dept: GENERAL RADIOLOGY | Facility: HOSPITAL | Age: 82
End: 2022-04-22

## 2022-04-22 ENCOUNTER — HOSPITAL ENCOUNTER (INPATIENT)
Facility: HOSPITAL | Age: 82
LOS: 7 days | Discharge: HOME HEALTH | End: 2022-04-29
Attending: EMERGENCY MEDICINE | Admitting: INTERNAL MEDICINE

## 2022-04-22 DIAGNOSIS — J81.0 FLASH PULMONARY EDEMA: Primary | ICD-10-CM

## 2022-04-22 DIAGNOSIS — J44.1 COPD WITH ACUTE EXACERBATION: ICD-10-CM

## 2022-04-22 DIAGNOSIS — I50.9 ACUTE ON CHRONIC CONGESTIVE HEART FAILURE, UNSPECIFIED HEART FAILURE TYPE: ICD-10-CM

## 2022-04-22 DIAGNOSIS — R06.03 ACUTE RESPIRATORY DISTRESS: ICD-10-CM

## 2022-04-22 DIAGNOSIS — R09.02 HYPOXIA: ICD-10-CM

## 2022-04-22 PROBLEM — J96.90 RESPIRATORY FAILURE (HCC): Status: ACTIVE | Noted: 2022-04-22

## 2022-04-22 LAB
ALBUMIN SERPL-MCNC: 4 G/DL (ref 3.5–5.2)
ALBUMIN/GLOB SERPL: 1.3 G/DL
ALP SERPL-CCNC: 128 U/L (ref 39–117)
ALT SERPL W P-5'-P-CCNC: 15 U/L (ref 1–33)
ANION GAP SERPL CALCULATED.3IONS-SCNC: 13 MMOL/L (ref 5–15)
ARTERIAL PATENCY WRIST A: ABNORMAL
AST SERPL-CCNC: 31 U/L (ref 1–32)
ATMOSPHERIC PRESS: ABNORMAL MM[HG]
BASE EXCESS BLDA CALC-SCNC: 5.3 MMOL/L (ref 0–2)
BASOPHILS # BLD AUTO: 0.08 10*3/MM3 (ref 0–0.2)
BASOPHILS NFR BLD AUTO: 0.6 % (ref 0–1.5)
BDY SITE: ABNORMAL
BILIRUB SERPL-MCNC: 0.3 MG/DL (ref 0–1.2)
BODY TEMPERATURE: 37 C
BUN SERPL-MCNC: 23 MG/DL (ref 8–23)
BUN/CREAT SERPL: 25.8 (ref 7–25)
CALCIUM SPEC-SCNC: 8.6 MG/DL (ref 8.6–10.5)
CHLORIDE SERPL-SCNC: 96 MMOL/L (ref 98–107)
CO2 BLDA-SCNC: 34.1 MMOL/L (ref 22–33)
CO2 SERPL-SCNC: 30 MMOL/L (ref 22–29)
COHGB MFR BLD: 1.4 % (ref 0–2)
CREAT SERPL-MCNC: 0.89 MG/DL (ref 0.57–1)
DEPRECATED RDW RBC AUTO: 48.3 FL (ref 37–54)
EGFRCR SERPLBLD CKD-EPI 2021: 64.8 ML/MIN/1.73
EOSINOPHIL # BLD AUTO: 0.07 10*3/MM3 (ref 0–0.4)
EOSINOPHIL NFR BLD AUTO: 0.6 % (ref 0.3–6.2)
EPAP: 8
ERYTHROCYTE [DISTWIDTH] IN BLOOD BY AUTOMATED COUNT: 13.8 % (ref 12.3–15.4)
FLUAV RNA RESP QL NAA+PROBE: NOT DETECTED
FLUBV RNA RESP QL NAA+PROBE: NOT DETECTED
GLOBULIN UR ELPH-MCNC: 3 GM/DL
GLUCOSE SERPL-MCNC: 258 MG/DL (ref 65–99)
HCO3 BLDA-SCNC: 32.3 MMOL/L (ref 20–26)
HCT VFR BLD AUTO: 29.3 % (ref 34–46.6)
HCT VFR BLD CALC: 27.8 % (ref 38–51)
HGB BLD-MCNC: 8.9 G/DL (ref 12–15.9)
HGB BLDA-MCNC: 9.1 G/DL (ref 14–18)
HOLD SPECIMEN: NORMAL
HOLD SPECIMEN: NORMAL
IMM GRANULOCYTES # BLD AUTO: 0.06 10*3/MM3 (ref 0–0.05)
IMM GRANULOCYTES NFR BLD AUTO: 0.5 % (ref 0–0.5)
INHALED O2 CONCENTRATION: 100 %
IPAP: 16
LYMPHOCYTES # BLD AUTO: 2.64 10*3/MM3 (ref 0.7–3.1)
LYMPHOCYTES NFR BLD AUTO: 20.9 % (ref 19.6–45.3)
MCH RBC QN AUTO: 29 PG (ref 26.6–33)
MCHC RBC AUTO-ENTMCNC: 30.4 G/DL (ref 31.5–35.7)
MCV RBC AUTO: 95.4 FL (ref 79–97)
METHGB BLD QL: 0.2 % (ref 0–1.5)
MODALITY: ABNORMAL
MONOCYTES # BLD AUTO: 0.98 10*3/MM3 (ref 0.1–0.9)
MONOCYTES NFR BLD AUTO: 7.8 % (ref 5–12)
NEUTROPHILS NFR BLD AUTO: 69.6 % (ref 42.7–76)
NEUTROPHILS NFR BLD AUTO: 8.81 10*3/MM3 (ref 1.7–7)
NOTE: ABNORMAL
NRBC BLD AUTO-RTO: 0 /100 WBC (ref 0–0.2)
NT-PROBNP SERPL-MCNC: 7055 PG/ML (ref 0–1800)
OXYHGB MFR BLDV: 99 % (ref 94–99)
PAW @ PEAK INSP FLOW SETTING VENT: 0 CMH2O
PCO2 BLDA: 60.7 MM HG (ref 35–45)
PCO2 TEMP ADJ BLD: 60.7 MM HG (ref 35–45)
PH BLDA: 7.33 PH UNITS (ref 7.35–7.45)
PH, TEMP CORRECTED: 7.33 PH UNITS
PLATELET # BLD AUTO: 341 10*3/MM3 (ref 140–450)
PMV BLD AUTO: 11.1 FL (ref 6–12)
PO2 BLDA: 462 MM HG (ref 83–108)
PO2 TEMP ADJ BLD: 462 MM HG (ref 83–108)
POTASSIUM SERPL-SCNC: 3.4 MMOL/L (ref 3.5–5.2)
PROT SERPL-MCNC: 7 G/DL (ref 6–8.5)
RBC # BLD AUTO: 3.07 10*6/MM3 (ref 3.77–5.28)
SARS-COV-2 RNA RESP QL NAA+PROBE: NOT DETECTED
SODIUM SERPL-SCNC: 139 MMOL/L (ref 136–145)
TOTAL RATE: 0 BREATHS/MINUTE
TROPONIN T SERPL-MCNC: 0.06 NG/ML (ref 0–0.03)
WBC NRBC COR # BLD: 12.64 10*3/MM3 (ref 3.4–10.8)
WHOLE BLOOD HOLD SPECIMEN: NORMAL
WHOLE BLOOD HOLD SPECIMEN: NORMAL

## 2022-04-22 PROCEDURE — 36600 WITHDRAWAL OF ARTERIAL BLOOD: CPT

## 2022-04-22 PROCEDURE — 93005 ELECTROCARDIOGRAM TRACING: CPT | Performed by: EMERGENCY MEDICINE

## 2022-04-22 PROCEDURE — 85025 COMPLETE CBC W/AUTO DIFF WBC: CPT | Performed by: EMERGENCY MEDICINE

## 2022-04-22 PROCEDURE — 80053 COMPREHEN METABOLIC PANEL: CPT | Performed by: EMERGENCY MEDICINE

## 2022-04-22 PROCEDURE — 83050 HGB METHEMOGLOBIN QUAN: CPT

## 2022-04-22 PROCEDURE — 94640 AIRWAY INHALATION TREATMENT: CPT

## 2022-04-22 PROCEDURE — 84145 PROCALCITONIN (PCT): CPT | Performed by: INTERNAL MEDICINE

## 2022-04-22 PROCEDURE — 94799 UNLISTED PULMONARY SVC/PX: CPT

## 2022-04-22 PROCEDURE — 83036 HEMOGLOBIN GLYCOSYLATED A1C: CPT | Performed by: INTERNAL MEDICINE

## 2022-04-22 PROCEDURE — 82805 BLOOD GASES W/O2 SATURATION: CPT

## 2022-04-22 PROCEDURE — 71045 X-RAY EXAM CHEST 1 VIEW: CPT

## 2022-04-22 PROCEDURE — 87636 SARSCOV2 & INF A&B AMP PRB: CPT | Performed by: EMERGENCY MEDICINE

## 2022-04-22 PROCEDURE — 84484 ASSAY OF TROPONIN QUANT: CPT | Performed by: EMERGENCY MEDICINE

## 2022-04-22 PROCEDURE — 84443 ASSAY THYROID STIM HORMONE: CPT | Performed by: INTERNAL MEDICINE

## 2022-04-22 PROCEDURE — 83880 ASSAY OF NATRIURETIC PEPTIDE: CPT | Performed by: EMERGENCY MEDICINE

## 2022-04-22 PROCEDURE — 25010000002 FUROSEMIDE PER 20 MG: Performed by: EMERGENCY MEDICINE

## 2022-04-22 PROCEDURE — 82375 ASSAY CARBOXYHB QUANT: CPT

## 2022-04-22 PROCEDURE — 99284 EMERGENCY DEPT VISIT MOD MDM: CPT

## 2022-04-22 PROCEDURE — 94660 CPAP INITIATION&MGMT: CPT

## 2022-04-22 RX ORDER — NITROGLYCERIN 20 MG/100ML
5-200 INJECTION INTRAVENOUS
Status: DISCONTINUED | OUTPATIENT
Start: 2022-04-22 | End: 2022-04-23

## 2022-04-22 RX ORDER — FUROSEMIDE 10 MG/ML
80 INJECTION INTRAMUSCULAR; INTRAVENOUS ONCE
Status: COMPLETED | OUTPATIENT
Start: 2022-04-22 | End: 2022-04-22

## 2022-04-22 RX ORDER — SODIUM CHLORIDE 0.9 % (FLUSH) 0.9 %
10 SYRINGE (ML) INJECTION AS NEEDED
Status: DISCONTINUED | OUTPATIENT
Start: 2022-04-22 | End: 2022-04-29 | Stop reason: HOSPADM

## 2022-04-22 RX ORDER — NITROGLYCERIN 20 MG/100ML
5-200 INJECTION INTRAVENOUS
Status: DISCONTINUED | OUTPATIENT
Start: 2022-04-22 | End: 2022-04-22

## 2022-04-22 RX ORDER — IPRATROPIUM BROMIDE AND ALBUTEROL SULFATE 2.5; .5 MG/3ML; MG/3ML
3 SOLUTION RESPIRATORY (INHALATION) ONCE
Status: COMPLETED | OUTPATIENT
Start: 2022-04-22 | End: 2022-04-22

## 2022-04-22 RX ADMIN — NITROGLYCERIN 20 MCG/MIN: 20 INJECTION INTRAVENOUS at 20:42

## 2022-04-22 RX ADMIN — IPRATROPIUM BROMIDE AND ALBUTEROL SULFATE 3 ML: .5; 2.5 SOLUTION RESPIRATORY (INHALATION) at 20:57

## 2022-04-22 RX ADMIN — FUROSEMIDE 80 MG: 10 INJECTION, SOLUTION INTRAMUSCULAR; INTRAVENOUS at 21:14

## 2022-04-23 ENCOUNTER — APPOINTMENT (OUTPATIENT)
Dept: CARDIOLOGY | Facility: HOSPITAL | Age: 82
End: 2022-04-23

## 2022-04-23 PROBLEM — J96.92 TYPE 2 RESPIRATORY FAILURE (HCC): Status: ACTIVE | Noted: 2022-04-22

## 2022-04-23 LAB
ALBUMIN SERPL-MCNC: 3.3 G/DL (ref 3.5–5.2)
ALBUMIN/GLOB SERPL: 1.2 G/DL
ALP SERPL-CCNC: 101 U/L (ref 39–117)
ALT SERPL W P-5'-P-CCNC: 10 U/L (ref 1–33)
ANION GAP SERPL CALCULATED.3IONS-SCNC: 11 MMOL/L (ref 5–15)
AST SERPL-CCNC: 20 U/L (ref 1–32)
BASOPHILS # BLD AUTO: 0.03 10*3/MM3 (ref 0–0.2)
BASOPHILS NFR BLD AUTO: 0.4 % (ref 0–1.5)
BH CV ECHO MEAS - AI P1/2T: 373 MSEC
BH CV ECHO MEAS - AO MAX PG: 23.9 MMHG
BH CV ECHO MEAS - AO MEAN PG: 12.2 MMHG
BH CV ECHO MEAS - AO ROOT DIAM: 2.6 CM
BH CV ECHO MEAS - AO V2 MAX: 244.3 CM/SEC
BH CV ECHO MEAS - AO V2 VTI: 56 CM
BH CV ECHO MEAS - AVA(I,D): 1.11 CM2
BH CV ECHO MEAS - EDV(CUBED): 62.3 ML
BH CV ECHO MEAS - EDV(MOD-SP2): 64 ML
BH CV ECHO MEAS - EDV(MOD-SP4): 68 ML
BH CV ECHO MEAS - EF(MOD-BP): 70 %
BH CV ECHO MEAS - EF(MOD-SP2): 70.3 %
BH CV ECHO MEAS - EF(MOD-SP4): 67.6 %
BH CV ECHO MEAS - ESV(CUBED): 23.2 ML
BH CV ECHO MEAS - ESV(MOD-SP2): 19 ML
BH CV ECHO MEAS - ESV(MOD-SP4): 22 ML
BH CV ECHO MEAS - FS: 28.1 %
BH CV ECHO MEAS - IVS/LVPW: 0.97 CM
BH CV ECHO MEAS - IVSD: 1.37 CM
BH CV ECHO MEAS - LA DIMENSION: 4.8 CM
BH CV ECHO MEAS - LV MASS(C)D: 204.9 GRAMS
BH CV ECHO MEAS - LV MAX PG: 5.6 MMHG
BH CV ECHO MEAS - LV MEAN PG: 2.9 MMHG
BH CV ECHO MEAS - LV V1 MAX: 117.8 CM/SEC
BH CV ECHO MEAS - LV V1 VTI: 28.8 CM
BH CV ECHO MEAS - LVIDD: 4 CM
BH CV ECHO MEAS - LVIDS: 2.9 CM
BH CV ECHO MEAS - LVOT AREA: 2.16 CM2
BH CV ECHO MEAS - LVOT DIAM: 1.66 CM
BH CV ECHO MEAS - LVPWD: 1.41 CM
BH CV ECHO MEAS - MV A MAX VEL: 124 CM/SEC
BH CV ECHO MEAS - MV DEC SLOPE: 1094 CM/SEC2
BH CV ECHO MEAS - MV DEC TIME: 0.23 MSEC
BH CV ECHO MEAS - MV E MAX VEL: 136.4 CM/SEC
BH CV ECHO MEAS - MV E/A: 1.1
BH CV ECHO MEAS - MV MAX PG: 16.4 MMHG
BH CV ECHO MEAS - MV MEAN PG: 7 MMHG
BH CV ECHO MEAS - MV V2 VTI: 59.5 CM
BH CV ECHO MEAS - MVA(VTI): 1.05 CM2
BH CV ECHO MEAS - PA ACC SLOPE: 634.5 CM/SEC2
BH CV ECHO MEAS - PA ACC TIME: 0.13 SEC
BH CV ECHO MEAS - PA PR(ACCEL): 18.8 MMHG
BH CV ECHO MEAS - RAP SYSTOLE: 8 MMHG
BH CV ECHO MEAS - RVSP: 61 MMHG
BH CV ECHO MEAS - SV(LVOT): 62.4 ML
BH CV ECHO MEAS - SV(MOD-SP2): 45 ML
BH CV ECHO MEAS - SV(MOD-SP4): 46 ML
BH CV ECHO MEAS - TAPSE (>1.6): 2.7 CM
BH CV ECHO MEAS - TR MAX PG: 53 MMHG
BH CV ECHO MEAS - TR MAX VEL: 363 CM/SEC
BH CV VAS BP RIGHT ARM: NORMAL MMHG
BH CV XLRA - RV BASE: 3.4 CM
BH CV XLRA - RV LENGTH: 5.1 CM
BH CV XLRA - RV MID: 2.6 CM
BILIRUB SERPL-MCNC: 0.3 MG/DL (ref 0–1.2)
BUN SERPL-MCNC: 25 MG/DL (ref 8–23)
BUN/CREAT SERPL: 32.9 (ref 7–25)
CALCIUM SPEC-SCNC: 8.6 MG/DL (ref 8.6–10.5)
CHLORIDE SERPL-SCNC: 97 MMOL/L (ref 98–107)
CO2 SERPL-SCNC: 31 MMOL/L (ref 22–29)
CREAT SERPL-MCNC: 0.76 MG/DL (ref 0.57–1)
DEPRECATED RDW RBC AUTO: 46.2 FL (ref 37–54)
EGFRCR SERPLBLD CKD-EPI 2021: 78.3 ML/MIN/1.73
EOSINOPHIL # BLD AUTO: 0.02 10*3/MM3 (ref 0–0.4)
EOSINOPHIL NFR BLD AUTO: 0.2 % (ref 0.3–6.2)
ERYTHROCYTE [DISTWIDTH] IN BLOOD BY AUTOMATED COUNT: 13.8 % (ref 12.3–15.4)
GLOBULIN UR ELPH-MCNC: 2.7 GM/DL
GLUCOSE BLDC GLUCOMTR-MCNC: 101 MG/DL (ref 70–130)
GLUCOSE BLDC GLUCOMTR-MCNC: 101 MG/DL (ref 70–130)
GLUCOSE BLDC GLUCOMTR-MCNC: 136 MG/DL (ref 70–130)
GLUCOSE BLDC GLUCOMTR-MCNC: 79 MG/DL (ref 70–130)
GLUCOSE SERPL-MCNC: 113 MG/DL (ref 65–99)
HBA1C MFR BLD: 4.7 % (ref 4.8–5.6)
HCT VFR BLD AUTO: 22.9 % (ref 34–46.6)
HCT VFR BLD AUTO: 24.9 % (ref 34–46.6)
HGB BLD-MCNC: 7.3 G/DL (ref 12–15.9)
HGB BLD-MCNC: 8 G/DL (ref 12–15.9)
HOLD SPECIMEN: NORMAL
IMM GRANULOCYTES # BLD AUTO: 0.02 10*3/MM3 (ref 0–0.05)
IMM GRANULOCYTES NFR BLD AUTO: 0.2 % (ref 0–0.5)
LEFT ATRIUM VOLUME INDEX: 66.9 ML/M2
LYMPHOCYTES # BLD AUTO: 1.59 10*3/MM3 (ref 0.7–3.1)
LYMPHOCYTES NFR BLD AUTO: 19 % (ref 19.6–45.3)
MAGNESIUM SERPL-MCNC: 2.1 MG/DL (ref 1.6–2.4)
MAXIMAL PREDICTED HEART RATE: 138 BPM
MCH RBC QN AUTO: 29.1 PG (ref 26.6–33)
MCHC RBC AUTO-ENTMCNC: 31.9 G/DL (ref 31.5–35.7)
MCV RBC AUTO: 91.2 FL (ref 79–97)
MONOCYTES # BLD AUTO: 0.81 10*3/MM3 (ref 0.1–0.9)
MONOCYTES NFR BLD AUTO: 9.7 % (ref 5–12)
NEUTROPHILS NFR BLD AUTO: 5.9 10*3/MM3 (ref 1.7–7)
NEUTROPHILS NFR BLD AUTO: 70.5 % (ref 42.7–76)
NRBC BLD AUTO-RTO: 0 /100 WBC (ref 0–0.2)
PHOSPHATE SERPL-MCNC: 3.7 MG/DL (ref 2.5–4.5)
PLATELET # BLD AUTO: 187 10*3/MM3 (ref 140–450)
PMV BLD AUTO: 11.3 FL (ref 6–12)
POTASSIUM SERPL-SCNC: 2.9 MMOL/L (ref 3.5–5.2)
POTASSIUM SERPL-SCNC: 4 MMOL/L (ref 3.5–5.2)
PROCALCITONIN SERPL-MCNC: 0.08 NG/ML (ref 0–0.25)
PROT SERPL-MCNC: 6 G/DL (ref 6–8.5)
RBC # BLD AUTO: 2.51 10*6/MM3 (ref 3.77–5.28)
SODIUM SERPL-SCNC: 139 MMOL/L (ref 136–145)
STRESS TARGET HR: 117 BPM
T4 FREE SERPL-MCNC: 1.08 NG/DL (ref 0.93–1.7)
TROPONIN T SERPL-MCNC: 0.07 NG/ML (ref 0–0.03)
TSH SERPL DL<=0.05 MIU/L-ACNC: 13.2 UIU/ML (ref 0.27–4.2)
WBC NRBC COR # BLD: 8.37 10*3/MM3 (ref 3.4–10.8)

## 2022-04-23 PROCEDURE — 85014 HEMATOCRIT: CPT | Performed by: INTERNAL MEDICINE

## 2022-04-23 PROCEDURE — 99291 CRITICAL CARE FIRST HOUR: CPT | Performed by: INTERNAL MEDICINE

## 2022-04-23 PROCEDURE — 84484 ASSAY OF TROPONIN QUANT: CPT | Performed by: NURSE PRACTITIONER

## 2022-04-23 PROCEDURE — 94799 UNLISTED PULMONARY SVC/PX: CPT

## 2022-04-23 PROCEDURE — 82962 GLUCOSE BLOOD TEST: CPT

## 2022-04-23 PROCEDURE — 25010000002 HEPARIN (PORCINE) PER 1000 UNITS: Performed by: INTERNAL MEDICINE

## 2022-04-23 PROCEDURE — 85025 COMPLETE CBC W/AUTO DIFF WBC: CPT | Performed by: NURSE PRACTITIONER

## 2022-04-23 PROCEDURE — 85018 HEMOGLOBIN: CPT | Performed by: INTERNAL MEDICINE

## 2022-04-23 PROCEDURE — 84100 ASSAY OF PHOSPHORUS: CPT | Performed by: NURSE PRACTITIONER

## 2022-04-23 PROCEDURE — 99222 1ST HOSP IP/OBS MODERATE 55: CPT | Performed by: INTERNAL MEDICINE

## 2022-04-23 PROCEDURE — 80053 COMPREHEN METABOLIC PANEL: CPT | Performed by: NURSE PRACTITIONER

## 2022-04-23 PROCEDURE — 83735 ASSAY OF MAGNESIUM: CPT | Performed by: NURSE PRACTITIONER

## 2022-04-23 PROCEDURE — 93306 TTE W/DOPPLER COMPLETE: CPT

## 2022-04-23 PROCEDURE — 84439 ASSAY OF FREE THYROXINE: CPT | Performed by: INTERNAL MEDICINE

## 2022-04-23 PROCEDURE — 93306 TTE W/DOPPLER COMPLETE: CPT | Performed by: INTERNAL MEDICINE

## 2022-04-23 PROCEDURE — 94640 AIRWAY INHALATION TREATMENT: CPT

## 2022-04-23 PROCEDURE — 84132 ASSAY OF SERUM POTASSIUM: CPT | Performed by: INTERNAL MEDICINE

## 2022-04-23 RX ORDER — LEVOTHYROXINE SODIUM 0.07 MG/1
75 TABLET ORAL
Status: DISCONTINUED | OUTPATIENT
Start: 2022-04-23 | End: 2022-04-29 | Stop reason: HOSPADM

## 2022-04-23 RX ORDER — BISOPROLOL FUMARATE 5 MG/1
10 TABLET, FILM COATED ORAL
Status: DISCONTINUED | OUTPATIENT
Start: 2022-04-23 | End: 2022-04-24

## 2022-04-23 RX ORDER — POTASSIUM CHLORIDE 750 MG/1
40 CAPSULE, EXTENDED RELEASE ORAL AS NEEDED
Status: DISCONTINUED | OUTPATIENT
Start: 2022-04-23 | End: 2022-04-29 | Stop reason: HOSPADM

## 2022-04-23 RX ORDER — BUDESONIDE AND FORMOTEROL FUMARATE DIHYDRATE 160; 4.5 UG/1; UG/1
2 AEROSOL RESPIRATORY (INHALATION)
Status: DISCONTINUED | OUTPATIENT
Start: 2022-04-23 | End: 2022-04-29 | Stop reason: HOSPADM

## 2022-04-23 RX ORDER — HEPARIN SODIUM 5000 [USP'U]/ML
5000 INJECTION, SOLUTION INTRAVENOUS; SUBCUTANEOUS EVERY 8 HOURS SCHEDULED
Status: DISCONTINUED | OUTPATIENT
Start: 2022-04-23 | End: 2022-04-29 | Stop reason: HOSPADM

## 2022-04-23 RX ORDER — LISINOPRIL 5 MG/1
5 TABLET ORAL
Status: DISCONTINUED | OUTPATIENT
Start: 2022-04-23 | End: 2022-04-29

## 2022-04-23 RX ORDER — HYDROCODONE BITARTRATE AND ACETAMINOPHEN 7.5; 325 MG/1; MG/1
1 TABLET ORAL EVERY 6 HOURS PRN
Status: DISCONTINUED | OUTPATIENT
Start: 2022-04-23 | End: 2022-04-29 | Stop reason: HOSPADM

## 2022-04-23 RX ORDER — ALBUTEROL SULFATE 2.5 MG/3ML
2.5 SOLUTION RESPIRATORY (INHALATION) EVERY 6 HOURS PRN
Status: DISCONTINUED | OUTPATIENT
Start: 2022-04-23 | End: 2022-04-29 | Stop reason: HOSPADM

## 2022-04-23 RX ORDER — TORSEMIDE 20 MG/1
40 TABLET ORAL DAILY
Status: DISCONTINUED | OUTPATIENT
Start: 2022-04-23 | End: 2022-04-24

## 2022-04-23 RX ORDER — ALPRAZOLAM 0.5 MG/1
0.5 TABLET ORAL NIGHTLY PRN
Status: DISCONTINUED | OUTPATIENT
Start: 2022-04-23 | End: 2022-04-29 | Stop reason: HOSPADM

## 2022-04-23 RX ADMIN — LISINOPRIL 5 MG: 5 TABLET ORAL at 09:15

## 2022-04-23 RX ADMIN — HEPARIN SODIUM 5000 UNITS: 5000 INJECTION INTRAVENOUS; SUBCUTANEOUS at 22:20

## 2022-04-23 RX ADMIN — POTASSIUM CHLORIDE 40 MEQ: 750 CAPSULE, EXTENDED RELEASE ORAL at 17:15

## 2022-04-23 RX ADMIN — BUDESONIDE AND FORMOTEROL FUMARATE DIHYDRATE 2 PUFF: 160; 4.5 AEROSOL RESPIRATORY (INHALATION) at 20:19

## 2022-04-23 RX ADMIN — POTASSIUM CHLORIDE 40 MEQ: 750 CAPSULE, EXTENDED RELEASE ORAL at 09:12

## 2022-04-23 RX ADMIN — POTASSIUM CHLORIDE 40 MEQ: 750 CAPSULE, EXTENDED RELEASE ORAL at 13:25

## 2022-04-23 RX ADMIN — BISOPROLOL FUMARATE 10 MG: 5 TABLET, FILM COATED ORAL at 09:12

## 2022-04-23 RX ADMIN — BUDESONIDE AND FORMOTEROL FUMARATE DIHYDRATE 2 PUFF: 160; 4.5 AEROSOL RESPIRATORY (INHALATION) at 07:35

## 2022-04-23 RX ADMIN — HEPARIN SODIUM 5000 UNITS: 5000 INJECTION INTRAVENOUS; SUBCUTANEOUS at 15:41

## 2022-04-23 RX ADMIN — ALPRAZOLAM 0.5 MG: 0.5 TABLET ORAL at 22:27

## 2022-04-23 RX ADMIN — HEPARIN SODIUM 5000 UNITS: 5000 INJECTION INTRAVENOUS; SUBCUTANEOUS at 06:34

## 2022-04-23 RX ADMIN — ALPRAZOLAM 0.5 MG: 0.5 TABLET ORAL at 01:58

## 2022-04-23 RX ADMIN — LEVOTHYROXINE SODIUM 75 MCG: 0.07 TABLET ORAL at 06:34

## 2022-04-23 RX ADMIN — TIOTROPIUM BROMIDE INHALATION SPRAY 2 PUFF: 3.12 SPRAY, METERED RESPIRATORY (INHALATION) at 07:36

## 2022-04-23 RX ADMIN — SERTRALINE HYDROCHLORIDE 150 MG: 100 TABLET ORAL at 09:15

## 2022-04-23 RX ADMIN — TORSEMIDE 40 MG: 20 TABLET ORAL at 13:25

## 2022-04-24 ENCOUNTER — APPOINTMENT (OUTPATIENT)
Dept: GENERAL RADIOLOGY | Facility: HOSPITAL | Age: 82
End: 2022-04-24

## 2022-04-24 LAB
ANION GAP SERPL CALCULATED.3IONS-SCNC: 9 MMOL/L (ref 5–15)
BUN SERPL-MCNC: 27 MG/DL (ref 8–23)
BUN/CREAT SERPL: 32.9 (ref 7–25)
CALCIUM SPEC-SCNC: 9.3 MG/DL (ref 8.6–10.5)
CHLORIDE SERPL-SCNC: 99 MMOL/L (ref 98–107)
CO2 SERPL-SCNC: 30 MMOL/L (ref 22–29)
CREAT SERPL-MCNC: 0.82 MG/DL (ref 0.57–1)
DEPRECATED RDW RBC AUTO: 48.2 FL (ref 37–54)
EGFRCR SERPLBLD CKD-EPI 2021: 71.5 ML/MIN/1.73
ERYTHROCYTE [DISTWIDTH] IN BLOOD BY AUTOMATED COUNT: 14.1 % (ref 12.3–15.4)
GLUCOSE BLDC GLUCOMTR-MCNC: 102 MG/DL (ref 70–130)
GLUCOSE BLDC GLUCOMTR-MCNC: 107 MG/DL (ref 70–130)
GLUCOSE BLDC GLUCOMTR-MCNC: 115 MG/DL (ref 70–130)
GLUCOSE BLDC GLUCOMTR-MCNC: 99 MG/DL (ref 70–130)
GLUCOSE SERPL-MCNC: 139 MG/DL (ref 65–99)
HCT VFR BLD AUTO: 28 % (ref 34–46.6)
HGB BLD-MCNC: 8.7 G/DL (ref 12–15.9)
MAGNESIUM SERPL-MCNC: 2.1 MG/DL (ref 1.6–2.4)
MCH RBC QN AUTO: 29.5 PG (ref 26.6–33)
MCHC RBC AUTO-ENTMCNC: 31.1 G/DL (ref 31.5–35.7)
MCV RBC AUTO: 94.9 FL (ref 79–97)
PHOSPHATE SERPL-MCNC: 3.5 MG/DL (ref 2.5–4.5)
PLATELET # BLD AUTO: 229 10*3/MM3 (ref 140–450)
PMV BLD AUTO: 10.9 FL (ref 6–12)
POTASSIUM SERPL-SCNC: 4.4 MMOL/L (ref 3.5–5.2)
QT INTERVAL: 422 MS
QTC INTERVAL: 455 MS
RBC # BLD AUTO: 2.95 10*6/MM3 (ref 3.77–5.28)
SODIUM SERPL-SCNC: 138 MMOL/L (ref 136–145)
WBC NRBC COR # BLD: 7.42 10*3/MM3 (ref 3.4–10.8)

## 2022-04-24 PROCEDURE — 99232 SBSQ HOSP IP/OBS MODERATE 35: CPT | Performed by: INTERNAL MEDICINE

## 2022-04-24 PROCEDURE — 85027 COMPLETE CBC AUTOMATED: CPT | Performed by: INTERNAL MEDICINE

## 2022-04-24 PROCEDURE — 25010000002 FUROSEMIDE PER 20 MG: Performed by: INTERNAL MEDICINE

## 2022-04-24 PROCEDURE — 94660 CPAP INITIATION&MGMT: CPT

## 2022-04-24 PROCEDURE — 94799 UNLISTED PULMONARY SVC/PX: CPT

## 2022-04-24 PROCEDURE — 83735 ASSAY OF MAGNESIUM: CPT | Performed by: INTERNAL MEDICINE

## 2022-04-24 PROCEDURE — 84100 ASSAY OF PHOSPHORUS: CPT | Performed by: INTERNAL MEDICINE

## 2022-04-24 PROCEDURE — 25010000002 HEPARIN (PORCINE) PER 1000 UNITS: Performed by: INTERNAL MEDICINE

## 2022-04-24 PROCEDURE — 94761 N-INVAS EAR/PLS OXIMETRY MLT: CPT

## 2022-04-24 PROCEDURE — 82962 GLUCOSE BLOOD TEST: CPT

## 2022-04-24 PROCEDURE — 80048 BASIC METABOLIC PNL TOTAL CA: CPT | Performed by: INTERNAL MEDICINE

## 2022-04-24 PROCEDURE — 71045 X-RAY EXAM CHEST 1 VIEW: CPT

## 2022-04-24 RX ORDER — BISOPROLOL FUMARATE 5 MG/1
20 TABLET, FILM COATED ORAL
Status: DISCONTINUED | OUTPATIENT
Start: 2022-04-24 | End: 2022-04-24

## 2022-04-24 RX ORDER — BISOPROLOL FUMARATE 5 MG/1
15 TABLET, FILM COATED ORAL
Status: DISCONTINUED | OUTPATIENT
Start: 2022-04-24 | End: 2022-04-28

## 2022-04-24 RX ORDER — FUROSEMIDE 10 MG/ML
80 INJECTION INTRAMUSCULAR; INTRAVENOUS ONCE
Status: COMPLETED | OUTPATIENT
Start: 2022-04-24 | End: 2022-04-24

## 2022-04-24 RX ADMIN — HEPARIN SODIUM 5000 UNITS: 5000 INJECTION INTRAVENOUS; SUBCUTANEOUS at 21:37

## 2022-04-24 RX ADMIN — ALBUTEROL SULFATE 2.5 MG: 2.5 SOLUTION RESPIRATORY (INHALATION) at 03:30

## 2022-04-24 RX ADMIN — TIOTROPIUM BROMIDE INHALATION SPRAY 2 PUFF: 3.12 SPRAY, METERED RESPIRATORY (INHALATION) at 10:57

## 2022-04-24 RX ADMIN — BUDESONIDE AND FORMOTEROL FUMARATE DIHYDRATE 2 PUFF: 160; 4.5 AEROSOL RESPIRATORY (INHALATION) at 21:21

## 2022-04-24 RX ADMIN — BUDESONIDE AND FORMOTEROL FUMARATE DIHYDRATE 2 PUFF: 160; 4.5 AEROSOL RESPIRATORY (INHALATION) at 10:58

## 2022-04-24 RX ADMIN — LISINOPRIL 5 MG: 5 TABLET ORAL at 09:39

## 2022-04-24 RX ADMIN — BISOPROLOL FUMARATE 15 MG: 5 TABLET, FILM COATED ORAL at 09:39

## 2022-04-24 RX ADMIN — HEPARIN SODIUM 5000 UNITS: 5000 INJECTION INTRAVENOUS; SUBCUTANEOUS at 15:43

## 2022-04-24 RX ADMIN — FUROSEMIDE 80 MG: 10 INJECTION, SOLUTION INTRAMUSCULAR; INTRAVENOUS at 09:39

## 2022-04-24 RX ADMIN — HEPARIN SODIUM 5000 UNITS: 5000 INJECTION INTRAVENOUS; SUBCUTANEOUS at 05:42

## 2022-04-24 RX ADMIN — SERTRALINE HYDROCHLORIDE 150 MG: 100 TABLET ORAL at 09:39

## 2022-04-24 RX ADMIN — ALPRAZOLAM 0.5 MG: 0.5 TABLET ORAL at 21:37

## 2022-04-24 RX ADMIN — LEVOTHYROXINE SODIUM 75 MCG: 0.07 TABLET ORAL at 05:42

## 2022-04-25 LAB
ANION GAP SERPL CALCULATED.3IONS-SCNC: 9 MMOL/L (ref 5–15)
BUN SERPL-MCNC: 23 MG/DL (ref 8–23)
BUN/CREAT SERPL: 30.7 (ref 7–25)
CALCIUM SPEC-SCNC: 9.3 MG/DL (ref 8.6–10.5)
CHLORIDE SERPL-SCNC: 93 MMOL/L (ref 98–107)
CO2 SERPL-SCNC: 30 MMOL/L (ref 22–29)
CREAT SERPL-MCNC: 0.75 MG/DL (ref 0.57–1)
DEPRECATED RDW RBC AUTO: 46.7 FL (ref 37–54)
EGFRCR SERPLBLD CKD-EPI 2021: 79.6 ML/MIN/1.73
ERYTHROCYTE [DISTWIDTH] IN BLOOD BY AUTOMATED COUNT: 14.1 % (ref 12.3–15.4)
GLUCOSE BLDC GLUCOMTR-MCNC: 105 MG/DL (ref 70–130)
GLUCOSE BLDC GLUCOMTR-MCNC: 156 MG/DL (ref 70–130)
GLUCOSE SERPL-MCNC: 105 MG/DL (ref 65–99)
HCT VFR BLD AUTO: 22.9 % (ref 34–46.6)
HGB BLD-MCNC: 7.3 G/DL (ref 12–15.9)
MAGNESIUM SERPL-MCNC: 2.1 MG/DL (ref 1.6–2.4)
MCH RBC QN AUTO: 29.3 PG (ref 26.6–33)
MCHC RBC AUTO-ENTMCNC: 31.9 G/DL (ref 31.5–35.7)
MCV RBC AUTO: 92 FL (ref 79–97)
PHOSPHATE SERPL-MCNC: 3.6 MG/DL (ref 2.5–4.5)
PLATELET # BLD AUTO: 193 10*3/MM3 (ref 140–450)
PMV BLD AUTO: 11 FL (ref 6–12)
POTASSIUM SERPL-SCNC: 3.8 MMOL/L (ref 3.5–5.2)
RBC # BLD AUTO: 2.49 10*6/MM3 (ref 3.77–5.28)
SODIUM SERPL-SCNC: 132 MMOL/L (ref 136–145)
WBC NRBC COR # BLD: 7.42 10*3/MM3 (ref 3.4–10.8)

## 2022-04-25 PROCEDURE — 83735 ASSAY OF MAGNESIUM: CPT | Performed by: INTERNAL MEDICINE

## 2022-04-25 PROCEDURE — 80048 BASIC METABOLIC PNL TOTAL CA: CPT | Performed by: INTERNAL MEDICINE

## 2022-04-25 PROCEDURE — 84100 ASSAY OF PHOSPHORUS: CPT | Performed by: INTERNAL MEDICINE

## 2022-04-25 PROCEDURE — 85027 COMPLETE CBC AUTOMATED: CPT | Performed by: INTERNAL MEDICINE

## 2022-04-25 PROCEDURE — 94799 UNLISTED PULMONARY SVC/PX: CPT

## 2022-04-25 PROCEDURE — 99232 SBSQ HOSP IP/OBS MODERATE 35: CPT | Performed by: NURSE PRACTITIONER

## 2022-04-25 PROCEDURE — 82962 GLUCOSE BLOOD TEST: CPT

## 2022-04-25 PROCEDURE — 25010000002 HEPARIN (PORCINE) PER 1000 UNITS: Performed by: INTERNAL MEDICINE

## 2022-04-25 PROCEDURE — 99232 SBSQ HOSP IP/OBS MODERATE 35: CPT | Performed by: INTERNAL MEDICINE

## 2022-04-25 PROCEDURE — 94664 DEMO&/EVAL PT USE INHALER: CPT

## 2022-04-25 RX ORDER — PANTOPRAZOLE SODIUM 40 MG/1
40 TABLET, DELAYED RELEASE ORAL
Status: DISCONTINUED | OUTPATIENT
Start: 2022-04-26 | End: 2022-04-29 | Stop reason: HOSPADM

## 2022-04-25 RX ADMIN — LISINOPRIL 5 MG: 5 TABLET ORAL at 08:19

## 2022-04-25 RX ADMIN — SERTRALINE HYDROCHLORIDE 150 MG: 100 TABLET ORAL at 08:19

## 2022-04-25 RX ADMIN — ALPRAZOLAM 0.5 MG: 0.5 TABLET ORAL at 21:48

## 2022-04-25 RX ADMIN — LEVOTHYROXINE SODIUM 75 MCG: 0.07 TABLET ORAL at 05:06

## 2022-04-25 RX ADMIN — HEPARIN SODIUM 5000 UNITS: 5000 INJECTION INTRAVENOUS; SUBCUTANEOUS at 16:16

## 2022-04-25 RX ADMIN — HEPARIN SODIUM 5000 UNITS: 5000 INJECTION INTRAVENOUS; SUBCUTANEOUS at 05:06

## 2022-04-25 RX ADMIN — BUDESONIDE AND FORMOTEROL FUMARATE DIHYDRATE 2 PUFF: 160; 4.5 AEROSOL RESPIRATORY (INHALATION) at 20:11

## 2022-04-25 RX ADMIN — HEPARIN SODIUM 5000 UNITS: 5000 INJECTION INTRAVENOUS; SUBCUTANEOUS at 21:48

## 2022-04-25 RX ADMIN — BUDESONIDE AND FORMOTEROL FUMARATE DIHYDRATE 2 PUFF: 160; 4.5 AEROSOL RESPIRATORY (INHALATION) at 07:17

## 2022-04-25 RX ADMIN — TIOTROPIUM BROMIDE INHALATION SPRAY 2 PUFF: 3.12 SPRAY, METERED RESPIRATORY (INHALATION) at 07:17

## 2022-04-25 RX ADMIN — BISOPROLOL FUMARATE 15 MG: 5 TABLET, FILM COATED ORAL at 08:19

## 2022-04-26 ENCOUNTER — APPOINTMENT (OUTPATIENT)
Dept: GENERAL RADIOLOGY | Facility: HOSPITAL | Age: 82
End: 2022-04-26

## 2022-04-26 LAB
ABO GROUP BLD: NORMAL
ANION GAP SERPL CALCULATED.3IONS-SCNC: 8 MMOL/L (ref 5–15)
BASOPHILS # BLD AUTO: 0.04 10*3/MM3 (ref 0–0.2)
BASOPHILS NFR BLD AUTO: 0.6 % (ref 0–1.5)
BLD GP AB SCN SERPL QL: NEGATIVE
BUN SERPL-MCNC: 27 MG/DL (ref 8–23)
BUN/CREAT SERPL: 37.5 (ref 7–25)
CALCIUM SPEC-SCNC: 9.4 MG/DL (ref 8.6–10.5)
CHLORIDE SERPL-SCNC: 97 MMOL/L (ref 98–107)
CO2 SERPL-SCNC: 30 MMOL/L (ref 22–29)
CREAT SERPL-MCNC: 0.72 MG/DL (ref 0.57–1)
DEPRECATED RDW RBC AUTO: 47.3 FL (ref 37–54)
EGFRCR SERPLBLD CKD-EPI 2021: 83.6 ML/MIN/1.73
EOSINOPHIL # BLD AUTO: 0.09 10*3/MM3 (ref 0–0.4)
EOSINOPHIL NFR BLD AUTO: 1.3 % (ref 0.3–6.2)
ERYTHROCYTE [DISTWIDTH] IN BLOOD BY AUTOMATED COUNT: 14.1 % (ref 12.3–15.4)
GLUCOSE SERPL-MCNC: 94 MG/DL (ref 65–99)
HCT VFR BLD AUTO: 24.6 % (ref 34–46.6)
HGB BLD-MCNC: 7.9 G/DL (ref 12–15.9)
IMM GRANULOCYTES # BLD AUTO: 0.02 10*3/MM3 (ref 0–0.05)
IMM GRANULOCYTES NFR BLD AUTO: 0.3 % (ref 0–0.5)
LYMPHOCYTES # BLD AUTO: 1.33 10*3/MM3 (ref 0.7–3.1)
LYMPHOCYTES NFR BLD AUTO: 19.6 % (ref 19.6–45.3)
MCH RBC QN AUTO: 29.2 PG (ref 26.6–33)
MCHC RBC AUTO-ENTMCNC: 32.1 G/DL (ref 31.5–35.7)
MCV RBC AUTO: 90.8 FL (ref 79–97)
MONOCYTES # BLD AUTO: 0.7 10*3/MM3 (ref 0.1–0.9)
MONOCYTES NFR BLD AUTO: 10.3 % (ref 5–12)
NEUTROPHILS NFR BLD AUTO: 4.61 10*3/MM3 (ref 1.7–7)
NEUTROPHILS NFR BLD AUTO: 67.9 % (ref 42.7–76)
NRBC BLD AUTO-RTO: 0 /100 WBC (ref 0–0.2)
PLATELET # BLD AUTO: 217 10*3/MM3 (ref 140–450)
PMV BLD AUTO: 11.3 FL (ref 6–12)
POTASSIUM SERPL-SCNC: 3.7 MMOL/L (ref 3.5–5.2)
RBC # BLD AUTO: 2.71 10*6/MM3 (ref 3.77–5.28)
RH BLD: POSITIVE
SODIUM SERPL-SCNC: 135 MMOL/L (ref 136–145)
T&S EXPIRATION DATE: NORMAL
WBC NRBC COR # BLD: 6.79 10*3/MM3 (ref 3.4–10.8)

## 2022-04-26 PROCEDURE — 25010000002 FUROSEMIDE PER 20 MG: Performed by: NURSE PRACTITIONER

## 2022-04-26 PROCEDURE — 86923 COMPATIBILITY TEST ELECTRIC: CPT

## 2022-04-26 PROCEDURE — 25010000002 HEPARIN (PORCINE) PER 1000 UNITS: Performed by: INTERNAL MEDICINE

## 2022-04-26 PROCEDURE — 94799 UNLISTED PULMONARY SVC/PX: CPT

## 2022-04-26 PROCEDURE — 99233 SBSQ HOSP IP/OBS HIGH 50: CPT | Performed by: NURSE PRACTITIONER

## 2022-04-26 PROCEDURE — 97161 PT EVAL LOW COMPLEX 20 MIN: CPT

## 2022-04-26 PROCEDURE — 97116 GAIT TRAINING THERAPY: CPT

## 2022-04-26 PROCEDURE — 86900 BLOOD TYPING SEROLOGIC ABO: CPT | Performed by: INTERNAL MEDICINE

## 2022-04-26 PROCEDURE — 85025 COMPLETE CBC W/AUTO DIFF WBC: CPT | Performed by: NURSE PRACTITIONER

## 2022-04-26 PROCEDURE — 99232 SBSQ HOSP IP/OBS MODERATE 35: CPT | Performed by: INTERNAL MEDICINE

## 2022-04-26 PROCEDURE — 86850 RBC ANTIBODY SCREEN: CPT | Performed by: INTERNAL MEDICINE

## 2022-04-26 PROCEDURE — 80048 BASIC METABOLIC PNL TOTAL CA: CPT | Performed by: NURSE PRACTITIONER

## 2022-04-26 PROCEDURE — 86901 BLOOD TYPING SEROLOGIC RH(D): CPT | Performed by: INTERNAL MEDICINE

## 2022-04-26 PROCEDURE — 97165 OT EVAL LOW COMPLEX 30 MIN: CPT

## 2022-04-26 PROCEDURE — 71045 X-RAY EXAM CHEST 1 VIEW: CPT

## 2022-04-26 RX ORDER — FUROSEMIDE 10 MG/ML
40 INJECTION INTRAMUSCULAR; INTRAVENOUS ONCE
Status: DISCONTINUED | OUTPATIENT
Start: 2022-04-26 | End: 2022-04-26

## 2022-04-26 RX ORDER — FUROSEMIDE 10 MG/ML
40 INJECTION INTRAMUSCULAR; INTRAVENOUS ONCE
Status: COMPLETED | OUTPATIENT
Start: 2022-04-26 | End: 2022-04-26

## 2022-04-26 RX ADMIN — FUROSEMIDE 40 MG: 10 INJECTION, SOLUTION INTRAMUSCULAR; INTRAVENOUS at 10:22

## 2022-04-26 RX ADMIN — BUDESONIDE AND FORMOTEROL FUMARATE DIHYDRATE 2 PUFF: 160; 4.5 AEROSOL RESPIRATORY (INHALATION) at 08:20

## 2022-04-26 RX ADMIN — TIOTROPIUM BROMIDE INHALATION SPRAY 2 PUFF: 3.12 SPRAY, METERED RESPIRATORY (INHALATION) at 08:20

## 2022-04-26 RX ADMIN — SERTRALINE HYDROCHLORIDE 150 MG: 100 TABLET ORAL at 08:16

## 2022-04-26 RX ADMIN — PANTOPRAZOLE SODIUM 40 MG: 40 TABLET, DELAYED RELEASE ORAL at 08:15

## 2022-04-26 RX ADMIN — BISOPROLOL FUMARATE 15 MG: 5 TABLET, FILM COATED ORAL at 08:16

## 2022-04-26 RX ADMIN — HEPARIN SODIUM 5000 UNITS: 5000 INJECTION INTRAVENOUS; SUBCUTANEOUS at 21:32

## 2022-04-26 RX ADMIN — ALPRAZOLAM 0.5 MG: 0.5 TABLET ORAL at 21:32

## 2022-04-26 RX ADMIN — BUDESONIDE AND FORMOTEROL FUMARATE DIHYDRATE 2 PUFF: 160; 4.5 AEROSOL RESPIRATORY (INHALATION) at 19:37

## 2022-04-26 RX ADMIN — HEPARIN SODIUM 5000 UNITS: 5000 INJECTION INTRAVENOUS; SUBCUTANEOUS at 13:52

## 2022-04-26 RX ADMIN — LEVOTHYROXINE SODIUM 75 MCG: 0.07 TABLET ORAL at 05:27

## 2022-04-26 RX ADMIN — LISINOPRIL 5 MG: 5 TABLET ORAL at 08:16

## 2022-04-26 RX ADMIN — HEPARIN SODIUM 5000 UNITS: 5000 INJECTION INTRAVENOUS; SUBCUTANEOUS at 05:27

## 2022-04-27 ENCOUNTER — APPOINTMENT (OUTPATIENT)
Dept: CT IMAGING | Facility: HOSPITAL | Age: 82
End: 2022-04-27

## 2022-04-27 ENCOUNTER — APPOINTMENT (OUTPATIENT)
Dept: GENERAL RADIOLOGY | Facility: HOSPITAL | Age: 82
End: 2022-04-27

## 2022-04-27 LAB
ANION GAP SERPL CALCULATED.3IONS-SCNC: 11 MMOL/L (ref 5–15)
BUN SERPL-MCNC: 26 MG/DL (ref 8–23)
BUN/CREAT SERPL: 35.6 (ref 7–25)
CALCIUM SPEC-SCNC: 9.3 MG/DL (ref 8.6–10.5)
CHLORIDE SERPL-SCNC: 98 MMOL/L (ref 98–107)
CO2 SERPL-SCNC: 27 MMOL/L (ref 22–29)
CREAT SERPL-MCNC: 0.73 MG/DL (ref 0.57–1)
DEPRECATED RDW RBC AUTO: 47.2 FL (ref 37–54)
EGFRCR SERPLBLD CKD-EPI 2021: 82.2 ML/MIN/1.73
ERYTHROCYTE [DISTWIDTH] IN BLOOD BY AUTOMATED COUNT: 14.1 % (ref 12.3–15.4)
GLUCOSE SERPL-MCNC: 84 MG/DL (ref 65–99)
HCT VFR BLD AUTO: 26.8 % (ref 34–46.6)
HGB BLD-MCNC: 8.5 G/DL (ref 12–15.9)
MAGNESIUM SERPL-MCNC: 2.6 MG/DL (ref 1.6–2.4)
MCH RBC QN AUTO: 28.9 PG (ref 26.6–33)
MCHC RBC AUTO-ENTMCNC: 31.7 G/DL (ref 31.5–35.7)
MCV RBC AUTO: 91.2 FL (ref 79–97)
PHOSPHATE SERPL-MCNC: 4.1 MG/DL (ref 2.5–4.5)
PLATELET # BLD AUTO: 211 10*3/MM3 (ref 140–450)
PMV BLD AUTO: 11.4 FL (ref 6–12)
POTASSIUM SERPL-SCNC: 3.9 MMOL/L (ref 3.5–5.2)
RBC # BLD AUTO: 2.94 10*6/MM3 (ref 3.77–5.28)
SODIUM SERPL-SCNC: 136 MMOL/L (ref 136–145)
WBC NRBC COR # BLD: 8.81 10*3/MM3 (ref 3.4–10.8)

## 2022-04-27 PROCEDURE — 85027 COMPLETE CBC AUTOMATED: CPT | Performed by: NURSE PRACTITIONER

## 2022-04-27 PROCEDURE — 99233 SBSQ HOSP IP/OBS HIGH 50: CPT | Performed by: HOSPITALIST

## 2022-04-27 PROCEDURE — 94799 UNLISTED PULMONARY SVC/PX: CPT

## 2022-04-27 PROCEDURE — 94664 DEMO&/EVAL PT USE INHALER: CPT

## 2022-04-27 PROCEDURE — 83735 ASSAY OF MAGNESIUM: CPT | Performed by: NURSE PRACTITIONER

## 2022-04-27 PROCEDURE — 80048 BASIC METABOLIC PNL TOTAL CA: CPT | Performed by: NURSE PRACTITIONER

## 2022-04-27 PROCEDURE — 71250 CT THORAX DX C-: CPT

## 2022-04-27 PROCEDURE — 84100 ASSAY OF PHOSPHORUS: CPT | Performed by: NURSE PRACTITIONER

## 2022-04-27 PROCEDURE — 71045 X-RAY EXAM CHEST 1 VIEW: CPT

## 2022-04-27 PROCEDURE — 25010000002 HEPARIN (PORCINE) PER 1000 UNITS: Performed by: INTERNAL MEDICINE

## 2022-04-27 PROCEDURE — 94660 CPAP INITIATION&MGMT: CPT

## 2022-04-27 RX ADMIN — LISINOPRIL 5 MG: 5 TABLET ORAL at 09:38

## 2022-04-27 RX ADMIN — HEPARIN SODIUM 5000 UNITS: 5000 INJECTION INTRAVENOUS; SUBCUTANEOUS at 05:48

## 2022-04-27 RX ADMIN — HEPARIN SODIUM 5000 UNITS: 5000 INJECTION INTRAVENOUS; SUBCUTANEOUS at 21:27

## 2022-04-27 RX ADMIN — ALPRAZOLAM 0.5 MG: 0.5 TABLET ORAL at 21:27

## 2022-04-27 RX ADMIN — ALBUTEROL SULFATE 2.5 MG: 2.5 SOLUTION RESPIRATORY (INHALATION) at 19:16

## 2022-04-27 RX ADMIN — Medication 10 ML: at 09:38

## 2022-04-27 RX ADMIN — BUDESONIDE AND FORMOTEROL FUMARATE DIHYDRATE 2 PUFF: 160; 4.5 AEROSOL RESPIRATORY (INHALATION) at 08:34

## 2022-04-27 RX ADMIN — BISOPROLOL FUMARATE 15 MG: 5 TABLET, FILM COATED ORAL at 09:38

## 2022-04-27 RX ADMIN — SERTRALINE HYDROCHLORIDE 150 MG: 100 TABLET ORAL at 09:38

## 2022-04-27 RX ADMIN — BUDESONIDE AND FORMOTEROL FUMARATE DIHYDRATE 2 PUFF: 160; 4.5 AEROSOL RESPIRATORY (INHALATION) at 19:04

## 2022-04-27 RX ADMIN — PANTOPRAZOLE SODIUM 40 MG: 40 TABLET, DELAYED RELEASE ORAL at 09:38

## 2022-04-27 RX ADMIN — HEPARIN SODIUM 5000 UNITS: 5000 INJECTION INTRAVENOUS; SUBCUTANEOUS at 14:48

## 2022-04-27 RX ADMIN — LEVOTHYROXINE SODIUM 75 MCG: 0.07 TABLET ORAL at 05:48

## 2022-04-28 PROCEDURE — 97530 THERAPEUTIC ACTIVITIES: CPT

## 2022-04-28 PROCEDURE — 25010000002 HEPARIN (PORCINE) PER 1000 UNITS: Performed by: INTERNAL MEDICINE

## 2022-04-28 PROCEDURE — 94799 UNLISTED PULMONARY SVC/PX: CPT

## 2022-04-28 PROCEDURE — 97110 THERAPEUTIC EXERCISES: CPT

## 2022-04-28 PROCEDURE — 94660 CPAP INITIATION&MGMT: CPT

## 2022-04-28 PROCEDURE — 99232 SBSQ HOSP IP/OBS MODERATE 35: CPT | Performed by: NURSE PRACTITIONER

## 2022-04-28 PROCEDURE — 99232 SBSQ HOSP IP/OBS MODERATE 35: CPT | Performed by: HOSPITALIST

## 2022-04-28 RX ORDER — TORSEMIDE 20 MG/1
40 TABLET ORAL DAILY
Status: DISCONTINUED | OUTPATIENT
Start: 2022-04-28 | End: 2022-04-29 | Stop reason: HOSPADM

## 2022-04-28 RX ORDER — BISOPROLOL FUMARATE 5 MG/1
10 TABLET, FILM COATED ORAL
Status: DISCONTINUED | OUTPATIENT
Start: 2022-04-29 | End: 2022-04-29 | Stop reason: HOSPADM

## 2022-04-28 RX ADMIN — ALPRAZOLAM 0.5 MG: 0.5 TABLET ORAL at 20:40

## 2022-04-28 RX ADMIN — SERTRALINE HYDROCHLORIDE 150 MG: 100 TABLET ORAL at 08:55

## 2022-04-28 RX ADMIN — PANTOPRAZOLE SODIUM 40 MG: 40 TABLET, DELAYED RELEASE ORAL at 08:56

## 2022-04-28 RX ADMIN — TIOTROPIUM BROMIDE INHALATION SPRAY 2 PUFF: 3.12 SPRAY, METERED RESPIRATORY (INHALATION) at 09:23

## 2022-04-28 RX ADMIN — BISOPROLOL FUMARATE 15 MG: 5 TABLET, FILM COATED ORAL at 08:55

## 2022-04-28 RX ADMIN — HEPARIN SODIUM 5000 UNITS: 5000 INJECTION INTRAVENOUS; SUBCUTANEOUS at 05:20

## 2022-04-28 RX ADMIN — TORSEMIDE 40 MG: 20 TABLET ORAL at 15:38

## 2022-04-28 RX ADMIN — HEPARIN SODIUM 5000 UNITS: 5000 INJECTION INTRAVENOUS; SUBCUTANEOUS at 15:38

## 2022-04-28 RX ADMIN — BUDESONIDE AND FORMOTEROL FUMARATE DIHYDRATE 2 PUFF: 160; 4.5 AEROSOL RESPIRATORY (INHALATION) at 20:24

## 2022-04-28 RX ADMIN — LISINOPRIL 5 MG: 5 TABLET ORAL at 08:56

## 2022-04-28 RX ADMIN — HEPARIN SODIUM 5000 UNITS: 5000 INJECTION INTRAVENOUS; SUBCUTANEOUS at 20:36

## 2022-04-28 RX ADMIN — Medication 10 ML: at 08:56

## 2022-04-28 RX ADMIN — LEVOTHYROXINE SODIUM 75 MCG: 0.07 TABLET ORAL at 05:20

## 2022-04-28 RX ADMIN — BUDESONIDE AND FORMOTEROL FUMARATE DIHYDRATE 2 PUFF: 160; 4.5 AEROSOL RESPIRATORY (INHALATION) at 09:27

## 2022-04-29 VITALS
BODY MASS INDEX: 18.11 KG/M2 | OXYGEN SATURATION: 100 % | SYSTOLIC BLOOD PRESSURE: 147 MMHG | TEMPERATURE: 98.3 F | HEART RATE: 69 BPM | RESPIRATION RATE: 18 BRPM | DIASTOLIC BLOOD PRESSURE: 64 MMHG | HEIGHT: 66 IN | WEIGHT: 112.7 LBS

## 2022-04-29 LAB
ANION GAP SERPL CALCULATED.3IONS-SCNC: 12 MMOL/L (ref 5–15)
BUN SERPL-MCNC: 35 MG/DL (ref 8–23)
BUN/CREAT SERPL: 43.2 (ref 7–25)
CALCIUM SPEC-SCNC: 9.4 MG/DL (ref 8.6–10.5)
CHLORIDE SERPL-SCNC: 99 MMOL/L (ref 98–107)
CO2 SERPL-SCNC: 27 MMOL/L (ref 22–29)
CREAT SERPL-MCNC: 0.81 MG/DL (ref 0.57–1)
DEPRECATED RDW RBC AUTO: 48 FL (ref 37–54)
EGFRCR SERPLBLD CKD-EPI 2021: 72.6 ML/MIN/1.73
ERYTHROCYTE [DISTWIDTH] IN BLOOD BY AUTOMATED COUNT: 13.9 % (ref 12.3–15.4)
GLUCOSE SERPL-MCNC: 110 MG/DL (ref 65–99)
HCT VFR BLD AUTO: 25.8 % (ref 34–46.6)
HGB BLD-MCNC: 7.9 G/DL (ref 12–15.9)
MCH RBC QN AUTO: 29.3 PG (ref 26.6–33)
MCHC RBC AUTO-ENTMCNC: 30.6 G/DL (ref 31.5–35.7)
MCV RBC AUTO: 95.6 FL (ref 79–97)
PLATELET # BLD AUTO: 214 10*3/MM3 (ref 140–450)
PMV BLD AUTO: 11.1 FL (ref 6–12)
POTASSIUM SERPL-SCNC: 3 MMOL/L (ref 3.5–5.2)
RBC # BLD AUTO: 2.7 10*6/MM3 (ref 3.77–5.28)
SODIUM SERPL-SCNC: 138 MMOL/L (ref 136–145)
WBC NRBC COR # BLD: 6.09 10*3/MM3 (ref 3.4–10.8)

## 2022-04-29 PROCEDURE — 25010000002 HEPARIN (PORCINE) PER 1000 UNITS: Performed by: INTERNAL MEDICINE

## 2022-04-29 PROCEDURE — 99231 SBSQ HOSP IP/OBS SF/LOW 25: CPT | Performed by: INTERNAL MEDICINE

## 2022-04-29 PROCEDURE — 94799 UNLISTED PULMONARY SVC/PX: CPT

## 2022-04-29 PROCEDURE — 80048 BASIC METABOLIC PNL TOTAL CA: CPT | Performed by: NURSE PRACTITIONER

## 2022-04-29 PROCEDURE — 94664 DEMO&/EVAL PT USE INHALER: CPT

## 2022-04-29 PROCEDURE — 85027 COMPLETE CBC AUTOMATED: CPT | Performed by: NURSE PRACTITIONER

## 2022-04-29 PROCEDURE — 99239 HOSP IP/OBS DSCHRG MGMT >30: CPT | Performed by: INTERNAL MEDICINE

## 2022-04-29 PROCEDURE — 94660 CPAP INITIATION&MGMT: CPT

## 2022-04-29 RX ORDER — BUDESONIDE AND FORMOTEROL FUMARATE DIHYDRATE 160; 4.5 UG/1; UG/1
2 AEROSOL RESPIRATORY (INHALATION)
Qty: 1 EACH | Refills: 0 | Status: SHIPPED | OUTPATIENT
Start: 2022-04-29

## 2022-04-29 RX ORDER — LISINOPRIL 10 MG/1
10 TABLET ORAL
Qty: 30 TABLET | Refills: 0 | Status: SHIPPED | OUTPATIENT
Start: 2022-04-30

## 2022-04-29 RX ORDER — LISINOPRIL 10 MG/1
10 TABLET ORAL
Status: DISCONTINUED | OUTPATIENT
Start: 2022-04-30 | End: 2022-04-29 | Stop reason: HOSPADM

## 2022-04-29 RX ADMIN — POTASSIUM CHLORIDE 40 MEQ: 750 CAPSULE, EXTENDED RELEASE ORAL at 13:20

## 2022-04-29 RX ADMIN — TORSEMIDE 40 MG: 20 TABLET ORAL at 08:51

## 2022-04-29 RX ADMIN — HEPARIN SODIUM 5000 UNITS: 5000 INJECTION INTRAVENOUS; SUBCUTANEOUS at 05:31

## 2022-04-29 RX ADMIN — TIOTROPIUM BROMIDE INHALATION SPRAY 2 PUFF: 3.12 SPRAY, METERED RESPIRATORY (INHALATION) at 08:07

## 2022-04-29 RX ADMIN — BISOPROLOL FUMARATE 10 MG: 5 TABLET, FILM COATED ORAL at 08:51

## 2022-04-29 RX ADMIN — BUDESONIDE AND FORMOTEROL FUMARATE DIHYDRATE 2 PUFF: 160; 4.5 AEROSOL RESPIRATORY (INHALATION) at 08:07

## 2022-04-29 RX ADMIN — BUDESONIDE AND FORMOTEROL FUMARATE DIHYDRATE 2 PUFF: 160; 4.5 AEROSOL RESPIRATORY (INHALATION) at 18:53

## 2022-04-29 RX ADMIN — LEVOTHYROXINE SODIUM 75 MCG: 0.07 TABLET ORAL at 05:31

## 2022-04-29 RX ADMIN — SERTRALINE HYDROCHLORIDE 150 MG: 100 TABLET ORAL at 08:51

## 2022-04-29 RX ADMIN — PANTOPRAZOLE SODIUM 40 MG: 40 TABLET, DELAYED RELEASE ORAL at 08:51

## 2022-04-29 RX ADMIN — LISINOPRIL 5 MG: 5 TABLET ORAL at 08:51

## 2022-04-29 RX ADMIN — HEPARIN SODIUM 5000 UNITS: 5000 INJECTION INTRAVENOUS; SUBCUTANEOUS at 13:19

## 2022-04-29 RX ADMIN — POTASSIUM CHLORIDE 40 MEQ: 750 CAPSULE, EXTENDED RELEASE ORAL at 17:53

## 2022-04-30 ENCOUNTER — READMISSION MANAGEMENT (OUTPATIENT)
Dept: CALL CENTER | Facility: HOSPITAL | Age: 82
End: 2022-04-30

## 2022-04-30 NOTE — OUTREACH NOTE
Prep Survey    Flowsheet Row Responses   [facility] facility patient discharged from? [place]   Is LACE score < 7 ? No   Emergency Room discharge w/ pulse ox? No   Eligibility Readm Mgmt   Discharge diagnosis Respiratory Failure Type    Does the patient have one of the following disease processes/diagnoses(primary or secondary)? Other   Does the patient have Home health ordered? Yes   What is the Home health agency?  [facility]   Is there a DME ordered? No   Prep survey completed? Yes          SHAMEKA STEWARD - Registered Nurse

## 2022-05-04 ENCOUNTER — READMISSION MANAGEMENT (OUTPATIENT)
Dept: CALL CENTER | Facility: HOSPITAL | Age: 82
End: 2022-05-04

## 2022-05-04 NOTE — OUTREACH NOTE
Medical Week 1 Survey    Flowsheet Row Responses   [facility] patient discharged from? [place]   Does the patient have one of the following disease processes/diagnoses(primary or secondary)? Other   Week 1 attempt successful? Yes   Call start time 1434   Call end time 1437   Discharge diagnosis Respiratory Failure Type    Is patient permission given to speak with other caregiver? Yes   List who call center can speak with IESHA   Meds reviewed with patient/caregiver? Yes   Is the patient having any side effects they believe may be caused by any medication additions or changes? No   Does the patient have all medications ordered at discharge? No   Nursing Interventions No intervention needed   Prescription comments Pt reports Symbicort was $400, she could not afford that cost. PCP gave her a sample of a different inhaler to try.   Is the patient taking all medications as directed (includes completed medication regime)? No   Does the patient have a primary care provider?  Yes   Does the patient have an appointment with their PCP within 7 days of discharge? Yes   Has the patient kept scheduled appointments due by today? N/A   What is the Home health agency?  [facility]   Has home health visited the patient within 72 hours of discharge? Yes   DME comments Wearing 3L O2 at night wearing prn during day. She has not checked pulse ox since DC   Comments Pt feels improved.    Did the patient receive a copy of their discharge instructions? Yes   Nursing interventions Reviewed instructions with patient   What is the patient's perception of their health status since discharge? Improving   Is the patient/caregiver able to teach back the hierarchy of who to call/visit for symptoms/problems? PCP, Specialist, Home health nurse, Urgent Care, ED, 911 Yes   If the patient is a current smoker, are they able to teach back resources for cessation? Not a smoker   Week 1 call completed? Yes          GIOVANNI Ridley  Nurse

## 2022-05-11 ENCOUNTER — READMISSION MANAGEMENT (OUTPATIENT)
Dept: CALL CENTER | Facility: HOSPITAL | Age: 82
End: 2022-05-11

## 2022-05-11 NOTE — OUTREACH NOTE
Medical Week 2 Survey    Flowsheet Row Responses   [facility] patient discharged from? [place]   Does the patient have one of the following disease processes/diagnoses(primary or secondary)? Other   Week 2 attempt successful? Yes   Call start time 1129   Discharge diagnosis Respiratory Failure Type    Call end time 1132   Is patient permission given to speak with other caregiver? Yes   List who call center can speak with Niece   Person spoke with today (if not patient) and relationship Niece   Meds reviewed with patient/caregiver? Yes   Is the patient having any side effects they believe may be caused by any medication additions or changes? No   Does the patient have all medications ordered at discharge? No   What is keeping the patient from filling the prescriptions? Financial   Nursing Interventions No intervention needed   Prescription comments Patient was prescribed Anoro but her cost was 400.00. different inhaler given by PCP   Is the patient taking all medications as directed (includes completed medication regime)? No   Does the patient have a primary care provider?  Yes   Does the patient have an appointment with their PCP within 7 days of discharge? Yes   Comments regarding PCP Patient has seen PCP since discharge.    Has the patient kept scheduled appointments due by today? Yes   What is the Home health agency?  [facility]   Has home health visited the patient within 72 hours of discharge? Yes   Psychosocial issues? No   Did the patient receive a copy of their discharge instructions? Yes   Nursing interventions Reviewed instructions with patient   What is the patient's perception of their health status since discharge? Improving   Is the patient/caregiver able to teach back signs and symptoms related to disease process for when to call PCP? Yes   Is the patient/caregiver able to teach back signs and symptoms related to disease process for when to call 911? Yes   Is the  patient/caregiver able to teach back the hierarchy of who to call/visit for symptoms/problems? PCP, Specialist, Home health nurse, Urgent Care, ED, 911 Yes   If the patient is a current smoker, are they able to teach back resources for cessation? Not a smoker   Week 2 Call Completed? Yes          RYLAND STEWARD - Registered Nurse

## 2022-05-20 ENCOUNTER — READMISSION MANAGEMENT (OUTPATIENT)
Dept: CALL CENTER | Facility: HOSPITAL | Age: 82
End: 2022-05-20

## 2022-05-20 NOTE — OUTREACH NOTE
Medical Week 3 Survey    Flowsheet Row Responses   [facility] patient discharged from? [place]   Does the patient have one of the following disease processes/diagnoses(primary or secondary)? Other   Week 3 attempt successful? Yes   Call start time 1457   Call end time 1459   Discharge diagnosis Respiratory Failure Type    Person spoke with today (if not patient) and relationship Niece   Is the patient taking all medications as directed (includes completed medication regime)? Yes   Has the patient kept scheduled appointments due by today? Yes   What is the Home health agency?  [facility]   DME comments Wearing 4L O2 through mask, wearing prn during day mostly at night. 98% @4L    Psychosocial issues? No   Comments Pt still has SOA but it is her baseline.    What is the patient's perception of their health status since discharge? Returned to baseline/stable   Is the patient/caregiver able to teach back the hierarchy of who to call/visit for symptoms/problems? PCP, Specialist, Home health nurse, Urgent Care, ED, 911 Yes   Week 3 Call Completed? Yes   Is the patient interested in additional calls from an ambulatory ?  NOTE:  applies to high risk patients requiring additional follow-up. No   Revoked No further contact(revokes)-requires comment   Graduated/Revoked comments baseline          GIOVANNI BRANTLEY - Registered Nurse

## (undated) DEVICE — HANDPIECE SET WITH HIGH FLOW TIP AND SUCTION TUBE: Brand: INTERPULSE

## (undated) DEVICE — CONTN GRAD MEAS TRIANG 32OZ BLK

## (undated) DEVICE — SST TWIST DRILL, STANDARD, 2.4MM DIA. X 127MM: Brand: MICROAIRE®

## (undated) DEVICE — TBG PENCL TELESCP MEGADYNE SMOKE EVAC 10FT

## (undated) DEVICE — TUBING, SUCTION, 1/4" X 10', STRAIGHT: Brand: MEDLINE

## (undated) DEVICE — ANTIBACTERIAL UNDYED BRAIDED (POLYGLACTIN 910), SYNTHETIC ABSORBABLE SUTURE: Brand: COATED VICRYL

## (undated) DEVICE — LUBE GEL ENDOGLIDE 1.1OZ

## (undated) DEVICE — GLV SURG SIGNATURE ESSENTIAL PF LTX SZ8.5

## (undated) DEVICE — HDRST POSTIN FM CRDL TRACH SLOT 9X8X4IN

## (undated) DEVICE — SPNG VERSALON 4X4 4PLY NONSTRL LF BG/200

## (undated) DEVICE — GLV SURG PREMIERPRO GAMMEX NEOPRN PF SZ8 GRN

## (undated) DEVICE — PEG PAD FOR SURG DISP

## (undated) DEVICE — THE BITE BLOCK MAXI, LATEX FREE STRAP IS USED TO PROTECT THE ENDOSCOPE INSERTION TUBE FROM BEING BITTEN BY THE PATIENT.

## (undated) DEVICE — SINGLE-USE BIOPSY FORCEPS: Brand: RADIAL JAW 4

## (undated) DEVICE — SUT MONOCRYL PLS ANTIB UND 3/0  PS1 27IN

## (undated) DEVICE — SOL IRR H2O BTL 1000ML STRL

## (undated) DEVICE — SAFELINER SUCTION CANISTER 1000CC: Brand: DEROYAL

## (undated) DEVICE — ELECTRD BLD EZ CLN STD 6.5IN

## (undated) DEVICE — PK HIP TOTL UNIV 10

## (undated) DEVICE — PATIENT RETURN ELECTRODE, SINGLE-USE, CONTACT QUALITY MONITORING, ADULT, WITH 9FT CORD, FOR PATIENTS WEIGING OVER 33LBS. (15KG): Brand: MEGADYNE

## (undated) DEVICE — SYS MIX CLEARMIX SGL/DBL VAC

## (undated) DEVICE — GLV SURG PREMIERPRO MIC LTX PF SZ8 BRN

## (undated) DEVICE — SYR LUERLOK 50ML

## (undated) DEVICE — KT ORCA ORCAPOD DISP STRL

## (undated) DEVICE — 1010 S-DRAPE TOWEL DRAPE 10/BX: Brand: STERI-DRAPE™

## (undated) DEVICE — TRAP FLD MINIVAC MEGADYNE 100ML

## (undated) DEVICE — FEMORAL CANAL PRESSURIZER WITHOUT HUB, MEDIUM

## (undated) DEVICE — INTRO ACCSR BLNT TP

## (undated) DEVICE — BLANKT WARM UPPR/BDY ARM/OUT 57X196CM

## (undated) DEVICE — HYBRID CO2 TUBING/CAP SET FOR OLYMPUS® SCOPES & CO2 SOURCE: Brand: ERBE

## (undated) DEVICE — HEWSON SUTURE RETRIEVER: Brand: HEWSON SUTURE RETRIEVER

## (undated) DEVICE — SYS CLS SKIN PREMIERPRO EXOFINFUSION 22CM